# Patient Record
Sex: MALE | Race: WHITE | NOT HISPANIC OR LATINO | Employment: OTHER | URBAN - METROPOLITAN AREA
[De-identification: names, ages, dates, MRNs, and addresses within clinical notes are randomized per-mention and may not be internally consistent; named-entity substitution may affect disease eponyms.]

---

## 2017-01-11 ENCOUNTER — PATIENT MESSAGE (OUTPATIENT)
Dept: CARDIOLOGY | Facility: CLINIC | Age: 72
End: 2017-01-11

## 2017-01-15 RX ORDER — METOPROLOL TARTRATE 50 MG/1
TABLET ORAL
Qty: 180 TABLET | Refills: 3 | Status: SHIPPED | OUTPATIENT
Start: 2017-01-15 | End: 2018-01-02 | Stop reason: SDUPTHER

## 2017-01-16 ENCOUNTER — ANTI-COAG VISIT (OUTPATIENT)
Dept: CARDIOLOGY | Facility: CLINIC | Age: 72
End: 2017-01-16

## 2017-01-16 ENCOUNTER — LAB VISIT (OUTPATIENT)
Dept: LAB | Facility: HOSPITAL | Age: 72
End: 2017-01-16
Attending: INTERNAL MEDICINE
Payer: MEDICARE

## 2017-01-16 DIAGNOSIS — I25.10 CORONARY ARTERY DISEASE INVOLVING NATIVE CORONARY ARTERY OF NATIVE HEART WITHOUT ANGINA PECTORIS: ICD-10-CM

## 2017-01-16 DIAGNOSIS — E78.5 HLD (HYPERLIPIDEMIA): ICD-10-CM

## 2017-01-16 DIAGNOSIS — Z79.01 LONG TERM CURRENT USE OF ANTICOAGULANT THERAPY: ICD-10-CM

## 2017-01-16 DIAGNOSIS — I27.82 OTHER CHRONIC PULMONARY EMBOLISM WITHOUT ACUTE COR PULMONALE: ICD-10-CM

## 2017-01-16 LAB
ALT SERPL W/O P-5'-P-CCNC: 26 U/L
ANION GAP SERPL CALC-SCNC: 5 MMOL/L
AST SERPL-CCNC: 27 U/L
BUN SERPL-MCNC: 21 MG/DL
CALCIUM SERPL-MCNC: 9.3 MG/DL
CHLORIDE SERPL-SCNC: 106 MMOL/L
CHOLEST/HDLC SERPL: 3.5 {RATIO}
CO2 SERPL-SCNC: 27 MMOL/L
CREAT SERPL-MCNC: 0.8 MG/DL
EST. GFR  (AFRICAN AMERICAN): >60 ML/MIN/1.73 M^2
EST. GFR  (NON AFRICAN AMERICAN): >60 ML/MIN/1.73 M^2
GLUCOSE SERPL-MCNC: 113 MG/DL
HCT VFR BLD AUTO: 42.8 %
HDL/CHOLESTEROL RATIO: 28.7 %
HDLC SERPL-MCNC: 129 MG/DL
HDLC SERPL-MCNC: 37 MG/DL
HGB BLD-MCNC: 14.1 G/DL
INR PPP: 2.2
LDLC SERPL CALC-MCNC: 71.4 MG/DL
NONHDLC SERPL-MCNC: 92 MG/DL
POTASSIUM SERPL-SCNC: 4.7 MMOL/L
PROTHROMBIN TIME: 22.5 SEC
SODIUM SERPL-SCNC: 138 MMOL/L
TRIGL SERPL-MCNC: 103 MG/DL

## 2017-01-16 PROCEDURE — 80061 LIPID PANEL: CPT

## 2017-01-16 PROCEDURE — 36415 COLL VENOUS BLD VENIPUNCTURE: CPT

## 2017-01-16 PROCEDURE — 85014 HEMATOCRIT: CPT

## 2017-01-16 PROCEDURE — 85610 PROTHROMBIN TIME: CPT

## 2017-01-16 PROCEDURE — 84450 TRANSFERASE (AST) (SGOT): CPT

## 2017-01-16 PROCEDURE — 85018 HEMOGLOBIN: CPT

## 2017-01-16 PROCEDURE — 84460 ALANINE AMINO (ALT) (SGPT): CPT

## 2017-01-16 PROCEDURE — 80048 BASIC METABOLIC PNL TOTAL CA: CPT

## 2017-01-20 ENCOUNTER — OFFICE VISIT (OUTPATIENT)
Dept: CARDIOLOGY | Facility: CLINIC | Age: 72
End: 2017-01-20
Payer: MEDICARE

## 2017-01-20 VITALS
HEART RATE: 52 BPM | HEIGHT: 76 IN | WEIGHT: 310.44 LBS | SYSTOLIC BLOOD PRESSURE: 112 MMHG | BODY MASS INDEX: 37.8 KG/M2 | DIASTOLIC BLOOD PRESSURE: 56 MMHG

## 2017-01-20 DIAGNOSIS — Z79.01 LONG TERM CURRENT USE OF ANTICOAGULANT THERAPY: ICD-10-CM

## 2017-01-20 DIAGNOSIS — I10 ESSENTIAL HYPERTENSION: ICD-10-CM

## 2017-01-20 DIAGNOSIS — E78.2 MIXED HYPERLIPIDEMIA: ICD-10-CM

## 2017-01-20 DIAGNOSIS — I77.9 LEFT-SIDED CAROTID ARTERY DISEASE: ICD-10-CM

## 2017-01-20 DIAGNOSIS — Z95.1 S/P CABG X 2: ICD-10-CM

## 2017-01-20 DIAGNOSIS — I25.10 CORONARY ARTERY DISEASE INVOLVING NATIVE CORONARY ARTERY OF NATIVE HEART WITHOUT ANGINA PECTORIS: Primary | ICD-10-CM

## 2017-01-20 DIAGNOSIS — I26.99 OTHER PULMONARY EMBOLISM WITHOUT ACUTE COR PULMONALE, UNSPECIFIED CHRONICITY: ICD-10-CM

## 2017-01-20 PROCEDURE — 1157F ADVNC CARE PLAN IN RCRD: CPT | Mod: S$GLB,,, | Performed by: INTERNAL MEDICINE

## 2017-01-20 PROCEDURE — 3078F DIAST BP <80 MM HG: CPT | Mod: S$GLB,,, | Performed by: INTERNAL MEDICINE

## 2017-01-20 PROCEDURE — 1126F AMNT PAIN NOTED NONE PRSNT: CPT | Mod: S$GLB,,, | Performed by: INTERNAL MEDICINE

## 2017-01-20 PROCEDURE — 93000 ELECTROCARDIOGRAM COMPLETE: CPT | Mod: S$GLB,,, | Performed by: INTERNAL MEDICINE

## 2017-01-20 PROCEDURE — 99215 OFFICE O/P EST HI 40 MIN: CPT | Mod: S$GLB,,, | Performed by: INTERNAL MEDICINE

## 2017-01-20 PROCEDURE — 1159F MED LIST DOCD IN RCRD: CPT | Mod: S$GLB,,, | Performed by: INTERNAL MEDICINE

## 2017-01-20 PROCEDURE — 99499 UNLISTED E&M SERVICE: CPT | Mod: S$GLB,,, | Performed by: INTERNAL MEDICINE

## 2017-01-20 PROCEDURE — 3074F SYST BP LT 130 MM HG: CPT | Mod: S$GLB,,, | Performed by: INTERNAL MEDICINE

## 2017-01-20 PROCEDURE — 99999 PR PBB SHADOW E&M-EST. PATIENT-LVL III: CPT | Mod: PBBFAC,,, | Performed by: INTERNAL MEDICINE

## 2017-01-20 PROCEDURE — 1160F RVW MEDS BY RX/DR IN RCRD: CPT | Mod: S$GLB,,, | Performed by: INTERNAL MEDICINE

## 2017-01-20 NOTE — MR AVS SNAPSHOT
[place] [facility]   [facility]  [place] [address]  Phone: [phone]                  Del Anand   2017 8:30 AM   Office Visit    Description:  Male : 1945   Provider:  Blanquita Levin MD   Department:  [place] [facility]           Reason for Visit     Coronary Artery Disease           Diagnoses this Visit        Comments    Coronary artery disease involving native coronary artery of native heart without angina pectoris                To Do List           Future Appointments        Provider Department Dept Phone    2017 2:30 PM Stephanie Vega, PharmD [place] [facility] [phone]      Goals (5 Years of Data)     None      [facility] On Call     [facility] On Call Nurse Care Line -  Assistance  Registered nurses in the [facility] On Call Center provide clinical advisement, health education, appointment booking, and other advisory services.  Call for this free service at [phone].             Medications           Message regarding Medications     Verify the changes and/or additions to your medication regime listed below are the same as discussed with your clinician today.  If any of these changes or additions are incorrect, please notify your healthcare provider.        STOP taking these medications     hydrocortisone 2.5 % cream Apply topically 2 (two) times daily.    methylPREDNISolone (MEDROL, FINA,) 4 mg tablet use as directed           Verify that the below list of medications is an accurate representation of the medications you are currently taking.  If none reported, the list may be blank. If incorrect, please contact your healthcare provider. Carry this list with you in case of emergency.           Current Medications     acetaminophen (TYLENOL) 325 MG tablet Take 325 mg by mouth as needed for Pain.    cyanocobalamin (B-12 DOTS) 500 MCG tablet Take 500 mcg by mouth every morning. Every day    FOLIC ACID/MULTIVITS-MIN/LUT (CENTRUM SILVER  "ORAL) Take 1 tablet by mouth once daily.    metoprolol tartrate (LOPRESSOR) 50 MG tablet TAKE 1 TABLET (50 MG TOTAL) BY MOUTH 2 (TWO) TIMES DAILY.    ramipril (ALTACE) 5 MG capsule TAKE 1 CAPSULE ONE TIME DAILY    rosuvastatin (CRESTOR) 40 MG Tab Take 1 tablet (40 mg total) by mouth once daily.    warfarin (COUMADIN) 10 MG tablet TAKE AS DIRECTED  BY  COUMADIN  CLINIC (CURRENT DOSE IS 10MG DAILY EXCEPT 15MG ON MONDAY,WEDNESDAY AND FRIDAY)     warfarin (COUMADIN) 5 MG tablet Take as directed by Coumadin Clinic. Current dose is 10mg daily except 15mg Monday, Wednesday, Friday (90 day supply)           Clinical Reference Information           Vital Signs - Last Recorded  Most recent update: 1/20/2017  8:42 AM by Sara Patterson LPN    BP Pulse Ht Wt BMI    (!) 112/56 (!) 52 6' 4" (1.93 m) (!) 140.8 kg (310 lb 6.5 oz) 37.78 kg/m2      Blood Pressure          Most Recent Value    BP  (!)  112/56      Allergies as of 1/20/2017     No Known Allergies      Immunizations Administered on Date of Encounter - 1/20/2017     None      Orders Placed During Today's Visit      Normal Orders This Visit    EKG 12-lead       Instructions    Component      Latest Ref Rng & Units 1/16/2017 3/28/2016 10/7/2015 7/31/2015                Cholesterol      120 - 199 mg/dL 129 188 160 177   Triglycerides      30 - 150 mg/dL 103 230 (H) 223 (H) 186 (H)   HDL      40 - 75 mg/dL 37 (L) 42 40 45   LDL Cholesterol      63.0 - 159.0 mg/dL 71.4 100.0 75.4 94.8   HDL/Chol Ratio      20.0 - 50.0 % 28.7 22.3 25.0 25.4     Component      Latest Ref Rng & Units 6/24/2015 3/16/2015 1/14/2015 1/7/2015              8:25 AM   Cholesterol      120 - 199 mg/dL 152 139 118 (L) 122   Triglycerides      30 - 150 mg/dL 171 (H) 102 102 97   HDL      40 - 75 mg/dL 43 44 31 (L) 39 (L)   LDL Cholesterol      63.0 - 159.0 mg/dL 74.8 74.6 66.6 63.6   HDL/Chol Ratio      20.0 - 50.0 % 28.3 31.7 26.3 32.0     Component      Latest Ref Rng & Units 1/7/2015 12/16/2014 6/3/2014 " 12/4/2013           8:25 AM      Cholesterol      120 - 199 mg/dL 122 123 177 169   Triglycerides      30 - 150 mg/dL 97 209 (H) 196 (H) 185 (H)   HDL      40 - 75 mg/dL 39 (L) 26 (L) 44 44   LDL Cholesterol      63.0 - 159.0 mg/dL 63.6 55.2 (L) 93.8 88.0   HDL/Chol Ratio      20.0 - 50.0 % 32.0 21.1 24.9 26.0     Component      Latest Ref Rng & Units 5/24/2013 6/1/2012              Cholesterol      120 - 199 mg/dL 177 174   Triglycerides      30 - 150 mg/dL 167 (H) 162 (H)   HDL      40 - 75 mg/dL 44 46   LDL Cholesterol      63.0 - 159.0 mg/dL 100.0 96.0   HDL/Chol Ratio      20.0 - 50.0 % 24.9 26.4         LDL - bad type - improves with diet and medications: typically statins; most other medications can lower LDL but have not been proven to prevent heart attacks.             May not improve significantly with exercise alone.             Ideally less than 70     HDL - good type - improves with exercise             Ideally greater than 50    TGs (triglycerides) - also bad - can change very quickly and considerably with food - improves with diet and exercise            In some cases a low carbohydrate diet will lower TGs better than a low fat diet.            Ideal range       Sugar, fat and cholesterol in food:     Current dietary guidelines recommend drastically reducing our intake of sugar. There is less emphasis on fat. And cholesterol in our food is no longer a significant concern. However please do not confuse this with the role of cholesterol in our blood and arteries. It is still cholesterol that clogs up our arteries whether it comes from our food or is manufactured by our bodies.       Most foods that are high in cholesterol are also high in saturated fat. But there is way more saturated fat than cholesterol in these foods. On the other hand there are a handful of foods that are high in cholesterol but do not contain much saturated fat: eggs, shrimp, crab legs and crawfish; these are OK to eat.        Saturated fat is the bad fat - you should limit your intake of this. Deep fried foods, meats and other animal fats are high in saturated fat. Cookies, donuts and most dessert and cakes are usually high in both saturated fat and sugar.       Unsaturated fat is the good fat. It contains the same number of calories as saturated fat but does not get deposited in our arteries. The Mediterranean style diet encourages the intake of unsaturated fat - olive oil, avocado and unsalted nuts.      You should eat a few servings of vegetables (and fruit as long as you are not diabetic) everyday. Substitute some plant proteins in place of meat: soy, beans, lentils, quinoa and oatmeal.     Do not use stick butter or stick margarine. Butter that comes in a tub is soft butter. It consists of 1/2 butter and 1/2 canola or another type of vegetable oil. It is fine to use that.       Trans fats should also be avoided. Most foods that are labelled as containing 0 gms of trans fat can still contain several hundred milligrams of trans fat: creamer, margarine, refrigerator dough, deep fried foods, ready made frosting, potato, corn and torilla chips, cakes, cookies, pie crusts and crackers containing shortening made with hydrogenated vegetable oil.

## 2017-01-20 NOTE — PATIENT INSTRUCTIONS
Component      Latest Ref Rng & Units 1/16/2017 3/28/2016 10/7/2015 7/31/2015                Cholesterol      120 - 199 mg/dL 129 188 160 177   Triglycerides      30 - 150 mg/dL 103 230 (H) 223 (H) 186 (H)   HDL      40 - 75 mg/dL 37 (L) 42 40 45   LDL Cholesterol      63.0 - 159.0 mg/dL 71.4 100.0 75.4 94.8   HDL/Chol Ratio      20.0 - 50.0 % 28.7 22.3 25.0 25.4     Component      Latest Ref Rng & Units 6/24/2015 3/16/2015 1/14/2015 1/7/2015              8:25 AM   Cholesterol      120 - 199 mg/dL 152 139 118 (L) 122   Triglycerides      30 - 150 mg/dL 171 (H) 102 102 97   HDL      40 - 75 mg/dL 43 44 31 (L) 39 (L)   LDL Cholesterol      63.0 - 159.0 mg/dL 74.8 74.6 66.6 63.6   HDL/Chol Ratio      20.0 - 50.0 % 28.3 31.7 26.3 32.0     Component      Latest Ref Rng & Units 1/7/2015 12/16/2014 6/3/2014 12/4/2013           8:25 AM      Cholesterol      120 - 199 mg/dL 122 123 177 169   Triglycerides      30 - 150 mg/dL 97 209 (H) 196 (H) 185 (H)   HDL      40 - 75 mg/dL 39 (L) 26 (L) 44 44   LDL Cholesterol      63.0 - 159.0 mg/dL 63.6 55.2 (L) 93.8 88.0   HDL/Chol Ratio      20.0 - 50.0 % 32.0 21.1 24.9 26.0     Component      Latest Ref Rng & Units 5/24/2013 6/1/2012              Cholesterol      120 - 199 mg/dL 177 174   Triglycerides      30 - 150 mg/dL 167 (H) 162 (H)   HDL      40 - 75 mg/dL 44 46   LDL Cholesterol      63.0 - 159.0 mg/dL 100.0 96.0   HDL/Chol Ratio      20.0 - 50.0 % 24.9 26.4         LDL - bad type - improves with diet and medications: typically statins; most other medications can lower LDL but have not been proven to prevent heart attacks.             May not improve significantly with exercise alone.             Ideally less than 70     HDL - good type - improves with exercise             Ideally greater than 50    TGs (triglycerides) - also bad - can change very quickly and considerably with food - improves with diet and exercise            In some cases a low carbohydrate diet will lower  TGs better than a low fat diet.            Ideal range       Sugar, fat and cholesterol in food:     Current dietary guidelines recommend drastically reducing our intake of sugar. There is less emphasis on fat. And cholesterol in our food is no longer a significant concern. However please do not confuse this with the role of cholesterol in our blood and arteries. It is still cholesterol that clogs up our arteries whether it comes from our food or is manufactured by our bodies.       Most foods that are high in cholesterol are also high in saturated fat. But there is way more saturated fat than cholesterol in these foods. On the other hand there are a handful of foods that are high in cholesterol but do not contain much saturated fat: eggs, shrimp, crab legs and crawfish; these are OK to eat.       Saturated fat is the bad fat - you should limit your intake of this. Deep fried foods, meats and other animal fats are high in saturated fat. Cookies, donuts and most dessert and cakes are usually high in both saturated fat and sugar.       Unsaturated fat is the good fat. It contains the same number of calories as saturated fat but does not get deposited in our arteries. The Mediterranean style diet encourages the intake of unsaturated fat - olive oil, avocado and unsalted nuts.      You should eat a few servings of vegetables (and fruit as long as you are not diabetic) everyday. Substitute some plant proteins in place of meat: soy, beans, lentils, quinoa and oatmeal.     Do not use stick butter or stick margarine. Butter that comes in a tub is soft butter. It consists of 1/2 butter and 1/2 canola or another type of vegetable oil. It is fine to use that.       Trans fats should also be avoided. Most foods that are labelled as containing 0 gms of trans fat can still contain several hundred milligrams of trans fat: creamer, margarine, refrigerator dough, deep fried foods, ready made frosting, potato, corn and torilla  chips, cakes, cookies, pie crusts and crackers containing shortening made with hydrogenated vegetable oil.

## 2017-01-20 NOTE — PROGRESS NOTES
Subjective:   Patient ID:  Del Anand is a 71 y.o. male who presents for follow-up of Coronary Artery Disease      Problem List:  CAD   CABG x 2 - SVG-LAD, SVG-ramus   HTN  Hyperlipidemia   Obesity   Prior tobacco use - 1ppd x 20 yrs (quit 1987)   Pulm embolism 1/15   DVT (R) leg while on rivaroxaban     HPI:   Del Anand feels generally well.   He remains on warfarin, because he had a DVT while on rivaroxaban. He does not report excessive bruising, nose bleeds, melena or other bleeding from other sites.  He does not report angina or shortness of breath with exertion. He has not required S/L NTG.    Exercising at [employer].  He has a tooth that may need to be extracted at a later date.  On 40 mg of rosuvastatin LDL is <70. Hepatic transaminases are within normal limits.       Review of Systems   Constitution: Positive for weight gain. Negative for malaise/fatigue and weight loss.   Cardiovascular: Negative for chest pain, dyspnea on exertion, leg swelling and palpitations.   Hematologic/Lymphatic: Does not bruise/bleed easily.   Musculoskeletal: Negative for arthritis and muscle cramps.   Gastrointestinal: Negative for abdominal pain and melena.       Current Outpatient Prescriptions   Medication Sig    acetaminophen (TYLENOL) 325 MG tablet Take 325 mg by mouth as needed for Pain.    cyanocobalamin (B-12 DOTS) 500 MCG tablet Take 500 mcg by mouth every morning. Every day    FOLIC ACID/MULTIVITS-MIN/LUT (CENTRUM SILVER ORAL) Take 1 tablet by mouth once daily.    metoprolol tartrate (LOPRESSOR) 50 MG tablet TAKE 1 TABLET (50 MG TOTAL) BY MOUTH 2 (TWO) TIMES DAILY.    ramipril (ALTACE) 5 MG capsule TAKE 1 CAPSULE ONE TIME DAILY    rosuvastatin (CRESTOR) 40 MG Tab Take 1 tablet (40 mg total) by mouth once daily.    warfarin (COUMADIN) 10 MG tablet TAKE AS DIRECTED  BY  COUMADIN  CLINIC          Social History   Substance Use Topics    Smoking status: Former Smoker     Packs/day: 1.00      "Years: 20.00     Types: Cigarettes     Quit date: 10/16/1987    Smokeless tobacco: Former User    Alcohol use 0.0 oz/week     1 - 2 Cans of beer, 1 - 2 Standard drinks or equivalent per week      Comment: daily         Objective:     Physical Exam   Constitutional: He is oriented to person, place, and time. He appears well-developed and well-nourished.   BP (!) 112/56  Pulse (!) 52  Ht 6' 4" (1.93 m)  Wt (!) 140.8 kg (310 lb 6.5 oz)  BMI 37.78 kg/m2     HENT:   Head: Normocephalic.   Neck: No JVD present.   Cardiovascular: Normal rate, regular rhythm, S1 normal and S2 normal.    No murmur heard.  Pulses:       Radial pulses are 2+ on the right side, and 2+ on the left side.   Pulmonary/Chest: Breath sounds normal. Chest wall is not dull to percussion.   Abdominal: Soft. He exhibits no mass. There is no splenomegaly or hepatomegaly.   Musculoskeletal:        Right lower leg: He exhibits no edema.        Left lower leg: He exhibits no edema.   Neurological: He is alert and oriented to person, place, and time. Gait normal.   Skin: No bruising noted. Nails show no clubbing.   Psychiatric: He has a normal mood and affect. His speech is normal and behavior is normal.           Lab Results   Component Value Date    CHOL 129 01/16/2017    HDL 37 (L) 01/16/2017    LDLCALC 71.4 01/16/2017    TRIG 103 01/16/2017    CHOLHDL 28.7 01/16/2017     Lab Results   Component Value Date     (H) 01/16/2017    CREATININE 0.8 01/16/2017    BUN 21 01/16/2017     01/16/2017    K 4.7 01/16/2017     01/16/2017    CO2 27 01/16/2017     Lab Results   Component Value Date    ALT 26 01/16/2017    AST 27 01/16/2017    ALKPHOS 83 03/28/2016    BILITOT 0.7 03/28/2016       ECG today reviewed by me. It reveals sinus rhythm with no ST or T abnormality.      Assessment:     1. Coronary artery disease involving native coronary artery of native heart without angina pectoris    2. Mixed hyperlipidemia    3. Pulmonary embolism    4. S/P " CABG x 2    5. Essential hypertension    6. Long term current use of anticoagulant therapy    7. Left-sided carotid artery disease          Plan:     Coronary artery disease  Stable. Continue current meds.    Essential hypertension  Check at home. Also verify accuracy of home BP monitor.    Mixed hyperlipidemia  Stable.  Continue same meds.   Cholesterol education.  Nutritional counseling.    Left-sided carotid artery disease  Repeat carotid u/s at next visit.    RTC 10/17.

## 2017-01-24 ENCOUNTER — TELEPHONE (OUTPATIENT)
Dept: CARDIOLOGY | Facility: CLINIC | Age: 72
End: 2017-01-24

## 2017-01-24 VITALS — DIASTOLIC BLOOD PRESSURE: 64 MMHG | SYSTOLIC BLOOD PRESSURE: 119 MMHG

## 2017-01-24 NOTE — PROGRESS NOTES
Patient, Del Anand (MRN #[id number]), presented with a recorded BMI of 37.78 kg/m^2 and a documented comorbidity(s):  - Hypertension  - Hyperlipidemia  to which the severe obesity is a contributing factor. This is consistent with the definition of severe obesity (BMI 35.0-35.9) with comorbidity (ICD-10 E66.01, Z68.35). The patient's severe obesity was monitored, evaluated, addressed and/or treated. This addendum to the medical record is made on 01/24/2017.

## 2017-02-13 ENCOUNTER — ANTI-COAG VISIT (OUTPATIENT)
Dept: CARDIOLOGY | Facility: CLINIC | Age: 72
End: 2017-02-13
Payer: MEDICARE

## 2017-02-13 DIAGNOSIS — Z79.01 LONG TERM CURRENT USE OF ANTICOAGULANT THERAPY: ICD-10-CM

## 2017-02-13 LAB — INR PPP: 2.9 (ref 2–3)

## 2017-02-13 PROCEDURE — 99211 OFF/OP EST MAY X REQ PHY/QHP: CPT | Mod: 25,S$GLB,,

## 2017-02-13 PROCEDURE — 85610 PROTHROMBIN TIME: CPT | Mod: QW,S$GLB,,

## 2017-02-13 NOTE — PROGRESS NOTES
Patient started Lopressor. This change in medication won't affect warfarin. He is stable on this dose. He will continue this dose until follow-up. I advised him to contact us with any changes or problems.

## 2017-03-09 NOTE — PROGRESS NOTES
Patient called complaining about the co-payments for a face to face visit. He is going to look into his options for a DOAC and contact us back.

## 2017-04-03 RX ORDER — RAMIPRIL 5 MG/1
5 CAPSULE ORAL DAILY
Qty: 90 CAPSULE | Refills: 1 | Status: SHIPPED | OUTPATIENT
Start: 2017-04-03 | End: 2017-10-01 | Stop reason: SDUPTHER

## 2017-04-04 DIAGNOSIS — E78.2 MIXED HYPERLIPIDEMIA: ICD-10-CM

## 2017-04-04 DIAGNOSIS — R73.9 HYPERGLYCEMIA: Primary | ICD-10-CM

## 2017-04-04 DIAGNOSIS — I10 UNSPECIFIED ESSENTIAL HYPERTENSION: ICD-10-CM

## 2017-04-17 ENCOUNTER — PATIENT MESSAGE (OUTPATIENT)
Dept: CARDIOLOGY | Facility: CLINIC | Age: 72
End: 2017-04-17

## 2017-04-17 RX ORDER — ROSUVASTATIN CALCIUM 40 MG/1
40 TABLET, COATED ORAL DAILY
Qty: 90 TABLET | Refills: 3 | Status: SHIPPED | OUTPATIENT
Start: 2017-04-17 | End: 2018-04-16 | Stop reason: SDUPTHER

## 2017-04-24 ENCOUNTER — ANTI-COAG VISIT (OUTPATIENT)
Dept: CARDIOLOGY | Facility: CLINIC | Age: 72
End: 2017-04-24
Payer: MEDICARE

## 2017-04-24 DIAGNOSIS — Z79.01 LONG TERM CURRENT USE OF ANTICOAGULANT THERAPY: Primary | ICD-10-CM

## 2017-04-24 LAB — INR PPP: 3.5 (ref 2–3)

## 2017-04-24 PROCEDURE — 85610 PROTHROMBIN TIME: CPT | Mod: QW,S$GLB,,

## 2017-04-24 PROCEDURE — 99211 OFF/OP EST MAY X REQ PHY/QHP: CPT | Mod: 25,S$GLB,,

## 2017-04-24 NOTE — PROGRESS NOTES
Patient will eat greens tonight to help reduce his INR. This change in diet will prevent his INR from continuing to climb. Patient is stable on this dose. He will continue this dose until follow-up. I advised him and his wife to contact us with any changes or problems.

## 2017-06-05 ENCOUNTER — PATIENT MESSAGE (OUTPATIENT)
Dept: CARDIOLOGY | Facility: CLINIC | Age: 72
End: 2017-06-05

## 2017-06-06 ENCOUNTER — LAB VISIT (OUTPATIENT)
Dept: LAB | Facility: HOSPITAL | Age: 72
End: 2017-06-06
Attending: INTERNAL MEDICINE
Payer: MEDICARE

## 2017-06-06 ENCOUNTER — ANTI-COAG VISIT (OUTPATIENT)
Dept: CARDIOLOGY | Facility: CLINIC | Age: 72
End: 2017-06-06

## 2017-06-06 DIAGNOSIS — Z79.01 LONG TERM CURRENT USE OF ANTICOAGULANT THERAPY: ICD-10-CM

## 2017-06-06 DIAGNOSIS — E11.9 TYPE 2 DIABETES MELLITUS WITHOUT COMPLICATION: ICD-10-CM

## 2017-06-06 DIAGNOSIS — I10 UNSPECIFIED ESSENTIAL HYPERTENSION: ICD-10-CM

## 2017-06-06 DIAGNOSIS — R73.9 HYPERGLYCEMIA: ICD-10-CM

## 2017-06-06 DIAGNOSIS — D64.9 ANEMIA: ICD-10-CM

## 2017-06-06 DIAGNOSIS — E78.2 MIXED HYPERLIPIDEMIA: ICD-10-CM

## 2017-06-06 LAB
ALBUMIN SERPL BCP-MCNC: 3.6 G/DL
ALP SERPL-CCNC: 83 U/L
ALT SERPL W/O P-5'-P-CCNC: 35 U/L
ANION GAP SERPL CALC-SCNC: 7 MMOL/L
AST SERPL-CCNC: 32 U/L
BASOPHILS # BLD AUTO: 0.04 K/UL
BASOPHILS NFR BLD: 0.7 %
BILIRUB SERPL-MCNC: 0.7 MG/DL
BUN SERPL-MCNC: 22 MG/DL
CALCIUM SERPL-MCNC: 8.6 MG/DL
CHLORIDE SERPL-SCNC: 104 MMOL/L
CHOLEST/HDLC SERPL: 3.6 {RATIO}
CO2 SERPL-SCNC: 26 MMOL/L
CREAT SERPL-MCNC: 0.8 MG/DL
DIFFERENTIAL METHOD: ABNORMAL
EOSINOPHIL # BLD AUTO: 0.1 K/UL
EOSINOPHIL NFR BLD: 2.1 %
ERYTHROCYTE [DISTWIDTH] IN BLOOD BY AUTOMATED COUNT: 13.4 %
EST. GFR  (AFRICAN AMERICAN): >60 ML/MIN/1.73 M^2
EST. GFR  (NON AFRICAN AMERICAN): >60 ML/MIN/1.73 M^2
ESTIMATED AVG GLUCOSE: 126 MG/DL
GLUCOSE SERPL-MCNC: 115 MG/DL
HBA1C MFR BLD HPLC: 6 %
HCT VFR BLD AUTO: 41.5 %
HDL/CHOLESTEROL RATIO: 27.5 %
HDLC SERPL-MCNC: 142 MG/DL
HDLC SERPL-MCNC: 39 MG/DL
HGB BLD-MCNC: 13.6 G/DL
INR PPP: 2.3
IRON SERPL-MCNC: 84 UG/DL
LDLC SERPL CALC-MCNC: 69.4 MG/DL
LYMPHOCYTES # BLD AUTO: 1.9 K/UL
LYMPHOCYTES NFR BLD: 34.5 %
MCH RBC QN AUTO: 32.3 PG
MCHC RBC AUTO-ENTMCNC: 32.8 %
MCV RBC AUTO: 99 FL
MONOCYTES # BLD AUTO: 0.5 K/UL
MONOCYTES NFR BLD: 9.1 %
NEUTROPHILS # BLD AUTO: 2.9 K/UL
NEUTROPHILS NFR BLD: 53.6 %
NONHDLC SERPL-MCNC: 103 MG/DL
PLATELET # BLD AUTO: 167 K/UL
PMV BLD AUTO: 11.1 FL
POTASSIUM SERPL-SCNC: 4.5 MMOL/L
PROT SERPL-MCNC: 6.8 G/DL
PROTHROMBIN TIME: 22.9 SEC
RBC # BLD AUTO: 4.21 M/UL
SATURATED IRON: 23 %
SODIUM SERPL-SCNC: 137 MMOL/L
TOTAL IRON BINDING CAPACITY: 371 UG/DL
TRANSFERRIN SERPL-MCNC: 251 MG/DL
TRIGL SERPL-MCNC: 168 MG/DL
TSH SERPL DL<=0.005 MIU/L-ACNC: 2.1 UIU/ML
WBC # BLD AUTO: 5.36 K/UL

## 2017-06-06 PROCEDURE — 80053 COMPREHEN METABOLIC PANEL: CPT

## 2017-06-06 PROCEDURE — 36415 COLL VENOUS BLD VENIPUNCTURE: CPT | Mod: PO

## 2017-06-06 PROCEDURE — 80061 LIPID PANEL: CPT

## 2017-06-06 PROCEDURE — 85025 COMPLETE CBC W/AUTO DIFF WBC: CPT

## 2017-06-06 PROCEDURE — 83036 HEMOGLOBIN GLYCOSYLATED A1C: CPT

## 2017-06-06 PROCEDURE — 84443 ASSAY THYROID STIM HORMONE: CPT

## 2017-06-06 PROCEDURE — 83540 ASSAY OF IRON: CPT

## 2017-06-06 PROCEDURE — 85610 PROTHROMBIN TIME: CPT

## 2017-06-06 NOTE — PROGRESS NOTES
Patient called to report that his next lab appointment is due 6/23/17 but had other labs scheduled for today and states his INR was also drawn, appointment from 6/23 was rescheduled to today-6/06/17

## 2017-06-07 ENCOUNTER — TELEPHONE (OUTPATIENT)
Dept: INTERNAL MEDICINE | Facility: CLINIC | Age: 72
End: 2017-06-07

## 2017-06-07 NOTE — TELEPHONE ENCOUNTER
----- Message from Amber Huertas MD sent at 6/7/2017  7:46 AM CDT -----  Please review your lab work and we will discuss at your pending office visit.  Amber Jimenez

## 2017-06-07 NOTE — PROGRESS NOTES
"Patient upset about having to go to [facility] [place]. He reports that he spoke to the "girl" at the [facility] lab and she advised him that they have lots of patients going there for INR testing. I explained our protocol to him again about point of care face to face management and added that despite this he  has been given the opportunity to alternate between the [facility] and [facility]. I also advised that it is out goal to only have him check his INR every 8 weeks once he has safe and stable INRs. In addition, he is not interested in a home meter since this is a "hassle".  He is still upset about the copay of $20 and would like to speak to management. He will call for Stephanie Vega when she is in office tomorrow. "This note will not be shared with the patient."  "

## 2017-06-13 ENCOUNTER — OFFICE VISIT (OUTPATIENT)
Dept: INTERNAL MEDICINE | Facility: CLINIC | Age: 72
End: 2017-06-13
Payer: MEDICARE

## 2017-06-13 VITALS
HEIGHT: 75 IN | SYSTOLIC BLOOD PRESSURE: 110 MMHG | TEMPERATURE: 98 F | BODY MASS INDEX: 38.71 KG/M2 | HEART RATE: 81 BPM | WEIGHT: 311.31 LBS | OXYGEN SATURATION: 97 % | DIASTOLIC BLOOD PRESSURE: 72 MMHG | RESPIRATION RATE: 16 BRPM

## 2017-06-13 DIAGNOSIS — I10 ESSENTIAL HYPERTENSION: ICD-10-CM

## 2017-06-13 DIAGNOSIS — I77.9 LEFT-SIDED CAROTID ARTERY DISEASE: ICD-10-CM

## 2017-06-13 DIAGNOSIS — E78.2 MIXED HYPERLIPIDEMIA: ICD-10-CM

## 2017-06-13 DIAGNOSIS — Z00.00 ANNUAL PHYSICAL EXAM: Primary | ICD-10-CM

## 2017-06-13 DIAGNOSIS — R73.01 IMPAIRED FASTING BLOOD SUGAR: ICD-10-CM

## 2017-06-13 DIAGNOSIS — G47.33 OSA ON CPAP: ICD-10-CM

## 2017-06-13 PROCEDURE — 99999 PR PBB SHADOW E&M-EST. PATIENT-LVL III: CPT | Mod: PBBFAC,,, | Performed by: INTERNAL MEDICINE

## 2017-06-13 PROCEDURE — 99397 PER PM REEVAL EST PAT 65+ YR: CPT | Mod: S$GLB,,, | Performed by: INTERNAL MEDICINE

## 2017-06-13 PROCEDURE — 99499 UNLISTED E&M SERVICE: CPT | Mod: S$GLB,,, | Performed by: INTERNAL MEDICINE

## 2017-06-13 NOTE — PROGRESS NOTES
72-year-old gentleman presents  for Annual PE   Ex- smoker having quit over 20 years ago,  Social alcohol consumer.     SCREENING TESTS:   Cholesterol/lab, reviewed today   US carotids 40-49%  Colonoscopy 08/17/11, with Dr. Martinez. He had 1 polyp;hyperplastic .  PSA 0.21.( last check)   Eye exam and dental work are up-to-date.    VACCINATIONS:  Zostavax, ~ 5 yrs ago  TDAP, 12/2013  Pneumovax, 10/2012  Prevnar, 2015  Flu vaccine., yearly    MedCard: Reviewed.     REVIEW OF SYSTEMS:   ++ weight , interested in diet- reviewed Weight Watchers  No fever, chill, or night sweats  No dysphagia or early satiety  No change in bowel or bladder function.  Not having increased thirst or urination.  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 dementia  Mental Health: Good   AD's: + will, living will, and POA  Nutrition: Good  Gait: No falls  Safety: Intact  Urinary incontinence: n/a, nocturia- rare; ++ urinary hesitency  Remainder of review negative except as previously noted.     PAST MEDICAL HISTORY:   Dyslipidemia.  Hypertension  Elevated LFTs,   Impaired fasting glucose/prediabetes  Metabolic syndrome  Fatty liver  Anemia with workup by hem/onc  unremarkable.   DJD, knees and hips  Renal stones,   Sinus and rhinitis.  Colon  polyps   DVT  Pulmonary embolism    PHYSICAL EXAM:   VSS:   GENERAL: Alert and oriented, in no acute distress. Well-developed,   well-nourished, obese gentleman, conversant, and cooperative. Pleasant as always  EYES: Conjunctivae and lids unremarkable. Sclerae anicteric.   Pupils reactive.   ENT:Canals w/o significant cerumen, TMS- unremarkable   Nasal mucosa, tturbinates, oropharynx injected w/o  exudate  Sinuses nontender.   NECK: Supple. No thyromegaly or lymphadenopathy.   RESPIRATORY: Efforts unlabored.   LUNGS: Clear to auscultation.   HEART: Regular rate and rhythm. No carotid bruits,   1+ pedal pulse. No edema.   ABDOMEN: Bowel sounds present, soft, nontender.   No hepatosplenomegaly appreciated.   MUSCULOSKELETAL: Gait normal.   Hands OA, right >> left  Left knee, decreased ROM, + crepitus, +  Tenderness, no instaibily  No clubbing, cyanosis, or edema.  NEURO: BECK. No tremor noted.  SKIN: Warm and dry      IMPRESSION:     Annual PE.     Family history of cardiovascular disease.     CAD, s/p bypass    Ischemic cardiomyopathy, asx  . Hypertension, stable.     Dyslipidemia, stable     Impaired fasting blood sugar/prediabetes, stable.     Dysmetabolic syndrome/obesity.     Fatty liver    Carotid ds, left 40-59%    DVT, right, on chronic anticoagulation    Pulmonary Embolism, on chronic anticoagulation    Anemia, improved    VERONICA, on CPAP    Obesity, BMI 38.91    PLAN:  Carotid US- pending  Reviewed Weight Watchers Program  Continue diet and weight loss  Continue present meds  Diet, reviewed avoid sugar and simple carbs to help w/ triglycerides  Exercise, continue gym and golfing    Call prn  RTC 6 mos

## 2017-06-27 ENCOUNTER — ANTI-COAG VISIT (OUTPATIENT)
Dept: CARDIOLOGY | Facility: CLINIC | Age: 72
End: 2017-06-27
Payer: MEDICARE

## 2017-06-27 DIAGNOSIS — Z79.01 LONG TERM CURRENT USE OF ANTICOAGULANT THERAPY: Primary | ICD-10-CM

## 2017-06-27 LAB — INR PPP: 2.7 (ref 2–3)

## 2017-06-27 PROCEDURE — 99211 OFF/OP EST MAY X REQ PHY/QHP: CPT | Mod: 25,S$GLB,,

## 2017-06-27 PROCEDURE — 85610 PROTHROMBIN TIME: CPT | Mod: QW,S$GLB,,

## 2017-06-27 NOTE — PROGRESS NOTES
Today is quick follow up for subtherapeutic INR.  INR has returned to therapeutic range.  Patient has minor cuts/bruises from use.  Patient denies changes to diet, medications, or health that would affect INR.  Patient will continue weekly warfarin regimen at this time.  Resume 6 week monitoring.  Patient advised to contact clinic with any changes, questions, or concerns.

## 2017-08-08 ENCOUNTER — LAB VISIT (OUTPATIENT)
Dept: LAB | Facility: HOSPITAL | Age: 72
End: 2017-08-08
Attending: INTERNAL MEDICINE
Payer: MEDICARE

## 2017-08-08 ENCOUNTER — ANTI-COAG VISIT (OUTPATIENT)
Dept: CARDIOLOGY | Facility: CLINIC | Age: 72
End: 2017-08-08

## 2017-08-08 DIAGNOSIS — Z79.01 LONG TERM CURRENT USE OF ANTICOAGULANT THERAPY: ICD-10-CM

## 2017-08-08 LAB
INR PPP: 2.7
PROTHROMBIN TIME: 27.3 SEC

## 2017-08-08 PROCEDURE — 85610 PROTHROMBIN TIME: CPT

## 2017-08-08 PROCEDURE — 36415 COLL VENOUS BLD VENIPUNCTURE: CPT | Mod: PO

## 2017-08-18 ENCOUNTER — TELEPHONE (OUTPATIENT)
Dept: INTERNAL MEDICINE | Facility: CLINIC | Age: 72
End: 2017-08-18

## 2017-08-18 DIAGNOSIS — R35.1 NOCTURIA: ICD-10-CM

## 2017-08-18 DIAGNOSIS — R73.03 PREDIABETES: Primary | ICD-10-CM

## 2017-08-18 NOTE — TELEPHONE ENCOUNTER
----- Message from Ana Feliciano sent at 8/18/2017  2:48 PM CDT -----  Contact: Self [phone]  Doctor appointment and lab have been scheduled.  Please link lab orders to the lab appointment.  Date of doctor appointment:  12/12  Physical or EP:  EP  Date of lab appointment:  12/04  Comments:

## 2017-09-22 ENCOUNTER — ANTI-COAG VISIT (OUTPATIENT)
Dept: CARDIOLOGY | Facility: CLINIC | Age: 72
End: 2017-09-22
Payer: MEDICARE

## 2017-09-22 DIAGNOSIS — Z79.01 LONG TERM CURRENT USE OF ANTICOAGULANT THERAPY: ICD-10-CM

## 2017-09-22 LAB — INR PPP: 2.4 (ref 2–3)

## 2017-09-22 PROCEDURE — 99211 OFF/OP EST MAY X REQ PHY/QHP: CPT | Mod: 25,S$GLB,,

## 2017-09-22 PROCEDURE — 85610 PROTHROMBIN TIME: CPT | Mod: QW,S$GLB,,

## 2017-10-01 RX ORDER — RAMIPRIL 5 MG/1
CAPSULE ORAL
Qty: 90 CAPSULE | Refills: 1 | Status: SHIPPED | OUTPATIENT
Start: 2017-10-01 | End: 2018-04-16 | Stop reason: SDUPTHER

## 2017-10-01 RX ORDER — WARFARIN 10 MG/1
TABLET ORAL
Qty: 111 TABLET | Refills: 3 | Status: ON HOLD | OUTPATIENT
Start: 2017-10-01 | End: 2019-03-13 | Stop reason: SDUPTHER

## 2017-10-17 ENCOUNTER — OFFICE VISIT (OUTPATIENT)
Dept: OPTOMETRY | Facility: CLINIC | Age: 72
End: 2017-10-17
Payer: MEDICARE

## 2017-10-17 DIAGNOSIS — H25.13 NUCLEAR SCLEROSIS, BILATERAL: Primary | ICD-10-CM

## 2017-10-17 DIAGNOSIS — H52.4 ASTIGMATISM WITH PRESBYOPIA, BILATERAL: ICD-10-CM

## 2017-10-17 DIAGNOSIS — H52.203 ASTIGMATISM WITH PRESBYOPIA, BILATERAL: ICD-10-CM

## 2017-10-17 PROCEDURE — 99999 PR PBB SHADOW E&M-EST. PATIENT-LVL II: CPT | Mod: PBBFAC,,, | Performed by: OPTOMETRIST

## 2017-10-17 PROCEDURE — 92014 COMPRE OPH EXAM EST PT 1/>: CPT | Mod: S$GLB,,, | Performed by: OPTOMETRIST

## 2017-10-17 PROCEDURE — 92015 DETERMINE REFRACTIVE STATE: CPT | Mod: S$GLB,,, | Performed by: OPTOMETRIST

## 2017-10-17 NOTE — PROGRESS NOTES
HPI     Blur ou at dist, x mos, no assoc pain or red, no relief over time,   constant    Last edited by Christophe Gentile, OD on 10/17/2017  3:52 PM. (History)              Assessment /Plan     For exam results, see Encounter Report.    Nuclear sclerosis, bilateral    Astigmatism with presbyopia, bilateral      1. Educated pt on presence of cataracts and effects on vision. No surgery at this time. Recheck in one year.  2. Spec Rx given. Different lens options discussed with patient. RTC 1 year full exam.

## 2017-10-20 ENCOUNTER — TELEPHONE (OUTPATIENT)
Dept: OPHTHALMOLOGY | Facility: CLINIC | Age: 72
End: 2017-10-20

## 2017-11-03 ENCOUNTER — LAB VISIT (OUTPATIENT)
Dept: LAB | Facility: HOSPITAL | Age: 72
End: 2017-11-03
Attending: INTERNAL MEDICINE
Payer: MEDICARE

## 2017-11-03 ENCOUNTER — CLINICAL SUPPORT (OUTPATIENT)
Dept: CARDIOLOGY | Facility: CLINIC | Age: 72
End: 2017-11-03
Payer: MEDICARE

## 2017-11-03 ENCOUNTER — ANTI-COAG VISIT (OUTPATIENT)
Dept: CARDIOLOGY | Facility: CLINIC | Age: 72
End: 2017-11-03
Payer: MEDICARE

## 2017-11-03 DIAGNOSIS — E78.2 MIXED HYPERLIPIDEMIA: ICD-10-CM

## 2017-11-03 DIAGNOSIS — Z79.01 LONG TERM CURRENT USE OF ANTICOAGULANT THERAPY: Primary | ICD-10-CM

## 2017-11-03 DIAGNOSIS — I26.99 OTHER PULMONARY EMBOLISM WITHOUT ACUTE COR PULMONALE, UNSPECIFIED CHRONICITY: ICD-10-CM

## 2017-11-03 DIAGNOSIS — I10 ESSENTIAL HYPERTENSION: ICD-10-CM

## 2017-11-03 DIAGNOSIS — I77.9 LEFT-SIDED CAROTID ARTERY DISEASE: ICD-10-CM

## 2017-11-03 DIAGNOSIS — I25.10 CORONARY ARTERY DISEASE INVOLVING NATIVE CORONARY ARTERY OF NATIVE HEART WITHOUT ANGINA PECTORIS: ICD-10-CM

## 2017-11-03 LAB
ALT SERPL W/O P-5'-P-CCNC: 24 U/L
ANION GAP SERPL CALC-SCNC: 6 MMOL/L
AST SERPL-CCNC: 29 U/L
BUN SERPL-MCNC: 21 MG/DL
CALCIUM SERPL-MCNC: 9.2 MG/DL
CHLORIDE SERPL-SCNC: 107 MMOL/L
CHOLEST SERPL-MCNC: 128 MG/DL
CHOLEST/HDLC SERPL: 3.6 {RATIO}
CO2 SERPL-SCNC: 27 MMOL/L
CREAT SERPL-MCNC: 0.8 MG/DL
EST. GFR  (AFRICAN AMERICAN): >60 ML/MIN/1.73 M^2
EST. GFR  (NON AFRICAN AMERICAN): >60 ML/MIN/1.73 M^2
GLUCOSE SERPL-MCNC: 130 MG/DL
HCT VFR BLD AUTO: 41 %
HDLC SERPL-MCNC: 36 MG/DL
HDLC SERPL: 28.1 %
HGB BLD-MCNC: 13.7 G/DL
INR PPP: 3.3 (ref 2–3)
INTERNAL CAROTID STENOSIS: ABNORMAL
LDLC SERPL CALC-MCNC: 61.8 MG/DL
NONHDLC SERPL-MCNC: 92 MG/DL
POTASSIUM SERPL-SCNC: 5.2 MMOL/L
SODIUM SERPL-SCNC: 140 MMOL/L
TRIGL SERPL-MCNC: 151 MG/DL

## 2017-11-03 PROCEDURE — 80061 LIPID PANEL: CPT

## 2017-11-03 PROCEDURE — 85014 HEMATOCRIT: CPT

## 2017-11-03 PROCEDURE — 84460 ALANINE AMINO (ALT) (SGPT): CPT

## 2017-11-03 PROCEDURE — 99211 OFF/OP EST MAY X REQ PHY/QHP: CPT | Mod: 25,S$GLB,,

## 2017-11-03 PROCEDURE — 84450 TRANSFERASE (AST) (SGOT): CPT

## 2017-11-03 PROCEDURE — 36415 COLL VENOUS BLD VENIPUNCTURE: CPT | Mod: PO

## 2017-11-03 PROCEDURE — 93880 EXTRACRANIAL BILAT STUDY: CPT | Mod: S$GLB,,, | Performed by: INTERNAL MEDICINE

## 2017-11-03 PROCEDURE — 80048 BASIC METABOLIC PNL TOTAL CA: CPT

## 2017-11-03 PROCEDURE — 85018 HEMOGLOBIN: CPT

## 2017-11-03 PROCEDURE — 99999 PR PBB SHADOW E&M-EST. PATIENT-LVL I: CPT | Mod: PBBFAC,,,

## 2017-11-03 PROCEDURE — 85610 PROTHROMBIN TIME: CPT | Mod: QW,S$GLB,,

## 2017-11-03 NOTE — PROGRESS NOTES
Quick follow up from low INR 9/22. INR within range today. Patient with bruise on arm from use. He will continue this weekly dose until follow up in 6 weeks. Re educated patient on the importance of a consistent diet while on coumadin. Advised patient to call with any changes or concerns.

## 2017-11-07 ENCOUNTER — OFFICE VISIT (OUTPATIENT)
Dept: CARDIOLOGY | Facility: CLINIC | Age: 72
End: 2017-11-07
Payer: MEDICARE

## 2017-11-07 VITALS
HEIGHT: 75 IN | SYSTOLIC BLOOD PRESSURE: 126 MMHG | HEART RATE: 68 BPM | BODY MASS INDEX: 38.46 KG/M2 | DIASTOLIC BLOOD PRESSURE: 60 MMHG | WEIGHT: 309.31 LBS

## 2017-11-07 DIAGNOSIS — I77.9 LEFT-SIDED CAROTID ARTERY DISEASE: ICD-10-CM

## 2017-11-07 DIAGNOSIS — G47.33 OSA ON CPAP: ICD-10-CM

## 2017-11-07 DIAGNOSIS — E78.2 MIXED HYPERLIPIDEMIA: ICD-10-CM

## 2017-11-07 DIAGNOSIS — I10 ESSENTIAL HYPERTENSION: ICD-10-CM

## 2017-11-07 DIAGNOSIS — I25.10 CORONARY ARTERY DISEASE INVOLVING NATIVE CORONARY ARTERY OF NATIVE HEART WITHOUT ANGINA PECTORIS: Primary | ICD-10-CM

## 2017-11-07 DIAGNOSIS — I27.82 OTHER CHRONIC PULMONARY EMBOLISM WITHOUT ACUTE COR PULMONALE: ICD-10-CM

## 2017-11-07 PROCEDURE — 99999 PR PBB SHADOW E&M-EST. PATIENT-LVL III: CPT | Mod: PBBFAC,,, | Performed by: INTERNAL MEDICINE

## 2017-11-07 PROCEDURE — 99214 OFFICE O/P EST MOD 30 MIN: CPT | Mod: S$GLB,,, | Performed by: INTERNAL MEDICINE

## 2017-11-07 PROCEDURE — 99499 UNLISTED E&M SERVICE: CPT | Mod: S$GLB,,, | Performed by: INTERNAL MEDICINE

## 2017-11-07 PROCEDURE — 93000 ELECTROCARDIOGRAM COMPLETE: CPT | Mod: S$GLB,,, | Performed by: INTERNAL MEDICINE

## 2017-11-07 RX ORDER — NAPROXEN SODIUM 220 MG/1
81 TABLET, FILM COATED ORAL DAILY
COMMUNITY

## 2017-11-07 NOTE — PATIENT INSTRUCTIONS
LDL - bad type - improves with diet and medications: typically statins; most other                   medications that lower LDL but have not been proven to prevent heart attacks.             May not improve significantly with exercise alone.             Ideally less than 70    HDL - good type - improves with exercise             Ideally greater than 50    TGs (triglycerides) - also bad - can change very quickly and considerably with food -           improve with diet and exercise            In some cases a low carbohydrate diet will lower TGs better than a low fat diet.            Ideal range     Sugar, fat and cholesterol in food:     A sensible diet that limits the intake of sugars, saturated (bad) fats and trans fats while increasing the intake of unsaturated (good)  fats and plant proteins is the basis of the current dietary recommendations.      We now recommend drastically reducing the intake of sugar. There is less emphasis on excluding fat.   Cholesterol in our food is no longer a significant concern, because it is generally present in small amounts. However please do not confuse this with the role of cholesterol in our blood and arteries. Make no mistake, it is cholesterol that clogs up our arteries whether it comes from our food or is manufactured by our bodies.       Most foods that are high in cholesterol are also high in saturated fat. But there is way more saturated fat than cholesterol in these foods. On the other hand there are a handful of foods that are high in cholesterol but do not contain much saturated fat: eggs, shrimp, crab legs and crawfish are OK to eat.       Saturated fat is the bad fat - you should limit your intake of this. Deep fried foods, meats and other animal fats are high in saturated fat. Cookies, donuts and most dessert and cakes are usually high in both saturated fat and sugar.       Unsaturated fat is the good fat. It contains the same number of calories as saturated fat  but does not get deposited in our arteries. The Mediterranean style diet encourages the intake of unsaturated fat - olive oil, avocado and unsalted nuts.      You should eat a few servings of vegetables (and fruit as long as you are not diabetic) everyday. Substitute some plant proteins in place of meat: soy, beans, lentils, quinoa and oatmeal.      Do not use stick butter or stick margarine. Butter that comes in a tub is soft butter. It consists of 1/2 butter and 1/2 canola or another type of vegetable oil. It is fine to use that.       Trans fats should definitely be avoided. Most foods that are labelled as containing 0 gms of trans fat can still contain several hundred milligrams of trans fat: creamer, margarine, refrigerator dough, deep fried foods, ready made frosting, potato, corn and torilla chips, cakes, cookies, pie crusts and crackers containing shortening made with hydrogenated vegetable oil.        ==============    Heart rate while exercising try maintaining a heart rate b/w 110 and 127 bpm.

## 2017-11-07 NOTE — PROGRESS NOTES
Subjective:   Patient ID:  Del Anand is a 72 y.o. male who presents for follow-up of Coronary Artery Disease      Problem List:  CAD   CABG x 2 - SVG-LAD, SVG-ramus   HTN  Hyperlipidemia   Obesity   Prior tobacco use - 1ppd x 20 yrs (quit 1987)   Pulm embolism 1/15   DVT (R) leg while on rivaroxaban     HPI:   Del Anand feels generally well. He does not report angina or shortness of breath with exertion. He has not required S/L NTG.    On 40 mg of rosuvastatin his LDL is <70 mg/dl. Hepatic transaminases are within normal limits.   Carotid u/s 40-49% on the left. He does not report symptoms suggestive of a TIA.  BP is within normal limits.  On warfarin for venous thromboembolism prevention.     ROS    Current Outpatient Prescriptions   Medication Sig    acetaminophen (TYLENOL) 325 MG tablet Take 325 mg by mouth as needed for Pain.    aspirin 81 MG Chew Take 81 mg by mouth once daily.    cyanocobalamin (B-12 DOTS) 500 MCG tablet Take 500 mcg by mouth every morning. Every day    FOLIC ACID/MULTIVITS-MIN/LUT (CENTRUM SILVER ORAL) Take 1 tablet by mouth once daily.    metoprolol tartrate (LOPRESSOR) 50 MG tablet TAKE 1 TABLET (50 MG TOTAL) BY MOUTH 2 (TWO) TIMES DAILY.    ramipril (ALTACE) 5 MG capsule TAKE 1 CAPSULE ONE TIME DAILY    rosuvastatin (CRESTOR) 40 MG Tab Take 1 tablet (40 mg total) by mouth once daily.    warfarin (COUMADIN) 10 MG tablet TAKE AS DIRECTED  BY  COUMADIN  CLINIC (CURRENT DOSE IS 10MG DAILY EXCEPT 15MG ON MONDAY,WEDNESDAY AND FRIDAY)  (Patient taking differently: TAKE AS DIRECTED  BY  COUMADIN  CLINIC (CURRENT DOSE IS 10MG DAILY EXCEPT 15MG ON WEDNESDAY))              Social History   Substance Use Topics    Smoking status: Former Smoker     Packs/day: 1.00     Years: 20.00     Types: Cigarettes     Quit date: 10/16/1987    Smokeless tobacco: Former User    Alcohol use 0.0 oz/week     1 - 2 Cans of beer, 1 - 2 Standard drinks or equivalent per week      Comment: daily  "        Objective:     Physical Exam   Constitutional: He is oriented to person, place, and time. He appears well-developed and well-nourished.   /60   Pulse 68   Ht 6' 3" (1.905 m)   Wt (!) 140.3 kg (309 lb 4.9 oz)   BMI 38.66 kg/m²      HENT:   Head: Normocephalic.   Neck: No JVD present.   Cardiovascular: Normal rate, regular rhythm, S1 normal and S2 normal.    No murmur heard.  Pulmonary/Chest: Breath sounds normal. Chest wall is not dull to percussion.   Abdominal: Soft. He exhibits no mass. There is no splenomegaly or hepatomegaly.   Musculoskeletal:        Right lower leg: He exhibits no edema.        Left lower leg: He exhibits no edema.   Neurological: He is alert and oriented to person, place, and time. Gait normal.   Skin: No bruising noted. Nails show no clubbing.   Psychiatric: He has a normal mood and affect. His speech is normal and behavior is normal.           Lab Results   Component Value Date    CHOL 128 11/03/2017    HDL 36 (L) 11/03/2017    LDLCALC 61.8 (L) 11/03/2017    TRIG 151 (H) 11/03/2017    CHOLHDL 28.1 11/03/2017     Lab Results   Component Value Date     (H) 11/03/2017    CREATININE 0.8 11/03/2017    BUN 21 11/03/2017     11/03/2017    K 5.2 (H) 11/03/2017     11/03/2017    CO2 27 11/03/2017     Lab Results   Component Value Date    ALT 24 11/03/2017    AST 29 11/03/2017    ALKPHOS 83 06/06/2017    BILITOT 0.7 06/06/2017       ECG today reviewed by me. It reveals sinus rhythm with no ST or T abnormality.        Assessment and Plan:     1. Coronary artery disease involving native coronary artery of native heart without angina pectoris    2. Mixed hyperlipidemia    3. Essential hypertension    4. Left-sided carotid artery disease    5. Chronic pulmonary embolism without acute cor pulmonale    6. VERONICA on CPAP      Coronary artery disease  Stable.  Continue same meds.   Exercise counseling. Exercise prescription given.    Mixed hyperlipidemia  Stable.  Continue " same meds.   Cholesterol education.  Nutritional counseling.     Essential hypertension  Stable.  Continue same meds.     Left-sided carotid artery disease  Stable.  Continue same meds.     Pulmonary embolism  Discussed alternatives to warfarin.  Continue anticoagulation w warfarin.    RTC 1 year.

## 2017-11-10 ENCOUNTER — TELEPHONE (OUTPATIENT)
Dept: OPTOMETRY | Facility: CLINIC | Age: 72
End: 2017-11-10

## 2017-11-11 NOTE — ASSESSMENT & PLAN NOTE
Discussed alternatives to warfarin. DVT while on rivaroxaban   Continue anticoagulation w warfarin.

## 2017-11-13 ENCOUNTER — TELEPHONE (OUTPATIENT)
Dept: CARDIOLOGY | Facility: CLINIC | Age: 72
End: 2017-11-13

## 2017-11-13 DIAGNOSIS — I10 HYPERTENSION, UNSPECIFIED TYPE: Primary | ICD-10-CM

## 2017-11-13 DIAGNOSIS — I10 ESSENTIAL HYPERTENSION: ICD-10-CM

## 2017-11-13 NOTE — TELEPHONE ENCOUNTER
----- Message from Blanquita Levin MD sent at 11/11/2017  4:39 PM CST -----  K was 5.2 when he came to see me. To be on the safe side repeat BMP (use HTN as the dx) at main campus in the next few days/weeks.

## 2017-11-14 ENCOUNTER — LAB VISIT (OUTPATIENT)
Dept: LAB | Facility: HOSPITAL | Age: 72
End: 2017-11-14
Payer: MEDICARE

## 2017-11-14 DIAGNOSIS — E78.2 MIXED HYPERLIPIDEMIA: Primary | ICD-10-CM

## 2017-11-14 DIAGNOSIS — I10 ESSENTIAL HYPERTENSION: ICD-10-CM

## 2017-11-14 DIAGNOSIS — I10 HYPERTENSION, UNSPECIFIED TYPE: ICD-10-CM

## 2017-11-14 LAB
ANION GAP SERPL CALC-SCNC: 7 MMOL/L
BUN SERPL-MCNC: 15 MG/DL
CALCIUM SERPL-MCNC: 8.8 MG/DL
CHLORIDE SERPL-SCNC: 105 MMOL/L
CO2 SERPL-SCNC: 26 MMOL/L
CREAT SERPL-MCNC: 0.8 MG/DL
EST. GFR  (AFRICAN AMERICAN): >60 ML/MIN/1.73 M^2
EST. GFR  (NON AFRICAN AMERICAN): >60 ML/MIN/1.73 M^2
GLUCOSE SERPL-MCNC: 94 MG/DL
POTASSIUM SERPL-SCNC: 4.7 MMOL/L
SODIUM SERPL-SCNC: 138 MMOL/L

## 2017-11-14 PROCEDURE — 36415 COLL VENOUS BLD VENIPUNCTURE: CPT

## 2017-11-14 PROCEDURE — 80048 BASIC METABOLIC PNL TOTAL CA: CPT

## 2017-11-16 ENCOUNTER — TELEPHONE (OUTPATIENT)
Dept: OPTOMETRY | Facility: CLINIC | Age: 72
End: 2017-11-16

## 2017-12-04 ENCOUNTER — PATIENT MESSAGE (OUTPATIENT)
Dept: INTERNAL MEDICINE | Facility: CLINIC | Age: 72
End: 2017-12-04

## 2017-12-05 ENCOUNTER — LAB VISIT (OUTPATIENT)
Dept: LAB | Facility: HOSPITAL | Age: 72
End: 2017-12-05
Attending: INTERNAL MEDICINE
Payer: MEDICARE

## 2017-12-05 DIAGNOSIS — R35.1 NOCTURIA: ICD-10-CM

## 2017-12-05 DIAGNOSIS — R73.03 PREDIABETES: ICD-10-CM

## 2017-12-05 LAB
ALBUMIN SERPL BCP-MCNC: 3.7 G/DL
ALP SERPL-CCNC: 92 U/L
ALT SERPL W/O P-5'-P-CCNC: 36 U/L
ANION GAP SERPL CALC-SCNC: 10 MMOL/L
AST SERPL-CCNC: 40 U/L
BILIRUB SERPL-MCNC: 0.7 MG/DL
BUN SERPL-MCNC: 16 MG/DL
CALCIUM SERPL-MCNC: 9.4 MG/DL
CHLORIDE SERPL-SCNC: 104 MMOL/L
CO2 SERPL-SCNC: 26 MMOL/L
COMPLEXED PSA SERPL-MCNC: 0.21 NG/ML
CREAT SERPL-MCNC: 0.9 MG/DL
EST. GFR  (AFRICAN AMERICAN): >60 ML/MIN/1.73 M^2
EST. GFR  (NON AFRICAN AMERICAN): >60 ML/MIN/1.73 M^2
ESTIMATED AVG GLUCOSE: 120 MG/DL
GLUCOSE SERPL-MCNC: 123 MG/DL
HBA1C MFR BLD HPLC: 5.8 %
POTASSIUM SERPL-SCNC: 4.7 MMOL/L
PROT SERPL-MCNC: 7.1 G/DL
SODIUM SERPL-SCNC: 140 MMOL/L

## 2017-12-05 PROCEDURE — 36415 COLL VENOUS BLD VENIPUNCTURE: CPT | Mod: PO

## 2017-12-05 PROCEDURE — 84153 ASSAY OF PSA TOTAL: CPT

## 2017-12-05 PROCEDURE — 83036 HEMOGLOBIN GLYCOSYLATED A1C: CPT

## 2017-12-05 PROCEDURE — 80053 COMPREHEN METABOLIC PANEL: CPT

## 2017-12-07 ENCOUNTER — TELEPHONE (OUTPATIENT)
Dept: INTERNAL MEDICINE | Facility: CLINIC | Age: 72
End: 2017-12-07

## 2017-12-07 NOTE — TELEPHONE ENCOUNTER
----- Message from Amber Huertas MD sent at 12/6/2017  8:59 PM CST -----  Please review your lab work and we will discuss at your pending office visit.  Amber Lebron

## 2017-12-12 ENCOUNTER — ANTI-COAG VISIT (OUTPATIENT)
Dept: CARDIOLOGY | Facility: CLINIC | Age: 72
End: 2017-12-12
Payer: MEDICARE

## 2017-12-12 ENCOUNTER — OFFICE VISIT (OUTPATIENT)
Dept: INTERNAL MEDICINE | Facility: CLINIC | Age: 72
End: 2017-12-12
Payer: MEDICARE

## 2017-12-12 VITALS
DIASTOLIC BLOOD PRESSURE: 68 MMHG | BODY MASS INDEX: 38.37 KG/M2 | WEIGHT: 308.63 LBS | RESPIRATION RATE: 16 BRPM | HEART RATE: 70 BPM | HEIGHT: 75 IN | SYSTOLIC BLOOD PRESSURE: 112 MMHG | TEMPERATURE: 99 F

## 2017-12-12 DIAGNOSIS — I70.0 AORTIC ATHEROSCLEROSIS: ICD-10-CM

## 2017-12-12 DIAGNOSIS — E78.2 MIXED HYPERLIPIDEMIA: ICD-10-CM

## 2017-12-12 DIAGNOSIS — I10 ESSENTIAL HYPERTENSION: Primary | ICD-10-CM

## 2017-12-12 DIAGNOSIS — I25.10 CORONARY ARTERY DISEASE INVOLVING NATIVE CORONARY ARTERY OF NATIVE HEART WITHOUT ANGINA PECTORIS: ICD-10-CM

## 2017-12-12 DIAGNOSIS — E66.01 SEVERE OBESITY (BMI 35.0-35.9 WITH COMORBIDITY): ICD-10-CM

## 2017-12-12 DIAGNOSIS — Z79.01 LONG TERM CURRENT USE OF ANTICOAGULANT THERAPY: Primary | ICD-10-CM

## 2017-12-12 DIAGNOSIS — R73.01 IMPAIRED FASTING GLUCOSE: ICD-10-CM

## 2017-12-12 LAB — INR PPP: 2.6 (ref 2–3)

## 2017-12-12 PROCEDURE — 85610 PROTHROMBIN TIME: CPT | Mod: QW,S$GLB,, | Performed by: PHARMACIST

## 2017-12-12 PROCEDURE — 90662 IIV NO PRSV INCREASED AG IM: CPT | Mod: S$GLB,,, | Performed by: INTERNAL MEDICINE

## 2017-12-12 PROCEDURE — 99999 PR PBB SHADOW E&M-EST. PATIENT-LVL III: CPT | Mod: PBBFAC,,, | Performed by: INTERNAL MEDICINE

## 2017-12-12 PROCEDURE — 99214 OFFICE O/P EST MOD 30 MIN: CPT | Mod: S$GLB,,, | Performed by: INTERNAL MEDICINE

## 2017-12-12 PROCEDURE — 99499 UNLISTED E&M SERVICE: CPT | Mod: S$GLB,,, | Performed by: INTERNAL MEDICINE

## 2017-12-12 PROCEDURE — G0008 ADMIN INFLUENZA VIRUS VAC: HCPCS | Mod: S$GLB,,, | Performed by: INTERNAL MEDICINE

## 2017-12-12 NOTE — PROGRESS NOTES
72-year-old gentleman w/ preDM, HTN, HLD, aortic atherosclerosis and obesity  On anticoagulant 2/2 to chronic DVT, s/p PE  presents  for follow up of problems ( wife +)  HTN, tx metoprolol and ramipril   Denied HA or dz  BP today==>112/68    HLD, tx rosuvastatin  Denied angina or PROCTOR  LDL==>61.8    Pre-DM/IFBS, tx diet  Denied increased thirst or urination  A1C==>5.8        MedCard: Reviewed.     REVIEW OF SYSTEMS:     No fever, chill, or night sweats  No dysphagia or early satiety  No change in bowel or bladder function.  Not having increased thirst or urination.  No HA or focal deficits  No increased thirst or urination  No cold or heat intolerance  No unusual bruising or bleeding  Remainder of review negative except as previously noted.     PAST MEDICAL HISTORY:   Dyslipidemia.  Hypertension  Elevated LFTs,   Impaired fasting glucose/prediabetes  Metabolic syndrome  Fatty liver  Anemia with workup by hem/onc  unremarkable.   DJD, knees and hips  Renal stones,   Sinus and rhinitis.  Colon  polyps   DVT  Pulmonary embolism    PHYSICAL EXAM:   VSS:   GENERAL: Alert and oriented, in no acute distress. Well-developed,   well-nourished, obese gentleman, conversant, and cooperative. Pleasant as always  EYES: Conjunctivae and lids unremarkable. Sclerae anicteric.   Pupils reactive.   ENT:Canals w/o significant cerumen, TMS- unremarkable   Nasal mucosa, tturbinates, oropharynx injected w/o exudate  Sinuses nontender.   NECK: Supple. No thyromegaly or lymphadenopathy.   RESPIRATORY: Efforts unlabored.   LUNGS: Clear to auscultation.   HEART: Regular rate and rhythm. No carotid bruits,   1+ pedal pulse. No edema.   MUSCULOSKELETAL: Gait normal.   NEURO: BECK. No tremor noted.  SKIN: Warm and dry      IMPRESSION:   . Hypertension, stable.     Dyslipidemia, stable     CAD, asx    Aortic atherosclerosis, asx    Impaired fasting blood sugar/prediabetes, stable.     Dysmetabolic syndrome/obesity.     Fatty liver       DVT, right,  on chronic anticoagulation    Pulmonary Embolism, on chronic anticoagulation    Anemia, improved    VERONICA, on CPAP    Obesity severe, w/ co-morbidity, BMI 38.91    PLAN:  HD flu vaccine  Continue diet and weight loss  Continue present meds    Call prn  RTC 6 mos EPP

## 2017-12-12 NOTE — PROGRESS NOTES
Patient reports no bleeding or bruising, no new medications and no diet changes.  I reminded the patient to call with any problems, changes or questions before the next visit.

## 2018-01-02 RX ORDER — METOPROLOL TARTRATE 50 MG/1
50 TABLET ORAL 2 TIMES DAILY
Qty: 180 TABLET | Refills: 3 | Status: SHIPPED | OUTPATIENT
Start: 2018-01-02 | End: 2018-12-04 | Stop reason: SDUPTHER

## 2018-01-17 DIAGNOSIS — M79.641 RIGHT HAND PAIN: Primary | ICD-10-CM

## 2018-01-18 ENCOUNTER — TELEPHONE (OUTPATIENT)
Dept: ORTHOPEDICS | Facility: CLINIC | Age: 73
End: 2018-01-18

## 2018-01-23 ENCOUNTER — HOSPITAL ENCOUNTER (OUTPATIENT)
Dept: RADIOLOGY | Facility: OTHER | Age: 73
Discharge: HOME | End: 2018-01-23
Attending: ORTHOPAEDIC SURGERY
Payer: MEDICARE

## 2018-01-23 ENCOUNTER — OFFICE VISIT (OUTPATIENT)
Dept: ORTHOPEDICS | Facility: CLINIC | Age: 73
End: 2018-01-23
Payer: MEDICARE

## 2018-01-23 VITALS
HEART RATE: 56 BPM | WEIGHT: 308.63 LBS | HEIGHT: 75 IN | SYSTOLIC BLOOD PRESSURE: 149 MMHG | BODY MASS INDEX: 38.37 KG/M2 | DIASTOLIC BLOOD PRESSURE: 77 MMHG | RESPIRATION RATE: 18 BRPM

## 2018-01-23 DIAGNOSIS — M79.641 RIGHT HAND PAIN: ICD-10-CM

## 2018-01-23 DIAGNOSIS — M65.331 TRIGGER MIDDLE FINGER OF RIGHT HAND: Primary | ICD-10-CM

## 2018-01-23 PROCEDURE — 73130 X-RAY EXAM OF HAND: CPT | Mod: TC,FY,RT

## 2018-01-23 PROCEDURE — 73130 X-RAY EXAM OF HAND: CPT | Mod: 26,RT,, | Performed by: RADIOLOGY

## 2018-01-23 PROCEDURE — 99999 PR PBB SHADOW E&M-EST. PATIENT-LVL III: CPT | Mod: PBBFAC,,, | Performed by: ORTHOPAEDIC SURGERY

## 2018-01-23 PROCEDURE — 20550 NJX 1 TENDON SHEATH/LIGAMENT: CPT | Mod: F7,S$GLB,, | Performed by: ORTHOPAEDIC SURGERY

## 2018-01-23 PROCEDURE — 99204 OFFICE O/P NEW MOD 45 MIN: CPT | Mod: 25,S$GLB,, | Performed by: ORTHOPAEDIC SURGERY

## 2018-01-23 RX ADMIN — DEXAMETHASONE SODIUM PHOSPHATE 4 MG: 4 INJECTION, SOLUTION INTRA-ARTICULAR; INTRALESIONAL; INTRAMUSCULAR; INTRAVENOUS; SOFT TISSUE at 11:01

## 2018-01-23 NOTE — PROCEDURES
Tendon Sheath  Date/Time: 1/23/2018 11:16 AM  Performed by: KORY VOSS  Authorized by: KORY VOSS     Consent Done?:  Yes (Verbal)  Timeout: prior to procedure the correct patient, procedure, and site was verified    Indications:  Pain  Timeout: prior to procedure the correct patient, procedure, and site was verified    Location:  Long finger  Site:  R long MCP  Needle size:  25 G  Approach:  Volar  Medications:  4 mg dexamethasone 4 mg/mL

## 2018-01-23 NOTE — PROGRESS NOTES
I have personally taken the history and examined the patient. I agree with the Hand Surgery PA's note. The plan will be injection from trigger finger long finger. It is painful. He cannot straighten the long finger all the way and unable to make composite fist.

## 2018-01-23 NOTE — PROGRESS NOTES
Subjective:      Patient ID: Del Anand is a 72 y.o. male.    Chief Complaint: Pain of the Right Hand      HPI  Del Anand is a 72 y.o. male presenting today for right middle finger pain. He notes onset about 1 mo ago. He complains of swelling and pain in the area of the middle A1 pulley. He initially had triggering of the finger, this has stopped but continues to have pain. Pt also notes weaker  strength of this hand but feels it is because he is limited by pain. Has pain when gripping things like his golf club. He denies numbness or tingling.       Review of patient's allergies indicates:  No Known Allergies      Current Outpatient Prescriptions   Medication Sig Dispense Refill    acetaminophen (TYLENOL) 325 MG tablet Take 325 mg by mouth as needed for Pain.      aspirin 81 MG Chew Take 81 mg by mouth once daily.      cyanocobalamin (B-12 DOTS) 500 MCG tablet Take 500 mcg by mouth every morning. Every day      FOLIC ACID/MULTIVITS-MIN/LUT (CENTRUM SILVER ORAL) Take 1 tablet by mouth once daily.      metoprolol tartrate (LOPRESSOR) 50 MG tablet Take 1 tablet (50 mg total) by mouth 2 (two) times daily. 180 tablet 3    ramipril (ALTACE) 5 MG capsule TAKE 1 CAPSULE ONE TIME DAILY 90 capsule 1    rosuvastatin (CRESTOR) 40 MG Tab Take 1 tablet (40 mg total) by mouth once daily. 90 tablet 3    warfarin (COUMADIN) 10 MG tablet TAKE AS DIRECTED  BY  COUMADIN  CLINIC (CURRENT DOSE IS 10MG DAILY EXCEPT 15MG ON MONDAY,WEDNESDAY AND FRIDAY)  (Patient taking differently: TAKE AS DIRECTED  BY  COUMADIN  CLINIC (CURRENT DOSE IS 10MG DAILY EXCEPT 15MG ON WEDNESDAY)) 111 tablet 3    warfarin (COUMADIN) 5 MG tablet Take as directed by [facility]. Current dose is 10mg daily except 15mg Monday, Wednesday, Friday (90 day supply) (Patient taking differently: Take by mouth. Take as directed by [facility]. Current dose is 10mg daily except 15mg on Wednesday) 90 tablet 3     No current  "facility-administered medications for this visit.        Past Medical History:   Diagnosis Date    Benign neoplasm of colon     CHF (congestive heart failure) 1/13/2015    Coronary artery disease     Difficult intubation     DVT (deep venous thrombosis)     HLD (hyperlipidemia)     HTN (hypertension)     Impaired fasting glucose     Kidney stone     Metabolic syndrome     Nonspecific abnormal results of liver function study     Nonspecific findings on examination of blood     Nuclear sclerosis - Both Eyes 10/18/2013    Osteoarthrosis, unspecified whether generalized or localized, lower leg     Respiratory distress     PT STATES IT WAS "CRAP", "VERY UNCOMFORTABLE"       Past Surgical History:   Procedure Laterality Date    CARDIAC SURGERY      CORONARY ARTERY BYPASS GRAFT      2014    CYSTOSCOPY      KIDNEY STONE SURGERY      KNEE ARTHROPLASTY      bilateral    renal stone      SKIN BIOPSY         Review of Systems:  Constitutional: Negative for chills and fever.   Respiratory: Negative for cough and shortness of breath.    Gastrointestinal: Negative for nausea and vomiting.   Skin: Negative for rash.   Neurological: Negative for dizziness and headaches.   Psychiatric/Behavioral: Negative for depression.   MSK as in HPI       OBJECTIVE:     PHYSICAL EXAM:  BP (!) 149/77   Pulse (!) 56   Resp 18   Ht 6' 3" (1.905 m)   Wt (!) 140 kg (308 lb 10.3 oz)   BMI 38.58 kg/m²     GEN:  NAD, well-developed, well-groomed.  NEURO: Awake, alert, and oriented. Normal attention and concentration.    PSYCH: Normal mood and affect. Behavior is normal.  HEENT: No cervical lymphadenopathy noted.  CARDIOVASCULAR: Radial pulses 2+ bilaterally. No LE edema noted.  PULMONARY: Breath sounds normal. No respiratory distress.  SKIN: Intact, no rashes.      MSK:   RUE:  Good active ROM of the wrist and fingers. Pain with flexion of the middle finger. AIN/PIN/Radial/Median/Ulnar Nerves assessed in isolation without " deficit. Radial & Ulnar arteries palpated 2+. Capillary Refill <3s. He has pain to palpation over the middle A1 pulley. No triggering noted. saggittal band is intact.       RADIOGRAPHS:  Xray right hand 1/23/18  Narrative   PA, lateral, oblique views right hand    Comparison: Not available.    Findings: There is no fracture or dislocation. Degenerative changes of the interphalangeal joints. Mild basal joints degenerative change. Soft tissues are unremarkable.     Comments: I have personally reviewed the imaging and I agree with the above radiologist's report.    ASSESSMENT/PLAN:       ICD-10-CM ICD-9-CM   1. Trigger middle finger of right hand M65.331 727.03          No orders of the defined types were placed in this encounter.       Plan:   -discussed treatment options with pt. He would like to proceed with injection of the right middle tendon sheath.   -RTC 6 wks      The patient indicates understanding of these issues and agrees to the plan.    Lydia Small PA-C  Hand Clinic   [facility]  [place], [place]

## 2018-01-24 RX ORDER — DEXAMETHASONE SODIUM PHOSPHATE 4 MG/ML
4 INJECTION, SOLUTION INTRA-ARTICULAR; INTRALESIONAL; INTRAMUSCULAR; INTRAVENOUS; SOFT TISSUE
Status: DISCONTINUED | OUTPATIENT
Start: 2018-01-23 | End: 2018-01-24 | Stop reason: HOSPADM

## 2018-01-25 ENCOUNTER — PES CALL (OUTPATIENT)
Dept: ADMINISTRATIVE | Facility: CLINIC | Age: 73
End: 2018-01-25

## 2018-01-29 ENCOUNTER — PATIENT MESSAGE (OUTPATIENT)
Dept: ADMINISTRATIVE | Facility: OTHER | Age: 73
End: 2018-01-29

## 2018-01-30 ENCOUNTER — ANTI-COAG VISIT (OUTPATIENT)
Dept: CARDIOLOGY | Facility: CLINIC | Age: 73
End: 2018-01-30

## 2018-01-30 ENCOUNTER — LAB VISIT (OUTPATIENT)
Dept: LAB | Facility: HOSPITAL | Age: 73
End: 2018-01-30
Attending: INTERNAL MEDICINE
Payer: MEDICARE

## 2018-01-30 DIAGNOSIS — Z79.01 LONG TERM CURRENT USE OF ANTICOAGULANT THERAPY: ICD-10-CM

## 2018-01-30 LAB
INR PPP: 2.6
PROTHROMBIN TIME: 25.6 SEC

## 2018-01-30 PROCEDURE — 85610 PROTHROMBIN TIME: CPT

## 2018-01-30 PROCEDURE — 36415 COLL VENOUS BLD VENIPUNCTURE: CPT | Mod: PO

## 2018-02-02 ENCOUNTER — PATIENT MESSAGE (OUTPATIENT)
Dept: ADMINISTRATIVE | Facility: OTHER | Age: 73
End: 2018-02-02

## 2018-02-05 ENCOUNTER — PATIENT OUTREACH (OUTPATIENT)
Dept: OTHER | Facility: OTHER | Age: 73
End: 2018-02-05

## 2018-02-05 NOTE — PROGRESS NOTES
Mr. Del Anand is a 72 y.o. male who is newly enrolled in the Digital Medicine Hypertension Clinic.     The following information was reviewed/updated:  Preferred pharmacy   [facility]/pharmacy #[id number] - [place] - [address] [facility]  [address] [place] [address]  Phone: [phone] Fax: [phone]    [facility] - [facility] [address] [facility] [facility]  [address] [facility] [facility] [address]  Phone: [phone] Fax: [phone]    Patient prefers a 90 days supply (no copay for 90 days supply)    Review of patient's allergies indicates:  No Known Allergies    Current Outpatient Prescriptions on File Prior to Visit   Medication Sig Dispense Refill    acetaminophen (TYLENOL) 325 MG tablet Take 325 mg by mouth as needed for Pain.      aspirin 81 MG Chew Take 81 mg by mouth once daily.      cyanocobalamin (B-12 DOTS) 500 MCG tablet Take 500 mcg by mouth every morning. Every day      FOLIC ACID/MULTIVITS-MIN/LUT (CENTRUM SILVER ORAL) Take 1 tablet by mouth once daily.      metoprolol tartrate (LOPRESSOR) 50 MG tablet Take 1 tablet (50 mg total) by mouth 2 (two) times daily. 180 tablet 3    ramipril (ALTACE) 5 MG capsule TAKE 1 CAPSULE ONE TIME DAILY 90 capsule 1    rosuvastatin (CRESTOR) 40 MG Tab Take 1 tablet (40 mg total) by mouth once daily. 90 tablet 3    warfarin (COUMADIN) 10 MG tablet TAKE AS DIRECTED  BY  COUMADIN  CLINIC (CURRENT DOSE IS 10MG DAILY EXCEPT 15MG ON MONDAY,WEDNESDAY AND FRIDAY)  (Patient taking differently: TAKE AS DIRECTED  BY  COUMADIN  CLINIC (CURRENT DOSE IS 10MG DAILY EXCEPT 15MG ON WEDNESDAY)) 111 tablet 3    warfarin (COUMADIN) 5 MG tablet Take as directed by [facility] Clinic. Current dose is 10mg daily except 15mg Monday, Wednesday, Friday (90 day supply) (Patient taking differently: Take by mouth. Take as directed by [facility]madin Clinic. Current dose is 10mg daily except 15mg on Wednesday) 90 tablet 3     No current facility-administered medications on  file prior to visit.        VERONICA screening results for this patient suggest a high likelihood of sleep apnea, which can contribute to hypertension. Patient has been previously diagnosed with sleep apnea and is not interested in referral at this time. Mr. Anand reports consistent CPAP use at least 8 per night. VERONICA is managed effectively with CPAP use.     Reviewed non-pharmacologic therapies and impact on BP:    1. Low-sodium diet- 11 mmHg reduction 2-4 weeks. I have reviewed [facility].A[facility] diet sodium restrictions (<2000mg/day) Does not track sodium intake  2. Exercise- 7 mmHg reduction 4-12 weeks. I have recommended patient engage in exercise as tolerated at least 30 minutes 5x per week to improve cardiovascular health.  Walks dog for 30 minutes daily. Golfs Tuesday, Thursday, Saturday, and one Sunday per month.  3. Alcohol intake- 3 mmHg reduction 4-12 weeks. I have discussed with patient the maximum recommended number of 2 drink(s) per day for men. One drink per day. Saturday 3-4 beers.    Explained that we expect patient to obtain several blood pressures per week at random times of day.   Our goal is to get  BP to consistently below 130/80mmHg and make the process convenient so patient can avoid extra trips to the office. Getting your blood pressure below 130/80mmHg (definition of control) will reduce your risk for heart attack, kidney failure, stroke and death (as well as kidney failure, eye disease, & dementia).     Patient is not meeting the goal already.   When asked what the patient thinks is causing BP to be elevated, he states: stress contributes, otherwise unsure    Instructed patient not to allow anyone else to use phone and BP cuff.   I'm not available for emergencies. Patient will call [facility] on-[facility] ([phone] or [phone]) or [phone] if needed.     Discussed appropriate BP measuring technique:  Before taking your blood pressure  Find a quiet place. You will need to listen for your heartbeat.  Roll up  the sleeve on your left arm or remove any tight-sleeved clothing, if needed. (It's best to take your blood pressure from your left arm if you are right-handed.You can use the other arm if you have been told by your health care provider to do so.)  Rest in a chair next to a table for 5 to 10 minutes. (Your left arm should rest comfortably at heart level.)  Sit up straight with your back against the chair, legs uncrossed and on the ground.  Rest your forearm on the table with the palm of your hand facing up.  You should not talk, read the newspaper, or watch television during this process.      Patient and I agreed that he will continue to monitor blood pressure and sodium intake, and continue to remain adherent to medications.     I will plan to follow-up with the patient in 3 weeks.   Emailed patient link to [facility] webpage and my contact information in case he has any questions.       Last 5 Patient Entered Readings                                      Current 30 Day Average: 133/78     Recent Readings 2/8/2018 2/7/2018 2/6/2018 2/5/2018 2/5/2018    SBP (mmHg) 137 125 127 152 154    DBP (mmHg) 81 69 88 73 84    Pulse 58 64 60 63 64

## 2018-02-05 NOTE — LETTER
Valeri George, PharmD  [address] [place] [address]     Dear Del Anand,    Welcome to the [facility]!         My name is Valeri George PharmD and I am your dedicated Digital Medicine clinician.  As an expert in medication management, I will help ensure that the medications you are taking continue to provide you with the intended benefits.      I am Letty oLpez and I will be your health  for the duration of the program.  My  job is to help you identify lifestyle changes to improve your blood pressure control.  We will talk about nutrition, exercise, and other ways that you may be able to adjust your current habits to better your health. Together, we will work to improve your overall health and encourage you to meet your goals for a healthier lifestyle.    What we expect from YOU:    You will need to take blood pressure readings multiple times a week and no less than one reading per week.   It is important that you take your measurements at different times during the day, when possible.     What you should expect from your Digital Medicine Care Team:   We will provide you with education about high blood pressure, including lifestyle changes that could help you to control your blood pressure.   We will review your weekly readings and provide you with monthly blood pressure progress reports after you have been in the program for more than 30 days.   We will send monthly progress reports on your blood pressure control to your physician so they can follow along with your progress as well.    You will be able to reach me by phone at [phone] or through your [facility] account by clicking my name under Care Team on the right side of the home screen.    I look forward to working with you to achieve your blood pressure goals!    Sincerely,    Valeri George PharmD  Your personal clinician    Please visit  [email or web address] to learn more about high blood pressure and what you can do lower your blood pressure.                                                                                           Del Anand  [address] [place] [address]

## 2018-02-14 ENCOUNTER — PATIENT MESSAGE (OUTPATIENT)
Dept: OTHER | Facility: OTHER | Age: 73
End: 2018-02-14

## 2018-02-14 ENCOUNTER — PATIENT OUTREACH (OUTPATIENT)
Dept: OTHER | Facility: OTHER | Age: 73
End: 2018-02-14

## 2018-02-14 NOTE — LETTER
Valeri George, PharmD  [address] [place] [address]     Dear Del Anand,    Welcome to the [facility]pert[facility] Digital [facility]!         My name is Valeri George PharmD and I am your dedicated Digital Medicine clinician.  As an expert in medication management, I will help ensure that the medications you are taking continue to provide you with the intended benefits.      I am Letty Lopez and I will be your health  for the duration of the program.  My  job is to help you identify lifestyle changes to improve your blood pressure control.  We will talk about nutrition, exercise, and other ways that you may be able to adjust your current habits to better your health. Together, we will work to improve your overall health and encourage you to meet your goals for a healthier lifestyle.    What we expect from YOU:    You will need to take blood pressure readings multiple times a week and no less than one reading per week.   It is important that you take your measurements at different times during the day, when possible.     What you should expect from your Digital Medicine Care Team:   We will provide you with education about high blood pressure, including lifestyle changes that could help you to control your blood pressure.   We will review your weekly readings and provide you with monthly blood pressure progress reports after you have been in the program for more than 30 days.   We will send monthly progress reports on your blood pressure control to your physician so they can follow along with your progress as well.    You will be able to reach me by phone at [phone] or through your [facility] account by clicking my name under Care Team on the right side of the home screen.    I look forward to working with you to achieve your blood pressure goals!    Sincerely,    Valeri George PharmD  Your personal clinician    Please visit  [email or web address] to learn more about high blood pressure and what you can do lower your blood pressure.                                                                                           Del Anand  [address] [address]

## 2018-02-14 NOTE — PROGRESS NOTES
Last 5 Patient Entered Readings                                      Current 30 Day Average: 131/74     Recent Readings 2/14/2018 2/13/2018 2/12/2018 2/11/2018 2/10/2018    SBP (mmHg) 123 135 138 114 119    DBP (mmHg) 65 64 73 59 70    Pulse 62 57 62 65 79          Hypertension Digital Medicine Program (HDMP): Health  Introduction    Introduced Mr. Del Anand to HDMP. Discussed health  role and recommended lifestyle modifications.       Diet (i.e. Low sodium, food labels): Pt reports using sea salt instead of table to table salt. Reports his wife does most of the cooking at home and she has been trying to use less salt.   Discussed sea salt and how its still contains a lot of sodium it it and should try seasonings such as Mrs. Silva to help with lowering sodium intake. Discussed how consuming no more than 2000 mgs of sodium a day.  Exercise: Pt reports golfing three times a week and walking his dog daily for at least 30 minutes.  Alcohol/Tobacco:Pt reports not smoking and having one drink a day.  Medication Adherence: has been compliant with the medicaiton regimen.  Other goals: Pt reports wanting to get his BP at goal.    Reviewed AHA recommendations:  Limit sodium intake to <2000mg/day  Perform 150 minutes of physical activity per week    Patient is aware of the importance of taking daily BP readings at various times of the day  Patient aware that the health  can be used as a resource for lifestyle modifications to help reduce or maintain a healthy BP  Patient is aware of the importance of medication adherence.  Patient is aware that HDMP team is not available for emergencies. Patient should call 911 or [facility] if one arises.

## 2018-02-18 ENCOUNTER — PATIENT MESSAGE (OUTPATIENT)
Dept: INTERNAL MEDICINE | Facility: CLINIC | Age: 73
End: 2018-02-18

## 2018-02-18 DIAGNOSIS — R31.9 HEMATURIA, UNSPECIFIED TYPE: Primary | ICD-10-CM

## 2018-02-18 DIAGNOSIS — Z87.442 HISTORY OF RENAL STONE: ICD-10-CM

## 2018-02-20 ENCOUNTER — OFFICE VISIT (OUTPATIENT)
Dept: UROLOGY | Facility: CLINIC | Age: 73
End: 2018-02-20
Payer: MEDICARE

## 2018-02-20 ENCOUNTER — ANTI-COAG VISIT (OUTPATIENT)
Dept: CARDIOLOGY | Facility: CLINIC | Age: 73
End: 2018-02-20
Payer: MEDICARE

## 2018-02-20 VITALS
HEIGHT: 75 IN | WEIGHT: 313.25 LBS | HEART RATE: 71 BPM | SYSTOLIC BLOOD PRESSURE: 135 MMHG | DIASTOLIC BLOOD PRESSURE: 65 MMHG | BODY MASS INDEX: 38.95 KG/M2

## 2018-02-20 DIAGNOSIS — R31.0 HEMATURIA, GROSS: ICD-10-CM

## 2018-02-20 DIAGNOSIS — Z79.01 LONG TERM CURRENT USE OF ANTICOAGULANT THERAPY: Primary | ICD-10-CM

## 2018-02-20 DIAGNOSIS — R31.9 HEMATURIA, UNSPECIFIED TYPE: ICD-10-CM

## 2018-02-20 LAB — INR PPP: 3 (ref 2–3)

## 2018-02-20 PROCEDURE — 3008F BODY MASS INDEX DOCD: CPT | Mod: S$GLB,,, | Performed by: UROLOGY

## 2018-02-20 PROCEDURE — 87086 URINE CULTURE/COLONY COUNT: CPT

## 2018-02-20 PROCEDURE — 1159F MED LIST DOCD IN RCRD: CPT | Mod: S$GLB,,, | Performed by: UROLOGY

## 2018-02-20 PROCEDURE — 81002 URINALYSIS NONAUTO W/O SCOPE: CPT | Mod: S$GLB,,, | Performed by: UROLOGY

## 2018-02-20 PROCEDURE — 99211 OFF/OP EST MAY X REQ PHY/QHP: CPT | Mod: 25,S$GLB,,

## 2018-02-20 PROCEDURE — 99999 PR PBB SHADOW E&M-EST. PATIENT-LVL III: CPT | Mod: PBBFAC,,, | Performed by: UROLOGY

## 2018-02-20 PROCEDURE — 88112 CYTOPATH CELL ENHANCE TECH: CPT | Performed by: PATHOLOGY

## 2018-02-20 PROCEDURE — 99204 OFFICE O/P NEW MOD 45 MIN: CPT | Mod: 25,S$GLB,, | Performed by: UROLOGY

## 2018-02-20 PROCEDURE — 1126F AMNT PAIN NOTED NONE PRSNT: CPT | Mod: S$GLB,,, | Performed by: UROLOGY

## 2018-02-20 PROCEDURE — 85610 PROTHROMBIN TIME: CPT | Mod: QW,S$GLB,,

## 2018-02-20 RX ORDER — CIPROFLOXACIN 250 MG/1
500 TABLET, FILM COATED ORAL ONCE
Status: CANCELLED | OUTPATIENT
Start: 2018-02-20 | End: 2018-02-20

## 2018-02-20 RX ORDER — LIDOCAINE HYDROCHLORIDE 20 MG/ML
JELLY TOPICAL ONCE
Status: CANCELLED | OUTPATIENT
Start: 2018-02-20 | End: 2018-02-20

## 2018-02-20 NOTE — LETTER
February 20, 2018      Patric Germain Jr., MD  [address]  [place] [address]           [facility] - [facility] 4th [facility]  [address] [facility] [address]  [facility] [address]  Phone: [phone]          Patient: Del Anand   MR Number: [id number]   YOB: 1945   Date of Visit: 2/20/2018       Dear Dr. Patric Germain Jr.:    Thank you for referring Del Anand to me for evaluation. Attached you will find relevant portions of my assessment and plan of care.    If you have questions, please do not hesitate to call me. I look forward to following Del Anand along with you.    Sincerely,    Arnold Ray MD    Enclosure  CC:  No Recipients    If you would like to receive this communication electronically, please contact [email or web address] or [phone] to request more information on [employer] Link access.    For providers and/or their staff who would like to refer a patient to [facility], please contact us through our one-stop-shop provider referral line, [facility], at [phone].    If you feel you have received this communication in error or would no longer like to receive these types of communications, please e-mail [email or web address]

## 2018-02-20 NOTE — PROGRESS NOTES
INR within normal range today. Patient is having hematuria and is going to see urology after. Denies any bruising or other changes. He will eating a serving of spinach tonight to help with the bleeding. Patient appears stable on this dose but will monitor closely because of bleeding. He will continue this dose until follow-up next week. I advised him and his wife to contact us with any changes or problems.

## 2018-02-20 NOTE — PROGRESS NOTES
Patient called to reschedule 3/20 coumadin clinic appointment to today-2/20, states he has a urology appointment due to blood in urine and also had blood when brushing his teeth today, wants INR tested today, appointment booked

## 2018-02-20 NOTE — PROGRESS NOTES
"CC: gross hematuria    Del Anand is a 72 y.o. man who is here for the evaluation of Hematuria    A new pt referred by his PCP, Dr. Cruz.  2 wk hx of gross hematuria.  C/o dark urine as well as bright red blood in his urine for the past 2 wks.  When he drank plenty of water, his urine got cleared.  He is on ASA and Coumadin for hx of PE, DVT.  No voiding problems reported.    Denies flank pain, dysuira.    Non-smoker.    Past Medical History:   Diagnosis Date    Benign neoplasm of colon     CHF (congestive heart failure) 1/13/2015    Coronary artery disease     Difficult intubation     DVT (deep venous thrombosis)     HLD (hyperlipidemia)     HTN (hypertension)     Impaired fasting glucose     Kidney stone     Metabolic syndrome     Nonspecific abnormal results of liver function study     Nonspecific findings on examination of blood     Nuclear sclerosis - Both Eyes 10/18/2013    Osteoarthrosis, unspecified whether generalized or localized, lower leg     Respiratory distress     PT STATES IT WAS "CRAP", "VERY UNCOMFORTABLE"     Past Surgical History:   Procedure Laterality Date    CARDIAC SURGERY      CORONARY ARTERY BYPASS GRAFT      2014    CYSTOSCOPY      KIDNEY STONE SURGERY      KNEE ARTHROPLASTY      bilateral    renal stone      SKIN BIOPSY       Social History   Substance Use Topics    Smoking status: Former Smoker     Packs/day: 1.00     Years: 20.00     Types: Cigarettes     Quit date: 10/16/1987    Smokeless tobacco: Former User    Alcohol use 0.0 oz/week     1 - 2 Cans of beer, 1 - 2 Standard drinks or equivalent per week      Comment: daily     Family History   Problem Relation Age of Onset    Heart attack Father      d. 85    Urolithiasis Father     Heart disease Father     Heart failure Father     Stroke Mother     Dementia Mother     Heart attack Maternal Grandfather      d. 65    Hypertension Paternal Grandmother     Heart attack Paternal Grandfather     " Heart failure Paternal Grandfather     Heart disease Paternal Grandfather     Other Sister      bladder lift, s/p 4     No Known Problems Maternal Aunt     No Known Problems Maternal Uncle     No Known Problems Paternal Aunt     No Known Problems Paternal Uncle     Blindness Neg Hx     Cancer Neg Hx     Cataracts Neg Hx     Diabetes Neg Hx     Glaucoma Neg Hx     Macular degeneration Neg Hx     Retinal detachment Neg Hx     Strabismus Neg Hx     Thyroid disease Neg Hx     Amblyopia Neg Hx      Allergy:  Review of patient's allergies indicates:  No Known Allergies  Outpatient Encounter Prescriptions as of 2018   Medication Sig Dispense Refill    acetaminophen (TYLENOL) 325 MG tablet Take 325 mg by mouth as needed for Pain.      aspirin 81 MG Chew Take 81 mg by mouth once daily.      cyanocobalamin (B-12 DOTS) 500 MCG tablet Take 500 mcg by mouth every morning. Every day      FOLIC ACID/MULTIVITS-MIN/LUT (CENTRUM SILVER ORAL) Take 1 tablet by mouth once daily.      metoprolol tartrate (LOPRESSOR) 50 MG tablet Take 1 tablet (50 mg total) by mouth 2 (two) times daily. 180 tablet 3    ramipril (ALTACE) 5 MG capsule TAKE 1 CAPSULE ONE TIME DAILY 90 capsule 1    rosuvastatin (CRESTOR) 40 MG Tab Take 1 tablet (40 mg total) by mouth once daily. 90 tablet 3    warfarin (COUMADIN) 10 MG tablet TAKE AS DIRECTED  BY  COUMADIN  CLINIC (CURRENT DOSE IS 10MG DAILY EXCEPT 15MG ON MONDAY,WEDNESDAY AND FRIDAY)  (Patient taking differently: TAKE AS DIRECTED  BY  COUMADIN  CLINIC (CURRENT DOSE IS 10MG DAILY EXCEPT 15MG ON WEDNESDAY)) 111 tablet 3    warfarin (COUMADIN) 5 MG tablet Take as directed by Coumadin Clinic. Current dose is 10mg daily except 15mg Monday, Wednesday, Friday (90 day supply) (Patient taking differently: Take by mouth. Take as directed by Coumadin Clinic. Current dose is 10mg daily except 15mg on Wednesday) 90 tablet 3     No facility-administered encounter medications on file as of  2/20/2018.      Review of Systems   General ROS: GENERAL:  No weight gain or loss  SKIN:  No rashes or lacerations  HEAD:  No headaches  EYES:  No exophthalmos, jaundice or blindness  EARS:  No dizziness, tinnitus or hearing loss  NOSE:  No changes in smell  MOUTH & THROAT:  No dyskinetic movements or obvious goiter  CHEST:  No shortness of breath, hyperventilation or cough  CARDIOVASCULAR:  No tachycardia or chest pain  ABDOMEN:  No nausea, vomiting, pain, constipation or diarrhea  URINARY:  No frequency, dysuria or sexual dysfunction  ENDOCRINE:  No polydipsia, polyuria  MUSCULOSKELETAL:  No pain or stiffness of the joints  NEUROLOGIC:  No weakness, sensory changes, seizures, confusion, memory loss, tremor or other abnormal movements  Physical Exam     Vitals:    02/20/18 1343   BP: 135/65   Pulse: 71     Physical Exam   Constitutional: He is oriented to person, place, and time. He appears well-developed and well-nourished. No distress.   HENT:   Head: Normocephalic and atraumatic.   Right Ear: External ear normal.   Left Ear: External ear normal.   Nose: Nose normal.   Mouth/Throat: Oropharynx is clear and moist.   Eyes: Conjunctivae are normal. Pupils are equal, round, and reactive to light.   Neck: Normal range of motion. Neck supple. No JVD present. No tracheal deviation present. No thyromegaly present.   Cardiovascular: Normal rate, regular rhythm, normal heart sounds and intact distal pulses.  Exam reveals no gallop and no friction rub.    No murmur heard.  Pulmonary/Chest: Effort normal and breath sounds normal. No respiratory distress. He has no wheezes. He exhibits no tenderness.   Abdominal: Soft. Bowel sounds are normal. He exhibits no distension and no mass. There is no tenderness. There is no rebound and no guarding.   Genitourinary: Rectum normal and penis normal. No penile tenderness.   Genitourinary Comments: Prostate 30 grams with negative nodule or negative tenderness     Musculoskeletal: Normal  range of motion. He exhibits no edema, tenderness or deformity.   Lymphadenopathy:     He has no cervical adenopathy.   Neurological: He is alert and oriented to person, place, and time.   Skin: Skin is warm and dry. He is not diaphoretic.     Psychiatric: He has a normal mood and affect. His behavior is normal. Thought content normal.     Genitalia:  Scrotum: no rash or lesion  Normal symmetric epididymis without masses  Normal vas palpated  Normal size, symmetric testicles with no masses   Normal urethral meatus with no discharge  Normal circumcised penis with no lesion   Rectal:  Normal perineum and anus upon inspection.  Normal tone, no masses or tenderness;     LABS:  Lab Results   Component Value Date    PSA 0.16 12/04/2013    PSA 0.17 10/15/2012    PSA 0.14 11/15/2011    PSA 0.14 11/22/2010    PSA 0.1 07/07/2008    PSA 0.2 06/29/2007    PSA 0.2 06/07/2005    PSA 0.2 06/02/2004    PSADIAG 0.21 12/05/2017    PSADIAG 0.21 03/28/2016    PSADIAG 0.38 12/16/2014    PSATOTAL 0.25 04/10/2015    PSAFREE 0.09 04/10/2015    PSAFREEPCT 36.00 04/10/2015     Results for orders placed or performed in visit on 12/05/17   Prostate Specific Antigen, Diagnostic   Result Value Ref Range    PSA DIAGNOSTIC 0.21 0.00 - 4.00 ng/mL   Results for orders placed or performed in visit on 03/28/16   Prostate Specific Antigen, Diagnostic   Result Value Ref Range    PSA DIAGNOSTIC 0.21 0.00 - 4.00 ng/mL   Results for orders placed or performed in visit on 12/16/14   PROSTATE SPECIFIC ANTIGEN, DIAGNOSTIC   Result Value Ref Range    PSA DIAGNOSTIC 0.38 0.00 - 4.00 ng/mL     Lab Results   Component Value Date    CREATININE 0.9 12/05/2017    CREATININE 0.8 11/14/2017    CREATININE 0.8 11/03/2017     Results for orders placed or performed in visit on 11/15/11   Testosterone   Result Value Ref Range    Testosterone, Total 504 195 - 1138 ng/dl     Urine Culture, Routine   Date Value Ref Range Status   11/07/2012 NO SIGNIFICANT GROWTH  Final      Assessment and Plan:  Del was seen today for hematuria.    Diagnoses and all orders for this visit:    Hematuria, gross  -     Cytology, urine  -     POCT urine dipstick without microscope  -     Urine culture  -     Cystoscopy; Future  -     CBC Without Differential; Future  -     Comprehensive metabolic panel; Future    Hematuria, unspecified type  -     CT Urogram Abd Pelvis W WO; Future    Other orders  -     lidocaine HCl 2% urojet; Place into the urethra once.  -     ciprofloxacin HCl tablet 500 mg; Take 2 tablets (500 mg total) by mouth once.      Follow-up:  Follow-up for cysto.

## 2018-02-21 LAB — BACTERIA UR CULT: NORMAL

## 2018-02-27 ENCOUNTER — HOSPITAL ENCOUNTER (OUTPATIENT)
Dept: RADIOLOGY | Facility: HOSPITAL | Age: 73
Discharge: HOME | End: 2018-02-27
Attending: UROLOGY
Payer: MEDICARE

## 2018-02-27 ENCOUNTER — ANTI-COAG VISIT (OUTPATIENT)
Dept: CARDIOLOGY | Facility: CLINIC | Age: 73
End: 2018-02-27
Payer: MEDICARE

## 2018-02-27 DIAGNOSIS — R31.9 HEMATURIA, UNSPECIFIED TYPE: ICD-10-CM

## 2018-02-27 DIAGNOSIS — Z79.01 LONG TERM CURRENT USE OF ANTICOAGULANT THERAPY: ICD-10-CM

## 2018-02-27 LAB — INR PPP: 2.5

## 2018-02-27 PROCEDURE — G0248 DEMONSTRATE USE HOME INR MON: HCPCS | Mod: S$GLB,,, | Performed by: INTERNAL MEDICINE

## 2018-02-27 PROCEDURE — 99211 OFF/OP EST MAY X REQ PHY/QHP: CPT | Mod: S$GLB,,,

## 2018-02-27 PROCEDURE — 25500020 PHARM REV CODE 255: Performed by: UROLOGY

## 2018-02-27 PROCEDURE — 74178 CT ABD&PLV WO CNTR FLWD CNTR: CPT | Mod: TC

## 2018-02-27 PROCEDURE — 74178 CT ABD&PLV WO CNTR FLWD CNTR: CPT | Mod: 26,,, | Performed by: RADIOLOGY

## 2018-02-27 RX ADMIN — IOHEXOL 150 ML: 350 INJECTION, SOLUTION INTRAVENOUS at 05:02

## 2018-02-27 NOTE — PROGRESS NOTES
INR good. Patient confirmed eating spinach last week as planned. Continue current dose plan and start weekly INR.

## 2018-02-27 NOTE — PROGRESS NOTES
Meter education complete. Patient demonstrated understanding of self-test process by performing initial pt/inr and reporting results to CPS. Reference materials reviewed. Terms of compliance discussed. Pt agreed to terms by signing Patient Contract and Release Form. Mr and Mrs Anand were given the opportunity to ask questions. Training checklist signed and faxed to [facility]. Chart billed for consult.

## 2018-03-02 ENCOUNTER — PATIENT OUTREACH (OUTPATIENT)
Dept: OTHER | Facility: OTHER | Age: 73
End: 2018-03-02

## 2018-03-02 NOTE — PROGRESS NOTES
Last 5 Patient Entered Readings                                      Current 30 Day Average: 129/71     Recent Readings 4/18/2018 4/17/2018 4/16/2018 4/13/2018 4/11/2018    SBP (mmHg) 110 149 134 120 117    DBP (mmHg) 61 76 67 66 63    Pulse 67 74 62 57 66        Patient's BP average is controlled based on 2017 ACC/AHA HTN guidelines of goal BP <130/80.      Patient denies s/s of hypotension (lightheadedness, dizziness, nausea, fatigue) associated with low readings. Instructed patient to inform me if this occurs, patient confirms understanding.      Patient denies s/s of hypertension (SOB, CP, severe headaches, changes in vision) associated with high readings. Instructed patient to go to the ED if BP > 180/110 and accompanied by hypertensive s/s, patient confirms understanding.     Patient's health , Rajni Chavez, will be following up every 3-4 weeks. I will continue to monitor regularly and will follow up in 2-3 months, sooner if BP begins to trend upward or downward.    Patient has my contact information and knows to call with any concerns or clinical changes.     Current HTN regimen:  Hypertension Medications             metoprolol tartrate (LOPRESSOR) 50 MG tablet Take 1 tablet (50 mg total) by mouth 2 (two) times daily.    ramipril (ALTACE) 5 MG capsule Take 1 capsule (5 mg total) by mouth once daily.        Patient reports changing to salt substitue.

## 2018-03-14 ENCOUNTER — ANTI-COAG VISIT (OUTPATIENT)
Dept: CARDIOLOGY | Facility: CLINIC | Age: 73
End: 2018-03-14

## 2018-03-14 ENCOUNTER — PATIENT OUTREACH (OUTPATIENT)
Dept: OTHER | Facility: OTHER | Age: 73
End: 2018-03-14

## 2018-03-14 DIAGNOSIS — Z79.01 LONG TERM CURRENT USE OF ANTICOAGULANT THERAPY: ICD-10-CM

## 2018-03-14 LAB — INR PPP: 1.7

## 2018-03-14 NOTE — PROGRESS NOTES
.  Last 5 Patient Entered Readings                                      Current 30 Day Average: 133/73     Recent Readings 3/28/2018 3/27/2018 3/27/2018 3/26/2018 3/23/2018    SBP (mmHg) 120 136 147 132 154    DBP (mmHg) 66 82 106 73 80    Pulse 60 67 67 64 52          Hypertension Digital Medicine Program (HDMP): Health  Follow Up    Lifestyle Modifications:  Discussed with pt that she will be getting a new health  in the digital medicine program.     1.Low sodium diet: yes, Pt reports his wife using Mrs. Silva seasonings while cooking instead of salt. Reports when they are dining out he doesn't add salt to his food.    2.Physical activity: yes , Pt reports he is still golfing when the weather isn't bad.     3.Hypotension/Hypertension symptoms: no   Frequency/Alleviating factors/Precipitating factors, etc.     4.Patient has been compliant with the medication regimen.     Follow up with Mr. Del Anand completed. No further questions or concerns. I will follow up in a few weeks to assess progress.

## 2018-03-14 NOTE — PROGRESS NOTES
INR lab draw was due 3/13, Patient tested 3/14, Tika with [facility] called in a verbal result dated today 3/14 as: INR -1.7, hard copy to be faxed

## 2018-03-27 ENCOUNTER — ANTI-COAG VISIT (OUTPATIENT)
Dept: CARDIOLOGY | Facility: CLINIC | Age: 73
End: 2018-03-27

## 2018-03-27 DIAGNOSIS — Z79.01 LONG TERM CURRENT USE OF ANTICOAGULANT THERAPY: ICD-10-CM

## 2018-03-27 LAB — INR PPP: 3.8

## 2018-03-27 NOTE — PROGRESS NOTES
Questioned and confirmed dose .   Reports having beer intake over weekend and a couple for lunch.  Pt stated he usually has alcohol intake every other day .

## 2018-04-04 ENCOUNTER — ANTI-COAG VISIT (OUTPATIENT)
Dept: CARDIOLOGY | Facility: CLINIC | Age: 73
End: 2018-04-04
Payer: MEDICARE

## 2018-04-04 DIAGNOSIS — Z79.01 LONG TERM CURRENT USE OF ANTICOAGULANT THERAPY: ICD-10-CM

## 2018-04-04 LAB — INR PPP: 2.4

## 2018-04-04 PROCEDURE — G0250 MD INR TEST REVIE INTER MGMT: HCPCS | Mod: S$GLB,,, | Performed by: PHARMACIST

## 2018-04-11 ENCOUNTER — ANTI-COAG VISIT (OUTPATIENT)
Dept: CARDIOLOGY | Facility: CLINIC | Age: 73
End: 2018-04-11

## 2018-04-11 DIAGNOSIS — Z79.01 LONG TERM CURRENT USE OF ANTICOAGULANT THERAPY: ICD-10-CM

## 2018-04-11 LAB — INR PPP: 2.7

## 2018-04-16 RX ORDER — RAMIPRIL 5 MG/1
5 CAPSULE ORAL DAILY
Qty: 90 CAPSULE | Refills: 1 | Status: SHIPPED | OUTPATIENT
Start: 2018-04-16 | End: 2018-09-30 | Stop reason: SDUPTHER

## 2018-04-16 RX ORDER — ROSUVASTATIN CALCIUM 40 MG/1
40 TABLET, COATED ORAL DAILY
Qty: 90 TABLET | Refills: 3 | Status: SHIPPED | OUTPATIENT
Start: 2018-04-16 | End: 2019-04-07 | Stop reason: SDUPTHER

## 2018-04-18 ENCOUNTER — ANTI-COAG VISIT (OUTPATIENT)
Dept: CARDIOLOGY | Facility: CLINIC | Age: 73
End: 2018-04-18

## 2018-04-18 DIAGNOSIS — Z79.01 LONG TERM CURRENT USE OF ANTICOAGULANT THERAPY: ICD-10-CM

## 2018-04-18 LAB — INR PPP: 2.7

## 2018-04-25 ENCOUNTER — TELEPHONE (OUTPATIENT)
Dept: UROLOGY | Facility: CLINIC | Age: 73
End: 2018-04-25

## 2018-04-25 ENCOUNTER — ANTI-COAG VISIT (OUTPATIENT)
Dept: CARDIOLOGY | Facility: CLINIC | Age: 73
End: 2018-04-25

## 2018-04-25 ENCOUNTER — HOSPITAL ENCOUNTER (OUTPATIENT)
Dept: RADIOLOGY | Facility: HOSPITAL | Age: 73
Discharge: HOME | End: 2018-04-25
Attending: UROLOGY
Payer: MEDICARE

## 2018-04-25 ENCOUNTER — PATIENT MESSAGE (OUTPATIENT)
Dept: UROLOGY | Facility: CLINIC | Age: 73
End: 2018-04-25

## 2018-04-25 DIAGNOSIS — N20.0 KIDNEY STONE ON LEFT SIDE: Primary | ICD-10-CM

## 2018-04-25 DIAGNOSIS — N20.0 KIDNEY STONE ON LEFT SIDE: ICD-10-CM

## 2018-04-25 DIAGNOSIS — Z79.01 LONG TERM CURRENT USE OF ANTICOAGULANT THERAPY: ICD-10-CM

## 2018-04-25 LAB — INR PPP: 3.6

## 2018-04-25 PROCEDURE — 74018 RADEX ABDOMEN 1 VIEW: CPT | Mod: 26,,, | Performed by: RADIOLOGY

## 2018-04-25 PROCEDURE — 74018 RADEX ABDOMEN 1 VIEW: CPT | Mod: TC

## 2018-04-25 NOTE — TELEPHONE ENCOUNTER
CT urogram 2/27/18  1.  Left nonobstructing nephrolithiasis.    2.  Stable, subcentimeter left lower lobe nodule , probably benign.    3.  Mild ectasia of the infrarenal abdominal aorta. Atherosclerosis.    Diagnoses and all orders for this visit:    Kidney stone on left side  -     X-Ray Abdomen AP 1 View; Future    He appears to have passed a stone.  Please have him follow up with a KUB with an VIJI.  He will bring the stone he passed for stone analysis.

## 2018-04-26 ENCOUNTER — OFFICE VISIT (OUTPATIENT)
Dept: UROLOGY | Facility: CLINIC | Age: 73
End: 2018-04-26
Payer: MEDICARE

## 2018-04-26 VITALS
BODY MASS INDEX: 38.65 KG/M2 | HEIGHT: 75 IN | WEIGHT: 310.88 LBS | DIASTOLIC BLOOD PRESSURE: 76 MMHG | SYSTOLIC BLOOD PRESSURE: 135 MMHG | HEART RATE: 58 BPM

## 2018-04-26 DIAGNOSIS — N20.0 KIDNEY STONE ON LEFT SIDE: Primary | ICD-10-CM

## 2018-04-26 PROCEDURE — 82365 CALCULUS SPECTROSCOPY: CPT

## 2018-04-26 PROCEDURE — 99214 OFFICE O/P EST MOD 30 MIN: CPT | Mod: S$GLB,,, | Performed by: NURSE PRACTITIONER

## 2018-04-26 PROCEDURE — 99999 PR PBB SHADOW E&M-EST. PATIENT-LVL III: CPT | Mod: PBBFAC,,, | Performed by: NURSE PRACTITIONER

## 2018-04-26 PROCEDURE — 3078F DIAST BP <80 MM HG: CPT | Mod: CPTII,S$GLB,, | Performed by: NURSE PRACTITIONER

## 2018-04-26 PROCEDURE — 3075F SYST BP GE 130 - 139MM HG: CPT | Mod: CPTII,S$GLB,, | Performed by: NURSE PRACTITIONER

## 2018-04-26 NOTE — LETTER
April 27, 2018      Arnold Ray MD  [address]  [place] [address]           [facility] - Urology 4th Floor  [address]  [facility] [address]  Phone: [phone]          Patient: Del Anand   MR Number: [id number]   YOB: 1945   Date of Visit: 4/26/2018       Dear Dr. Arnold Ray:    Thank you for referring Del Anand to me for evaluation. Attached you will find relevant portions of my assessment and plan of care.    If you have questions, please do not hesitate to call me. I look forward to following Del Anand along with you.    Sincerely,    Nadia Ramírez, NP    Enclosure  CC:  No Recipients    If you would like to receive this communication electronically, please contact [email or web address] or [phone] to request more information on [employer] Link access.    For providers and/or their staff who would like to refer a patient to [facility], please contact us through our one-stop-shop provider referral line, [facility], at [phone].    If you feel you have received this communication in error or would no longer like to receive these types of communications, please e-mail [email or web address]

## 2018-04-27 NOTE — PATIENT INSTRUCTIONS
The patient was encouraged to drink 2-3 liters of water a day, limit iced tea and don as well as foods high in oxalate.  They were cautioned to try to limit salt and red meat intake.  We also discussed adding citrate to the diet with the addition of manas or lemon juice to their water or alternatively with crystal light.     Get prompt medical attention if any of the following occur:  · Severe pain that returns and not relieved by pain medicines  · Repeated vomiting or unable to keep down fluids  · Weakness, dizziness or fainting  · Fever of 101.4ºF (38ºC) or higher, or as directed by your healthcare provider  · Blood clots in urine  · Foul smelling or cloudy urine  · Unable to pass urine for 8 hours or increasing bladder pressure

## 2018-04-27 NOTE — PROGRESS NOTES
Subjective:       Patient ID: Del Anand is a 73 y.o. male.    Chief Complaint: Kidney stone.      HPI: Del Anand is a 73 y.o. White male who presents today for evaluation and management of kidney stone. He is an established patient with [facility] but a new patient to me. His last clinic visit was with Dr. Ray on 2/20/18 for gross hematuria work up.    Hx of kidney stones. He reports his last kidney stone was in 11/2012.    2/27/18 CT Urogram:  Kidneys/Ureters: Normal in size and location demonstrating appropriate concentration and excretion of contrast on delayed phase imaging.Left kidney contains multiple subcentimeter hypodensities too small to characterize but likely representing simple renal cyst.  Additionally there is a 0.6 cm nonobstructing nephrolith in the left lower pole.No hydroureteronephrosis.    4/25/18 KUB:  Nonspecific bowel gas pattern with no evidence of bowel obstruction.  No free air.  There is degenerative change of the spine.  Soft tissues and osseous structures are otherwise unremarkable.    Today patient presents to clinic for left renal stone. He brought the stone to clinic.   He reports after having the CT urogram completed, he felt some left flank discomfort radiating to lower left abdominal area, which he assumed was a stone passing. A few days ago, he felt 10/10 sharp intermittent pain in his penis. He reports he was having to strain to urinate and having bladder pressure. He reports he did not feel as though he was emptying his bladder. After 2 days, he then was able to pass stone and all his urinary symptoms subsided. He denies any hematuria since being seen in clinic with Dr. Ray.  Today he reports pain has resolved and is 0/10. He is urinating without difficulty. He denies straining, intermittent stream, or decreased FOS since passing stone. He denies flank pain, frequency, urgency, bladder pain or pressure, dysuria or hematuria. He denies f/c/n/v.      Review of  patient's allergies indicates:  No Known Allergies    Current Outpatient Prescriptions   Medication Sig Dispense Refill    acetaminophen (TYLENOL) 325 MG tablet Take 325 mg by mouth as needed for Pain.      aspirin 81 MG Chew Take 81 mg by mouth once daily.      cyanocobalamin (B-12 DOTS) 500 MCG tablet Take 500 mcg by mouth every morning. Every day      FOLIC ACID/MULTIVITS-MIN/LUT (CENTRUM SILVER ORAL) Take 1 tablet by mouth once daily.      metoprolol tartrate (LOPRESSOR) 50 MG tablet Take 1 tablet (50 mg total) by mouth 2 (two) times daily. 180 tablet 3    ramipril (ALTACE) 5 MG capsule Take 1 capsule (5 mg total) by mouth once daily. 90 capsule 1    rosuvastatin (CRESTOR) 40 MG Tab Take 1 tablet (40 mg total) by mouth once daily. 90 tablet 3    warfarin (COUMADIN) 10 MG tablet TAKE AS DIRECTED  BY  COUMADIN  CLINIC (CURRENT DOSE IS 10MG DAILY EXCEPT 15MG ON MONDAY,WEDNESDAY AND FRIDAY)  (Patient taking differently: TAKE AS DIRECTED  BY  COUMADIN  CLINIC (CURRENT DOSE IS 10MG DAILY EXCEPT 15MG ON WEDNESDAY)) 111 tablet 3    warfarin (COUMADIN) 5 MG tablet Take as directed by Coumadin Clinic. Current dose is 10mg daily except 15mg Monday, Wednesday, Friday (90 day supply) (Patient taking differently: Take by mouth. Take as directed by Coumadin Clinic. Current dose is 10mg daily except 15mg on Wednesday) 90 tablet 3     No current facility-administered medications for this visit.        Past Medical History:   Diagnosis Date    Benign neoplasm of colon     CHF (congestive heart failure) 1/13/2015    Coronary artery disease     Difficult intubation     DVT (deep venous thrombosis)     HLD (hyperlipidemia)     HTN (hypertension)     Impaired fasting glucose     Kidney stone     Metabolic syndrome     Nonspecific abnormal results of liver function study     Nonspecific findings on examination of blood     Nuclear sclerosis - Both Eyes 10/18/2013    Osteoarthrosis, unspecified whether  "generalized or localized, lower leg     Respiratory distress     PT STATES IT WAS "CRAP", "VERY UNCOMFORTABLE"       Past Surgical History:   Procedure Laterality Date    CARDIAC SURGERY      CORONARY ARTERY BYPASS GRAFT      2014    CYSTOSCOPY      KIDNEY STONE SURGERY      KNEE ARTHROPLASTY      bilateral    renal stone      SKIN BIOPSY         Family History   Problem Relation Age of Onset    Heart attack Father      d. 85    Urolithiasis Father     Heart disease Father     Heart failure Father     Stroke Mother     Dementia Mother     Heart attack Maternal Grandfather      d. 65    Hypertension Paternal Grandmother     Heart attack Paternal Grandfather     Heart failure Paternal Grandfather     Heart disease Paternal Grandfather     Other Sister      bladder lift, s/p 4     No Known Problems Maternal Aunt     No Known Problems Maternal Uncle     No Known Problems Paternal Aunt     No Known Problems Paternal Uncle     Blindness Neg Hx     Cancer Neg Hx     Cataracts Neg Hx     Diabetes Neg Hx     Glaucoma Neg Hx     Macular degeneration Neg Hx     Retinal detachment Neg Hx     Strabismus Neg Hx     Thyroid disease Neg Hx     Amblyopia Neg Hx        Review of Systems   Constitutional: Negative for activity change, appetite change, chills, diaphoresis, fatigue, fever and unexpected weight change.   HENT: Negative for congestion and trouble swallowing.    Eyes: Negative for visual disturbance.   Respiratory: Negative for chest tightness and shortness of breath.    Cardiovascular: Negative for chest pain and palpitations.   Gastrointestinal: Negative for abdominal pain, constipation, diarrhea, nausea and vomiting.   Genitourinary: Negative for decreased urine volume, difficulty urinating (resolved), discharge, dysuria, flank pain, frequency, hematuria, penile pain (resolved), penile swelling, scrotal swelling, testicular pain and urgency.   Musculoskeletal: Negative for back pain " and gait problem.   Skin: Negative for pallor and rash.   Allergic/Immunologic: Negative for immunocompromised state.   Neurological: Negative for dizziness, seizures, syncope, weakness, light-headedness and headaches.   Hematological: Negative for adenopathy.   Psychiatric/Behavioral: Negative for confusion. The patient is not nervous/anxious.          All other systems were reviewed and were negative.    Objective:     Vitals:    04/26/18 1441   BP: 135/76   Pulse: (!) 58        Physical Exam   Nursing note and vitals reviewed.  Constitutional: He is oriented to person, place, and time. He appears well-developed and well-nourished.   HENT:   Head: Normocephalic and atraumatic.   Eyes: Conjunctivae and EOM are normal.   Neck: Normal range of motion.   Cardiovascular: Regular rhythm.  Bradycardia present.    Pulmonary/Chest: Effort normal. No respiratory distress.   Abdominal: Soft. He exhibits no distension. There is no CVA tenderness.   Musculoskeletal: Normal range of motion.   Neurological: He is alert and oriented to person, place, and time.   Skin: Skin is warm and dry.     Psychiatric: He has a normal mood and affect. His behavior is normal. Judgment and thought content normal.         Lab Results   Component Value Date    CREATININE 0.9 02/20/2018     Lab Results   Component Value Date    EGFRNONAA >60.0 02/20/2018     Lab Results   Component Value Date    ESTGFRAFRICA >60.0 02/20/2018         Assessment:       1. Kidney stone on left side        Plan:     Diagnoses and all orders for this visit:    Kidney stone on left side  -     Urinary Stone Analysis    -Discussed plan of care with patient  -Stone brought to clinic by patient sent to lab for stone analysis; will notify patient of results  -General risk factors for kidney stones and the conservative measures to prevent kidney stones in the future were discussed with the patient in detail.  The patient was encouraged to drink 2-3 liters of water a day,  limit iced tea and don as well as foods high in oxalate.  They were cautioned to try to limit salt and red meat intake.  We also discussed adding citrate to the diet with the addition of manas or lemon juice to their water or alternatively with crystal light.   -Reviewed KUB with patient  -Follow up as needed  -Get prompt medical attention if any of the following occur:  · Severe pain that returns and not relieved by pain medicines  · Repeated vomiting or unable to keep down fluids  · Weakness, dizziness or fainting  · Fever of 101.4ºF (38ºC) or higher, or as directed by your healthcare provider  · Blood clots in urine  · Foul smelling or cloudy urine  · Unable to pass urine for 8 hours or increasing bladder pressure        I spent 25 minutes with the patient of which more than half was spent in coordinating the patient's care as well as in direct consultation with the patient in regards to our treatment and plan.

## 2018-04-30 ENCOUNTER — PATIENT OUTREACH (OUTPATIENT)
Dept: OTHER | Facility: OTHER | Age: 73
End: 2018-04-30

## 2018-05-02 ENCOUNTER — ANTI-COAG VISIT (OUTPATIENT)
Dept: CARDIOLOGY | Facility: CLINIC | Age: 73
End: 2018-05-02

## 2018-05-02 DIAGNOSIS — Z79.01 LONG TERM CURRENT USE OF ANTICOAGULANT THERAPY: ICD-10-CM

## 2018-05-02 LAB
ANNOTATION COMMENT IMP: NORMAL
COMPN STONE: NORMAL
INR PPP: 2.8
SPECIMEN SOURCE: NORMAL
STONE ANALYSIS IR-IMP: NORMAL

## 2018-05-03 ENCOUNTER — TELEPHONE (OUTPATIENT)
Dept: UROLOGY | Facility: CLINIC | Age: 73
End: 2018-05-03

## 2018-05-03 NOTE — TELEPHONE ENCOUNTER
Spoke with Mr. Anand regarding urinary stone analysis.  (100% Calcium oxalate monohydrate)    General risk factors for kidney stones and the conservative measures to prevent kidney stones in the future were discussed with the patient in detail.  The patient was encouraged to drink 2-3 liters of water a day, limit iced tea and don as well as foods high in oxalate.  They were cautioned to try to limit salt and red meat intake.  We also discussed adding citrate to the diet with the addition of manas or lemon juice to their water or alternatively with crystal light.     Pt verbalized understanding.

## 2018-05-09 ENCOUNTER — ANTI-COAG VISIT (OUTPATIENT)
Dept: CARDIOLOGY | Facility: CLINIC | Age: 73
End: 2018-05-09
Payer: MEDICARE

## 2018-05-09 DIAGNOSIS — Z79.01 LONG TERM CURRENT USE OF ANTICOAGULANT THERAPY: ICD-10-CM

## 2018-05-09 LAB — INR PPP: 2.8

## 2018-05-09 PROCEDURE — G0250 MD INR TEST REVIE INTER MGMT: HCPCS | Mod: S$GLB,,, | Performed by: PHARMACIST

## 2018-05-16 ENCOUNTER — ANTI-COAG VISIT (OUTPATIENT)
Dept: CARDIOLOGY | Facility: CLINIC | Age: 73
End: 2018-05-16

## 2018-05-16 DIAGNOSIS — Z79.01 LONG TERM CURRENT USE OF ANTICOAGULANT THERAPY: ICD-10-CM

## 2018-05-16 LAB — INR PPP: 2.2

## 2018-05-16 NOTE — PROGRESS NOTES
Questioned pt, confirmed taking correct dose  Reports no spinach intake this week  No other changes (missed dose, new meds, bleeding, etc)

## 2018-05-16 NOTE — PROGRESS NOTES
Patient called back and I followed-up with him.  Asked patient what he thinks is causing his BP to spike on 5/14. Patient states he does not know what caused his BP to spike that day and he can not recall what he ate that day. Patient states that even on that day he was puzzled and could not recall anything he ate caused his BP to spike.  Patient reports he will be out of town playing golf for 5 days.   Last 5 Patient Entered Readings                                      Current 30 Day Average: 136/72     Recent Readings 5/14/2018 5/14/2018 5/11/2018 5/8/2018 4/30/2018    SBP (mmHg) 169 138 133 125 126    DBP (mmHg) 85 93 70 67 75    Pulse 56 55 57 68 59          Digital Medicine: Health  Follow Up    Lifestyle Modifications:    1.Dietary Modifications (Sodium intake <2,000mg/day, food labels, dining out):  Patient reports he still watching his sodium intake.     2.Physical Activity: Patient reports he still playing golf when the weather is good.     3.Medication Therapy: Patient has been compliant with the medication regimen.    4.Patient has the following medication side effects/concerns: Patient reports he has no s/s of hypotension ( dizziness, LH, nausea, or fatigue ) with low readings. Patient reports he has no s/s of hypertension ( CP, SOB, severe headaches,or change in vision ) with high readings.   (Frequency/Alleviating factors/Precipitating factors, etc.)     Follow up with Mr. Del Anand completed. No further questions or concerns.     Patient's current 30 day average BP is not at goal.     Plan: Will continue follow up to achieve health goals.

## 2018-05-23 ENCOUNTER — ANTI-COAG VISIT (OUTPATIENT)
Dept: CARDIOLOGY | Facility: CLINIC | Age: 73
End: 2018-05-23

## 2018-05-23 DIAGNOSIS — Z79.01 LONG TERM CURRENT USE OF ANTICOAGULANT THERAPY: ICD-10-CM

## 2018-05-23 LAB — INR PPP: 2.7

## 2018-05-30 ENCOUNTER — ANTI-COAG VISIT (OUTPATIENT)
Dept: CARDIOLOGY | Facility: CLINIC | Age: 73
End: 2018-05-30

## 2018-05-30 DIAGNOSIS — Z79.01 LONG TERM CURRENT USE OF ANTICOAGULANT THERAPY: ICD-10-CM

## 2018-05-30 LAB — INR PPP: 2.6

## 2018-05-31 ENCOUNTER — PATIENT MESSAGE (OUTPATIENT)
Dept: CARDIOLOGY | Facility: CLINIC | Age: 73
End: 2018-05-31

## 2018-06-06 ENCOUNTER — ANTI-COAG VISIT (OUTPATIENT)
Dept: CARDIOLOGY | Facility: CLINIC | Age: 73
End: 2018-06-06

## 2018-06-06 ENCOUNTER — PATIENT OUTREACH (OUTPATIENT)
Dept: OTHER | Facility: OTHER | Age: 73
End: 2018-06-06

## 2018-06-06 DIAGNOSIS — Z79.01 LONG TERM CURRENT USE OF ANTICOAGULANT THERAPY: ICD-10-CM

## 2018-06-06 LAB — INR PPP: 2.8

## 2018-06-06 NOTE — PROGRESS NOTES
Last 5 Patient Entered Readings                                      Current 30 Day Average: 139/72     Recent Readings 6/6/2018 5/30/2018 5/23/2018 5/14/2018 5/14/2018    SBP (mmHg) 125 132 151 169 138    DBP (mmHg) 61 73 76 85 93    Pulse 59 57 56 56 55        Digital Medicine: Health  Follow Up    Lifestyle Modifications:    1.Dietary Modifications (Sodium intake <2,000mg/day, food labels, dining out): Patient reports he ha been watching his sodium intake by cutting out the salt and using salt substitutes.     2.Physical Activity: Patient reports he still plays golf for exercise.     3.Medication Therapy: Patient has been compliant with the medication regimen.    4.Patient has the following medication side effects/concerns: Patient reports he has no s/s of hypotension (dizziness, LH, nausea,or fatigue) with low readings. Patient reports he has no s/s of hypertension (CP,SOB,severe headaches,or change in vision) with high readings.   (Frequency/Alleviating factors/Precipitating factors, etc.)     Follow up with Mr. Del Anand completed. No further questions or concerns.     Patient's current 30 day average BP is not at goal.    Plan: Will continue follow up to achieve health goals.

## 2018-06-06 NOTE — PROGRESS NOTES
"Patient scheduled for dental work 7/6 at 10AM. Per pt he requested a  teeth cleaning and strongly feel that he needs the second tooth from the back on the upper left side extracted    Per message from Dr Levin, "hold Coumadin for 2 days prior to your visit w the dentist and resume in the evening after the procedure"  "

## 2018-06-13 ENCOUNTER — ANTI-COAG VISIT (OUTPATIENT)
Dept: CARDIOLOGY | Facility: CLINIC | Age: 73
End: 2018-06-13
Payer: MEDICARE

## 2018-06-13 DIAGNOSIS — Z79.01 LONG TERM CURRENT USE OF ANTICOAGULANT THERAPY: ICD-10-CM

## 2018-06-13 LAB — INR PPP: 3.1

## 2018-06-13 PROCEDURE — G0250 MD INR TEST REVIE INTER MGMT: HCPCS | Mod: S$GLB,,, | Performed by: INTERNAL MEDICINE

## 2018-06-20 ENCOUNTER — ANTI-COAG VISIT (OUTPATIENT)
Dept: CARDIOLOGY | Facility: CLINIC | Age: 73
End: 2018-06-20

## 2018-06-20 DIAGNOSIS — Z79.01 LONG TERM CURRENT USE OF ANTICOAGULANT THERAPY: ICD-10-CM

## 2018-06-20 LAB — INR PPP: 3.5

## 2018-06-27 ENCOUNTER — PATIENT OUTREACH (OUTPATIENT)
Dept: OTHER | Facility: OTHER | Age: 73
End: 2018-06-27

## 2018-06-27 ENCOUNTER — ANTI-COAG VISIT (OUTPATIENT)
Dept: CARDIOLOGY | Facility: CLINIC | Age: 73
End: 2018-06-27

## 2018-06-27 DIAGNOSIS — Z79.01 LONG TERM CURRENT USE OF ANTICOAGULANT THERAPY: ICD-10-CM

## 2018-06-27 LAB — INR PPP: 3.1

## 2018-06-27 NOTE — PROGRESS NOTES
Last 5 Patient Entered Readings                                      Current 30 Day Average: 130/70     Recent Readings 6/27/2018 6/20/2018 6/20/2018 6/13/2018 6/6/2018    SBP (mmHg) 135 136 145 122 125    DBP (mmHg) 69 78 88 69 61    Pulse 58 58 58 60 59        Left voicemail and sent [facility] message to follow up with Mr. Del Anand.    Per 30 day average, blood pressure is well controlled 130/70 mmHg. Encouraged adherence to low sodium diet and physical activity guidelines. Advised patient to call or message with questions or concerns. Follow up in 8 weeks.       Current HTN regimen:  Hypertension Medications             metoprolol tartrate (LOPRESSOR) 50 MG tablet Take 1 tablet (50 mg total) by mouth 2 (two) times daily.    ramipril (ALTACE) 5 MG capsule Take 1 capsule (5 mg total) by mouth once daily.

## 2018-07-04 LAB — INR PPP: 3.9

## 2018-07-05 ENCOUNTER — ANTI-COAG VISIT (OUTPATIENT)
Dept: CARDIOLOGY | Facility: CLINIC | Age: 73
End: 2018-07-05

## 2018-07-05 DIAGNOSIS — Z79.01 LONG TERM CURRENT USE OF ANTICOAGULANT THERAPY: ICD-10-CM

## 2018-07-07 ENCOUNTER — PATIENT MESSAGE (OUTPATIENT)
Dept: CARDIOLOGY | Facility: CLINIC | Age: 73
End: 2018-07-07

## 2018-07-07 LAB — INR PPP: 1.3

## 2018-07-09 NOTE — PROGRESS NOTES
"We received the following message from patient sent Saturday 7/7/18:  "My last dose of warfarin was on Tuesday, it was 10 mg. I ate some spinach on Thursday for dinner. I had a tooth extracted on Friday, July 6. My INR today, Saturday was 1.3. I plan on taking 15 mg of warfarin today and 10 on Sunday. I expect to hear from you on Monday for dosing instructions unless you contact me before then. "  "

## 2018-07-11 ENCOUNTER — ANTI-COAG VISIT (OUTPATIENT)
Dept: CARDIOLOGY | Facility: CLINIC | Age: 73
End: 2018-07-11

## 2018-07-11 ENCOUNTER — PATIENT MESSAGE (OUTPATIENT)
Dept: CARDIOLOGY | Facility: CLINIC | Age: 73
End: 2018-07-11

## 2018-07-11 DIAGNOSIS — Z79.01 LONG TERM CURRENT USE OF ANTICOAGULANT THERAPY: ICD-10-CM

## 2018-07-11 LAB — INR PPP: 1.7

## 2018-07-11 NOTE — PROGRESS NOTES
"We received the following message from the patient:    "My INR today was 1.7 after taking the quantities previously communicated. This is up toward my target range only .4. I would like to pursue a more aggressive schedule to get me back within range sooner. Taking 15 today and 10 each day after until a retest will not get me back in range soon enough for me. I plan to take 15 mg today and tomorrow which is  more like what I needed in the past.  If you have a problem with this please let me know.   If it would be safe to take additional doses I would also be interested in that.   Send me a note if needed "          "

## 2018-07-12 NOTE — PROGRESS NOTES
Received the following message from patient:  I have downloaded my inr test results from [employer] provided reporting software     Entry method Test location Date and time of INR test result INR test result Unscheduled test Out of target range   [facility] 7/11/2018 9:18 1.7 No Yes   [facility] 7/7/2018 15:22 1.3 Yes Yes   [facility] 7/4/2018 6:45 3.9 No Yes   [facility] 6/27/2018 7:39 3.1 No No   [facility] 6/20/2018 7:12 3.5 No No   [facility] 6/13/2018 7:37 3.1 No No   [facility] 6/6/2018 7:24 2.8 No No

## 2018-07-18 ENCOUNTER — ANTI-COAG VISIT (OUTPATIENT)
Dept: CARDIOLOGY | Facility: CLINIC | Age: 73
End: 2018-07-18
Payer: MEDICARE

## 2018-07-18 DIAGNOSIS — Z79.01 LONG TERM CURRENT USE OF ANTICOAGULANT THERAPY: ICD-10-CM

## 2018-07-18 LAB — INR PPP: 2.8

## 2018-07-18 PROCEDURE — G0250 MD INR TEST REVIE INTER MGMT: HCPCS | Mod: S$GLB,,, | Performed by: INTERNAL MEDICINE

## 2018-07-23 ENCOUNTER — PATIENT MESSAGE (OUTPATIENT)
Dept: ADMINISTRATIVE | Facility: OTHER | Age: 73
End: 2018-07-23

## 2018-07-25 ENCOUNTER — PATIENT MESSAGE (OUTPATIENT)
Dept: CARDIOLOGY | Facility: CLINIC | Age: 73
End: 2018-07-25

## 2018-07-25 ENCOUNTER — ANTI-COAG VISIT (OUTPATIENT)
Dept: CARDIOLOGY | Facility: CLINIC | Age: 73
End: 2018-07-25

## 2018-07-25 DIAGNOSIS — Z79.01 LONG TERM CURRENT USE OF ANTICOAGULANT THERAPY: ICD-10-CM

## 2018-07-25 LAB — INR PPP: 2.6

## 2018-08-01 ENCOUNTER — ANTI-COAG VISIT (OUTPATIENT)
Dept: CARDIOLOGY | Facility: CLINIC | Age: 73
End: 2018-08-01

## 2018-08-01 DIAGNOSIS — Z79.01 LONG TERM CURRENT USE OF ANTICOAGULANT THERAPY: ICD-10-CM

## 2018-08-01 LAB — INR PPP: 2.9

## 2018-08-03 ENCOUNTER — PATIENT OUTREACH (OUTPATIENT)
Dept: OTHER | Facility: OTHER | Age: 73
End: 2018-08-03

## 2018-08-03 NOTE — PROGRESS NOTES
Last 5 Patient Entered Readings                                      Current 30 Day Average: 127/70     Recent Readings 8/1/2018 7/25/2018 7/20/2018 7/18/2018 7/11/2018    SBP (mmHg) 126 115 138 126 131    DBP (mmHg) 71 62 73 76 70    Pulse 61 60 53 61 56        Digital Medicine: Health  Follow Up    Lifestyle Modifications:    1.Dietary Modifications (Sodium intake <2,000mg/day, food labels, dining out): Patient reports he still watching his sodium intake by watching how much salt he consumes daily.    2.Physical Activity: Patient reports he still playing golf regularly.     3.Medication Therapy: Patient has been compliant with the medication regimen.    4.Patient has the following medication side effects/concerns: Patient denies any s/s of hypotension (dizziness, LH,nausea,or change in vision) with low readings. Patient denies any s/s (CP,SOB,severe headaches,or change in vision ) with high readings.   (Frequency/Alleviating factors/Precipitating factors, etc.)     Follow up with Mr. Del Anand completed. No further questions or concerns.    Patient's current 30 day average BP is at goal.     Plan:  Will continue follow up to achieve health goals.

## 2018-08-08 ENCOUNTER — ANTI-COAG VISIT (OUTPATIENT)
Dept: CARDIOLOGY | Facility: CLINIC | Age: 73
End: 2018-08-08

## 2018-08-08 DIAGNOSIS — Z79.01 LONG TERM CURRENT USE OF ANTICOAGULANT THERAPY: ICD-10-CM

## 2018-08-08 LAB — INR PPP: 3

## 2018-08-14 ENCOUNTER — ANTI-COAG VISIT (OUTPATIENT)
Dept: CARDIOLOGY | Facility: CLINIC | Age: 73
End: 2018-08-14

## 2018-08-14 DIAGNOSIS — Z79.01 LONG TERM CURRENT USE OF ANTICOAGULANT THERAPY: ICD-10-CM

## 2018-08-14 LAB — INR PPP: 2.4

## 2018-08-17 ENCOUNTER — ANTI-COAG VISIT (OUTPATIENT)
Dept: CARDIOLOGY | Facility: CLINIC | Age: 73
End: 2018-08-17

## 2018-08-17 DIAGNOSIS — Z79.01 LONG TERM CURRENT USE OF ANTICOAGULANT THERAPY: ICD-10-CM

## 2018-08-17 LAB — INR PPP: 2.2

## 2018-08-22 ENCOUNTER — ANTI-COAG VISIT (OUTPATIENT)
Dept: CARDIOLOGY | Facility: CLINIC | Age: 73
End: 2018-08-22
Payer: MEDICARE

## 2018-08-22 DIAGNOSIS — Z79.01 LONG TERM CURRENT USE OF ANTICOAGULANT THERAPY: ICD-10-CM

## 2018-08-22 LAB — INR PPP: 2.6

## 2018-08-22 PROCEDURE — G0250 MD INR TEST REVIE INTER MGMT: HCPCS | Mod: S$GLB,,, | Performed by: INTERNAL MEDICINE

## 2018-08-23 ENCOUNTER — PATIENT OUTREACH (OUTPATIENT)
Dept: OTHER | Facility: OTHER | Age: 73
End: 2018-08-23

## 2018-08-23 NOTE — PROGRESS NOTES
Last 5 Patient Entered Readings                                      Current 30 Day Average: 129/68     Recent Readings 8/22/2018 8/17/2018 8/14/2018 8/8/2018 8/1/2018    SBP (mmHg) 136 145 136 118 126    DBP (mmHg) 71 66 74 62 71    Pulse 62 55 67 64 61          Left voicemail to follow up and address upward trend of BP

## 2018-08-24 ENCOUNTER — PATIENT OUTREACH (OUTPATIENT)
Dept: OTHER | Facility: OTHER | Age: 73
End: 2018-08-24

## 2018-08-24 NOTE — PROGRESS NOTES
Called patient for a follow-up. Asked patient what he think has been casueing his BP to spike up lately. Patient states he was stress about finding  basal cancer on his leg which he had removed 10 days ago.   Last 5 Patient Entered Readings                                      Current 30 Day Average: 129/68     Recent Readings 8/22/2018 8/17/2018 8/14/2018 8/8/2018 8/1/2018    SBP (mmHg) 136 145 136 118 126    DBP (mmHg) 71 66 74 62 71    Pulse 62 55 67 64 61         Digital Medicine: Health  Follow Up    Lifestyle Modifications:    1.Dietary Modifications (Sodium intake <2,000mg/day, food labels, dining out): Patient reports he still watching his sodium intake closely.     2.Physical Activity: Patient reports he has not been golfing much but walks his dog every morning for about 1 mile.     3.Medication Therapy: Patient has been compliant with the medication regimen.    4.Patient has the following medication side effects/concerns: Patient denies any s/s of hypotension (dizziness, LH,nausea, or fatigue) with low readings. Patient denies any s/s of hypertension (CP,SOB,severe headaches,or change in vision) with high readings.   Follow up with Mr. Del Anand completed. No further questions or concerns.     Patient's current 30 day average BP is at goal.     Plan: Will continue follow up to achieve health goals.

## 2018-08-29 ENCOUNTER — ANTI-COAG VISIT (OUTPATIENT)
Dept: CARDIOLOGY | Facility: CLINIC | Age: 73
End: 2018-08-29

## 2018-08-29 DIAGNOSIS — Z79.01 LONG TERM CURRENT USE OF ANTICOAGULANT THERAPY: ICD-10-CM

## 2018-08-29 LAB — INR PPP: 3.1

## 2018-08-29 NOTE — PROGRESS NOTES
Last 5 Patient Entered Readings                                      Current 30 Day Average: 132/70     Recent Readings 8/29/2018 8/29/2018 8/22/2018 8/17/2018 8/14/2018    SBP (mmHg) 132 150 136 145 136    DBP (mmHg) 75 83 71 66 74    Pulse 56 56 62 55 67          Left voicemail to follow up. Health  spoke with patient last week; he reported recent increased stress.

## 2018-09-05 ENCOUNTER — ANTI-COAG VISIT (OUTPATIENT)
Dept: CARDIOLOGY | Facility: CLINIC | Age: 73
End: 2018-09-05

## 2018-09-05 DIAGNOSIS — Z79.01 LONG TERM CURRENT USE OF ANTICOAGULANT THERAPY: ICD-10-CM

## 2018-09-05 LAB — INR PPP: 2.8

## 2018-09-07 ENCOUNTER — PES CALL (OUTPATIENT)
Dept: ADMINISTRATIVE | Facility: CLINIC | Age: 73
End: 2018-09-07

## 2018-09-12 ENCOUNTER — ANTI-COAG VISIT (OUTPATIENT)
Dept: CARDIOLOGY | Facility: CLINIC | Age: 73
End: 2018-09-12

## 2018-09-12 DIAGNOSIS — Z79.01 LONG TERM CURRENT USE OF ANTICOAGULANT THERAPY: ICD-10-CM

## 2018-09-12 LAB — INR PPP: 2.9

## 2018-09-12 NOTE — PROGRESS NOTES
Last 5 Patient Entered Readings                                      Current 30 Day Average: 130/70     Recent Readings 9/12/2018 9/5/2018 8/29/2018 8/29/2018 8/22/2018    SBP (mmHg) 112 119 132 150 136    DBP (mmHg) 66 68 75 83 71    Pulse 64 62 56 56 62        BP improving and average at goal. No medication recommendations at this time.

## 2018-09-14 ENCOUNTER — PATIENT OUTREACH (OUTPATIENT)
Dept: OTHER | Facility: OTHER | Age: 73
End: 2018-09-14

## 2018-09-14 NOTE — PROGRESS NOTES
Last 5 Patient Entered Readings                                      Current 30 Day Average: 129/69     Recent Readings 9/12/2018 9/5/2018 8/29/2018 8/29/2018 8/22/2018    SBP (mmHg) 112 119 132 150 136    DBP (mmHg) 66 68 75 83 71    Pulse 64 62 56 56 62        Digital Medicine: Health  Follow Up    Lifestyle Modifications:    1.Dietary Modifications (Sodium intake <2,000mg/day, food labels, dining out): Patient reports nothing has changed with diet and he still watching his sodium intake closely.     2.Physical Activity: Patient reports he has not been playing golf much d/t the weather but still walking his dogs in the morning.     3.Medication Therapy: Patient has been compliant with the medication regimen.    4.Patient has the following medication side effects/concerns: Patient denies any s/s of hypotension (dizziness, LH,nausea,or fatigue) with low readings. Patient denies any s/s of hypertension (CP,SOB,severe headaches,or change in vision ) with high readings.   (Frequency/Alleviating factors/Precipitating factors, etc.)     Follow up with Mr. Del Anand completed. No further questions or concerns.     Patient's current 30 day average BP is at goal.     Plan: Will continue follow up to achieve health goals.

## 2018-09-19 ENCOUNTER — PATIENT MESSAGE (OUTPATIENT)
Dept: CARDIOLOGY | Facility: CLINIC | Age: 73
End: 2018-09-19

## 2018-09-19 ENCOUNTER — ANTI-COAG VISIT (OUTPATIENT)
Dept: CARDIOLOGY | Facility: CLINIC | Age: 73
End: 2018-09-19

## 2018-09-19 DIAGNOSIS — Z79.01 LONG TERM CURRENT USE OF ANTICOAGULANT THERAPY: ICD-10-CM

## 2018-09-19 LAB — INR PPP: 2.5

## 2018-09-19 NOTE — PROGRESS NOTES
2nd course of doxy starting, will not adjust coumadin as we typically see an increase in results, which did not occur with his last course of ABx.  Will inform patient that if anything, we should see a rise in INR.

## 2018-09-19 NOTE — PROGRESS NOTES
Pt reports taking an antibiotic (no name) and is concerned about it affecting his INR.  Will review this with him and ask him to remain on his current dose as his INR is on the lower end but it is therapeutic.

## 2018-09-19 NOTE — PROGRESS NOTES
Patient called back to report that on 9/11 he started Doxycycline -not sure of how many mg.-last dose was this am but states today he's getting a second course of the Doxycycline

## 2018-09-25 ENCOUNTER — OFFICE VISIT (OUTPATIENT)
Dept: WOUND CARE | Facility: CLINIC | Age: 73
End: 2018-09-25
Payer: MEDICARE

## 2018-09-25 VITALS
WEIGHT: 313.06 LBS | DIASTOLIC BLOOD PRESSURE: 69 MMHG | SYSTOLIC BLOOD PRESSURE: 136 MMHG | HEART RATE: 71 BPM | HEIGHT: 75 IN | TEMPERATURE: 97 F | BODY MASS INDEX: 38.92 KG/M2

## 2018-09-25 DIAGNOSIS — T81.89XA DELAYED SURGICAL WOUND HEALING, INITIAL ENCOUNTER: Primary | ICD-10-CM

## 2018-09-25 DIAGNOSIS — Z85.89 HISTORY OF SQUAMOUS CELL CARCINOMA: ICD-10-CM

## 2018-09-25 PROCEDURE — 99203 OFFICE O/P NEW LOW 30 MIN: CPT | Mod: 25,S$PBB,, | Performed by: NURSE PRACTITIONER

## 2018-09-25 PROCEDURE — 3075F SYST BP GE 130 - 139MM HG: CPT | Mod: CPTII,,, | Performed by: NURSE PRACTITIONER

## 2018-09-25 PROCEDURE — 99214 OFFICE O/P EST MOD 30 MIN: CPT | Mod: PBBFAC,25 | Performed by: NURSE PRACTITIONER

## 2018-09-25 PROCEDURE — 1101F PT FALLS ASSESS-DOCD LE1/YR: CPT | Mod: CPTII,,, | Performed by: NURSE PRACTITIONER

## 2018-09-25 PROCEDURE — 99499 UNLISTED E&M SERVICE: CPT | Mod: HCNC,S$GLB,, | Performed by: NURSE PRACTITIONER

## 2018-09-25 PROCEDURE — 3078F DIAST BP <80 MM HG: CPT | Mod: CPTII,,, | Performed by: NURSE PRACTITIONER

## 2018-09-25 PROCEDURE — 99999 PR PBB SHADOW E&M-EST. PATIENT-LVL IV: CPT | Mod: PBBFAC,,, | Performed by: NURSE PRACTITIONER

## 2018-09-25 PROCEDURE — 11042 DBRDMT SUBQ TIS 1ST 20SQCM/<: CPT | Mod: S$PBB,,, | Performed by: NURSE PRACTITIONER

## 2018-09-25 PROCEDURE — 11042 DBRDMT SUBQ TIS 1ST 20SQCM/<: CPT | Mod: PBBFAC | Performed by: NURSE PRACTITIONER

## 2018-09-25 RX ORDER — DOXYCYCLINE 100 MG/1
TABLET ORAL
Refills: 0 | COMMUNITY
Start: 2018-09-18 | End: 2018-10-16

## 2018-09-25 NOTE — PATIENT INSTRUCTIONS
Clean with wound cleanser or normal saline  Apply Santyl to wound bed  Cover with small moist gauze  Apply border absorbant cover dressing  Perform wound care daily  Return to clinic in one week  Monitor for infection    Recognizing and Treating Wound Infection  Wounds can become infected with harmful bacteria. This prevents healing and increases the risk of scars. In some cases, the infection may spread to other parts of the body. And infection with the bacteria that cause tetanus can be fatal. Know what to watch for and get prompt treatment for infection.    Risk Factors  A wound is more likely to become infected if it:  · Results from a puncture, such as from a nail or piece of glass.  · Results from a human or animal bite.  · Isn't cleaned or treated within 8 hours.  · Occurs in your hand, foot, leg, armpit, or groin.  · Contains dirt or saliva.  · Heals very slowly.  · Occurs in a person with diabetes, alcoholism, or a compromised immune system.    Symptoms of Infection  Call your healthcare provider at the first sign of infection, such as:  · Yellow, yellow-green, or foul-smelling drainage from a wound  · Increased pain, swelling, or redness in or near a wound  · A change in the color or size of a wound  · Red streaks in the skin around the wound  · Fever  Preventing Wound Infection   Follow these steps to help keep wounds from developing infection:  · Wash the wound right away with soap and water.  · Apply a small amount of antibiotic ointment. You can buy this at the drugstore without a prescription.  · Cover wounds with a bandage or gauze dressing.  · Keep the wound clean and dry for the first 24 hours.  · Change the dressing daily using sterile gloves.    Treatment  Treatment is likely to depend on the type and extent of your infection. Your healthcare provider may prescribe oral antibiotics to help fight bacteria. He or she may also flush the wound with an antibiotic solution or apply an antibiotic  ointment. Sometimes an abscess (a pocket of pus) may form. In that case, the abscess will be opened and the fluid drained. You may need hospital care if the infection is very severe.  © 9936-2695 [employer], [address] [place] [address], [place] [address]. All rights reserved. This information is not intended as a substitute for professional medical care. Always follow your healthcare professional's instructions.

## 2018-09-25 NOTE — PROGRESS NOTES
Subjective:       Patient ID: Del Anand is a 73 y.o. male.    Chief Complaint: Wound Check    Mr. 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 He underwent MOH's procedure on 8/15/5018. He reports his incision became necrotic and eventually opened. A wound culture was done post op week 3 that was + for K.pneumoniae. He was prescribed doxycycline for two weeks, his last dose is tomorrow. PMHx includes CAD, CABG 2014, HTN, DVT, PE, and kidney stones. He is on coumadin therapy for hx of PE. He presents today for wound evaluation and recommendations.       Review of Systems   Constitutional: Negative.  Negative for chills, diaphoresis, fatigue and fever.   HENT: Negative.  Negative for ear pain, nosebleeds, sinus pain, sore throat and trouble swallowing.    Eyes: Positive for visual disturbance. Negative for pain and redness.   Respiratory: Negative.  Negative for cough, shortness of breath and wheezing.         Hx of PE  DVT   Cardiovascular: Negative.  Negative for chest pain, palpitations and leg swelling.        CAD  Hx of COPD   Gastrointestinal: Negative.  Negative for abdominal distention, abdominal pain, blood in stool, nausea and vomiting.   Endocrine: Negative.  Negative for polydipsia, polyphagia and polyuria.   Genitourinary: Negative.  Negative for flank pain and hematuria.   Musculoskeletal: Positive for arthralgias (Knees, hands). Negative for back pain, joint swelling and myalgias.   Skin: Positive for wound (Left leg).   Allergic/Immunologic: Negative.    Neurological: Negative.  Negative for seizures, facial asymmetry, weakness and headaches.   Hematological: Negative for adenopathy. Bruises/bleeds easily.        Coumadin therapy   Psychiatric/Behavioral: Negative.  Negative for agitation, dysphoric mood and sleep disturbance. The patient is not nervous/anxious.        Objective:      Physical Exam   Constitutional: He is oriented to person,  place, and time. Vital signs are normal. He appears well-developed. No distress.   HENT:   Head: Normocephalic.   Mouth/Throat: Oropharynx is clear and moist.   Eyes: Conjunctivae, EOM and lids are normal. Pupils are equal, round, and reactive to light.   Neck: Trachea normal and normal range of motion. Carotid bruit is not present. No thyromegaly present.   Cardiovascular: Normal rate, regular rhythm, normal heart sounds and intact distal pulses.   Pulses:       Dorsalis pedis pulses are 2+ on the right side, and 2+ on the left side.        Posterior tibial pulses are 2+ on the right side, and 2+ on the left side.   Pulmonary/Chest: Effort normal and breath sounds normal. No respiratory distress. He has no wheezes. He has no rales.   Abdominal: Soft. Bowel sounds are normal. He exhibits no distension. There is no tenderness.   Musculoskeletal: Normal range of motion.        Legs:  Lymphadenopathy:     He has no cervical adenopathy.   Neurological: He is alert and oriented to person, place, and time.   Skin: Skin is warm and dry. Capillary refill takes less than 2 seconds. He is not diaphoretic. No erythema.   Psychiatric: He has a normal mood and affect. Thought content normal.   Vitals reviewed.              Procedure: Del was seen in the clinic room and placed in the supine position on the treatment table. Attention was directed to the left lower leg .  The wound was covered with 30-40% necrotic tissue. + wound culture however he on his second week of antibiotic therapy. The wound was non-tender. The wound was prepped with betadine. Utilizing a 5cm curette, I debrided the subcutaneous tissue to excise viable and nonviable tissue inside the wound margins. Multiple sutures removed from the necrotic tissue. The patient tolerated well. Topical anesthetic was used for pain control. The wound was flushed with wound cleasner. There was no bleeding during the procedure. The wound was then dressed with medihoney (Santyl  ordered) and a foam cover dressing. The patient tolerated the procedure well.    Assessment:       1. Delayed surgical wound healing, initial encounter    2. History of squamous cell carcinoma        Plan:       Clean with wound cleanser or normal saline  Apply Santyl to wound bed  Cover with small moist gauze  Apply border absorbant cover dressing  Perform wound care daily  Return to clinic in one week  Monitor for infection  Plan enzymatic debridement along with conservative sharps debridement as indicated due to coumadin therapy

## 2018-09-26 ENCOUNTER — ANTI-COAG VISIT (OUTPATIENT)
Dept: CARDIOLOGY | Facility: CLINIC | Age: 73
End: 2018-09-26
Payer: MEDICARE

## 2018-09-26 DIAGNOSIS — Z79.01 LONG TERM CURRENT USE OF ANTICOAGULANT THERAPY: ICD-10-CM

## 2018-09-26 LAB — INR PPP: 3.2

## 2018-09-26 PROCEDURE — G0250 MD INR TEST REVIE INTER MGMT: HCPCS | Mod: ,,, | Performed by: INTERNAL MEDICINE

## 2018-09-30 RX ORDER — RAMIPRIL 5 MG/1
CAPSULE ORAL
Qty: 90 CAPSULE | Refills: 1 | Status: SHIPPED | OUTPATIENT
Start: 2018-09-30 | End: 2019-01-03

## 2018-10-02 ENCOUNTER — OFFICE VISIT (OUTPATIENT)
Dept: WOUND CARE | Facility: CLINIC | Age: 73
End: 2018-10-02
Payer: MEDICARE

## 2018-10-02 VITALS
BODY MASS INDEX: 38.32 KG/M2 | TEMPERATURE: 98 F | DIASTOLIC BLOOD PRESSURE: 68 MMHG | HEIGHT: 75 IN | WEIGHT: 308.19 LBS | SYSTOLIC BLOOD PRESSURE: 128 MMHG | HEART RATE: 83 BPM

## 2018-10-02 DIAGNOSIS — T81.89XA DELAYED SURGICAL WOUND HEALING, INITIAL ENCOUNTER: Primary | ICD-10-CM

## 2018-10-02 DIAGNOSIS — Z85.89 HISTORY OF SQUAMOUS CELL CARCINOMA: ICD-10-CM

## 2018-10-02 PROCEDURE — 99212 OFFICE O/P EST SF 10 MIN: CPT | Mod: 25,S$PBB,, | Performed by: NURSE PRACTITIONER

## 2018-10-02 PROCEDURE — 99999 PR PBB SHADOW E&M-EST. PATIENT-LVL III: CPT | Mod: PBBFAC,,, | Performed by: NURSE PRACTITIONER

## 2018-10-02 PROCEDURE — 3078F DIAST BP <80 MM HG: CPT | Mod: CPTII,,, | Performed by: NURSE PRACTITIONER

## 2018-10-02 PROCEDURE — 1101F PT FALLS ASSESS-DOCD LE1/YR: CPT | Mod: CPTII,,, | Performed by: NURSE PRACTITIONER

## 2018-10-02 PROCEDURE — 3074F SYST BP LT 130 MM HG: CPT | Mod: CPTII,,, | Performed by: NURSE PRACTITIONER

## 2018-10-02 PROCEDURE — 99213 OFFICE O/P EST LOW 20 MIN: CPT | Mod: PBBFAC | Performed by: NURSE PRACTITIONER

## 2018-10-02 NOTE — PATIENT INSTRUCTIONS
Clean with wound cleanser or normal saline  Apply Santyl to wound bed  Cover with small piece of silver alginate  Apply border absorbant cover dressing  Perform wound care daily  Return to clinic in one week  Monitor for infection  Plan enzymatic debridement along with conservative sharps debridement as indicated due to coumadin therapy

## 2018-10-02 NOTE — PROGRESS NOTES
Subjective:       Patient ID: Del Anand is a 73 y.o. male.    Chief Complaint: Wound Check    Mr. 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 He underwent MOH's procedure on 8/15/5018. He reports his incision became necrotic and eventually opened. A wound culture was done post op week 3 that was + for K.pneumoniae. He was prescribed doxycycline for two weeks.  PMHx includes CAD, CABG 2014, HTN, DVT, PE, and kidney stones. He is on coumadin therapy for hx of PE. Last clinic visit he was debrided and enzymatic debridement was initiated. He is here today for follow up. The wound has slightly macerated edges along with 30% necrotic tissue to wound bed.       Review of Systems   Constitutional: Negative.  Negative for chills, diaphoresis, fatigue and fever.   HENT: Negative.  Negative for ear pain, nosebleeds, sinus pain, sore throat and trouble swallowing.    Eyes: Positive for visual disturbance. Negative for pain and redness.   Respiratory: Negative.  Negative for cough, shortness of breath and wheezing.         Hx of PE  DVT   Cardiovascular: Negative.  Negative for chest pain, palpitations and leg swelling.        CAD  Hx of COPD   Gastrointestinal: Negative.  Negative for abdominal distention, abdominal pain, blood in stool, nausea and vomiting.   Endocrine: Negative.  Negative for polydipsia, polyphagia and polyuria.   Genitourinary: Negative.  Negative for flank pain and hematuria.   Musculoskeletal: Positive for arthralgias (Knees, hands). Negative for back pain, joint swelling and myalgias.   Skin: Positive for wound (Left leg).   Allergic/Immunologic: Negative.    Neurological: Negative.  Negative for seizures, facial asymmetry, weakness and headaches.   Hematological: Negative for adenopathy. Bruises/bleeds easily.        Coumadin therapy   Psychiatric/Behavioral: Negative.  Negative for agitation, dysphoric mood and sleep disturbance. The patient  is not nervous/anxious.        Objective:      Physical Exam   Constitutional: He is oriented to person, place, and time. Vital signs are normal. He appears well-developed. No distress.   HENT:   Head: Normocephalic.   Mouth/Throat: Oropharynx is clear and moist.   Eyes: Conjunctivae, EOM and lids are normal. Pupils are equal, round, and reactive to light.   Neck: Trachea normal and normal range of motion. Carotid bruit is not present. No thyromegaly present.   Cardiovascular: Normal rate, regular rhythm, normal heart sounds and intact distal pulses.   Pulses:       Dorsalis pedis pulses are 2+ on the right side, and 2+ on the left side.        Posterior tibial pulses are 2+ on the right side, and 2+ on the left side.   Pulmonary/Chest: Effort normal and breath sounds normal. No respiratory distress. He has no wheezes. He has no rales.   Abdominal: Soft. Bowel sounds are normal. He exhibits no distension. There is no tenderness.   Musculoskeletal: Normal range of motion.        Legs:  Lymphadenopathy:     He has no cervical adenopathy.   Neurological: He is alert and oriented to person, place, and time.   Skin: Skin is warm and dry. Capillary refill takes less than 2 seconds. He is not diaphoretic. No erythema.   Psychiatric: He has a normal mood and affect. Thought content normal.   Vitals reviewed.            Procedure: Del was seen in the clinic room and placed in the supine position on the treatment table. Attention was directed to the left lower leg .  The wound was covered with 30% necrotic tissue.  The wound was non-tender. The wound was prepped with betadine. Utilizing a 3mm curette, I debrided the subcutaneous tissue to excise viable and nonviable tissue inside the wound margins. Multiple sutures removed from the necrotic tissue. The patient tolerated well.  The wound was flushed with wound cleasner. There was no bleeding during the procedure. The wound was then dressed with medihoney (Santyl not brought  to clinic) followed by silver alginate,  and a foam cover dressing. The patient tolerated the procedure well.    Assessment:       1. Delayed surgical wound healing, initial encounter    2. History of squamous cell carcinoma        Plan:       Clean with wound cleanser or normal saline  Apply Santyl to wound bed  Cover with small piece of silver alginate  Apply border absorbant cover dressing  Perform wound care daily  Return to clinic in one week  Monitor for infection  Plan enzymatic debridement along with conservative sharps debridement as indicated due to coumadin therapy

## 2018-10-03 ENCOUNTER — ANTI-COAG VISIT (OUTPATIENT)
Dept: CARDIOLOGY | Facility: CLINIC | Age: 73
End: 2018-10-03

## 2018-10-03 DIAGNOSIS — Z79.01 LONG TERM CURRENT USE OF ANTICOAGULANT THERAPY: ICD-10-CM

## 2018-10-03 LAB — INR PPP: 3.2

## 2018-10-09 ENCOUNTER — OFFICE VISIT (OUTPATIENT)
Dept: WOUND CARE | Facility: CLINIC | Age: 73
End: 2018-10-09
Payer: MEDICARE

## 2018-10-09 VITALS
HEIGHT: 75 IN | HEART RATE: 58 BPM | BODY MASS INDEX: 38.57 KG/M2 | SYSTOLIC BLOOD PRESSURE: 155 MMHG | DIASTOLIC BLOOD PRESSURE: 77 MMHG | WEIGHT: 310.19 LBS

## 2018-10-09 DIAGNOSIS — T81.89XD DELAYED SURGICAL WOUND HEALING, SUBSEQUENT ENCOUNTER: Primary | ICD-10-CM

## 2018-10-09 PROCEDURE — 99999 PR PBB SHADOW E&M-EST. PATIENT-LVL IV: CPT | Mod: PBBFAC,,, | Performed by: NURSE PRACTITIONER

## 2018-10-09 PROCEDURE — 99214 OFFICE O/P EST MOD 30 MIN: CPT | Mod: PBBFAC,25 | Performed by: NURSE PRACTITIONER

## 2018-10-09 PROCEDURE — 87070 CULTURE OTHR SPECIMN AEROBIC: CPT

## 2018-10-09 PROCEDURE — 3078F DIAST BP <80 MM HG: CPT | Mod: CPTII,,, | Performed by: NURSE PRACTITIONER

## 2018-10-09 PROCEDURE — 11042 DBRDMT SUBQ TIS 1ST 20SQCM/<: CPT | Mod: PBBFAC | Performed by: NURSE PRACTITIONER

## 2018-10-09 PROCEDURE — 3077F SYST BP >= 140 MM HG: CPT | Mod: CPTII,,, | Performed by: NURSE PRACTITIONER

## 2018-10-09 PROCEDURE — 99499 UNLISTED E&M SERVICE: CPT | Mod: HCNC,S$GLB,, | Performed by: NURSE PRACTITIONER

## 2018-10-09 PROCEDURE — 99212 OFFICE O/P EST SF 10 MIN: CPT | Mod: 25,S$PBB,, | Performed by: NURSE PRACTITIONER

## 2018-10-09 PROCEDURE — 11042 DBRDMT SUBQ TIS 1ST 20SQCM/<: CPT | Mod: S$PBB,,, | Performed by: NURSE PRACTITIONER

## 2018-10-09 PROCEDURE — 1101F PT FALLS ASSESS-DOCD LE1/YR: CPT | Mod: CPTII,,, | Performed by: NURSE PRACTITIONER

## 2018-10-09 NOTE — PROGRESS NOTES
Subjective:       Patient ID: Del Anand is a 73 y.o. male.    Chief Complaint: Wound Check    Mr. 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 He underwent MOH's procedure on 8/15/5018. He reports his incision became necrotic and eventually opened. A wound culture was done post op week 3 that was + for K.pneumoniae. He was prescribed doxycycline for two weeks.  PMHx includes CAD, CABG 2014, HTN, DVT, PE, and kidney stones. He is on coumadin therapy for hx of PE. Last clinic visit he was debrided and enzymatic debridement was initiated. He is here today for follow up. The patient reports he is having to perform wound care twice a day due to drainage. The  Dressing removed was saturated after 14 hours. He has redness to the periwound and the skin is still macerated. No odor noted. Mild to moderate generalized swelling to area.       Review of Systems   Constitutional: Negative.  Negative for chills, diaphoresis, fatigue and fever.   HENT: Negative.  Negative for ear pain, nosebleeds, sinus pain, sore throat and trouble swallowing.    Eyes: Positive for visual disturbance. Negative for pain and redness.   Respiratory: Negative.  Negative for cough, shortness of breath and wheezing.         Hx of PE  DVT   Cardiovascular: Negative.  Negative for chest pain, palpitations and leg swelling.        CAD  Hx of COPD   Gastrointestinal: Negative.  Negative for abdominal distention, abdominal pain, blood in stool, nausea and vomiting.   Endocrine: Negative.  Negative for polydipsia, polyphagia and polyuria.   Genitourinary: Negative.  Negative for flank pain and hematuria.   Musculoskeletal: Positive for arthralgias (Knees, hands). Negative for back pain, joint swelling and myalgias.   Skin: Positive for wound (Left leg).   Allergic/Immunologic: Negative.    Neurological: Negative.  Negative for seizures, facial asymmetry, weakness and headaches.   Hematological:  Negative for adenopathy. Bruises/bleeds easily.        Coumadin therapy   Psychiatric/Behavioral: Negative.  Negative for agitation, dysphoric mood and sleep disturbance. The patient is not nervous/anxious.        Objective:      Physical Exam   Constitutional: He is oriented to person, place, and time. Vital signs are normal. He appears well-developed. No distress.   HENT:   Head: Normocephalic.   Mouth/Throat: Oropharynx is clear and moist.   Eyes: Conjunctivae, EOM and lids are normal. Pupils are equal, round, and reactive to light.   Neck: Trachea normal and normal range of motion. Carotid bruit is not present. No thyromegaly present.   Cardiovascular: Normal rate, regular rhythm, normal heart sounds and intact distal pulses.   Pulses:       Dorsalis pedis pulses are 2+ on the right side, and 2+ on the left side.        Posterior tibial pulses are 2+ on the right side, and 2+ on the left side.   Pulmonary/Chest: Effort normal and breath sounds normal. No respiratory distress. He has no wheezes. He has no rales.   Abdominal: Soft. Bowel sounds are normal. He exhibits no distension. There is no tenderness.   Musculoskeletal: Normal range of motion.        Legs:  Lymphadenopathy:     He has no cervical adenopathy.   Neurological: He is alert and oriented to person, place, and time.   Skin: Skin is warm and dry. Capillary refill takes less than 2 seconds. He is not diaphoretic. No erythema.   Psychiatric: He has a normal mood and affect. Thought content normal.   Vitals reviewed.          Procedure: Del was seen in the clinic room and placed in the supine position on the treatment table. Attention was directed to the left lower leg .  The wound was covered with 20% necrotic tissue.  The wound was non-tender. The wound was prepped with betadine. Utilizing a 3mm curette, I debrided the subcutaneous tissue to excise viable and nonviable tissue inside the wound margins. Following debrided a tissue culture was obtained.  The patient tolerated well.  The wound was flushed with wound cleasner. There was no bleeding during the procedure. The wound was then dressed with Santyl to necrotic tissue only, followed by silver alginate, barrier cream to periwound,  and a foam cover dressing. The patient tolerated the procedure well.    Assessment:       1. Delayed surgical wound healing, subsequent encounter        Plan:       Clean with wound cleanser or normal saline  Apply Santyl to wound bed  Cover with small piece of silver alginate  Barrier cream to the periphery of wound  Apply border absorbant cover dressing  Perform wound care twice a day  Return to clinic in one week  Monitor for infection  Plan enzymatic debridement along with conservative sharps debridement as indicated due to coumadin therapy  Wound culture performed

## 2018-10-09 NOTE — PATIENT INSTRUCTIONS
Clean with wound cleanser or normal saline  Apply Santyl to wound bed  Cover with small piece of silver alginate  Barrier cream to the periphery of wound  Apply border absorbant cover dressing  Perform wound care twice a day  Return to clinic in one week

## 2018-10-10 ENCOUNTER — ANTI-COAG VISIT (OUTPATIENT)
Dept: CARDIOLOGY | Facility: CLINIC | Age: 73
End: 2018-10-10

## 2018-10-10 ENCOUNTER — PATIENT MESSAGE (OUTPATIENT)
Dept: CARDIOLOGY | Facility: CLINIC | Age: 73
End: 2018-10-10

## 2018-10-10 DIAGNOSIS — Z79.01 LONG TERM CURRENT USE OF ANTICOAGULANT THERAPY: ICD-10-CM

## 2018-10-10 LAB — INR PPP: 3.6

## 2018-10-12 LAB — BACTERIA SPEC AEROBE CULT: NO GROWTH

## 2018-10-16 ENCOUNTER — OFFICE VISIT (OUTPATIENT)
Dept: WOUND CARE | Facility: CLINIC | Age: 73
End: 2018-10-16
Payer: MEDICARE

## 2018-10-16 ENCOUNTER — OFFICE VISIT (OUTPATIENT)
Dept: OPTOMETRY | Facility: CLINIC | Age: 73
End: 2018-10-16
Payer: MEDICARE

## 2018-10-16 DIAGNOSIS — H52.203 ASTIGMATISM WITH PRESBYOPIA, BILATERAL: ICD-10-CM

## 2018-10-16 DIAGNOSIS — H25.13 NUCLEAR SCLEROSIS, BILATERAL: Primary | ICD-10-CM

## 2018-10-16 DIAGNOSIS — T81.89XD DELAYED SURGICAL WOUND HEALING, SUBSEQUENT ENCOUNTER: Primary | ICD-10-CM

## 2018-10-16 DIAGNOSIS — H52.4 ASTIGMATISM WITH PRESBYOPIA, BILATERAL: ICD-10-CM

## 2018-10-16 PROCEDURE — 99999 PR PBB SHADOW E&M-EST. PATIENT-LVL III: CPT | Mod: PBBFAC,,, | Performed by: NURSE PRACTITIONER

## 2018-10-16 PROCEDURE — 99213 OFFICE O/P EST LOW 20 MIN: CPT | Mod: PBBFAC,25 | Performed by: NURSE PRACTITIONER

## 2018-10-16 PROCEDURE — 99999 PR PBB SHADOW E&M-EST. PATIENT-LVL II: CPT | Mod: PBBFAC,,, | Performed by: OPTOMETRIST

## 2018-10-16 PROCEDURE — 92014 COMPRE OPH EXAM EST PT 1/>: CPT | Mod: S$PBB,,, | Performed by: OPTOMETRIST

## 2018-10-16 PROCEDURE — 17250 CHEM CAUT OF GRANLTJ TISSUE: CPT | Mod: S$PBB,,, | Performed by: NURSE PRACTITIONER

## 2018-10-16 PROCEDURE — 92015 DETERMINE REFRACTIVE STATE: CPT | Mod: ,,, | Performed by: OPTOMETRIST

## 2018-10-16 PROCEDURE — 1101F PT FALLS ASSESS-DOCD LE1/YR: CPT | Mod: CPTII,,, | Performed by: NURSE PRACTITIONER

## 2018-10-16 PROCEDURE — 99212 OFFICE O/P EST SF 10 MIN: CPT | Mod: PBBFAC,27,PO | Performed by: OPTOMETRIST

## 2018-10-16 PROCEDURE — 17250 CHEM CAUT OF GRANLTJ TISSUE: CPT | Mod: PBBFAC | Performed by: NURSE PRACTITIONER

## 2018-10-16 RX ORDER — MUPIROCIN 20 MG/G
OINTMENT TOPICAL 2 TIMES DAILY
Refills: 0 | COMMUNITY
Start: 2018-09-19 | End: 2019-01-03 | Stop reason: ALTCHOICE

## 2018-10-16 RX ORDER — HYDROCODONE BITARTRATE AND ACETAMINOPHEN 5; 325 MG/1; MG/1
1 TABLET ORAL
Refills: 0 | COMMUNITY
Start: 2018-08-20 | End: 2019-01-03 | Stop reason: ALTCHOICE

## 2018-10-16 NOTE — PATIENT INSTRUCTIONS
Clean with wound cleanser or normal saline  DC santyl  Cover with small piece of silver alginate  Barrier cream to the periphery of wound  Apply border absorbant cover dressing  Perform wound care twice a day  Return to clinic in one week  Monitor for infection

## 2018-10-16 NOTE — PROGRESS NOTES
KHAI CONTRERAS 10/2017 Glasses about 11 yrs. Old.. Patient sees better at distance   without glasses, near seems fine with glasses.  Trouble seeing golf ball,   and TV. Not using any drops.   OD sometimes hernandez.    Last edited by Unique Quan on 10/16/2018  2:25 PM. (History)            Assessment /Plan     For exam results, see Encounter Report.    Nuclear sclerosis, bilateral    Astigmatism with presbyopia, bilateral      1. Educated pt on presence of cataracts and effects on vision. No surgery at this time. Recheck in one year.  2. Spec Rx given. Different lens options discussed with patient. RTC 1 year full exam.

## 2018-10-16 NOTE — PROGRESS NOTES
Subjective:       Patient ID: Del Anand is a 73 y.o. male.    Chief Complaint: Wound Check    Mr. 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 He underwent MOH's procedure on 8/15/5018. He reports his incision became necrotic and eventually opened. A wound culture was done post op week 3 that was + for K.pneumoniae. He was prescribed doxycycline for two weeks.  PMHx includes CAD, CABG 2014, HTN, DVT, PE, and kidney stones. He is on coumadin therapy for hx of PE. Sharps debridement along with enzymatic debridement was initiated. Last clinic visit dressing change frequency was increased to twice a day due to large amounts of drainage and periwound skin maceration. He is here today for follow up.       Review of Systems   Constitutional: Negative.  Negative for chills, diaphoresis, fatigue and fever.   HENT: Negative.  Negative for ear pain, nosebleeds, sinus pain, sore throat and trouble swallowing.    Eyes: Positive for visual disturbance. Negative for pain and redness.   Respiratory: Negative.  Negative for cough, shortness of breath and wheezing.         Hx of PE  DVT   Cardiovascular: Negative.  Negative for chest pain, palpitations and leg swelling.        CAD  Hx of COPD   Gastrointestinal: Negative.  Negative for abdominal distention, abdominal pain, blood in stool, nausea and vomiting.   Endocrine: Negative.  Negative for polydipsia, polyphagia and polyuria.   Genitourinary: Negative.  Negative for flank pain and hematuria.   Musculoskeletal: Positive for arthralgias (Knees, hands). Negative for back pain, joint swelling and myalgias.   Skin: Positive for wound (Left leg).   Allergic/Immunologic: Negative.    Neurological: Negative.  Negative for seizures, facial asymmetry, weakness and headaches.   Hematological: Negative for adenopathy. Bruises/bleeds easily.        Coumadin therapy   Psychiatric/Behavioral: Negative.  Negative for agitation,  dysphoric mood and sleep disturbance. The patient is not nervous/anxious.        Objective:      Physical Exam   Constitutional: He is oriented to person, place, and time. Vital signs are normal. He appears well-developed. No distress.   HENT:   Head: Normocephalic.   Mouth/Throat: Oropharynx is clear and moist.   Eyes: Conjunctivae, EOM and lids are normal. Pupils are equal, round, and reactive to light.   Neck: Trachea normal and normal range of motion. Carotid bruit is not present. No thyromegaly present.   Cardiovascular: Normal rate, regular rhythm, normal heart sounds and intact distal pulses.   Pulses:       Dorsalis pedis pulses are 2+ on the right side, and 2+ on the left side.        Posterior tibial pulses are 2+ on the right side, and 2+ on the left side.   Pulmonary/Chest: Effort normal and breath sounds normal. No respiratory distress. He has no wheezes. He has no rales.   Abdominal: Soft. Bowel sounds are normal. He exhibits no distension. There is no tenderness.   Musculoskeletal: Normal range of motion.        Legs:  Lymphadenopathy:     He has no cervical adenopathy.   Neurological: He is alert and oriented to person, place, and time.   Skin: Skin is warm and dry. Capillary refill takes less than 2 seconds. He is not diaphoretic. No erythema.   Psychiatric: He has a normal mood and affect. Thought content normal.   Vitals reviewed.            Using one silver nitrate stick, hypergranualtion tissue to wound bed was cauterized. Patient tolerated well.   Assessment:       1. Delayed surgical wound healing, subsequent encounter        Plan:       Clean with wound cleanser or normal saline  DC santyl  Cover with small piece of silver alginate  Barrier cream to the periphery of wound  Apply border absorbant cover dressing  Perform wound care twice a day  Return to clinic in one week

## 2018-10-17 ENCOUNTER — ANTI-COAG VISIT (OUTPATIENT)
Dept: CARDIOLOGY | Facility: CLINIC | Age: 73
End: 2018-10-17

## 2018-10-17 DIAGNOSIS — Z79.01 LONG TERM CURRENT USE OF ANTICOAGULANT THERAPY: ICD-10-CM

## 2018-10-17 LAB — INR PPP: 3.4

## 2018-10-22 ENCOUNTER — OFFICE VISIT (OUTPATIENT)
Dept: WOUND CARE | Facility: CLINIC | Age: 73
End: 2018-10-22
Payer: MEDICARE

## 2018-10-22 VITALS
SYSTOLIC BLOOD PRESSURE: 123 MMHG | BODY MASS INDEX: 38.65 KG/M2 | HEART RATE: 53 BPM | TEMPERATURE: 98 F | WEIGHT: 310.88 LBS | HEIGHT: 75 IN | DIASTOLIC BLOOD PRESSURE: 62 MMHG

## 2018-10-22 DIAGNOSIS — T81.89XD DELAYED SURGICAL WOUND HEALING, SUBSEQUENT ENCOUNTER: Primary | ICD-10-CM

## 2018-10-22 PROCEDURE — 99999 PR PBB SHADOW E&M-EST. PATIENT-LVL IV: CPT | Mod: PBBFAC,,, | Performed by: NURSE PRACTITIONER

## 2018-10-22 PROCEDURE — 99214 OFFICE O/P EST MOD 30 MIN: CPT | Mod: PBBFAC,25 | Performed by: NURSE PRACTITIONER

## 2018-10-22 PROCEDURE — 17250 CHEM CAUT OF GRANLTJ TISSUE: CPT | Mod: PBBFAC,HCNC | Performed by: NURSE PRACTITIONER

## 2018-10-22 PROCEDURE — 3078F DIAST BP <80 MM HG: CPT | Mod: CPTII,HCNC,, | Performed by: NURSE PRACTITIONER

## 2018-10-22 PROCEDURE — 99212 OFFICE O/P EST SF 10 MIN: CPT | Mod: 25,S$PBB,HCNC, | Performed by: NURSE PRACTITIONER

## 2018-10-22 PROCEDURE — 3074F SYST BP LT 130 MM HG: CPT | Mod: CPTII,HCNC,, | Performed by: NURSE PRACTITIONER

## 2018-10-22 PROCEDURE — 1101F PT FALLS ASSESS-DOCD LE1/YR: CPT | Mod: CPTII,HCNC,, | Performed by: NURSE PRACTITIONER

## 2018-10-22 PROCEDURE — 17250 CHEM CAUT OF GRANLTJ TISSUE: CPT | Mod: S$PBB,HCNC,, | Performed by: NURSE PRACTITIONER

## 2018-10-22 NOTE — PROGRESS NOTES
Last 5 Patient Entered Readings                                      Current 30 Day Average: 136/71     Recent Readings 10/17/2018 10/10/2018 10/3/2018 9/26/2018 9/19/2018    SBP (mmHg) 134 135 146 128 111    DBP (mmHg) 70 68 78 69 63    Pulse 59 63 60 80 65          Left voicemail. Discuss recent elevated BP readings. Consider increasing ramipril.

## 2018-10-22 NOTE — PATIENT INSTRUCTIONS
Clean with wound cleanser or normal saline  Cover with small piece of silver alginate  Barrier cream to the periphery of wound  Apply border absorbant cover dressing  Perform wound care twice a day  Return to clinic in one-two weeks (vacation)

## 2018-10-23 ENCOUNTER — ANTI-COAG VISIT (OUTPATIENT)
Dept: CARDIOLOGY | Facility: CLINIC | Age: 73
End: 2018-10-23

## 2018-10-23 DIAGNOSIS — Z79.01 LONG TERM CURRENT USE OF ANTICOAGULANT THERAPY: ICD-10-CM

## 2018-10-23 LAB — INR PPP: 3

## 2018-10-24 ENCOUNTER — PATIENT OUTREACH (OUTPATIENT)
Dept: OTHER | Facility: OTHER | Age: 73
End: 2018-10-24

## 2018-10-24 NOTE — PROGRESS NOTES
Called patient for a follow-up. Asked patient what he think caused his BP to be elevated on 10/23. Patient states he was in a rush when taking his BP reading. Advised patient to  Wait at least 5-10 minutes to relax and then take a BP reading.   Last 5 Patient Entered Readings                                      Current 30 Day Average: 137/72     Recent Readings 10/23/2018 10/23/2018 10/17/2018 10/10/2018 10/3/2018    SBP (mmHg) 140 162 134 135 146    DBP (mmHg) 77 82 70 68 78    Pulse 64 69 59 63 60        [facility]: Health  Follow Up    Lifestyle Modifications:    1.Dietary Modifications (Sodium intake <2,000mg/day, food labels, dining out): Patient reports he still watching hi sodium intake.     2.Physical Activity: Patient reports he still playing golf.    3.Medication Therapy: Patient has been compliant with the medication regimen.    4.Patient has the following medication side effects/concerns: Patient denies any s/s of hypotension (dizziness, LH, nausea, or fatigue) with low readings. Patient denies any s/s of hypertension (CP,SOB,severe headaches, or change in vision) with high readings.   (Frequency/Alleviating factors/Precipitating factors, etc.)     Follow up with Mr. Del Anand completed. No further questions or concerns.     Patient's current 30 day average BP is elevated.     Plan: Will continue to follow up to achieve health goals.

## 2018-10-29 ENCOUNTER — PATIENT MESSAGE (OUTPATIENT)
Dept: CARDIOLOGY | Facility: CLINIC | Age: 73
End: 2018-10-29

## 2018-10-29 NOTE — TELEPHONE ENCOUNTER
Arina with Coumadin clinic notified of pt's request.Arina to contact pt regarding Coumadin request.

## 2018-10-31 LAB — INR PPP: 3

## 2018-11-01 ENCOUNTER — ANTI-COAG VISIT (OUTPATIENT)
Dept: CARDIOLOGY | Facility: CLINIC | Age: 73
End: 2018-11-01
Payer: MEDICARE

## 2018-11-01 ENCOUNTER — OFFICE VISIT (OUTPATIENT)
Dept: WOUND CARE | Facility: CLINIC | Age: 73
End: 2018-11-01
Payer: MEDICARE

## 2018-11-01 VITALS
HEIGHT: 75 IN | DIASTOLIC BLOOD PRESSURE: 79 MMHG | WEIGHT: 313.5 LBS | HEART RATE: 62 BPM | TEMPERATURE: 98 F | SYSTOLIC BLOOD PRESSURE: 138 MMHG | BODY MASS INDEX: 38.98 KG/M2

## 2018-11-01 DIAGNOSIS — Z79.01 LONG TERM CURRENT USE OF ANTICOAGULANT THERAPY: ICD-10-CM

## 2018-11-01 DIAGNOSIS — T81.89XD DELAYED SURGICAL WOUND HEALING, SUBSEQUENT ENCOUNTER: Primary | ICD-10-CM

## 2018-11-01 DIAGNOSIS — Z85.89 HISTORY OF SQUAMOUS CELL CARCINOMA: ICD-10-CM

## 2018-11-01 PROCEDURE — 3075F SYST BP GE 130 - 139MM HG: CPT | Mod: CPTII,HCNC,S$GLB, | Performed by: NURSE PRACTITIONER

## 2018-11-01 PROCEDURE — 3078F DIAST BP <80 MM HG: CPT | Mod: CPTII,HCNC,S$GLB, | Performed by: NURSE PRACTITIONER

## 2018-11-01 PROCEDURE — 29580 STRAPPING UNNA BOOT: CPT | Mod: HCNC,LT,S$GLB, | Performed by: NURSE PRACTITIONER

## 2018-11-01 PROCEDURE — 1101F PT FALLS ASSESS-DOCD LE1/YR: CPT | Mod: CPTII,HCNC,S$GLB, | Performed by: NURSE PRACTITIONER

## 2018-11-01 PROCEDURE — G0250 MD INR TEST REVIE INTER MGMT: HCPCS | Mod: S$GLB,,, | Performed by: PHARMACIST

## 2018-11-01 PROCEDURE — 99999 PR PBB SHADOW E&M-EST. PATIENT-LVL IV: CPT | Mod: PBBFAC,HCNC,, | Performed by: NURSE PRACTITIONER

## 2018-11-01 PROCEDURE — 99214 OFFICE O/P EST MOD 30 MIN: CPT | Mod: PBBFAC,HCNC | Performed by: NURSE PRACTITIONER

## 2018-11-01 NOTE — PROGRESS NOTES
Subjective:       Patient ID: Del Anand is a 73 y.o. male.    Chief Complaint: Wound Check    Mr. 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 He underwent MOH's procedure on 8/15/5018. He reports his incision became necrotic and eventually opened. A wound culture was done post op week 3 that was + for K.pneumoniae. He was prescribed doxycycline for two weeks.  PMHx includes CAD, CABG 2014, HTN, DVT, PE, and kidney stones. He is on coumadin therapy for hx of PE. Sharps debridement along with enzymatic debridement was initiated. Last clinic visit dressing change frequency was increased to twice a day due to large amounts of drainage and periwound skin maceration. The wound was also cauterized with silver nitrate for hypergranulation tissue. He is here today for follow up following a week on vacation. The leg has generalized edema especially to ankle area and the tissue is smooth and non granular. Continues with moderate amounts of drainage.  Slow delayed progress. Will place in compression therapy.       Review of Systems   Constitutional: Negative.  Negative for chills, diaphoresis, fatigue and fever.   HENT: Negative.  Negative for ear pain, nosebleeds, sinus pain, sore throat and trouble swallowing.    Eyes: Positive for visual disturbance. Negative for pain and redness.   Respiratory: Negative.  Negative for cough, shortness of breath and wheezing.         Hx of PE  DVT   Cardiovascular: Negative.  Negative for chest pain, palpitations and leg swelling.        CAD  Hx of COPD   Gastrointestinal: Negative.  Negative for abdominal distention, abdominal pain, blood in stool, nausea and vomiting.   Endocrine: Negative.  Negative for polydipsia, polyphagia and polyuria.   Genitourinary: Negative.  Negative for flank pain and hematuria.   Musculoskeletal: Positive for arthralgias (Knees, hands). Negative for back pain, joint swelling and myalgias.   Skin:  Positive for wound (Left leg).   Allergic/Immunologic: Negative.    Neurological: Negative.  Negative for seizures, facial asymmetry, weakness and headaches.   Hematological: Negative for adenopathy. Bruises/bleeds easily.        Coumadin therapy   Psychiatric/Behavioral: Negative.  Negative for agitation, dysphoric mood and sleep disturbance. The patient is not nervous/anxious.        Objective:      Physical Exam   Constitutional: He is oriented to person, place, and time. Vital signs are normal. He appears well-developed. No distress.   HENT:   Head: Normocephalic.   Mouth/Throat: Oropharynx is clear and moist.   Eyes: Conjunctivae, EOM and lids are normal. Pupils are equal, round, and reactive to light.   Neck: Trachea normal and normal range of motion. Carotid bruit is not present. No thyromegaly present.   Cardiovascular: Normal rate, regular rhythm, normal heart sounds and intact distal pulses.   Pulses:       Dorsalis pedis pulses are 2+ on the right side, and 2+ on the left side.        Posterior tibial pulses are 2+ on the right side, and 2+ on the left side.   Pulmonary/Chest: Effort normal and breath sounds normal. No respiratory distress. He has no wheezes. He has no rales.   Abdominal: Soft. Bowel sounds are normal. He exhibits no distension. There is no tenderness.   Musculoskeletal: Normal range of motion.        Left lower leg: He exhibits edema.        Legs:  Lymphadenopathy:     He has no cervical adenopathy.   Neurological: He is alert and oriented to person, place, and time.   Skin: Skin is warm and dry. Capillary refill takes less than 2 seconds. He is not diaphoretic. No erythema.   Psychiatric: He has a normal mood and affect. Thought content normal.   Vitals reviewed.          Del was seen in the clinic room and placed in the supine position on the treatment table. A silver alginate dressing was applied to the wound with barrier cream to the periwound.  The patient's foot was positioned at  a 90 degree angle.  The coflex was applied per package instructions.  A two layered application was performed using a spiral technique beginning with the foam layer followed by the cohesive bandage avoiding creases or folds.  The wrap was started behind the first metatarsal and ended below the tibial tubercle of the knee.  There was overlap of each turn half the width of the previous turn.  The compression wrap will be changed every 7 days.    Assessment:       1. Delayed surgical wound healing, subsequent encounter    2. History of squamous cell carcinoma        Plan:       Clean with wound cleanser or normal saline  Silver alginate to wound bed  Barrier cream to the periphery of wound  Apply border absorbant cover dressing  Two layer coflex compression therapy  Return to clinic in one  Avoid getting leg wet, use cast cover  [facility] split thickness skin graft

## 2018-11-01 NOTE — PROGRESS NOTES
Last 5 Patient Entered Readings                                      Current 30 Day Average: 142/77     Recent Readings 11/1/2018 11/1/2018 11/1/2018 10/23/2018 10/23/2018    SBP (mmHg) 155 157 140 140 162    DBP (mmHg) 83 84 77 77 82    Pulse 48 55 64 64 69          Left voicemail and sent [employer] message. Discuss increasing ramipril.

## 2018-11-01 NOTE — PATIENT INSTRUCTIONS
Elevate legs as much as possible. Do not get the dressings wet and use cast covers for showering.  Should the dressing become wet, remove it, place a wet-to-dry dressing over the wound, cover with gauze and roll gauze and use ace wraps for compression and to secure bandages.  Notify clinic as soon as possible to have a new dressing applied.    Return to clinic in one week  May use cast cover for showering, can be purchased at [employer]    Chronic Venous Insufficiency: Treating Ulcers  If leg swelling due to chronic venous insufficiency isnt controlled, an ulcer (open wound) can form. Although ulcers vary in size and shape, they usually appear on the inside of the ankle.      Blood pools around the ankles. The area may look puffy or swollen, and the skin may dimple when pressed.            Fluid leaks from the veins into surrounding tissue. The ankle may bulge, and the skin may glisten.            An ulcer forms if the skin is broken by a bump or a scratch. The ulcer appears watery and may seep fluid.        Your doctor or nurse may apply a special dressing to help the ulcer heal and protect it from infection.    Treating an Ulcer  · Visit your doctor. Ulcers need frequent medical care. Special dressings may be applied. You may be given antibiotics to fight infection.  · Elevate your legs often to reduce swelling. The ulcer needs oxygen-rich blood to heal. This blood cant reach the ulcer until swelling is reduced.  When to Call Your Doctor   · You have an increase in pain.  · You develop a fever of 101.0°F or higher.  · The ulcer oozes discolored fluid or smells bad.  · Swelling increases suddenly or the dressing feels tight.    © 5258-4017 [employer] [facility], [address] [place] [address], [place] [address]. All rights reserved. This information is not intended as a substitute for professional medical care. Always follow your healthcare professional's instructions.

## 2018-11-02 DIAGNOSIS — T81.89XD DELAYED SURGICAL WOUND HEALING, SUBSEQUENT ENCOUNTER: Primary | ICD-10-CM

## 2018-11-07 ENCOUNTER — PATIENT MESSAGE (OUTPATIENT)
Dept: CARDIOLOGY | Facility: CLINIC | Age: 73
End: 2018-11-07

## 2018-11-07 ENCOUNTER — ANTI-COAG VISIT (OUTPATIENT)
Dept: CARDIOLOGY | Facility: CLINIC | Age: 73
End: 2018-11-07

## 2018-11-07 DIAGNOSIS — Z79.01 LONG TERM CURRENT USE OF ANTICOAGULANT THERAPY: ICD-10-CM

## 2018-11-07 LAB — INR PPP: 3.1

## 2018-11-07 NOTE — PROGRESS NOTES
"Received the following message from patient this afternoon:     "I am having implant work tomorrow. In July, the dentist pulled the molar. My next appt is tomorrow when he will drill and place the anchor into my jaw and 90 days from now an implant will be screwed into the anchor.    Is there any reason to not take my coumadin today? "        I advised patient that if drilling/cutting will take place and if this will be invasive then he would have had to have his coumadin held/adjusted in advance of this procedure at least to target the lower end of his range and possibly hold the coumadin if the dentist says this will be invasive. He reports he will call his dentist and get more info and let us know outcome.   "

## 2018-11-07 NOTE — PROGRESS NOTES
Last 5 Patient Entered Readings                                      Current 30 Day Average: 138/76     Recent Readings 11/7/2018 11/1/2018 11/1/2018 11/1/2018 10/23/2018    SBP (mmHg) 124 155 157 140 140    DBP (mmHg) 70 83 84 77 77    Pulse 60 48 55 64 64          Patient's BP average is above goal of <130/80.     Patient denies s/s of hypotension (lightheadedness, dizziness, nausea, fatigue) associated with low readings. Instructed patient to inform me if this occurs, patient confirms understanding.      Patient denies s/s of hypertension (SOB, CP, severe headaches, changes in vision) associated with high readings. Instructed patient to go to the ED if BP > 180/110 and accompanied by hypertensive s/s, patient confirms understanding.    Variable BP readings may be due to low battery. Patient reports he just charged his cuff for the first time about 1 week ago. Encouraged him to fully charge cuff regularly. He admits to missed morning dose of metoprolol yesterday and states he infrequently misses doses.    Will continue to monitor regularly. Will follow up in 2-3 weeks, sooner if BP begins to trend upward or downward.    Patient has my contact information and knows to call with any concerns or clinical changes.     Current HTN regimen:  Hypertension Medications             metoprolol tartrate (LOPRESSOR) 50 MG tablet Take 1 tablet (50 mg total) by mouth 2 (two) times daily.    ramipril (ALTACE) 5 MG capsule TAKE 1 CAPSULE EVERY DAY

## 2018-11-08 ENCOUNTER — ANTI-COAG VISIT (OUTPATIENT)
Dept: CARDIOLOGY | Facility: CLINIC | Age: 73
End: 2018-11-08
Payer: MEDICARE

## 2018-11-08 ENCOUNTER — OFFICE VISIT (OUTPATIENT)
Dept: WOUND CARE | Facility: CLINIC | Age: 73
End: 2018-11-08
Payer: MEDICARE

## 2018-11-08 VITALS
WEIGHT: 311.31 LBS | TEMPERATURE: 98 F | DIASTOLIC BLOOD PRESSURE: 71 MMHG | HEIGHT: 75 IN | SYSTOLIC BLOOD PRESSURE: 147 MMHG | BODY MASS INDEX: 38.71 KG/M2

## 2018-11-08 DIAGNOSIS — T81.89XD DELAYED SURGICAL WOUND HEALING, SUBSEQUENT ENCOUNTER: Primary | ICD-10-CM

## 2018-11-08 DIAGNOSIS — Z79.01 LONG TERM CURRENT USE OF ANTICOAGULANT THERAPY: Primary | ICD-10-CM

## 2018-11-08 DIAGNOSIS — Z85.89 HISTORY OF SQUAMOUS CELL CARCINOMA: ICD-10-CM

## 2018-11-08 LAB — INR PPP: 2.8 (ref 2–3)

## 2018-11-08 PROCEDURE — 17250 CHEM CAUT OF GRANLTJ TISSUE: CPT | Mod: HCNC,S$GLB,, | Performed by: NURSE PRACTITIONER

## 2018-11-08 PROCEDURE — 29580 STRAPPING UNNA BOOT: CPT | Mod: HCNC,51,LT,S$GLB | Performed by: NURSE PRACTITIONER

## 2018-11-08 PROCEDURE — 99499 UNLISTED E&M SERVICE: CPT | Mod: HCNC,S$GLB,, | Performed by: NURSE PRACTITIONER

## 2018-11-08 PROCEDURE — 85610 PROTHROMBIN TIME: CPT | Mod: QW,HCNC,S$GLB, | Performed by: INTERNAL MEDICINE

## 2018-11-08 PROCEDURE — 99999 PR PBB SHADOW E&M-EST. PATIENT-LVL III: CPT | Mod: PBBFAC,HCNC,, | Performed by: NURSE PRACTITIONER

## 2018-11-08 PROCEDURE — 99211 OFF/OP EST MAY X REQ PHY/QHP: CPT | Mod: 25,HCNC,S$GLB, | Performed by: INTERNAL MEDICINE

## 2018-11-08 NOTE — PATIENT INSTRUCTIONS
Elevate legs as much as possible. Do not get the dressings wet and use cast covers for showering.  Should the dressing become wet, remove it, place a wet-to-dry dressing over the wound, cover with gauze and roll gauze and use ace wraps for compression and to secure bandages.  Notify clinic as soon as possible to have a new dressing applied.    Return to clinic in one week for dressing change    Chronic Venous Insufficiency: Treating Ulcers  If leg swelling due to chronic venous insufficiency isnt controlled, an ulcer (open wound) can form. Although ulcers vary in size and shape, they usually appear on the inside of the ankle.      Blood pools around the ankles. The area may look puffy or swollen, and the skin may dimple when pressed.            Fluid leaks from the veins into surrounding tissue. The ankle may bulge, and the skin may glisten.            An ulcer forms if the skin is broken by a bump or a scratch. The ulcer appears watery and may seep fluid.        Your doctor or nurse may apply a special dressing to help the ulcer heal and protect it from infection.    Treating an Ulcer  · Visit your doctor. Ulcers need frequent medical care. Special dressings may be applied. You may be given antibiotics to fight infection.  · Elevate your legs often to reduce swelling. The ulcer needs oxygen-rich blood to heal. This blood cant reach the ulcer until swelling is reduced.  When to Call Your Doctor   · You have an increase in pain.  · You develop a fever of 101.0°F or higher.  · The ulcer oozes discolored fluid or smells bad.  · Swelling increases suddenly or the dressing feels tight.    © 6865-7083 [employer], [address] [place] [address], [place] [address]. All rights reserved. This information is not intended as a substitute for professional medical care. Always follow your healthcare professional's instructions.

## 2018-11-08 NOTE — PROGRESS NOTES
INR within normal range today. Patient with bruises on arms from use. Denies any bleeding or changes. Patient is stable on this dose. He will continue this dose until follow-up in 3 weeks unless his new test strips come in before then and he can use his home meter again. I advised him to contact us with any changes or problems.

## 2018-11-08 NOTE — PROGRESS NOTES
Subjective:       Patient ID: Del Anand is a 73 y.o. male.    Chief Complaint: Wound Check    Mr. 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 He underwent MOH's procedure on 8/15/5018. He reports his incision became necrotic and eventually opened. A wound culture was done post op week 3 that was + for K.pneumoniae. He was prescribed doxycycline for two weeks.  PMHx includes CAD, CABG 2014, HTN, DVT, PE, and kidney stones. He is on coumadin therapy for hx of PE. Sharps debridement along with enzymatic debridement was initiated. Last clinic visit dressing change frequency was increased to twice a day due to large amounts of drainage and periwound skin maceration. The wound was also cauterized with silver nitrate for hypergranulation tissue. He is here today for follow up following a week on vacation. The leg has generalized edema especially to ankle area and the tissue is smooth and non granular. Continues with moderate amounts of drainage.  Slow delayed progress. Patient placed in compression therapy last week with good results.        Review of Systems   Constitutional: Negative.  Negative for chills, diaphoresis, fatigue and fever.   HENT: Negative.  Negative for ear pain, nosebleeds, sinus pain, sore throat and trouble swallowing.    Eyes: Positive for visual disturbance. Negative for pain and redness.   Respiratory: Negative.  Negative for cough, shortness of breath and wheezing.         Hx of PE  DVT   Cardiovascular: Negative.  Negative for chest pain, palpitations and leg swelling.        CAD  Hx of COPD   Gastrointestinal: Negative.  Negative for abdominal distention, abdominal pain, blood in stool, nausea and vomiting.   Endocrine: Negative.  Negative for polydipsia, polyphagia and polyuria.   Genitourinary: Negative.  Negative for flank pain and hematuria.   Musculoskeletal: Positive for arthralgias (Knees, hands). Negative for back pain, joint  swelling and myalgias.   Skin: Positive for wound (Left leg).   Allergic/Immunologic: Negative.    Neurological: Negative.  Negative for seizures, facial asymmetry, weakness and headaches.   Hematological: Negative for adenopathy. Bruises/bleeds easily.        Coumadin therapy   Psychiatric/Behavioral: Negative.  Negative for agitation, dysphoric mood and sleep disturbance. The patient is not nervous/anxious.        Objective:      Physical Exam   Constitutional: He is oriented to person, place, and time. Vital signs are normal. He appears well-developed. No distress.   HENT:   Head: Normocephalic.   Mouth/Throat: Oropharynx is clear and moist.   Eyes: Conjunctivae, EOM and lids are normal. Pupils are equal, round, and reactive to light.   Neck: Trachea normal and normal range of motion. Carotid bruit is not present. No thyromegaly present.   Cardiovascular: Normal rate, regular rhythm, normal heart sounds and intact distal pulses.   Pulses:       Dorsalis pedis pulses are 2+ on the right side, and 2+ on the left side.        Posterior tibial pulses are 2+ on the right side, and 2+ on the left side.   Pulmonary/Chest: Effort normal and breath sounds normal. No respiratory distress. He has no wheezes. He has no rales.   Abdominal: Soft. Bowel sounds are normal. He exhibits no distension. There is no tenderness.   Musculoskeletal: Normal range of motion.        Left lower leg: He exhibits edema.        Legs:  Lymphadenopathy:     He has no cervical adenopathy.   Neurological: He is alert and oriented to person, place, and time.   Skin: Skin is warm and dry. Capillary refill takes less than 2 seconds. He is not diaphoretic. No erythema.   Psychiatric: He has a normal mood and affect. Thought content normal.   Vitals reviewed.              Del was seen in the clinic room and placed in the supine position on the treatment table. The wound was cauterized with two silver nitrate sticks. A silver alginate dressing was  applied to the wound with barrier cream to the periwound.  The patient's foot was positioned at a 90 degree angle.  The coflex was applied per package instructions.  A two layered application was performed using a spiral technique beginning with the foam layer followed by the cohesive bandage avoiding creases or folds.  The wrap was started behind the first metatarsal and ended below the tibial tubercle of the knee.  There was overlap of each turn half the width of the previous turn.  The compression wrap will be changed every 7 days.    Assessment:       1. Delayed surgical wound healing, subsequent encounter    2. History of squamous cell carcinoma        Plan:       Clean with wound cleanser or normal saline  Silver alginate to wound bed  Barrier cream to the periphery of wound  Apply border absorbant cover dressing  Two layer coflex compression therapy  Return to clinic in one  Avoid getting leg wet, use cast cover

## 2018-11-15 ENCOUNTER — OFFICE VISIT (OUTPATIENT)
Dept: WOUND CARE | Facility: CLINIC | Age: 73
End: 2018-11-15
Payer: MEDICARE

## 2018-11-15 ENCOUNTER — IMMUNIZATION (OUTPATIENT)
Dept: PHARMACY | Facility: CLINIC | Age: 73
End: 2018-11-15
Payer: MEDICARE

## 2018-11-15 VITALS
TEMPERATURE: 98 F | HEART RATE: 68 BPM | DIASTOLIC BLOOD PRESSURE: 78 MMHG | BODY MASS INDEX: 38.78 KG/M2 | SYSTOLIC BLOOD PRESSURE: 142 MMHG | HEIGHT: 75 IN | WEIGHT: 311.94 LBS

## 2018-11-15 DIAGNOSIS — T81.89XD DELAYED SURGICAL WOUND HEALING, SUBSEQUENT ENCOUNTER: Primary | ICD-10-CM

## 2018-11-15 DIAGNOSIS — Z85.89 HISTORY OF SQUAMOUS CELL CARCINOMA: ICD-10-CM

## 2018-11-15 PROCEDURE — 99499 UNLISTED E&M SERVICE: CPT | Mod: HCNC,S$GLB,, | Performed by: NURSE PRACTITIONER

## 2018-11-15 PROCEDURE — 99999 PR PBB SHADOW E&M-EST. PATIENT-LVL III: CPT | Mod: PBBFAC,HCNC,, | Performed by: NURSE PRACTITIONER

## 2018-11-15 PROCEDURE — 29580 STRAPPING UNNA BOOT: CPT | Mod: HCNC,LT,S$GLB, | Performed by: NURSE PRACTITIONER

## 2018-11-15 NOTE — PROGRESS NOTES
Subjective:       Patient ID: Del Anand is a 73 y.o. male.    Chief Complaint: Wound Check    Mr. 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 He underwent MOH's procedure on 8/15/5018. He reports his incision became necrotic and eventually opened. A wound culture was done post op week 3 that was + for K.pneumoniae. He was prescribed doxycycline for two weeks.  PMHx includes CAD, CABG 2014, HTN, DVT, PE, and kidney stones. He is on coumadin therapy for hx of PE. Sharps debridement along with enzymatic debridement was initiated. The wound is also cauterized with silver nitrate for hypergranulation tissue as indicated.  The left leg developed generalized edema especially to ankle area and the tissue is smooth and non granular following a vacation. Moderate amounts of drainage. Patient placed in compression therapy last week with good results.  Good progress over the last week as evidenced by wound contraction. Will continue compression and silver nitrate cauterization as indicated.       Review of Systems   Constitutional: Negative.  Negative for chills, diaphoresis, fatigue and fever.   HENT: Negative.  Negative for ear pain, nosebleeds, sinus pain, sore throat and trouble swallowing.    Eyes: Positive for visual disturbance. Negative for pain and redness.   Respiratory: Negative.  Negative for cough, shortness of breath and wheezing.         Hx of PE  DVT   Cardiovascular: Negative.  Negative for chest pain, palpitations and leg swelling.        CAD  Hx of COPD   Gastrointestinal: Negative.  Negative for abdominal distention, abdominal pain, blood in stool, nausea and vomiting.   Endocrine: Negative.  Negative for polydipsia, polyphagia and polyuria.   Genitourinary: Negative.  Negative for flank pain and hematuria.   Musculoskeletal: Positive for arthralgias (Knees, hands). Negative for back pain, joint swelling and myalgias.   Skin: Positive for wound  (Left leg).   Allergic/Immunologic: Negative.    Neurological: Negative.  Negative for seizures, facial asymmetry, weakness and headaches.   Hematological: Negative for adenopathy. Bruises/bleeds easily.        Coumadin therapy   Psychiatric/Behavioral: Negative.  Negative for agitation, dysphoric mood and sleep disturbance. The patient is not nervous/anxious.        Objective:      Physical Exam   Constitutional: He is oriented to person, place, and time. Vital signs are normal. He appears well-developed. No distress.   HENT:   Head: Normocephalic.   Mouth/Throat: Oropharynx is clear and moist.   Eyes: Conjunctivae, EOM and lids are normal. Pupils are equal, round, and reactive to light.   Neck: Trachea normal and normal range of motion. Carotid bruit is not present. No thyromegaly present.   Cardiovascular: Normal rate, regular rhythm, normal heart sounds and intact distal pulses.   Pulses:       Dorsalis pedis pulses are 2+ on the right side, and 2+ on the left side.        Posterior tibial pulses are 2+ on the right side, and 2+ on the left side.   Pulmonary/Chest: Effort normal and breath sounds normal. No respiratory distress. He has no wheezes. He has no rales.   Abdominal: Soft. Bowel sounds are normal. He exhibits no distension. There is no tenderness.   Musculoskeletal: Normal range of motion.        Left lower leg: He exhibits edema.        Legs:  Lymphadenopathy:     He has no cervical adenopathy.   Neurological: He is alert and oriented to person, place, and time.   Skin: Skin is warm and dry. Capillary refill takes less than 2 seconds. He is not diaphoretic. No erythema.   Psychiatric: He has a normal mood and affect. Thought content normal.   Vitals reviewed.            Del was seen in the clinic room and placed in the supine position on the treatment table. The wound was cauterized with two silver nitrate sticks. Iodolfex was applied to the wound with barrier cream to the periwound.  The patient's  foot was positioned at a 90 degree angle.  The coflex was applied per package instructions.  A two layered application was performed using a spiral technique beginning with the foam layer followed by the cohesive bandage avoiding creases or folds.  The wrap was started behind the first metatarsal and ended below the tibial tubercle of the knee.  There was overlap of each turn half the width of the previous turn.  The compression wrap will be changed every 7 days.    Assessment:       1. Delayed surgical wound healing, subsequent encounter    2. History of squamous cell carcinoma        Plan:       Clean with wound cleanser or normal saline  Iodoflex to wound bed  Barrier cream to the periphery of wound  Apply border absorbant cover dressing  Two layer coflex compression therapy  Return to clinic in one  Avoid getting leg wet, use cast cover

## 2018-11-15 NOTE — PATIENT INSTRUCTIONS
Elevate legs as much as possible. Do not get the dressings wet and use cast covers for showering.  Should the dressing become wet, remove it, place a wet-to-dry dressing over the wound, cover with gauze and roll gauze and use ace wraps for compression and to secure bandages.  Notify clinic as soon as possible to have a new dressing applied.    Return to clinic in one week

## 2018-11-21 ENCOUNTER — ANTI-COAG VISIT (OUTPATIENT)
Dept: CARDIOLOGY | Facility: CLINIC | Age: 73
End: 2018-11-21

## 2018-11-21 DIAGNOSIS — Z79.01 LONG TERM CURRENT USE OF ANTICOAGULANT THERAPY: ICD-10-CM

## 2018-11-21 LAB
INR PPP: 2.4
INR PPP: 2.6

## 2018-11-21 NOTE — PROGRESS NOTES
Last 5 Patient Entered Readings                                      Current 30 Day Average: 130/77     Recent Readings 11/21/2018 11/14/2018 11/7/2018 11/1/2018 11/1/2018    SBP (mmHg) 112 119 124 155 157    DBP (mmHg) 71 75 70 83 84    Pulse 62 62 60 48 55          BP readings improved and at goal. No medication recommendations at this time.

## 2018-11-23 ENCOUNTER — OFFICE VISIT (OUTPATIENT)
Dept: WOUND CARE | Facility: CLINIC | Age: 73
End: 2018-11-23
Payer: MEDICARE

## 2018-11-23 VITALS
TEMPERATURE: 98 F | WEIGHT: 310.88 LBS | SYSTOLIC BLOOD PRESSURE: 124 MMHG | HEIGHT: 75 IN | HEART RATE: 65 BPM | BODY MASS INDEX: 38.65 KG/M2 | DIASTOLIC BLOOD PRESSURE: 62 MMHG

## 2018-11-23 DIAGNOSIS — T81.89XD DELAYED SURGICAL WOUND HEALING, SUBSEQUENT ENCOUNTER: Primary | ICD-10-CM

## 2018-11-23 DIAGNOSIS — Z85.89 HISTORY OF SQUAMOUS CELL CARCINOMA: ICD-10-CM

## 2018-11-23 PROCEDURE — 29580 STRAPPING UNNA BOOT: CPT | Mod: HCNC,LT,S$GLB, | Performed by: NURSE PRACTITIONER

## 2018-11-23 PROCEDURE — 99499 UNLISTED E&M SERVICE: CPT | Mod: HCNC,S$GLB,, | Performed by: NURSE PRACTITIONER

## 2018-11-23 PROCEDURE — 99999 PR PBB SHADOW E&M-EST. PATIENT-LVL III: CPT | Mod: PBBFAC,HCNC,, | Performed by: NURSE PRACTITIONER

## 2018-11-23 NOTE — PROGRESS NOTES
Subjective:       Patient ID: Del Anand is a 73 y.o. male.    Chief Complaint: Wound Check    Mr. 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 He underwent MOH's procedure on 8/15/5018. He reports his incision became necrotic and eventually opened. A wound culture was done post op week 3 that was + for K.pneumoniae. He was prescribed doxycycline for two weeks.  PMHx includes CAD, CABG 2014, HTN, DVT, PE, and kidney stones. He is on coumadin therapy for hx of PE. Sharps debridement along with enzymatic debridement was initiated. The wound is also cauterized with silver nitrate for hypergranulation tissue as indicated.  The left leg developed generalized edema especially to ankle area and the tissue is smooth and non granular following a vacation. Moderate amounts of drainage. Patient placed in compression therapy last week with good results.  Good progress over the last week as evidenced by wound contraction. Will continue compression and silver nitrate cauterization as indicated.       Review of Systems   Constitutional: Negative.  Negative for chills, diaphoresis, fatigue and fever.   HENT: Negative.  Negative for ear pain, nosebleeds, sinus pain, sore throat and trouble swallowing.    Eyes: Positive for visual disturbance. Negative for pain and redness.   Respiratory: Negative.  Negative for cough, shortness of breath and wheezing.         Hx of PE  DVT   Cardiovascular: Negative.  Negative for chest pain, palpitations and leg swelling.        CAD  Hx of COPD   Gastrointestinal: Negative.  Negative for abdominal distention, abdominal pain, blood in stool, nausea and vomiting.   Endocrine: Negative.  Negative for polydipsia, polyphagia and polyuria.   Genitourinary: Negative.  Negative for flank pain and hematuria.   Musculoskeletal: Positive for arthralgias (Knees, hands). Negative for back pain, joint swelling and myalgias.   Skin: Positive for wound  (Left leg).   Allergic/Immunologic: Negative.    Neurological: Negative.  Negative for seizures, facial asymmetry, weakness and headaches.   Hematological: Negative for adenopathy. Bruises/bleeds easily.        Coumadin therapy   Psychiatric/Behavioral: Negative.  Negative for agitation, dysphoric mood and sleep disturbance. The patient is not nervous/anxious.        Objective:      Physical Exam   Constitutional: He is oriented to person, place, and time. Vital signs are normal. He appears well-developed. No distress.   HENT:   Head: Normocephalic.   Mouth/Throat: Oropharynx is clear and moist.   Eyes: Conjunctivae, EOM and lids are normal. Pupils are equal, round, and reactive to light.   Neck: Trachea normal and normal range of motion. Carotid bruit is not present. No thyromegaly present.   Cardiovascular: Normal rate, regular rhythm, normal heart sounds and intact distal pulses.   Pulses:       Dorsalis pedis pulses are 2+ on the right side, and 2+ on the left side.        Posterior tibial pulses are 2+ on the right side, and 2+ on the left side.   Pulmonary/Chest: Effort normal and breath sounds normal. No respiratory distress. He has no wheezes. He has no rales.   Abdominal: Soft. Bowel sounds are normal. He exhibits no distension. There is no tenderness.   Musculoskeletal: Normal range of motion.        Left lower leg: He exhibits edema.        Legs:  Lymphadenopathy:     He has no cervical adenopathy.   Neurological: He is alert and oriented to person, place, and time.   Skin: Skin is warm and dry. Capillary refill takes less than 2 seconds. He is not diaphoretic. No erythema.   Psychiatric: He has a normal mood and affect. Thought content normal.   Vitals reviewed.                Del was seen in the clinic room and placed in the supine position on the treatment table. The wound was cauterized with one silver nitrate sticks. Iodolfex was applied to the wound with barrier cream to the periwound.  The  patient's foot was positioned at a 90 degree angle.  The coflex was applied per package instructions.  A two layered application was performed using a spiral technique beginning with the foam layer followed by the cohesive bandage avoiding creases or folds.  The wrap was started behind the first metatarsal and ended below the tibial tubercle of the knee.  There was overlap of each turn half the width of the previous turn.  The compression wrap will be changed every 7 days.    Assessment:       1. Delayed surgical wound healing, subsequent encounter    2. History of squamous cell carcinoma        Plan:       Clean with wound cleanser or normal saline  Iodoflex to wound bed  Apply border absorbant cover dressing  Two layer coflex compression therapy  Return to clinic in one  Avoid getting leg wet, use cast cover

## 2018-11-28 ENCOUNTER — ANTI-COAG VISIT (OUTPATIENT)
Dept: CARDIOLOGY | Facility: CLINIC | Age: 73
End: 2018-11-28

## 2018-11-28 DIAGNOSIS — Z79.01 LONG TERM CURRENT USE OF ANTICOAGULANT THERAPY: ICD-10-CM

## 2018-11-28 LAB — INR PPP: 2.5

## 2018-11-28 NOTE — PROGRESS NOTES
INR drawn today using home meter and unaffected test strips (lot #[id number]) is within range. Continue current dose and home monitoring plan.

## 2018-11-29 ENCOUNTER — OFFICE VISIT (OUTPATIENT)
Dept: WOUND CARE | Facility: CLINIC | Age: 73
End: 2018-11-29
Payer: MEDICARE

## 2018-11-29 VITALS
BODY MASS INDEX: 39.09 KG/M2 | DIASTOLIC BLOOD PRESSURE: 63 MMHG | HEIGHT: 75 IN | TEMPERATURE: 98 F | WEIGHT: 314.38 LBS | HEART RATE: 67 BPM | SYSTOLIC BLOOD PRESSURE: 137 MMHG

## 2018-11-29 DIAGNOSIS — Z85.89 HISTORY OF SQUAMOUS CELL CARCINOMA: ICD-10-CM

## 2018-11-29 DIAGNOSIS — L08.9 PUSTULES DETERMINED BY EXAMINATION: ICD-10-CM

## 2018-11-29 DIAGNOSIS — T81.89XD DELAYED SURGICAL WOUND HEALING, SUBSEQUENT ENCOUNTER: Primary | ICD-10-CM

## 2018-11-29 PROCEDURE — 99999 PR PBB SHADOW E&M-EST. PATIENT-LVL III: CPT | Mod: PBBFAC,HCNC,, | Performed by: NURSE PRACTITIONER

## 2018-11-29 PROCEDURE — 99499 UNLISTED E&M SERVICE: CPT | Mod: HCNC,S$GLB,, | Performed by: NURSE PRACTITIONER

## 2018-11-29 PROCEDURE — 29580 STRAPPING UNNA BOOT: CPT | Mod: 51,HCNC,LT,S$GLB | Performed by: NURSE PRACTITIONER

## 2018-11-29 PROCEDURE — 17250 CHEM CAUT OF GRANLTJ TISSUE: CPT | Mod: HCNC,S$GLB,, | Performed by: NURSE PRACTITIONER

## 2018-11-29 RX ORDER — SULFAMETHOXAZOLE AND TRIMETHOPRIM 800; 160 MG/1; MG/1
1 TABLET ORAL 2 TIMES DAILY
Qty: 20 TABLET | Refills: 0 | Status: SHIPPED | OUTPATIENT
Start: 2018-11-29 | End: 2018-12-09

## 2018-11-29 NOTE — PROGRESS NOTES
Subjective:       Patient ID: Del Anand is a 73 y.o. male.    Chief Complaint: Wound Check    Mr. 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 He underwent MOH's procedure on 8/15/5018. He reports his incision became necrotic and eventually opened. A wound culture was done post op week 3 that was + for K.pneumoniae. He was prescribed doxycycline for two weeks.  PMHx includes CAD, CABG 2014, HTN, DVT, PE, and kidney stones. He is on coumadin therapy for hx of PE. Sharps debridement along with enzymatic debridement was initiated. The wound is also cauterized with silver nitrate for hypergranulation tissue as indicated.  The left leg developed generalized edema especially to ankle area and the tissue is smooth and non granular following a vacation. Moderate amounts of drainage. Patient placed in compression therapy last week with good results.  Good progress over the last week as evidenced by wound contraction. Will continue compression and silver nitrate cauterization as indicated. Plan to discharge next week. Wound near complete closure.      Review of Systems   Constitutional: Negative.  Negative for chills, diaphoresis, fatigue and fever.   HENT: Negative.  Negative for ear pain, nosebleeds, sinus pain, sore throat and trouble swallowing.    Eyes: Positive for visual disturbance. Negative for pain and redness.   Respiratory: Negative.  Negative for cough, shortness of breath and wheezing.         Hx of PE  DVT   Cardiovascular: Negative.  Negative for chest pain, palpitations and leg swelling.        CAD  Hx of COPD   Gastrointestinal: Negative.  Negative for abdominal distention, abdominal pain, blood in stool, nausea and vomiting.   Endocrine: Negative.  Negative for polydipsia, polyphagia and polyuria.   Genitourinary: Negative.  Negative for flank pain and hematuria.   Musculoskeletal: Positive for arthralgias (Knees, hands). Negative for back pain,  joint swelling and myalgias.   Skin: Positive for wound (Left leg).   Allergic/Immunologic: Negative.    Neurological: Negative.  Negative for seizures, facial asymmetry, weakness and headaches.   Hematological: Negative for adenopathy. Bruises/bleeds easily.        Coumadin therapy   Psychiatric/Behavioral: Negative.  Negative for agitation, dysphoric mood and sleep disturbance. The patient is not nervous/anxious.        Objective:      Physical Exam   Constitutional: He is oriented to person, place, and time. Vital signs are normal. He appears well-developed. No distress.   HENT:   Head: Normocephalic.   Mouth/Throat: Oropharynx is clear and moist.   Eyes: Conjunctivae, EOM and lids are normal. Pupils are equal, round, and reactive to light.   Neck: Trachea normal and normal range of motion. Carotid bruit is not present. No thyromegaly present.   Cardiovascular: Normal rate, regular rhythm, normal heart sounds and intact distal pulses.   Pulses:       Dorsalis pedis pulses are 2+ on the right side, and 2+ on the left side.        Posterior tibial pulses are 2+ on the right side, and 2+ on the left side.   Pulmonary/Chest: Effort normal and breath sounds normal. No respiratory distress. He has no wheezes. He has no rales.   Abdominal: Soft. Bowel sounds are normal. He exhibits no distension. There is no tenderness.   Musculoskeletal: Normal range of motion.        Left lower leg: He exhibits edema.        Legs:  Lymphadenopathy:     He has no cervical adenopathy.   Neurological: He is alert and oriented to person, place, and time.   Skin: Skin is warm and dry. Capillary refill takes less than 2 seconds. He is not diaphoretic. No erythema.   Psychiatric: He has a normal mood and affect. Thought content normal.   Vitals reviewed.          Del was seen in the clinic room and placed in the supine position on the treatment table. The wound was cauterized with one silver nitrate sticks. Iodolfex was applied to the wound  with barrier cream to the periwound.  The patient's foot was positioned at a 90 degree angle.  The coflex was applied per package instructions.  A two layered application was performed using a spiral technique beginning with the foam layer followed by the cohesive bandage avoiding creases or folds.  The wrap was started behind the first metatarsal and ended below the tibial tubercle of the knee.  There was overlap of each turn half the width of the previous turn.  The compression wrap will be changed every 7 days.    Assessment:       1. Delayed surgical wound healing, subsequent encounter    2. History of squamous cell carcinoma        Plan:       Clean with wound cleanser or normal saline  Iodoflex to wound bed  Apply border absorbant cover dressing  Two layer coflex compression therapy  Return to clinic in one  Avoid getting leg wet, use cast cover  Preparing to discharge next week  Bactrim for pustules to left  Upper leg x 10 days

## 2018-11-29 NOTE — PATIENT INSTRUCTIONS
Elevate legs as much as possible. Do not get the dressings wet and use cast covers for showering.  Should the dressing become wet, remove it, place a wet-to-dry dressing over the wound, cover with gauze and roll gauze and use ace wraps for compression and to secure bandages.  Notify clinic as soon as possible to have a new dressing applied.    Return to clinic in one week  Preparing for discharge  Bactrim oral x 10 days twice a day for pustules to left upper leg

## 2018-12-05 ENCOUNTER — ANTI-COAG VISIT (OUTPATIENT)
Dept: CARDIOLOGY | Facility: CLINIC | Age: 73
End: 2018-12-05
Payer: MEDICARE

## 2018-12-05 DIAGNOSIS — Z79.01 LONG TERM CURRENT USE OF ANTICOAGULANT THERAPY: ICD-10-CM

## 2018-12-05 LAB — INR PPP: 2.9

## 2018-12-05 PROCEDURE — G0250 MD INR TEST REVIE INTER MGMT: HCPCS | Mod: S$GLB,,, | Performed by: PHARMACIST

## 2018-12-05 NOTE — PROGRESS NOTES
Patient Questioned and confirmed taking correct dose of coumadin; He reports missed dose of coumadin on SATURDAY  Reports stating new meds (bactrim) on FRI 11/30; States taking new meds for 10 days  NO other changes

## 2018-12-06 RX ORDER — METOPROLOL TARTRATE 50 MG/1
50 TABLET ORAL 2 TIMES DAILY
Qty: 180 TABLET | Refills: 3 | Status: SHIPPED | OUTPATIENT
Start: 2018-12-06 | End: 2020-01-06 | Stop reason: SDUPTHER

## 2018-12-06 RX ORDER — WARFARIN 10 MG/1
TABLET ORAL
Qty: 111 TABLET | Refills: 3 | OUTPATIENT
Start: 2018-12-06

## 2018-12-07 ENCOUNTER — OFFICE VISIT (OUTPATIENT)
Dept: WOUND CARE | Facility: CLINIC | Age: 73
End: 2018-12-07
Payer: MEDICARE

## 2018-12-07 VITALS
WEIGHT: 310.63 LBS | HEART RATE: 56 BPM | RESPIRATION RATE: 18 BRPM | BODY MASS INDEX: 38.62 KG/M2 | HEIGHT: 75 IN | TEMPERATURE: 98 F | SYSTOLIC BLOOD PRESSURE: 128 MMHG | DIASTOLIC BLOOD PRESSURE: 62 MMHG

## 2018-12-07 DIAGNOSIS — I83.892 VARICOSE VEINS OF LEFT LOWER EXTREMITY WITH COMPLICATIONS: Primary | ICD-10-CM

## 2018-12-07 PROBLEM — T81.89XA DELAYED SURGICAL WOUND HEALING: Status: RESOLVED | Noted: 2018-09-25 | Resolved: 2018-12-07

## 2018-12-07 PROCEDURE — 99212 OFFICE O/P EST SF 10 MIN: CPT | Mod: HCNC,S$GLB,, | Performed by: NURSE PRACTITIONER

## 2018-12-07 PROCEDURE — 3074F SYST BP LT 130 MM HG: CPT | Mod: CPTII,HCNC,S$GLB, | Performed by: NURSE PRACTITIONER

## 2018-12-07 PROCEDURE — 99999 PR PBB SHADOW E&M-EST. PATIENT-LVL V: CPT | Mod: PBBFAC,HCNC,, | Performed by: NURSE PRACTITIONER

## 2018-12-07 PROCEDURE — 3078F DIAST BP <80 MM HG: CPT | Mod: CPTII,HCNC,S$GLB, | Performed by: NURSE PRACTITIONER

## 2018-12-07 PROCEDURE — 1101F PT FALLS ASSESS-DOCD LE1/YR: CPT | Mod: CPTII,HCNC,S$GLB, | Performed by: NURSE PRACTITIONER

## 2018-12-10 ENCOUNTER — ANTI-COAG VISIT (OUTPATIENT)
Dept: CARDIOLOGY | Facility: CLINIC | Age: 73
End: 2018-12-10

## 2018-12-10 DIAGNOSIS — Z79.01 LONG TERM CURRENT USE OF ANTICOAGULANT THERAPY: ICD-10-CM

## 2018-12-10 LAB — INR PPP: 2.6

## 2018-12-12 LAB — INR PPP: 2.6

## 2018-12-13 ENCOUNTER — ANTI-COAG VISIT (OUTPATIENT)
Dept: CARDIOLOGY | Facility: CLINIC | Age: 73
End: 2018-12-13

## 2018-12-13 DIAGNOSIS — Z79.01 LONG TERM CURRENT USE OF ANTICOAGULANT THERAPY: ICD-10-CM

## 2018-12-19 ENCOUNTER — ANTI-COAG VISIT (OUTPATIENT)
Dept: CARDIOLOGY | Facility: CLINIC | Age: 73
End: 2018-12-19

## 2018-12-19 DIAGNOSIS — Z79.01 LONG TERM CURRENT USE OF ANTICOAGULANT THERAPY: ICD-10-CM

## 2018-12-19 LAB — INR PPP: 2

## 2018-12-27 ENCOUNTER — ANTI-COAG VISIT (OUTPATIENT)
Dept: CARDIOLOGY | Facility: CLINIC | Age: 73
End: 2018-12-27

## 2018-12-27 DIAGNOSIS — Z79.01 LONG TERM CURRENT USE OF ANTICOAGULANT THERAPY: ICD-10-CM

## 2018-12-27 LAB — INR PPP: 2.6

## 2018-12-28 ENCOUNTER — LAB VISIT (OUTPATIENT)
Dept: LAB | Facility: HOSPITAL | Age: 73
End: 2018-12-28
Attending: INTERNAL MEDICINE
Payer: MEDICARE

## 2018-12-28 DIAGNOSIS — E78.2 MIXED HYPERLIPIDEMIA: ICD-10-CM

## 2018-12-28 DIAGNOSIS — I10 ESSENTIAL HYPERTENSION: ICD-10-CM

## 2018-12-28 LAB
ALT SERPL W/O P-5'-P-CCNC: 26 U/L
ANION GAP SERPL CALC-SCNC: 6 MMOL/L
AST SERPL-CCNC: 30 U/L
BUN SERPL-MCNC: 18 MG/DL
CALCIUM SERPL-MCNC: 9.1 MG/DL
CHLORIDE SERPL-SCNC: 108 MMOL/L
CHOLEST SERPL-MCNC: 149 MG/DL
CHOLEST/HDLC SERPL: 3.4 {RATIO}
CO2 SERPL-SCNC: 27 MMOL/L
CREAT SERPL-MCNC: 0.8 MG/DL
EST. GFR  (AFRICAN AMERICAN): >60 ML/MIN/1.73 M^2
EST. GFR  (NON AFRICAN AMERICAN): >60 ML/MIN/1.73 M^2
GLUCOSE SERPL-MCNC: 109 MG/DL
HDLC SERPL-MCNC: 44 MG/DL
HDLC SERPL: 29.5 %
LDLC SERPL CALC-MCNC: 77.2 MG/DL
NONHDLC SERPL-MCNC: 105 MG/DL
POTASSIUM SERPL-SCNC: 5.3 MMOL/L
SODIUM SERPL-SCNC: 141 MMOL/L
TRIGL SERPL-MCNC: 139 MG/DL

## 2018-12-28 PROCEDURE — 84450 TRANSFERASE (AST) (SGOT): CPT | Mod: HCNC

## 2018-12-28 PROCEDURE — 36415 COLL VENOUS BLD VENIPUNCTURE: CPT | Mod: HCNC,PO

## 2018-12-28 PROCEDURE — 80061 LIPID PANEL: CPT | Mod: HCNC

## 2018-12-28 PROCEDURE — 80048 BASIC METABOLIC PNL TOTAL CA: CPT | Mod: HCNC

## 2018-12-28 PROCEDURE — 84460 ALANINE AMINO (ALT) (SGPT): CPT | Mod: HCNC

## 2019-01-02 ENCOUNTER — ANTI-COAG VISIT (OUTPATIENT)
Dept: CARDIOLOGY | Facility: CLINIC | Age: 74
End: 2019-01-02

## 2019-01-02 DIAGNOSIS — Z79.01 LONG TERM CURRENT USE OF ANTICOAGULANT THERAPY: ICD-10-CM

## 2019-01-02 LAB — INR PPP: 2.5

## 2019-01-02 NOTE — PROGRESS NOTES
Last 5 Patient Entered Readings                                      Current 30 Day Average: 133/72     Recent Readings 1/2/2019 12/27/2018 12/19/2018 12/12/2018 12/10/2018    SBP (mmHg) 142 139 140 111 132    DBP (mmHg) 74 89 72 61 50    Pulse 56 79 55 66 61          Elevated K+ noted on recent labs. Patient seeing Dr. Levin 1/3. Given elevated K+ and recent study linking increased risk of lung cancer with use of an ACE Inhibitor, would consider changing ramipril to chlorthalidone 12.5 mg to 25 mg once daily. First recent elevation of K+. Alternatively, could repeat K+ level and if WNL, change ramipril to ARB (ie. olmesartan 10 mg daily).

## 2019-01-03 ENCOUNTER — PATIENT MESSAGE (OUTPATIENT)
Dept: CARDIOLOGY | Facility: CLINIC | Age: 74
End: 2019-01-03

## 2019-01-03 ENCOUNTER — OFFICE VISIT (OUTPATIENT)
Dept: CARDIOLOGY | Facility: CLINIC | Age: 74
End: 2019-01-03
Payer: MEDICARE

## 2019-01-03 VITALS
BODY MASS INDEX: 39.17 KG/M2 | HEIGHT: 75 IN | HEART RATE: 60 BPM | DIASTOLIC BLOOD PRESSURE: 70 MMHG | SYSTOLIC BLOOD PRESSURE: 130 MMHG | WEIGHT: 315 LBS

## 2019-01-03 DIAGNOSIS — E78.2 MIXED HYPERLIPIDEMIA: ICD-10-CM

## 2019-01-03 DIAGNOSIS — I25.10 CORONARY ARTERY DISEASE INVOLVING NATIVE CORONARY ARTERY OF NATIVE HEART WITHOUT ANGINA PECTORIS: Primary | ICD-10-CM

## 2019-01-03 DIAGNOSIS — I27.82 OTHER CHRONIC PULMONARY EMBOLISM WITHOUT ACUTE COR PULMONALE: ICD-10-CM

## 2019-01-03 DIAGNOSIS — I10 ESSENTIAL HYPERTENSION: ICD-10-CM

## 2019-01-03 PROCEDURE — 3075F SYST BP GE 130 - 139MM HG: CPT | Mod: CPTII,S$GLB,, | Performed by: INTERNAL MEDICINE

## 2019-01-03 PROCEDURE — 1101F PT FALLS ASSESS-DOCD LE1/YR: CPT | Mod: CPTII,S$GLB,, | Performed by: INTERNAL MEDICINE

## 2019-01-03 PROCEDURE — 99499 UNLISTED E&M SERVICE: CPT | Mod: HCNC,S$GLB,, | Performed by: INTERNAL MEDICINE

## 2019-01-03 PROCEDURE — 1101F PR PT FALLS ASSESS DOC 0-1 FALLS W/OUT INJ PAST YR: ICD-10-PCS | Mod: CPTII,S$GLB,, | Performed by: INTERNAL MEDICINE

## 2019-01-03 PROCEDURE — 93010 EKG 12-LEAD: ICD-10-PCS | Mod: S$GLB,,, | Performed by: INTERNAL MEDICINE

## 2019-01-03 PROCEDURE — 3075F PR MOST RECENT SYSTOLIC BLOOD PRESS GE 130-139MM HG: ICD-10-PCS | Mod: CPTII,S$GLB,, | Performed by: INTERNAL MEDICINE

## 2019-01-03 PROCEDURE — 3078F DIAST BP <80 MM HG: CPT | Mod: CPTII,S$GLB,, | Performed by: INTERNAL MEDICINE

## 2019-01-03 PROCEDURE — 99999 PR PBB SHADOW E&M-EST. PATIENT-LVL III: CPT | Mod: PBBFAC,,, | Performed by: INTERNAL MEDICINE

## 2019-01-03 PROCEDURE — 93010 ELECTROCARDIOGRAM REPORT: CPT | Mod: S$GLB,,, | Performed by: INTERNAL MEDICINE

## 2019-01-03 PROCEDURE — 99999 PR PBB SHADOW E&M-EST. PATIENT-LVL III: ICD-10-PCS | Mod: PBBFAC,,, | Performed by: INTERNAL MEDICINE

## 2019-01-03 PROCEDURE — 3078F PR MOST RECENT DIASTOLIC BLOOD PRESSURE < 80 MM HG: ICD-10-PCS | Mod: CPTII,S$GLB,, | Performed by: INTERNAL MEDICINE

## 2019-01-03 PROCEDURE — 99214 OFFICE O/P EST MOD 30 MIN: CPT | Mod: S$GLB,,, | Performed by: INTERNAL MEDICINE

## 2019-01-03 PROCEDURE — 99214 PR OFFICE/OUTPT VISIT, EST, LEVL IV, 30-39 MIN: ICD-10-PCS | Mod: S$GLB,,, | Performed by: INTERNAL MEDICINE

## 2019-01-03 PROCEDURE — 99499 RISK ADDL DX/OHS AUDIT: ICD-10-PCS | Mod: HCNC,S$GLB,, | Performed by: INTERNAL MEDICINE

## 2019-01-03 PROCEDURE — 93005 ELECTROCARDIOGRAM TRACING: CPT | Mod: S$GLB,,, | Performed by: INTERNAL MEDICINE

## 2019-01-03 PROCEDURE — 93005 EKG 12-LEAD: ICD-10-PCS | Mod: S$GLB,,, | Performed by: INTERNAL MEDICINE

## 2019-01-03 RX ORDER — IRBESARTAN 150 MG/1
150 TABLET ORAL NIGHTLY
Qty: 90 TABLET | Refills: 3 | Status: SHIPPED | OUTPATIENT
Start: 2019-01-03 | End: 2019-12-16 | Stop reason: SDUPTHER

## 2019-01-03 RX ORDER — EZETIMIBE 10 MG/1
10 TABLET ORAL DAILY
Qty: 90 TABLET | Refills: 3 | Status: SHIPPED | OUTPATIENT
Start: 2019-01-03 | End: 2019-12-16 | Stop reason: SDUPTHER

## 2019-01-03 NOTE — PROGRESS NOTES
Subjective:   Patient ID:  Del Anand is a 73 y.o. male who presents for follow-up of Coronary Artery Disease      Problem List:  CAD   CABG x 2 - SVG-LAD, SVG-ramus   HTN  Hyperlipidemia   Obesity   Prior tobacco use - 1ppd x 20 yrs (quit 1987)   Pulm embolism 1/15   DVT (R) leg while on rivaroxaban     HPI:   Del Anand has been taking warfarin for prevention of venous thromboembolism and monitors his INR at home. He does not report excessive bruising, nose bleeds, melena or bleeding from other sites.   On 40 mg of rosuvastatin LDL is in the 70s. Hepatic transaminases are within normal limits.   On a low dose ACE inhibitor for hypertension.      Review of Systems   Constitution: Negative for malaise/fatigue, weight gain and weight loss.   Cardiovascular: Negative for chest pain, dyspnea on exertion, leg swelling and palpitations.   Respiratory: Negative for cough and hemoptysis.    Hematologic/Lymphatic: Does not bruise/bleed easily.   Musculoskeletal: Negative for arthritis and muscle cramps.   Gastrointestinal: Negative for abdominal pain, heartburn and melena.   Genitourinary: Negative for hematuria and nocturia.   Neurological: Negative for headaches, light-headedness and seizures.   Psychiatric/Behavioral: Negative for depression.       Current Outpatient Medications   Medication Sig    aspirin 81 MG Chew Take 81 mg by mouth once daily.    cyanocobalamin (B-12 DOTS) 500 MCG tablet Take 500 mcg by mouth every morning. Every day    FOLIC ACID/MULTIVITS-MIN/LUT (CENTRUM SILVER ORAL) Take 1 tablet by mouth once daily.    metoprolol tartrate (LOPRESSOR) 50 MG tablet TAKE 1 TABLET (50 MG TOTAL) BY MOUTH 2 (TWO) TIMES DAILY.    ramipril (ALTACE) 5 MG capsule TAKE 1 CAPSULE EVERY DAY    rosuvastatin (CRESTOR) 40 MG Tab Take 1 tablet (40 mg total) by mouth once daily.    warfarin (COUMADIN) 10 MG tablet TAKE AS DIRECTED  BY  COUMADIN  CLINIC (CURRENT DOSE IS 10MG DAILY EXCEPT 15MG ON  "MONDAY,WEDNESDAY AND FRIDAY)  (Patient taking differently: TAKE AS DIRECTED  BY  COUMADIN  CLINIC (CURRENT DOSE IS 10MG DAILY EXCEPT 15MG ON WEDNESDAY))         Social History     Tobacco Use    Smoking status: Former Smoker     Packs/day: 1.00     Years: 20.00     Pack years: 20.00     Types: Cigarettes     Last attempt to quit: 10/16/1987     Years since quittin.2    Smokeless tobacco: Former User   Substance Use Topics    Alcohol use: Yes     Alcohol/week: 0.0 oz     Types: 1 - 2 Cans of beer, 1 - 2 Standard drinks or equivalent per week     Comment: daily    Drug use: No         Objective:     Physical Exam   Constitutional: He is oriented to person, place, and time. He appears well-developed and well-nourished.   /70   Pulse 60   Ht 6' 3" (1.905 m)   Wt (!) 143.5 kg (316 lb 5.8 oz)   BMI 39.54 kg/m²      HENT:   Head: Normocephalic and atraumatic.   Neck: No JVD present. Carotid bruit is not present.   Cardiovascular: Normal rate, regular rhythm, S1 normal and S2 normal. Exam reveals no gallop.   No murmur heard.  Pulses:       Radial pulses are 2+ on the right side, and 2+ on the left side.        Posterior tibial pulses are 1+ on the right side, and 2+ on the left side.   Pulmonary/Chest: Effort normal. He has no wheezes. He has no rales. Chest wall is not dull to percussion.   Abdominal: Soft. There is no splenomegaly or hepatomegaly. There is no tenderness.   Musculoskeletal:        Right lower leg: He exhibits no edema.        Left lower leg: He exhibits no edema.   Lesion on left lower extremity.   Neurological: He is alert and oriented to person, place, and time. Gait normal.   Skin: Skin is warm and dry. No bruising noted. No cyanosis. Nails show no clubbing.   Psychiatric: He has a normal mood and affect. His speech is normal and behavior is normal. Judgment and thought content normal. Cognition and memory are normal.               Lab Results   Component Value Date    CHOL 149 " 12/28/2018    HDL 44 12/28/2018    LDLCALC 77.2 12/28/2018    TRIG 139 12/28/2018    CHOLHDL 29.5 12/28/2018     Lab Results   Component Value Date     12/28/2018    CREATININE 0.8 12/28/2018    BUN 18 12/28/2018     12/28/2018    K 5.3 (H) 12/28/2018     12/28/2018    CO2 27 12/28/2018     Lab Results   Component Value Date    ALT 26 12/28/2018    AST 30 12/28/2018    ALKPHOS 88 02/20/2018    BILITOT 0.8 02/20/2018       ECG today reviewed by me. It reveals sinus bradycardia with no ST or T abnormality. LAFB.      Assessment and Plan:     1. Coronary artery disease involving native coronary artery of native heart without angina pectoris  Stable.  Continue same meds.  - IN OFFICE EKG 12-LEAD (to Muse); Future  - EKG 12-lead; Future    2. Chronic pulmonary embolism without acute cor pulmonale  Continue warfarin and home INR monitoring.  Patient would like to switch to Q 2 weeks monitoring at home.  Will discuss with Coumadin Clinic.    3. Essential hypertension  Switch Ace inhibitor to angiotensin receptor blocker in view of association w lung cancer.  D/C ramipril.   - irbesartan (AVAPRO) 150 MG tablet; Take 1 tablet (150 mg total) by mouth every evening.  Dispense: 90 tablet; Refill: 3  - Basic metabolic panel; Future  - Basic metabolic panel; Future    4. Mixed hyperlipidemia  LDL not at goal.   Continue rosuvastatin.  Add   - ezetimibe (ZETIA) 10 mg tablet; Take 1 tablet (10 mg total) by mouth once daily.  Dispense: 90 tablet; Refill: 3  - AST (SGOT); Future  - ALT (SGPT); Future  - Lipid panel; Future    Greater than 50% of 25 minutes spent counseling.      Follow-up in about 1 year (around 1/3/2020).

## 2019-01-03 NOTE — Clinical Note
Hannae,This is the pt we spoke about on Fri. He would like to switch home INR testing from once a week to q 2 weeks.Homeyar ISABELLA Levin, [facility], FASEAssociate Director Preventive CardiologyTel: [phone]

## 2019-01-03 NOTE — PATIENT INSTRUCTIONS
Your potassium level was mildly elevated.  This may be a spurious reading but it has been mildly elevated in the past. So I recommend that you limit your intake of potassium containing foods    OJ  Bananas  Tomatoes  Fruit juices  Vegetable juices:  low sodium V8*  Salt substitutes      ============    Switch Altace AKA ramipril to Avapro AKA irbesartan.  Take at night.    ===========    Add Zetia 10 mg once a day to the rosuvastatin that you are currently taking. LDL should be <70

## 2019-01-04 ENCOUNTER — TELEPHONE (OUTPATIENT)
Dept: INTERNAL MEDICINE | Facility: CLINIC | Age: 74
End: 2019-01-04

## 2019-01-04 DIAGNOSIS — Z12.11 COLON CANCER SCREENING: Primary | ICD-10-CM

## 2019-01-07 NOTE — PROGRESS NOTES
Received note from Dr. Levin that patient asked him if he could change to testing q2 weeks. In reviewing chart, he has not been in for a meter follow up appt and recently had some erratic INRs and dose changes. He needs to be scheduled for meter f/u visit and continue weekly INRs for now. Once he has his meter f/u appt and dose is stable, we can then decide if changing to [id number] is ok.

## 2019-01-09 ENCOUNTER — ANTI-COAG VISIT (OUTPATIENT)
Dept: CARDIOLOGY | Facility: CLINIC | Age: 74
End: 2019-01-09
Payer: MEDICARE

## 2019-01-09 DIAGNOSIS — Z79.01 LONG TERM CURRENT USE OF ANTICOAGULANT THERAPY: ICD-10-CM

## 2019-01-09 LAB — INR PPP: 2.4

## 2019-01-09 PROCEDURE — G0250 PR MD REVIEW INTERPRET OF TEST: ICD-10-PCS | Mod: S$GLB,,, | Performed by: INTERNAL MEDICINE

## 2019-01-09 PROCEDURE — G0250 MD INR TEST REVIE INTER MGMT: HCPCS | Mod: S$GLB,,, | Performed by: INTERNAL MEDICINE

## 2019-01-16 ENCOUNTER — ANTI-COAG VISIT (OUTPATIENT)
Dept: CARDIOLOGY | Facility: CLINIC | Age: 74
End: 2019-01-16
Payer: MEDICARE

## 2019-01-16 ENCOUNTER — PATIENT MESSAGE (OUTPATIENT)
Dept: CARDIOLOGY | Facility: CLINIC | Age: 74
End: 2019-01-16

## 2019-01-16 DIAGNOSIS — Z79.01 LONG TERM CURRENT USE OF ANTICOAGULANT THERAPY: Primary | ICD-10-CM

## 2019-01-16 LAB — INR PPP: 2.6 (ref 2–3)

## 2019-01-16 PROCEDURE — 85610 PROTHROMBIN TIME: CPT | Mod: QW,HCNC,S$GLB, | Performed by: INTERNAL MEDICINE

## 2019-01-16 PROCEDURE — 99211 OFF/OP EST MAY X REQ PHY/QHP: CPT | Mod: 25,HCNC,S$GLB, | Performed by: INTERNAL MEDICINE

## 2019-01-16 PROCEDURE — 99211 PR OFFICE/OUTPT VISIT, EST, LEVL I: ICD-10-PCS | Mod: 25,HCNC,S$GLB, | Performed by: INTERNAL MEDICINE

## 2019-01-16 PROCEDURE — 85610 POCT INR: ICD-10-PCS | Mod: QW,HCNC,S$GLB, | Performed by: INTERNAL MEDICINE

## 2019-01-17 ENCOUNTER — PATIENT OUTREACH (OUTPATIENT)
Dept: OTHER | Facility: OTHER | Age: 74
End: 2019-01-17

## 2019-01-17 ENCOUNTER — PATIENT MESSAGE (OUTPATIENT)
Dept: CARDIOLOGY | Facility: CLINIC | Age: 74
End: 2019-01-17

## 2019-01-17 NOTE — PROGRESS NOTES
Last 5 Patient Entered Readings                                      Current 30 Day Average: 135/73     Recent Readings 1/9/2019 1/2/2019 12/27/2018 12/19/2018 12/12/2018    SBP (mmHg) 118 142 139 140 111    DBP (mmHg) 56 74 89 72 61    Pulse 61 56 79 55 66          Left voicemail.  Follow up on change from ramipril to irbesartan made by Dr. Levin ~ 1/3.

## 2019-01-21 ENCOUNTER — TELEPHONE (OUTPATIENT)
Dept: ENDOSCOPY | Facility: HOSPITAL | Age: 74
End: 2019-01-21

## 2019-01-21 DIAGNOSIS — Z12.11 SPECIAL SCREENING FOR MALIGNANT NEOPLASMS, COLON: Primary | ICD-10-CM

## 2019-01-21 RX ORDER — SODIUM, POTASSIUM,MAG SULFATES 17.5-3.13G
SOLUTION, RECONSTITUTED, ORAL ORAL
Qty: 1 KIT | Refills: 0 | Status: ON HOLD | OUTPATIENT
Start: 2019-01-21 | End: 2019-03-13 | Stop reason: HOSPADM

## 2019-01-21 NOTE — TELEPHONE ENCOUNTER
Laura Kong, PharmD   to Me  Blanquita Levin MD           1/21/19 2:55 PM   Patient has a history of multiple VTE. He may be cleared to hold warfarin x5 days but will need enoxaparin bridge. We will be in touch with patient regarding detailed instructions.     Copying cardiologist on plan. Please let us know if the date changes. Thank you!              1/21/19 10:04 AM   You routed this conversation to [facility] Coumad Provider    Me          1/21/19 10:03 AM   Note      Pt is scheduled to have a colonoscopy on 3/13/19.  Pt is on Coumadin.  Can pt hold x 3 or 5 days?  Please advise and let pt know.  Thanks

## 2019-01-21 NOTE — TELEPHONE ENCOUNTER
Pt is scheduled to have a colonoscopy on 3/13/19.  Pt is on Coumadin.  Can pt hold x 3 or 5 days?  Please advise and let pt know.  Thanks

## 2019-01-22 NOTE — PROGRESS NOTES
Patient, Del Anand (MRN #[id number]), presented with a recorded BMI of 39.54 kg/m^2 and a documented comorbidity(s):  - Hypertension  - Hyperlipidemia  to which the severe obesity is a contributing factor. This is consistent with the definition of severe obesity (BMI 35.0-39.9) with comorbidity (ICD-10 E66.01, Z68.35). The patient's severe obesity was monitored, evaluated, addressed and/or treated. This addendum to the medical record is made on 01/21/2019.

## 2019-01-30 ENCOUNTER — PATIENT MESSAGE (OUTPATIENT)
Dept: ADMINISTRATIVE | Facility: OTHER | Age: 74
End: 2019-01-30

## 2019-01-30 ENCOUNTER — ANTI-COAG VISIT (OUTPATIENT)
Dept: CARDIOLOGY | Facility: CLINIC | Age: 74
End: 2019-01-30

## 2019-01-30 DIAGNOSIS — Z79.01 LONG TERM CURRENT USE OF ANTICOAGULANT THERAPY: ICD-10-CM

## 2019-01-30 LAB — INR PPP: 2.8

## 2019-01-30 NOTE — PROGRESS NOTES
Last 5 Patient Entered Readings                                      Current 30 Day Average: 119/61     Recent Readings 1/30/2019 1/23/2019 1/18/2019 1/9/2019 1/2/2019    SBP (mmHg) 110 112 111 118 142    DBP (mmHg) 51 58 65 56 74    Pulse 62 60 61 61 56        Left voicemail.  Follow up on change from ramipril to irbesartan made by Dr. Levin ~ 1/3. BP at goal.

## 2019-02-06 ENCOUNTER — LAB VISIT (OUTPATIENT)
Dept: LAB | Facility: HOSPITAL | Age: 74
End: 2019-02-06
Attending: INTERNAL MEDICINE
Payer: MEDICARE

## 2019-02-06 DIAGNOSIS — I10 ESSENTIAL HYPERTENSION: ICD-10-CM

## 2019-02-06 LAB
ANION GAP SERPL CALC-SCNC: 5 MMOL/L
BUN SERPL-MCNC: 20 MG/DL
CALCIUM SERPL-MCNC: 9.3 MG/DL
CHLORIDE SERPL-SCNC: 105 MMOL/L
CO2 SERPL-SCNC: 28 MMOL/L
CREAT SERPL-MCNC: 0.8 MG/DL
EST. GFR  (AFRICAN AMERICAN): >60 ML/MIN/1.73 M^2
EST. GFR  (NON AFRICAN AMERICAN): >60 ML/MIN/1.73 M^2
GLUCOSE SERPL-MCNC: 133 MG/DL
POTASSIUM SERPL-SCNC: 4.6 MMOL/L
SODIUM SERPL-SCNC: 138 MMOL/L

## 2019-02-06 PROCEDURE — 36415 COLL VENOUS BLD VENIPUNCTURE: CPT | Mod: HCNC,PO

## 2019-02-06 PROCEDURE — 80048 BASIC METABOLIC PNL TOTAL CA: CPT | Mod: HCNC

## 2019-02-07 ENCOUNTER — PATIENT MESSAGE (OUTPATIENT)
Dept: CARDIOLOGY | Facility: CLINIC | Age: 74
End: 2019-02-07

## 2019-02-13 ENCOUNTER — PATIENT MESSAGE (OUTPATIENT)
Dept: CARDIOLOGY | Facility: CLINIC | Age: 74
End: 2019-02-13

## 2019-02-13 ENCOUNTER — ANTI-COAG VISIT (OUTPATIENT)
Dept: CARDIOLOGY | Facility: CLINIC | Age: 74
End: 2019-02-13

## 2019-02-13 DIAGNOSIS — R53.83 FATIGUE, UNSPECIFIED TYPE: Primary | ICD-10-CM

## 2019-02-13 DIAGNOSIS — Z79.01 LONG TERM CURRENT USE OF ANTICOAGULANT THERAPY: ICD-10-CM

## 2019-02-13 LAB — INR PPP: 2.4

## 2019-02-13 NOTE — PROGRESS NOTES
Pl tell him that the Zetia is not causing fatigue.  But he should do some additional blood tests and tell us his heart rate and BP

## 2019-02-15 ENCOUNTER — LAB VISIT (OUTPATIENT)
Dept: LAB | Facility: HOSPITAL | Age: 74
End: 2019-02-15
Attending: INTERNAL MEDICINE
Payer: MEDICARE

## 2019-02-15 DIAGNOSIS — R53.83 FATIGUE, UNSPECIFIED TYPE: ICD-10-CM

## 2019-02-15 LAB
ERYTHROCYTE [DISTWIDTH] IN BLOOD BY AUTOMATED COUNT: 12.9 %
HCT VFR BLD AUTO: 41.4 %
HGB BLD-MCNC: 13.6 G/DL
MCH RBC QN AUTO: 31.8 PG
MCHC RBC AUTO-ENTMCNC: 32.9 G/DL
MCV RBC AUTO: 97 FL
PLATELET # BLD AUTO: 170 K/UL
PMV BLD AUTO: 11.2 FL
RBC # BLD AUTO: 4.28 M/UL
TSH SERPL DL<=0.005 MIU/L-ACNC: 1.47 UIU/ML
WBC # BLD AUTO: 6.25 K/UL

## 2019-02-15 PROCEDURE — 36415 COLL VENOUS BLD VENIPUNCTURE: CPT | Mod: HCNC,PO

## 2019-02-15 PROCEDURE — 85027 COMPLETE CBC AUTOMATED: CPT | Mod: HCNC

## 2019-02-15 PROCEDURE — 84443 ASSAY THYROID STIM HORMONE: CPT | Mod: HCNC

## 2019-02-27 ENCOUNTER — ANTI-COAG VISIT (OUTPATIENT)
Dept: CARDIOLOGY | Facility: CLINIC | Age: 74
End: 2019-02-27
Payer: MEDICARE

## 2019-02-27 DIAGNOSIS — Z79.01 LONG TERM CURRENT USE OF ANTICOAGULANT THERAPY: ICD-10-CM

## 2019-02-27 LAB — INR PPP: 2.9

## 2019-02-27 PROCEDURE — G0250 MD INR TEST REVIE INTER MGMT: HCPCS | Mod: S$GLB,,, | Performed by: PHARMACIST

## 2019-02-27 PROCEDURE — G0250 PR MD REVIEW INTERPRET OF TEST: ICD-10-PCS | Mod: S$GLB,,, | Performed by: PHARMACIST

## 2019-02-27 NOTE — PROGRESS NOTES
Last 5 Patient Entered Readings                                      Current 30 Day Average: 123/68     Recent Readings 2/27/2019 2/21/2019 2/21/2019 2/13/2019 2/7/2019    SBP (mmHg) 115 141 152 124 124    DBP (mmHg) 66 77 86 67 62    Pulse 75 66 59 54 57          Left voicemail.  Follow up on change from ramipril to irbesartan made by Dr. Levin ~ 1/3. BP at goal.

## 2019-03-06 ENCOUNTER — ANTI-COAG VISIT (OUTPATIENT)
Dept: CARDIOLOGY | Facility: CLINIC | Age: 74
End: 2019-03-06

## 2019-03-06 DIAGNOSIS — Z79.01 LONG TERM CURRENT USE OF ANTICOAGULANT THERAPY: Primary | ICD-10-CM

## 2019-03-06 LAB — INR PPP: 3.2

## 2019-03-06 RX ORDER — ENOXAPARIN SODIUM 150 MG/ML
150 INJECTION SUBCUTANEOUS EVERY 12 HOURS
Qty: 14 SYRINGE | Refills: 1 | Status: SHIPPED | OUTPATIENT
Start: 2019-03-06 | End: 2019-07-17

## 2019-03-06 RX ORDER — ENOXAPARIN SODIUM 150 MG/ML
150 INJECTION SUBCUTANEOUS EVERY 12 HOURS
Qty: 14 SYRINGE | Refills: 1 | Status: SHIPPED | OUTPATIENT
Start: 2019-03-06 | End: 2019-03-06 | Stop reason: SDUPTHER

## 2019-03-06 NOTE — PROGRESS NOTES
"Patient needs bridging instructions for upcoming c-scope    Height    6'3"           Weight   144 kg          SCr     0.8 mg/dL          CrCl    >30 mL/min             Pre-Procedure Instructions:    Take last dose of warfarin on :           3/7               Start enoxaparin injections the evening of:         3/9                  Enoxaparin dose is:        150mg              Every 12 hours (Twice Daily)  Last dose of enoxaparin will be the morning of:           3/12                Post-Procedure Instructions:    Restart warfarin on        3/13                  At a dose of      Per calendar                   Unless directed otherwise by your physician    Restart lovenox injections on the morning of         3/14                Unless directed otherwise by your physician    The pt has been instructed to call the Coumadin Clinic if given different recommendations post procedure.            "

## 2019-03-07 ENCOUNTER — HOSPITAL ENCOUNTER (EMERGENCY)
Facility: HOSPITAL | Age: 74
Discharge: HOME | End: 2019-03-07
Attending: EMERGENCY MEDICINE
Payer: MEDICARE

## 2019-03-07 ENCOUNTER — PATIENT MESSAGE (OUTPATIENT)
Dept: INTERNAL MEDICINE | Facility: CLINIC | Age: 74
End: 2019-03-07

## 2019-03-07 VITALS
RESPIRATION RATE: 17 BRPM | DIASTOLIC BLOOD PRESSURE: 57 MMHG | SYSTOLIC BLOOD PRESSURE: 129 MMHG | HEART RATE: 65 BPM | OXYGEN SATURATION: 97 % | TEMPERATURE: 98 F

## 2019-03-07 DIAGNOSIS — M79.89 LEG SWELLING: ICD-10-CM

## 2019-03-07 DIAGNOSIS — S70.12XA CONTUSION OF LEFT THIGH, INITIAL ENCOUNTER: Primary | ICD-10-CM

## 2019-03-07 PROCEDURE — 99284 PR EMERGENCY DEPT VISIT,LEVEL IV: ICD-10-PCS | Mod: HCNC,,, | Performed by: EMERGENCY MEDICINE

## 2019-03-07 PROCEDURE — 99284 EMERGENCY DEPT VISIT MOD MDM: CPT | Mod: HCNC,,, | Performed by: EMERGENCY MEDICINE

## 2019-03-07 PROCEDURE — 99284 EMERGENCY DEPT VISIT MOD MDM: CPT | Mod: 25,HCNC

## 2019-03-07 NOTE — ED TRIAGE NOTES
"Patient states left leg hamstring "snapped" after slipping near his pool Sunday. Able to walk with out pain Mon, Tues, Wednesday. Pain began when sitting. Oxycodone 10mg PTA with no relief.  On Coumadin. Concerned about edema from knee down.   "

## 2019-03-07 NOTE — ED PROVIDER NOTES
"Encounter Date: 3/7/2019    SCRIBE #1 NOTE: I, Anna Baca, am scribing for, and in the presence of,  Dr. Gillis. I have scribed the following portions of the note - Other sections scribed: HPI, ROS, PE.       History     Chief Complaint   Patient presents with    Leg Pain     LLE pain since saturday that is worsening. pt reports difficulty walking.      Time patient was seen by the provider: 2:13 PM      The patient is a 73 y.o. male with co-morbidities including HLD, HTN, osteoarthritis, CAD on coumadin who presents to the ED with a complaint of leg pain. Patient reports while cleaning his pool 4 days ago, he slipped, "fell into a split," and heard his left hamstring, "snap like a guitar." He states he did not have any pain until today when he sat down in a chair, He states he now has mild pain with ambulating, worse with sitting and bending down and also swelling to left lower leg. He took 3 tylenols and oxycodone with no relief. Patient also reports large bruise to left posterior superior thigh, which he noticed 3 days ago. Denies fever, chills or pelvic pain.       The history is provided by the patient and medical records.     Review of patient's allergies indicates:  No Known Allergies  Past Medical History:   Diagnosis Date    Benign neoplasm of colon     Cataract     CHF (congestive heart failure) 1/13/2015    Coronary artery disease     Difficult intubation     DVT (deep venous thrombosis)     HLD (hyperlipidemia)     HTN (hypertension)     Impaired fasting glucose     Kidney stone     Metabolic syndrome     Nonspecific abnormal results of liver function study     Nonspecific findings on examination of blood     Nuclear sclerosis - Both Eyes 10/18/2013    Osteoarthrosis, unspecified whether generalized or localized, lower leg     Respiratory distress     PT STATES IT WAS "CRAP", "VERY UNCOMFORTABLE"     Past Surgical History:   Procedure Laterality Date    AORTOCORONARY " BYPASS-CABG/CABGX2 N/A 2014    Performed by [facility] Schilling [facility] at [facility] OR 2ND FLR    CARDIAC SURGERY      CORONARY ARTERY BYPASS GRAFT      2014    CYSTOSCOPY      HEART CATH WITH GRAFTS-LEFT N/A 2015    Performed by Sundar Adler MD at [facility] CATH LAB    HEART CATH-LEFT Left 11/10/2014    Performed by Baldev Prescott MD at [facility] CATH LAB    KIDNEY STONE SURGERY      KNEE ARTHROPLASTY      bilateral    renal stone      SKIN BIOPSY      VENOGRAM of RLE with poss thrombolysis Right 4/10/2015    Performed by Devin Lane MD at [facility] CATH LAB     Family History   Problem Relation Age of Onset    Heart attack Father         d. 85    Urolithiasis Father     Heart disease Father     Heart failure Father     Stroke Mother     Dementia Mother     Heart attack Maternal Grandfather         d. 65    Hypertension Paternal Grandmother     Heart attack Paternal Grandfather     Heart failure Paternal Grandfather     Heart disease Paternal Grandfather     Other Sister         bladder lift, s/p 4     No Known Problems Maternal Aunt     No Known Problems Maternal Uncle     No Known Problems Paternal Aunt     No Known Problems Paternal Uncle     Blindness Neg Hx     Cancer Neg Hx     Cataracts Neg Hx     Diabetes Neg Hx     Glaucoma Neg Hx     Macular degeneration Neg Hx     Retinal detachment Neg Hx     Strabismus Neg Hx     Thyroid disease Neg Hx     Amblyopia Neg Hx      Social History     Tobacco Use    Smoking status: Former Smoker     Packs/day: 1.00     Years: 20.00     Pack years: 20.00     Types: Cigarettes     Last attempt to quit: 10/16/1987     Years since quittin.4    Smokeless tobacco: Former User   Substance Use Topics    Alcohol use: Yes     Alcohol/week: 0.0 oz     Types: 1 - 2 Cans of beer, 1 - 2 Standard drinks or equivalent per week     Comment: daily, none today    Drug use: No     Review of Systems   Constitutional: Negative for fever.   HENT:  Negative for sore throat.    Respiratory: Negative for shortness of breath.    Cardiovascular: Negative for chest pain.   Gastrointestinal: Negative for nausea.   Genitourinary: Negative for dysuria.   Musculoskeletal: Negative for back pain.        Positive for pain and swelling to LLE   Skin: Positive for wound (bruise to left posterior superior thigh). Negative for rash.   Neurological: Negative for weakness.   Hematological: Does not bruise/bleed easily.       Physical Exam     Initial Vitals [03/07/19 1156]   BP Pulse Resp Temp SpO2   (!) 129/57 65 17 98 °F (36.7 °C) 97 %      MAP       --         Physical Exam    Nursing note and vitals reviewed.  Constitutional: He appears well-developed and well-nourished. He is not diaphoretic. No distress.   Comfortable, well appearing   HENT:   Head: Normocephalic and atraumatic.   Eyes: Conjunctivae and EOM are normal.   Neck: Normal range of motion. Neck supple.   Cardiovascular: Normal rate and regular rhythm.   No murmur heard.   DP pulse 2+   Pulmonary/Chest: Breath sounds normal. No respiratory distress.   Abdominal: Soft. He exhibits no distension. There is no tenderness. There is no guarding.   Musculoskeletal: Normal range of motion.   Left leg with full ROM at hip and knee. Large ecchymotic lesion at the posterior superior aspect of left thigh, this area is soft and easily compressible. There is no surrounding erythema, induration or warmth. 5/5 flexion and extension at the knee. Moderate swelling of the left lower leg and calf compared to right.    Neurological: He is alert and oriented to person, place, and time.   Skin: Skin is warm and dry. No rash noted.             ED Course   Procedures  Labs Reviewed - No data to display       Imaging Results          US Lower Extremity Veins Left (Final result)  Result time 03/07/19 16:13:13    Final result by Alli Car MD (03/07/19 16:13:13)                 Impression:      No evidence of deep venous thrombosis  in the left lower extremity.    Electronically signed by resident: Ankit Estrada  Date:    03/07/2019  Time:    16:05    Electronically signed by: Alli Car MD  Date:    03/07/2019  Time:    16:13             Narrative:    EXAMINATION:  US LOWER EXTREMITY VEINS LEFT    CLINICAL HISTORY:  Other specified soft tissue disorders    TECHNIQUE:  Duplex and color flow Doppler evaluation and graded compression of the left lower extremity veins was performed.    COMPARISON:  Ultrasound 04/09/2015, 01/16/2015    FINDINGS:  Left thigh veins: The common femoral, femoral, popliteal, upper greater saphenous, and deep femoral veins are patent and free of thrombus. The veins are normally compressible and have normal phasic flow and augmentation response.    Left calf veins: The visualized calf veins are patent.    Contralateral CFV: The contralateral (right) common femoral vein is patent and free of thrombus.    Miscellaneous: Compressible varicosities within the left popliteal fossa and left calf.                                 Medical Decision Making:   History:   Old Medical Records: I decided to obtain old medical records.  Initial Assessment:   74 yo m, h/o CHF, DVT on coumadin (last INR checked yesterday, was 3.2), CAD, HTN, here with left posterior thigh pain s/p mechanical fall 4 days ago    VSS and well-appearing  Large ecchymotic area to posterior thigh though no signs of infection/compartment syndrome on exam  Extension/flexion at leg 5/5  Mild swelling to lower leg  Pulses 2+ DP  Differential Diagnosis:   Hematoma - likely worsened by anticoagulation  Likely partial hamstring tear  No signs infection/compartment syndrome  Could also have concurrent DVT though suspect LE edema 2/2 dependent edema from ecchymoses/hematoma  Clinical Tests:   Radiological Study: Ordered and Reviewed  ED Management:  Ultrasound to r/o DVT though suspicion low  INR 3.2 yesterday so do not need to recheck today      4:33 PM  yanira  negative for DVT  Will d/c home, has f/u with internal medicine tomorrow  Discussed with pt that may need outpatient MRI             Scribe Attestation:   Scribe #1: I performed the above scribed service and the documentation accurately describes the services I performed. I attest to the accuracy of the note.    I, Dr. Jody Gillis, personally performed the services described in this documentation. All medical record entries made by the scribe were at my direction and in my presence.  I have reviewed the chart and agree that the record reflects my personal performance and is accurate and complete. Jody Gillis MD.  3:16 PM 03/11/2019           Clinical Impression:       ICD-10-CM ICD-9-CM   1. Contusion of left thigh, initial encounter S70.12XA 924.00   2. Leg swelling M79.89 729.81         Disposition:   Disposition: Discharged  Condition: Stable                        Jody Gillis MD  03/11/19 1257

## 2019-03-07 NOTE — ED NOTES
Patient identifiers verified and correct for Mr Anand  C/C: Left leg pain SEE NN  APPEARANCE: awake and alert in NAD.  SKIN: warm, dry and intact. Bruise to left buttock.  MUSCULOSKELETAL: Patient moving all extremities spontaneously, no obvious swelling or deformities noted. Ambulates independently.  RESPIRATORY: Denies shortness of breath.Respirations unlabored.   CARDIAC: Denies CP, 2+ distal pulses; no peripheral edema  ABDOMEN: S/ND/NT, Denies nausea  : voids spontaneously, denies difficulty  Neurologic: AAO x 4; follows commands equal strength in all extremities; denies numbness/tingling. Denies dizziness Denies weakness, waylon lower ext pale colored, positive sensation, mvmt and pedal pulses.

## 2019-03-08 ENCOUNTER — OFFICE VISIT (OUTPATIENT)
Dept: INTERNAL MEDICINE | Facility: CLINIC | Age: 74
End: 2019-03-08
Payer: MEDICARE

## 2019-03-08 VITALS
HEART RATE: 62 BPM | TEMPERATURE: 99 F | DIASTOLIC BLOOD PRESSURE: 64 MMHG | BODY MASS INDEX: 39.6 KG/M2 | RESPIRATION RATE: 16 BRPM | WEIGHT: 315 LBS | OXYGEN SATURATION: 96 % | SYSTOLIC BLOOD PRESSURE: 106 MMHG

## 2019-03-08 DIAGNOSIS — R22.42 LOCALIZED SWELLING, MASS AND LUMP, LEFT LOWER LIMB: ICD-10-CM

## 2019-03-08 DIAGNOSIS — S70.12XD HEMATOMA OF LEFT THIGH, SUBSEQUENT ENCOUNTER: ICD-10-CM

## 2019-03-08 DIAGNOSIS — I82.591 CHRONIC DEEP VEIN THROMBOSIS (DVT) OF OTHER VEIN OF RIGHT LOWER EXTREMITY: ICD-10-CM

## 2019-03-08 DIAGNOSIS — S76.219A: Primary | ICD-10-CM

## 2019-03-08 PROCEDURE — 99213 PR OFFICE/OUTPT VISIT, EST, LEVL III, 20-29 MIN: ICD-10-PCS | Mod: HCNC,S$GLB,, | Performed by: INTERNAL MEDICINE

## 2019-03-08 PROCEDURE — 3074F PR MOST RECENT SYSTOLIC BLOOD PRESSURE < 130 MM HG: ICD-10-PCS | Mod: HCNC,CPTII,S$GLB, | Performed by: INTERNAL MEDICINE

## 2019-03-08 PROCEDURE — 99999 PR PBB SHADOW E&M-EST. PATIENT-LVL III: ICD-10-PCS | Mod: PBBFAC,HCNC,, | Performed by: INTERNAL MEDICINE

## 2019-03-08 PROCEDURE — 99999 PR PBB SHADOW E&M-EST. PATIENT-LVL III: CPT | Mod: PBBFAC,HCNC,, | Performed by: INTERNAL MEDICINE

## 2019-03-08 PROCEDURE — 3074F SYST BP LT 130 MM HG: CPT | Mod: HCNC,CPTII,S$GLB, | Performed by: INTERNAL MEDICINE

## 2019-03-08 PROCEDURE — 1101F PR PT FALLS ASSESS DOC 0-1 FALLS W/OUT INJ PAST YR: ICD-10-PCS | Mod: HCNC,CPTII,S$GLB, | Performed by: INTERNAL MEDICINE

## 2019-03-08 PROCEDURE — 99213 OFFICE O/P EST LOW 20 MIN: CPT | Mod: HCNC,S$GLB,, | Performed by: INTERNAL MEDICINE

## 2019-03-08 PROCEDURE — 99499 UNLISTED E&M SERVICE: CPT | Mod: HCNC,S$GLB,, | Performed by: INTERNAL MEDICINE

## 2019-03-08 PROCEDURE — 1101F PT FALLS ASSESS-DOCD LE1/YR: CPT | Mod: HCNC,CPTII,S$GLB, | Performed by: INTERNAL MEDICINE

## 2019-03-08 PROCEDURE — 99499 RISK ADDL DX/OHS AUDIT: ICD-10-PCS | Mod: HCNC,S$GLB,, | Performed by: INTERNAL MEDICINE

## 2019-03-08 PROCEDURE — 3078F PR MOST RECENT DIASTOLIC BLOOD PRESSURE < 80 MM HG: ICD-10-PCS | Mod: HCNC,CPTII,S$GLB, | Performed by: INTERNAL MEDICINE

## 2019-03-08 PROCEDURE — 3078F DIAST BP <80 MM HG: CPT | Mod: HCNC,CPTII,S$GLB, | Performed by: INTERNAL MEDICINE

## 2019-03-08 RX ORDER — HYDROCODONE BITARTRATE AND ACETAMINOPHEN 10; 325 MG/1; MG/1
1 TABLET ORAL EVERY 6 HOURS PRN
Qty: 12 TABLET | Refills: 0 | Status: SHIPPED | OUTPATIENT
Start: 2019-03-08 | End: 2019-07-17

## 2019-03-08 NOTE — PROGRESS NOTES
Subjective:       Patient ID: Del Anand is a 73 y.o. male who presents for Leg Pain      Leg Pain    The incident occurred 2 days ago. The injury mechanism was a fall. The pain is present in the left thigh. The quality of the pain is described as aching. The pain is moderate. Pertinent negatives include no loss of motion, loss of sensation, numbness or tingling. The symptoms are aggravated by movement. He has tried rest and ice for the symptoms. The treatment provided no relief.      Patient with DVT on Coumadin who visited ER on 3/7/19 for left upper leg pain, US in the ER showed no clot. Pain is severe with motion and reaches inner thigh. Patient feels that movement in left leg is weaker. Has taken oxycodone for the pain.    Body mass index is 39.6 kg/m².      Review of Systems   Constitutional: Negative for chills and fever.   HENT: Negative for congestion, rhinorrhea and sinus pressure.    Respiratory: Negative for cough and shortness of breath.    Cardiovascular: Negative for chest pain and palpitations.   Gastrointestinal: Negative for abdominal pain, constipation, diarrhea, nausea and vomiting.   Genitourinary: Negative for dysuria and hematuria.   Musculoskeletal: Positive for arthralgias and gait problem. Negative for myalgias.   Skin: Positive for color change (left inner thigh bruising). Negative for rash.   Neurological: Negative for dizziness, tingling, weakness, numbness and headaches.   Hematological: Bruises/bleeds easily.       Objective:      Physical Exam   Constitutional: He is oriented to person, place, and time. He appears well-developed and well-nourished.   HENT:   Head: Normocephalic and atraumatic.   Right Ear: Hearing and external ear normal.   Left Ear: Hearing and external ear normal.   Nose: Nose normal.   Mouth/Throat: Uvula is midline and oropharynx is clear and moist.   Eyes: Pupils are equal, round, and reactive to light. Conjunctivae, EOM and lids are normal.   Neck: Full  passive range of motion without pain.   Cardiovascular: Normal rate and regular rhythm.   No murmur heard.  Pulmonary/Chest: Effort normal and breath sounds normal. He has no wheezes.   Abdominal: Soft. Bowel sounds are normal. He exhibits no distension. There is no tenderness.   Musculoskeletal: Normal range of motion. He exhibits edema (trace pitting LLE edema).        Left upper leg: He exhibits tenderness and swelling. He exhibits no bony tenderness and no laceration.   Neurological: He is alert and oriented to person, place, and time.   Skin: Skin is warm and dry. Ecchymosis (left leg) noted. No lesion and no rash noted.   Psychiatric: He has a normal mood and affect.       Assessment/Plan:       1. Strain of tendon of adductor muscle of thigh  - HYDROcodone-acetaminophen (NORCO)  mg per tablet; Take 1 tablet by mouth every 6 (six) hours as needed for Pain.  Dispense: 12 tablet; Refill: 0    2. Hematoma of left thigh, subsequent encounter  - may use ice as needed, elevate leg, rest    3. Localized swelling, mass and lump, left lower limb   - HYDROcodone-acetaminophen (NORCO)  mg per tablet; Take 1 tablet by mouth every 6 (six) hours as needed for Pain.  Dispense: 12 tablet; Refill: 0  - consider tissue US if worsens    4. Chronic deep vein thrombosis (DVT) of other vein of right lower extremity  - on chronic anticoagulation    Call if no improvement in symptoms    Aga Mejias MD

## 2019-03-11 ENCOUNTER — PATIENT MESSAGE (OUTPATIENT)
Dept: ENDOSCOPY | Facility: HOSPITAL | Age: 74
End: 2019-03-11

## 2019-03-11 ENCOUNTER — TELEPHONE (OUTPATIENT)
Dept: ENDOSCOPY | Facility: HOSPITAL | Age: 74
End: 2019-03-11

## 2019-03-13 ENCOUNTER — HOSPITAL ENCOUNTER (OUTPATIENT)
Facility: HOSPITAL | Age: 74
Discharge: HOME | End: 2019-03-13
Attending: INTERNAL MEDICINE | Admitting: INTERNAL MEDICINE
Payer: MEDICARE

## 2019-03-13 ENCOUNTER — ANESTHESIA (OUTPATIENT)
Dept: ENDOSCOPY | Facility: HOSPITAL | Age: 74
End: 2019-03-13
Payer: MEDICARE

## 2019-03-13 ENCOUNTER — ANESTHESIA EVENT (OUTPATIENT)
Dept: ENDOSCOPY | Facility: HOSPITAL | Age: 74
End: 2019-03-13
Payer: MEDICARE

## 2019-03-13 VITALS
OXYGEN SATURATION: 100 % | BODY MASS INDEX: 37.3 KG/M2 | WEIGHT: 300 LBS | TEMPERATURE: 98 F | SYSTOLIC BLOOD PRESSURE: 122 MMHG | RESPIRATION RATE: 18 BRPM | HEIGHT: 75 IN | HEART RATE: 58 BPM | DIASTOLIC BLOOD PRESSURE: 67 MMHG

## 2019-03-13 DIAGNOSIS — Z12.11 COLON CANCER SCREENING: Primary | ICD-10-CM

## 2019-03-13 PROCEDURE — 25000003 PHARM REV CODE 250: Mod: HCNC | Performed by: INTERNAL MEDICINE

## 2019-03-13 PROCEDURE — D9220A PRA ANESTHESIA: ICD-10-PCS | Mod: PT,HCNC,ANES, | Performed by: ANESTHESIOLOGY

## 2019-03-13 PROCEDURE — 63600175 PHARM REV CODE 636 W HCPCS: Mod: HCNC | Performed by: NURSE ANESTHETIST, CERTIFIED REGISTERED

## 2019-03-13 PROCEDURE — 45385 COLONOSCOPY W/LESION REMOVAL: CPT | Mod: HCNC | Performed by: INTERNAL MEDICINE

## 2019-03-13 PROCEDURE — 27201012 HC FORCEPS, HOT/COLD, DISP: Mod: HCNC | Performed by: INTERNAL MEDICINE

## 2019-03-13 PROCEDURE — 45380 COLONOSCOPY AND BIOPSY: CPT | Mod: 59,HCNC,, | Performed by: INTERNAL MEDICINE

## 2019-03-13 PROCEDURE — 88305 TISSUE SPECIMEN TO PATHOLOGY - SURGERY: ICD-10-PCS | Mod: 26,HCNC,, | Performed by: PATHOLOGY

## 2019-03-13 PROCEDURE — 45380 COLONOSCOPY AND BIOPSY: CPT | Mod: HCNC | Performed by: INTERNAL MEDICINE

## 2019-03-13 PROCEDURE — 88305 TISSUE EXAM BY PATHOLOGIST: CPT | Mod: HCNC | Performed by: PATHOLOGY

## 2019-03-13 PROCEDURE — D9220A PRA ANESTHESIA: Mod: PT,HCNC,ANES, | Performed by: ANESTHESIOLOGY

## 2019-03-13 PROCEDURE — 45385 PR COLONOSCOPY,REMV LESN,SNARE: ICD-10-PCS | Mod: PT,HCNC,GC, | Performed by: INTERNAL MEDICINE

## 2019-03-13 PROCEDURE — 88305 TISSUE EXAM BY PATHOLOGIST: CPT | Mod: 26,HCNC,, | Performed by: PATHOLOGY

## 2019-03-13 PROCEDURE — 37000008 HC ANESTHESIA 1ST 15 MINUTES: Mod: HCNC | Performed by: INTERNAL MEDICINE

## 2019-03-13 PROCEDURE — 45385 COLONOSCOPY W/LESION REMOVAL: CPT | Mod: PT,HCNC,GC, | Performed by: INTERNAL MEDICINE

## 2019-03-13 PROCEDURE — 37000009 HC ANESTHESIA EA ADD 15 MINS: Mod: HCNC | Performed by: INTERNAL MEDICINE

## 2019-03-13 PROCEDURE — 45380 PR COLONOSCOPY,BIOPSY: ICD-10-PCS | Mod: 59,HCNC,, | Performed by: INTERNAL MEDICINE

## 2019-03-13 PROCEDURE — 27201089 HC SNARE, DISP (ANY): Mod: HCNC | Performed by: INTERNAL MEDICINE

## 2019-03-13 RX ORDER — SODIUM CHLORIDE 0.9 % (FLUSH) 0.9 %
3 SYRINGE (ML) INJECTION
Status: DISCONTINUED | OUTPATIENT
Start: 2019-03-13 | End: 2019-03-13 | Stop reason: HOSPADM

## 2019-03-13 RX ORDER — PHENYLEPHRINE HYDROCHLORIDE 10 MG/ML
INJECTION INTRAVENOUS
Status: DISCONTINUED | OUTPATIENT
Start: 2019-03-13 | End: 2019-03-13

## 2019-03-13 RX ORDER — PROPOFOL 10 MG/ML
VIAL (ML) INTRAVENOUS
Status: DISCONTINUED | OUTPATIENT
Start: 2019-03-13 | End: 2019-03-13

## 2019-03-13 RX ORDER — SODIUM CHLORIDE 9 MG/ML
INJECTION, SOLUTION INTRAVENOUS CONTINUOUS
Status: DISCONTINUED | OUTPATIENT
Start: 2019-03-13 | End: 2019-03-13 | Stop reason: HOSPADM

## 2019-03-13 RX ORDER — WARFARIN 10 MG/1
10-15 TABLET ORAL DAILY
Qty: 180 TABLET | Refills: 3 | Status: SHIPPED | OUTPATIENT
Start: 2019-03-13 | End: 2020-12-20 | Stop reason: SDUPTHER

## 2019-03-13 RX ORDER — PROPOFOL 10 MG/ML
VIAL (ML) INTRAVENOUS CONTINUOUS PRN
Status: DISCONTINUED | OUTPATIENT
Start: 2019-03-13 | End: 2019-03-13

## 2019-03-13 RX ORDER — LIDOCAINE HCL/PF 100 MG/5ML
SYRINGE (ML) INTRAVENOUS
Status: DISCONTINUED | OUTPATIENT
Start: 2019-03-13 | End: 2019-03-13

## 2019-03-13 RX ADMIN — PROPOFOL 50 MG: 10 INJECTION, EMULSION INTRAVENOUS at 08:03

## 2019-03-13 RX ADMIN — PROPOFOL 20 MG: 10 INJECTION, EMULSION INTRAVENOUS at 08:03

## 2019-03-13 RX ADMIN — PROPOFOL 30 MG: 10 INJECTION, EMULSION INTRAVENOUS at 08:03

## 2019-03-13 RX ADMIN — PHENYLEPHRINE HYDROCHLORIDE 150 MCG: 10 INJECTION INTRAVENOUS at 09:03

## 2019-03-13 RX ADMIN — SODIUM CHLORIDE: 0.9 INJECTION, SOLUTION INTRAVENOUS at 08:03

## 2019-03-13 RX ADMIN — PROPOFOL 100 MCG/KG/MIN: 10 INJECTION, EMULSION INTRAVENOUS at 08:03

## 2019-03-13 RX ADMIN — LIDOCAINE HYDROCHLORIDE 100 MG: 20 INJECTION, SOLUTION INTRAVENOUS at 08:03

## 2019-03-13 NOTE — [employer] PATIENT INSTRUCTIONS
Discharge Summary/Instructions after an Endoscopic Procedure  Patient Name: Del Anand  Patient MRN: [id number]  Patient YOB: 1945  Wednesday, March 13, 2019  Nikunj Larson MD  RESTRICTIONS:  During your procedure today, you received medications for sedation.  These   medications may affect your judgment, balance and coordination.  Therefore,   for 24 hours, you have the following restrictions:   - DO NOT drive a car, operate machinery, make legal/financial decisions,   sign important papers or drink alcohol.    ACTIVITY:  Today: no heavy lifting, straining or running due to procedural   sedation/anesthesia.  The following day: return to full activity including work.  DIET:  Eat and drink normally unless instructed otherwise.     TREATMENT FOR COMMON SIDE EFFECTS:  - Mild abdominal pain, nausea, belching, bloating or excessive gas:  rest,   eat lightly and use a heating pad.  - Sore Throat: treat with throat lozenges and/or gargle with warm salt   water.  - Because air was used during the procedure, expelling large amounts of air   from your rectum or belching is normal.  - If a bowel prep was taken, you may not have a bowel movement for 1-3 days.    This is normal.  SYMPTOMS TO WATCH FOR AND REPORT TO YOUR PHYSICIAN:  1. Abdominal pain or bloating, other than gas cramps.  2. Chest pain.  3. Back pain.  4. Signs of infection such as: chills or fever occurring within 24 hours   after the procedure.  5. Rectal bleeding, which would show as bright red, maroon, or black stools.   (A tablespoon of blood from the rectum is not serious, especially if   hemorrhoids are present.)  6. Vomiting.  7. Weakness or dizziness.  GO DIRECTLY TO THE NEAREST EMERGENCY ROOM IF YOU HAVE ANY OF THE FOLLOWING:      Difficulty breathing              Chills and/or fever over 101 F   Persistent vomiting and/or vomiting blood   Severe abdominal pain   Severe chest pain   Black, tarry stools   Bleeding- more than one  tablespoon   Any other symptom or condition that you feel may need urgent attention  Your doctor recommends these additional instructions:  If any biopsies were taken, your doctors clinic will contact you in 1 to 2   weeks with any results.  - Discharge patient to home.   - Patient has a contact number available for emergencies.  The signs and   symptoms of potential delayed complications were discussed with the   patient.  Return to normal activities tomorrow.  Written discharge   instructions were provided to the patient.   - Resume previous diet.   - Continue present medications.   - Await pathology results.   - Telephone GI clinic for pathology results in 1 week.   - Repeat colonoscopy in 3 years for surveillance of multiple polyps.   - Resume Coumadin (warfarin) tomorrow and Lovenox (enoxaparin) today at   prior doses.  For questions, problems or results please call your physician - Nikunj Larson MD at Work:  [phone].  [facility], EMERGENCY ROOM PHONE NUMBER: [phone]  IF A COMPLICATION OR EMERGENCY SITUATION ARISES AND YOU ARE UNABLE TO REACH   YOUR PHYSICIAN - GO DIRECTLY TO THE EMERGENCY ROOM.  Nikunj Larson MD  3/13/2019 9:31:15 AM  This report has been verified and signed electronically.  [employer]

## 2019-03-13 NOTE — ANESTHESIA PREPROCEDURE EVALUATION
03/13/2019  Del Anand is a 73 y.o., male.    Anesthesia Evaluation    I have reviewed the Patient Summary Reports.    I have reviewed the Nursing Notes.   I have reviewed the Medications.     Review of Systems  Anesthesia Hx:  Hx of Anesthetic complications Difficult intubation; PONV History of prior surgery of interest to airway management or planning: heart surgery. Previous anesthesia: General Denies Family Hx of Anesthesia complications.  Personal Hx of Anesthesia complications, Post-Operative Nausea/Vomiting  Difficult Intubation   Social:  Former Smoker    Hematology/Oncology:  Hematology Normal   Oncology Normal     EENT/Dental:EENT/Dental Normal   Cardiovascular:   Exercise tolerance: good Hypertension CAD  CABG/stent  CHF ECG has been reviewed. Hx PE, DVT   Pulmonary:   Sleep Apnea, CPAP    Renal/:   renal calculi    Hepatic/GI:   Liver Disease, Fatty liver   Musculoskeletal:   Arthritis     Neurological:  Neurology Normal    Endocrine:  Endocrine Normal    Dermatological:  Skin Normal    Psych:  Psychiatric Normal           Physical Exam  General:  Well nourished, Morbid Obesity    Airway/Jaw/Neck:  Airway Findings: Mouth Opening: Normal Tongue: Normal  General Airway Assessment: Adult, Average, Possible difficult intubation  Mallampati: III  Improves to II with phonation.  TM Distance: Normal, at least 6 cm        Eyes/Ears/Nose:  EYES/EARS/NOSE FINDINGS: Normal   Dental:  Dental Findings: In tact, Molar caps   Chest/Lungs:  Chest/Lungs Findings: Clear to auscultation, Normal Respiratory Rate     Heart/Vascular:  Heart Findings: Rate: Normal  Rhythm: Regular Rhythm  Sounds: Normal  Heart murmur: negative Vascular Findings: Normal    Abdomen:  Abdomen Findings: Normal    Musculoskeletal:  Musculoskeletal Findings: Normal   Skin:  Skin Findings: Normal    Mental Status:  Mental Status  Findings:  Cooperative, Alert and Oriented         Anesthesia Plan  Type of Anesthesia, risks & benefits discussed:  Anesthesia Type:  general  Patient's Preference:   Intra-op Monitoring Plan: standard ASA monitors  Intra-op Monitoring Plan Comments:   Post Op Pain Control Plan:   Post Op Pain Control Plan Comments:   Induction:   IV  Beta Blocker:  Patient is on a Beta-Blocker and has received one dose within the past 24 hours (No further documentation required).       Informed Consent: Patient understands risks and agrees with Anesthesia plan.  Questions answered. Anesthesia consent signed with patient.  ASA Score: 3     Day of Surgery Review of History & Physical:            Ready For Surgery From Anesthesia Perspective.

## 2019-03-13 NOTE — PLAN OF CARE
Problem: Adult Inpatient Plan of Care  Goal: Plan of Care Review  Outcome: Outcome(s) achieved Date Met: 03/13/19    Discharge instructions  given to patient and spouse. Verbalized understanding. Patient stable, tolerating fluids. No complaints at this time.   Patient adequate for discharge once Dr. Larson comes and speaks to patient and spouse regarding results of procedure.

## 2019-03-13 NOTE — H&P
Short Stay Endoscopy History and Physical    PCP - Soo Huertas MD  Referring Physician - Amber Huertas MD  [address] [facility]  [place] [id number]    Procedure - Colonoscopy  ASA - per anesthesia  Mallampati - per anesthesia  History of Anesthesia problems - no  Family history Anesthesia problems -  no   Plan of anesthesia - General    HPI  73 y.o. male here for screening colonoscopy. Afib on coumadin, has been on hold since 3/7    Reason for procedure: Screening colonosocpy    ROS:  Constitutional: No fevers, chills, No weight loss  CV: No chest pain  Pulm: No cough, No shortness of breath  GI: see HPI    Medical History:  has a past medical history of Benign neoplasm of colon, Cataract, CHF (congestive heart failure) (1/13/2015), Coronary artery disease, Difficult intubation, DVT (deep venous thrombosis), HLD (hyperlipidemia), HTN (hypertension), Impaired fasting glucose, Kidney stone, Metabolic syndrome, Nonspecific abnormal results of liver function study, Nonspecific findings on examination of blood, Nuclear sclerosis - Both Eyes (10/18/2013), Osteoarthrosis, unspecified whether generalized or localized, lower leg, and Respiratory distress.    Surgical History:  has a past surgical history that includes Knee Arthroplasty; renal stone; Cystoscopy; Kidney stone surgery; Cardiac surgery; Skin biopsy; and Coronary artery bypass graft.    Family History: family history includes Dementia in his mother; Heart attack in his father, maternal grandfather, and paternal grandfather; Heart disease in his father and paternal grandfather; Heart failure in his father and paternal grandfather; Hypertension in his paternal grandmother; No Known Problems in his maternal aunt, maternal uncle, paternal aunt, and paternal uncle; Other in his sister; Stroke in his mother; Urolithiasis in his father., see HPI    Social History:  reports that he quit smoking about 31 years ago. His smoking use  included cigarettes. He has a 20.00 pack-year smoking history. He has quit using smokeless tobacco. He reports that he drinks alcohol. He reports that he does not use drugs.    Review of patient's allergies indicates:  No Known Allergies    Medications:   Medications Prior to Admission   Medication Sig Dispense Refill Last Dose    aspirin 81 MG Chew Take 81 mg by mouth once daily.   3/13/2019 at Unknown time    cyanocobalamin (B-12 DOTS) 500 MCG tablet Take 500 mcg by mouth every morning. Every day   3/13/2019 at Unknown time    enoxaparin (LOVENOX) 150 mg/mL Syrg Inject 1 mL (150 mg total) into the skin every 12 (twelve) hours. 14 Syringe 1 3/12/2019 at Unknown time    ezetimibe (ZETIA) 10 mg tablet Take 1 tablet (10 mg total) by mouth once daily. 90 tablet 3 3/13/2019 at Unknown time    FOLIC ACID/MULTIVITS-MIN/LUT (CENTRUM SILVER ORAL) Take 1 tablet by mouth once daily.   3/13/2019 at Unknown time    HYDROcodone-acetaminophen (NORCO)  mg per tablet Take 1 tablet by mouth every 6 (six) hours as needed for Pain. 12 tablet 0 Past Week at Unknown time    irbesartan (AVAPRO) 150 MG tablet Take 1 tablet (150 mg total) by mouth every evening. 90 tablet 3 3/12/2019 at Unknown time    metoprolol tartrate (LOPRESSOR) 50 MG tablet TAKE 1 TABLET (50 MG TOTAL) BY MOUTH 2 (TWO) TIMES DAILY. 180 tablet 3 3/13/2019 at Unknown time    rosuvastatin (CRESTOR) 40 MG Tab Take 1 tablet (40 mg total) by mouth once daily. 90 tablet 3 3/13/2019 at Unknown time    sodium,potassium,mag sulfates (SUPREP BOWEL PREP KIT) 17.5-3.13-1.6 gram SolR As directed.  Dispense 1 kit. 1 kit 0 Taking    warfarin (COUMADIN) 10 MG tablet TAKE AS DIRECTED  BY  COUMADIN  CLINIC (CURRENT DOSE IS 10MG DAILY EXCEPT 15MG ON MONDAY,WEDNESDAY AND FRIDAY)  (Patient taking differently: TAKE AS DIRECTED  BY  COUMADIN  CLINIC (CURRENT DOSE IS 10MG DAILY EXCEPT 15MG ON WEDNESDAY)) 111 tablet 3 3/7/2019       Physical Exam:    Vital Signs: There were no  vitals filed for this visit.    General Appearance: Well appearing in no acute distress  Abdomen: Soft, non tender, non distended with normal bowel sounds, no masses    Labs:  Lab Results   Component Value Date    WBC 6.25 02/15/2019    HGB 13.6 (L) 02/15/2019    HCT 41.4 02/15/2019     02/15/2019    CHOL 149 12/28/2018    TRIG 139 12/28/2018    HDL 44 12/28/2018    ALT 26 12/28/2018    AST 30 12/28/2018     02/06/2019    K 4.6 02/06/2019     02/06/2019    CREATININE 0.8 02/06/2019    BUN 20 02/06/2019    CO2 28 02/06/2019    TSH 1.471 02/15/2019    PSA 0.16 12/04/2013    INR 3.2 03/06/2019    GLUF 113 (H) 06/24/2015    HGBA1C 5.8 (H) 12/05/2017       I have explained the risks and benefits of this endoscopic procedure to the patient including but not limited to bleeding, inflammation, infection, perforation, and death.      Hayley Thornton MD

## 2019-03-13 NOTE — PROGRESS NOTES
Dr. Larson's resident at bedside explaining results of procedure to patient and spouse. All questions answered. Copy of report provided.   Patient ok for d/c at this time.

## 2019-03-13 NOTE — DISCHARGE INSTRUCTIONS
Colonoscopy     A camera attached to a flexible tube with a viewing lens is used to take video pictures.     Colonoscopy is a test to view the inside of your lower digestive tract (colon and rectum). Sometimes it can show the last part of the small intestine (ileum). During the test, small pieces of tissue may be removed for testing. This is called a biopsy. Small growths, such as polyps, may also be removed.   Why is colonoscopy done?  The test is done to help look for colon cancer. And it can help find the source of abdominal pain, bleeding, and changes in bowel habits. It may be needed once a year, depending on factors such as your:  · Age  · Health history  · Family health history  · Symptoms  · Results from any prior colonoscopy  Risks and possible complications  These include:  · Bleeding               · A puncture or tear in the colon   · Risks of anesthesia  · A cancer lesion not being seen  Getting ready   To prepare for the test:  · Talk with your healthcare provider about the risks of the test (see below). Also ask your healthcare provider about alternatives to the test.  · Tell your healthcare provider about any medicines you take. Also tell him or her about any health conditions you may have.  · Make sure your rectum and colon are empty for the test. Follow the diet and bowel prep instructions exactly. If you dont, the test may need to be rescheduled.  · Plan for a friend or family member to drive you home after the test.     Colonoscopy provides an inside view of the entire colon.     You may discuss the results with your doctor right away or at a future visit.  During the test   The test is usually done in the hospital on an outpatient basis. This means you go home the same day. The procedure takes about 30 minutes. During that time:  · You are given relaxing (sedating) medicine through an IV line. You may be drowsy, or fully asleep.  · The healthcare provider will first give you a physical exam to  check for anal and rectal problems.  · Then the anus is lubricated and the scope inserted.  · If you are awake, you may have a feeling similar to needing to have a bowel movement. You may also feel pressure as air is pumped into the colon. Its OK to pass gas during the procedure.  · Biopsy, polyp removal, or other treatments may be done during the test.  After the test   You may have gas right after the test. It can help to try to pass it to help prevent later bloating. Your healthcare provider may discuss the results with you right away. Or you may need to schedule a follow-up visit to talk about the results. After the test, you can go back to your normal eating and other activities. You may be tired from the sedation and need to rest for a few hours.  Date Last Reviewed: 11/1/2016 © 2000-2017 [employer]. [address] [place] [address], [place] [address]. All rights reserved. This information is not intended as a substitute for professional medical care. Always follow your healthcare professional's instructions.

## 2019-03-14 ENCOUNTER — TELEPHONE (OUTPATIENT)
Dept: CARDIOLOGY | Facility: CLINIC | Age: 74
End: 2019-03-14

## 2019-03-14 NOTE — ANESTHESIA POSTPROCEDURE EVALUATION
"Anesthesia Post Evaluation    Patient: Del Anand    Procedure(s) Performed: Procedure(s) (LRB):  COLONOSCOPY (N/A)    Final Anesthesia Type: general  Patient location during evaluation: PACU  Patient participation: Yes- Able to Participate  Level of consciousness: awake and alert and oriented  Post-procedure vital signs: reviewed and stable  Pain management: adequate  Airway patency: patent  PONV status at discharge: No PONV  Anesthetic complications: no      Cardiovascular status: hemodynamically stable  Respiratory status: unassisted, spontaneous ventilation and room air  Hydration status: euvolemic  Follow-up not needed.        Visit Vitals  /67   Pulse (!) 58   Temp 36.7 °C (98.1 °F) (Temporal)   Resp 18   Ht 6' 3" (1.905 m)   Wt 136.1 kg (300 lb)   SpO2 100%   BMI 37.50 kg/m²       Pain/Brigitte Score: Brigitte Score: 10 (3/13/2019 10:03 AM)        "

## 2019-03-14 NOTE — TELEPHONE ENCOUNTER
----- Message from Blanquita Levin MD sent at 3/13/2019  9:20 PM CDT -----  Blood work last month was fine. Is he taking Zetia and is he still feeling fatigued?

## 2019-03-15 ENCOUNTER — TELEPHONE (OUTPATIENT)
Dept: INTERNAL MEDICINE | Facility: CLINIC | Age: 74
End: 2019-03-15

## 2019-03-15 ENCOUNTER — PATIENT OUTREACH (OUTPATIENT)
Dept: OTHER | Facility: OTHER | Age: 74
End: 2019-03-15

## 2019-03-15 ENCOUNTER — HOSPITAL ENCOUNTER (OUTPATIENT)
Dept: RADIOLOGY | Facility: HOSPITAL | Age: 74
Discharge: HOME | End: 2019-03-15
Attending: INTERNAL MEDICINE
Payer: MEDICARE

## 2019-03-15 ENCOUNTER — PATIENT MESSAGE (OUTPATIENT)
Dept: INTERNAL MEDICINE | Facility: CLINIC | Age: 74
End: 2019-03-15

## 2019-03-15 DIAGNOSIS — S76.219A: ICD-10-CM

## 2019-03-15 DIAGNOSIS — S70.12XD HEMATOMA OF LEFT THIGH, SUBSEQUENT ENCOUNTER: ICD-10-CM

## 2019-03-15 DIAGNOSIS — R22.42 LOCALIZED SWELLING, MASS AND LUMP, LEFT LOWER LIMB: ICD-10-CM

## 2019-03-15 PROCEDURE — 93971 EXTREMITY STUDY: CPT | Mod: TC,HCNC,RT

## 2019-03-15 PROCEDURE — 93971 EXTREMITY STUDY: CPT | Mod: 26,HCNC,RT, | Performed by: RADIOLOGY

## 2019-03-15 PROCEDURE — 93971 US LOWER EXTREMITY VEINS RIGHT: ICD-10-PCS | Mod: 26,HCNC,RT, | Performed by: RADIOLOGY

## 2019-03-15 NOTE — TELEPHONE ENCOUNTER
Spoke with Kwaku from U/S at [facility].   Pt was there & pt stated that the left leg was ok & the u/s was done already on that one.   His right left was the one that was swollen today.Pt wanted right left checked for DVT's.  Mejias was gone for the day.   Spoke with Dr. Choudhary & told kwaku ok the look at right leg.

## 2019-03-15 NOTE — PROGRESS NOTES
Last 5 Patient Entered Readings                                      Current 30 Day Average: 126/71     Recent Readings 3/6/2019 2/27/2019 2/21/2019 2/21/2019 2/13/2019    SBP (mmHg) 123 115 141 152 124    DBP (mmHg) 61 66 77 86 67    Pulse 64 75 66 59 54          Digital Medicine: Health  Follow Up    Lifestyle Modifications:    1.Dietary Modifications (Sodium intake <2,000mg/day, food labels, dining out): Patient still watching his sodium intake.    2.Physical Activity: Patient reports he has not been playing golf d/t pulling a muscle in his leg.    3.Medication Therapy: Patient has been compliant with the medication regimen.    4.Patient has the following medication side effects/concerns: Patient denies any s/s of hypotension (dizziness, LH, nausea, or fatigue) with low readings. Patient reports (CP,SOB,severe headaches, or change in vision) with high readings.   (Frequency/Alleviating factors/Precipitating factors, etc.)     Follow up with Mr. Del Anand completed. No further questions or concerns.     Plan: Will continue to follow up to achieve health goals.

## 2019-03-15 NOTE — TELEPHONE ENCOUNTER
Please assist with scheduling ultrasound; order is in chart; pt requesting this to be done today if possible. Thank you

## 2019-03-16 ENCOUNTER — PATIENT MESSAGE (OUTPATIENT)
Dept: INTERNAL MEDICINE | Facility: CLINIC | Age: 74
End: 2019-03-16

## 2019-03-18 ENCOUNTER — ANTI-COAG VISIT (OUTPATIENT)
Dept: CARDIOLOGY | Facility: CLINIC | Age: 74
End: 2019-03-18

## 2019-03-18 ENCOUNTER — PATIENT MESSAGE (OUTPATIENT)
Dept: INTERNAL MEDICINE | Facility: CLINIC | Age: 74
End: 2019-03-18

## 2019-03-18 DIAGNOSIS — Z79.01 LONG TERM CURRENT USE OF ANTICOAGULANT THERAPY: ICD-10-CM

## 2019-03-18 LAB — INR PPP: 2.1

## 2019-03-18 NOTE — PROGRESS NOTES
Pt reports recent fall and ED visit on 3/7.  Has large bruise on leg/posterior thigh, followed up in ED for this on 3/7.  US negative in ED, leg swelling thought to be related to bruise/fall.  He reports that the bruise remains and now some bruising on his arm secondary to procedure.  He has been recently boosted with his coumadin dose after holding and bridging with lovenox for endoscopy last week.  We will boost him today as well as request he continue Lovenox thru that time.  Will repeat INR on Wednesday.

## 2019-03-18 NOTE — PROGRESS NOTES
Patient was advised of coumadin instructions: 3/18 -15mg, 3/19 -10mg, re test the INR 3/20 and if no call is received on 3/20 regarding the result then take 15mg -3/20, patient states understanding

## 2019-03-20 ENCOUNTER — ANTI-COAG VISIT (OUTPATIENT)
Dept: CARDIOLOGY | Facility: CLINIC | Age: 74
End: 2019-03-20

## 2019-03-20 DIAGNOSIS — Z79.01 LONG TERM CURRENT USE OF ANTICOAGULANT THERAPY: ICD-10-CM

## 2019-03-20 LAB — INR PPP: 2.4

## 2019-03-25 ENCOUNTER — PATIENT MESSAGE (OUTPATIENT)
Dept: INTERNAL MEDICINE | Facility: CLINIC | Age: 74
End: 2019-03-25

## 2019-03-25 DIAGNOSIS — D12.2 ADENOMATOUS POLYP OF ASCENDING COLON: ICD-10-CM

## 2019-03-27 ENCOUNTER — PATIENT MESSAGE (OUTPATIENT)
Dept: SLEEP MEDICINE | Facility: CLINIC | Age: 74
End: 2019-03-27

## 2019-04-03 ENCOUNTER — ANTI-COAG VISIT (OUTPATIENT)
Dept: CARDIOLOGY | Facility: CLINIC | Age: 74
End: 2019-04-03
Payer: MEDICARE

## 2019-04-03 DIAGNOSIS — Z79.01 LONG TERM CURRENT USE OF ANTICOAGULANT THERAPY: ICD-10-CM

## 2019-04-03 LAB — INR PPP: 2

## 2019-04-03 PROCEDURE — 93793 ANTICOAG MGMT PT WARFARIN: CPT | Mod: S$GLB,,, | Performed by: PHARMACIST

## 2019-04-03 PROCEDURE — 93793 PR ANTICOAGULANT MGMT FOR PT TAKING WARFARIN: ICD-10-PCS | Mod: S$GLB,,, | Performed by: PHARMACIST

## 2019-04-03 PROCEDURE — G0250 MD INR TEST REVIE INTER MGMT: HCPCS | Mod: S$GLB,,, | Performed by: INTERNAL MEDICINE

## 2019-04-03 PROCEDURE — G0250 PR MD REVIEW INTERPRET OF TEST: ICD-10-PCS | Mod: S$GLB,,, | Performed by: INTERNAL MEDICINE

## 2019-04-03 NOTE — PROGRESS NOTES
Questioned pt, confirmed taking correct dose of coumadin; also confirmed taking 15mg on 3/21  No green veggie intake but reports eating 'Cocktails nuts' recently  NO other changes

## 2019-04-03 NOTE — PROGRESS NOTES
Patient advised to take coumadin 15MG MWF; 10mg all others. Also to re test INR on 4/17. Patient verbalized understanding.

## 2019-04-08 RX ORDER — ROSUVASTATIN CALCIUM 40 MG/1
TABLET, COATED ORAL
Qty: 90 TABLET | Refills: 3 | Status: SHIPPED | OUTPATIENT
Start: 2019-04-08 | End: 2019-10-07 | Stop reason: SDUPTHER

## 2019-04-17 ENCOUNTER — OFFICE VISIT (OUTPATIENT)
Dept: SLEEP MEDICINE | Facility: CLINIC | Age: 74
End: 2019-04-17
Payer: MEDICARE

## 2019-04-17 ENCOUNTER — PATIENT MESSAGE (OUTPATIENT)
Dept: CARDIOLOGY | Facility: CLINIC | Age: 74
End: 2019-04-17

## 2019-04-17 ENCOUNTER — ANTI-COAG VISIT (OUTPATIENT)
Dept: CARDIOLOGY | Facility: CLINIC | Age: 74
End: 2019-04-17
Payer: MEDICARE

## 2019-04-17 VITALS
WEIGHT: 313.5 LBS | SYSTOLIC BLOOD PRESSURE: 128 MMHG | HEART RATE: 57 BPM | BODY MASS INDEX: 38.98 KG/M2 | DIASTOLIC BLOOD PRESSURE: 63 MMHG | HEIGHT: 75 IN

## 2019-04-17 DIAGNOSIS — Z79.01 LONG TERM CURRENT USE OF ANTICOAGULANT THERAPY: Primary | ICD-10-CM

## 2019-04-17 DIAGNOSIS — G47.33 OSA (OBSTRUCTIVE SLEEP APNEA): Primary | ICD-10-CM

## 2019-04-17 LAB — INR PPP: 2.8

## 2019-04-17 PROCEDURE — 93793 PR ANTICOAGULANT MGMT FOR PT TAKING WARFARIN: ICD-10-PCS | Mod: S$GLB,,, | Performed by: PHARMACIST

## 2019-04-17 PROCEDURE — 3074F PR MOST RECENT SYSTOLIC BLOOD PRESSURE < 130 MM HG: ICD-10-PCS | Mod: HCNC,CPTII,S$GLB, | Performed by: PSYCHIATRY & NEUROLOGY

## 2019-04-17 PROCEDURE — 3074F SYST BP LT 130 MM HG: CPT | Mod: HCNC,CPTII,S$GLB, | Performed by: PSYCHIATRY & NEUROLOGY

## 2019-04-17 PROCEDURE — 1101F PT FALLS ASSESS-DOCD LE1/YR: CPT | Mod: HCNC,CPTII,S$GLB, | Performed by: PSYCHIATRY & NEUROLOGY

## 2019-04-17 PROCEDURE — 3078F PR MOST RECENT DIASTOLIC BLOOD PRESSURE < 80 MM HG: ICD-10-PCS | Mod: HCNC,CPTII,S$GLB, | Performed by: PSYCHIATRY & NEUROLOGY

## 2019-04-17 PROCEDURE — 99204 PR OFFICE/OUTPT VISIT, NEW, LEVL IV, 45-59 MIN: ICD-10-PCS | Mod: HCNC,S$GLB,, | Performed by: PSYCHIATRY & NEUROLOGY

## 2019-04-17 PROCEDURE — 99999 PR PBB SHADOW E&M-EST. PATIENT-LVL III: CPT | Mod: PBBFAC,HCNC,, | Performed by: PSYCHIATRY & NEUROLOGY

## 2019-04-17 PROCEDURE — 99499 UNLISTED E&M SERVICE: CPT | Mod: HCNC,S$GLB,, | Performed by: PSYCHIATRY & NEUROLOGY

## 2019-04-17 PROCEDURE — 99999 PR PBB SHADOW E&M-EST. PATIENT-LVL III: ICD-10-PCS | Mod: PBBFAC,HCNC,, | Performed by: PSYCHIATRY & NEUROLOGY

## 2019-04-17 PROCEDURE — 1101F PR PT FALLS ASSESS DOC 0-1 FALLS W/OUT INJ PAST YR: ICD-10-PCS | Mod: HCNC,CPTII,S$GLB, | Performed by: PSYCHIATRY & NEUROLOGY

## 2019-04-17 PROCEDURE — 3078F DIAST BP <80 MM HG: CPT | Mod: HCNC,CPTII,S$GLB, | Performed by: PSYCHIATRY & NEUROLOGY

## 2019-04-17 PROCEDURE — 99499 RISK ADDL DX/OHS AUDIT: ICD-10-PCS | Mod: HCNC,S$GLB,, | Performed by: PSYCHIATRY & NEUROLOGY

## 2019-04-17 PROCEDURE — 93793 ANTICOAG MGMT PT WARFARIN: CPT | Mod: S$GLB,,, | Performed by: PHARMACIST

## 2019-04-17 PROCEDURE — 99204 OFFICE O/P NEW MOD 45 MIN: CPT | Mod: HCNC,S$GLB,, | Performed by: PSYCHIATRY & NEUROLOGY

## 2019-04-17 NOTE — PATIENT INSTRUCTIONS
"Consider "CPAP pillow" with cut outs  Consider CPAP liners    DME [facility] [phone] (ext [phone])- [facility]  ---------------------------------------    Access phone number: [phone]  Access address: [address] [place] [address]      "

## 2019-04-17 NOTE — PROGRESS NOTES
Del Anand  was seen at the request of  Self, Aaareferral for sleep evaluation.    03/16/2015 INITIAL HISTORY OF PRESENT ILLNESS:  Del Anand is a 74 y.o. male is here to be evaluated for a sleep disorder.       CHIEF COMPLAINT:      H/o CABG in 12/15. SOB in 1/15. Diagnosed with PE. KLovenox-> Xarelto was started. SaO2 at night with a concentrator.    The patient's complaints include fatigue, snoring,  gasping for air in sleep and interrupted sleep since  Several years back.     Denies  dry mouth and sore throat  Denies nasal congestion   Reports  morning headaches  Denies  interrupted sleep  Denies frequent leg movements  Denies symptoms concerning for parasomnia    The ESS (Clovis Sleepiness Score) taken on initial visit is 5 /24    The patient never had tonsillectomy, adenoidectomy or UPPP     05/06/2015:  He comes to discuss PSG - tolerated CPAP well. Had DVT on Xarelto- now back on Coumadin.    06/24/2015 :   Mr. Anand reports leak to the eyes from his View mask. Improved sleep continuity on CPAP. Still taking Coumadin.   Reports mask leak. No longer snoring, but has witnessed apneas some days.    CPAP pressure: 12 cm H2O  Mask comfort / fit:  View    Pressure tolerance: OK   Humidification: not using humidifier - does not  Feel like maintaining it   CPAP Interrogation:    Ave daily usage:8 hours /7days  Ave daily usage:8 hours /30days  Days >4 hours usage: 7 /7 days  Days >4 hours usage: 30/30 days   Machine condition: good     90-%tile pressure: n/a cm H2O  Large leak 0%  AHI 2.0 (5 OA noted on 6/3 on a download)    10/19/2015:  He is tolerating CPAP well. He had some throat infections (was not aware of the need to wash the hose). Still on Coumadin - will get coagulation test in December, and may come off Coumadin, although he is pretty used to it by now. Not wearing compression stockings anymore. He comes to discuss new titration.  No longer has break through snoring or pauses in his breathing  since pressure was increased to 15 cm. Has to fix his mask several times per night. ESS 8/24.     CPAP pressure: 15 cm H2O  Mask comfort / fit:  FFM    Pressure tolerance: OK   Humidification: not using    CPAP Interrogation:    Ave daily usage:7:30-8 hours /7days  Ave daily usage:24 hours /30days  Days >4 hours usage: 7 /7 days  Days >4 hours usage: 24/30 days   Machine condition: good     90-%tile pressure: na cm H2O  Large leak 0-1%  AHI 0.2    INTERVAL HISTORY:    04/17/2019  The patient has not presented any new complaints since the previous visit.   The patient reports improved sleep continuity and daytime sleepiness on PAP. ESS today is 7/24.  Denies break through snoring. No dry mouth.   Needs supplies.  Using FFM.    Not using humidifier.  No dry mouth.  Being seen by his dentist regularly.     Gained 15 lbs since 2015    EPWORTH SLEEPINESS SCALE 4/16/2019   Sitting and reading 1   Watching TV 1   Sitting, inactive in a public place (e.g. a theatre or a meeting) 0   As a passenger in a car for an hour without a break 1   Lying down to rest in the afternoon when circumstances permit 3   Sitting and talking to someone 0   Sitting quietly after a lunch without alcohol 1   In a car, while stopped for a few minutes in traffic 0   Total score 7       PHQ9 4/4/2016   Total Score 0     No flowsheet data found.          SLEEP ROUTINE 04/17/2019 :    Bed partner: wife  Time to bed: 8-9  Sleep onset latency: 1 min or less  Disruptions or awakenings: 2-3  Time to fall back into sleep: 6-7  Wakeup time: 6-7 AM   Perceived sleep quality: 6=-7 AM  Perceived total sleep time:  6-7  hours.  Daytime naps: raredly  Weekend sleep routine: till 7 AM  Exercise routine: yes     PREVIOUS SLEEP STUDIES:       SPLIT NIGHT STUDY on 3/23/15: Significant VERONICA (Obstructive Sleep Apnea) with AHI of 33.1 hour and SaO2 hyun of 61% - Chema 2 below 90% 12% of TST (weight 284 lbs). Effective control of respiratory events was reached at 12 cm  H2O CPAP.  CPAP titration on 7/30/15: Effective control of respiratory events was achieved at 15 cm of H2O. Weight 294 lbs.      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/03/2014    Obesity, Class II, BMI 35-39.9, with comorbidity 12/03/2014    Pulmonary embolism 01/17/2015    Long term current use of anticoagulant therapy 01/19/2015    Osteoarthrosis, unspecified whether generalized or localized, lower leg 01/26/2015    DVT (deep venous thrombosis) 04/09/2015    Mixed hyperlipidemia 10/15/2015    Essential hypertension 10/15/2015    VERONICA on CPAP 04/04/2016    Left-sided carotid artery disease 04/15/2016    Hematuria, gross 02/20/2018    History of squamous cell carcinoma 09/25/2018    Colon cancer screening 03/13/2019    Adenomatous polyp of ascending colon 03/25/2019     Resolved Ambulatory Problems     Diagnosis Date Noted    Impaired fasting glucose 10/20/2012    Morbid obesity 12/12/2013    Angina pectoris 10/13/2014    Morbid obesity 12/08/2014    Shortness of breath 01/12/2015    Delayed surgical wound healing 09/25/2018    Varicose veins of left lower extremity with complications 12/07/2018     Past Medical History:   Diagnosis Date    Benign neoplasm of colon     Cataract     CHF (congestive heart failure) 1/13/2015    Coronary artery disease     Difficult intubation     DVT (deep venous thrombosis)     HLD (hyperlipidemia)     HTN (hypertension)     Impaired fasting glucose     Kidney stone     Metabolic syndrome     Nonspecific abnormal results of liver function study     Nonspecific findings on examination of blood     Nuclear sclerosis -  Both Eyes 10/18/2013    Osteoarthrosis, unspecified whether generalized or localized, lower leg     Respiratory distress                 PAST SURGICAL HISTORY:    Past Surgical History:   Procedure Laterality Date    AORTOCORONARY BYPASS-CABG/CABGX2 N/A 2014    [facility] at [facility] OR 2ND FLR    CARDIAC SURGERY      COLONOSCOPY N/A 3/13/2019    Performed by Nikunj Larson MD at [facility] ENDO (2ND FLR)    CORONARY ARTERY BYPASS GRAFT      2014    CYSTOSCOPY      HEART CATH WITH GRAFTS-LEFT N/A 2015    Performed [facility] Sundar Adler [facility] at [facility] CATH LAB    HEART CATH-LEFT Left 11/10/2014    Performed by Baldev Prescott [facility] at [facility] CATH LAB    KIDNEY STONE SURGERY      KNEE ARTHROPLASTY      bilateral    renal stone      SKIN BIOPSY      VENOGRAM of RLE with poss thrombolysis Right 4/10/2015    Performed by Devin Lane MD at [facility] CATH LAB         FAMILY HISTORY:                Family History   Problem Relation Age of Onset    Heart attack Father         d. 85    Urolithiasis Father     Heart disease Father     Heart failure Father     Stroke Mother     Dementia Mother     Heart attack Maternal Grandfather         d. 65    Hypertension Paternal Grandmother     Heart attack Paternal Grandfather     Heart failure Paternal Grandfather     Heart disease Paternal Grandfather     Other Sister         bladder lift, s/p 4     No Known Problems Maternal Aunt     No Known Problems Maternal Uncle     No Known Problems Paternal Aunt     No Known Problems Paternal Uncle     Blindness Neg Hx     Cancer Neg Hx     Cataracts Neg Hx     Diabetes Neg Hx     Glaucoma Neg Hx     Macular degeneration Neg Hx     Retinal detachment Neg Hx     Strabismus Neg Hx     Thyroid disease Neg Hx     Amblyopia Neg Hx        SOCIAL HISTORY:          Tobacco:   Social History     Tobacco Use   Smoking Status Former Smoker    Packs/day: 1.00    Years: 20.00    Pack years:  20.00    Types: Cigarettes    Last attempt to quit: 10/16/1987    Years since quittin.5   Smokeless Tobacco Former User       alcohol use:    Social History     Substance and Sexual Activity   Alcohol Use Yes    Alcohol/week: 0.0 oz    Types: 1 - 2 Cans of beer, 1 - 2 Standard drinks or equivalent per week    Comment: daily, none today                 Occupation: retired    ALLERGIES:  Review of patient's allergies indicates:  No Known Allergies    CURRENT MEDICATIONS:    Current Outpatient Medications   Medication Sig Dispense Refill    aspirin 81 MG Chew Take 81 mg by mouth once daily.      cyanocobalamin (B-12 DOTS) 500 MCG tablet Take 500 mcg by mouth every morning. Every day      enoxaparin (LOVENOX) 150 mg/mL Syrg Inject 1 mL (150 mg total) into the skin every 12 (twelve) hours. 14 Syringe 1    FOLIC ACID/MULTIVITS-MIN/LUT (CENTRUM SILVER ORAL) Take 1 tablet by mouth once daily.      HYDROcodone-acetaminophen (NORCO)  mg per tablet Take 1 tablet by mouth every 6 (six) hours as needed for Pain. 12 tablet 0    irbesartan (AVAPRO) 150 MG tablet Take 1 tablet (150 mg total) by mouth every evening. 90 tablet 3    metoprolol tartrate (LOPRESSOR) 50 MG tablet TAKE 1 TABLET (50 MG TOTAL) BY MOUTH 2 (TWO) TIMES DAILY. 180 tablet 3    rosuvastatin (CRESTOR) 40 MG Tab TAKE 1 TABLET ONE TIME DAILY 90 tablet 3    warfarin (COUMADIN) 10 MG tablet Take 1-1.5 tablets (10-15 mg total) by mouth Daily. 180 tablet 3    ezetimibe (ZETIA) 10 mg tablet Take 1 tablet (10 mg total) by mouth once daily. 90 tablet 3     No current facility-administered medications for this visit.                       REVIEW OF SYSTEMS:   Sleep related symptoms as per HPI    denies weight gain  Denies dyspnea  Denies palpitations  Denies acid reflux   Denies polyuria  Reports occasional  mood diturbance  Denies  anemia  Denies  muscle pain  Denies  Gait imbalance    Otherwise, a balance of 10 systems reviewed is  "negative.    PHYSICAL EXAM:  /63 (BP Location: Left arm, Patient Position: Sitting, BP Method: Large (Automatic))   Pulse (!) 57   Ht 6' 3" (1.905 m)   Wt (!) 142.2 kg (313 lb 8 oz)   BMI 39.18 kg/m²   GENERAL: Overweight body habitus, well groomed.  HEENT:   HEENT:  Conjunctivae are non-erythematous; Pupils equal, round, and reactive to light; Nose is symmetrical; Nasal mucosa is pink and moist; Septum is midline; Inferior turbinates are normal; Nasal airflow is normal; Posterior pharynx is pink; Modified Mallampati:IV; Posterior palate is low; Tonsils not visualized; Uvula is normal and pink;Tongue is broad; Dentition is fair; No TMJ tenderness; Jaw opening and protrusion without click and without discomfort.  NECK: Supple. Neck circumference is 18.2 inches. No thyromegaly. No palpable nodes.     SKIN: On face and neck: No abrasions, no rashes, no lesions.  No subcutaneous nodules are palpable.  RESPIRATORY: Chest is clear to auscultation.  Normal chest expansion and non-labored breathing at rest.  CARDIOVASCULAR: Normal S1, S2.  No murmurs, gallops or rubs. No carotid bruits bilaterally.  No edema. No clubbing. No cyanosis.    NEURO: Oriented to time, place and person. Normal attention span and concentration. Gait normal.    PSYCH: Affect is full. Mood is normal  MUSCULOSKELETAL: Moves 4 extremities. Gait normal.         Using [facility]: Yes      ASSESSMENT:    1. Severe VERONICA with significant hypoxemia. The patient symptomatically has  snoring, excessive daytime fatigue, gasping for air in sleep and interrupted sleep  with exam findings of "a crowded oral airway and elevated body mass index. The patient has medical co-morbidities of CAD, h/o PE and CHF,hyperlipidemia and hypertension,  which can be worsened by VERONICA. This warrants treatment. Improved AHI and OA index.         PLAN:    Continue CPAP 15 cm.   Will update his supplies    Consider "CPAP pillow" with cut outs  Consider CPAP liners    DME " [facility] [phone] (ext [phone])- [facility]  ---------------------------------------      Following recommendations were given in the AVS:       Education: During our discussion today, we talked about the etiology of obstructive sleep apnea as well as the potential ramifications of untreated sleep apnea, which could include daytime sleepiness, hypertension, heart disease and/or stroke.  We discussed potential treatment options, which could include weight loss, body positioning, continuous positive airway pressure (CPAP), or referral for surgical consideration. The patient preferred CPAP option.    She should avoid ETOH and sedatives at night, as it tends to aggravate VERONICA. Regular replacement of CPAP mask, tubing and filter was recommended.    Precautions: The patient was advised to abstain from driving should he feel sleepy or drowsy.    Follow up: MD/NP after 1 month of PAP use Madelyn.    Thank you for allowing me the opportunity to participate in the care of your patient.

## 2019-04-18 RX ORDER — WARFARIN SODIUM 5 MG/1
5 TABLET ORAL DAILY
Qty: 36 TABLET | Refills: 3 | Status: SHIPPED | OUTPATIENT
Start: 2019-04-18 | End: 2022-01-03 | Stop reason: DRUGHIGH

## 2019-04-18 RX ORDER — WARFARIN SODIUM 5 MG/1
5 TABLET ORAL DAILY
COMMUNITY
End: 2019-04-18 | Stop reason: SDUPTHER

## 2019-04-18 NOTE — PROGRESS NOTES
Patient sent message as follows - I am being scheduled to take 15 mg of coumidin and I only have 10 mg tablets. I wouild like to get a supply of 5 mg tablets so I don't have the cut the 10's in half. Would you send a Rx to [facility] for a 90 day supply? That would be 36 pills., chart routed to PharmD for escript to be sent

## 2019-05-01 ENCOUNTER — ANTI-COAG VISIT (OUTPATIENT)
Dept: CARDIOLOGY | Facility: CLINIC | Age: 74
End: 2019-05-01
Payer: MEDICARE

## 2019-05-01 ENCOUNTER — PATIENT MESSAGE (OUTPATIENT)
Dept: CARDIOLOGY | Facility: CLINIC | Age: 74
End: 2019-05-01

## 2019-05-01 DIAGNOSIS — Z79.01 LONG TERM CURRENT USE OF ANTICOAGULANT THERAPY: ICD-10-CM

## 2019-05-01 LAB — INR PPP: 3.7

## 2019-05-01 PROCEDURE — 93793 ANTICOAG MGMT PT WARFARIN: CPT | Mod: S$GLB,,,

## 2019-05-01 PROCEDURE — 93793 PR ANTICOAGULANT MGMT FOR PT TAKING WARFARIN: ICD-10-PCS | Mod: S$GLB,,,

## 2019-05-01 NOTE — PROGRESS NOTES
Pt refusing to test on 5/8; states he wanted to confirmed if decreased dose will put him back therapeutic goal range. States he will be out of town next week as well, does not want to bring meter; PT request 5/15 f/u.

## 2019-05-01 NOTE — PROGRESS NOTES
Sara Patterson, Del Lundy  Looking back at my Warfarin dosing and my INR results, I wonder if my dosing for the next two weeks   is on target for the desired results. The dosing to me appears a bit on the high side.   INR     dosing prescribed for the next two weeks   2.0     Dose 170   2.8     Dose 170   3.7     Dose 167.5   Would you mind confirming this, I would have sent the request to Justo Golden but her name does   not appear in my options list for sending messages.

## 2019-05-15 ENCOUNTER — ANTI-COAG VISIT (OUTPATIENT)
Dept: CARDIOLOGY | Facility: CLINIC | Age: 74
End: 2019-05-15
Payer: MEDICARE

## 2019-05-15 ENCOUNTER — PATIENT MESSAGE (OUTPATIENT)
Dept: CARDIOLOGY | Facility: CLINIC | Age: 74
End: 2019-05-15

## 2019-05-15 DIAGNOSIS — Z79.01 LONG TERM CURRENT USE OF ANTICOAGULANT THERAPY: ICD-10-CM

## 2019-05-15 LAB — INR PPP: 4.2

## 2019-05-15 PROCEDURE — 93793 ANTICOAG MGMT PT WARFARIN: CPT | Mod: S$GLB,,, | Performed by: PHARMACIST

## 2019-05-15 PROCEDURE — 93793 PR ANTICOAGULANT MGMT FOR PT TAKING WARFARIN: ICD-10-PCS | Mod: S$GLB,,, | Performed by: PHARMACIST

## 2019-05-15 NOTE — PROGRESS NOTES
"Confirmed taking correct dose of coumadin; also patient confirmed taking correct coumadin dose 5/1.       Also, patient even reports that he consumed alcohol on 5/14  NO other changes reported. In speaking with patient, I recommended we lower his dose to 10mg daily except 15mg twice per week (Since in the recent past INR was subtherapeutic on one day of 15mg and now supra therapeutic on 3 days of 15mg.) I reminded his that his therapaeutic range is 2.5-3.5 due to his history of recurrent clots. Patient was not pleased with this dose and reports he wants to only take one day of 15mg since this dose has worked for him in the past.  We will try this dose and repeat INR as scheduled. He is pleased with this plan.  Patient was re-educated on situations that would require placing a call to the coumadin clinic, including bleeding or unusual bruising issues, changes in health, diet or medications, upcoming procedures that require warfarin interruption, and missed coumadin dose(s). Patient expressed understanding that avoidance of consistency with these parameters could cause fluctuations in INR, leading to more frequent visits and increase risk of adverse events. "This note will not be shared with the patient."        "

## 2019-05-24 ENCOUNTER — PATIENT OUTREACH (OUTPATIENT)
Dept: OTHER | Facility: OTHER | Age: 74
End: 2019-05-24

## 2019-05-24 NOTE — PROGRESS NOTES
Last 5 Patient Entered Readings                                      Current 30 Day Average: 130/68     Recent Readings 5/15/2019 5/1/2019 4/17/2019 4/3/2019 3/17/2019    SBP (mmHg) 123 137 134 127 132    DBP (mmHg) 67 69 71 69 75    Pulse 61 60 58 79 89          Digital Medicine: Health  Follow Up    Left voicemail to follow up with Mr. Del Chavez Kika.  Current BP average 130/68 mmHg is at goal, 130/80.  Patient BP is trending downward.   Will send [employer] message since I was unable to LVM on cell or home phone.   WCB in 1 mo.

## 2019-05-29 ENCOUNTER — ANTI-COAG VISIT (OUTPATIENT)
Dept: CARDIOLOGY | Facility: CLINIC | Age: 74
End: 2019-05-29
Payer: MEDICARE

## 2019-05-29 DIAGNOSIS — Z79.01 LONG TERM CURRENT USE OF ANTICOAGULANT THERAPY: ICD-10-CM

## 2019-05-29 LAB — INR PPP: 3.1

## 2019-05-29 PROCEDURE — 93793 PR ANTICOAGULANT MGMT FOR PT TAKING WARFARIN: ICD-10-PCS | Mod: S$GLB,,, | Performed by: PHARMACIST

## 2019-05-29 PROCEDURE — 93793 ANTICOAG MGMT PT WARFARIN: CPT | Mod: S$GLB,,, | Performed by: PHARMACIST

## 2019-06-06 NOTE — PROGRESS NOTES
Last 5 Patient Entered Readings                                      Current 30 Day Average: 120/71     Recent Readings 5/29/2019 5/15/2019 5/1/2019 4/17/2019 4/3/2019    SBP (mmHg) 117 123 137 134 127    DBP (mmHg) 75 67 69 71 69    Pulse 58 61 60 58 79          Left voicemail to follow up with  Del Chavez Kika.    Per 30 day average, blood pressure is well controlled 120/71 mmHg. Encouraged patient to return health 's call. Advised patient to call or message with questions or concerns. Follow up in 3-4 months.    Hypertension Medications             irbesartan (AVAPRO) 150 MG tablet Take 1 tablet (150 mg total) by mouth every evening.    metoprolol tartrate (LOPRESSOR) 50 MG tablet TAKE 1 TABLET (50 MG TOTAL) BY MOUTH 2 (TWO) TIMES DAILY.

## 2019-06-12 ENCOUNTER — ANTI-COAG VISIT (OUTPATIENT)
Dept: CARDIOLOGY | Facility: CLINIC | Age: 74
End: 2019-06-12
Payer: MEDICARE

## 2019-06-12 DIAGNOSIS — Z79.01 LONG TERM CURRENT USE OF ANTICOAGULANT THERAPY: ICD-10-CM

## 2019-06-12 LAB — INR PPP: 2.5

## 2019-06-12 PROCEDURE — 93793 ANTICOAG MGMT PT WARFARIN: CPT | Mod: S$GLB,,, | Performed by: PHARMACIST

## 2019-06-12 PROCEDURE — 93793 PR ANTICOAGULANT MGMT FOR PT TAKING WARFARIN: ICD-10-PCS | Mod: S$GLB,,, | Performed by: PHARMACIST

## 2019-06-12 NOTE — PROGRESS NOTES
INR on downward trend since last coumadin dose decrease. We will make a slight dose increase and follow up as scheduled in 2 weeks.

## 2019-06-21 ENCOUNTER — TELEPHONE (OUTPATIENT)
Dept: SLEEP MEDICINE | Facility: CLINIC | Age: 74
End: 2019-06-21

## 2019-06-21 NOTE — TELEPHONE ENCOUNTER
----- Message from Lucía Atwood, RRT sent at 6/21/2019 11:31 AM CDT -----  Hey! Mr. Anand came into [facility] today wanting to try a new nasal mask. He did not have his machine with him today to be properly fitted and his RXs on file are all for FFM. He is scheduled at [facility] on the 26th with April. Do you mind uploading a nasal RX please!

## 2019-06-21 NOTE — PROGRESS NOTES
Last 5 Patient Entered Readings                                      Current 30 Day Average: 120/73     Recent Readings 6/12/2019 5/29/2019 5/15/2019 5/1/2019 4/17/2019    SBP (mmHg) 123 117 123 137 134    DBP (mmHg) 70 75 67 69 71    Pulse 72 58 61 60 58        Digital Medicine: Health  Follow Up    Lifestyle Modifications:  Dietary Modifications (Sodium intake <2,000mg/day, food labels, dining out): deferred    Physical Activity:   Patient gets up at 6 am every morning to walk his dog and then plays golf 3x a week and then once a month on Sunday.    Medication Therapy:   Patient has been compliant with the medication regimen.    Patient has the following medication side effects/concerns: none  (Frequency/Alleviating factors/Precipitating factors, etc.)     Follow up with Mr. Del Anand completed. No further questions or concerns. Will continue to follow up to achieve health goals.    Notes:  Patient's BP is well within goal and numbers are looking good. Patient did state that he was getting a text reminder about taking BP readings because he had been taking them every other week.   Reminded patient that per protocol we need at least one reading a week to which he happily obliged. Will f/u on golf, dog walking, and get some more details on diet at next outreach.

## 2019-06-24 ENCOUNTER — TELEPHONE (OUTPATIENT)
Dept: SLEEP MEDICINE | Facility: CLINIC | Age: 74
End: 2019-06-24

## 2019-06-24 DIAGNOSIS — G47.33 OSA (OBSTRUCTIVE SLEEP APNEA): Primary | ICD-10-CM

## 2019-06-24 NOTE — TELEPHONE ENCOUNTER
Message   Received: 3 days ago   Message Contents   Lucía Atwood, RRT  P [facility] Staff   Cc: Jodee Ingram MD             Hey! Mr. Anand came into S today wanting to try a new nasal mask. He did not have his machine with him today to be properly fitted and his RXs on file are all for FFM. He is scheduled at [facility] on the 26th with April. Do you mind uploading a nasal RX please!

## 2019-06-24 NOTE — TELEPHONE ENCOUNTER
_____________________________      I will re-write his prescription for a nasal mask  _________________      ----- Message from Lucía Atwood RRT sent at 6/21/2019 11:31 AM CDT -----  Hey! Mr. Anand came into [facility] today wanting to try a new nasal mask. He did not have his machine with him today to be properly fitted and his RXs on file are all for FFM. He is scheduled at [facility] on the 26th with April. Do you mind uploading a nasal RX please!            Documentation

## 2019-06-26 ENCOUNTER — ANTI-COAG VISIT (OUTPATIENT)
Dept: CARDIOLOGY | Facility: CLINIC | Age: 74
End: 2019-06-26
Payer: MEDICARE

## 2019-06-26 DIAGNOSIS — Z79.01 LONG TERM CURRENT USE OF ANTICOAGULANT THERAPY: ICD-10-CM

## 2019-06-26 LAB — INR PPP: 4.1

## 2019-06-26 PROCEDURE — 93793 PR ANTICOAGULANT MGMT FOR PT TAKING WARFARIN: ICD-10-PCS | Mod: S$GLB,,, | Performed by: PHARMACIST

## 2019-06-26 PROCEDURE — 93793 ANTICOAG MGMT PT WARFARIN: CPT | Mod: S$GLB,,, | Performed by: PHARMACIST

## 2019-06-26 NOTE — PROGRESS NOTES
Confirmed taking correct dose of coumadin  Reports drinking a beer daily, not new  NO other changes

## 2019-07-10 ENCOUNTER — PATIENT MESSAGE (OUTPATIENT)
Dept: SLEEP MEDICINE | Facility: CLINIC | Age: 74
End: 2019-07-10

## 2019-07-10 ENCOUNTER — ANTI-COAG VISIT (OUTPATIENT)
Dept: CARDIOLOGY | Facility: CLINIC | Age: 74
End: 2019-07-10
Payer: MEDICARE

## 2019-07-10 ENCOUNTER — PATIENT MESSAGE (OUTPATIENT)
Dept: CARDIOLOGY | Facility: CLINIC | Age: 74
End: 2019-07-10

## 2019-07-10 ENCOUNTER — TELEPHONE (OUTPATIENT)
Dept: SLEEP MEDICINE | Facility: CLINIC | Age: 74
End: 2019-07-10

## 2019-07-10 DIAGNOSIS — G47.33 OSA (OBSTRUCTIVE SLEEP APNEA): Primary | ICD-10-CM

## 2019-07-10 DIAGNOSIS — Z79.01 LONG TERM CURRENT USE OF ANTICOAGULANT THERAPY: ICD-10-CM

## 2019-07-10 LAB — INR PPP: 3.5

## 2019-07-10 PROCEDURE — 93793 PR ANTICOAGULANT MGMT FOR PT TAKING WARFARIN: ICD-10-PCS | Mod: S$GLB,,, | Performed by: PHARMACIST

## 2019-07-10 PROCEDURE — 93793 ANTICOAG MGMT PT WARFARIN: CPT | Mod: S$GLB,,, | Performed by: PHARMACIST

## 2019-07-10 NOTE — TELEPHONE ENCOUNTER
Cpap Supplies   Received: Today   Message Contents   Satnam Ingram MD   Phone Number: [phone]             Patient is requesting to switch back to a FFM so please update Rx. Thanks!

## 2019-07-17 ENCOUNTER — OFFICE VISIT (OUTPATIENT)
Dept: OPTOMETRY | Facility: CLINIC | Age: 74
End: 2019-07-17
Payer: MEDICARE

## 2019-07-17 DIAGNOSIS — H25.13 NUCLEAR SCLEROSIS, BILATERAL: Primary | ICD-10-CM

## 2019-07-17 DIAGNOSIS — H52.203 ASTIGMATISM WITH PRESBYOPIA, BILATERAL: ICD-10-CM

## 2019-07-17 DIAGNOSIS — H52.4 ASTIGMATISM WITH PRESBYOPIA, BILATERAL: ICD-10-CM

## 2019-07-17 PROCEDURE — 92015 DETERMINE REFRACTIVE STATE: CPT | Mod: HCNC,S$GLB,, | Performed by: OPTOMETRIST

## 2019-07-17 PROCEDURE — 92012 INTRM OPH EXAM EST PATIENT: CPT | Mod: HCNC,S$GLB,, | Performed by: OPTOMETRIST

## 2019-07-17 PROCEDURE — 92012 PR EYE EXAM, EST PATIENT,INTERMED: ICD-10-PCS | Mod: HCNC,S$GLB,, | Performed by: OPTOMETRIST

## 2019-07-17 PROCEDURE — 92015 PR REFRACTION: ICD-10-PCS | Mod: HCNC,S$GLB,, | Performed by: OPTOMETRIST

## 2019-07-17 PROCEDURE — 99999 PR PBB SHADOW E&M-EST. PATIENT-LVL II: ICD-10-PCS | Mod: PBBFAC,HCNC,, | Performed by: OPTOMETRIST

## 2019-07-17 PROCEDURE — 99999 PR PBB SHADOW E&M-EST. PATIENT-LVL II: CPT | Mod: PBBFAC,HCNC,, | Performed by: OPTOMETRIST

## 2019-07-18 ENCOUNTER — TELEPHONE (OUTPATIENT)
Dept: WOUND CARE | Facility: CLINIC | Age: 74
End: 2019-07-18

## 2019-07-18 NOTE — TELEPHONE ENCOUNTER
----- Message from Daniel Michel sent at 7/18/2019  3:52 PM CDT -----  Contact: Pt  Needs Advice    Reason for call:The Pt would like to be seen for wound care on his ankle above his left foot. He was given an appt for 7:20 am on 7/26/19.  Please contact the Pt if you have a sooner appt since he would like to get in to have it checked out please.        Communication Preference:[phone]    Additional Information:The Pt has seen Becka Chavez for this wound before and would like an appt for the first available for this.

## 2019-07-19 ENCOUNTER — OFFICE VISIT (OUTPATIENT)
Dept: WOUND CARE | Facility: CLINIC | Age: 74
End: 2019-07-19
Payer: MEDICARE

## 2019-07-19 VITALS
BODY MASS INDEX: 38.89 KG/M2 | HEIGHT: 75 IN | WEIGHT: 312.81 LBS | SYSTOLIC BLOOD PRESSURE: 127 MMHG | TEMPERATURE: 99 F | HEART RATE: 69 BPM | DIASTOLIC BLOOD PRESSURE: 78 MMHG

## 2019-07-19 DIAGNOSIS — L97.921 ULCER OF LEFT LOWER EXTREMITY, LIMITED TO BREAKDOWN OF SKIN: ICD-10-CM

## 2019-07-19 PROBLEM — L97.901 ULCER OF LOWER EXTREMITY, LIMITED TO BREAKDOWN OF SKIN: Status: ACTIVE | Noted: 2019-07-19

## 2019-07-19 PROCEDURE — 29580 PR STRAPPING UNNA BOOT: ICD-10-PCS | Mod: HCNC,LT,S$GLB, | Performed by: NURSE PRACTITIONER

## 2019-07-19 PROCEDURE — 1101F PT FALLS ASSESS-DOCD LE1/YR: CPT | Mod: HCNC,CPTII,S$GLB, | Performed by: NURSE PRACTITIONER

## 2019-07-19 PROCEDURE — 3078F DIAST BP <80 MM HG: CPT | Mod: HCNC,CPTII,S$GLB, | Performed by: NURSE PRACTITIONER

## 2019-07-19 PROCEDURE — 3074F SYST BP LT 130 MM HG: CPT | Mod: HCNC,CPTII,S$GLB, | Performed by: NURSE PRACTITIONER

## 2019-07-19 PROCEDURE — 99999 PR PBB SHADOW E&M-EST. PATIENT-LVL IV: ICD-10-PCS | Mod: PBBFAC,HCNC,, | Performed by: NURSE PRACTITIONER

## 2019-07-19 PROCEDURE — 3074F PR MOST RECENT SYSTOLIC BLOOD PRESSURE < 130 MM HG: ICD-10-PCS | Mod: HCNC,CPTII,S$GLB, | Performed by: NURSE PRACTITIONER

## 2019-07-19 PROCEDURE — 3078F PR MOST RECENT DIASTOLIC BLOOD PRESSURE < 80 MM HG: ICD-10-PCS | Mod: HCNC,CPTII,S$GLB, | Performed by: NURSE PRACTITIONER

## 2019-07-19 PROCEDURE — 99212 PR OFFICE/OUTPT VISIT, EST, LEVL II, 10-19 MIN: ICD-10-PCS | Mod: 25,HCNC,S$GLB, | Performed by: NURSE PRACTITIONER

## 2019-07-19 PROCEDURE — 99212 OFFICE O/P EST SF 10 MIN: CPT | Mod: 25,HCNC,S$GLB, | Performed by: NURSE PRACTITIONER

## 2019-07-19 PROCEDURE — 29580 STRAPPING UNNA BOOT: CPT | Mod: HCNC,LT,S$GLB, | Performed by: NURSE PRACTITIONER

## 2019-07-19 PROCEDURE — 1101F PR PT FALLS ASSESS DOC 0-1 FALLS W/OUT INJ PAST YR: ICD-10-PCS | Mod: HCNC,CPTII,S$GLB, | Performed by: NURSE PRACTITIONER

## 2019-07-19 PROCEDURE — 99999 PR PBB SHADOW E&M-EST. PATIENT-LVL IV: CPT | Mod: PBBFAC,HCNC,, | Performed by: NURSE PRACTITIONER

## 2019-07-19 NOTE — PROGRESS NOTES
Subjective:       Patient ID: Del Anand is a 74 y.o. male.    Chief Complaint: Wound Check    This patient is well known to my service.  He is seen today for evaluation and management of a wound to the left shin.  This occurred as a result of trauma when he hit it on a ladder on 7/12/19. 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  He has been using an antibiotic ointment on the wound but no form of compression.  PMHx includes CAD, CABG 2014, HTN, DVT, PE, and kidney stones. He is on coumadin therapy for hx of PE. He has responded well to compression therapy in the past.  He is afebrile. He denies increased redness, swelling or purulent drainage.  He does not complain of pain.      Review of Systems   Constitutional: Negative.  Negative for chills, diaphoresis, fatigue and fever.   HENT: Negative.  Negative for ear pain, nosebleeds, sinus pain, sore throat and trouble swallowing.    Eyes: Positive for visual disturbance. Negative for pain and redness.   Respiratory: Negative.  Negative for cough, shortness of breath and wheezing.         Hx of PE  DVT   Cardiovascular: Negative.  Negative for chest pain, palpitations and leg swelling.        CAD  Hx of COPD   Gastrointestinal: Negative.  Negative for abdominal distention, abdominal pain, blood in stool, nausea and vomiting.   Endocrine: Negative.  Negative for polydipsia, polyphagia and polyuria.   Genitourinary: Negative.  Negative for flank pain and hematuria.   Musculoskeletal: Positive for arthralgias (Knees, hands). Negative for back pain, joint swelling and myalgias.   Skin: Negative for wound.   Allergic/Immunologic: Negative.    Neurological: Negative.  Negative for seizures, facial asymmetry, weakness and headaches.   Hematological: Negative for adenopathy. Bruises/bleeds easily.        Coumadin therapy   Psychiatric/Behavioral: Negative.  Negative for agitation, dysphoric mood and sleep disturbance. The  patient is not nervous/anxious.        Objective:      Physical Exam   Constitutional: He is oriented to person, place, and time. Vital signs are normal. He appears well-developed. No distress.   HENT:   Head: Normocephalic.   Neck: Trachea normal. Carotid bruit is not present.   Cardiovascular: Intact distal pulses.   Pulses:       Dorsalis pedis pulses are 2+ on the right side, and 2+ on the left side.        Posterior tibial pulses are 2+ on the right side, and 2+ on the left side.   Musculoskeletal: Normal range of motion.        Left lower leg: He exhibits edema.        Legs:  Neurological: He is alert and oriented to person, place, and time.   Skin: Skin is warm and dry. Capillary refill takes less than 2 seconds. He is not diaphoretic. No erythema.   Psychiatric: He has a normal mood and affect. Thought content normal.   Vitals reviewed.      Del was seen in the clinic room and placed in the supine position on the treatment table.  The right leg was cleansed with Easi-clense sponges and dried thoroughly.  Eucerin cream was applied to the lower legs. A hydrofiber dressing was applied to the wound.  The patient's foot was positioned at a 90 degree angle.  The coflex was applied per package instructions.  A two layered application was performed using a spiral technique beginning with the foam layer followed by the cohesive bandage avoiding creases or folds.  The wrap was started behind the first metatarsal and ended below the tibial tubercle of the knee.  There was overlap of each turn half the width of the previous turn.  The compression wrap will be changed every 7 days.      Assessment:       1. Ulcer of left lower extremity, limited to breakdown of skin        Plan:       Coflex compression wrap left lower leg as detailed above.  Patient was warned not to get the dressings wet and to use cast covers for showering.  Should the dressing become wet, he is to remove it, place a wet-to-dry dressing over the  wound, cover with gauze and roll gauze and use ace wraps for compression and to secure bandages.  He should then notify this office as soon as possible to have a new dressing applied.  Return to clinic in one week.

## 2019-07-24 ENCOUNTER — ANTI-COAG VISIT (OUTPATIENT)
Dept: CARDIOLOGY | Facility: CLINIC | Age: 74
End: 2019-07-24
Payer: MEDICARE

## 2019-07-24 DIAGNOSIS — Z79.01 LONG TERM CURRENT USE OF ANTICOAGULANT THERAPY: ICD-10-CM

## 2019-07-24 LAB — INR PPP: 3.8

## 2019-07-24 PROCEDURE — 93793 ANTICOAG MGMT PT WARFARIN: CPT | Mod: S$GLB,,, | Performed by: PHARMACIST

## 2019-07-24 PROCEDURE — 93793 PR ANTICOAGULANT MGMT FOR PT TAKING WARFARIN: ICD-10-PCS | Mod: S$GLB,,, | Performed by: PHARMACIST

## 2019-07-26 ENCOUNTER — OFFICE VISIT (OUTPATIENT)
Dept: WOUND CARE | Facility: CLINIC | Age: 74
End: 2019-07-26
Payer: MEDICARE

## 2019-07-26 VITALS
WEIGHT: 315 LBS | HEART RATE: 70 BPM | BODY MASS INDEX: 39.17 KG/M2 | HEIGHT: 75 IN | SYSTOLIC BLOOD PRESSURE: 127 MMHG | TEMPERATURE: 98 F | DIASTOLIC BLOOD PRESSURE: 77 MMHG

## 2019-07-26 DIAGNOSIS — L97.921 ULCER OF LEFT LOWER EXTREMITY, LIMITED TO BREAKDOWN OF SKIN: Primary | ICD-10-CM

## 2019-07-26 PROCEDURE — 99211 PR OFFICE/OUTPT VISIT, EST, LEVL I: ICD-10-PCS | Mod: HCNC,S$GLB,, | Performed by: NURSE PRACTITIONER

## 2019-07-26 PROCEDURE — 3078F PR MOST RECENT DIASTOLIC BLOOD PRESSURE < 80 MM HG: ICD-10-PCS | Mod: HCNC,CPTII,S$GLB, | Performed by: NURSE PRACTITIONER

## 2019-07-26 PROCEDURE — 3078F DIAST BP <80 MM HG: CPT | Mod: HCNC,CPTII,S$GLB, | Performed by: NURSE PRACTITIONER

## 2019-07-26 PROCEDURE — 3074F PR MOST RECENT SYSTOLIC BLOOD PRESSURE < 130 MM HG: ICD-10-PCS | Mod: HCNC,CPTII,S$GLB, | Performed by: NURSE PRACTITIONER

## 2019-07-26 PROCEDURE — 99999 PR PBB SHADOW E&M-EST. PATIENT-LVL IV: ICD-10-PCS | Mod: PBBFAC,HCNC,, | Performed by: NURSE PRACTITIONER

## 2019-07-26 PROCEDURE — 3074F SYST BP LT 130 MM HG: CPT | Mod: HCNC,CPTII,S$GLB, | Performed by: NURSE PRACTITIONER

## 2019-07-26 PROCEDURE — 99211 OFF/OP EST MAY X REQ PHY/QHP: CPT | Mod: HCNC,S$GLB,, | Performed by: NURSE PRACTITIONER

## 2019-07-26 PROCEDURE — 99999 PR PBB SHADOW E&M-EST. PATIENT-LVL IV: CPT | Mod: PBBFAC,HCNC,, | Performed by: NURSE PRACTITIONER

## 2019-07-26 NOTE — PROGRESS NOTES
Subjective:       Patient ID: Del Anand is a 74 y.o. male.    Chief Complaint: Wound Check    This patient is well known to my service.  He is seen today for evaluation and management of a wound to the left shin.  This occurred as a result of trauma when he hit it on a ladder on 7/12/19. 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  A coflex compression wrap was placed on the leg on the last visit and the wound is now healed.  PMHx includes CAD, CABG 2014, HTN, DVT, PE, and kidney stones. He is on coumadin therapy for hx of PE. He is afebrile. He denies increased redness, swelling or purulent drainage.  He does not complain of pain.      Wound Check       Review of Systems   Constitutional: Negative.  Negative for chills, diaphoresis, fatigue and fever.   HENT: Negative.  Negative for ear pain, nosebleeds, sinus pain, sore throat and trouble swallowing.    Eyes: Positive for visual disturbance. Negative for pain and redness.   Respiratory: Negative.  Negative for cough, shortness of breath and wheezing.         Hx of PE  DVT   Cardiovascular: Negative.  Negative for chest pain, palpitations and leg swelling.        CAD  Hx of COPD   Gastrointestinal: Negative.  Negative for abdominal distention, abdominal pain, blood in stool, nausea and vomiting.   Endocrine: Negative.  Negative for polydipsia, polyphagia and polyuria.   Genitourinary: Negative.  Negative for flank pain and hematuria.   Musculoskeletal: Positive for arthralgias (Knees, hands). Negative for back pain, joint swelling and myalgias.   Skin: Negative for wound.   Allergic/Immunologic: Negative.    Neurological: Negative.  Negative for seizures, facial asymmetry, weakness and headaches.   Hematological: Negative for adenopathy. Bruises/bleeds easily.        Coumadin therapy   Psychiatric/Behavioral: Negative.  Negative for agitation, dysphoric mood and sleep disturbance. The patient is not nervous/anxious.         Objective:      Physical Exam   Constitutional: He is oriented to person, place, and time. Vital signs are normal. He appears well-developed. No distress.   HENT:   Head: Normocephalic.   Neck: Trachea normal. Carotid bruit is not present.   Cardiovascular: Intact distal pulses.   Pulses:       Dorsalis pedis pulses are 2+ on the right side, and 2+ on the left side.        Posterior tibial pulses are 2+ on the right side, and 2+ on the left side.   Musculoskeletal: Normal range of motion.        Left lower leg: He exhibits edema.        Legs:  Neurological: He is alert and oriented to person, place, and time.   Skin: Skin is warm and dry. Capillary refill takes less than 2 seconds. He is not diaphoretic. No erythema.   Psychiatric: He has a normal mood and affect. Thought content normal.   Vitals reviewed.      Assessment:       1. Ulcer of left lower extremity, limited to breakdown of skin        Plan:       OTC compression hose bilateral lower legs  Return to this clinic as needed.

## 2019-07-26 NOTE — PATIENT INSTRUCTIONS
Change dressing every 3 days.  Apply compression hose first thing in the morning and remove at bedtime.

## 2019-07-29 ENCOUNTER — PATIENT MESSAGE (OUTPATIENT)
Dept: ADMINISTRATIVE | Facility: OTHER | Age: 74
End: 2019-07-29

## 2019-08-01 ENCOUNTER — PATIENT OUTREACH (OUTPATIENT)
Dept: OTHER | Facility: OTHER | Age: 74
End: 2019-08-01

## 2019-08-01 NOTE — PROGRESS NOTES
Last 5 Patient Entered Readings                                      Current 30 Day Average: 127/75     Recent Readings 8/1/2019 7/24/2019 7/20/2019 7/10/2019 7/3/2019    SBP (mmHg) 108 120 159 115 132    DBP (mmHg) 61 80 98 67 70    Pulse 63 83 76 80 70        Digital Medicine: Health  Follow Up    Lifestyle Modifications:  Dietary Modifications (Sodium intake <2,000mg/day, food labels, dining out):   Patient states that he's trying to lose weight. We talked about his diet and he eats three meals a day.   I suggested possibly taking one of those meals and breaking them down into two meals to help burn some more calories.   Patient understood and said he would give it a try    Physical Activity:   Patient still walks his dog daily and plays golf 1-2x per week and then also for 1 Sunday out of the month.     Medication Therapy:   Patient has been compliant with the medication regimen.    Patient has the following medication side effects/concerns: none  (Frequency/Alleviating factors/Precipitating factors, etc.)     Follow up with Mr. Del Anand completed. No further questions or concerns. Will continue to follow up to achieve health goals.    Notes:  Patient is doing well and BP is within goal. Will f/u in 6 weeks.

## 2019-08-07 ENCOUNTER — ANTI-COAG VISIT (OUTPATIENT)
Dept: CARDIOLOGY | Facility: CLINIC | Age: 74
End: 2019-08-07
Payer: MEDICARE

## 2019-08-07 DIAGNOSIS — Z79.01 LONG TERM CURRENT USE OF ANTICOAGULANT THERAPY: ICD-10-CM

## 2019-08-07 LAB — INR PPP: 3.9

## 2019-08-07 PROCEDURE — 93793 ANTICOAG MGMT PT WARFARIN: CPT | Mod: S$GLB,,, | Performed by: PHARMACIST

## 2019-08-07 PROCEDURE — 93793 PR ANTICOAGULANT MGMT FOR PT TAKING WARFARIN: ICD-10-PCS | Mod: S$GLB,,, | Performed by: PHARMACIST

## 2019-08-21 ENCOUNTER — HOSPITAL ENCOUNTER (OUTPATIENT)
Dept: RADIOLOGY | Facility: HOSPITAL | Age: 74
Discharge: HOME | End: 2019-08-21
Attending: INTERNAL MEDICINE
Payer: MEDICARE

## 2019-08-21 ENCOUNTER — LAB VISIT (OUTPATIENT)
Dept: LAB | Facility: HOSPITAL | Age: 74
End: 2019-08-21
Attending: INTERNAL MEDICINE
Payer: MEDICARE

## 2019-08-21 ENCOUNTER — ANTI-COAG VISIT (OUTPATIENT)
Dept: CARDIOLOGY | Facility: CLINIC | Age: 74
End: 2019-08-21
Payer: MEDICARE

## 2019-08-21 ENCOUNTER — OFFICE VISIT (OUTPATIENT)
Dept: INTERNAL MEDICINE | Facility: CLINIC | Age: 74
End: 2019-08-21
Payer: MEDICARE

## 2019-08-21 VITALS
HEART RATE: 88 BPM | SYSTOLIC BLOOD PRESSURE: 108 MMHG | DIASTOLIC BLOOD PRESSURE: 69 MMHG | WEIGHT: 309.06 LBS | BODY MASS INDEX: 38.43 KG/M2 | HEIGHT: 75 IN | RESPIRATION RATE: 16 BRPM | TEMPERATURE: 98 F

## 2019-08-21 DIAGNOSIS — R10.31 RLQ ABDOMINAL PAIN: ICD-10-CM

## 2019-08-21 DIAGNOSIS — Z79.01 LONG TERM CURRENT USE OF ANTICOAGULANT THERAPY: ICD-10-CM

## 2019-08-21 LAB
ANION GAP SERPL CALC-SCNC: 7 MMOL/L (ref 8–16)
BASOPHILS # BLD AUTO: 0.04 K/UL (ref 0–0.2)
BASOPHILS NFR BLD: 0.5 % (ref 0–1.9)
BUN SERPL-MCNC: 15 MG/DL (ref 8–23)
CALCIUM SERPL-MCNC: 9.6 MG/DL (ref 8.7–10.5)
CHLORIDE SERPL-SCNC: 105 MMOL/L (ref 95–110)
CO2 SERPL-SCNC: 28 MMOL/L (ref 23–29)
CREAT SERPL-MCNC: 0.8 MG/DL (ref 0.5–1.4)
DIFFERENTIAL METHOD: ABNORMAL
EOSINOPHIL # BLD AUTO: 0.1 K/UL (ref 0–0.5)
EOSINOPHIL NFR BLD: 0.7 % (ref 0–8)
ERYTHROCYTE [DISTWIDTH] IN BLOOD BY AUTOMATED COUNT: 13.8 % (ref 11.5–14.5)
EST. GFR  (AFRICAN AMERICAN): >60 ML/MIN/1.73 M^2
EST. GFR  (NON AFRICAN AMERICAN): >60 ML/MIN/1.73 M^2
GLUCOSE SERPL-MCNC: 127 MG/DL (ref 70–110)
HCT VFR BLD AUTO: 42.7 % (ref 40–54)
HGB BLD-MCNC: 13.5 G/DL (ref 14–18)
IMM GRANULOCYTES # BLD AUTO: 0.03 K/UL (ref 0–0.04)
IMM GRANULOCYTES NFR BLD AUTO: 0.3 % (ref 0–0.5)
INR PPP: 3
LYMPHOCYTES # BLD AUTO: 1.7 K/UL (ref 1–4.8)
LYMPHOCYTES NFR BLD: 19.1 % (ref 18–48)
MCH RBC QN AUTO: 32.1 PG (ref 27–31)
MCHC RBC AUTO-ENTMCNC: 31.6 G/DL (ref 32–36)
MCV RBC AUTO: 101 FL (ref 82–98)
MONOCYTES # BLD AUTO: 1 K/UL (ref 0.3–1)
MONOCYTES NFR BLD: 11 % (ref 4–15)
NEUTROPHILS # BLD AUTO: 6.1 K/UL (ref 1.8–7.7)
NEUTROPHILS NFR BLD: 68.4 % (ref 38–73)
NRBC BLD-RTO: 0 /100 WBC
PLATELET # BLD AUTO: 177 K/UL (ref 150–350)
PMV BLD AUTO: 11.2 FL (ref 9.2–12.9)
POTASSIUM SERPL-SCNC: 5.2 MMOL/L (ref 3.5–5.1)
PROCALCITONIN SERPL IA-MCNC: 0.02 NG/ML
RBC # BLD AUTO: 4.21 M/UL (ref 4.6–6.2)
SODIUM SERPL-SCNC: 140 MMOL/L (ref 136–145)
WBC # BLD AUTO: 8.84 K/UL (ref 3.9–12.7)

## 2019-08-21 PROCEDURE — 1101F PR PT FALLS ASSESS DOC 0-1 FALLS W/OUT INJ PAST YR: ICD-10-PCS | Mod: HCNC,CPTII,S$GLB, | Performed by: INTERNAL MEDICINE

## 2019-08-21 PROCEDURE — 99999 PR PBB SHADOW E&M-EST. PATIENT-LVL III: ICD-10-PCS | Mod: PBBFAC,HCNC,, | Performed by: INTERNAL MEDICINE

## 2019-08-21 PROCEDURE — 3078F DIAST BP <80 MM HG: CPT | Mod: HCNC,CPTII,S$GLB, | Performed by: INTERNAL MEDICINE

## 2019-08-21 PROCEDURE — 74177 CT ABDOMEN PELVIS WITH CONTRAST: ICD-10-PCS | Mod: 26,HCNC,, | Performed by: RADIOLOGY

## 2019-08-21 PROCEDURE — 84145 PROCALCITONIN (PCT): CPT | Mod: HCNC

## 2019-08-21 PROCEDURE — 25500020 PHARM REV CODE 255: Mod: HCNC | Performed by: INTERNAL MEDICINE

## 2019-08-21 PROCEDURE — 99214 OFFICE O/P EST MOD 30 MIN: CPT | Mod: HCNC,S$GLB,, | Performed by: INTERNAL MEDICINE

## 2019-08-21 PROCEDURE — 99999 PR PBB SHADOW E&M-EST. PATIENT-LVL III: CPT | Mod: PBBFAC,HCNC,, | Performed by: INTERNAL MEDICINE

## 2019-08-21 PROCEDURE — 99214 PR OFFICE/OUTPT VISIT, EST, LEVL IV, 30-39 MIN: ICD-10-PCS | Mod: HCNC,S$GLB,, | Performed by: INTERNAL MEDICINE

## 2019-08-21 PROCEDURE — 1101F PT FALLS ASSESS-DOCD LE1/YR: CPT | Mod: HCNC,CPTII,S$GLB, | Performed by: INTERNAL MEDICINE

## 2019-08-21 PROCEDURE — 85025 COMPLETE CBC W/AUTO DIFF WBC: CPT | Mod: HCNC

## 2019-08-21 PROCEDURE — 3078F PR MOST RECENT DIASTOLIC BLOOD PRESSURE < 80 MM HG: ICD-10-PCS | Mod: HCNC,CPTII,S$GLB, | Performed by: INTERNAL MEDICINE

## 2019-08-21 PROCEDURE — 74177 CT ABD & PELVIS W/CONTRAST: CPT | Mod: 26,HCNC,, | Performed by: RADIOLOGY

## 2019-08-21 PROCEDURE — 93793 ANTICOAG MGMT PT WARFARIN: CPT | Mod: S$GLB,,, | Performed by: PHARMACIST

## 2019-08-21 PROCEDURE — 93793 PR ANTICOAGULANT MGMT FOR PT TAKING WARFARIN: ICD-10-PCS | Mod: S$GLB,,, | Performed by: PHARMACIST

## 2019-08-21 PROCEDURE — 80048 BASIC METABOLIC PNL TOTAL CA: CPT | Mod: HCNC

## 2019-08-21 PROCEDURE — 36415 COLL VENOUS BLD VENIPUNCTURE: CPT | Mod: HCNC,PO

## 2019-08-21 PROCEDURE — 74177 CT ABD & PELVIS W/CONTRAST: CPT | Mod: TC,HCNC

## 2019-08-21 PROCEDURE — 3074F PR MOST RECENT SYSTOLIC BLOOD PRESSURE < 130 MM HG: ICD-10-PCS | Mod: HCNC,CPTII,S$GLB, | Performed by: INTERNAL MEDICINE

## 2019-08-21 PROCEDURE — 3074F SYST BP LT 130 MM HG: CPT | Mod: HCNC,CPTII,S$GLB, | Performed by: INTERNAL MEDICINE

## 2019-08-21 RX ORDER — HYDROCODONE BITARTRATE AND ACETAMINOPHEN 7.5; 325 MG/1; MG/1
1 TABLET ORAL EVERY 6 HOURS PRN
Qty: 30 TABLET | Refills: 0 | Status: SHIPPED | OUTPATIENT
Start: 2019-08-21 | End: 2020-01-07

## 2019-08-21 RX ADMIN — IOHEXOL 100 ML: 350 INJECTION, SOLUTION INTRAVENOUS at 02:08

## 2019-08-21 RX ADMIN — IOHEXOL 30 ML: 350 INJECTION, SOLUTION INTRAVENOUS at 12:08

## 2019-08-21 NOTE — PROGRESS NOTES
Subjective:       Patient ID: Del Anand is a 74 y.o. male.    Chief Complaint: rt side pain    HPI   Pt here for evaluation of 3 days of worsening RLQ Abdominal pain described as sharp/stabbing in nature without radiation. No fevers/chills, N/V/D/C. Movements makes it worse.  No relation to eating.   Review of Systems   Constitutional: Negative for activity change, appetite change, chills, diaphoresis, fatigue, fever and unexpected weight change.   HENT: Negative for postnasal drip, rhinorrhea, sinus pressure, sneezing, sore throat, trouble swallowing and voice change.    Respiratory: Negative for cough, shortness of breath and wheezing.    Cardiovascular: Negative for chest pain, palpitations and leg swelling.   Gastrointestinal: Positive for abdominal pain. Negative for abdominal distention, anal bleeding, blood in stool, constipation, diarrhea, nausea, rectal pain and vomiting.   Genitourinary: Negative for dysuria.   Musculoskeletal: Negative for arthralgias and myalgias.   Skin: Negative for rash and wound.   Allergic/Immunologic: Negative for environmental allergies and food allergies.   Hematological: Negative for adenopathy. Does not bruise/bleed easily.       Objective:      Physical Exam   Constitutional: He is oriented to person, place, and time. He appears well-developed and well-nourished. No distress.   HENT:   Head: Normocephalic and atraumatic.   Eyes: Pupils are equal, round, and reactive to light. Conjunctivae and EOM are normal. Right eye exhibits no discharge. Left eye exhibits no discharge. No scleral icterus.   Neck: Normal range of motion. Neck supple. No JVD present.   Cardiovascular: Normal rate, regular rhythm and normal heart sounds.   No murmur heard.  Pulmonary/Chest: Effort normal and breath sounds normal. No respiratory distress. He has no wheezes. He has no rales.   Abdominal: Soft. Normal appearance and bowel sounds are normal. There is tenderness in the right lower quadrant.  There is tenderness at McBurney's point. There is no rigidity, no rebound, no guarding, no CVA tenderness and negative Florez's sign.   Musculoskeletal: He exhibits no edema.   Lymphadenopathy:     He has no cervical adenopathy.   Neurological: He is alert and oriented to person, place, and time.   Skin: Skin is warm and dry. No rash noted. He is not diaphoretic. No pallor.       Assessment:       1. RLQ abdominal pain        Plan:    1. STAT CT(Abd/pelvis) with contrast        CBC/BMP/PCT       Rx Norco PRN pain, no NSAIDs as he is on coumadin       Pt advised to go straight to the ER if he develops fevers/chills, worsening abdominal pain, bleeding, weakness, etc.

## 2019-08-22 ENCOUNTER — PATIENT MESSAGE (OUTPATIENT)
Dept: INTERNAL MEDICINE | Facility: CLINIC | Age: 74
End: 2019-08-22

## 2019-08-22 DIAGNOSIS — R10.31 RIGHT LOWER QUADRANT ABDOMINAL PAIN: ICD-10-CM

## 2019-08-22 DIAGNOSIS — K63.89 EPIPLOIC APPENDAGITIS: Primary | ICD-10-CM

## 2019-08-28 ENCOUNTER — ANTI-COAG VISIT (OUTPATIENT)
Dept: CARDIOLOGY | Facility: CLINIC | Age: 74
End: 2019-08-28
Payer: MEDICARE

## 2019-08-28 DIAGNOSIS — Z79.01 LONG TERM CURRENT USE OF ANTICOAGULANT THERAPY: ICD-10-CM

## 2019-08-28 LAB — INR PPP: 2.3

## 2019-08-28 PROCEDURE — 93793 PR ANTICOAGULANT MGMT FOR PT TAKING WARFARIN: ICD-10-PCS | Mod: S$GLB,,, | Performed by: PHARMACIST

## 2019-08-28 PROCEDURE — 93793 ANTICOAG MGMT PT WARFARIN: CPT | Mod: S$GLB,,, | Performed by: PHARMACIST

## 2019-08-28 NOTE — PROGRESS NOTES
Patient denies any changes in diet, medications, or health that would effect warfarin therapy.  INR not at goal. Medications, chart, and patient findings reviewed. See calendar for adjustments to dose and follow up plan.

## 2019-09-11 ENCOUNTER — ANTI-COAG VISIT (OUTPATIENT)
Dept: CARDIOLOGY | Facility: CLINIC | Age: 74
End: 2019-09-11
Payer: MEDICARE

## 2019-09-11 DIAGNOSIS — Z79.01 LONG TERM CURRENT USE OF ANTICOAGULANT THERAPY: ICD-10-CM

## 2019-09-11 LAB — INR PPP: 3.1

## 2019-09-11 PROCEDURE — 93793 PR ANTICOAGULANT MGMT FOR PT TAKING WARFARIN: ICD-10-PCS | Mod: S$GLB,,, | Performed by: PHARMACIST

## 2019-09-11 PROCEDURE — 93793 ANTICOAG MGMT PT WARFARIN: CPT | Mod: S$GLB,,, | Performed by: PHARMACIST

## 2019-09-12 ENCOUNTER — PATIENT OUTREACH (OUTPATIENT)
Dept: OTHER | Facility: OTHER | Age: 74
End: 2019-09-12

## 2019-09-25 ENCOUNTER — PATIENT OUTREACH (OUTPATIENT)
Dept: OTHER | Facility: OTHER | Age: 74
End: 2019-09-25

## 2019-09-25 ENCOUNTER — ANTI-COAG VISIT (OUTPATIENT)
Dept: CARDIOLOGY | Facility: CLINIC | Age: 74
End: 2019-09-25
Payer: MEDICARE

## 2019-09-25 DIAGNOSIS — Z79.01 LONG TERM CURRENT USE OF ANTICOAGULANT THERAPY: ICD-10-CM

## 2019-09-25 LAB — INR PPP: 2.4

## 2019-09-25 PROCEDURE — 93793 PR ANTICOAGULANT MGMT FOR PT TAKING WARFARIN: ICD-10-PCS | Mod: S$GLB,,, | Performed by: PHARMACIST

## 2019-09-25 PROCEDURE — 93793 ANTICOAG MGMT PT WARFARIN: CPT | Mod: S$GLB,,, | Performed by: PHARMACIST

## 2019-09-25 NOTE — PROGRESS NOTES
Digital Medicine: Clinician Follow-Up    Patient reports charging BP cuff as advised by health     The history is provided by the patient.     Follow Up  Follow-up reason(s): reading review              Sleep Apnea  Patient previously diagnosed with VERONICA     He reports he is currently using CPAP     Medication Affordability  Patient is currently not having problems affording medications    Medication Adherence:   He misses doses: never    He does not wonder if medications are working.  He knows purpose of medications.        INTERVENTION(S)  reviewed appropriate dose schedule and encouragement/support    PLAN  patient verbalizes understanding    BP trending down since charging cuff  Reminded him to charge cuff at least monthly  Continue current regimen and monitoring        There are no preventive care reminders to display for this patient.    Last 5 Patient Entered Readings                                      Current 30 Day Average: 133/73     Recent Readings 9/25/2019 9/20/2019 9/11/2019 8/28/2019 8/21/2019    SBP (mmHg) 132 139 140 119 140    DBP (mmHg) 72 78 71 72 79    Pulse 55 66 75 79 78             Hypertension Medications             irbesartan (AVAPRO) 150 MG tablet Take 1 tablet (150 mg total) by mouth every evening.    metoprolol tartrate (LOPRESSOR) 50 MG tablet TAKE 1 TABLET (50 MG TOTAL) BY MOUTH 2 (TWO) TIMES DAILY.

## 2019-09-30 DIAGNOSIS — R35.1 NOCTURIA: ICD-10-CM

## 2019-09-30 DIAGNOSIS — I10 ESSENTIAL HYPERTENSION: Primary | ICD-10-CM

## 2019-09-30 DIAGNOSIS — E78.2 MIXED HYPERLIPIDEMIA: ICD-10-CM

## 2019-09-30 DIAGNOSIS — R73.03 PREDIABETES: ICD-10-CM

## 2019-10-07 RX ORDER — ROSUVASTATIN CALCIUM 40 MG/1
TABLET, COATED ORAL
Qty: 90 TABLET | Refills: 3 | Status: SHIPPED | OUTPATIENT
Start: 2019-10-07 | End: 2020-10-06

## 2019-10-09 ENCOUNTER — ANTI-COAG VISIT (OUTPATIENT)
Dept: CARDIOLOGY | Facility: CLINIC | Age: 74
End: 2019-10-09
Payer: MEDICARE

## 2019-10-09 DIAGNOSIS — Z79.01 LONG TERM CURRENT USE OF ANTICOAGULANT THERAPY: ICD-10-CM

## 2019-10-09 LAB — INR PPP: 3

## 2019-10-09 PROCEDURE — 93793 PR ANTICOAGULANT MGMT FOR PT TAKING WARFARIN: ICD-10-PCS | Mod: S$GLB,,, | Performed by: PHARMACIST

## 2019-10-09 PROCEDURE — 93793 ANTICOAG MGMT PT WARFARIN: CPT | Mod: S$GLB,,, | Performed by: PHARMACIST

## 2019-10-09 NOTE — PROGRESS NOTES
INR at goal. Medications and chart reviewed. No changes noted to necessitate adjustment of warfarin or follow-up plan. See calendar.    Of note, patient reports taking 12.5mg  9/25 & 10/2

## 2019-10-11 ENCOUNTER — PATIENT OUTREACH (OUTPATIENT)
Dept: ADMINISTRATIVE | Facility: OTHER | Age: 74
End: 2019-10-11

## 2019-10-11 ENCOUNTER — PATIENT OUTREACH (OUTPATIENT)
Dept: OTHER | Facility: OTHER | Age: 74
End: 2019-10-11

## 2019-10-11 NOTE — PROGRESS NOTES
Digital Medicine: Health  Follow-Up    Patient is doing well with activity and diet. BP numbers are trending down and patient's average is 2 points lower than it was at last encounter (9/12/19).  Will f/u in 4 weeks.    The history is provided by the patient.     Follow Up  Follow-up reason(s): reading review and routine education      Routine Education Topics: eating patterns            Diet:   He has the following dietary restrictions: none    Barriers to a Healthy Diet: no barriers to healthy eating    Patient states that he has made no major changes to his diet. Patient was trying to eat 4 times a day to lose weight and he forgot about doing that so he's back to 3 meals a day.    Physical Activity:   When asked if exercising, patient responded: yes    Patient participates in the following activities: walking, yard/housework and sports    He identified the following barriers to physical activity: no barriers to being active    Patient plays golf 3x a week and goes walking with his dog daily in the mornings.    Medication Adherence:       Patient identified the following reasons for non-compliance: none    Patient reports no s/s or issues taking medication as prescribed.       [facility]    Intervention/Plan    There are no preventive care reminders to display for this patient.    Last 5 Patient Entered Readings                                      Current 30 Day Average: 138/76     Recent Readings 10/9/2019 10/2/2019 10/2/2019 9/25/2019 9/20/2019    SBP (mmHg) 135 145 141 132 139    DBP (mmHg) 71 74 91 72 78    Pulse 71 53 71 55 66

## 2019-10-14 ENCOUNTER — TELEPHONE (OUTPATIENT)
Dept: CARDIOLOGY | Facility: CLINIC | Age: 74
End: 2019-10-14

## 2019-10-14 NOTE — TELEPHONE ENCOUNTER
Spoke with the patient to confirm that he has enough of his medicine Rosuvastatin 40mg. Explained to the patient that he has three refills with 90 tabs. Date signed on 10/7/19. He states he does not know why the refill was sent but he has enough.

## 2019-10-16 ENCOUNTER — OFFICE VISIT (OUTPATIENT)
Dept: OPTOMETRY | Facility: CLINIC | Age: 74
End: 2019-10-16
Payer: MEDICARE

## 2019-10-16 DIAGNOSIS — H52.4 ASTIGMATISM WITH PRESBYOPIA, BILATERAL: ICD-10-CM

## 2019-10-16 DIAGNOSIS — H52.203 ASTIGMATISM WITH PRESBYOPIA, BILATERAL: ICD-10-CM

## 2019-10-16 DIAGNOSIS — H25.13 NUCLEAR SCLEROSIS, BILATERAL: Primary | ICD-10-CM

## 2019-10-16 PROCEDURE — 99999 PR PBB SHADOW E&M-EST. PATIENT-LVL II: CPT | Mod: PBBFAC,HCNC,, | Performed by: OPTOMETRIST

## 2019-10-16 PROCEDURE — 99999 PR PBB SHADOW E&M-EST. PATIENT-LVL II: ICD-10-PCS | Mod: PBBFAC,HCNC,, | Performed by: OPTOMETRIST

## 2019-10-16 PROCEDURE — 92014 PR EYE EXAM, EST PATIENT,COMPREHESV: ICD-10-PCS | Mod: HCNC,S$GLB,, | Performed by: OPTOMETRIST

## 2019-10-16 PROCEDURE — 92014 COMPRE OPH EXAM EST PT 1/>: CPT | Mod: HCNC,S$GLB,, | Performed by: OPTOMETRIST

## 2019-10-16 NOTE — PROGRESS NOTES
KHAI CONTRERAS 07/2019  Patient had new glasses made in July and the distance is   better but the reading is not as good as his old ones.  Patient states he   had them re check at [facility] told him they were made   correctly.  Patient had old glasses made here and may be another brand of   progressives.     Last edited by Unique Quan on 10/16/2019 10:13 AM. (History)            Assessment /Plan     For exam results, see Encounter Report.    Nuclear sclerosis, bilateral    Astigmatism with presbyopia, bilateral      1. Educated pt on presence of cataracts and effects on vision. No surgery at this time. Recheck in one year.  2. Remake spec rx, increase bifocal height.

## 2019-10-18 ENCOUNTER — LAB VISIT (OUTPATIENT)
Dept: LAB | Facility: HOSPITAL | Age: 74
End: 2019-10-18
Attending: INTERNAL MEDICINE
Payer: MEDICARE

## 2019-10-18 DIAGNOSIS — E78.2 MIXED HYPERLIPIDEMIA: ICD-10-CM

## 2019-10-18 DIAGNOSIS — R73.03 PREDIABETES: ICD-10-CM

## 2019-10-18 DIAGNOSIS — I10 ESSENTIAL HYPERTENSION: ICD-10-CM

## 2019-10-18 DIAGNOSIS — R35.1 NOCTURIA: ICD-10-CM

## 2019-10-18 LAB
ALBUMIN SERPL BCP-MCNC: 3.8 G/DL (ref 3.5–5.2)
ALP SERPL-CCNC: 91 U/L (ref 55–135)
ALT SERPL W/O P-5'-P-CCNC: 33 U/L (ref 10–44)
ANION GAP SERPL CALC-SCNC: 8 MMOL/L (ref 8–16)
AST SERPL-CCNC: 31 U/L (ref 10–40)
BASOPHILS # BLD AUTO: 0.03 K/UL (ref 0–0.2)
BASOPHILS NFR BLD: 0.6 % (ref 0–1.9)
BILIRUB SERPL-MCNC: 1 MG/DL (ref 0.1–1)
BUN SERPL-MCNC: 15 MG/DL (ref 8–23)
CALCIUM SERPL-MCNC: 9.1 MG/DL (ref 8.7–10.5)
CHLORIDE SERPL-SCNC: 103 MMOL/L (ref 95–110)
CHOLEST SERPL-MCNC: 122 MG/DL (ref 120–199)
CHOLEST/HDLC SERPL: 3.1 {RATIO} (ref 2–5)
CO2 SERPL-SCNC: 28 MMOL/L (ref 23–29)
COMPLEXED PSA SERPL-MCNC: 0.12 NG/ML (ref 0–4)
CREAT SERPL-MCNC: 0.8 MG/DL (ref 0.5–1.4)
DIFFERENTIAL METHOD: ABNORMAL
EOSINOPHIL # BLD AUTO: 0.1 K/UL (ref 0–0.5)
EOSINOPHIL NFR BLD: 2.2 % (ref 0–8)
ERYTHROCYTE [DISTWIDTH] IN BLOOD BY AUTOMATED COUNT: 13.1 % (ref 11.5–14.5)
EST. GFR  (AFRICAN AMERICAN): >60 ML/MIN/1.73 M^2
EST. GFR  (NON AFRICAN AMERICAN): >60 ML/MIN/1.73 M^2
ESTIMATED AVG GLUCOSE: 120 MG/DL (ref 68–131)
GLUCOSE SERPL-MCNC: 116 MG/DL (ref 70–110)
HBA1C MFR BLD HPLC: 5.8 % (ref 4–5.6)
HCT VFR BLD AUTO: 40.8 % (ref 40–54)
HDLC SERPL-MCNC: 39 MG/DL (ref 40–75)
HDLC SERPL: 32 % (ref 20–50)
HGB BLD-MCNC: 13.4 G/DL (ref 14–18)
IMM GRANULOCYTES # BLD AUTO: 0.01 K/UL (ref 0–0.04)
IMM GRANULOCYTES NFR BLD AUTO: 0.2 % (ref 0–0.5)
LDLC SERPL CALC-MCNC: 47.6 MG/DL (ref 63–159)
LYMPHOCYTES # BLD AUTO: 1.6 K/UL (ref 1–4.8)
LYMPHOCYTES NFR BLD: 29.7 % (ref 18–48)
MCH RBC QN AUTO: 32.1 PG (ref 27–31)
MCHC RBC AUTO-ENTMCNC: 32.8 G/DL (ref 32–36)
MCV RBC AUTO: 98 FL (ref 82–98)
MONOCYTES # BLD AUTO: 0.6 K/UL (ref 0.3–1)
MONOCYTES NFR BLD: 11 % (ref 4–15)
NEUTROPHILS # BLD AUTO: 3.1 K/UL (ref 1.8–7.7)
NEUTROPHILS NFR BLD: 56.3 % (ref 38–73)
NONHDLC SERPL-MCNC: 83 MG/DL
NRBC BLD-RTO: 0 /100 WBC
PLATELET # BLD AUTO: 162 K/UL (ref 150–350)
PMV BLD AUTO: 11.6 FL (ref 9.2–12.9)
POTASSIUM SERPL-SCNC: 4.9 MMOL/L (ref 3.5–5.1)
PROT SERPL-MCNC: 6.9 G/DL (ref 6–8.4)
RBC # BLD AUTO: 4.17 M/UL (ref 4.6–6.2)
SODIUM SERPL-SCNC: 139 MMOL/L (ref 136–145)
TRIGL SERPL-MCNC: 177 MG/DL (ref 30–150)
TSH SERPL DL<=0.005 MIU/L-ACNC: 2.03 UIU/ML (ref 0.4–4)
WBC # BLD AUTO: 5.45 K/UL (ref 3.9–12.7)

## 2019-10-18 PROCEDURE — 85025 COMPLETE CBC W/AUTO DIFF WBC: CPT | Mod: HCNC

## 2019-10-18 PROCEDURE — 80053 COMPREHEN METABOLIC PANEL: CPT | Mod: HCNC

## 2019-10-18 PROCEDURE — 80061 LIPID PANEL: CPT | Mod: HCNC

## 2019-10-18 PROCEDURE — 83036 HEMOGLOBIN GLYCOSYLATED A1C: CPT | Mod: HCNC

## 2019-10-18 PROCEDURE — 84153 ASSAY OF PSA TOTAL: CPT | Mod: HCNC

## 2019-10-18 PROCEDURE — 84443 ASSAY THYROID STIM HORMONE: CPT | Mod: HCNC

## 2019-10-18 PROCEDURE — 36415 COLL VENOUS BLD VENIPUNCTURE: CPT | Mod: HCNC,PO

## 2019-10-21 ENCOUNTER — TELEPHONE (OUTPATIENT)
Dept: INTERNAL MEDICINE | Facility: CLINIC | Age: 74
End: 2019-10-21

## 2019-10-21 NOTE — TELEPHONE ENCOUNTER
----- Message from Amber Huertas MD sent at 10/18/2019  4:27 PM CDT -----  Please review your lab work and we will discuss at your pending office visit.  Amber Lebron

## 2019-10-23 ENCOUNTER — ANTI-COAG VISIT (OUTPATIENT)
Dept: CARDIOLOGY | Facility: CLINIC | Age: 74
End: 2019-10-23
Payer: MEDICARE

## 2019-10-23 DIAGNOSIS — Z79.01 LONG TERM CURRENT USE OF ANTICOAGULANT THERAPY: ICD-10-CM

## 2019-10-23 LAB — INR PPP: 3.1

## 2019-10-23 PROCEDURE — 93793 PR ANTICOAGULANT MGMT FOR PT TAKING WARFARIN: ICD-10-PCS | Mod: S$GLB,,, | Performed by: PHARMACIST

## 2019-10-23 PROCEDURE — 93793 ANTICOAG MGMT PT WARFARIN: CPT | Mod: S$GLB,,, | Performed by: PHARMACIST

## 2019-10-25 ENCOUNTER — OFFICE VISIT (OUTPATIENT)
Dept: INTERNAL MEDICINE | Facility: CLINIC | Age: 74
End: 2019-10-25
Payer: MEDICARE

## 2019-10-25 VITALS
HEART RATE: 70 BPM | DIASTOLIC BLOOD PRESSURE: 62 MMHG | RESPIRATION RATE: 16 BRPM | WEIGHT: 312.38 LBS | SYSTOLIC BLOOD PRESSURE: 100 MMHG | TEMPERATURE: 98 F | HEIGHT: 75 IN | BODY MASS INDEX: 38.84 KG/M2

## 2019-10-25 DIAGNOSIS — Z00.00 ANNUAL PHYSICAL EXAM: Primary | ICD-10-CM

## 2019-10-25 DIAGNOSIS — I25.5 ISCHEMIC CARDIOMYOPATHY: ICD-10-CM

## 2019-10-25 DIAGNOSIS — E66.01 SEVERE OBESITY (BMI 35.0-39.9) WITH COMORBIDITY: ICD-10-CM

## 2019-10-25 DIAGNOSIS — I77.9 BILATERAL CAROTID ARTERY DISEASE, UNSPECIFIED TYPE: ICD-10-CM

## 2019-10-25 DIAGNOSIS — E88.810 METABOLIC SYNDROME: ICD-10-CM

## 2019-10-25 DIAGNOSIS — I10 HYPERTENSION, UNSPECIFIED TYPE: ICD-10-CM

## 2019-10-25 DIAGNOSIS — I25.810 CORONARY ARTERY DISEASE INVOLVING CORONARY BYPASS GRAFT, ANGINA PRESENCE UNSPECIFIED, UNSPECIFIED WHETHER NATIVE OR TRANSPLANTED HEART: ICD-10-CM

## 2019-10-25 DIAGNOSIS — R73.01 IMPAIRED FASTING GLUCOSE: ICD-10-CM

## 2019-10-25 DIAGNOSIS — K76.0 FATTY LIVER: ICD-10-CM

## 2019-10-25 DIAGNOSIS — E78.5 HYPERLIPIDEMIA, UNSPECIFIED HYPERLIPIDEMIA TYPE: ICD-10-CM

## 2019-10-25 PROCEDURE — 99397 PR PREVENTIVE VISIT,EST,65 & OVER: ICD-10-PCS | Mod: HCNC,25,S$GLB, | Performed by: INTERNAL MEDICINE

## 2019-10-25 PROCEDURE — 99999 PR PBB SHADOW E&M-EST. PATIENT-LVL IV: CPT | Mod: PBBFAC,HCNC,, | Performed by: INTERNAL MEDICINE

## 2019-10-25 PROCEDURE — 3074F PR MOST RECENT SYSTOLIC BLOOD PRESSURE < 130 MM HG: ICD-10-PCS | Mod: HCNC,CPTII,S$GLB, | Performed by: INTERNAL MEDICINE

## 2019-10-25 PROCEDURE — G0008 FLU VACCINE - HIGH DOSE (65+) PRESERVATIVE FREE IM: ICD-10-PCS | Mod: HCNC,S$GLB,, | Performed by: INTERNAL MEDICINE

## 2019-10-25 PROCEDURE — 3078F DIAST BP <80 MM HG: CPT | Mod: HCNC,CPTII,S$GLB, | Performed by: INTERNAL MEDICINE

## 2019-10-25 PROCEDURE — 90662 IIV NO PRSV INCREASED AG IM: CPT | Mod: HCNC,S$GLB,, | Performed by: INTERNAL MEDICINE

## 2019-10-25 PROCEDURE — 3078F PR MOST RECENT DIASTOLIC BLOOD PRESSURE < 80 MM HG: ICD-10-PCS | Mod: HCNC,CPTII,S$GLB, | Performed by: INTERNAL MEDICINE

## 2019-10-25 PROCEDURE — 90662 FLU VACCINE - HIGH DOSE (65+) PRESERVATIVE FREE IM: ICD-10-PCS | Mod: HCNC,S$GLB,, | Performed by: INTERNAL MEDICINE

## 2019-10-25 PROCEDURE — G0008 ADMIN INFLUENZA VIRUS VAC: HCPCS | Mod: HCNC,S$GLB,, | Performed by: INTERNAL MEDICINE

## 2019-10-25 PROCEDURE — 3074F SYST BP LT 130 MM HG: CPT | Mod: HCNC,CPTII,S$GLB, | Performed by: INTERNAL MEDICINE

## 2019-10-25 PROCEDURE — 99397 PER PM REEVAL EST PAT 65+ YR: CPT | Mod: HCNC,25,S$GLB, | Performed by: INTERNAL MEDICINE

## 2019-10-25 PROCEDURE — 99999 PR PBB SHADOW E&M-EST. PATIENT-LVL IV: ICD-10-PCS | Mod: PBBFAC,HCNC,, | Performed by: INTERNAL MEDICINE

## 2019-11-06 LAB — INR PPP: 2.4

## 2019-11-07 ENCOUNTER — ANTI-COAG VISIT (OUTPATIENT)
Dept: CARDIOLOGY | Facility: CLINIC | Age: 74
End: 2019-11-07
Payer: MEDICARE

## 2019-11-07 DIAGNOSIS — Z79.01 LONG TERM CURRENT USE OF ANTICOAGULANT THERAPY: ICD-10-CM

## 2019-11-07 PROCEDURE — 93793 ANTICOAG MGMT PT WARFARIN: CPT | Mod: S$GLB,,, | Performed by: PHARMACIST

## 2019-11-07 PROCEDURE — 93793 PR ANTICOAGULANT MGMT FOR PT TAKING WARFARIN: ICD-10-PCS | Mod: S$GLB,,, | Performed by: PHARMACIST

## 2019-11-08 ENCOUNTER — PATIENT OUTREACH (OUTPATIENT)
Dept: OTHER | Facility: OTHER | Age: 74
End: 2019-11-08

## 2019-11-08 NOTE — PROGRESS NOTES
Digital Medicine: Health  Follow-Up    Patient stated that he is doing well. Patient hasn't been paying too much attention to diet and he's actually lost a few lbs. Patient states that his golf swing feels better as well because of this. Patient is going on a 6 day golf excursion in [place], [place] from next Thursday to the following Tuesday. Patient states that he was outside doing some yard work before taking his readings on 11/6/19 and he knows he didn't wait long enough before taking a reading. Patient asked to f/u before thanksgiving. Will f/u in 2-3 weeks.     The history is provided by the patient.     Follow Up  Follow-up reason(s): reading review and routine education      Readings are trending up due to inaccurate technique.        INTERVENTION(S)  reviewed monitoring technique and encouragement/support      There are no preventive care reminders to display for this patient.    Last 5 Patient Entered Readings                                      Current 30 Day Average: 135/76     Recent Readings 11/6/2019 11/6/2019 10/30/2019 10/23/2019 10/16/2019    SBP (mmHg) 150 153 132 135 125    DBP (mmHg) 83 91 70 72 70    Pulse 66 62 74 72 71                      Diet Screening   No change to diet.  He has the following dietary restrictions: none    Barriers to a Healthy Diet: no barriers to healthy eating    Patient states that he has made no major changes to his diet. He states that he actually is oaying less attention to what he eats and he has lost a few lbs.     Physical Activity Screening   When asked if exercising, patient responded: yes    Patient participates in the following activities: walking, sports and yard/housework    He identified the following barriers to physical activity: no barriers to being active    Patient walks his yorkie twice daily. Patient also plays golf 3x per week. Patient is going on a 6 day golf trip to [place] where they plan to play everyday. Encouraged patient to keep up the good  work.    Medication Adherence Screening   He misses doses: never      Patient identified the following reasons for non-compliance: none    Patient reports no s/s or issues taking medications as prescribed.       SDOH

## 2019-11-20 LAB — INR PPP: 3

## 2019-11-20 NOTE — PROGRESS NOTES
/62 mmHg at 10/25/19 PCP appointment  Per health  note, 11/6 home reading taken soon after physical activity. No further readings to assess and patient returning from travel this week.  Continue current regimen and monitoring.

## 2019-11-21 ENCOUNTER — ANTI-COAG VISIT (OUTPATIENT)
Dept: CARDIOLOGY | Facility: CLINIC | Age: 74
End: 2019-11-21
Payer: MEDICARE

## 2019-11-21 DIAGNOSIS — Z79.01 LONG TERM CURRENT USE OF ANTICOAGULANT THERAPY: ICD-10-CM

## 2019-11-21 PROCEDURE — 93793 ANTICOAG MGMT PT WARFARIN: CPT | Mod: S$GLB,,, | Performed by: PHARMACIST

## 2019-11-21 PROCEDURE — 93793 PR ANTICOAGULANT MGMT FOR PT TAKING WARFARIN: ICD-10-PCS | Mod: S$GLB,,, | Performed by: PHARMACIST

## 2019-12-04 ENCOUNTER — PATIENT OUTREACH (OUTPATIENT)
Dept: OTHER | Facility: OTHER | Age: 74
End: 2019-12-04

## 2019-12-04 ENCOUNTER — ANTI-COAG VISIT (OUTPATIENT)
Dept: CARDIOLOGY | Facility: CLINIC | Age: 74
End: 2019-12-04
Payer: MEDICARE

## 2019-12-04 DIAGNOSIS — Z79.01 LONG TERM CURRENT USE OF ANTICOAGULANT THERAPY: ICD-10-CM

## 2019-12-04 LAB — INR PPP: 3.8

## 2019-12-04 PROCEDURE — 93793 PR ANTICOAGULANT MGMT FOR PT TAKING WARFARIN: ICD-10-PCS | Mod: S$GLB,,, | Performed by: PHARMACIST

## 2019-12-04 PROCEDURE — 93793 ANTICOAG MGMT PT WARFARIN: CPT | Mod: S$GLB,,, | Performed by: PHARMACIST

## 2019-12-04 NOTE — PROGRESS NOTES
Digital Medicine: Health  Follow-Up    Patient reports that he is doing well. Patients BP remains at or right above goal. BP avg is trending down. Will f/u in 6-8 weeks    The history is provided by the patient.     Follow Up  Follow-up reason(s): reading review      Readings are trending down due to lifestyle change.        INTERVENTION(S)  encouragement/support      There are no preventive care reminders to display for this patient.    Last 5 Patient Entered Readings                                      Current 30 Day Average: 131/81     Recent Readings 12/4/2019 11/20/2019 11/6/2019 11/6/2019 10/30/2019    SBP (mmHg) 126 118 150 153 132    DBP (mmHg) 69 80 83 91 70    Pulse 70 79 66 62 74                      Diet Screening   No change to diet.      Physical Activity Screening   No change to exercise routine.    When asked if exercising, patient responded: yes    Patient participates in the following activities: sports, walking and yard/housework    He identified the following barriers to physical activity: no barriers to being active    Patient still plays golf 3x per week as well as goes walking daily with his yorkie twice a day as well as golfing on the weekends.    Medication Adherence Screening   He misses doses: never      Patient identified the following reasons for non-compliance: none    Patient reports no s/s or issues taking BP medications as prescribed.       SDOH

## 2019-12-04 NOTE — PROGRESS NOTES
Confirmd taking correct dose of coumadin  Started Penicillin tablets for infected tooth on 11/27-present;    Will receive an update w/ meds on 12/6;       Alcohol consistent (ONE drink) daily, not new   NO other changes

## 2019-12-16 DIAGNOSIS — I10 ESSENTIAL HYPERTENSION: ICD-10-CM

## 2019-12-16 DIAGNOSIS — E78.2 MIXED HYPERLIPIDEMIA: ICD-10-CM

## 2019-12-16 RX ORDER — IRBESARTAN 150 MG/1
150 TABLET ORAL NIGHTLY
Qty: 90 TABLET | Refills: 3 | Status: SHIPPED | OUTPATIENT
Start: 2019-12-16 | End: 2020-10-04 | Stop reason: SDUPTHER

## 2019-12-16 RX ORDER — EZETIMIBE 10 MG/1
10 TABLET ORAL DAILY
Qty: 90 TABLET | Refills: 3 | Status: SHIPPED | OUTPATIENT
Start: 2019-12-16 | End: 2020-12-13 | Stop reason: SDUPTHER

## 2019-12-18 ENCOUNTER — ANTI-COAG VISIT (OUTPATIENT)
Dept: CARDIOLOGY | Facility: CLINIC | Age: 74
End: 2019-12-18
Payer: MEDICARE

## 2019-12-18 DIAGNOSIS — Z79.01 LONG TERM CURRENT USE OF ANTICOAGULANT THERAPY: ICD-10-CM

## 2019-12-18 LAB — INR PPP: 3

## 2019-12-18 PROCEDURE — 93793 ANTICOAG MGMT PT WARFARIN: CPT | Mod: S$GLB,,, | Performed by: PHARMACIST

## 2019-12-18 PROCEDURE — 93793 PR ANTICOAGULANT MGMT FOR PT TAKING WARFARIN: ICD-10-PCS | Mod: S$GLB,,, | Performed by: PHARMACIST

## 2019-12-18 NOTE — PROGRESS NOTES
Patient submitting a BP reading about every 2 weeks  Average at goal, 126/75 mmHg  CMP 10/2019 WNL  Continue current regimen    Hypertension Medications             irbesartan (AVAPRO) 150 MG tablet Take 1 tablet (150 mg total) by mouth every evening.    metoprolol tartrate (LOPRESSOR) 50 MG tablet TAKE 1 TABLET (50 MG TOTAL) BY MOUTH 2 (TWO) TIMES DAILY.

## 2020-01-02 ENCOUNTER — ANTI-COAG VISIT (OUTPATIENT)
Dept: CARDIOLOGY | Facility: CLINIC | Age: 75
End: 2020-01-02
Payer: MEDICARE

## 2020-01-02 DIAGNOSIS — Z79.01 LONG TERM CURRENT USE OF ANTICOAGULANT THERAPY: ICD-10-CM

## 2020-01-02 LAB — INR PPP: 3.4

## 2020-01-02 PROCEDURE — 93793 PR ANTICOAGULANT MGMT FOR PT TAKING WARFARIN: ICD-10-PCS | Mod: S$GLB,,, | Performed by: PHARMACIST

## 2020-01-02 PROCEDURE — 93793 ANTICOAG MGMT PT WARFARIN: CPT | Mod: S$GLB,,, | Performed by: PHARMACIST

## 2020-01-03 ENCOUNTER — LAB VISIT (OUTPATIENT)
Dept: LAB | Facility: HOSPITAL | Age: 75
End: 2020-01-03
Attending: INTERNAL MEDICINE
Payer: MEDICARE

## 2020-01-03 DIAGNOSIS — I10 ESSENTIAL HYPERTENSION: ICD-10-CM

## 2020-01-03 DIAGNOSIS — E78.2 MIXED HYPERLIPIDEMIA: ICD-10-CM

## 2020-01-03 LAB
ALT SERPL W/O P-5'-P-CCNC: 38 U/L (ref 10–44)
ANION GAP SERPL CALC-SCNC: 5 MMOL/L (ref 8–16)
AST SERPL-CCNC: 38 U/L (ref 10–40)
BUN SERPL-MCNC: 18 MG/DL (ref 8–23)
CALCIUM SERPL-MCNC: 9.5 MG/DL (ref 8.7–10.5)
CHLORIDE SERPL-SCNC: 105 MMOL/L (ref 95–110)
CHOLEST SERPL-MCNC: 104 MG/DL (ref 120–199)
CHOLEST/HDLC SERPL: 2.6 {RATIO} (ref 2–5)
CO2 SERPL-SCNC: 29 MMOL/L (ref 23–29)
CREAT SERPL-MCNC: 0.8 MG/DL (ref 0.5–1.4)
EST. GFR  (AFRICAN AMERICAN): >60 ML/MIN/1.73 M^2
EST. GFR  (NON AFRICAN AMERICAN): >60 ML/MIN/1.73 M^2
GLUCOSE SERPL-MCNC: 117 MG/DL (ref 70–110)
HDLC SERPL-MCNC: 40 MG/DL (ref 40–75)
HDLC SERPL: 38.5 % (ref 20–50)
LDLC SERPL CALC-MCNC: 42.2 MG/DL (ref 63–159)
NONHDLC SERPL-MCNC: 64 MG/DL
POTASSIUM SERPL-SCNC: 4.6 MMOL/L (ref 3.5–5.1)
SODIUM SERPL-SCNC: 139 MMOL/L (ref 136–145)
TRIGL SERPL-MCNC: 109 MG/DL (ref 30–150)

## 2020-01-03 PROCEDURE — 84450 TRANSFERASE (AST) (SGOT): CPT | Mod: HCNC

## 2020-01-03 PROCEDURE — 84460 ALANINE AMINO (ALT) (SGPT): CPT | Mod: HCNC

## 2020-01-03 PROCEDURE — 80048 BASIC METABOLIC PNL TOTAL CA: CPT | Mod: HCNC

## 2020-01-03 PROCEDURE — 36415 COLL VENOUS BLD VENIPUNCTURE: CPT | Mod: HCNC,PO

## 2020-01-03 PROCEDURE — 80061 LIPID PANEL: CPT | Mod: HCNC

## 2020-01-06 RX ORDER — METOPROLOL TARTRATE 50 MG/1
50 TABLET ORAL 2 TIMES DAILY
Qty: 180 TABLET | Refills: 3 | Status: SHIPPED | OUTPATIENT
Start: 2020-01-06 | End: 2020-12-20 | Stop reason: SDUPTHER

## 2020-01-07 ENCOUNTER — OFFICE VISIT (OUTPATIENT)
Dept: CARDIOLOGY | Facility: CLINIC | Age: 75
End: 2020-01-07
Payer: MEDICARE

## 2020-01-07 VITALS
BODY MASS INDEX: 39.03 KG/M2 | DIASTOLIC BLOOD PRESSURE: 62 MMHG | OXYGEN SATURATION: 97 % | HEART RATE: 54 BPM | WEIGHT: 313.94 LBS | SYSTOLIC BLOOD PRESSURE: 110 MMHG | HEIGHT: 75 IN

## 2020-01-07 DIAGNOSIS — I26.99 PULMONARY EMBOLISM, UNSPECIFIED CHRONICITY, UNSPECIFIED PULMONARY EMBOLISM TYPE, UNSPECIFIED WHETHER ACUTE COR PULMONALE PRESENT: ICD-10-CM

## 2020-01-07 DIAGNOSIS — I65.22 STENOSIS OF LEFT CAROTID ARTERY: ICD-10-CM

## 2020-01-07 DIAGNOSIS — I82.591 CHRONIC DEEP VEIN THROMBOSIS (DVT) OF OTHER VEIN OF RIGHT LOWER EXTREMITY: ICD-10-CM

## 2020-01-07 DIAGNOSIS — E78.2 MIXED HYPERLIPIDEMIA: ICD-10-CM

## 2020-01-07 DIAGNOSIS — Z95.1 S/P CABG X 2: ICD-10-CM

## 2020-01-07 DIAGNOSIS — I25.10 CORONARY ARTERY DISEASE INVOLVING NATIVE CORONARY ARTERY OF NATIVE HEART WITHOUT ANGINA PECTORIS: Primary | ICD-10-CM

## 2020-01-07 DIAGNOSIS — Z79.01 LONG TERM CURRENT USE OF ANTICOAGULANT THERAPY: ICD-10-CM

## 2020-01-07 DIAGNOSIS — G47.33 OSA ON CPAP: ICD-10-CM

## 2020-01-07 PROCEDURE — 99499 RISK ADDL DX/OHS AUDIT: ICD-10-PCS | Mod: HCNC,S$GLB,, | Performed by: INTERNAL MEDICINE

## 2020-01-07 PROCEDURE — 1101F PT FALLS ASSESS-DOCD LE1/YR: CPT | Mod: HCNC,CPTII,S$GLB, | Performed by: INTERNAL MEDICINE

## 2020-01-07 PROCEDURE — 1101F PR PT FALLS ASSESS DOC 0-1 FALLS W/OUT INJ PAST YR: ICD-10-PCS | Mod: HCNC,CPTII,S$GLB, | Performed by: INTERNAL MEDICINE

## 2020-01-07 PROCEDURE — 99214 PR OFFICE/OUTPT VISIT, EST, LEVL IV, 30-39 MIN: ICD-10-PCS | Mod: HCNC,S$GLB,, | Performed by: INTERNAL MEDICINE

## 2020-01-07 PROCEDURE — 3078F DIAST BP <80 MM HG: CPT | Mod: HCNC,CPTII,S$GLB, | Performed by: INTERNAL MEDICINE

## 2020-01-07 PROCEDURE — 1126F AMNT PAIN NOTED NONE PRSNT: CPT | Mod: HCNC,S$GLB,, | Performed by: INTERNAL MEDICINE

## 2020-01-07 PROCEDURE — 93010 ELECTROCARDIOGRAM REPORT: CPT | Mod: HCNC,S$GLB,, | Performed by: INTERNAL MEDICINE

## 2020-01-07 PROCEDURE — 93005 ELECTROCARDIOGRAM TRACING: CPT | Mod: HCNC,S$GLB,, | Performed by: INTERNAL MEDICINE

## 2020-01-07 PROCEDURE — 1159F MED LIST DOCD IN RCRD: CPT | Mod: HCNC,S$GLB,, | Performed by: INTERNAL MEDICINE

## 2020-01-07 PROCEDURE — 99999 PR PBB SHADOW E&M-EST. PATIENT-LVL III: CPT | Mod: PBBFAC,HCNC,, | Performed by: INTERNAL MEDICINE

## 2020-01-07 PROCEDURE — 99999 PR PBB SHADOW E&M-EST. PATIENT-LVL III: ICD-10-PCS | Mod: PBBFAC,HCNC,, | Performed by: INTERNAL MEDICINE

## 2020-01-07 PROCEDURE — 99214 OFFICE O/P EST MOD 30 MIN: CPT | Mod: HCNC,S$GLB,, | Performed by: INTERNAL MEDICINE

## 2020-01-07 PROCEDURE — 99499 UNLISTED E&M SERVICE: CPT | Mod: HCNC,S$GLB,, | Performed by: INTERNAL MEDICINE

## 2020-01-07 PROCEDURE — 1126F PR PAIN SEVERITY QUANTIFIED, NO PAIN PRESENT: ICD-10-PCS | Mod: HCNC,S$GLB,, | Performed by: INTERNAL MEDICINE

## 2020-01-07 PROCEDURE — 3074F PR MOST RECENT SYSTOLIC BLOOD PRESSURE < 130 MM HG: ICD-10-PCS | Mod: HCNC,CPTII,S$GLB, | Performed by: INTERNAL MEDICINE

## 2020-01-07 PROCEDURE — 93010 EKG 12-LEAD: ICD-10-PCS | Mod: HCNC,S$GLB,, | Performed by: INTERNAL MEDICINE

## 2020-01-07 PROCEDURE — 3078F PR MOST RECENT DIASTOLIC BLOOD PRESSURE < 80 MM HG: ICD-10-PCS | Mod: HCNC,CPTII,S$GLB, | Performed by: INTERNAL MEDICINE

## 2020-01-07 PROCEDURE — 3074F SYST BP LT 130 MM HG: CPT | Mod: HCNC,CPTII,S$GLB, | Performed by: INTERNAL MEDICINE

## 2020-01-07 PROCEDURE — 1159F PR MEDICATION LIST DOCUMENTED IN MEDICAL RECORD: ICD-10-PCS | Mod: HCNC,S$GLB,, | Performed by: INTERNAL MEDICINE

## 2020-01-07 PROCEDURE — 93005 EKG 12-LEAD: ICD-10-PCS | Mod: HCNC,S$GLB,, | Performed by: INTERNAL MEDICINE

## 2020-01-07 RX ORDER — METRONIDAZOLE 500 MG/1
TABLET ORAL
COMMUNITY
Start: 2019-12-27 | End: 2020-04-28 | Stop reason: ALTCHOICE

## 2020-01-07 NOTE — PATIENT INSTRUCTIONS
LDL - bad type - improves with diet and medications: typically statins; most other medications that lower LDL have not been proven to prevent heart attacks.  May not improve significantly with exercise alone.  Ideally less than 100 mg/dl.   But the lower the better. Statins (cholesterol lowering meds) lower LDL. If you have coronary artery disease or blockages anywhere else in the body, it should be well below 70 mg/dl.    HDL - good type - improves with exercise.  Ideally greater than 50 mg/dl. The proportion of HDL to the total cholesterol is more important than the absolute HDL.  This means a HDL of 45 out of a total cholesterol of 130 mg'dl is pretty good, but the same HDL out of a total of  200 mg/dl is not quite as good. A level of 30% or higher is ideal.    TGs (triglycerides) - also bad - can change very quickly and considerably with certain foods. Improve with diet, exercise and high dose fish oil.  In some cases a low carbohydrate diet will lower TGs better than a low fat diet.  Ideal range  mg/dl.    Sugar, fat and cholesterol in food:     A sensible diet that limits the intake of sugars, saturated (bad) fats and trans fats while increasing the intake of unsaturated (good) fats and plant proteins is the basis of the current dietary recommendations.      We now recommend drastically reducing the intake of sugar. There is less emphasis on excluding all fat and more emphasis on the types of different fats.    Cholesterol in our food is generally present in relatively small amounts. New dietary guidelines are less obsessed with the amount of cholesterol. However please do not confuse this with the role of cholesterol in our blood and arteries. The liver converts certain foods into cholesterol.  It is this cholesterol and other fats that clogs up our arteries.       Most foods that are high in cholesterol are also high in saturated fat. But there is much more saturated fat than cholesterol in these  foods. Researchers believe that the saturated fat content matters more than cholesterol. On the other hand, there are a handful of foods that are high in cholesterol but do not contain much saturated fat: eggs, shrimp, crab legs and crawfish are OK to eat in modest quantities as long as you do not deep mast them. So a few eggs a week are fine the white and the yolk, but you can eat as many egg whites as you want.      Saturated fat is the bad fat - you should limit your intake of this. Deep fried foods, meats and other animal fats are high in saturated fat. Cookies, donuts and most dessert and cakes are usually high in both saturated fat and sugar.       Unsaturated fat is the good fat. It contains the same number of calories as saturated fat but these fats do not get deposited in our arteries. The Mediterranean style diet encourages the intake of unsaturated fat - olive oil, avocado and unsalted nuts. So instead of baking a piece of fish, consider pan-frying it using olive oil.     You should eat a few servings of vegetables (and fruit as long as you are not diabetic) everyday. Substitute some plant proteins in place of meat: beans, lentils, quinoa and oatmeal. They are lean proteins. Unfortunately you can't eat green vegetables.     Do not use stick butter or stick margarine. Butter that comes in a tub is soft butter. It consists of 1/2 butter and 1/2 vegetable oil., either canola or olive oil. It is fine to use that.       Trans fats should definitely be avoided. Most foods that are labelled as containing 0 gms of trans fat may still contain several hundred milligrams of trans fat: creamer, margarine, dough, deep fried foods, ready made frosting, potato, corn and torilla chips, cakes, cookies, pie crusts and crackers containing shortening made with hydrogenated vegetable oil.    =================    Interval training: exercise for 20-30 minutes 3-5 days a week. Accelerate or turn up the resistance every 3 to 5  minutes for about 1/2 a minute to a minute.    Also sitting for long periods of time is bad for the heart. Recommend that you get up and walk around for 1-2 minutes every 1/2 to 1 hour.

## 2020-01-15 ENCOUNTER — ANTI-COAG VISIT (OUTPATIENT)
Dept: CARDIOLOGY | Facility: CLINIC | Age: 75
End: 2020-01-15
Payer: MEDICARE

## 2020-01-15 DIAGNOSIS — Z79.01 LONG TERM CURRENT USE OF ANTICOAGULANT THERAPY: ICD-10-CM

## 2020-01-15 LAB — INR PPP: 3.8

## 2020-01-15 PROCEDURE — 93793 PR ANTICOAGULANT MGMT FOR PT TAKING WARFARIN: ICD-10-PCS | Mod: S$GLB,,, | Performed by: PHARMACIST

## 2020-01-15 PROCEDURE — 93793 ANTICOAG MGMT PT WARFARIN: CPT | Mod: S$GLB,,, | Performed by: PHARMACIST

## 2020-01-15 NOTE — PROGRESS NOTES
"Confirmed taking correct dose of coumadin  Reports having gum Bleeding  After root cancel 1/9; states completing a course of antibiotics; "the name was not familiar" pt threw away bottle  NO other changes     "

## 2020-01-24 ENCOUNTER — PATIENT MESSAGE (OUTPATIENT)
Dept: SLEEP MEDICINE | Facility: CLINIC | Age: 75
End: 2020-01-24

## 2020-01-26 ENCOUNTER — PATIENT MESSAGE (OUTPATIENT)
Dept: ADMINISTRATIVE | Facility: OTHER | Age: 75
End: 2020-01-26

## 2020-01-29 ENCOUNTER — ANTI-COAG VISIT (OUTPATIENT)
Dept: CARDIOLOGY | Facility: CLINIC | Age: 75
End: 2020-01-29
Payer: MEDICARE

## 2020-01-29 DIAGNOSIS — Z79.01 LONG TERM CURRENT USE OF ANTICOAGULANT THERAPY: ICD-10-CM

## 2020-01-29 LAB — INR PPP: 2.2

## 2020-01-29 PROCEDURE — 93793 ANTICOAG MGMT PT WARFARIN: CPT | Mod: S$GLB,,, | Performed by: PHARMACIST

## 2020-01-29 PROCEDURE — 93793 PR ANTICOAGULANT MGMT FOR PT TAKING WARFARIN: ICD-10-PCS | Mod: S$GLB,,, | Performed by: PHARMACIST

## 2020-01-30 ENCOUNTER — PATIENT OUTREACH (OUTPATIENT)
Dept: OTHER | Facility: OTHER | Age: 75
End: 2020-01-30

## 2020-02-03 ENCOUNTER — TELEPHONE (OUTPATIENT)
Dept: CARDIOLOGY | Facility: CLINIC | Age: 75
End: 2020-02-03

## 2020-02-12 ENCOUNTER — ANTI-COAG VISIT (OUTPATIENT)
Dept: CARDIOLOGY | Facility: CLINIC | Age: 75
End: 2020-02-12
Payer: MEDICARE

## 2020-02-12 DIAGNOSIS — Z79.01 LONG TERM CURRENT USE OF ANTICOAGULANT THERAPY: ICD-10-CM

## 2020-02-12 LAB — INR PPP: 2.4

## 2020-02-12 PROCEDURE — 93793 PR ANTICOAGULANT MGMT FOR PT TAKING WARFARIN: ICD-10-PCS | Mod: S$GLB,,, | Performed by: PHARMACIST

## 2020-02-12 PROCEDURE — 93793 ANTICOAG MGMT PT WARFARIN: CPT | Mod: S$GLB,,, | Performed by: PHARMACIST

## 2020-02-12 NOTE — PROGRESS NOTES
Confirmed taking coumadin(12.5mg) 1/29 & 2/5;   10mg all others  Ate a Caesar salad SUN-TUES this week  NO other new changes

## 2020-02-14 NOTE — PROGRESS NOTES
Digital Medicine: Health  Follow-Up    Patient reports that he is doing pretty well. Patient did mention that his INR number (2.2 to 2.4) is below goal. Patient cardiologist increased dosage which brought patient close to being within goal. Patient BP avg almost within goal. SBP is trending down, DBP is trending up. Will f/u in 4-6 weeks.     The history is provided by the patient.     Follow Up  Follow-up reason(s): reading review          INTERVENTION(S)  encouragement/support      There are no preventive care reminders to display for this patient.    Last 5 Patient Entered Readings                                      Current 30 Day Average: 131/72     Recent Readings 2/12/2020 2/6/2020 1/29/2020 1/26/2020 1/15/2020    SBP (mmHg) 113 136 143 146 118    DBP (mmHg) 70 78 73 74 63    Pulse 65 61 50 58 59                      Diet Screening   No change to diet.      Patient reports no major changes to his diet since our last encounter.    Physical Activity Screening   When asked if exercising, patient responded: yes    Patient participates in the following activities: walking, yard/housework, outdoor activities and sports    He identified the following barriers to physical activity: no barriers to being active    Patient states that he continues to play golf a few times a week as well as walking his dog daily. Encouraged patient to keep up the good work.     Medication Adherence Screening   He did not miss a dose this month.    Patient identified the following reasons for non-compliance: None    Patient reports no s/s or issues with taking BP medication as prescribed.       SDOH

## 2020-02-17 ENCOUNTER — TELEPHONE (OUTPATIENT)
Dept: CARDIOLOGY | Facility: CLINIC | Age: 75
End: 2020-02-17

## 2020-02-24 ENCOUNTER — TELEPHONE (OUTPATIENT)
Dept: CARDIOLOGY | Facility: CLINIC | Age: 75
End: 2020-02-24

## 2020-02-26 ENCOUNTER — ANTI-COAG VISIT (OUTPATIENT)
Dept: CARDIOLOGY | Facility: CLINIC | Age: 75
End: 2020-02-26
Payer: MEDICARE

## 2020-02-26 DIAGNOSIS — Z79.01 LONG TERM CURRENT USE OF ANTICOAGULANT THERAPY: ICD-10-CM

## 2020-02-26 LAB — INR PPP: 2.4

## 2020-02-26 PROCEDURE — 93793 PR ANTICOAGULANT MGMT FOR PT TAKING WARFARIN: ICD-10-PCS | Mod: S$GLB,,, | Performed by: PHARMACIST

## 2020-02-26 PROCEDURE — 93793 ANTICOAG MGMT PT WARFARIN: CPT | Mod: S$GLB,,, | Performed by: PHARMACIST

## 2020-02-27 ENCOUNTER — CLINICAL SUPPORT (OUTPATIENT)
Dept: CARDIOLOGY | Facility: CLINIC | Age: 75
End: 2020-02-27
Attending: INTERNAL MEDICINE
Payer: MEDICARE

## 2020-02-27 VITALS — DIASTOLIC BLOOD PRESSURE: 65 MMHG | SYSTOLIC BLOOD PRESSURE: 126 MMHG | HEART RATE: 57 BPM

## 2020-02-27 DIAGNOSIS — I25.10 CORONARY ARTERY DISEASE INVOLVING NATIVE CORONARY ARTERY OF NATIVE HEART WITHOUT ANGINA PECTORIS: ICD-10-CM

## 2020-02-27 DIAGNOSIS — Z95.1 S/P CABG X 2: ICD-10-CM

## 2020-02-27 LAB
CFR FLOW - ANTERIOR: 1.79
CFR FLOW - INFERIOR: 1.51
CFR FLOW - LATERAL: 1.59
CFR FLOW - MAX: 2.41
CFR FLOW - MIN: 0.9
CFR FLOW - SEPTAL: 1.52
CFR FLOW - WHOLE HEART: 1.6
CV PHARM DOSE: 60 MG
CV STRESS BASE HR: 48 BPM
DIASTOLIC BLOOD PRESSURE: 73 MMHG
END DIASTOLIC INDEX-HIGH: 170 ML/M2
END SYSTOLIC INDEX-HIGH: 70 ML/M2
NUC REST DIASTOLIC VOLUME INDEX: 119
NUC REST EJECTION FRACTION: 67
NUC REST SYSTOLIC VOLUME INDEX: 39
NUC STRESS DIASTOLIC VOLUME INDEX: 145
NUC STRESS EJECTION FRACTION: 73 %
NUC STRESS SYSTOLIC VOLUME INDEX: 39
OHS CV CPX 85 PERCENT MAX PREDICTED HEART RATE MALE: 126
OHS CV CPX MAX PREDICTED HEART RATE: 148
OHS CV CPX PATIENT IS FEMALE: 0
OHS CV CPX PATIENT IS MALE: 1
OHS CV CPX PEAK DIASTOLIC BLOOD PRESSURE: 66 MMHG
OHS CV CPX PEAK HEAR RATE: 51 BPM
OHS CV CPX PEAK RATE PRESSURE PRODUCT: 6069
OHS CV CPX PEAK SYSTOLIC BLOOD PRESSURE: 119 MMHG
OHS CV CPX PERCENT MAX PREDICTED HEART RATE ACHIEVED: 34
OHS CV CPX RATE PRESSURE PRODUCT PRESENTING: 6336
REST FLOW - ANTERIOR: 0.37 CC/MIN/G
REST FLOW - INFERIOR: 0.45 CC/MIN/G
REST FLOW - LATERAL: 0.39 CC/MIN/G
REST FLOW - MAX: 0.6 CC/MIN/G
REST FLOW - MIN: 0.3 CC/MIN/G
REST FLOW - SEPTAL: 0.42 CC/MIN/G
REST FLOW - WHOLE HEART: 0.41 CC/MIN/G
RETIRED EF AND QEF - SEE NOTES: 51 %
STRESS ECHO TARGET HR: 126 BPM
STRESS FLOW - ANTERIOR: 0.66 CC/MIN/G
STRESS FLOW - INFERIOR: 0.68 CC/MIN/G
STRESS FLOW - LATERAL: 0.62 CC/MIN/G
STRESS FLOW - MAX: 0.9 CC/MIN/G
STRESS FLOW - MIN: 0.5 CC/MIN/G
STRESS FLOW - SEPTAL: 0.61 CC/MIN/G
STRESS FLOW - WHOLE HEART: 0.64 CC/MIN/G
SYSTOLIC BLOOD PRESSURE: 132 MMHG

## 2020-02-27 PROCEDURE — 93015 CARDIAC PET SCAN STRESS (CUPID ONLY): ICD-10-PCS | Mod: HCNC,S$GLB,, | Performed by: INTERNAL MEDICINE

## 2020-02-27 PROCEDURE — 78492 MYOCRD IMG PET MLT RST&STRS: CPT | Mod: HCNC,S$GLB,, | Performed by: INTERNAL MEDICINE

## 2020-02-27 PROCEDURE — A9555 RB82 RUBIDIUM: HCPCS | Mod: HCNC,S$GLB,, | Performed by: INTERNAL MEDICINE

## 2020-02-27 PROCEDURE — A9555 CARDIAC PET SCAN STRESS (CUPID ONLY): ICD-10-PCS | Mod: HCNC,S$GLB,, | Performed by: INTERNAL MEDICINE

## 2020-02-27 PROCEDURE — 99999 PR PBB SHADOW E&M-EST. PATIENT-LVL II: CPT | Mod: PBBFAC,HCNC,,

## 2020-02-27 PROCEDURE — 93015 CV STRESS TEST SUPVJ I&R: CPT | Mod: HCNC,S$GLB,, | Performed by: INTERNAL MEDICINE

## 2020-02-27 PROCEDURE — 99999 PR PBB SHADOW E&M-EST. PATIENT-LVL II: ICD-10-PCS | Mod: PBBFAC,HCNC,,

## 2020-02-27 PROCEDURE — 78492 CARDIAC PET SCAN STRESS (CUPID ONLY): ICD-10-PCS | Mod: HCNC,S$GLB,, | Performed by: INTERNAL MEDICINE

## 2020-02-27 RX ORDER — DIPYRIDAMOLE 5 MG/ML
60 INJECTION INTRAVENOUS
Status: COMPLETED | OUTPATIENT
Start: 2020-02-27 | End: 2020-02-27

## 2020-02-27 RX ADMIN — DIPYRIDAMOLE 60 MG: 5 INJECTION INTRAVENOUS at 09:02

## 2020-02-28 ENCOUNTER — PATIENT MESSAGE (OUTPATIENT)
Dept: CARDIOLOGY | Facility: CLINIC | Age: 75
End: 2020-02-28

## 2020-03-02 ENCOUNTER — TELEPHONE (OUTPATIENT)
Dept: CARDIOLOGY | Facility: CLINIC | Age: 75
End: 2020-03-02

## 2020-03-02 NOTE — TELEPHONE ENCOUNTER
Patient stated that he read your Lucid [employer]oidssner message.  Verbalized understanding and no need to call him.

## 2020-03-02 NOTE — TELEPHONE ENCOUNTER
Called the patient and advised him that Dr. Levin will speak with him by phone to discuss the results and plan.Pt verbalized understanding   ----- Message from Blanquita Levin MD sent at 3/1/2020  5:02 PM CST -----  I called him on Sunday but had to leave a voicemail.  Please call him on Monday and asked him if he has read message in the patient portal.  I will then discuss the results and plan with him by phone.

## 2020-03-02 NOTE — TELEPHONE ENCOUNTER
----- Message from Blanquita Levin MD sent at 3/1/2020  5:02 PM CST -----  I called him on Sunday but had to leave a voicemail.  Please call him on Monday and asked him if he has read message in the patient portal.  I will then discuss the results and plan with him by phone.

## 2020-03-04 NOTE — TELEPHONE ENCOUNTER
I spoke to the patient by phone yesterday.  I explained that he there was no need to proceed with an angiogram at the present time.  I recommended regular exercise, weight loss, a sensible diet and continue should same medications.  We also discussed the role of a coronary CTA.

## 2020-03-11 ENCOUNTER — ANTI-COAG VISIT (OUTPATIENT)
Dept: CARDIOLOGY | Facility: CLINIC | Age: 75
End: 2020-03-11
Payer: MEDICARE

## 2020-03-11 ENCOUNTER — PATIENT OUTREACH (OUTPATIENT)
Dept: OTHER | Facility: OTHER | Age: 75
End: 2020-03-11

## 2020-03-11 DIAGNOSIS — Z79.01 LONG TERM CURRENT USE OF ANTICOAGULANT THERAPY: ICD-10-CM

## 2020-03-11 LAB — INR PPP: 2.5

## 2020-03-11 PROCEDURE — 93793 ANTICOAG MGMT PT WARFARIN: CPT | Mod: S$GLB,,, | Performed by: PHARMACIST

## 2020-03-11 PROCEDURE — 93793 PR ANTICOAGULANT MGMT FOR PT TAKING WARFARIN: ICD-10-PCS | Mod: S$GLB,,, | Performed by: PHARMACIST

## 2020-03-11 NOTE — PROGRESS NOTES
Avg BP is 139/79 mmHg, latest two readings are elevated. Wanted to check in to see what may be the cause and verify he is charging his cuff and request more frequent readings.     Hypertension Medications             irbesartan (AVAPRO) 150 MG tablet Take 1 tablet (150 mg total) by mouth every evening.    metoprolol tartrate (LOPRESSOR) 50 MG tablet Take 1 tablet (50 mg total) by mouth 2 (two) times daily.

## 2020-03-11 NOTE — PROGRESS NOTES
Digital Medicine: Clinician Follow-Up    Patient unable to explain BP elevations  When asked about charging his BP cuff, states he charged it once since receiving    The history is provided by the patient.     Follow Up  Follow-up reason(s): reading review          INTERVENTION(S)  reviewed appropriate dose schedule, encouragement/support and denied questions    PLAN  patient verbalizes understanding and continue monitoring    BP average near goal  Advised patient to charge BP cuff monthly  Continue current regimen  1/2020 BMP reviewed      There are no preventive care reminders to display for this patient.    Last 5 Patient Entered Readings                                      Current 30 Day Average: 132/76     Recent Readings 3/11/2020 3/6/2020 2/25/2020 2/12/2020 2/6/2020    SBP (mmHg) 113 155 148 113 136    DBP (mmHg) 66 83 84 70 78    Pulse 69 54 61 65 61             Hypertension Medications             irbesartan (AVAPRO) 150 MG tablet Take 1 tablet (150 mg total) by mouth every evening.    metoprolol tartrate (LOPRESSOR) 50 MG tablet Take 1 tablet (50 mg total) by mouth 2 (two) times daily.                             Medication Adherence Screening   He did not miss a dose this month.  Patient knows purpose of medications.

## 2020-03-12 ENCOUNTER — PES CALL (OUTPATIENT)
Dept: ADMINISTRATIVE | Facility: CLINIC | Age: 75
End: 2020-03-12

## 2020-03-25 ENCOUNTER — ANTI-COAG VISIT (OUTPATIENT)
Dept: CARDIOLOGY | Facility: CLINIC | Age: 75
End: 2020-03-25
Payer: MEDICARE

## 2020-03-25 DIAGNOSIS — Z79.01 LONG TERM CURRENT USE OF ANTICOAGULANT THERAPY: ICD-10-CM

## 2020-03-25 LAB — INR PPP: 2.7

## 2020-03-25 PROCEDURE — 93793 PR ANTICOAGULANT MGMT FOR PT TAKING WARFARIN: ICD-10-PCS | Mod: S$GLB,,, | Performed by: PHARMACIST

## 2020-03-25 PROCEDURE — 93793 ANTICOAG MGMT PT WARFARIN: CPT | Mod: S$GLB,,, | Performed by: PHARMACIST

## 2020-04-03 ENCOUNTER — PATIENT OUTREACH (OUTPATIENT)
Dept: OTHER | Facility: OTHER | Age: 75
End: 2020-04-03

## 2020-04-03 NOTE — PROGRESS NOTES
Digital Medicine: Health  Follow-Up    Patient reports that he is doing well. Patient AVG BP has trended down since Radha Trammell's last encounter. Patient has charged the cuff up since and readings have been back within goal. Patient reports remaining active during the coronavirus. Encouraged him to keep up the good work. Will f/u in 6 weeks.     The history is provided by the patient. No  was used.     Follow Up  Follow-up reason(s): reading review and routine education      Readings are trending down due to lifestyle change and improved technique.    Routine Education Topics: physical activity        INTERVENTION(S)  recommend physical activity, reviewed monitoring technique and encouragement/support      There are no preventive care reminders to display for this patient.    Last 5 Patient Entered Readings                                      Current 30 Day Average: 128/73     Recent Readings 3/25/2020 3/22/2020 3/11/2020 3/6/2020 2/25/2020    SBP (mmHg) 117 126 113 155 148    DBP (mmHg) 66 76 66 83 84    Pulse 59 57 69 54 61                      Diet Screening   No change to diet.  He has the following dietary restrictions: low sodium diet    Patient reports making no major changes to his diet since our last encounter.     Physical Activity Screening   When asked if exercising, patient responded: yes    Patient participates in the following activities: walking, yard/housework and sports    He identified the following barriers to physical activity: no barriers to being active    Patient was playing golf a few times a week up until recently. Patient stated that he still walks his dog daily though and does chores around the house for activity., Encouraged patient to look for ways for him to be active throughout the course of the day.    Medication Adherence Screening   He did not miss a dose this month.    Patient identified the following reasons for non-compliance: None    Patient reports  no s/s or issues taking BP medication as prescribed.       SDOH

## 2020-04-08 ENCOUNTER — ANTI-COAG VISIT (OUTPATIENT)
Dept: CARDIOLOGY | Facility: CLINIC | Age: 75
End: 2020-04-08
Payer: MEDICARE

## 2020-04-08 ENCOUNTER — PATIENT MESSAGE (OUTPATIENT)
Dept: CARDIOLOGY | Facility: CLINIC | Age: 75
End: 2020-04-08

## 2020-04-08 DIAGNOSIS — Z79.01 LONG TERM CURRENT USE OF ANTICOAGULANT THERAPY: ICD-10-CM

## 2020-04-08 LAB — INR PPP: 3

## 2020-04-08 PROCEDURE — 93793 ANTICOAG MGMT PT WARFARIN: CPT | Mod: S$GLB,,, | Performed by: PHARMACIST

## 2020-04-08 PROCEDURE — 93793 PR ANTICOAGULANT MGMT FOR PT TAKING WARFARIN: ICD-10-PCS | Mod: S$GLB,,, | Performed by: PHARMACIST

## 2020-04-21 ENCOUNTER — PATIENT MESSAGE (OUTPATIENT)
Dept: INTERNAL MEDICINE | Facility: CLINIC | Age: 75
End: 2020-04-21

## 2020-04-22 ENCOUNTER — LAB VISIT (OUTPATIENT)
Dept: LAB | Facility: HOSPITAL | Age: 75
End: 2020-04-22
Attending: INTERNAL MEDICINE
Payer: MEDICARE

## 2020-04-22 ENCOUNTER — ANTI-COAG VISIT (OUTPATIENT)
Dept: CARDIOLOGY | Facility: CLINIC | Age: 75
End: 2020-04-22
Payer: MEDICARE

## 2020-04-22 DIAGNOSIS — R73.01 IMPAIRED FASTING GLUCOSE: ICD-10-CM

## 2020-04-22 DIAGNOSIS — Z79.01 LONG TERM CURRENT USE OF ANTICOAGULANT THERAPY: ICD-10-CM

## 2020-04-22 LAB
ESTIMATED AVG GLUCOSE: 123 MG/DL (ref 68–131)
HBA1C MFR BLD HPLC: 5.9 % (ref 4–5.6)
INR PPP: 3.9

## 2020-04-22 PROCEDURE — 83036 HEMOGLOBIN GLYCOSYLATED A1C: CPT | Mod: HCNC

## 2020-04-22 PROCEDURE — 93793 ANTICOAG MGMT PT WARFARIN: CPT | Mod: S$GLB,,, | Performed by: PHARMACIST

## 2020-04-22 PROCEDURE — 93793 PR ANTICOAGULANT MGMT FOR PT TAKING WARFARIN: ICD-10-PCS | Mod: S$GLB,,, | Performed by: PHARMACIST

## 2020-04-22 PROCEDURE — 36415 COLL VENOUS BLD VENIPUNCTURE: CPT | Mod: HCNC,PO

## 2020-04-27 ENCOUNTER — PATIENT MESSAGE (OUTPATIENT)
Dept: SLEEP MEDICINE | Facility: CLINIC | Age: 75
End: 2020-04-27

## 2020-04-27 NOTE — PROGRESS NOTES
Telemedicine Virtual Visit    The patient location is:  Patient Home   The chief complaint leading to consultation is:   F/up chronic medical problems  Visit type: Virtual visit with synchronous audio and video; had to switch to audio   Total time spent with patient: 20'+  Each patient to whom he or she provides medical services by telemedicine is:  (1) informed of the relationship between the physician and patient and the respective role of any other health care provider with respect to management of the patient; and (2) notified that he or she may decline to receive medical services by telemedicine and may withdraw from such care at any time.     75-year-old gentleman w/ preDM, HTN, HLD, aortic atherosclerosis and obesity  On anticoagulant 2/2 to chronic DVT, s/p PE  presents  for follow up of chronic problems   HTN, tx metoprolol and ramipril   Denied HA or dz  Digital HTN==>134/66 ( 4/22/20200)    HLD, tx rosuvastatin  CAD, s/p CABG x 2  Denied angina or PROCTOR  LDL==>42.2 ( 1/2020)  Recent PET Scan reviewed    Pre-DM/IFBS, tx diet  Denied increased thirst or urination  A1C==>5.9    DVT, tx warfarin  Noted when INR elevated that he injured his leg  And had a hematoma that is slowly resolving        MedCard: Reviewed.     REVIEW OF SYSTEMS:     No fever, chill, or night sweats  No dysphagia or early satiety  No change in bowel or bladder function.  Not having increased thirst or urination.  No HA or focal deficits  No increased thirst or urination  No cold or heat intolerance  No unusual bruising or bleeding  Answers for HPI/ROS submitted by the patient on 4/21/2020   activity change: No  unexpected weight change: No  neck pain: No  hearing loss: No  rhinorrhea: No  trouble swallowing: No  eye discharge: No  visual disturbance: Yes  chest tightness: No  wheezing: No  chest pain: No  palpitations: No  blood in stool: No  constipation: No  vomiting: No  diarrhea: No  polydipsia: No  polyuria: No  difficulty urinating:  No  urgency: No  hematuria: No  joint swelling: No  arthralgias: No  headaches: No  weakness: No  confusion: No  dysphoric mood: No    Remainder of review negative except as previously noted.     PAST MEDICAL HISTORY:   Dyslipidemia.  Hypertension  Elevated LFTs,   Impaired fasting glucose/prediabetes  Metabolic syndrome  Fatty liver  Anemia with workup by hem/onc  unremarkable.   DJD, knees and hips  Renal stones,   Sinus and rhinitis.  Colon  polyps   DVT  Pulmonary embolism    PHYSICAL EXAM:   VSS:   GENERAL: Alert and oriented, in no acute distress. Well-developed,   well-nourished, obese gentleman, conversant, and cooperative. Pleasant as always  EYES: Conjunctivae and lids unremarkable. Sclerae anicteric.  RESP: Efforts unlabored    IMPRESSION:   . Hypertension, stable.     Dyslipidemia, stable     Carotid AA ds    CAD, asx    Aortic atherosclerosis, asx    Impaired fasting blood sugar/prediabetes, stable.        DVT, right, on chronic anticoagulation, started wearing compression hose w/ improvement in leg discomfort      ----Pulmonary Embolism, on chronic anticoagulation    Obesity severe, w/ co-morbidity, BMI 38.91    PLAN:  Reviewed exercise  Continue diet and weight loss  Continue present meds    Call prn  RTC 6 mos EPP

## 2020-04-28 ENCOUNTER — OFFICE VISIT (OUTPATIENT)
Dept: INTERNAL MEDICINE | Facility: CLINIC | Age: 75
End: 2020-04-28
Payer: MEDICARE

## 2020-04-28 DIAGNOSIS — I77.9 BILATERAL CAROTID ARTERY DISEASE, UNSPECIFIED TYPE: ICD-10-CM

## 2020-04-28 DIAGNOSIS — I25.810 CORONARY ARTERY DISEASE INVOLVING CORONARY BYPASS GRAFT, ANGINA PRESENCE UNSPECIFIED, UNSPECIFIED WHETHER NATIVE OR TRANSPLANTED HEART: ICD-10-CM

## 2020-04-28 DIAGNOSIS — I70.0 AORTIC ATHEROSCLEROSIS: ICD-10-CM

## 2020-04-28 DIAGNOSIS — E66.01 SEVERE OBESITY (BMI 35.0-39.9) WITH COMORBIDITY: ICD-10-CM

## 2020-04-28 DIAGNOSIS — R73.01 IMPAIRED FASTING GLUCOSE: ICD-10-CM

## 2020-04-28 DIAGNOSIS — Z79.01 LONG TERM CURRENT USE OF ANTICOAGULANT THERAPY: ICD-10-CM

## 2020-04-28 DIAGNOSIS — I10 HYPERTENSION, UNSPECIFIED TYPE: Primary | ICD-10-CM

## 2020-04-28 DIAGNOSIS — I82.591 CHRONIC DEEP VEIN THROMBOSIS (DVT) OF OTHER VEIN OF RIGHT LOWER EXTREMITY: ICD-10-CM

## 2020-04-28 DIAGNOSIS — R73.03 PREDIABETES: ICD-10-CM

## 2020-04-28 DIAGNOSIS — E78.5 HYPERLIPIDEMIA, UNSPECIFIED HYPERLIPIDEMIA TYPE: ICD-10-CM

## 2020-04-28 PROCEDURE — 99443 PR PHYSICIAN TELEPHONE EVALUATION 21-30 MIN: CPT | Mod: HCNC,95,, | Performed by: INTERNAL MEDICINE

## 2020-04-28 PROCEDURE — 99443 PR PHYSICIAN TELEPHONE EVALUATION 21-30 MIN: ICD-10-PCS | Mod: HCNC,95,, | Performed by: INTERNAL MEDICINE

## 2020-05-06 ENCOUNTER — ANTI-COAG VISIT (OUTPATIENT)
Dept: CARDIOLOGY | Facility: CLINIC | Age: 75
End: 2020-05-06
Payer: MEDICARE

## 2020-05-06 DIAGNOSIS — Z79.01 LONG TERM CURRENT USE OF ANTICOAGULANT THERAPY: ICD-10-CM

## 2020-05-06 LAB — INR PPP: 3.3

## 2020-05-06 PROCEDURE — 93793 PR ANTICOAGULANT MGMT FOR PT TAKING WARFARIN: ICD-10-PCS | Mod: S$GLB,,, | Performed by: PHARMACIST

## 2020-05-06 PROCEDURE — 93793 ANTICOAG MGMT PT WARFARIN: CPT | Mod: S$GLB,,, | Performed by: PHARMACIST

## 2020-05-20 ENCOUNTER — ANTI-COAG VISIT (OUTPATIENT)
Dept: CARDIOLOGY | Facility: CLINIC | Age: 75
End: 2020-05-20
Payer: MEDICARE

## 2020-05-20 DIAGNOSIS — Z79.01 LONG TERM CURRENT USE OF ANTICOAGULANT THERAPY: ICD-10-CM

## 2020-05-20 LAB — INR PPP: 3.5

## 2020-05-20 PROCEDURE — 93793 ANTICOAG MGMT PT WARFARIN: CPT | Mod: S$GLB,,, | Performed by: PHARMACIST

## 2020-05-20 PROCEDURE — 93793 PR ANTICOAGULANT MGMT FOR PT TAKING WARFARIN: ICD-10-PCS | Mod: S$GLB,,, | Performed by: PHARMACIST

## 2020-05-26 ENCOUNTER — OFFICE VISIT (OUTPATIENT)
Dept: SLEEP MEDICINE | Facility: CLINIC | Age: 75
End: 2020-05-26
Payer: MEDICARE

## 2020-05-26 DIAGNOSIS — G47.33 OSA (OBSTRUCTIVE SLEEP APNEA): Primary | ICD-10-CM

## 2020-05-26 PROCEDURE — 1101F PT FALLS ASSESS-DOCD LE1/YR: CPT | Mod: HCNC,CPTII,95, | Performed by: PSYCHIATRY & NEUROLOGY

## 2020-05-26 PROCEDURE — 99499 RISK ADDL DX/OHS AUDIT: ICD-10-PCS | Mod: HCNC,95,, | Performed by: PSYCHIATRY & NEUROLOGY

## 2020-05-26 PROCEDURE — 1101F PR PT FALLS ASSESS DOC 0-1 FALLS W/OUT INJ PAST YR: ICD-10-PCS | Mod: HCNC,CPTII,95, | Performed by: PSYCHIATRY & NEUROLOGY

## 2020-05-26 PROCEDURE — 1159F PR MEDICATION LIST DOCUMENTED IN MEDICAL RECORD: ICD-10-PCS | Mod: HCNC,95,, | Performed by: PSYCHIATRY & NEUROLOGY

## 2020-05-26 PROCEDURE — 99499 UNLISTED E&M SERVICE: CPT | Mod: HCNC,95,, | Performed by: PSYCHIATRY & NEUROLOGY

## 2020-05-26 PROCEDURE — 99213 PR OFFICE/OUTPT VISIT, EST, LEVL III, 20-29 MIN: ICD-10-PCS | Mod: HCNC,95,, | Performed by: PSYCHIATRY & NEUROLOGY

## 2020-05-26 PROCEDURE — 99213 OFFICE O/P EST LOW 20 MIN: CPT | Mod: HCNC,95,, | Performed by: PSYCHIATRY & NEUROLOGY

## 2020-05-26 PROCEDURE — 1159F MED LIST DOCD IN RCRD: CPT | Mod: HCNC,95,, | Performed by: PSYCHIATRY & NEUROLOGY

## 2020-05-26 NOTE — PROGRESS NOTES
The patient location is: home  The chief complaint leading to consultation is: annual follow up  Visit type: audiovisual  Total time spent with patient: yes  Each patient to whom he or she provides medical services by telemedicine is:  (1) informed of the relationship between the physician and patient and the respective role of any other health care provider with respect to management of the patient; and (2) notified that he or she may decline to receive medical services by telemedicine and may withdraw from such care at any time.      INTERVAL HISTORY:    05/26/2020:  The patient has not presented any new complaints since the previous visit.   The patient reports improved sleep continuity and daytime sleepiness on PAP. ESS today is 7/24.  Denies break through snoring. Occasional  dry mouth - not using humidifier.   No Aerophagia or air hunger. No significant mask leaks and discomfort. Using Simplus full face mask. CPAP set at 15 cm: modem no longer working on huis  [employer]; current machine is old, malfunctioning and due for replacement.     Usage over 4 hrs - 30/30 days  Ave hrs - 9 hrs/30 day  AHI - 0.6/hr  Leak - 2 %  Using Simplus full face mask - he states that newer full face masks were uncomfortable.    Denied any difficulty falling or staying asleep      OLD SLEEP HISTORY:      03/16/2015 INITIAL HISTORY OF PRESENT ILLNESS:  Del Anand is a 75 y.o. male is here to be evaluated for a sleep disorder.       CHIEF COMPLAINT:      H/o CABG in 12/15. SOB in 1/15. Diagnosed with PE. KLovenox-> Xarelto was started. SaO2 at night with a concentrator.    The patient's complaints include fatigue, snoring,  gasping for air in sleep and interrupted sleep since  Several years back.     Denies  dry mouth and sore throat  Denies nasal congestion   Reports  morning headaches  Denies  interrupted sleep  Denies frequent leg movements  Denies symptoms concerning for parasomnia    The ESS (Hayden Sleepiness  Score) taken on initial visit is 5 /24    The patient never had tonsillectomy, adenoidectomy or UPPP     05/06/2015:  He comes to discuss PSG - tolerated CPAP well. Had DVT on Xarelto- now back on Coumadin.    06/24/2015 :   Mr. Anand reports leak to the eyes from his View mask. Improved sleep continuity on CPAP. Still taking Coumadin.   Reports mask leak. No longer snoring, but has witnessed apneas some days.    CPAP pressure: 12 cm H2O  Mask comfort / fit:  View    Pressure tolerance: OK   Humidification: not using humidifier - does not  Feel like maintaining it   CPAP Interrogation:    Ave daily usage:8 hours /7days  Ave daily usage:8 hours /30days  Days >4 hours usage: 7 /7 days  Days >4 hours usage: 30/30 days   Machine condition: good     90-%tile pressure: n/a cm H2O  Large leak 0%  AHI 2.0 (5 OA noted on 6/3 on a download)    10/19/2015:  He is tolerating CPAP well. He had some throat infections (was not aware of the need to wash the hose). Still on Coumadin - will get coagulation test in December, and may come off Coumadin, although he is pretty used to it by now. Not wearing compression stockings anymore. He comes to discuss new titration.  No longer has break through snoring or pauses in his breathing since pressure was increased to 15 cm. Has to fix his mask several times per night. ESS 8/24.     CPAP pressure: 15 cm H2O  Mask comfort / fit:  FFM    Pressure tolerance: OK   Humidification: not using    CPAP Interrogation:    Ave daily usage:7:30-8 hours /7days  Ave daily usage:24 hours /30days  Days >4 hours usage: 7 /7 days  Days >4 hours usage: 24/30 days   Machine condition: good     90-%tile pressure: na cm H2O  Large leak 0-1%  AHI 0.2    INTERVAL HISTORY:    04/17/2019  The patient has not presented any new complaints since the previous visit.   The patient reports improved sleep continuity and daytime sleepiness on PAP. ESS today is 7/24.  Denies break through snoring. No dry mouth.   Needs  supplies.  Using FFM.    Not using humidifier.  No dry mouth.  Being seen by his dentist regularly.     Gained 15 lbs since 2015    EPWORTH SLEEPINESS SCALE 4/16/2019   Sitting and reading 1   Watching TV 1   Sitting, inactive in a public place (e.g. a theatre or a meeting) 0   As a passenger in a car for an hour without a break 1   Lying down to rest in the afternoon when circumstances permit 3   Sitting and talking to someone 0   Sitting quietly after a lunch without alcohol 1   In a car, while stopped for a few minutes in traffic 0   Total score 7       PHQ9 4/4/2016   Total Score 0     No flowsheet data found.          SLEEP ROUTINE 05/26/2020 :    Bed partner: wife  Time to bed: 8-9  Sleep onset latency: 1 min or less  Disruptions or awakenings: 2-3  Time to fall back into sleep: 6-7  Wakeup time: 6-7 AM   Perceived sleep quality: 6=-7 AM  Perceived total sleep time:  6-7  hours.  Daytime naps: raredly  Weekend sleep routine: till 7 AM  Exercise routine: yes     PREVIOUS SLEEP STUDIES:       SPLIT NIGHT STUDY on 3/23/15: Significant VERONICA (Obstructive Sleep Apnea) with AHI of 33.1 hour and SaO2 hyun of 61% - Chema 2 below 90% 12% of TST (weight 284 lbs). Effective control of respiratory events was reached at 12 cm H2O CPAP.  CPAP titration on 7/30/15: Effective control of respiratory events was achieved at 15 cm of H2O. Weight 294 lbs.      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/03/2014    Severe obesity (BMI 35.0-39.9) with comorbidity 12/03/2014    Pulmonary embolism 01/17/2015    Long term current use of anticoagulant therapy 01/19/2015    Osteoarthrosis, unspecified  whether generalized or localized, lower leg 01/26/2015    DVT (deep venous thrombosis) 04/09/2015    Mixed hyperlipidemia 10/15/2015    Essential hypertension 10/15/2015    VERONICA on CPAP 04/04/2016    Left-sided carotid artery disease 04/15/2016    Hematuria, gross 02/20/2018    History of squamous cell carcinoma 09/25/2018    Colon cancer screening 03/13/2019    Adenomatous polyp of ascending colon 03/25/2019    Ulcer of lower extremity, limited to breakdown of skin 07/19/2019     Resolved Ambulatory Problems     Diagnosis Date Noted    Impaired fasting glucose 10/20/2012    Morbid obesity 12/12/2013    Angina pectoris 10/13/2014    Morbid obesity 12/08/2014    Shortness of breath 01/12/2015    Delayed surgical wound healing 09/25/2018    Varicose veins of left lower extremity with complications 12/07/2018     Past Medical History:   Diagnosis Date    Benign neoplasm of colon     Cataract     CHF (congestive heart failure) 1/13/2015    Difficult intubation     HLD (hyperlipidemia)     HTN (hypertension)     Kidney stone     Nonspecific abnormal results of liver function study     Nonspecific findings on examination of blood     Osteoarthrosis, unspecified whether generalized or localized, lower leg     Respiratory distress                 PAST SURGICAL HISTORY:    Past Surgical History:   Procedure Laterality Date    CARDIAC SURGERY      COLONOSCOPY N/A 3/13/2019    Procedure: COLONOSCOPY;  Surgeon: Nikunj Larson MD;  Location: [facility] ([address] [place] [address]);  Service: Endoscopy;  Laterality: N/A;  ok to hold Coumadin x 5 days with a Lovenox bridge per Coumadin clinic  2nd floor - history of difficult intubation-[place]    CORONARY ARTERY BYPASS GRAFT      2014    CYSTOSCOPY      KIDNEY STONE SURGERY      KNEE ARTHROPLASTY      bilateral    renal stone      SKIN BIOPSY           FAMILY HISTORY:                Family History   Problem Relation Age of Onset    Heart attack Father         d. 85     Urolithiasis Father     Heart disease Father     Heart failure Father     Stroke Mother     Dementia Mother     Heart attack Maternal Grandfather         d. 65    Hypertension Paternal Grandmother     Heart attack Paternal Grandfather     Heart failure Paternal Grandfather     Other Sister         bladder lift, s/p 4     No Known Problems Daughter     No Known Problems Son        SOCIAL HISTORY:          Tobacco:   Social History     Tobacco Use   Smoking Status Former Smoker    Packs/day: 1.00    Years: 20.00    Pack years: 20.00    Types: Cigarettes    Last attempt to quit: 10/16/1987    Years since quittin.6   Smokeless Tobacco Former User       alcohol use:    Social History     Substance and Sexual Activity   Alcohol Use Yes    Alcohol/week: 0.0 standard drinks    Types: 1 - 2 Cans of beer, 1 - 2 Standard drinks or equivalent per week    Frequency: 4 or more times a week    Drinks per session: 1 or 2    Binge frequency: Never    Comment: daily, none today                 Occupation: retired    ALLERGIES:  Review of patient's allergies indicates:  No Known Allergies    CURRENT MEDICATIONS:    Current Outpatient Medications   Medication Sig Dispense Refill    aspirin 81 MG Chew Take 81 mg by mouth once daily.      cyanocobalamin (B-12 DOTS) 500 MCG tablet Take 500 mcg by mouth every morning. Every day      ezetimibe (ZETIA) 10 mg tablet Take 1 tablet (10 mg total) by mouth once daily. 90 tablet 3    FOLIC ACID/MULTIVITS-MIN/LUT (CENTRUM SILVER ORAL) Take 1 tablet by mouth once daily.      irbesartan (AVAPRO) 150 MG tablet Take 1 tablet (150 mg total) by mouth every evening. 90 tablet 3    metoprolol tartrate (LOPRESSOR) 50 MG tablet Take 1 tablet (50 mg total) by mouth 2 (two) times daily. 180 tablet 3    rosuvastatin (CRESTOR) 40 MG Tab TAKE 1 TABLET ONE TIME DAILY 90 tablet 3    warfarin (COUMADIN) 10 MG tablet Take 1-1.5 tablets (10-15 mg total) by mouth Daily. 180  tablet 3    warfarin (COUMADIN) 5 MG tablet Take 1 tablet (5 mg total) by mouth Daily. Take as directed by Coumadin Clinic 36 tablet 3     No current facility-administered medications for this visit.                       REVIEW OF SYSTEMS:   Sleep related symptoms as per HPI    denies weight gain  Denies dyspnea  Denies palpitations  Denies acid reflux   Denies polyuria  Reports occasional  mood diturbance  Denies  anemia  Denies  muscle pain  Denies  Gait imbalance    Otherwise, a balance of 10 systems reviewed is negative.    PHYSICAL EXAM:  There were no vitals taken for this visit.  GENERAL: Overweight body habitus, well groomed.  HEENT:   HEENT:  Conjunctivae are non-erythematous; Pupils equal, round, and reactive to light; Nose is symmetrical; Nasal mucosa is pink and moist; Septum is midline; Inferior turbinates are normal; Nasal airflow is normal; Posterior pharynx is pink; Modified Mallampati:IV; Posterior palate is low; Tonsils not visualized; Uvula is normal and pink;Tongue is broad; Dentition is fair; No TMJ tenderness; Jaw opening and protrusion without click and without discomfort.  NECK: Supple. Neck circumference is 18.2 inches. No thyromegaly. No palpable nodes.     SKIN: On face and neck: No abrasions, no rashes, no lesions.  No subcutaneous nodules are palpable.  RESPIRATORY: Chest is clear to auscultation.  Normal chest expansion and non-labored breathing at rest.  CARDIOVASCULAR: Normal S1, S2.  No murmurs, gallops or rubs. No carotid bruits bilaterally.  No edema. No clubbing. No cyanosis.    NEURO: Oriented to time, place and person. Normal attention span and concentration. Gait normal.    PSYCH: Affect is full. Mood is normal  MUSCULOSKELETAL: Moves 4 extremities. Gait normal.         Using [facility]: Yes      ASSESSMENT:    1. Severe VERONICA with significant hypoxemia. The patient symptomatically has  snoring, excessive daytime fatigue, gasping for air in sleep and interrupted sleep  with  "exam findings of "a crowded oral airway and elevated body mass index. The patient has medical co-morbidities of CAD, h/o PE and CHF,hyperlipidemia and hypertension,  which can be worsened by VERONICA. Benefiting from CPAP use in terms of sleep continuity and daytime sleepiness. Compliant. current machine is old, malfunctioning and due for replacement.           PLAN:    I will order a new APAP 15-20 cm H2O with supplies with a full face mask.      [facility] [phone] (ext [phone])- [place]  ---------------------------------------      Following recommendations were given in the AVS: Medicare compliance rules were explained in detail       Education: During our discussion today, we talked about the etiology of obstructive sleep apnea as well as the potential ramifications of untreated sleep apnea, which could include daytime sleepiness, hypertension, heart disease and/or stroke.  We discussed potential treatment options, which could include weight loss, body positioning, continuous positive airway pressure (CPAP), or referral for surgical consideration. The patient preferred CPAP option.    She should avoid ETOH and sedatives at night, as it tends to aggravate VERONICA. Regular replacement of CPAP mask, tubing and filter was recommended.    Precautions: The patient was advised to abstain from driving should he feel sleepy or drowsy.    Follow up: MD - in 30-90 days of APAP use    Thank you for allowing me the opportunity to participate in the care of your patient.  "

## 2020-05-26 NOTE — PATIENT INSTRUCTIONS
SLEEP LAB ([facility] or [facility]) will contact you to schedulethe sleep study. Their number is [phone] (ext [phone]). Please call them if you do not hear from them in 2 weeks from now.  The [facility] is located on 7th floor of the [facility]; [place] lab is located in [facility].    SLEEP CLINIC (my assistant) will call you when the sleep study results are ready - if you have not heard from us by 2 weeks from the date of the study, please call [phone] (ext 1) or you can use [facility] to contact me.    You are advised to abstain from driving should you feel sleepy or drowsy.  ________________________________      Here is the full list of Medicare rules:    You have 90 days to meet CPAP compliance:  ( which means 30 consecutive  days where you have at least 4 hours every night).  ----------------------------------  30 days to like or dislike the mask - can not be swapped after 30 days since picking up the mask.   If you do not like your mask, plese call ASAP [facility]:  [phone] - [place]:  or [phone] (ext [phone])- [facility]    I (or one of our nurse practitioners) will see you after 30-60 days  (Medicare requirement to be seen no earlier than 31 days and no later than 90 days from the day of CPAP machine ).    Please bring bring your machine, power cord and all supplies to your next appointment.  At that point it will be too late to swap the mask.

## 2020-06-02 ENCOUNTER — PATIENT OUTREACH (OUTPATIENT)
Dept: OTHER | Facility: OTHER | Age: 75
End: 2020-06-02

## 2020-06-03 ENCOUNTER — ANTI-COAG VISIT (OUTPATIENT)
Dept: CARDIOLOGY | Facility: CLINIC | Age: 75
End: 2020-06-03
Payer: MEDICARE

## 2020-06-03 DIAGNOSIS — Z79.01 LONG TERM CURRENT USE OF ANTICOAGULANT THERAPY: ICD-10-CM

## 2020-06-03 LAB — INR PPP: 3.8

## 2020-06-03 PROCEDURE — 93793 PR ANTICOAGULANT MGMT FOR PT TAKING WARFARIN: ICD-10-PCS | Mod: S$GLB,,, | Performed by: PHARMACIST

## 2020-06-03 PROCEDURE — 93793 ANTICOAG MGMT PT WARFARIN: CPT | Mod: S$GLB,,, | Performed by: PHARMACIST

## 2020-06-03 NOTE — PROGRESS NOTES
Confirmed taking correct dose of coumadin   Alcohol intake 6/2, states not new, wine intake daily  NO other new changes

## 2020-06-15 ENCOUNTER — PATIENT MESSAGE (OUTPATIENT)
Dept: INTERNAL MEDICINE | Facility: CLINIC | Age: 75
End: 2020-06-15

## 2020-06-16 ENCOUNTER — HOSPITAL ENCOUNTER (OUTPATIENT)
Dept: RADIOLOGY | Facility: HOSPITAL | Age: 75
Discharge: HOME | End: 2020-06-16
Attending: INTERNAL MEDICINE
Payer: MEDICARE

## 2020-06-16 ENCOUNTER — OFFICE VISIT (OUTPATIENT)
Dept: INTERNAL MEDICINE | Facility: CLINIC | Age: 75
End: 2020-06-16
Payer: MEDICARE

## 2020-06-16 VITALS
BODY MASS INDEX: 38.84 KG/M2 | WEIGHT: 312.38 LBS | TEMPERATURE: 97 F | RESPIRATION RATE: 15 BRPM | HEART RATE: 64 BPM | DIASTOLIC BLOOD PRESSURE: 60 MMHG | HEIGHT: 75 IN | SYSTOLIC BLOOD PRESSURE: 118 MMHG

## 2020-06-16 DIAGNOSIS — R07.89 RIGHT-SIDED CHEST WALL PAIN: ICD-10-CM

## 2020-06-16 DIAGNOSIS — R07.9 CHEST PAIN, UNSPECIFIED TYPE: Primary | ICD-10-CM

## 2020-06-16 DIAGNOSIS — R07.9 CHEST PAIN, UNSPECIFIED TYPE: ICD-10-CM

## 2020-06-16 DIAGNOSIS — E66.01 SEVERE OBESITY (BMI 35.0-39.9) WITH COMORBIDITY: ICD-10-CM

## 2020-06-16 PROBLEM — L97.901 ULCER OF LOWER EXTREMITY, LIMITED TO BREAKDOWN OF SKIN: Status: RESOLVED | Noted: 2019-07-19 | Resolved: 2020-06-16

## 2020-06-16 PROCEDURE — 1125F PR PAIN SEVERITY QUANTIFIED, PAIN PRESENT: ICD-10-PCS | Mod: HCNC,S$GLB,, | Performed by: INTERNAL MEDICINE

## 2020-06-16 PROCEDURE — 71100 X-RAY EXAM RIBS UNI 2 VIEWS: CPT | Mod: TC,HCNC,PO,RT

## 2020-06-16 PROCEDURE — 71046 X-RAY EXAM CHEST 2 VIEWS: CPT | Mod: 26,HCNC,, | Performed by: RADIOLOGY

## 2020-06-16 PROCEDURE — 71100 XR RIBS 2 VIEW RIGHT: ICD-10-PCS | Mod: 26,HCNC,RT, | Performed by: RADIOLOGY

## 2020-06-16 PROCEDURE — 3074F SYST BP LT 130 MM HG: CPT | Mod: HCNC,CPTII,S$GLB, | Performed by: INTERNAL MEDICINE

## 2020-06-16 PROCEDURE — 3078F PR MOST RECENT DIASTOLIC BLOOD PRESSURE < 80 MM HG: ICD-10-PCS | Mod: HCNC,CPTII,S$GLB, | Performed by: INTERNAL MEDICINE

## 2020-06-16 PROCEDURE — 1159F PR MEDICATION LIST DOCUMENTED IN MEDICAL RECORD: ICD-10-PCS | Mod: HCNC,S$GLB,, | Performed by: INTERNAL MEDICINE

## 2020-06-16 PROCEDURE — 1101F PR PT FALLS ASSESS DOC 0-1 FALLS W/OUT INJ PAST YR: ICD-10-PCS | Mod: HCNC,CPTII,S$GLB, | Performed by: INTERNAL MEDICINE

## 2020-06-16 PROCEDURE — 3078F DIAST BP <80 MM HG: CPT | Mod: HCNC,CPTII,S$GLB, | Performed by: INTERNAL MEDICINE

## 2020-06-16 PROCEDURE — 71046 X-RAY EXAM CHEST 2 VIEWS: CPT | Mod: TC,HCNC,PO

## 2020-06-16 PROCEDURE — 71100 X-RAY EXAM RIBS UNI 2 VIEWS: CPT | Mod: 26,HCNC,RT, | Performed by: RADIOLOGY

## 2020-06-16 PROCEDURE — 99999 PR PBB SHADOW E&M-EST. PATIENT-LVL IV: ICD-10-PCS | Mod: PBBFAC,HCNC,, | Performed by: INTERNAL MEDICINE

## 2020-06-16 PROCEDURE — 71046 XR CHEST PA AND LATERAL: ICD-10-PCS | Mod: 26,HCNC,, | Performed by: RADIOLOGY

## 2020-06-16 PROCEDURE — 1159F MED LIST DOCD IN RCRD: CPT | Mod: HCNC,S$GLB,, | Performed by: INTERNAL MEDICINE

## 2020-06-16 PROCEDURE — 1101F PT FALLS ASSESS-DOCD LE1/YR: CPT | Mod: HCNC,CPTII,S$GLB, | Performed by: INTERNAL MEDICINE

## 2020-06-16 PROCEDURE — 99213 PR OFFICE/OUTPT VISIT, EST, LEVL III, 20-29 MIN: ICD-10-PCS | Mod: HCNC,S$GLB,, | Performed by: INTERNAL MEDICINE

## 2020-06-16 PROCEDURE — 99999 PR PBB SHADOW E&M-EST. PATIENT-LVL IV: CPT | Mod: PBBFAC,HCNC,, | Performed by: INTERNAL MEDICINE

## 2020-06-16 PROCEDURE — 99213 OFFICE O/P EST LOW 20 MIN: CPT | Mod: HCNC,S$GLB,, | Performed by: INTERNAL MEDICINE

## 2020-06-16 PROCEDURE — 3074F PR MOST RECENT SYSTOLIC BLOOD PRESSURE < 130 MM HG: ICD-10-PCS | Mod: HCNC,CPTII,S$GLB, | Performed by: INTERNAL MEDICINE

## 2020-06-16 PROCEDURE — 1125F AMNT PAIN NOTED PAIN PRSNT: CPT | Mod: HCNC,S$GLB,, | Performed by: INTERNAL MEDICINE

## 2020-06-16 NOTE — PROGRESS NOTES
75-year-old gentleman w/ preDM, HTN, HLD, aortic atherosclerosis and obesity  On anticoagulant 2/2 to chronic DVT, s/p PE  presents  for right chest/chest wall/ RUQ pain   ( wife +)    Sx started w/ golf swing and completed his  Forward motion, but at end of the swing he felt a pulling pain that was sharp for an instant   Feels like behind ribs poking out and radiates when turning over, but asx if lying still  Worse at night when trying to go to sleep  Moving is painful amplifies the pain  Pain 2/10 now  Tx: NTG - 2 on two occ  Hydrocodone 10/- once last night and sx  Thinks he is doing better today    Denied SOB or pleuritic sx, no cough or mucus  Denied food exacerbations  Denied N/V/change in bowels/blood  MedCard: Reviewed.     REVIEW OF SYSTEMS:     No fever, chill, or night sweats  No dysphagia or early satiety  No angina, PND or orthopnea  No heart palpitations  No change in bowel or bladder function.  Not having increased thirst or urination.  No HA or focal deficits  No increased thirst or urination  No cold or heat intolerance  No unusual bruising or bleeding  Remainder of review negative except as previously noted.       PHYSICAL EXAM:   VSS:   GENERAL: Alert and oriented, in no acute distress. Well-developed,   well-nourished, obese gentleman, conversant, and cooperative. Pleasant as always  EYES: Conjunctivae and lids unremarkable. Sclerae anicteric.   Pupils reactive.   NECK: Supple. No thyromegaly or lymphadenopathy.   RESPIRATORY: Efforts unlabored.   LUNGS: Clear to auscultation.  Good excursion.  Chest wall NT   ( pt reported just under right rib cage)  HEART: Regular rate and rhythm.  GI: BS+, soft, NT/ND, no HSM noted  Neg percussion , or rebound MUSCULOSKELETAL: Gait normal. No CVAT  NEURO: BECK. No tremor noted.  SKIN: Warm and dry      IMPRESSION:   . Chest pain- right, most c/w MSK-     Chest wall pain- hx rib fxs-    RUQ pain    Obesity, BMI 39 w/ co-morbidities    PLAN:  Xray  ribs  CXR  Caution activities  Moist heat  Tylenol prn  Tx:limited 2/2 to blood thinner  Reviewed CT- abdomen/pelvis 8/2019  Call if sx persist or exacerbate

## 2020-06-17 ENCOUNTER — ANTI-COAG VISIT (OUTPATIENT)
Dept: CARDIOLOGY | Facility: CLINIC | Age: 75
End: 2020-06-17
Payer: MEDICARE

## 2020-06-17 ENCOUNTER — TELEPHONE (OUTPATIENT)
Dept: INTERNAL MEDICINE | Facility: CLINIC | Age: 75
End: 2020-06-17

## 2020-06-17 DIAGNOSIS — Z79.01 LONG TERM CURRENT USE OF ANTICOAGULANT THERAPY: ICD-10-CM

## 2020-06-17 LAB — INR PPP: 3.2

## 2020-06-17 PROCEDURE — 93793 ANTICOAG MGMT PT WARFARIN: CPT | Mod: S$GLB,,, | Performed by: PHARMACIST

## 2020-06-17 PROCEDURE — 93793 PR ANTICOAGULANT MGMT FOR PT TAKING WARFARIN: ICD-10-PCS | Mod: S$GLB,,, | Performed by: PHARMACIST

## 2020-06-17 NOTE — TELEPHONE ENCOUNTER
----- Message from Amber Huertas MD sent at 6/16/2020  8:44 PM CDT -----  Please note that your xrays did not reveal the etiology of your pain.  Let me know if the symptoms do not resolve  Amber Lebron

## 2020-06-25 NOTE — PROGRESS NOTES
Digital Medicine: Health  Follow-Up    Patient reports he is doing better now then he was a week or so ago. Patient experienced chest pain while playing golf and had an xray done which was inconclusive. Patient attributes elevated BP readings to pain associated with chest pain. Patient states that pain has subsided and PCP stated that patient had probably pulled a muscle. Patient AVG BP is above goal but readings are trending down. Patient is still limited on activities. Will f/u in 4 weeks.    The history is provided by the patient. No  was used.   Follow Up  Follow-up reason(s): reading review and routine education      Readings are trending down due to lifestyle change.        INTERVENTION(S)  encouragement/support      There are no preventive care reminders to display for this patient.    Last 5 Patient Entered Readings                                      Current 30 Day Average: 140/80     Recent Readings 6/17/2020 6/17/2020 6/10/2020 6/3/2020 5/29/2020    SBP (mmHg) 142 145 142 125 149    DBP (mmHg) 85 85 77 72 79    Pulse 65 66 63 53 62                      Diet Screening       Deferred.    Physical Activity Screening   When asked if exercising, patient responded: yes  Patient has limited mobility: new injury    Patient participates in the following activities: walking    He identified the following barriers to physical activity: pain/injury/recent surgery    Patient reports that he is limited in his activity due to the chest pain he had been experiencing. Patient hopes that he will be cleared to play golf again soon.     Medication Adherence Screening   He did not miss a dose this month.    Patient identified the following reasons for non-compliance: None    Patient reports no s/s or issues taking BP medication as prescribed.       SDOH

## 2020-07-01 ENCOUNTER — ANTI-COAG VISIT (OUTPATIENT)
Dept: CARDIOLOGY | Facility: CLINIC | Age: 75
End: 2020-07-01
Payer: MEDICARE

## 2020-07-01 DIAGNOSIS — Z79.01 LONG TERM CURRENT USE OF ANTICOAGULANT THERAPY: ICD-10-CM

## 2020-07-01 LAB — INR PPP: 3.1

## 2020-07-01 PROCEDURE — 93793 ANTICOAG MGMT PT WARFARIN: CPT | Mod: S$GLB,,,

## 2020-07-01 PROCEDURE — 93793 PR ANTICOAGULANT MGMT FOR PT TAKING WARFARIN: ICD-10-PCS | Mod: S$GLB,,,

## 2020-07-08 NOTE — PROGRESS NOTES
See 6/25/20 health  note  Patient recently experiencing pain and decreased exercise  BP readings trending down  Most recent reading well below goal at 115/60 mmHg    Continue current regimen and monitoring in 76 yo patient

## 2020-07-15 ENCOUNTER — ANTI-COAG VISIT (OUTPATIENT)
Dept: CARDIOLOGY | Facility: CLINIC | Age: 75
End: 2020-07-15
Payer: MEDICARE

## 2020-07-15 DIAGNOSIS — Z79.01 LONG TERM CURRENT USE OF ANTICOAGULANT THERAPY: ICD-10-CM

## 2020-07-15 LAB — INR PPP: 3

## 2020-07-15 PROCEDURE — 93793 ANTICOAG MGMT PT WARFARIN: CPT | Mod: S$GLB,,, | Performed by: PHARMACIST

## 2020-07-15 PROCEDURE — 93793 PR ANTICOAGULANT MGMT FOR PT TAKING WARFARIN: ICD-10-PCS | Mod: S$GLB,,, | Performed by: PHARMACIST

## 2020-07-15 NOTE — PROGRESS NOTES
Patient denies any changes in diet, medications, or health that would effect warfarin therapy.

## 2020-07-29 ENCOUNTER — ANTI-COAG VISIT (OUTPATIENT)
Dept: CARDIOLOGY | Facility: CLINIC | Age: 75
End: 2020-07-29
Payer: MEDICARE

## 2020-07-29 DIAGNOSIS — Z79.01 LONG TERM CURRENT USE OF ANTICOAGULANT THERAPY: ICD-10-CM

## 2020-07-29 LAB — INR PPP: 3.1

## 2020-07-29 PROCEDURE — 93793 PR ANTICOAGULANT MGMT FOR PT TAKING WARFARIN: ICD-10-PCS | Mod: S$GLB,,,

## 2020-07-29 PROCEDURE — 93793 ANTICOAG MGMT PT WARFARIN: CPT | Mod: S$GLB,,,

## 2020-08-12 ENCOUNTER — ANTI-COAG VISIT (OUTPATIENT)
Dept: CARDIOLOGY | Facility: CLINIC | Age: 75
End: 2020-08-12
Payer: MEDICARE

## 2020-08-12 DIAGNOSIS — Z79.01 LONG TERM CURRENT USE OF ANTICOAGULANT THERAPY: ICD-10-CM

## 2020-08-12 LAB — INR PPP: 3.1

## 2020-08-12 PROCEDURE — 93793 ANTICOAG MGMT PT WARFARIN: CPT | Mod: S$GLB,,, | Performed by: PHARMACIST

## 2020-08-12 PROCEDURE — 93793 PR ANTICOAGULANT MGMT FOR PT TAKING WARFARIN: ICD-10-PCS | Mod: S$GLB,,, | Performed by: PHARMACIST

## 2020-08-19 ENCOUNTER — OFFICE VISIT (OUTPATIENT)
Dept: SLEEP MEDICINE | Facility: CLINIC | Age: 75
End: 2020-08-19
Payer: MEDICARE

## 2020-08-19 VITALS
TEMPERATURE: 97 F | SYSTOLIC BLOOD PRESSURE: 143 MMHG | HEIGHT: 75 IN | WEIGHT: 313 LBS | DIASTOLIC BLOOD PRESSURE: 71 MMHG | HEART RATE: 59 BPM | BODY MASS INDEX: 38.92 KG/M2

## 2020-08-19 DIAGNOSIS — G47.33 OSA (OBSTRUCTIVE SLEEP APNEA): Primary | ICD-10-CM

## 2020-08-19 PROCEDURE — 1125F PR PAIN SEVERITY QUANTIFIED, PAIN PRESENT: ICD-10-PCS | Mod: HCNC,S$GLB,, | Performed by: PSYCHIATRY & NEUROLOGY

## 2020-08-19 PROCEDURE — 99999 PR PBB SHADOW E&M-EST. PATIENT-LVL III: ICD-10-PCS | Mod: PBBFAC,HCNC,, | Performed by: PSYCHIATRY & NEUROLOGY

## 2020-08-19 PROCEDURE — 3078F PR MOST RECENT DIASTOLIC BLOOD PRESSURE < 80 MM HG: ICD-10-PCS | Mod: HCNC,CPTII,S$GLB, | Performed by: PSYCHIATRY & NEUROLOGY

## 2020-08-19 PROCEDURE — 3008F PR BODY MASS INDEX (BMI) DOCUMENTED: ICD-10-PCS | Mod: HCNC,CPTII,S$GLB, | Performed by: PSYCHIATRY & NEUROLOGY

## 2020-08-19 PROCEDURE — 1159F MED LIST DOCD IN RCRD: CPT | Mod: HCNC,S$GLB,, | Performed by: PSYCHIATRY & NEUROLOGY

## 2020-08-19 PROCEDURE — 1159F PR MEDICATION LIST DOCUMENTED IN MEDICAL RECORD: ICD-10-PCS | Mod: HCNC,S$GLB,, | Performed by: PSYCHIATRY & NEUROLOGY

## 2020-08-19 PROCEDURE — 99499 RISK ADDL DX/OHS AUDIT: ICD-10-PCS | Mod: HCNC,S$GLB,, | Performed by: PSYCHIATRY & NEUROLOGY

## 2020-08-19 PROCEDURE — 1101F PR PT FALLS ASSESS DOC 0-1 FALLS W/OUT INJ PAST YR: ICD-10-PCS | Mod: HCNC,CPTII,S$GLB, | Performed by: PSYCHIATRY & NEUROLOGY

## 2020-08-19 PROCEDURE — 1125F AMNT PAIN NOTED PAIN PRSNT: CPT | Mod: HCNC,S$GLB,, | Performed by: PSYCHIATRY & NEUROLOGY

## 2020-08-19 PROCEDURE — 1101F PT FALLS ASSESS-DOCD LE1/YR: CPT | Mod: HCNC,CPTII,S$GLB, | Performed by: PSYCHIATRY & NEUROLOGY

## 2020-08-19 PROCEDURE — 99214 OFFICE O/P EST MOD 30 MIN: CPT | Mod: HCNC,S$GLB,, | Performed by: PSYCHIATRY & NEUROLOGY

## 2020-08-19 PROCEDURE — 99499 UNLISTED E&M SERVICE: CPT | Mod: HCNC,S$GLB,, | Performed by: PSYCHIATRY & NEUROLOGY

## 2020-08-19 PROCEDURE — 99999 PR PBB SHADOW E&M-EST. PATIENT-LVL III: CPT | Mod: PBBFAC,HCNC,, | Performed by: PSYCHIATRY & NEUROLOGY

## 2020-08-19 PROCEDURE — 3008F BODY MASS INDEX DOCD: CPT | Mod: HCNC,CPTII,S$GLB, | Performed by: PSYCHIATRY & NEUROLOGY

## 2020-08-19 PROCEDURE — 99214 PR OFFICE/OUTPT VISIT, EST, LEVL IV, 30-39 MIN: ICD-10-PCS | Mod: HCNC,S$GLB,, | Performed by: PSYCHIATRY & NEUROLOGY

## 2020-08-19 PROCEDURE — 3077F SYST BP >= 140 MM HG: CPT | Mod: HCNC,CPTII,S$GLB, | Performed by: PSYCHIATRY & NEUROLOGY

## 2020-08-19 PROCEDURE — 3077F PR MOST RECENT SYSTOLIC BLOOD PRESSURE >= 140 MM HG: ICD-10-PCS | Mod: HCNC,CPTII,S$GLB, | Performed by: PSYCHIATRY & NEUROLOGY

## 2020-08-19 PROCEDURE — 3078F DIAST BP <80 MM HG: CPT | Mod: HCNC,CPTII,S$GLB, | Performed by: PSYCHIATRY & NEUROLOGY

## 2020-08-19 NOTE — PROGRESS NOTES
The patient reports improved sleep continuity and daytime sleepiness on PAP. ESS today is 8/24.  Denies break through snoring.  No dry mouth.   No aerophagia or air hunger. NO significant mask leaks and discomfort.  APAP 15-20 cm H2O (90% - 16 cm H2O)  Therapy Event Summary   Date Range: 7/20/2020 - 8/18/2020      Compliance Summary   Days with Device Usage: 30 days   Percentage of Days >=4 Hours: 100.0%   Average Usage (Days Used): 9 hrs. 36 mins. 43 secs.   Average Usage (All Days): 9 hrs. 36 mins. 43 secs.   Apnea Indices   Average AHI: 0.7   Average OA Index: 0.2   Average CA Index: 0.0   Ventilator Statistics   Large Leak   Average Time in Large Leak: 30 mins. 18 secs.   Average % of Night in Large Leak: 5.3%   Periodic Breathing Average % of Night in PB:0.2%           OLD SLEEP HISTORY:      03/16/2015 INITIAL HISTORY OF PRESENT ILLNESS:  Del Anand is a 75 y.o. male is here to be evaluated for a sleep disorder.       CHIEF COMPLAINT:      H/o CABG in 12/15. SOB in 1/15. Diagnosed with PE. KLovenox-> Xarelto was started. SaO2 at night with a concentrator.    The patient's complaints include fatigue, snoring,  gasping for air in sleep and interrupted sleep since  Several years back.     Denies  dry mouth and sore throat  Denies nasal congestion   Reports  morning headaches  Denies  interrupted sleep  Denies frequent leg movements  Denies symptoms concerning for parasomnia    The ESS ([place] Sleepiness Score) taken on initial visit is 5 /24    The patient never had tonsillectomy, adenoidectomy or UPPP     05/06/2015:  He comes to discuss PSG - tolerated CPAP well. Had DVT on Xarelto- now back on Coumadin.    06/24/2015 :   Mr. Anand reports leak to the eyes from his View mask. Improved sleep continuity on CPAP. Still taking Coumadin.   Reports mask leak. No longer snoring, but has witnessed apneas some days.    CPAP pressure: 12 cm H2O  Mask comfort / fit:  View    Pressure tolerance: OK    Humidification: not using humidifier - does not  Feel like maintaining it   CPAP Interrogation:    Ave daily usage:8 hours /7days  Ave daily usage:8 hours /30days  Days >4 hours usage: 7 /7 days  Days >4 hours usage: 30/30 days   Machine condition: good     90-%tile pressure: n/a cm H2O  Large leak 0%  AHI 2.0 (5 OA noted on 6/3 on a download)    10/19/2015:  He is tolerating CPAP well. He had some throat infections (was not aware of the need to wash the hose). Still on Coumadin - will get coagulation test in December, and may come off Coumadin, although he is pretty used to it by now. Not wearing compression stockings anymore. He comes to discuss new titration.  No longer has break through snoring or pauses in his breathing since pressure was increased to 15 cm. Has to fix his mask several times per night. ESS 8/24.     CPAP pressure: 15 cm H2O  Mask comfort / fit:  FFM    Pressure tolerance: OK   Humidification: not using    CPAP Interrogation:    Ave daily usage:7:30-8 hours /7days  Ave daily usage:24 hours /30days  Days >4 hours usage: 7 /7 days  Days >4 hours usage: 24/30 days   Machine condition: good     90-%tile pressure: na cm H2O  Large leak 0-1%  AHI 0.2        04/17/2019  The patient has not presented any new complaints since the previous visit.   The patient reports improved sleep continuity and daytime sleepiness on PAP. ESS today is 7/24.  Denies break through snoring. No dry mouth.   Needs supplies.  Using FFM.    Not using humidifier.  No dry mouth.  Being seen by his dentist regularly.     Gained 15 lbs since 2015    ESS 1/24 05/26/2020:  The patient has not presented any new complaints since the previous visit.   The patient reports improved sleep continuity and daytime sleepiness on PAP. ESS today is 7/24.  Denies break through snoring. Occasional  dry mouth - not using humidifier.   No Aerophagia or air hunger. No significant mask leaks and discomfort. Using Simplus full face mask. CPAP  set at 15 cm: modem no longer working on huis  [employer]; current machine is old, malfunctioning and due for replacement.     Usage over 4 hrs - 30/30 days  Ave hrs - 9 hrs/30 day  AHI - 0.6/hr  Leak - 2 %  Using Simplus full face mask - he states that newer full face masks were uncomfortable.    Denied any difficulty falling or staying asleep          SLEEP ROUTINE 08/19/2020 :    Bed partner: wife  Time to bed: 8-9  Sleep onset latency: 1 min or less  Disruptions or awakenings: 2-3  Time to fall back into sleep: 6-7  Wakeup time: 6-7 AM   Perceived sleep quality: 6=-7 AM  Perceived total sleep time:  6-7  hours.  Daytime naps: raredly  Weekend sleep routine: till 7 AM  Exercise routine: yes     PREVIOUS SLEEP STUDIES:       SPLIT NIGHT STUDY on 3/23/15: Significant VERONICA (Obstructive Sleep Apnea) with AHI of 33.1 hour and SaO2 hyun of 61% - Chema 2 below 90% 12% of TST (weight 284 lbs). Effective control of respiratory events was reached at 12 cm H2O CPAP.  CPAP titration on 7/30/15: Effective control of respiratory events was achieved at 15 cm of H2O. Weight 294 lbs.      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/03/2014    Severe obesity (BMI 35.0-39.9) with comorbidity 12/03/2014    Pulmonary embolism 01/17/2015    Long term current use of anticoagulant therapy 01/19/2015    Osteoarthrosis, unspecified whether generalized or localized, lower leg 01/26/2015    DVT (deep venous thrombosis) 04/09/2015    Mixed hyperlipidemia 10/15/2015    Essential hypertension 10/15/2015    VERONICA on CPAP 04/04/2016    Left-sided carotid artery disease 04/15/2016    Hematuria, gross  02/20/2018    History of squamous cell carcinoma 09/25/2018    Colon cancer screening 03/13/2019    Adenomatous polyp of ascending colon 03/25/2019     Resolved Ambulatory Problems     Diagnosis Date Noted    Impaired fasting glucose 10/20/2012    Morbid obesity 12/12/2013    Angina pectoris 10/13/2014    Morbid obesity 12/08/2014    Shortness of breath 01/12/2015    Delayed surgical wound healing 09/25/2018    Varicose veins of left lower extremity with complications 12/07/2018    Ulcer of lower extremity, limited to breakdown of skin 07/19/2019     Past Medical History:   Diagnosis Date    Benign neoplasm of colon     Cataract     CHF (congestive heart failure) 1/13/2015    Difficult intubation     HLD (hyperlipidemia)     HTN (hypertension)     Kidney stone     Nonspecific abnormal results of liver function study     Nonspecific findings on examination of blood     Osteoarthrosis, unspecified whether generalized or localized, lower leg     Respiratory distress                 PAST SURGICAL HISTORY:    Past Surgical History:   Procedure Laterality Date    CARDIAC SURGERY      COLONOSCOPY N/A 3/13/2019    Procedure: COLONOSCOPY;  Surgeon: Nikunj Larson MD;  Location: [facility] ([address] [place] [address]);  Service: Endoscopy;  Laterality: N/A;  ok to hold Coumadin x 5 days with a Lovenox bridge per Coumadin clinic  2nd floor - history of difficult intubation-MS    CORONARY ARTERY BYPASS GRAFT      2014    CYSTOSCOPY      KIDNEY STONE SURGERY      KNEE ARTHROPLASTY      bilateral    renal stone      SKIN BIOPSY           FAMILY HISTORY:                Family History   Problem Relation Age of Onset    Heart attack Father         d. 85    Urolithiasis Father     Heart disease Father     Heart failure Father     Stroke Mother     Dementia Mother     Heart attack Maternal Grandfather         d. 65    Hypertension Paternal Grandmother     Heart attack Paternal Grandfather     Heart failure  Paternal Grandfather     Other Sister         bladder lift, s/p 4     No Known Problems Daughter     No Known Problems Son        SOCIAL HISTORY:          Tobacco:   Social History     Tobacco Use   Smoking Status Former Smoker    Packs/day: 1.00    Years: 20.00    Pack years: 20.00    Types: Cigarettes    Quit date: 10/16/1987    Years since quittin.8   Smokeless Tobacco Former User       alcohol use:    Social History     Substance and Sexual Activity   Alcohol Use Yes    Alcohol/week: 0.0 standard drinks    Types: 1 - 2 Cans of beer, 1 - 2 Standard drinks or equivalent per week    Frequency: 4 or more times a week    Drinks per session: 1 or 2    Binge frequency: Never    Comment: daily, none today                 Occupation: retired    ALLERGIES:  Review of patient's allergies indicates:  No Known Allergies    CURRENT MEDICATIONS:    Current Outpatient Medications   Medication Sig Dispense Refill    aspirin 81 MG Chew Take 81 mg by mouth once daily.      cyanocobalamin (B-12 DOTS) 500 MCG tablet Take 500 mcg by mouth every morning. Every day      FOLIC ACID/MULTIVITS-MIN/LUT (CENTRUM SILVER ORAL) Take 1 tablet by mouth once daily.      irbesartan (AVAPRO) 150 MG tablet Take 1 tablet (150 mg total) by mouth every evening. 90 tablet 3    metoprolol tartrate (LOPRESSOR) 50 MG tablet Take 1 tablet (50 mg total) by mouth 2 (two) times daily. 180 tablet 3    rosuvastatin (CRESTOR) 40 MG Tab TAKE 1 TABLET ONE TIME DAILY 90 tablet 3    warfarin (COUMADIN) 10 MG tablet Take 1-1.5 tablets (10-15 mg total) by mouth Daily. 180 tablet 3    warfarin (COUMADIN) 5 MG tablet Take 1 tablet (5 mg total) by mouth Daily. Take as directed by Coumadin Clinic 36 tablet 3    ezetimibe (ZETIA) 10 mg tablet Take 1 tablet (10 mg total) by mouth once daily. 90 tablet 3     No current facility-administered medications for this visit.                       REVIEW OF SYSTEMS:   Sleep related symptoms as per  "HPI    denies weight gain  Denies dyspnea  Denies palpitations  Denies acid reflux   Denies polyuria  Reports occasional  mood diturbance  Denies  anemia  Denies  muscle pain  Denies  Gait imbalance    Otherwise, a balance of 10 systems reviewed is negative.    PHYSICAL EXAM:  BP (!) 143/71 (BP Location: Left arm, Patient Position: Sitting, BP Method: Large (Automatic))   Pulse (!) 59   Temp 96.9 °F (36.1 °C)   Ht 6' 3" (1.905 m)   Wt (!) 142 kg (313 lb)   BMI 39.12 kg/m²   GENERAL: Overweight body habitus, well groomed.  HEENT:   HEENT:  Conjunctivae are non-erythematous; Pupils equal, round, and reactive to light; Nose is symmetrical; Nasal mucosa is pink and moist; Septum is midline; Inferior turbinates are normal; Nasal airflow is normal; Posterior pharynx is pink; Modified Mallampati:IV; Posterior palate is low; Tonsils not visualized; Uvula is normal and pink;Tongue is broad; Dentition is fair; No TMJ tenderness; Jaw opening and protrusion without click and without discomfort.  NECK: Supple. Neck circumference is 18.2 inches. No thyromegaly. No palpable nodes.     SKIN: On face and neck: No abrasions, no rashes, no lesions.  No subcutaneous nodules are palpable.  RESPIRATORY: Chest is clear to auscultation.  Normal chest expansion and non-labored breathing at rest.  CARDIOVASCULAR: Normal S1, S2.  No murmurs, gallops or rubs. No carotid bruits bilaterally.  No edema. No clubbing. No cyanosis.    NEURO: Oriented to time, place and person. Normal attention span and concentration. Gait normal.    PSYCH: Affect is full. Mood is normal  MUSCULOSKELETAL: Moves 4 extremities. Gait normal.         Using [facility]: Yes      ASSESSMENT:    1. Severe VERONICA with significant hypoxemia. The patient symptomatically has  snoring, excessive daytime fatigue, gasping for air in sleep and interrupted sleep  with exam findings of "a crowded oral airway and elevated body mass index. The patient has medical co-morbidities of " CAD, h/o PE and CHF,hyperlipidemia and hypertension,  which can be worsened by VERONICA. Benefiting from CPAP use in terms of sleep continuity and daytime sleepiness. Compliant.Likes his new APAP machine.           PLAN:    CONTIINUE  APAP 15-20 cm H2O with supplies with a full face mask.    Periodic supplies replacement was recommended     Education: During our discussion today, we talked about the etiology of obstructive sleep apnea as well as the potential ramifications of untreated sleep apnea, which could include daytime sleepiness, hypertension, heart disease and/or stroke.  We discussed potential treatment options, which could include weight loss, body positioning, continuous positive airway pressure (CPAP), or referral for surgical consideration. The patient preferred CPAP option.    She should avoid ETOH and sedatives at night, as it tends to aggravate VERONICA. Regular replacement of CPAP mask, tubing and filter was recommended.    Precautions: The patient was advised to abstain from driving should he feel sleepy or drowsy.    Follow up: MD-12 months.     Thank you for allowing me the opportunity to participate in the care of your patient.

## 2020-08-20 DIAGNOSIS — M79.671 RIGHT FOOT PAIN: Primary | ICD-10-CM

## 2020-08-21 ENCOUNTER — OFFICE VISIT (OUTPATIENT)
Dept: ORTHOPEDICS | Facility: CLINIC | Age: 75
End: 2020-08-21
Payer: MEDICARE

## 2020-08-21 ENCOUNTER — HOSPITAL ENCOUNTER (OUTPATIENT)
Dept: RADIOLOGY | Facility: HOSPITAL | Age: 75
Discharge: HOME | End: 2020-08-21
Attending: ORTHOPAEDIC SURGERY
Payer: MEDICARE

## 2020-08-21 DIAGNOSIS — M79.671 RIGHT FOOT PAIN: ICD-10-CM

## 2020-08-21 DIAGNOSIS — M76.61 ACHILLES TENDINITIS OF RIGHT LOWER EXTREMITY: Primary | ICD-10-CM

## 2020-08-21 PROCEDURE — 73630 X-RAY EXAM OF FOOT: CPT | Mod: 26,HCNC,RT, | Performed by: RADIOLOGY

## 2020-08-21 PROCEDURE — 73630 X-RAY EXAM OF FOOT: CPT | Mod: TC,HCNC,RT

## 2020-08-21 PROCEDURE — 1101F PR PT FALLS ASSESS DOC 0-1 FALLS W/OUT INJ PAST YR: ICD-10-PCS | Mod: HCNC,CPTII,S$GLB, | Performed by: ORTHOPAEDIC SURGERY

## 2020-08-21 PROCEDURE — 1159F PR MEDICATION LIST DOCUMENTED IN MEDICAL RECORD: ICD-10-PCS | Mod: HCNC,S$GLB,, | Performed by: ORTHOPAEDIC SURGERY

## 2020-08-21 PROCEDURE — 99204 OFFICE O/P NEW MOD 45 MIN: CPT | Mod: HCNC,S$GLB,, | Performed by: ORTHOPAEDIC SURGERY

## 2020-08-21 PROCEDURE — 1125F PR PAIN SEVERITY QUANTIFIED, PAIN PRESENT: ICD-10-PCS | Mod: HCNC,S$GLB,, | Performed by: ORTHOPAEDIC SURGERY

## 2020-08-21 PROCEDURE — 99999 PR PBB SHADOW E&M-EST. PATIENT-LVL III: CPT | Mod: PBBFAC,HCNC,, | Performed by: ORTHOPAEDIC SURGERY

## 2020-08-21 PROCEDURE — 99999 PR PBB SHADOW E&M-EST. PATIENT-LVL III: ICD-10-PCS | Mod: PBBFAC,HCNC,, | Performed by: ORTHOPAEDIC SURGERY

## 2020-08-21 PROCEDURE — 1125F AMNT PAIN NOTED PAIN PRSNT: CPT | Mod: HCNC,S$GLB,, | Performed by: ORTHOPAEDIC SURGERY

## 2020-08-21 PROCEDURE — 99204 PR OFFICE/OUTPT VISIT, NEW, LEVL IV, 45-59 MIN: ICD-10-PCS | Mod: HCNC,S$GLB,, | Performed by: ORTHOPAEDIC SURGERY

## 2020-08-21 PROCEDURE — 1101F PT FALLS ASSESS-DOCD LE1/YR: CPT | Mod: HCNC,CPTII,S$GLB, | Performed by: ORTHOPAEDIC SURGERY

## 2020-08-21 PROCEDURE — 1159F MED LIST DOCD IN RCRD: CPT | Mod: HCNC,S$GLB,, | Performed by: ORTHOPAEDIC SURGERY

## 2020-08-21 PROCEDURE — 73630 XR FOOT COMPLETE 3 VIEW RIGHT: ICD-10-PCS | Mod: 26,HCNC,RT, | Performed by: RADIOLOGY

## 2020-08-21 RX ORDER — DICLOFENAC SODIUM 10 MG/G
2 GEL TOPICAL 4 TIMES DAILY
Qty: 1 TUBE | Refills: 1 | Status: SHIPPED | OUTPATIENT
Start: 2020-08-21 | End: 2020-10-09

## 2020-08-21 NOTE — LETTER
August 22, 2020        Amber Huertas MD  [address] [facility] [address]             [facility] 5th Fl  [address], [address]  [facility] [address]  Phone: [phone]   Patient: Del Anand   MR Number: [id number]   YOB: 1945   Date of Visit: 8/21/2020     Dear Dr. Huertas,     I saw our mutual patient today for right Achilles tendinitis pain.  I believe this should resolve with supportive/symptomatic care.  I have placed him in a boot for 2 weeks to rest the Achilles and he will slowly wean out.  Given he cannot take oral anti-inflammatories, I prescribed him a topical anti-inflammatory (Voltaren gel).    Please not hesitate to call reach out questions.    Sincerely,    Bridget Day MD  [phone]  Foot & Ankle Orthopedic Surgery  08/22/2020

## 2020-08-21 NOTE — PATIENT INSTRUCTIONS
"Today, you saw Dr. Day and were seen for non-insertional achilles tendinopathy     We decided the next step(s) in in treatment is/are   RICE guidelines (see below)   Anti-inflammatory medications (see below)   Therapy: Achilles stretching exercises provided (see below)   Activity: Boot immobilization with heel lifts and transitioning to shoes with heel lifts after 2 weeks    Thank you for allowing me to participate in your care.  We will see you back in 6 weeks.    How to treat inflammation?  1.) What does your doctor mean when they tell you to follow R.I.C.E. Guidelines?     Rest your ankle/foot by limiting activities that cause pain.  A good indicator of activity level is your pain the night following the activity and the day after.  If you have pain that lasts until the next day, you did too much.   Ice it to keep the swelling down. Don't put ice directly on the skin (use a thin piece of cloth between the ice bag and skin) and don't ice more than 20 minutes at a time to avoid frostbite.   Compressive bandages immobilize and support your injury.  Make sure it isn't too tight - your toes should not be turning purple and the wrap should not hurt.   Elevate your ankle above your heart level - "toes above your nose". This applies to acute injuries and should be followed for first 48 hours as well as afterward when you have increased pain and swelling after activity or therapy.    2.) Another common way of treating pain and inflammation from an injury is anti-inflammatory medications.  Dr. Day may prescribe you a medication for inflammation or you can take an over-the-counter anti-inflammatory (also known as NSAIDs - nonsteroidal anti-inflammatory drugs).  These include such medications as aleve, motrin, ibuprofen, naproxen, etc.  They should be taken as prescribed or according to the over-the-counter packaging instructions.  These medications can upset the stomach or rare cases causes ulcer disease or " kidney injury.  If you are concerned about using these medications long-term, you should discuss it with you primary doctor.  You can also combine or substitute an NSAID with acetaminophen (commonly known as Tylenol).  Take according to bottle instructions, and you can take up to 3000 mg daily (important to keep in mind if you take other medications that contain acetaminophen).  Again long term use should be discussed with your primary doctor.    WHAT IS ACHILLES TENDINITIS?    The Achilles tendon is the largest tendon in the body. It connects the upper calf muscles to the back of the heel bone. Achilles tendinitis is an injury to this tendon that causes pain in the back of the calf/heel. Typically this injury results from inflammation of the surrounding sheath (paratenonitis), degeneration within the tendon (tendinosis), or a combination of the two.    Symptoms  Paratenonitis is more typical in younger people. Symptoms start gradually and spontaneously. You may experience aching and burning pain, especially with morning activity, that gets worse with exercise.    Tendinosis feels similar but is more typical in middle-aged people. If you have severe pain and difficulty walking, it may indicate a partial tear of the tendon.    Causes  The cause of paratenonitis is not well understood although it is common in people who have recently increased the intensity of running or jumping workouts. It can be associated with repetitive activities, problems such as flatfoot or high-arched feet, or running on slanted surfaces or uneven/hard ground. Tendinosis which is thickening and scarring of the tendon can occur as you age.    Diagnosis  Your tendon may feel enlarged and warm 1-4 inches above your heel. Gently squeezing the tendon between your thumb and forefinger while you move your ankle may cause pain and sometimes a scratching feeling. You may have weakness in push-off strength with walking.    A foot and ankle orthopaedic  surgeon will evaluate your symptoms and history to make a diagnosis. An MRI may be used to define the extent of the degeneration, the degree to which the tendon sheath is involved, and the presence of other problems in this area.    Treatments    Non-surgical Treatment    Most cases are successfully treated non-surgically with nonsteroidal anti-inflammatory medications, rest, immobilization, limitation of activity, ice, contrast baths, stretching, and/or heel lifts. It takes time for symptoms to improve, which may be frustrating if you are used to being active. Treatment is less likely to be successful if symptoms have been present for more than six months and the pain is located much closer to the heel bone itself.    If you still experience symptoms after 2-3 months, your surgeon may recommend a formal physical therapy program, or a cast or brace to completely rest the area. An arch support may help if you also have flatfoot. Extracorporeal shock wave therapy and platelet-rich plasma injections may be explored as well.    Surgical Treatment    Brisement is another option for paratenonitis. In this procedure, local anesthetic is injected into the space between the tendon and its surrounding sheath to break up scar tissue. This can be beneficial in the earlier stages of Achilles tendinitis, but may need to be repeated 2 or 3 times. This technique is not used for tendinosis.    If non-surgical treatments do not help, surgery may be considered. Surgery consists of cutting out the surrounding thickened and scarred sheath and repairing any tears within the tendon. You will be asked to move your leg almost immediately to prevent repeat scarring of the tendon. You can put weight on your leg as soon as pain and swelling permit, usually less than 1-2 weeks. Return to competitive activity takes 3-6 months.    Surgery for tendinosis involves removing scar tissue and calcification deposits in the tendon. The tendon is then  repaired with suture. In older patients, or when more than half of the tendon is removed, one of the other tendons at the back of the ankle is transferred to the heel bone to improve the strength of the Achilles tendon and the blood supply to this area.     Recovery  It may take up to two years for symptoms to go away completely. At least 3-6 months of non-surgical treatment is recommended before considering surgery. You will need to wear a cast, splint, or brace for 4-8 weeks following surgery, although you will start doing range-of-motion exercises before that.    Non-surgical management of paratenonitis may take several months but patients return to pre-injury activities in 90% of cases. For the other 10%, subsequent surgical treatment is successful in % of cases.     Recovery from non-surgical and surgical treatment for tendinosis is less predictable. Your age, the extent of your injury, the amount of time you experienced symptoms, and whether a tendon transfer was performed can affect the surgerys success.     Risks and Complications  The risk of Achilles tendon rupture is small but present. You should not return to normal running and jumping athletic activities until the pain is gone and the area is no longer tender to touch.     FAQs  Would a cortisone injection help?  Cortisone injections are not recommended for the treatment of Achilles tendinitis because they can weaken the tendon and make it much easier to rupture.    https://www.footcaremd.org/conditions-treatments/ankle/achilles-tendinitis  The American [employer] & [employer] Society (AOFAS) offers information on this site as an educational service. The content of [facility], including text, images, and graphics, is for informational purposes only. The content is not intended to substitute for professional medical advice, diagnoses or treatments.         ACHILLES STRETCHING PROGRAM    The stretch is called an eccentric step stretch.    Find a  suitable step where you can stand upright (you should have no bend at your waist) with heels over the edge of the step (you should be on the balls of your feet) and lower yourself down over the step (you are allowed to use your hands to balance).    Each stretch should last 15 to 20 seconds. Tightness should be felt in calf not Achilles tendon. As a general rule I would encourage you to stretch irrespective of the pain that occurs DURING the stretch.    Number of Stretches    You do 6 stretches with knees straight and then 3 stretches with knees slightly bent.    This is 1 set. Weight will either be on both legs (Figures A and B) or on one leg (Figures C and D).        Program    Level 1: 1 Set with weight on both legs once per day    Level 2: 1 Set with weight on both legs twice per day    Level 3: 1 Set with weight on both legs three times per day    Level 4: 1 Set with weight on one leg (the affected leg) once per day    Level 5: 1 Set with weight on one leg twice per day    Level 6: 1 Set with weight on one leg three times per day    How to progress to the next level    You should not progress to the next level unless     You have done three days in that level     You have not had any increase in morning after pain or stiffness      You have not had increase in pain measured two hours after completing the last repetition for that day.    When to rest or stop?    If you are unable to complete the level's 6 stretches, stop the program rest for 2 days and then resume at Level 1     How long do I do the program for?    6 weeks and then you return to running irrespective whether you consider whether you have improved or not.    What results can you expect?  This program is based on a study done on [place] patients.  It showed this stretching program was successful at alleviating pain and getting patients back to their preferred activity in 86% of cases.  It took an average of 10 weeks to get back to activity  level and often symptoms took 6 months to resolve.  Only 1 patient ever required surgery.  This works better when symptoms are in middle of the tendon instead of where it attaches to bone.  Even when symptoms are at the bone, it was still effective in 50% of cases.

## 2020-08-22 NOTE — PROGRESS NOTES
Subjective:   Chief complaint:   Chief Complaint   Patient presents with    Right Foot - Pain       HPI:   Del Anand is a 75 y.o. male who presents today for evaluation of right achilles pain.  Rates pain as 7/10.  Pain has been ongoing for 1 month.  Inciting event: none known.  Treatments tried:  APAP and rest.    Patient localizes pain to the midsubstance of the Achilles.  Denies specific injury or pop.  Denies prodrome of pain.  It is associated with swelling and bump.  Pain is worse with activity and pressure of the area and alleviated by rest.    Answers for HPI/ROS submitted by the patient on 8/18/2020   Leg pain  Pain Chronicity: chronic  History of trauma: No  Onset: 1 to 4 weeks ago  Frequency: constantly  Progression since onset: waxing and waning  injury location: at home  pain- numeric: 7/10  pain location: right ankle  pain quality: throbbing  Radiating Pain: Yes  Aggravating factors: bearing weight, walking  fever: No  inability to bear weight: No  itching: No  joint locking: No  limited range of motion: No  stiffness: No  tingling: No  Treatments tried: exercise  physical therapy: not tried  Improvement on treatment: no relief      ROS:  Musculoskeletal: per HPI  Constitutional: Negative for fever  Cardiovascular: Negative for chest pain  Respiratory: Negative for shortness of breath  Skin: Negative for ulcers or lesions  Neurological: Negative for burning, tingling and numbness  Vascular: Negative for peripheral edema  Heme:  Yes for chronic anticoagulation; yes for history of blood clot  Endocrine: Negative for diabetes  Immune: Negative for inflammatory arthritis  /Nephrology: Negative for ESRD-on hemodialysis       Objective:   Exam:  There were no vitals filed for this visit.  General: No acute distress, well-appearing  Neurologic: Alert and oriented x3  Psychiatric: Appropriate mood and affect, cooperative  Cardiovascular: Regular pulse  Respiratory: Breathing on room air  Skin: No  rashes or ulcers  Vascular:  Right foot with palpable dorsalis pedis pulse.  Musculoskeletal:  Right ankle with not irritable ankle range of motion except max dorsiflexion.  Pain is localized to the Achilles as well as generalized calf.  He lacks full dorsiflexion.  There is swelling and tenderness in the midsubstance of the Achilles.  Achilles palpates in continuity.  Nontender over the Achilles insertion.  Nontender over the plantar fascia origin.  Fires tibialis anterior, gastrocsoleus, posterior tibialis, peroneals.  Sensation intact to light touch in able to localize throughout superficial peroneal, deep peroneal, tibial nerve distributions.    Imaging:  Radiographs were ordered, obtained and personally reviewed today.    Three views standing right foot demonstrate no acute osseous abnormalities.  There is no Achilles calcifications.  There is small insertional enthesophyte of the plantar fascia.    Additional records/labs reviewed:  None      Assessment:     1. Achilles tendinitis of right lower extremity         I had a lengthy discussion with the patient today regarding clinical history, imaging findings, and physical exam.  I discussed insertional versus non-insertional Achilles tendon pathology. I emphasized role for nonsurgical treatment modalities, notably eccentric stretching, anti-inflammatory medications, ice and other modalities.  I strongly advised against any injections around the achilles tendon or its insertion due to risk of rupture.  Given propensity for symptoms to resolve with treatment as above, I discussed with patient that surgery is reserved for refractory symptoms lasting at least 6 months despite adequate treatment as above.         Plan:       1.  Therapy: Home stretching program based on literature by Gemma et al (ZAMZAM 2011) provided   2.  Symptomatic treatment: Rx provided: Topical Voltaren  3.  Restrictions:  Avoid high impact activities until symptoms resolve  4.  Immobilization:   "Boot with heel lift x2 weeks and then wean out using the heel lift in his shoe.  5.  Brace/orthotics/etc: Discussed gel heel cups/lifts and backless shoes to alleviate pressure on achilles as he weans out of the boot  6.  Follow-up:  6 weeks if symptoms not improved        No orders of the defined types were placed in this encounter.      Past Medical History:   Diagnosis Date    Benign neoplasm of colon     Cataract     CHF (congestive heart failure) 2015    Coronary artery disease     Difficult intubation     DVT (deep venous thrombosis)     HLD (hyperlipidemia)     HTN (hypertension)     Impaired fasting glucose     Kidney stone     Metabolic syndrome     Nonspecific abnormal results of liver function study     Nonspecific findings on examination of blood     Nuclear sclerosis - Both Eyes 10/18/2013    Osteoarthrosis, unspecified whether generalized or localized, lower leg     Respiratory distress     PT STATES IT WAS "CRAP", "VERY UNCOMFORTABLE"       Past Surgical History:   Procedure Laterality Date    CARDIAC SURGERY      COLONOSCOPY N/A 3/13/2019    Procedure: COLONOSCOPY;  Surgeon: Nikunj Larson MD;  Location: [facility] ([address] [place] [address]);  Service: Endoscopy;  Laterality: N/A;  ok to hold Coumadin x 5 days with a Lovenox bridge per Coumadin clinic  2nd floor - history of difficult intubation-MS    CORONARY ARTERY BYPASS GRAFT      2014    CYSTOSCOPY      KIDNEY STONE SURGERY      KNEE ARTHROPLASTY      bilateral    renal stone      SKIN BIOPSY         Family History   Problem Relation Age of Onset    Heart attack Father         d. 85    Urolithiasis Father     Heart disease Father     Heart failure Father     Stroke Mother     Dementia Mother     Heart attack Maternal Grandfather         d. 65    Hypertension Paternal Grandmother     Heart attack Paternal Grandfather     Heart failure Paternal Grandfather     Other Sister         bladder lift, s/p 4     No Known " Problems Daughter     No Known Problems Son        Social History     Socioeconomic History    Marital status:      Spouse name: Not on file    Number of children: Not on file    Years of education: Not on file    Highest education level: Not on file   Occupational History    Not on file   Social Needs    Financial resource strain: Not hard at all    Food insecurity     Worry: Never true     Inability: Never true    Transportation needs     Medical: No     Non-medical: No   Tobacco Use    Smoking status: Former Smoker     Packs/day: 1.00     Years: 20.00     Pack years: 20.00     Types: Cigarettes     Quit date: 10/16/1987     Years since quittin.8    Smokeless tobacco: Former User   Substance and Sexual Activity    Alcohol use: Yes     Alcohol/week: 0.0 standard drinks     Types: 1 - 2 Cans of beer, 1 - 2 Standard drinks or equivalent per week     Frequency: 4 or more times a week     Drinks per session: 1 or 2     Binge frequency: Never     Comment: daily, none today    Drug use: No    Sexual activity: Yes     Partners: Female     Birth control/protection: None   Lifestyle    Physical activity     Days per week: 3 days     Minutes per session: 150+ min    Stress: Not at all   Relationships    Social connections     Talks on phone: More than three times a week     Gets together: More than three times a week     Attends [employer] service: Not on file     Active member of club or organization: Yes     Attends meetings of clubs or organizations: More than 4 times per year     Relationship status:    Other Topics Concern    Not on file   Social History Narrative    SOCIAL HISTORY:     .     Retired  for [employer].     Son in [place]    Daughter lives in [place].     +/- on exercise with the joint problems.      Plays golf, now going to gym        FAMILY HISTORY:     Mom d. 2006 with dementia secondary to subarachnoid     hemorrhage and stroke.     Dad d. 85,  sudden MI.     One sister living in the [facility] doing well.

## 2020-08-25 ENCOUNTER — PATIENT OUTREACH (OUTPATIENT)
Dept: OTHER | Facility: OTHER | Age: 75
End: 2020-08-25

## 2020-08-25 NOTE — PROGRESS NOTES
Digital Medicine: Health  Follow-Up                Reason for review: Blood pressure at goal        Topics Covered on Call: physical activity    Additional Follow-up details: Patient returned call to let me know he is doing well. Patient AVG BP is within goal but readings are trending up. Patient reports taking his BP only every other week. Reminded patient that we do need at least one BP a week. Patient reports he'll get the messages but forget to take it. Patient reports having tendonitis in his Achilles and attributes recent elevated BP reading to that. Patient will be wearing a boot for the next two weeks and laying off of golf until he is healed up. Will f/u in 6 weeks.         Diet-Not assessed          Physical Activity-Change      Additional physical activity details: Patient is limited due to achilles tendonitis. Patient will be in a walking boot for th next two week with some at home exercises that he is supposed to do. Patient reports that he is already starting to feel better with it.       Medication Adherence-Medication adherence was assessed.      Substance, Sleep, Stress-Not assessed      Additional monitoring needed. Encouraged patient to try and rememebr to take a reading per week  Continue current diet/physical activity routine.       Addressed any questions or concerns and patient has my contact information if needed prior to next outreach. Patient verbalizes understanding.          There are no preventive care reminders to display for this patient.    Last 5 Patient Entered Readings                                      Current 30 Day Average: 128/69     Recent Readings 8/12/2020 7/29/2020 7/15/2020 7/14/2020 7/1/2020    SBP (mmHg) 147 109 142 140 115    DBP (mmHg) 68 70 63 72 60    Pulse 69 106 76 73 76

## 2020-08-26 ENCOUNTER — OFFICE VISIT (OUTPATIENT)
Dept: OPHTHALMOLOGY | Facility: CLINIC | Age: 75
End: 2020-08-26
Payer: MEDICARE

## 2020-08-26 DIAGNOSIS — H25.13 NUCLEAR SCLEROSIS, BILATERAL: Primary | ICD-10-CM

## 2020-08-26 DIAGNOSIS — Z01.818 PRE-OP TESTING: ICD-10-CM

## 2020-08-26 PROCEDURE — 99999 PR PBB SHADOW E&M-EST. PATIENT-LVL III: ICD-10-PCS | Mod: PBBFAC,HCNC,, | Performed by: OPHTHALMOLOGY

## 2020-08-26 PROCEDURE — 92014 COMPRE OPH EXAM EST PT 1/>: CPT | Mod: HCNC,S$GLB,, | Performed by: OPHTHALMOLOGY

## 2020-08-26 PROCEDURE — 92136 OPHTHALMIC BIOMETRY: CPT | Mod: HCNC,RT,S$GLB, | Performed by: OPHTHALMOLOGY

## 2020-08-26 PROCEDURE — 92136 IOL MASTER - OU - BOTH EYES: ICD-10-PCS | Mod: HCNC,RT,S$GLB, | Performed by: OPHTHALMOLOGY

## 2020-08-26 PROCEDURE — 92014 PR EYE EXAM, EST PATIENT,COMPREHESV: ICD-10-PCS | Mod: HCNC,S$GLB,, | Performed by: OPHTHALMOLOGY

## 2020-08-26 PROCEDURE — 99999 PR PBB SHADOW E&M-EST. PATIENT-LVL III: CPT | Mod: PBBFAC,HCNC,, | Performed by: OPHTHALMOLOGY

## 2020-08-26 RX ORDER — TROPICAMIDE 10 MG/ML
1 SOLUTION/ DROPS OPHTHALMIC
Status: CANCELLED | OUTPATIENT
Start: 2020-08-26

## 2020-08-26 RX ORDER — PREDNISOLONE ACETATE-GATIFLOXACIN-BROMFENAC .75; 5; 1 MG/ML; MG/ML; MG/ML
1 SUSPENSION/ DROPS OPHTHALMIC 3 TIMES DAILY
Qty: 7 ML | Refills: 3 | Status: SHIPPED | OUTPATIENT
Start: 2020-08-26 | End: 2020-08-26 | Stop reason: SDUPTHER

## 2020-08-26 RX ORDER — PHENYLEPHRINE HYDROCHLORIDE 25 MG/ML
1 SOLUTION/ DROPS OPHTHALMIC
Status: CANCELLED | OUTPATIENT
Start: 2020-08-26

## 2020-08-26 RX ORDER — SODIUM CHLORIDE 0.9 % (FLUSH) 0.9 %
10 SYRINGE (ML) INJECTION
Status: DISCONTINUED | OUTPATIENT
Start: 2020-08-26 | End: 2021-03-30

## 2020-08-26 RX ORDER — PREDNISOLONE ACETATE-GATIFLOXACIN-BROMFENAC .75; 5; 1 MG/ML; MG/ML; MG/ML
1 SUSPENSION/ DROPS OPHTHALMIC 3 TIMES DAILY
Qty: 5 ML | Refills: 3 | Status: SHIPPED | OUTPATIENT
Start: 2020-08-26 | End: 2020-10-09

## 2020-08-26 RX ORDER — TETRACAINE HYDROCHLORIDE 5 MG/ML
1 SOLUTION OPHTHALMIC
Status: CANCELLED | OUTPATIENT
Start: 2020-08-26

## 2020-08-26 RX ORDER — MOXIFLOXACIN 5 MG/ML
1 SOLUTION/ DROPS OPHTHALMIC
Status: CANCELLED | OUTPATIENT
Start: 2020-08-26

## 2020-08-26 NOTE — H&P (VIEW-ONLY)
KHAI     DIANE: 10/16/2019  Dr. Gentile     Patient reports that his vision is significantly getting worse over time   at all ranges. Glare is bothersome but not significant at this time.   Glasses have not seem to be helping much for distance vision so he really   only wears them to read.     Drops: At's prN ou    History:  Catarcts OU  Astig         Last edited by Rob Hodges on 8/26/2020  9:12 AM. (History)            Assessment /Plan     For exam results, see Encounter Report.    Nuclear sclerosis, bilateral      Visually Significant Cataract: Patient reports decreased vision consistent with the clinical amount of lenticular opacity, which reaches the level of visual significance and affects activities of daily living. Risks, benefits, and alternatives to cataract surgery were discussed and the consent reviewed. IOL options were discussed, including ATIOLs and the associated side effects and additional patient cost associated with them.   IOL Selections:   Right eye  IOL: TFNT00 21.0     Left eye  IOL: TFNT00 21.0    Pt wishes to have right eye done first.  The patient expresses a desire to reduce spectacle dependence. I reviewed various IOL and LASER refractive surgical options and we will attempt to minimize spectacle dependence by managing astigmatism and optimizing IOL selection. Femtosecond LASER assisted cataract surgery (FLACS) technology was explained to the patient with educational videos and discussion.  The patient voices understanding and wishes to implement this technology during the cataract procedure.  I explained the increased precision of the LASER versus manual techniques, especially as it relates to astigmatism reduction with arcuate incisions.  I emphasized that although our goal is to reduce the need for refractive correction after surgery, there may still be a need for spectacle correction to achieve optimal visual acuity, and that a reasonable range of functional vision should be the  expectation.  No guarantees are made about post operative refraction or visual acuity, as the eye may heal in unpredictable ways, and the standard risks, benefits, and alternatives to cataract surgery were explained.  The patient understands that the refractive portions of this cataract procedure are not covered by insurance, and that there is an out of pocket expense of $2250 per eye. I also explained that even though our pre-operative plan is to utilize advanced refractive technologies during surgery, that I may decide to eliminate part or all of this plan if surgical challenges or complications arise, or I feel that it is not in the patient's best interest. Consent forms and an ABN form were given to the patient to review.      Catalys Parameters:  Right Eye:   ORLANDO:  12mm   ?Need to olayinka patient sitting up?: n  Capsulotomy: Scanned Capsule   rdGrdrrdarddrderd:rd rd3rd Arcuate: OFF  Incisions: OFF  Left Eye:   ORLANDO:  12mm   ?Need to olayinka patient sitting up?: n  Capsulotomy: Scanned Capsule  rdGrdrrdarddrderd:rd rd3rd Arcuate:  OFF  Incisions:  OFF                    [FreeTextEntry1] : Ms. Najera is a 72 year old female with a history of abnormal chest CT, asthma, carcinoid tumor of lung, chronic GERD, eosinophilic asthma, low vitamin D, snoring, OW, and SOB presenting to the office today for a follow up visit. Her chief complaint is SOB.\par -she states that she has been having difficulty breathing / SOB when she is walking and active. she reports occasional palpitations as well\par -she notes that she has been sleeping well although her sleep is interrupted and occasionally only gets 2 hours of sleep at a time\par -she reports a mild cough\par -she states that she has been getting frequent reflux/ heartburn\par -she denies any headaches, nausea, vomiting, fever, chills, sweats, chest pain, chest pressure, diarrhea, constipation, dysphagia, dizziness, leg swelling, leg pain, itchy eyes, itchy ears, or sour taste in the mouth.

## 2020-08-26 NOTE — PROGRESS NOTES
KHAI     DIANE: 10/16/2019  Dr. Gentile     Patient reports that his vision is significantly getting worse over time   at all ranges. Glare is bothersome but not significant at this time.   Glasses have not seem to be helping much for distance vision so he really   only wears them to read.     Drops: At's prN ou    History:  Catarcts OU  Astig         Last edited by Rob Hodges on 8/26/2020  9:12 AM. (History)            Assessment /Plan     For exam results, see Encounter Report.    Nuclear sclerosis, bilateral      Visually Significant Cataract: Patient reports decreased vision consistent with the clinical amount of lenticular opacity, which reaches the level of visual significance and affects activities of daily living. Risks, benefits, and alternatives to cataract surgery were discussed and the consent reviewed. IOL options were discussed, including ATIOLs and the associated side effects and additional patient cost associated with them.   IOL Selections:   Right eye  IOL: TFNT00 21.0     Left eye  IOL: TFNT00 21.0    Pt wishes to have right eye done first.  The patient expresses a desire to reduce spectacle dependence. I reviewed various IOL and LASER refractive surgical options and we will attempt to minimize spectacle dependence by managing astigmatism and optimizing IOL selection. Femtosecond LASER assisted cataract surgery (FLACS) technology was explained to the patient with educational videos and discussion.  The patient voices understanding and wishes to implement this technology during the cataract procedure.  I explained the increased precision of the LASER versus manual techniques, especially as it relates to astigmatism reduction with arcuate incisions.  I emphasized that although our goal is to reduce the need for refractive correction after surgery, there may still be a need for spectacle correction to achieve optimal visual acuity, and that a reasonable range of functional vision should be the  expectation.  No guarantees are made about post operative refraction or visual acuity, as the eye may heal in unpredictable ways, and the standard risks, benefits, and alternatives to cataract surgery were explained.  The patient understands that the refractive portions of this cataract procedure are not covered by insurance, and that there is an out of pocket expense of $2250 per eye. I also explained that even though our pre-operative plan is to utilize advanced refractive technologies during surgery, that I may decide to eliminate part or all of this plan if surgical challenges or complications arise, or I feel that it is not in the patient's best interest. Consent forms and an ABN form were given to the patient to review.      Catalys Parameters:  Right Eye:   ORLANDO:  12mm   ?Need to olayinka patient sitting up?: n  Capsulotomy: Scanned Capsule   stGstrstastdstest:st st1st Arcuate: OFF  Incisions: OFF  Left Eye:   ORLANDO:  12mm   ?Need to olayinka patient sitting up?: n  Capsulotomy: Scanned Capsule  stGstrstastdstest:st st1st Arcuate:  OFF  Incisions:  OFF

## 2020-08-27 ENCOUNTER — ANTI-COAG VISIT (OUTPATIENT)
Dept: CARDIOLOGY | Facility: CLINIC | Age: 75
End: 2020-08-27
Payer: MEDICARE

## 2020-08-27 DIAGNOSIS — Z79.01 LONG TERM CURRENT USE OF ANTICOAGULANT THERAPY: ICD-10-CM

## 2020-08-27 DIAGNOSIS — I26.99 PULMONARY EMBOLISM, UNSPECIFIED CHRONICITY, UNSPECIFIED PULMONARY EMBOLISM TYPE, UNSPECIFIED WHETHER ACUTE COR PULMONALE PRESENT: ICD-10-CM

## 2020-08-27 LAB — INR PPP: 3.4

## 2020-08-27 PROCEDURE — 93793 ANTICOAG MGMT PT WARFARIN: CPT | Mod: S$GLB,,, | Performed by: PHARMACIST

## 2020-08-27 PROCEDURE — 93793 PR ANTICOAGULANT MGMT FOR PT TAKING WARFARIN: ICD-10-PCS | Mod: S$GLB,,, | Performed by: PHARMACIST

## 2020-08-27 NOTE — PROGRESS NOTES
See 8/25/20 health  note regarding patient's pain and requesting more frequent BP readings  BP average 137/71 mmHg  Continue current regimen and monitoring, await more readings

## 2020-09-01 ENCOUNTER — TELEPHONE (OUTPATIENT)
Dept: INTERNAL MEDICINE | Facility: CLINIC | Age: 75
End: 2020-09-01

## 2020-09-01 NOTE — TELEPHONE ENCOUNTER
----- Message from Camille Lim sent at 9/1/2020  3:14 PM CDT -----  Contact: patient [phone]  Please switch the following appointments and notify patient to confirm .     Rylee Anand [birth year] has an appt with  at 9:30 am and needs it switched with her 's appt on 9/23/20  2:30pm. Thanks Giovana.

## 2020-09-09 ENCOUNTER — ANTI-COAG VISIT (OUTPATIENT)
Dept: CARDIOLOGY | Facility: CLINIC | Age: 75
End: 2020-09-09
Payer: MEDICARE

## 2020-09-09 DIAGNOSIS — Z79.01 LONG TERM CURRENT USE OF ANTICOAGULANT THERAPY: ICD-10-CM

## 2020-09-09 LAB — INR PPP: 3.3

## 2020-09-09 PROCEDURE — 93793 PR ANTICOAGULANT MGMT FOR PT TAKING WARFARIN: ICD-10-PCS | Mod: S$GLB,,, | Performed by: PHARMACIST

## 2020-09-09 PROCEDURE — 93793 ANTICOAG MGMT PT WARFARIN: CPT | Mod: S$GLB,,, | Performed by: PHARMACIST

## 2020-09-11 DIAGNOSIS — H25.11 NUCLEAR SCLEROTIC CATARACT OF RIGHT EYE: Primary | ICD-10-CM

## 2020-09-11 DIAGNOSIS — H25.12 NUCLEAR SCLEROTIC CATARACT OF LEFT EYE: ICD-10-CM

## 2020-09-14 ENCOUNTER — LAB VISIT (OUTPATIENT)
Dept: FAMILY MEDICINE | Facility: CLINIC | Age: 75
End: 2020-09-14
Payer: MEDICARE

## 2020-09-14 LAB — SARS-COV-2 RNA RESP QL NAA+PROBE: NOT DETECTED

## 2020-09-14 PROCEDURE — U0003 INFECTIOUS AGENT DETECTION BY NUCLEIC ACID (DNA OR RNA); SEVERE ACUTE RESPIRATORY SYNDROME CORONAVIRUS 2 (SARS-COV-2) (CORONAVIRUS DISEASE [COVID-19]), AMPLIFIED PROBE TECHNIQUE, MAKING USE OF HIGH THROUGHPUT TECHNOLOGIES AS DESCRIBED BY CMS-2020-01-R: HCPCS | Mod: HCNC

## 2020-09-15 ENCOUNTER — TELEPHONE (OUTPATIENT)
Dept: OPHTHALMOLOGY | Facility: CLINIC | Age: 75
End: 2020-09-15

## 2020-09-15 ENCOUNTER — PATIENT OUTREACH (OUTPATIENT)
Dept: OTHER | Facility: OTHER | Age: 75
End: 2020-09-15

## 2020-09-15 NOTE — TELEPHONE ENCOUNTER
Patient given arrival time for surgery as 10 am.  Nothing to eat or drink after 9 pm night before.  May have water/gatorade/powerade from 9 pm until leaves house morning of surgery.

## 2020-09-15 NOTE — PROGRESS NOTES
Digital Medicine: Clinician Follow-Up    Patient reports getting a boot for his tendonitis/achilles pain and starting exercises for it about 3 weeks ago. He reports it is a slow process but the pain is starting to improve.    He reports decreased vision 2/2 cataracts has caused him stress. He reports cataract removal surgeries planned for 9/17 and 10/1/20. He expects stress to decrease after surgeries.        The history is provided by the patient.   Follow-up reason(s): routine follow up.         Last 5 Patient Entered Readings                                      Current 30 Day Average: 150/76     Recent Readings 9/9/2020 8/27/2020 8/12/2020 7/29/2020 7/15/2020    SBP (mmHg) 145 155 147 109 142    DBP (mmHg) 77 75 68 70 63    Pulse 60 65 69 106 76                 Depression Screening  Did not address depression screening.    Sleep Apnea Screening    Did not address sleep apnea screening.     Medication Affordability Screening  Did not address medication affordability screening.     Medication Adherence-Medication adherence was assessed.        Denies missed medication doses      ASSESSMENT(S)  Patients BP average is 150/76 mmHg, which is above goal. Patient's BP goal is less than or equal to 130/80 per 2017 ACC/AHA Hypertension Guidelines.     BP starting to trend down with improvement in pain  Patient also expects stress to decrease over next 3 weeks after cataract removal    BP had typically been controlled prior to recent pain issues      Hypertension Plan  Continue current therapy. Discussed increasing irbesartan if BP does not continue on downward trend as pain and cataracts are addressed       Addressed patient questions and patient has my contact information if needed prior to next outreach. Patient verbalizes understanding.              Hypertension Medications             irbesartan (AVAPRO) 150 MG tablet Take 1 tablet (150 mg total) by mouth every evening.    metoprolol tartrate (LOPRESSOR) 50 MG tablet  Take 1 tablet (50 mg total) by mouth 2 (two) times daily.

## 2020-09-17 ENCOUNTER — ANESTHESIA (OUTPATIENT)
Dept: SURGERY | Facility: OTHER | Age: 75
End: 2020-09-17
Payer: MEDICARE

## 2020-09-17 ENCOUNTER — ANESTHESIA EVENT (OUTPATIENT)
Dept: SURGERY | Facility: OTHER | Age: 75
End: 2020-09-17
Payer: MEDICARE

## 2020-09-17 ENCOUNTER — HOSPITAL ENCOUNTER (OUTPATIENT)
Facility: OTHER | Age: 75
Discharge: HOME | End: 2020-09-17
Attending: OPHTHALMOLOGY | Admitting: OPHTHALMOLOGY
Payer: MEDICARE

## 2020-09-17 VITALS
HEART RATE: 51 BPM | TEMPERATURE: 98 F | SYSTOLIC BLOOD PRESSURE: 136 MMHG | BODY MASS INDEX: 37.3 KG/M2 | RESPIRATION RATE: 16 BRPM | DIASTOLIC BLOOD PRESSURE: 69 MMHG | HEIGHT: 75 IN | WEIGHT: 300 LBS | OXYGEN SATURATION: 98 %

## 2020-09-17 DIAGNOSIS — H25.13 NUCLEAR SCLEROSIS, BILATERAL: ICD-10-CM

## 2020-09-17 DIAGNOSIS — H25.12 NUCLEAR SCLEROTIC CATARACT OF LEFT EYE: Primary | ICD-10-CM

## 2020-09-17 PROCEDURE — 36000706: Mod: HCNC | Performed by: OPHTHALMOLOGY

## 2020-09-17 PROCEDURE — 37000009 HC ANESTHESIA EA ADD 15 MINS: Mod: HCNC | Performed by: OPHTHALMOLOGY

## 2020-09-17 PROCEDURE — 66984 PR REMOVAL, CATARACT, W/INSRT INTRAOC LENS, W/O ENDO CYCLO: ICD-10-PCS | Mod: HCNC,RT,, | Performed by: OPHTHALMOLOGY

## 2020-09-17 PROCEDURE — 66984 XCAPSL CTRC RMVL W/O ECP: CPT | Mod: HCNC,RT,, | Performed by: OPHTHALMOLOGY

## 2020-09-17 PROCEDURE — 66999 UNLISTED PX ANT SEGMENT EYE: CPT | Mod: CSM,HCNC,RT, | Performed by: OPHTHALMOLOGY

## 2020-09-17 PROCEDURE — 36000707: Mod: HCNC | Performed by: OPHTHALMOLOGY

## 2020-09-17 PROCEDURE — 25000003 PHARM REV CODE 250: Mod: HCNC | Performed by: OPHTHALMOLOGY

## 2020-09-17 PROCEDURE — 71000015 HC POSTOP RECOV 1ST HR: Mod: HCNC | Performed by: OPHTHALMOLOGY

## 2020-09-17 PROCEDURE — 37000008 HC ANESTHESIA 1ST 15 MINUTES: Mod: HCNC | Performed by: OPHTHALMOLOGY

## 2020-09-17 PROCEDURE — 63600175 PHARM REV CODE 636 W HCPCS: Mod: HCNC | Performed by: NURSE ANESTHETIST, CERTIFIED REGISTERED

## 2020-09-17 PROCEDURE — V2788 PRESBYOPIA-CORRECT FUNCTION: HCPCS | Mod: HCNC,WS | Performed by: OPHTHALMOLOGY

## 2020-09-17 PROCEDURE — 66999 PR FEMTO MFIOL: ICD-10-PCS | Mod: CSM,HCNC,RT, | Performed by: OPHTHALMOLOGY

## 2020-09-17 RX ORDER — ACETAMINOPHEN 325 MG/1
650 TABLET ORAL EVERY 4 HOURS PRN
Status: DISCONTINUED | OUTPATIENT
Start: 2020-09-17 | End: 2020-09-17 | Stop reason: HOSPADM

## 2020-09-17 RX ORDER — MOXIFLOXACIN 5 MG/ML
1 SOLUTION/ DROPS OPHTHALMIC
Status: COMPLETED | OUTPATIENT
Start: 2020-09-17 | End: 2020-09-17

## 2020-09-17 RX ORDER — MIDAZOLAM HYDROCHLORIDE 1 MG/ML
INJECTION INTRAMUSCULAR; INTRAVENOUS
Status: DISCONTINUED | OUTPATIENT
Start: 2020-09-17 | End: 2020-09-17

## 2020-09-17 RX ORDER — TETRACAINE HYDROCHLORIDE 5 MG/ML
1 SOLUTION OPHTHALMIC
Status: COMPLETED | OUTPATIENT
Start: 2020-09-17 | End: 2020-09-17

## 2020-09-17 RX ORDER — PROPARACAINE HYDROCHLORIDE 5 MG/ML
1 SOLUTION/ DROPS OPHTHALMIC
Status: DISCONTINUED | OUTPATIENT
Start: 2020-09-17 | End: 2020-09-17 | Stop reason: HOSPADM

## 2020-09-17 RX ORDER — LIDOCAINE HYDROCHLORIDE 40 MG/ML
INJECTION, SOLUTION RETROBULBAR
Status: DISCONTINUED | OUTPATIENT
Start: 2020-09-17 | End: 2020-09-17 | Stop reason: HOSPADM

## 2020-09-17 RX ORDER — TROPICAMIDE 10 MG/ML
1 SOLUTION/ DROPS OPHTHALMIC
Status: COMPLETED | OUTPATIENT
Start: 2020-09-17 | End: 2020-09-17

## 2020-09-17 RX ORDER — TETRACAINE HYDROCHLORIDE 5 MG/ML
SOLUTION OPHTHALMIC
Status: DISCONTINUED | OUTPATIENT
Start: 2020-09-17 | End: 2020-09-17 | Stop reason: HOSPADM

## 2020-09-17 RX ORDER — PHENYLEPHRINE HYDROCHLORIDE 100 MG/ML
1 SOLUTION/ DROPS OPHTHALMIC
Status: DISCONTINUED | OUTPATIENT
Start: 2020-09-17 | End: 2020-09-17 | Stop reason: HOSPADM

## 2020-09-17 RX ORDER — PHENYLEPHRINE HYDROCHLORIDE 25 MG/ML
1 SOLUTION/ DROPS OPHTHALMIC
Status: COMPLETED | OUTPATIENT
Start: 2020-09-17 | End: 2020-09-17

## 2020-09-17 RX ADMIN — TROPICAMIDE 1 DROP: 10 SOLUTION/ DROPS OPHTHALMIC at 10:09

## 2020-09-17 RX ADMIN — MIDAZOLAM HYDROCHLORIDE 1 MG: 1 INJECTION, SOLUTION INTRAMUSCULAR; INTRAVENOUS at 12:09

## 2020-09-17 RX ADMIN — MOXIFLOXACIN 1 DROP: 5 SOLUTION/ DROPS OPHTHALMIC at 12:09

## 2020-09-17 RX ADMIN — TROPICAMIDE 1 DROP: 10 SOLUTION/ DROPS OPHTHALMIC at 11:09

## 2020-09-17 RX ADMIN — MOXIFLOXACIN 1 DROP: 5 SOLUTION/ DROPS OPHTHALMIC at 01:09

## 2020-09-17 RX ADMIN — MOXIFLOXACIN 1 DROP: 5 SOLUTION/ DROPS OPHTHALMIC at 10:09

## 2020-09-17 RX ADMIN — TETRACAINE HYDROCHLORIDE 1 DROP: 5 SOLUTION OPHTHALMIC at 10:09

## 2020-09-17 RX ADMIN — PHENYLEPHRINE HYDROCHLORIDE 1 DROP: 25 SOLUTION/ DROPS OPHTHALMIC at 11:09

## 2020-09-17 RX ADMIN — MIDAZOLAM HYDROCHLORIDE 2 MG: 1 INJECTION, SOLUTION INTRAMUSCULAR; INTRAVENOUS at 12:09

## 2020-09-17 RX ADMIN — TETRACAINE HYDROCHLORIDE 1 DROP: 5 SOLUTION OPHTHALMIC at 11:09

## 2020-09-17 RX ADMIN — MOXIFLOXACIN 1 DROP: 5 SOLUTION/ DROPS OPHTHALMIC at 11:09

## 2020-09-17 RX ADMIN — PHENYLEPHRINE HYDROCHLORIDE 1 DROP: 25 SOLUTION/ DROPS OPHTHALMIC at 10:09

## 2020-09-17 NOTE — PLAN OF CARE
Del Anand has met all discharge criteria from Phase II. Vital Signs are stable, ambulating  without difficulty. Discharge instructions given, patient verbalized understanding. Discharged from facility via wheelchair in stable condition. With wife Rylee

## 2020-09-17 NOTE — DISCHARGE SUMMARY
Outcome: Successful outpatient ophthalmic surgical procedure  Preprinted Instructions given to patient.  Regular diet.  Activity: No restrictions  Meds: see Med Rec  Condition: stable  Follow up: 1 day with Dr Klein  Disposition: Home  Diagnosis: s/p eye surgery

## 2020-09-17 NOTE — DISCHARGE INSTRUCTIONS
Chanel Klein MD  [facility]  Department of Ophthalmology      AFTER: Cataract Surgery:  Relax at home and DO NOT exert yourself for the rest of the day.  Plan to see Dr. Klein tomorrow at the eye clinic:   [facility] [facility] [address]  [place] [address]    Refer to attached eye drop instruction sheet     Precautions:  DO NOT rub your eye.  You may resume moderate activity the day after surgery.  Wear protective sunglasses during the day and a shield at night for 1(one) week.  If you have pain, redness and decreased vision, call Dr. Klein (or the on-call doctor after hours) @[phone].            Home Care Instructions for Eye Surgeries    1. ACTIVITY:  Limit your activity today. Relax at home and DO NOT exert yourself for the rest of the day. Increase activity gradually. You may return to work or school as directed by your physician.    2. DIET:  Drink plenty of fluids. Resume your normal diet unless instructed otherwise.    3. PAIN:  Expect a moderate amount of pain. If a prescription for pain is not sent home with you, you may take your commonly used pain reliever as directed. If this is not sufficient, call your physician. You may resume any other prescription medication unless otherwise directed by your physician.     Discuss any problem with your physician as soon as it arises. Do not Delay.      EMERGENCY- If you are unable to contact your physician, please go to the nearest Emergency Room.       Anesthesia: Monitored Anesthesia Care (MAC)    Anesthesia Safety  · Have an adult family member or friend drive you home after the procedure.  · For the first 24 hours after your surgery:  · Do not drive or use heavy equipment.  · Do not make important decisions or sign documents.  · Avoid alcohol.  · Have someone stay with you, if possible. They can watch for problems and help keep you safe.

## 2020-09-17 NOTE — ANESTHESIA POSTPROCEDURE EVALUATION
Anesthesia Post Evaluation    Patient: Del Anand    Procedure(s) Performed: Procedure(s) (LRB):  EXTRACTION, CATARACT, WITH IOL INSERTION (Right)    Final Anesthesia Type: MAC    Patient location during evaluation: [facility]  Patient participation: Yes- Able to Participate  Level of consciousness: awake and alert  Post-procedure vital signs: reviewed and stable  Pain management: adequate  Airway patency: patent    PONV status at discharge: No PONV  Anesthetic complications: no      Cardiovascular status: blood pressure returned to baseline  Respiratory status: unassisted  Hydration status: euvolemic  Follow-up not needed.          Vitals Value Taken Time   /67 09/17/20 1055   Temp 36.5 °C (97.7 °F) 09/17/20 1054   Pulse 50 09/17/20 1054   Resp 20 09/17/20 1054   SpO2 97 % 09/17/20 1054         No case tracking events are documented in the log.      Pain/Brigitte Score: No data recorded

## 2020-09-17 NOTE — ANESTHESIA PREPROCEDURE EVALUATION
09/17/2020  Del Anand is a 75 y.o., male.    Anesthesia Evaluation    I have reviewed the Patient Summary Reports.    I have reviewed the Nursing Notes. I have reviewed the NPO Status.   I have reviewed the Medications.     Review of Systems  Anesthesia Hx:  Denies Family Hx of Anesthesia complications.   Denies Personal Hx of Anesthesia complications.   Social:  Non-Smoker    Hematology/Oncology:  Hematology Normal   Oncology Normal     EENT/Dental:EENT/Dental Normal   Cardiovascular:   Hypertension CAD asymptomatic CABG/stent (CABG 2014)  CHF (comp) DVT's   Pulmonary:   Sleep Apnea, CPAP    Renal/:   renal calculi    Hepatic/GI:  Hepatic/GI Normal    Musculoskeletal:   Arthritis     Neurological:  Neurology Normal    Endocrine:  Endocrine Normal    Dermatological:  Skin Normal    Psych:  Psychiatric Normal           Physical Exam  General:  Obesity    Airway/Jaw/Neck:  Airway Findings: Mouth Opening: Normal Tongue: Normal  General Airway Assessment: Adult, Average, Possible difficult intubation  Mallampati: III  Improves to II with phonation.  TM Distance: Normal, at least 6 cm         Dental:  Dental Findings: In tact, Molar caps    Heart/Vascular:  Heart Findings: Rate: Normal  Rhythm: Regular Rhythm  Sounds: Normal        Mental Status:  Mental Status Findings:  Cooperative, Alert and Oriented         Anesthesia Plan  Type of Anesthesia, risks & benefits discussed:  Anesthesia Type:  MAC  Patient's Preference:   Intra-op Monitoring Plan: standard ASA monitors  Intra-op Monitoring Plan Comments:   Post Op Pain Control Plan: per primary service following discharge from PACU  Post Op Pain Control Plan Comments:   Induction:    Beta Blocker:         Informed Consent: Patient understands risks and agrees with Anesthesia plan.  Questions answered. Anesthesia consent signed with patient.  ASA Score: 3      Day of Surgery Review of History & Physical:    H&P update referred to the surgeon.         Ready For Surgery From Anesthesia Perspective.

## 2020-09-18 ENCOUNTER — OFFICE VISIT (OUTPATIENT)
Dept: OPHTHALMOLOGY | Facility: CLINIC | Age: 75
End: 2020-09-18
Attending: OPHTHALMOLOGY
Payer: MEDICARE

## 2020-09-18 DIAGNOSIS — H25.13 NUCLEAR SCLEROSIS, BILATERAL: ICD-10-CM

## 2020-09-18 DIAGNOSIS — Z98.890 POST-OPERATIVE STATE: Primary | ICD-10-CM

## 2020-09-18 PROCEDURE — 99024 PR POST-OP FOLLOW-UP VISIT: ICD-10-PCS | Mod: HCNC,S$GLB,, | Performed by: OPHTHALMOLOGY

## 2020-09-18 PROCEDURE — 99999 PR PBB SHADOW E&M-EST. PATIENT-LVL III: CPT | Mod: PBBFAC,HCNC,, | Performed by: OPHTHALMOLOGY

## 2020-09-18 PROCEDURE — 99999 PR PBB SHADOW E&M-EST. PATIENT-LVL III: ICD-10-PCS | Mod: PBBFAC,HCNC,, | Performed by: OPHTHALMOLOGY

## 2020-09-18 PROCEDURE — 99024 POSTOP FOLLOW-UP VISIT: CPT | Mod: HCNC,S$GLB,, | Performed by: OPHTHALMOLOGY

## 2020-09-18 NOTE — PROGRESS NOTES
HPI     POD1 CE w/IOL OD. Patient states he is doing well, notes that eye is a   little scratchy.     PGB OD TID     Last edited by Tiff Castillo, PCT on 9/18/2020 10:07 AM. (History)            Assessment /Plan     For exam results, see Encounter Report.    Post-operative state    Nuclear sclerosis, bilateral      Slit lamp exam:  L/L: nl  K: clear, wound sealed  AC: 1+ cell  Lens: IOL centered and stable    POD1 s/p Phaco/IOL  Appropriate precautions and post op medications reviewed.  Patient instructed to call or come in if symptoms of redness, decreased vision, or pain are experienced.

## 2020-09-20 NOTE — PROGRESS NOTES
75-year-old gentleman presents  for Annual PE   Ex- smoker having quit over 20 years ago,  Social alcohol consumer.     SCREENING TESTS:   Cholesterol/lab, reviewed today   US carotids 40-49%, Cards following  Colonoscopy 08/2019,     . 5 polyps,  rec 3 yrs  PSA 0.12  Eye exam and dental work are up-to-date.    VACCINATIONS:  Zostavax, ~ 5 yrs ago  TDAP, 12/2013  Pneumovax, 10/2012  Prevnar, 2015  Flu vaccine., yearly    MedCard: Reviewed.     REVIEW OF SYSTEMS:     No fever, chill, or night sweats  No dysphagia or early satiety  No change in bowel or bladder function.  Not having increased thirst or urination.  No HA or focal deficits  No increased thirst or urination  No cold or heat intolerance  No unusual bruising or bleeding    ADL's: 100% independent  Memory: Good----delayed recall, Mom d. dementia  Mental Health: Good   AD's: + will, living will, and POA  Nutrition: Good  Gait: No falls  Safety: Intact  Urinary incontinence: n/a, nocturia- occ; ++ urinary hesitency  Remainder of review negative except as previously noted.     PAST MEDICAL HISTORY:   Dyslipidemia.  Hypertension  Elevated LFTs,   Impaired fasting glucose/prediabetes  Metabolic syndrome  Fatty liver  Anemia with workup by hem/onc  unremarkable.   DJD, knees and hips  Renal stones,   Sinus and rhinitis.  Colon  polyps   DVT  Pulmonary embolism    PHYSICAL EXAM:   VSS:   GENERAL: Alert and oriented, in no acute distress. Well-developed,   well-nourished, obese gentleman, conversant, and cooperative. Pleasant as always  EYES: Conjunctivae and lids unremarkable. Sclerae anicteric.   Pupils reactive.   NECK: Supple. No thyromegaly or lymphadenopathy.   RESPIRATORY: Efforts unlabored.   LUNGS: Clear to auscultation.   HEART: Regular rate and rhythm. No carotid bruits,   1+ pedal pulse. No edema.   ABDOMEN: Bowel sounds present, soft, nontender.   No hepatosplenomegaly appreciated.   MUSCULOSKELETAL: Gait normal.   No clubbing, cyanosis, or  edema.  NEURO: BECK. No tremor noted.  SKIN: Warm and dry      IMPRESSION:     Annual PE.     Family history of cardiovascular disease.     Ischemic cardiomyopathy, asx  . Hypertension, stable.      HLD    Carotid ds, left 40-49% 0-19% right- 2016       CAD, s/p bypass    Aortic atherosclerosis    Impaired fasting blood sugar/prediabetes, stable.     Dysmetabolic syndrome/obesity.     Fatty liver     DVT, right, on chronic anticoagulation    Pulmonary Embolism, on chronic anticoagulation    Anemia, stable    VERONICA, on CPAP    Obesity, BMI 39.05    BPH w/ nocturia    PLAN:  Pneumovax  Fasting lab  Continue diet and weight loss  Continue present meds  Diet, reviewed avoid sugar and simple carbs to help w/ triglycerides  Exercise, continue gym and golfing    Call prn  RTC 6 mos

## 2020-09-22 ENCOUNTER — OFFICE VISIT (OUTPATIENT)
Dept: INTERNAL MEDICINE | Facility: CLINIC | Age: 75
End: 2020-09-22
Payer: MEDICARE

## 2020-09-22 VITALS
WEIGHT: 315 LBS | HEIGHT: 75 IN | HEART RATE: 86 BPM | DIASTOLIC BLOOD PRESSURE: 72 MMHG | SYSTOLIC BLOOD PRESSURE: 118 MMHG | BODY MASS INDEX: 39.17 KG/M2 | RESPIRATION RATE: 16 BRPM | OXYGEN SATURATION: 100 % | TEMPERATURE: 98 F

## 2020-09-22 DIAGNOSIS — I82.591 CHRONIC DEEP VEIN THROMBOSIS (DVT) OF OTHER VEIN OF RIGHT LOWER EXTREMITY: ICD-10-CM

## 2020-09-22 DIAGNOSIS — E78.2 MIXED HYPERLIPIDEMIA: ICD-10-CM

## 2020-09-22 DIAGNOSIS — Z95.1 S/P CABG X 2: ICD-10-CM

## 2020-09-22 DIAGNOSIS — Z00.00 ANNUAL PHYSICAL EXAM: Primary | ICD-10-CM

## 2020-09-22 DIAGNOSIS — N40.1 BPH ASSOCIATED WITH NOCTURIA: ICD-10-CM

## 2020-09-22 DIAGNOSIS — R73.03 PREDIABETES: ICD-10-CM

## 2020-09-22 DIAGNOSIS — K76.0 FATTY LIVER: ICD-10-CM

## 2020-09-22 DIAGNOSIS — Z79.01 LONG TERM CURRENT USE OF ANTICOAGULANT THERAPY: ICD-10-CM

## 2020-09-22 DIAGNOSIS — I77.9 BILATERAL CAROTID ARTERY DISEASE, UNSPECIFIED TYPE: ICD-10-CM

## 2020-09-22 DIAGNOSIS — I70.0 AORTIC ATHEROSCLEROSIS: ICD-10-CM

## 2020-09-22 DIAGNOSIS — Z23 NEED FOR 23-POLYVALENT PNEUMOCOCCAL POLYSACCHARIDE VACCINE: ICD-10-CM

## 2020-09-22 DIAGNOSIS — E66.01 SEVERE OBESITY (BMI 35.0-39.9) WITH COMORBIDITY: ICD-10-CM

## 2020-09-22 DIAGNOSIS — I25.5 ISCHEMIC CARDIOMYOPATHY: ICD-10-CM

## 2020-09-22 DIAGNOSIS — R35.1 BPH ASSOCIATED WITH NOCTURIA: ICD-10-CM

## 2020-09-22 DIAGNOSIS — R73.01 IMPAIRED FASTING GLUCOSE: ICD-10-CM

## 2020-09-22 DIAGNOSIS — I26.99 PULMONARY EMBOLISM, UNSPECIFIED CHRONICITY, UNSPECIFIED PULMONARY EMBOLISM TYPE, UNSPECIFIED WHETHER ACUTE COR PULMONALE PRESENT: ICD-10-CM

## 2020-09-22 DIAGNOSIS — D64.9 ANEMIA, UNSPECIFIED TYPE: ICD-10-CM

## 2020-09-22 DIAGNOSIS — I10 ESSENTIAL HYPERTENSION: ICD-10-CM

## 2020-09-22 PROCEDURE — 3074F PR MOST RECENT SYSTOLIC BLOOD PRESSURE < 130 MM HG: ICD-10-PCS | Mod: HCNC,CPTII,S$GLB, | Performed by: INTERNAL MEDICINE

## 2020-09-22 PROCEDURE — 3078F PR MOST RECENT DIASTOLIC BLOOD PRESSURE < 80 MM HG: ICD-10-PCS | Mod: HCNC,CPTII,S$GLB, | Performed by: INTERNAL MEDICINE

## 2020-09-22 PROCEDURE — G0009 ADMIN PNEUMOCOCCAL VACCINE: HCPCS | Mod: HCNC,S$GLB,, | Performed by: INTERNAL MEDICINE

## 2020-09-22 PROCEDURE — 3078F DIAST BP <80 MM HG: CPT | Mod: HCNC,CPTII,S$GLB, | Performed by: INTERNAL MEDICINE

## 2020-09-22 PROCEDURE — 99999 PR PBB SHADOW E&M-EST. PATIENT-LVL V: CPT | Mod: PBBFAC,HCNC,, | Performed by: INTERNAL MEDICINE

## 2020-09-22 PROCEDURE — 99499 RISK ADDL DX/OHS AUDIT: ICD-10-PCS | Mod: HCNC,S$GLB,, | Performed by: INTERNAL MEDICINE

## 2020-09-22 PROCEDURE — 99397 PER PM REEVAL EST PAT 65+ YR: CPT | Mod: HCNC,25,S$GLB, | Performed by: INTERNAL MEDICINE

## 2020-09-22 PROCEDURE — 3074F SYST BP LT 130 MM HG: CPT | Mod: HCNC,CPTII,S$GLB, | Performed by: INTERNAL MEDICINE

## 2020-09-22 PROCEDURE — 90732 PPSV23 VACC 2 YRS+ SUBQ/IM: CPT | Mod: HCNC,S$GLB,, | Performed by: INTERNAL MEDICINE

## 2020-09-22 PROCEDURE — 90732 PNEUMOCOCCAL POLYSACCHARIDE VACCINE 23-VALENT =>2YO SQ IM: ICD-10-PCS | Mod: HCNC,S$GLB,, | Performed by: INTERNAL MEDICINE

## 2020-09-22 PROCEDURE — 99999 PR PBB SHADOW E&M-EST. PATIENT-LVL V: ICD-10-PCS | Mod: PBBFAC,HCNC,, | Performed by: INTERNAL MEDICINE

## 2020-09-22 PROCEDURE — 99397 PR PREVENTIVE VISIT,EST,65 & OVER: ICD-10-PCS | Mod: HCNC,25,S$GLB, | Performed by: INTERNAL MEDICINE

## 2020-09-22 PROCEDURE — G0009 PNEUMOCOCCAL POLYSACCHARIDE VACCINE 23-VALENT =>2YO SQ IM: ICD-10-PCS | Mod: HCNC,S$GLB,, | Performed by: INTERNAL MEDICINE

## 2020-09-22 PROCEDURE — 99499 UNLISTED E&M SERVICE: CPT | Mod: HCNC,S$GLB,, | Performed by: INTERNAL MEDICINE

## 2020-09-23 ENCOUNTER — ANTI-COAG VISIT (OUTPATIENT)
Dept: CARDIOLOGY | Facility: CLINIC | Age: 75
End: 2020-09-23
Payer: MEDICARE

## 2020-09-23 DIAGNOSIS — Z79.01 LONG TERM CURRENT USE OF ANTICOAGULANT THERAPY: ICD-10-CM

## 2020-09-23 LAB — INR PPP: 3.1

## 2020-09-23 PROCEDURE — 93793 PR ANTICOAGULANT MGMT FOR PT TAKING WARFARIN: ICD-10-PCS | Mod: S$GLB,,, | Performed by: PHARMACIST

## 2020-09-23 PROCEDURE — 93793 ANTICOAG MGMT PT WARFARIN: CPT | Mod: S$GLB,,, | Performed by: PHARMACIST

## 2020-09-24 ENCOUNTER — LAB VISIT (OUTPATIENT)
Dept: LAB | Facility: HOSPITAL | Age: 75
End: 2020-09-24
Attending: INTERNAL MEDICINE
Payer: MEDICARE

## 2020-09-24 DIAGNOSIS — R35.1 BPH ASSOCIATED WITH NOCTURIA: ICD-10-CM

## 2020-09-24 DIAGNOSIS — E78.2 MIXED HYPERLIPIDEMIA: ICD-10-CM

## 2020-09-24 DIAGNOSIS — N40.1 BPH ASSOCIATED WITH NOCTURIA: ICD-10-CM

## 2020-09-24 DIAGNOSIS — R73.03 PREDIABETES: ICD-10-CM

## 2020-09-24 DIAGNOSIS — D64.9 ANEMIA, UNSPECIFIED TYPE: ICD-10-CM

## 2020-09-24 LAB
ALBUMIN SERPL BCP-MCNC: 3.6 G/DL (ref 3.5–5.2)
ALP SERPL-CCNC: 103 U/L (ref 55–135)
ALT SERPL W/O P-5'-P-CCNC: 31 U/L (ref 10–44)
ANION GAP SERPL CALC-SCNC: 7 MMOL/L (ref 8–16)
AST SERPL-CCNC: 25 U/L (ref 10–40)
BASOPHILS # BLD AUTO: 0.07 K/UL (ref 0–0.2)
BASOPHILS NFR BLD: 1.2 % (ref 0–1.9)
BILIRUB SERPL-MCNC: 0.7 MG/DL (ref 0.1–1)
BUN SERPL-MCNC: 25 MG/DL (ref 8–23)
CALCIUM SERPL-MCNC: 9.1 MG/DL (ref 8.7–10.5)
CHLORIDE SERPL-SCNC: 105 MMOL/L (ref 95–110)
CHOLEST SERPL-MCNC: 118 MG/DL (ref 120–199)
CHOLEST/HDLC SERPL: 3 {RATIO} (ref 2–5)
CO2 SERPL-SCNC: 28 MMOL/L (ref 23–29)
COMPLEXED PSA SERPL-MCNC: 0.17 NG/ML (ref 0–4)
CREAT SERPL-MCNC: 0.9 MG/DL (ref 0.5–1.4)
DIFFERENTIAL METHOD: ABNORMAL
EOSINOPHIL # BLD AUTO: 0.1 K/UL (ref 0–0.5)
EOSINOPHIL NFR BLD: 1.8 % (ref 0–8)
ERYTHROCYTE [DISTWIDTH] IN BLOOD BY AUTOMATED COUNT: 13.2 % (ref 11.5–14.5)
EST. GFR  (AFRICAN AMERICAN): >60 ML/MIN/1.73 M^2
EST. GFR  (NON AFRICAN AMERICAN): >60 ML/MIN/1.73 M^2
ESTIMATED AVG GLUCOSE: 126 MG/DL (ref 68–131)
GLUCOSE SERPL-MCNC: 130 MG/DL (ref 70–110)
HBA1C MFR BLD HPLC: 6 % (ref 4–5.6)
HCT VFR BLD AUTO: 43.2 % (ref 40–54)
HDLC SERPL-MCNC: 39 MG/DL (ref 40–75)
HDLC SERPL: 33.1 % (ref 20–50)
HGB BLD-MCNC: 13.8 G/DL (ref 14–18)
IMM GRANULOCYTES # BLD AUTO: 0.01 K/UL (ref 0–0.04)
IMM GRANULOCYTES NFR BLD AUTO: 0.2 % (ref 0–0.5)
LDLC SERPL CALC-MCNC: 49.2 MG/DL (ref 63–159)
LYMPHOCYTES # BLD AUTO: 1.8 K/UL (ref 1–4.8)
LYMPHOCYTES NFR BLD: 30.7 % (ref 18–48)
MCH RBC QN AUTO: 32.1 PG (ref 27–31)
MCHC RBC AUTO-ENTMCNC: 31.9 G/DL (ref 32–36)
MCV RBC AUTO: 101 FL (ref 82–98)
MONOCYTES # BLD AUTO: 0.6 K/UL (ref 0.3–1)
MONOCYTES NFR BLD: 10.2 % (ref 4–15)
NEUTROPHILS # BLD AUTO: 3.4 K/UL (ref 1.8–7.7)
NEUTROPHILS NFR BLD: 55.9 % (ref 38–73)
NONHDLC SERPL-MCNC: 79 MG/DL
NRBC BLD-RTO: 0 /100 WBC
PLATELET # BLD AUTO: 198 K/UL (ref 150–350)
PMV BLD AUTO: 11.4 FL (ref 9.2–12.9)
POTASSIUM SERPL-SCNC: 4.7 MMOL/L (ref 3.5–5.1)
PROT SERPL-MCNC: 6.8 G/DL (ref 6–8.4)
RBC # BLD AUTO: 4.3 M/UL (ref 4.6–6.2)
SODIUM SERPL-SCNC: 140 MMOL/L (ref 136–145)
TRIGL SERPL-MCNC: 149 MG/DL (ref 30–150)
WBC # BLD AUTO: 6 K/UL (ref 3.9–12.7)

## 2020-09-24 PROCEDURE — 36415 COLL VENOUS BLD VENIPUNCTURE: CPT | Mod: HCNC,PO

## 2020-09-24 PROCEDURE — 80053 COMPREHEN METABOLIC PANEL: CPT | Mod: HCNC

## 2020-09-24 PROCEDURE — 80061 LIPID PANEL: CPT | Mod: HCNC

## 2020-09-24 PROCEDURE — 84153 ASSAY OF PSA TOTAL: CPT | Mod: HCNC

## 2020-09-24 PROCEDURE — 85025 COMPLETE CBC W/AUTO DIFF WBC: CPT | Mod: HCNC

## 2020-09-24 PROCEDURE — 83036 HEMOGLOBIN GLYCOSYLATED A1C: CPT | Mod: HCNC

## 2020-09-25 ENCOUNTER — OFFICE VISIT (OUTPATIENT)
Dept: OPHTHALMOLOGY | Facility: CLINIC | Age: 75
End: 2020-09-25
Attending: OPHTHALMOLOGY
Payer: MEDICARE

## 2020-09-25 ENCOUNTER — PATIENT MESSAGE (OUTPATIENT)
Dept: INTERNAL MEDICINE | Facility: CLINIC | Age: 75
End: 2020-09-25

## 2020-09-25 ENCOUNTER — TELEPHONE (OUTPATIENT)
Dept: INTERNAL MEDICINE | Facility: CLINIC | Age: 75
End: 2020-09-25

## 2020-09-25 DIAGNOSIS — H25.13 NUCLEAR SCLEROSIS, BILATERAL: Primary | ICD-10-CM

## 2020-09-25 DIAGNOSIS — Z98.890 POST-OPERATIVE STATE: ICD-10-CM

## 2020-09-25 PROCEDURE — 99024 PR POST-OP FOLLOW-UP VISIT: ICD-10-PCS | Mod: HCNC,S$GLB,, | Performed by: OPHTHALMOLOGY

## 2020-09-25 PROCEDURE — 92136 IOL MASTER - OU - BOTH EYES: ICD-10-PCS | Mod: 26,HCNC,LT,S$GLB | Performed by: OPHTHALMOLOGY

## 2020-09-25 PROCEDURE — 99024 POSTOP FOLLOW-UP VISIT: CPT | Mod: HCNC,S$GLB,, | Performed by: OPHTHALMOLOGY

## 2020-09-25 PROCEDURE — 99999 PR PBB SHADOW E&M-EST. PATIENT-LVL III: ICD-10-PCS | Mod: PBBFAC,HCNC,, | Performed by: OPHTHALMOLOGY

## 2020-09-25 PROCEDURE — 99999 PR PBB SHADOW E&M-EST. PATIENT-LVL III: CPT | Mod: PBBFAC,HCNC,, | Performed by: OPHTHALMOLOGY

## 2020-09-25 PROCEDURE — 92136 OPHTHALMIC BIOMETRY: CPT | Mod: 26,HCNC,LT,S$GLB | Performed by: OPHTHALMOLOGY

## 2020-09-25 RX ORDER — MOXIFLOXACIN 5 MG/ML
1 SOLUTION/ DROPS OPHTHALMIC
Status: CANCELLED | OUTPATIENT
Start: 2020-09-25

## 2020-09-25 RX ORDER — TETRACAINE HYDROCHLORIDE 5 MG/ML
1 SOLUTION OPHTHALMIC
Status: CANCELLED | OUTPATIENT
Start: 2020-09-25

## 2020-09-25 RX ORDER — PHENYLEPHRINE HYDROCHLORIDE 25 MG/ML
1 SOLUTION/ DROPS OPHTHALMIC
Status: CANCELLED | OUTPATIENT
Start: 2020-09-25

## 2020-09-25 RX ORDER — SODIUM CHLORIDE 0.9 % (FLUSH) 0.9 %
10 SYRINGE (ML) INJECTION
Status: DISCONTINUED | OUTPATIENT
Start: 2020-09-25 | End: 2021-03-30

## 2020-09-25 RX ORDER — TROPICAMIDE 10 MG/ML
1 SOLUTION/ DROPS OPHTHALMIC
Status: CANCELLED | OUTPATIENT
Start: 2020-09-25

## 2020-09-25 NOTE — H&P (VIEW-ONLY)
HPI     POW1 CE w/IOL OD. Patient states he is doing well, notes no problems or   complaints.     PGB OD TID     Last edited by Tiff Castillo, PCT on 9/25/2020 11:09 AM. (History)            Assessment /Plan     For exam results, see Encounter Report.    Nuclear sclerosis, bilateral    Post-operative state      Slit lamp exam:  L/L: nl  K: clear, wound sealed  AC: trace cell  Iris/Lens: IOL centered and stable    POW1 s/p phaco: Surgery healing well with no signs of infection or abnormal inflammation.    Patient wishes to proceed with surgery in the second eye. Risks, benefits, alternatives reviewed. IOL selection reviewed.     Right eye  IOL: TFNT00 21.0, implanted with plano result...     Left eye  IOL: TFNT00 21.0      The patient expresses a desire to reduce spectacle dependence. I reviewed various IOL and LASER refractive surgical options and we will attempt to minimize spectacle dependence by managing astigmatism and optimizing IOL selection. Femtosecond LASER assisted cataract surgery (FLACS) technology was explained to the patient with educational videos and discussion.  The patient voices understanding and wishes to implement this technology during the cataract procedure.  I explained the increased precision of the LASER versus manual techniques, especially as it relates to astigmatism reduction with arcuate incisions.  I emphasized that although our goal is to reduce the need for refractive correction after surgery, there may still be a need for spectacle correction to achieve optimal visual acuity, and that a reasonable range of functional vision should be the expectation.  No guarantees are made about post operative refraction or visual acuity, as the eye may heal in unpredictable ways, and the standard risks, benefits, and alternatives to cataract surgery were explained.  The patient understands that the refractive portions of this cataract procedure are not covered by insurance, and that there is an  out of pocket expense of $2250 per eye. I also explained that even though our pre-operative plan is to utilize advanced refractive technologies during surgery, that I may decide to eliminate part or all of this plan if surgical challenges or complications arise, or I feel that it is not in the patient's best interest. Consent forms and an ABN form were given to the patient to review.      Catalys Parameters:    Left Eye:   OR[place]O:  12mm   ?Need to olayinka patient sitting up?: n  Capsulotomy: Scanned Capsule  stGstrstastdstest:st st1st Arcuate:  OFF  Incisions:  OFF

## 2020-09-25 NOTE — TELEPHONE ENCOUNTER
----- Message from Amber Huertas MD sent at 9/25/2020  9:15 AM CDT -----  Please note that your urine was unremarkable  Your chemistries revealed an elevated glucose ( in the diabetic range)  And your Hemoglobin A1C ( your 3 month average blood glucose) has elevated but it is still in the pre-diabetic range.  Your cholesterol is very good at 118.  You still have a borderline anemia, but it is stable.  Your prostate check is normal.  Please do not hesitate to call/email if you have any questions or concerns.  Amber Lebron

## 2020-09-25 NOTE — PROGRESS NOTES
HPI     POW1 CE w/IOL OD. Patient states he is doing well, notes no problems or   complaints.     PGB OD TID     Last edited by Tiff Castillo, PCT on 9/25/2020 11:09 AM. (History)            Assessment /Plan     For exam results, see Encounter Report.    Nuclear sclerosis, bilateral    Post-operative state      Slit lamp exam:  L/L: nl  K: clear, wound sealed  AC: trace cell  Iris/Lens: IOL centered and stable    POW1 s/p phaco: Surgery healing well with no signs of infection or abnormal inflammation.    Patient wishes to proceed with surgery in the second eye. Risks, benefits, alternatives reviewed. IOL selection reviewed.     Right eye  IOL: TFNT00 21.0, implanted with plano result...     Left eye  IOL: TFNT00 21.0      The patient expresses a desire to reduce spectacle dependence. I reviewed various IOL and LASER refractive surgical options and we will attempt to minimize spectacle dependence by managing astigmatism and optimizing IOL selection. Femtosecond LASER assisted cataract surgery (FLACS) technology was explained to the patient with educational videos and discussion.  The patient voices understanding and wishes to implement this technology during the cataract procedure.  I explained the increased precision of the LASER versus manual techniques, especially as it relates to astigmatism reduction with arcuate incisions.  I emphasized that although our goal is to reduce the need for refractive correction after surgery, there may still be a need for spectacle correction to achieve optimal visual acuity, and that a reasonable range of functional vision should be the expectation.  No guarantees are made about post operative refraction or visual acuity, as the eye may heal in unpredictable ways, and the standard risks, benefits, and alternatives to cataract surgery were explained.  The patient understands that the refractive portions of this cataract procedure are not covered by insurance, and that there is an  out of pocket expense of $2250 per eye. I also explained that even though our pre-operative plan is to utilize advanced refractive technologies during surgery, that I may decide to eliminate part or all of this plan if surgical challenges or complications arise, or I feel that it is not in the patient's best interest. Consent forms and an ABN form were given to the patient to review.      Catalys Parameters:    Left Eye:   [place]O:  12mm   ?Need to olayinka patient sitting up?: n  Capsulotomy: Scanned Capsule  rdGrdrrdarddrderd:rd rd3rd Arcuate:  OFF  Incisions:  OFF

## 2020-09-26 NOTE — TELEPHONE ENCOUNTER
When the  CBC is trended, the RBC and H/H actually up a little since last year    Primary rec would be to eat healthy, including foods rich in B vitamins or if unable to take 2/2 to warfarin restrictions  Would be sure to take a daily MVI

## 2020-09-28 ENCOUNTER — LAB VISIT (OUTPATIENT)
Dept: FAMILY MEDICINE | Facility: CLINIC | Age: 75
End: 2020-09-28
Payer: MEDICARE

## 2020-09-28 LAB — SARS-COV-2 RNA RESP QL NAA+PROBE: NOT DETECTED

## 2020-09-28 PROCEDURE — U0003 INFECTIOUS AGENT DETECTION BY NUCLEIC ACID (DNA OR RNA); SEVERE ACUTE RESPIRATORY SYNDROME CORONAVIRUS 2 (SARS-COV-2) (CORONAVIRUS DISEASE [COVID-19]), AMPLIFIED PROBE TECHNIQUE, MAKING USE OF HIGH THROUGHPUT TECHNOLOGIES AS DESCRIBED BY CMS-2020-01-R: HCPCS | Mod: HCNC

## 2020-09-30 ENCOUNTER — TELEPHONE (OUTPATIENT)
Dept: OPHTHALMOLOGY | Facility: CLINIC | Age: 75
End: 2020-09-30

## 2020-09-30 NOTE — TELEPHONE ENCOUNTER
Patient given arrival time for surgery as 830 am.  Nothing to eat or drink after 9 pm night before.  May have water/gatorade/powerade from 9 pm until leaves house morning of surgery.

## 2020-10-01 ENCOUNTER — HOSPITAL ENCOUNTER (OUTPATIENT)
Facility: OTHER | Age: 75
Discharge: HOME | End: 2020-10-01
Attending: OPHTHALMOLOGY | Admitting: OPHTHALMOLOGY
Payer: MEDICARE

## 2020-10-01 ENCOUNTER — ANESTHESIA EVENT (OUTPATIENT)
Dept: SURGERY | Facility: OTHER | Age: 75
End: 2020-10-01
Payer: MEDICARE

## 2020-10-01 ENCOUNTER — ANESTHESIA (OUTPATIENT)
Dept: SURGERY | Facility: OTHER | Age: 75
End: 2020-10-01
Payer: MEDICARE

## 2020-10-01 VITALS
OXYGEN SATURATION: 98 % | HEART RATE: 55 BPM | SYSTOLIC BLOOD PRESSURE: 154 MMHG | TEMPERATURE: 98 F | DIASTOLIC BLOOD PRESSURE: 72 MMHG | BODY MASS INDEX: 39.17 KG/M2 | RESPIRATION RATE: 18 BRPM | HEIGHT: 75 IN | WEIGHT: 315 LBS

## 2020-10-01 DIAGNOSIS — H25.13 NUCLEAR SCLEROSIS, BILATERAL: ICD-10-CM

## 2020-10-01 PROCEDURE — 71000015 HC POSTOP RECOV 1ST HR: Mod: HCNC | Performed by: OPHTHALMOLOGY

## 2020-10-01 PROCEDURE — 37000008 HC ANESTHESIA 1ST 15 MINUTES: Mod: HCNC | Performed by: OPHTHALMOLOGY

## 2020-10-01 PROCEDURE — 66999 PR FEMTO MFIOL: ICD-10-PCS | Mod: CSM,HCNC,LT, | Performed by: OPHTHALMOLOGY

## 2020-10-01 PROCEDURE — 37000009 HC ANESTHESIA EA ADD 15 MINS: Mod: HCNC | Performed by: OPHTHALMOLOGY

## 2020-10-01 PROCEDURE — 66999 UNLISTED PX ANT SEGMENT EYE: CPT | Mod: CSM,HCNC,LT, | Performed by: OPHTHALMOLOGY

## 2020-10-01 PROCEDURE — 66984 XCAPSL CTRC RMVL W/O ECP: CPT | Mod: 79,HCNC,LT, | Performed by: OPHTHALMOLOGY

## 2020-10-01 PROCEDURE — V2788 PRESBYOPIA-CORRECT FUNCTION: HCPCS | Mod: HCNC | Performed by: OPHTHALMOLOGY

## 2020-10-01 PROCEDURE — 36000707: Mod: HCNC | Performed by: OPHTHALMOLOGY

## 2020-10-01 PROCEDURE — 36000706: Mod: HCNC | Performed by: OPHTHALMOLOGY

## 2020-10-01 PROCEDURE — 63600175 PHARM REV CODE 636 W HCPCS: Mod: HCNC | Performed by: NURSE ANESTHETIST, CERTIFIED REGISTERED

## 2020-10-01 PROCEDURE — 66984 PR REMOVAL, CATARACT, W/INSRT INTRAOC LENS, W/O ENDO CYCLO: ICD-10-PCS | Mod: 79,HCNC,LT, | Performed by: OPHTHALMOLOGY

## 2020-10-01 PROCEDURE — 25000003 PHARM REV CODE 250: Mod: HCNC | Performed by: OPHTHALMOLOGY

## 2020-10-01 RX ORDER — ACETAMINOPHEN 325 MG/1
650 TABLET ORAL EVERY 4 HOURS PRN
Status: DISCONTINUED | OUTPATIENT
Start: 2020-10-01 | End: 2020-10-01 | Stop reason: HOSPADM

## 2020-10-01 RX ORDER — MOXIFLOXACIN 5 MG/ML
1 SOLUTION/ DROPS OPHTHALMIC
Status: COMPLETED | OUTPATIENT
Start: 2020-10-01 | End: 2020-10-01

## 2020-10-01 RX ORDER — PHENYLEPHRINE HYDROCHLORIDE 100 MG/ML
1 SOLUTION/ DROPS OPHTHALMIC
Status: COMPLETED | OUTPATIENT
Start: 2020-10-01 | End: 2020-10-01

## 2020-10-01 RX ORDER — PROPARACAINE HYDROCHLORIDE 5 MG/ML
1 SOLUTION/ DROPS OPHTHALMIC
Status: DISCONTINUED | OUTPATIENT
Start: 2020-10-01 | End: 2020-10-01 | Stop reason: HOSPADM

## 2020-10-01 RX ORDER — LIDOCAINE HYDROCHLORIDE 40 MG/ML
INJECTION, SOLUTION RETROBULBAR
Status: DISCONTINUED | OUTPATIENT
Start: 2020-10-01 | End: 2020-10-01 | Stop reason: HOSPADM

## 2020-10-01 RX ORDER — TETRACAINE HYDROCHLORIDE 5 MG/ML
SOLUTION OPHTHALMIC
Status: DISCONTINUED | OUTPATIENT
Start: 2020-10-01 | End: 2020-10-01 | Stop reason: HOSPADM

## 2020-10-01 RX ORDER — MOXIFLOXACIN 5 MG/ML
SOLUTION/ DROPS OPHTHALMIC
Status: DISCONTINUED | OUTPATIENT
Start: 2020-10-01 | End: 2020-10-01 | Stop reason: HOSPADM

## 2020-10-01 RX ORDER — TETRACAINE HYDROCHLORIDE 5 MG/ML
1 SOLUTION OPHTHALMIC
Status: COMPLETED | OUTPATIENT
Start: 2020-10-01 | End: 2020-10-01

## 2020-10-01 RX ORDER — TROPICAMIDE 10 MG/ML
1 SOLUTION/ DROPS OPHTHALMIC
Status: COMPLETED | OUTPATIENT
Start: 2020-10-01 | End: 2020-10-01

## 2020-10-01 RX ORDER — MIDAZOLAM HYDROCHLORIDE 1 MG/ML
INJECTION INTRAMUSCULAR; INTRAVENOUS
Status: DISCONTINUED | OUTPATIENT
Start: 2020-10-01 | End: 2020-10-01

## 2020-10-01 RX ORDER — PHENYLEPHRINE HYDROCHLORIDE 25 MG/ML
1 SOLUTION/ DROPS OPHTHALMIC
Status: COMPLETED | OUTPATIENT
Start: 2020-10-01 | End: 2020-10-01

## 2020-10-01 RX ADMIN — MOXIFLOXACIN 1 DROP: 5 SOLUTION/ DROPS OPHTHALMIC at 09:10

## 2020-10-01 RX ADMIN — TETRACAINE HYDROCHLORIDE 1 DROP: 5 SOLUTION OPHTHALMIC at 09:10

## 2020-10-01 RX ADMIN — TROPICAMIDE 1 DROP: 10 SOLUTION/ DROPS OPHTHALMIC at 09:10

## 2020-10-01 RX ADMIN — MIDAZOLAM HYDROCHLORIDE 2 MG: 1 INJECTION, SOLUTION INTRAMUSCULAR; INTRAVENOUS at 10:10

## 2020-10-01 RX ADMIN — PHENYLEPHRINE HYDROCHLORIDE 1 DROP: 25 SOLUTION/ DROPS OPHTHALMIC at 09:10

## 2020-10-01 RX ADMIN — MOXIFLOXACIN 1 DROP: 5 SOLUTION/ DROPS OPHTHALMIC at 11:10

## 2020-10-01 RX ADMIN — MIDAZOLAM HYDROCHLORIDE 1 MG: 1 INJECTION, SOLUTION INTRAMUSCULAR; INTRAVENOUS at 10:10

## 2020-10-01 NOTE — PLAN OF CARE
Del BURCH Kika has met all discharge criteria from Phase II. Vital Signs are stable, ambulating  without difficulty. Discharge instructions given, patient verbalized understanding. Discharged from facility via wheelchair in stable condition.        09-Feb-2018

## 2020-10-01 NOTE — ANESTHESIA POSTPROCEDURE EVALUATION
Anesthesia Post Evaluation    Patient: Del Anand    Procedure(s) Performed: Procedure(s) (LRB):  EXTRACTION, CATARACT, WITH IOL INSERTION (Left)    Final Anesthesia Type: MAC    Patient location during evaluation: [facility]  Patient participation: Yes- Able to Participate  Level of consciousness: awake and alert  Post-procedure vital signs: reviewed and stable  Pain management: adequate  Airway patency: patent    PONV status at discharge: No PONV  Anesthetic complications: no      Cardiovascular status: blood pressure returned to baseline  Respiratory status: unassisted, room air and spontaneous ventilation  Hydration status: euvolemic  Follow-up not needed.          Vitals Value Taken Time   /72 10/01/20 0913   Temp 36.4 °C (97.6 °F) 10/01/20 0913   Pulse 55 10/01/20 0913   Resp 18 10/01/20 0913   SpO2 98 % 10/01/20 0913         No case tracking events are documented in the log.      Pain/Brigitte Score: No data recorded

## 2020-10-01 NOTE — DISCHARGE INSTRUCTIONS
Home Care Instructions for Eye Surgeries    1. ACTIVITY:   Limit your activity today. Increase activity gradually. You may return to work or school as directed by your physician.    2. DIET:   Drink plenty of fluids. Resume your normal diet unless instructed otherwise.  3. PAIN:   Expect a moderate amount of pain. If a prescription for pain is not sent home with you, you may take your commonly used pain reliever as directed. If this is not sufficient, call your physician. You may resume any other prescription medication unless otherwise directed by your physician.     4. DRESSING:   Keep your dressing dry and intact.  5. COMMENTS:   No driving, operating heavy machinery or signing legal documents for 24 hours.    No bending forward at the waist.   See attached schedule of eye drops.    Discuss any problem with your physician as soon as it arises. Do not Delay.      EMERGENCY- If you are unable to contact your physician, please go to the nearest Emergency Room.     Anesthesia: Monitored Anesthesia Care (MAC)  Anesthesia safety  Tips for anesthesia safety include the following:   · Follow all instructions you are given for how long not to eat or drink before your procedure.  · Be sure your healthcare provider knows what medicines you take, especially any anti-inflammatory medicine or blood thinners. This includes aspirin and any other over-the-counter medicines, herbs, and supplements.  · Have an adult family member or friend drive you home after the procedure.  · For the first 24 hours after your surgery:  ¨ Do not drive or use heavy equipment.  ¨ Do not make important decisions or sign documents.  ¨ Avoid alcohol.  ¨ Have someone stay with you, if possible. They can watch for problems and help keep you safe.  Date Last Reviewed: 12/1/2016  © 4485-6432 [employer], "[employer]". [address] [place] [address], [place] [address]. All rights reserved. This information is not intended as a substitute for professional medical  care. Always follow your healthcare professional's instructions.

## 2020-10-01 NOTE — ANESTHESIA PREPROCEDURE EVALUATION
10/01/2020  Del Anand is a 75 y.o., male.    Pre-op Assessment    I have reviewed the Patient Summary Reports.     I have reviewed the Nursing Notes. I have reviewed the NPO Status.   I have reviewed the Medications.     Review of Systems  Anesthesia Hx:  Denies Family Hx of Anesthesia complications.   Denies Personal Hx of Anesthesia complications.   Social:  Non-Smoker    Hematology/Oncology:  Hematology Normal   Oncology Normal     EENT/Dental:EENT/Dental Normal   Cardiovascular:   Hypertension CAD asymptomatic CABG/stent (CABG 2014)  CHF (comp) DVT's   Pulmonary:   Sleep Apnea, CPAP    Renal/:   renal calculi    Hepatic/GI:  Hepatic/GI Normal    Musculoskeletal:   Arthritis     Neurological:  Neurology Normal    Endocrine:  Endocrine Normal    Dermatological:  Skin Normal    Psych:  Psychiatric Normal           Physical Exam  General:  Obesity    Airway/Jaw/Neck:  Airway Findings: Mouth Opening: Normal Tongue: Normal  General Airway Assessment: Adult, Average, Possible difficult intubation  Mallampati: III  Improves to II with phonation.  TM Distance: Normal, at least 6 cm         Dental:  Dental Findings: In tact, Molar caps    Heart/Vascular:  Heart Findings: Rate: Normal  Rhythm: Regular Rhythm  Sounds: Normal        Mental Status:  Mental Status Findings:  Cooperative, Alert and Oriented         Anesthesia Plan  Type of Anesthesia, risks & benefits discussed:  Anesthesia Type:  MAC  Patient's Preference:   Intra-op Monitoring Plan: standard ASA monitors  Intra-op Monitoring Plan Comments:   Post Op Pain Control Plan: per primary service following discharge from PACU  Post Op Pain Control Plan Comments:   Induction:    Beta Blocker:         Informed Consent: Patient understands risks and agrees with Anesthesia plan.  Questions answered. Anesthesia consent signed with patient.  ASA Score: 3     Day  of Surgery Review of History & Physical:    H&P update referred to the surgeon.         Ready For Surgery From Anesthesia Perspective.

## 2020-10-01 NOTE — OP NOTE
SURGEON:  Chanel Klein M.D.    PREOPERATIVE DIAGNOSIS:    Nuclear Sclerotic Cataract Left Eye    POSTOPERATIVE DIAGNOSIS:    Nuclear Sclerotic Cataract Left Eye    PROCEDURES:    Phacoemulsification with  intraocular lens, Left eye (42991)  With Femtosecond LASER assist    DATE OF SURGERY: 10/01/2020    IMPLANT: TFNT00 21.0    ANESTHESIA:  MAC with topical Lidocaine    COMPLICATIONS:  None    ESTIMATED BLOOD LOSS: None    SPECIMENS: None    INDICATIONS:    The patient has a history of painless progressive visual loss and  difficulty with activities of daily living secondary to cataract formation.  After a thorough discussion of the risks, benefits, and alternatives to cataract surgery, including, but not limited to, the rare risks of infection, retinal detachment, hemorrhage, need for additional surgery, loss of vision, and even loss of the eye, the patient voices understanding and desires to proceed.    DESCRIPTION OF PROCEDURE:    After verification of consent and marking of the operative eye, the patient was positioned under the femtosecond LASER. Topical anesthetic drops were administered. A surgical timeout was initiated with verification of patient identifiers and the laser surgical plan. The eye was docked securely and the laser portion of the cataract procedure was carried out without complication.  The patient was returned to the pre-operative area to await the intraocular surgical portion of the cataract procedure.  The patients IOL calculations were reviewed, and the lens selection confirmed.   After verification and marking of the proper eye in the preop holding area, the patient was brought to the operating room in supine position where the eye was prepped and draped in standard sterile fashion with 5% Betadine and a lid speculum placed in the eye.   Topical 4% Lidocaine was used in addition to the preoperative anesthesia and the procedure was begun by the creation of a paracentesis incision through  which viscoelastic was used to fill the anterior chamber.  Next, a keratome blade was used to create a triplanar temporal clear corneal incision and a cystotome and Utrata forceps used to fashion a continuous curvilinear capsulorrhexis.  Hydrodissection was carried out using the Khan hydrodissection cannula and the nucleus was found to be mobile.  Phacoemulsification of the nucleus was carried out using a quick chop technique, and all remaining epinuclear and cortical material was removed.  The eye was then reformed with Viscoelastic and the  intraocular lens was implanted into the capsular bag.  All remaining viscoelastics were removed from the eye and at the end of the case the pupil was round, the lens was well-centered within the capsular bag and all wounds were found to be water tight.  Drops of Vigamox and Pred Forte were instilled and a shield was placed over the eye. The patient will follow up with Dr. Klein in the morning.

## 2020-10-02 ENCOUNTER — OFFICE VISIT (OUTPATIENT)
Dept: OPHTHALMOLOGY | Facility: CLINIC | Age: 75
End: 2020-10-02
Attending: OPHTHALMOLOGY
Payer: MEDICARE

## 2020-10-02 DIAGNOSIS — H25.10 NUCLEAR SCLEROSIS, UNSPECIFIED LATERALITY: ICD-10-CM

## 2020-10-02 DIAGNOSIS — Z98.890 POST-OPERATIVE STATE: Primary | ICD-10-CM

## 2020-10-02 PROCEDURE — 99024 POSTOP FOLLOW-UP VISIT: CPT | Mod: HCNC,S$GLB,, | Performed by: OPHTHALMOLOGY

## 2020-10-02 PROCEDURE — 99999 PR PBB SHADOW E&M-EST. PATIENT-LVL III: ICD-10-PCS | Mod: PBBFAC,HCNC,, | Performed by: OPHTHALMOLOGY

## 2020-10-02 PROCEDURE — 99024 PR POST-OP FOLLOW-UP VISIT: ICD-10-PCS | Mod: HCNC,S$GLB,, | Performed by: OPHTHALMOLOGY

## 2020-10-02 PROCEDURE — 99999 PR PBB SHADOW E&M-EST. PATIENT-LVL III: CPT | Mod: PBBFAC,HCNC,, | Performed by: OPHTHALMOLOGY

## 2020-10-02 NOTE — PROGRESS NOTES
Assessment /Plan     For exam results, see Encounter Report.    Post-operative state    Nuclear sclerosis, unspecified laterality      Slit lamp exam:  L/L: nl  K: clear, wound sealed  AC: 1+ cell  Lens: IOL centered and stable    POD1 s/p Phaco/IOL  Appropriate precautions and post op medications reviewed.  Patient instructed to call or come in if symptoms of redness, decreased vision, or pain are experienced.

## 2020-10-04 ENCOUNTER — PATIENT MESSAGE (OUTPATIENT)
Dept: INTERNAL MEDICINE | Facility: CLINIC | Age: 75
End: 2020-10-04

## 2020-10-04 DIAGNOSIS — I10 ESSENTIAL HYPERTENSION: ICD-10-CM

## 2020-10-05 ENCOUNTER — PATIENT MESSAGE (OUTPATIENT)
Dept: INTERNAL MEDICINE | Facility: CLINIC | Age: 75
End: 2020-10-05

## 2020-10-06 RX ORDER — IRBESARTAN 150 MG/1
150 TABLET ORAL NIGHTLY
Qty: 90 TABLET | Refills: 3 | Status: SHIPPED | OUTPATIENT
Start: 2020-10-06 | End: 2021-03-08

## 2020-10-07 ENCOUNTER — ANTI-COAG VISIT (OUTPATIENT)
Dept: CARDIOLOGY | Facility: CLINIC | Age: 75
End: 2020-10-07
Payer: MEDICARE

## 2020-10-07 DIAGNOSIS — Z79.01 LONG TERM CURRENT USE OF ANTICOAGULANT THERAPY: ICD-10-CM

## 2020-10-07 LAB — INR PPP: 2.7

## 2020-10-07 PROCEDURE — 93793 ANTICOAG MGMT PT WARFARIN: CPT | Mod: S$GLB,,, | Performed by: PHARMACIST

## 2020-10-07 PROCEDURE — 93793 PR ANTICOAGULANT MGMT FOR PT TAKING WARFARIN: ICD-10-PCS | Mod: S$GLB,,, | Performed by: PHARMACIST

## 2020-10-09 ENCOUNTER — OFFICE VISIT (OUTPATIENT)
Dept: ORTHOPEDICS | Facility: CLINIC | Age: 75
End: 2020-10-09
Payer: MEDICARE

## 2020-10-09 DIAGNOSIS — M76.61 ACHILLES TENDINITIS OF RIGHT LOWER EXTREMITY: Primary | ICD-10-CM

## 2020-10-09 PROCEDURE — 99214 PR OFFICE/OUTPT VISIT, EST, LEVL IV, 30-39 MIN: ICD-10-PCS | Mod: HCNC,S$GLB,, | Performed by: ORTHOPAEDIC SURGERY

## 2020-10-09 PROCEDURE — 1159F MED LIST DOCD IN RCRD: CPT | Mod: HCNC,S$GLB,, | Performed by: ORTHOPAEDIC SURGERY

## 2020-10-09 PROCEDURE — 99999 PR PBB SHADOW E&M-EST. PATIENT-LVL III: ICD-10-PCS | Mod: PBBFAC,HCNC,, | Performed by: ORTHOPAEDIC SURGERY

## 2020-10-09 PROCEDURE — 1126F PR PAIN SEVERITY QUANTIFIED, NO PAIN PRESENT: ICD-10-PCS | Mod: HCNC,S$GLB,, | Performed by: ORTHOPAEDIC SURGERY

## 2020-10-09 PROCEDURE — 1101F PR PT FALLS ASSESS DOC 0-1 FALLS W/OUT INJ PAST YR: ICD-10-PCS | Mod: HCNC,CPTII,S$GLB, | Performed by: ORTHOPAEDIC SURGERY

## 2020-10-09 PROCEDURE — 99214 OFFICE O/P EST MOD 30 MIN: CPT | Mod: HCNC,S$GLB,, | Performed by: ORTHOPAEDIC SURGERY

## 2020-10-09 PROCEDURE — 1101F PT FALLS ASSESS-DOCD LE1/YR: CPT | Mod: HCNC,CPTII,S$GLB, | Performed by: ORTHOPAEDIC SURGERY

## 2020-10-09 PROCEDURE — 1159F PR MEDICATION LIST DOCUMENTED IN MEDICAL RECORD: ICD-10-PCS | Mod: HCNC,S$GLB,, | Performed by: ORTHOPAEDIC SURGERY

## 2020-10-09 PROCEDURE — 1126F AMNT PAIN NOTED NONE PRSNT: CPT | Mod: HCNC,S$GLB,, | Performed by: ORTHOPAEDIC SURGERY

## 2020-10-09 PROCEDURE — 99999 PR PBB SHADOW E&M-EST. PATIENT-LVL III: CPT | Mod: PBBFAC,HCNC,, | Performed by: ORTHOPAEDIC SURGERY

## 2020-10-09 NOTE — PATIENT INSTRUCTIONS
Today, you saw Dr. Day and were seen for follow-up achilles tendinitis - it partially better but still not all the way better.    We decided the next step(s) in in treatment is/are   Tylenol/acetaminophen 1000mg before golf (or activity) and repeat 6 hours later if needed   Stretch before walks and golf   Heat to warm up before exercises   Continue exercises but use a bigger stair   Discontinue boot and transition with the gel insert in your shoes/sneakers/golf shoes; limit wearing the moccasins       Thank you for allowing me to participate in your care.  We will see you back as needed to discuss next steps in treatment if current treatment plan does not provide relief.     If you aren't better but still really want to avoid surgery, we will consider physical therapy for dry needling and scraping.    If you are really frustrated and want to discuss surgery further, we will order MRI.

## 2020-10-09 NOTE — PROGRESS NOTES
Subjective:   Chief complaint: Follow-up right achilles    HPI:   Del Anand is a 75 y.o. male who presents today for follow-up.  Reports he wore the boot and did the exercises diligently for a couple weeks after seeing me last.  He initially had resolution of pain and return his walking.  As he returned to full activity (walking the dog, golfing), he had recurrence of the Achilles pain.  He has placed himself back in the boot and again had resolution of the pain.  He has no pain when wearing the boot no matter how far he walks.  He has recurrence of pain localized to the Achilles midsubstance with walking.    For shoe wear, he wears moccasin surround the house and a loafer otherwise.  He denies any injuries or pops.    When he was doing the exercise program, he noted he would be able to do it but it was on a low stair in his heel contact the ground.  He had no pain during exercise but would have aching the following day and he would wait for resolution to resume the exercises.  He has not used APAP recently.  He still golfs.    PMH significant for DVT on warfarin.  Cannot take NSAIDS.    ROS:  Musculoskeletal: per HPI  Constitutional: Negative for fever  Skin: Negative for ulcers or lesions  Neurological: Negative for burning, tingling and numbness  Endocrine: Negative for diabetes     Objective:   Exam:  There were no vitals filed for this visit.  General: No acute distress, well-appearing  Musculoskeletal:  Skin benign except for a swelling over the midsubstance of the Achilles.  Achilles palpate in continuity.  There is tenderness over the swelling of the Achilles.  It feels thickened.  There is no tenderness over the retrocalcaneal bursa.  There is no tenderness over the Achilles insertion.  He dorsiflexes at least 10° past neutral with only slight discomfort.  Fires tibialis anterior, gastrocsoleus, posterior tibialis, peroneals.  Sensation intact to light touch in able to localize throughout superficial  peroneal, deep peroneal, tibial nerve distributions.    Imaging:  None new    Additional testing/results reviewed:  None      Assessment:     1. Achilles tendinitis of right lower extremity        I again reviewed imaging, clinical history, and diagnosis as above with the patient. I attempted to use layman's terms to educate the patient as well as utilize foot models and/or pictures.   At this point, attempted non-operative treatment is working partially but I think could be further maximized.  The next step is boot weaning, gel lifts and more supportive shoes, scheduled APAP.    I also had a brief discussion regarding the surgical treatment for this problem.  It would involve at least 2-3 months before patient could return to golfing.  Would also be at increased risk of complications going to his chronic anticoagulation history of blood clot.  For this and other reasons notably only mild functional impairments, patient strongly interested in avoiding surgery.    Discussed continued treatment as above.  May consider PT (dry needling/scraping) if no response.  Reassured patient this is not a precursor to rupture and encouraged him to advance activities as tolerated.       Plan:       1.  Therapy: Continue with current Achilles protocol  2.  Symptomatic treatment: RICE modalities recommended with emphasis on ice and elevation as needed and Acetaminophen extra-strength  3.  Restrictions: Advance activity as tolerated, use pain as guide  4.  Brace/orthotics/etc:  Gel heel inserts in golf and sneakers, discontinue moccasin, wean out of boot  5.  Follow-up:  As needed  --if wants to avoid surgery, order PT  --if considering surgery, order MRI    Total visit time = 25 minutes, of which greater than 50% was spent in direct counseling and/or coordination of care.        No orders of the defined types were placed in this encounter.      Past Medical History:   Diagnosis Date    Benign neoplasm of colon     Cataract     CHF  "(congestive heart failure) 2015    Coronary artery disease     Difficult intubation     DVT (deep venous thrombosis)     HLD (hyperlipidemia)     HTN (hypertension)     Impaired fasting glucose     Kidney stone     Metabolic syndrome     Nonspecific abnormal results of liver function study     Nonspecific findings on examination of blood     Nuclear sclerosis - Both Eyes 10/18/2013    Osteoarthrosis, unspecified whether generalized or localized, lower leg     Respiratory distress     PT STATES IT WAS "CRAP", "VERY UNCOMFORTABLE"       Past Surgical History:   Procedure Laterality Date    CARDIAC SURGERY      CATARACT EXTRACTION W/  INTRAOCULAR LENS IMPLANT Right 2020    Procedure: EXTRACTION, CATARACT, WITH IOL INSERTION;  Surgeon: Chanel Klein MD;  Location: [facility];  Service: Ophthalmology;  Laterality: Right;    CATARACT EXTRACTION W/  INTRAOCULAR LENS IMPLANT Left 10/1/2020    Procedure: EXTRACTION, CATARACT, WITH IOL INSERTION;  Surgeon: Chanel Klein MD;  Location: [facility] OR;  Service: Ophthalmology;  Laterality: Left;    COLONOSCOPY N/A 3/13/2019    Procedure: COLONOSCOPY;  Surgeon: Nikunj Larson MD;  Location: [facility] ([address] [place] [address]);  Service: Endoscopy;  Laterality: N/A;  ok to hold Coumadin x 5 days with a Lovenox bridge per Coumadin clinic  2nd floor - history of difficult intubation-MS    CORONARY ARTERY BYPASS GRAFT      2014    CYSTOSCOPY      KIDNEY STONE SURGERY      KNEE ARTHROPLASTY      bilateral    renal stone      SKIN BIOPSY         Family History   Problem Relation Age of Onset    Heart attack Father         d. 85    Urolithiasis Father     Heart disease Father     Heart failure Father     Stroke Mother     Dementia Mother     Heart attack Maternal Grandfather         d. 65    Hypertension Paternal Grandmother     Heart attack Paternal Grandfather     Heart failure Paternal Grandfather     Other Sister         bladder lift, s/p 4     No Known " Problems Daughter     No Known Problems Son        Social History     Socioeconomic History    Marital status:      Spouse name: Not on file    Number of children: Not on file    Years of education: Not on file    Highest education level: Not on file   Occupational History    Not on file   Social Needs    Financial resource strain: Not hard at all    Food insecurity     Worry: Never true     Inability: Never true    Transportation needs     Medical: No     Non-medical: No   Tobacco Use    Smoking status: Former Smoker     Packs/day: 1.00     Years: 20.00     Pack years: 20.00     Types: Cigarettes     Quit date: 10/16/1987     Years since quittin.0    Smokeless tobacco: Former User   Substance and Sexual Activity    Alcohol use: Yes     Alcohol/week: 0.0 standard drinks     Types: 1 - 2 Cans of beer, 1 - 2 Standard drinks or equivalent per week     Frequency: 4 or more times a week     Drinks per session: 1 or 2     Binge frequency: Never     Comment: daily, none today    Drug use: No    Sexual activity: Yes     Partners: Female     Birth control/protection: None   Lifestyle    Physical activity     Days per week: 3 days     Minutes per session: 150+ min    Stress: Not at all   Relationships    Social connections     Talks on phone: More than three times a week     Gets together: More than three times a week     Attends Baptism service: Not on file     Active member of club or organization: Yes     Attends meetings of clubs or organizations: More than 4 times per year     Relationship status:    Other Topics Concern    Not on file   Social History Narrative    SOCIAL HISTORY:     .     Retired  for [employer].     Son in [place]    Daughter lives in [place].     +/- on exercise with the joint problems.      Plays golf, now going to gym        FAMILY HISTORY:     Mom d. 2006 with dementia secondary to subarachnoid     hemorrhage and stroke.     Dad d. 85,  sudden MI.     One sister living in the [facility] doing well.

## 2020-10-16 ENCOUNTER — TELEPHONE (OUTPATIENT)
Dept: INTERNAL MEDICINE | Facility: CLINIC | Age: 75
End: 2020-10-16

## 2020-10-16 NOTE — TELEPHONE ENCOUNTER
Would recommend they quarantine and if they become symptomatic we can get them tested.     Or, they can go to a community testing center to get tested.

## 2020-10-16 NOTE — TELEPHONE ENCOUNTER
"Please see following message sent from the pt's wife, arabella.:    "Sabina, I see that she is out of the office.  Can you ask another MD?  Del and I found out today that we were exposed to someone who tested positive for covid.  This exposure happened on Monday.  Neither of us have sx.  I called the local urgent care and they will NOT test unless you are symptomatic!"    Please advise.  "

## 2020-10-21 ENCOUNTER — PATIENT MESSAGE (OUTPATIENT)
Dept: INTERNAL MEDICINE | Facility: CLINIC | Age: 75
End: 2020-10-21

## 2020-10-21 ENCOUNTER — ANTI-COAG VISIT (OUTPATIENT)
Dept: CARDIOLOGY | Facility: CLINIC | Age: 75
End: 2020-10-21
Payer: MEDICARE

## 2020-10-21 DIAGNOSIS — B97.21 SARS-ASSOCIATED CORONAVIRUS INFECTION: Primary | ICD-10-CM

## 2020-10-21 DIAGNOSIS — Z79.01 LONG TERM CURRENT USE OF ANTICOAGULANT THERAPY: ICD-10-CM

## 2020-10-21 LAB — INR PPP: 2.5

## 2020-10-21 PROCEDURE — 93793 PR ANTICOAGULANT MGMT FOR PT TAKING WARFARIN: ICD-10-PCS | Mod: S$GLB,,, | Performed by: PHARMACIST

## 2020-10-21 PROCEDURE — 93793 ANTICOAG MGMT PT WARFARIN: CPT | Mod: S$GLB,,, | Performed by: PHARMACIST

## 2020-10-21 RX ORDER — FLUTICASONE FUROATE AND VILANTEROL TRIFENATATE 100; 25 UG/1; UG/1
1 POWDER RESPIRATORY (INHALATION) DAILY
Qty: 1 EACH | Refills: 0 | Status: SHIPPED | OUTPATIENT
Start: 2020-10-21 | End: 2020-11-06

## 2020-10-21 NOTE — TELEPHONE ENCOUNTER
Will order Covid Surveillance where someone checks in with you daily    Make sure you are taking Vitamin D 3 two thousand IU daily  Vitamin C 1,000  And Zinc 40mg ( max)  Rx Breo ( steroid inhaler)- escripted to his [facility]   Continue warfarin and ASA    Does he have a pulse ox?    Also, wear your mask at home, keep minimum of 6 ' from spouse   And she can start the supplements too

## 2020-10-21 NOTE — PROGRESS NOTES
Patient was diagnosed with COVID yesterday. INR has trended to the lower end of the range. Patient would like to target the higher end of the range at this time. I am in agreement and we will repeat INR next week.

## 2020-10-21 NOTE — PROGRESS NOTES
BP has significantly improved  Average approaching goal, 132/72 mmHg  Continue current regimen and monitoring  9/24/20 CMP reviewed

## 2020-10-22 ENCOUNTER — PATIENT MESSAGE (OUTPATIENT)
Dept: OTHER | Facility: OTHER | Age: 75
End: 2020-10-22

## 2020-10-26 ENCOUNTER — PATIENT MESSAGE (OUTPATIENT)
Dept: INTERNAL MEDICINE | Facility: CLINIC | Age: 75
End: 2020-10-26

## 2020-10-26 NOTE — PROGRESS NOTES
Pt has never seen me. Routing to Dr. Nieto to ascertain if there is anything else he wishes to have ordered other than pending d dimer.   Subjective:   Patient ID:  Del Anand is a 74 y.o. male who presents for follow-up of Coronary artery disease involving native coronary artery of       Problem List:  CAD   CABG x 2 - SVG-LAD, SVG-ramus   HTN  Hyperlipidemia   Obesity   Prior tobacco use - 1ppd x 20 yrs (quit 1987)   Pulm embolism 1/15   DVT (R) leg while on rivaroxaban     HPI:   Del Anand is here for fup of CAD. He does not report angina or shortness of breath with exertion.     He is not exercising except for walking his dog. He used to exercise at a gym for 3 years after he completed cardiac rehab. He is also playing golf less frequently.  On 40 mg of rosuvastatin his LDL(c) is <50 mg/dl. Transaminases are at the  upper limits of normal.   He uses compression stockings for swelling in the left leg.  On warfarin for prevention of VTE.  He had a DVT in the right lower extremity while on rivaroxaban.  He checks his INR at home. INR range is 2.5-3.5. He does not report excessive bruising, nose bleeds, melena or bleeding from other sites.   Carotid u/s 2017: 40-49% stenosis on the left.      Review of Systems   Constitution: Negative for fever, malaise/fatigue, weight gain and weight loss.   HENT: Negative for hearing loss and nosebleeds.    Eyes: Negative for vision loss in left eye and vision loss in right eye.   Cardiovascular: Negative for chest pain, claudication, dyspnea on exertion, irregular heartbeat, leg swelling, palpitations and syncope.   Respiratory: Negative for cough, hemoptysis, sputum production and wheezing.    Hematologic/Lymphatic: Negative for bleeding problem. Does not bruise/bleed easily.   Musculoskeletal: Positive for arthritis and joint pain. Negative for back pain, falls, muscle cramps, muscle weakness and neck pain.   Gastrointestinal: Negative for abdominal pain, constipation, diarrhea, heartburn, hematochezia and melena.   Genitourinary: Negative for frequency, hematuria and nocturia.   Neurological:  "Negative for excessive daytime sleepiness, dizziness, focal weakness, headaches, light-headedness, loss of balance, numbness, paresthesias, seizures and weakness.   Psychiatric/Behavioral: Negative for depression. The patient is not nervous/anxious.        Current Outpatient Medications   Medication Sig    aspirin 81 MG Chew Take 81 mg by mouth once daily.    cyanocobalamin (B-12 DOTS) 500 MCG tablet Take 500 mcg by mouth every morning. Every day    ezetimibe (ZETIA) 10 mg tablet Take 1 tablet (10 mg total) by mouth once daily.    FOLIC ACID/MULTIVITS-MIN/LUT (CENTRUM SILVER ORAL) Take 1 tablet by mouth once daily.    irbesartan (AVAPRO) 150 MG tablet Take 1 tablet (150 mg total) by mouth every evening.    metoprolol tartrate (LOPRESSOR) 50 MG tablet Take 1 tablet (50 mg total) by mouth 2 (two) times daily.    metroNIDAZOLE (FLAGYL) 500 MG tablet Pt states he takes 4 times a day every 6 hours    rosuvastatin (CRESTOR) 40 MG Tab TAKE 1 TABLET ONE TIME DAILY    warfarin (COUMADIN) 10 MG tablet Take 1-1.5 tablets (10-15 mg total) by mouth Daily.    warfarin (COUMADIN) 5 MG tablet Take 1 tablet (5 mg total) by mouth Daily. Take as directed by Coumadin Clinic         Social history:  Del Anand  reports that he quit smoking about 32 years ago. His smoking use included cigarettes. He has a 20.00 pack-year smoking history. He has quit using smokeless tobacco. He reports that he drinks alcohol. He reports that he does not use drugs.      Objective:     Physical Exam   Constitutional: He is oriented to person, place, and time. He appears well-developed and well-nourished.   /62   Pulse (!) 54   Ht 6' 3" (1.905 m)   Wt (!) 142.4 kg (313 lb 15 oz)   SpO2 97%   BMI 39.24 kg/m²      HENT:   Head: Normocephalic and atraumatic.   Neck: No JVD present. Carotid bruit is not present.   Cardiovascular: Normal rate, regular rhythm, S1 normal and S2 normal. Exam reveals no gallop.   No murmur " heard.  Pulses:       Radial pulses are 2+ on the right side, and 2+ on the left side.        Posterior tibial pulses are 2+ on the right side, and 2+ on the left side.   Pulmonary/Chest: Effort normal. He has no wheezes. He has no rales. Chest wall is not dull to percussion.   Abdominal: Soft. There is no splenomegaly or hepatomegaly. There is no tenderness.   Musculoskeletal:        Right lower leg: He exhibits no edema.        Left lower leg: He exhibits no edema.   Neurological: He is alert and oriented to person, place, and time. Gait normal.   Skin: Skin is warm and dry. No bruising noted. No cyanosis. Nails show no clubbing.   Psychiatric: He has a normal mood and affect. His speech is normal and behavior is normal. Judgment and thought content normal. Cognition and memory are normal.           Lab Results   Component Value Date    CHOL 104 (L) 01/03/2020    HDL 40 01/03/2020    LDLCALC 42.2 (L) 01/03/2020    TRIG 109 01/03/2020    CHOLHDL 38.5 01/03/2020     Lab Results   Component Value Date     (H) 01/03/2020    CREATININE 0.8 01/03/2020    BUN 18 01/03/2020     01/03/2020    K 4.6 01/03/2020     01/03/2020    CO2 29 01/03/2020     Lab Results   Component Value Date    ALT 38 01/03/2020    AST 38 01/03/2020    ALKPHOS 91 10/18/2019    BILITOT 1.0 10/18/2019       ECG today reviewed by me. It reveals sinus rhythm with no ST abnormality. There is decreased R wave volatge in the lateral leads that is most likely due to lead position.       Assessment and Plan:     Coronary artery disease involving native coronary artery of native heart without angina pectoris  S/P CABG x 2  Stable. Continue same meds.   Recommend a stress test. No stress test since CPX after surgery. Suspect abnormal ECG is due to lead position.  Exercise counseling.  -     IN OFFICE EKG 12-LEAD (to Muse)  -     Cardiac PET Scan Stress; Future; Expected date: 01/08/2020. BMI 39    Mixed hyperlipidemia  LDL at goal. Continue  rosuvastatin and ezetimibe.  Nutritional counseling.     Chronic deep vein thrombosis (DVT) of other vein of right lower extremity  Pulmonary embolism, old  Long term current use of anticoagulant therapy  VTE while on rivaroxaban. Needs to remain on warfarin.   Continue home INR monitoring.     Stenosis of left carotid artery  Nonobstructive. Will repeat u/s at the time of next visit.     VERONICA on CPAP    Follow up in about 1 year (around 1/7/2021), or if symptoms worsen or fail to improve.

## 2020-10-27 NOTE — TELEPHONE ENCOUNTER
That is a sx of Covid, and the stool is infectious,   replenishing electrolytes is appropriate, especially potassium  And keeping well hydrated , water is best w/ G2 added a couple of times daily

## 2020-10-28 ENCOUNTER — ANTI-COAG VISIT (OUTPATIENT)
Dept: CARDIOLOGY | Facility: CLINIC | Age: 75
End: 2020-10-28
Payer: MEDICARE

## 2020-10-28 DIAGNOSIS — Z79.01 LONG TERM CURRENT USE OF ANTICOAGULANT THERAPY: ICD-10-CM

## 2020-10-28 DIAGNOSIS — I26.99 PULMONARY EMBOLISM, UNSPECIFIED CHRONICITY, UNSPECIFIED PULMONARY EMBOLISM TYPE, UNSPECIFIED WHETHER ACUTE COR PULMONALE PRESENT: ICD-10-CM

## 2020-10-28 LAB — INR PPP: 3.9

## 2020-10-28 PROCEDURE — 93793 ANTICOAG MGMT PT WARFARIN: CPT | Mod: S$GLB,,, | Performed by: PHARMACIST

## 2020-10-28 PROCEDURE — 93793 PR ANTICOAGULANT MGMT FOR PT TAKING WARFARIN: ICD-10-PCS | Mod: S$GLB,,, | Performed by: PHARMACIST

## 2020-10-29 NOTE — PROGRESS NOTES
Patient experienced issues with diarrhea recently but reports his diet is normal and his diarrhea has subsided. INR increased likely in part from diarrhea and also possibly due to the coumadin increase. I will place him back on his previous weekly dose of coumadin

## 2020-11-06 ENCOUNTER — OFFICE VISIT (OUTPATIENT)
Dept: OPTOMETRY | Facility: CLINIC | Age: 75
End: 2020-11-06
Payer: MEDICARE

## 2020-11-06 DIAGNOSIS — H26.493 AFTER-CATARACT OBSCURING VISION, BILATERAL: ICD-10-CM

## 2020-11-06 DIAGNOSIS — Z98.42 S/P BILATERAL CATARACT EXTRACTION: Primary | ICD-10-CM

## 2020-11-06 DIAGNOSIS — Z98.41 S/P BILATERAL CATARACT EXTRACTION: Primary | ICD-10-CM

## 2020-11-06 PROCEDURE — 99024 POSTOP FOLLOW-UP VISIT: CPT | Mod: HCNC,S$GLB,, | Performed by: OPTOMETRIST

## 2020-11-06 PROCEDURE — 99999 PR PBB SHADOW E&M-EST. PATIENT-LVL III: CPT | Mod: PBBFAC,HCNC,, | Performed by: OPTOMETRIST

## 2020-11-06 PROCEDURE — 99024 PR POST-OP FOLLOW-UP VISIT: ICD-10-PCS | Mod: HCNC,S$GLB,, | Performed by: OPTOMETRIST

## 2020-11-06 PROCEDURE — 99999 PR PBB SHADOW E&M-EST. PATIENT-LVL III: ICD-10-PCS | Mod: PBBFAC,HCNC,, | Performed by: OPTOMETRIST

## 2020-11-06 NOTE — PROGRESS NOTES
HPI     Post-op Evaluation      Additional comments: 1 month phaco OS              Comments     Last eye exam was 10/2/20 with Dr. Klein.  Patient states has difficulty seeing golf ball @ 100 yards and feels   vision fluctuates during the day.   Everything seems brighter.     09/17/2020 IMPLANT: TFNT00 21.0 OD  10/01/2020 IMPLANT: TFNT00 21.0 OS          Last edited by Nelida Sutton on 11/6/2020 10:09 AM. (History)            Assessment /Plan     For exam results, see Encounter Report.    S/P bilateral cataract extraction  -     OCT- Retina    After-cataract obscuring vision, bilateral            1.  Pt doing well.  No rx given.  Recommend artificial tears at least 2x/day OU.  No CME OU.  Retina flat and intact OU--no holes, tears, breaks, or RDs.  Return care to Dr. Gentile.    2.  Educated pt.  RTC 3-4 months for YAG OU.

## 2020-11-10 ENCOUNTER — PATIENT OUTREACH (OUTPATIENT)
Dept: OTHER | Facility: OTHER | Age: 75
End: 2020-11-10

## 2020-11-11 ENCOUNTER — ANTI-COAG VISIT (OUTPATIENT)
Dept: CARDIOLOGY | Facility: CLINIC | Age: 75
End: 2020-11-11
Payer: MEDICARE

## 2020-11-11 DIAGNOSIS — Z79.01 LONG TERM CURRENT USE OF ANTICOAGULANT THERAPY: ICD-10-CM

## 2020-11-11 LAB — INR PPP: 3.1

## 2020-11-11 PROCEDURE — 93793 PR ANTICOAGULANT MGMT FOR PT TAKING WARFARIN: ICD-10-PCS | Mod: S$GLB,,, | Performed by: PHARMACIST

## 2020-11-11 PROCEDURE — 93793 ANTICOAG MGMT PT WARFARIN: CPT | Mod: S$GLB,,, | Performed by: PHARMACIST

## 2020-11-13 RX ORDER — FLUTICASONE FUROATE AND VILANTEROL TRIFENATATE 100; 25 UG/1; UG/1
POWDER RESPIRATORY (INHALATION)
Refills: 0 | OUTPATIENT
Start: 2020-11-13

## 2020-11-27 ENCOUNTER — ANTI-COAG VISIT (OUTPATIENT)
Dept: CARDIOLOGY | Facility: CLINIC | Age: 75
End: 2020-11-27
Payer: MEDICARE

## 2020-11-27 DIAGNOSIS — Z79.01 LONG TERM CURRENT USE OF ANTICOAGULANT THERAPY: ICD-10-CM

## 2020-11-27 LAB — INR PPP: 2.6

## 2020-11-27 PROCEDURE — 93793 PR ANTICOAGULANT MGMT FOR PT TAKING WARFARIN: ICD-10-PCS | Mod: S$GLB,,, | Performed by: PHARMACIST

## 2020-11-27 PROCEDURE — 93793 ANTICOAG MGMT PT WARFARIN: CPT | Mod: S$GLB,,, | Performed by: PHARMACIST

## 2020-11-28 ENCOUNTER — PATIENT MESSAGE (OUTPATIENT)
Dept: CARDIOLOGY | Facility: CLINIC | Age: 75
End: 2020-11-28

## 2020-12-02 NOTE — PROGRESS NOTES
Patient concerned about recent downward trend in INR. We will increase his weekly dose slightly and watch closely.

## 2020-12-09 ENCOUNTER — ANTI-COAG VISIT (OUTPATIENT)
Dept: CARDIOLOGY | Facility: CLINIC | Age: 75
End: 2020-12-09
Payer: MEDICARE

## 2020-12-09 DIAGNOSIS — Z79.01 LONG TERM CURRENT USE OF ANTICOAGULANT THERAPY: ICD-10-CM

## 2020-12-09 LAB — INR PPP: 3.4

## 2020-12-09 PROCEDURE — 93793 PR ANTICOAGULANT MGMT FOR PT TAKING WARFARIN: ICD-10-PCS | Mod: S$GLB,,, | Performed by: PHARMACIST

## 2020-12-09 PROCEDURE — 93793 ANTICOAG MGMT PT WARFARIN: CPT | Mod: S$GLB,,, | Performed by: PHARMACIST

## 2020-12-09 NOTE — PROGRESS NOTES
INR has increased to the higher end of the range since last week's dose increase. I suggest going back to 10mg daily dose. We can check next week to make sure INR is therapeutic and also to bypass week of [place]

## 2020-12-13 DIAGNOSIS — E78.2 MIXED HYPERLIPIDEMIA: ICD-10-CM

## 2020-12-14 DIAGNOSIS — Z79.01 LONG TERM CURRENT USE OF ANTICOAGULANT THERAPY: ICD-10-CM

## 2020-12-14 DIAGNOSIS — I25.10 CORONARY ARTERY DISEASE INVOLVING NATIVE CORONARY ARTERY OF NATIVE HEART WITHOUT ANGINA PECTORIS: Primary | ICD-10-CM

## 2020-12-14 DIAGNOSIS — E78.2 MIXED HYPERLIPIDEMIA: ICD-10-CM

## 2020-12-14 DIAGNOSIS — I10 ESSENTIAL HYPERTENSION: ICD-10-CM

## 2020-12-14 RX ORDER — EZETIMIBE 10 MG/1
10 TABLET ORAL DAILY
Qty: 90 TABLET | Refills: 3 | Status: SHIPPED | OUTPATIENT
Start: 2020-12-14 | End: 2021-03-08

## 2020-12-16 ENCOUNTER — PATIENT MESSAGE (OUTPATIENT)
Dept: INTERNAL MEDICINE | Facility: CLINIC | Age: 75
End: 2020-12-16

## 2020-12-16 DIAGNOSIS — D64.9 ANEMIA, UNSPECIFIED TYPE: ICD-10-CM

## 2020-12-16 DIAGNOSIS — R73.03 PREDIABETES: Primary | ICD-10-CM

## 2020-12-22 NOTE — PROGRESS NOTES
Sara with Dr Levin called, states Patient needs a prescritpion for Warfarin called in to [facility]

## 2020-12-22 NOTE — PROGRESS NOTES
Pt was supposed to test last week due to holiday week this week and INR on upper end 2 weeks ago and dose adjustments. Please advise pt to test in AM as we will not get results at this time today (4:25pm).     Refill sent in by Dr. Levin already

## 2020-12-23 ENCOUNTER — ANTI-COAG VISIT (OUTPATIENT)
Dept: CARDIOLOGY | Facility: CLINIC | Age: 75
End: 2020-12-23
Payer: MEDICARE

## 2020-12-23 DIAGNOSIS — Z79.01 LONG TERM CURRENT USE OF ANTICOAGULANT THERAPY: ICD-10-CM

## 2020-12-23 LAB — INR PPP: 2.7

## 2020-12-23 PROCEDURE — 93793 PR ANTICOAGULANT MGMT FOR PT TAKING WARFARIN: ICD-10-PCS | Mod: S$GLB,,,

## 2020-12-23 PROCEDURE — 93793 ANTICOAG MGMT PT WARFARIN: CPT | Mod: S$GLB,,,

## 2020-12-28 NOTE — PROGRESS NOTES
[facility]: [facility]  Follow-Up    The history is provided by the patient.             Reason for review: Blood pressure at goal        Topics Covered on Call: physical activity, Diet and achilles tendonitis    Additional Follow-up details: Patient reports that he is doing well. Patient AVG BP is within goal and readings are trending down. Patient was experiencing pain from achilles tendonitis at last encounter. Patient reports that this issue has subsided and he is no longer experiencing achilles pain. Patient reports making no major changes to his diet since our last encounter and patient's activity levels have returned to normal. Will f/u in 4-6 weeks to check in.             Diet-no change to diet  No 24 hour dietary recall  No change to diet.  Patient reports eating or drinking the following: Patient reports making no major changes to his diet since our last encounter.       Physical Activity-Change      He added golf, walking, house chores/yard work to His physical activity routine.        Additional physical activity details: Patient reports that he has returned to normal activity including walking, golf, house chores and yard work. Patient had been experiencing achilles tendonitis, which was limiting but this has subsided since our last encounter. Encouraged patient to keep up the good work.       Medication Adherence-Medication adherence was assessed.      Substance, Sleep, Stress-Not assessed      Continue current diet/physical activity routine.       Addressed patient questions and patient has my contact information if needed prior to next outreach. Patient verbalizes understanding.      Explained the importance of self-monitoring and medication adherence. Encouraged the patient to communicate with their health  for lifestyle modifications to help improve or maintain a healthy lifestyle.               There are no preventive care reminders to display for this patient.      Last 5 Patient  Entered Readings                                      Current 30 Day Average: 126/73     Recent Readings 12/23/2020 12/18/2020 12/9/2020 12/3/2020 12/3/2020    SBP (mmHg) 112 121 130 139 132    DBP (mmHg) 63 69 74 85 76    Pulse 72 73 67 65 66

## 2021-01-06 ENCOUNTER — ANTI-COAG VISIT (OUTPATIENT)
Dept: CARDIOLOGY | Facility: CLINIC | Age: 76
End: 2021-01-06
Payer: MEDICARE

## 2021-01-06 DIAGNOSIS — Z79.01 LONG TERM CURRENT USE OF ANTICOAGULANT THERAPY: ICD-10-CM

## 2021-01-06 LAB — INR PPP: 2.2

## 2021-01-06 PROCEDURE — 93793 PR ANTICOAGULANT MGMT FOR PT TAKING WARFARIN: ICD-10-PCS | Mod: S$GLB,,, | Performed by: PHARMACIST

## 2021-01-06 PROCEDURE — 93793 ANTICOAG MGMT PT WARFARIN: CPT | Mod: S$GLB,,, | Performed by: PHARMACIST

## 2021-01-20 ENCOUNTER — ANTI-COAG VISIT (OUTPATIENT)
Dept: CARDIOLOGY | Facility: CLINIC | Age: 76
End: 2021-01-20
Payer: MEDICARE

## 2021-01-20 DIAGNOSIS — Z79.01 LONG TERM CURRENT USE OF ANTICOAGULANT THERAPY: ICD-10-CM

## 2021-01-20 DIAGNOSIS — I26.99 PULMONARY EMBOLISM, UNSPECIFIED CHRONICITY, UNSPECIFIED PULMONARY EMBOLISM TYPE, UNSPECIFIED WHETHER ACUTE COR PULMONALE PRESENT: ICD-10-CM

## 2021-01-20 LAB — INR PPP: 3

## 2021-01-20 PROCEDURE — 93793 PR ANTICOAGULANT MGMT FOR PT TAKING WARFARIN: ICD-10-PCS | Mod: S$GLB,,, | Performed by: PHARMACIST

## 2021-01-20 PROCEDURE — 93793 ANTICOAG MGMT PT WARFARIN: CPT | Mod: S$GLB,,, | Performed by: PHARMACIST

## 2021-01-22 ENCOUNTER — PATIENT MESSAGE (OUTPATIENT)
Dept: ADMINISTRATIVE | Facility: OTHER | Age: 76
End: 2021-01-22

## 2021-01-27 ENCOUNTER — IMMUNIZATION (OUTPATIENT)
Dept: PHARMACY | Facility: CLINIC | Age: 76
End: 2021-01-27
Payer: MEDICARE

## 2021-01-27 DIAGNOSIS — Z23 NEED FOR VACCINATION: Primary | ICD-10-CM

## 2021-02-03 ENCOUNTER — ANTI-COAG VISIT (OUTPATIENT)
Dept: CARDIOLOGY | Facility: CLINIC | Age: 76
End: 2021-02-03
Payer: MEDICARE

## 2021-02-03 DIAGNOSIS — Z79.01 LONG TERM CURRENT USE OF ANTICOAGULANT THERAPY: Primary | ICD-10-CM

## 2021-02-03 LAB — INR PPP: 2.9

## 2021-02-03 PROCEDURE — 93793 ANTICOAG MGMT PT WARFARIN: CPT | Mod: S$GLB,,, | Performed by: PHARMACIST

## 2021-02-03 PROCEDURE — 93793 PR ANTICOAGULANT MGMT FOR PT TAKING WARFARIN: ICD-10-PCS | Mod: S$GLB,,, | Performed by: PHARMACIST

## 2021-02-12 ENCOUNTER — PROCEDURE VISIT (OUTPATIENT)
Dept: OPHTHALMOLOGY | Facility: CLINIC | Age: 76
End: 2021-02-12
Attending: OPHTHALMOLOGY
Payer: MEDICARE

## 2021-02-12 DIAGNOSIS — H26.493 POSTERIOR CAPSULAR OPACIFICATION, BILATERAL: Primary | ICD-10-CM

## 2021-02-12 PROCEDURE — 92014 COMPRE OPH EXAM EST PT 1/>: CPT | Mod: 57,S$GLB,, | Performed by: OPHTHALMOLOGY

## 2021-02-12 PROCEDURE — 92014 PR EYE EXAM, EST PATIENT,COMPREHESV: ICD-10-PCS | Mod: 57,S$GLB,, | Performed by: OPHTHALMOLOGY

## 2021-02-12 PROCEDURE — 66821 PR DISCISSION,2ND CATARACT,LASER: ICD-10-PCS | Mod: 50,S$GLB,, | Performed by: OPHTHALMOLOGY

## 2021-02-12 PROCEDURE — 66821 AFTER CATARACT LASER SURGERY: CPT | Mod: 50,S$GLB,, | Performed by: OPHTHALMOLOGY

## 2021-02-24 ENCOUNTER — IMMUNIZATION (OUTPATIENT)
Dept: PHARMACY | Facility: CLINIC | Age: 76
End: 2021-02-24
Payer: MEDICARE

## 2021-02-24 DIAGNOSIS — Z23 NEED FOR VACCINATION: Primary | ICD-10-CM

## 2021-02-26 ENCOUNTER — ANTI-COAG VISIT (OUTPATIENT)
Dept: CARDIOLOGY | Facility: CLINIC | Age: 76
End: 2021-02-26
Payer: MEDICARE

## 2021-02-26 ENCOUNTER — PATIENT MESSAGE (OUTPATIENT)
Dept: OTHER | Facility: OTHER | Age: 76
End: 2021-02-26

## 2021-02-26 DIAGNOSIS — Z79.01 LONG TERM CURRENT USE OF ANTICOAGULANT THERAPY: Primary | ICD-10-CM

## 2021-02-26 LAB — INR PPP: 3.2

## 2021-02-26 PROCEDURE — 93793 ANTICOAG MGMT PT WARFARIN: CPT | Mod: S$GLB,,,

## 2021-02-26 PROCEDURE — 93793 PR ANTICOAGULANT MGMT FOR PT TAKING WARFARIN: ICD-10-PCS | Mod: S$GLB,,,

## 2021-03-03 ENCOUNTER — ANTI-COAG VISIT (OUTPATIENT)
Dept: CARDIOLOGY | Facility: CLINIC | Age: 76
End: 2021-03-03
Payer: MEDICARE

## 2021-03-03 DIAGNOSIS — Z79.01 LONG TERM CURRENT USE OF ANTICOAGULANT THERAPY: Primary | ICD-10-CM

## 2021-03-03 LAB — INR PPP: 3.8

## 2021-03-03 PROCEDURE — 93793 ANTICOAG MGMT PT WARFARIN: CPT | Mod: S$GLB,,, | Performed by: PHARMACIST

## 2021-03-03 PROCEDURE — 93793 PR ANTICOAGULANT MGMT FOR PT TAKING WARFARIN: ICD-10-PCS | Mod: S$GLB,,, | Performed by: PHARMACIST

## 2021-03-16 ENCOUNTER — HOSPITAL ENCOUNTER (OUTPATIENT)
Dept: CARDIOLOGY | Facility: HOSPITAL | Age: 76
Discharge: HOME | End: 2021-03-16
Attending: INTERNAL MEDICINE
Payer: MEDICARE

## 2021-03-16 DIAGNOSIS — I65.22 STENOSIS OF LEFT CAROTID ARTERY: ICD-10-CM

## 2021-03-16 LAB
LEFT ARM DIASTOLIC BLOOD PRESSURE: 80 MMHG
LEFT ARM SYSTOLIC BLOOD PRESSURE: 130 MMHG
LEFT CBA DIAS: 12 CM/S
LEFT CBA SYS: 43 CM/S
LEFT CCA DIST DIAS: 13 CM/S
LEFT CCA DIST SYS: 56 CM/S
LEFT CCA MID DIAS: 16 CM/S
LEFT CCA MID SYS: 65 CM/S
LEFT CCA PROX DIAS: 18 CM/S
LEFT CCA PROX SYS: 87 CM/S
LEFT ECA DIAS: 15 CM/S
LEFT ECA SYS: 116 CM/S
LEFT ICA DIST DIAS: 20 CM/S
LEFT ICA DIST SYS: 80 CM/S
LEFT ICA MID DIAS: 18 CM/S
LEFT ICA MID SYS: 122 CM/S
LEFT ICA PROX DIAS: 39 CM/S
LEFT ICA PROX SYS: 183 CM/S
LEFT VERTEBRAL DIAS: 13 CM/S
LEFT VERTEBRAL SYS: 72 CM/S
OHS CV CAROTID RIGHT ICA EDV HIGHEST: 28
OHS CV CAROTID ULTRASOUND LEFT ICA/CCA RATIO: 3.27
OHS CV CAROTID ULTRASOUND RIGHT ICA/CCA RATIO: 1.25
OHS CV PV CAROTID LEFT HIGHEST CCA: 87
OHS CV PV CAROTID LEFT HIGHEST ICA: 183
OHS CV PV CAROTID RIGHT HIGHEST CCA: 75
OHS CV PV CAROTID RIGHT HIGHEST ICA: 91
OHS CV US CAROTID LEFT HIGHEST EDV: 39
RIGHT ARM DIASTOLIC BLOOD PRESSURE: 80 MMHG
RIGHT ARM SYSTOLIC BLOOD PRESSURE: 130 MMHG
RIGHT CBA DIAS: 14 CM/S
RIGHT CBA SYS: 70 CM/S
RIGHT CCA DIST DIAS: 17 CM/S
RIGHT CCA DIST SYS: 73 CM/S
RIGHT CCA MID DIAS: 14 CM/S
RIGHT CCA MID SYS: 71 CM/S
RIGHT CCA PROX DIAS: 11 CM/S
RIGHT CCA PROX SYS: 75 CM/S
RIGHT ECA DIAS: 23 CM/S
RIGHT ECA SYS: 103 CM/S
RIGHT ICA DIST DIAS: 28 CM/S
RIGHT ICA DIST SYS: 91 CM/S
RIGHT ICA MID DIAS: 14 CM/S
RIGHT ICA MID SYS: 88 CM/S
RIGHT ICA PROX DIAS: 18 CM/S
RIGHT ICA PROX SYS: 74 CM/S
RIGHT VERTEBRAL DIAS: 11 CM/S
RIGHT VERTEBRAL SYS: 45 CM/S

## 2021-03-16 PROCEDURE — 93880 EXTRACRANIAL BILAT STUDY: CPT | Mod: 26,,, | Performed by: INTERNAL MEDICINE

## 2021-03-16 PROCEDURE — 93880 EXTRACRANIAL BILAT STUDY: CPT

## 2021-03-16 PROCEDURE — 93880 CV US DOPPLER CAROTID (CUPID ONLY): ICD-10-PCS | Mod: 26,,, | Performed by: INTERNAL MEDICINE

## 2021-03-17 ENCOUNTER — ANTI-COAG VISIT (OUTPATIENT)
Dept: CARDIOLOGY | Facility: CLINIC | Age: 76
End: 2021-03-17
Payer: MEDICARE

## 2021-03-17 DIAGNOSIS — Z79.01 LONG TERM CURRENT USE OF ANTICOAGULANT THERAPY: Primary | ICD-10-CM

## 2021-03-17 LAB — INR PPP: 2.8

## 2021-03-17 PROCEDURE — 93793 ANTICOAG MGMT PT WARFARIN: CPT | Mod: S$GLB,,, | Performed by: PHARMACIST

## 2021-03-17 PROCEDURE — 93793 PR ANTICOAGULANT MGMT FOR PT TAKING WARFARIN: ICD-10-PCS | Mod: S$GLB,,, | Performed by: PHARMACIST

## 2021-03-19 ENCOUNTER — TELEPHONE (OUTPATIENT)
Dept: INTERNAL MEDICINE | Facility: CLINIC | Age: 76
End: 2021-03-19

## 2021-03-23 ENCOUNTER — OFFICE VISIT (OUTPATIENT)
Dept: INTERNAL MEDICINE | Facility: CLINIC | Age: 76
End: 2021-03-23
Payer: MEDICARE

## 2021-03-23 VITALS
SYSTOLIC BLOOD PRESSURE: 124 MMHG | OXYGEN SATURATION: 98 % | TEMPERATURE: 98 F | WEIGHT: 308.63 LBS | HEART RATE: 72 BPM | HEIGHT: 75 IN | DIASTOLIC BLOOD PRESSURE: 74 MMHG | BODY MASS INDEX: 38.37 KG/M2

## 2021-03-23 DIAGNOSIS — R73.01 IMPAIRED FASTING GLUCOSE: ICD-10-CM

## 2021-03-23 DIAGNOSIS — I10 ESSENTIAL HYPERTENSION: Primary | ICD-10-CM

## 2021-03-23 DIAGNOSIS — I70.0 AORTIC ATHEROSCLEROSIS: ICD-10-CM

## 2021-03-23 DIAGNOSIS — E66.01 SEVERE OBESITY (BMI 35.0-39.9) WITH COMORBIDITY: ICD-10-CM

## 2021-03-23 DIAGNOSIS — I77.9 BILATERAL CAROTID ARTERY DISEASE, UNSPECIFIED TYPE: ICD-10-CM

## 2021-03-23 DIAGNOSIS — I25.810 CORONARY ARTERY DISEASE INVOLVING CORONARY BYPASS GRAFT, ANGINA PRESENCE UNSPECIFIED, UNSPECIFIED WHETHER NATIVE OR TRANSPLANTED HEART: ICD-10-CM

## 2021-03-23 DIAGNOSIS — E78.2 MIXED HYPERLIPIDEMIA: ICD-10-CM

## 2021-03-23 PROCEDURE — 3288F PR FALLS RISK ASSESSMENT DOCUMENTED: ICD-10-PCS | Mod: CPTII,S$GLB,, | Performed by: INTERNAL MEDICINE

## 2021-03-23 PROCEDURE — 1159F PR MEDICATION LIST DOCUMENTED IN MEDICAL RECORD: ICD-10-PCS | Mod: S$GLB,,, | Performed by: INTERNAL MEDICINE

## 2021-03-23 PROCEDURE — 3074F PR MOST RECENT SYSTOLIC BLOOD PRESSURE < 130 MM HG: ICD-10-PCS | Mod: CPTII,S$GLB,, | Performed by: INTERNAL MEDICINE

## 2021-03-23 PROCEDURE — 1159F MED LIST DOCD IN RCRD: CPT | Mod: S$GLB,,, | Performed by: INTERNAL MEDICINE

## 2021-03-23 PROCEDURE — 3008F BODY MASS INDEX DOCD: CPT | Mod: CPTII,S$GLB,, | Performed by: INTERNAL MEDICINE

## 2021-03-23 PROCEDURE — 99499 RISK ADDL DX/OHS AUDIT: ICD-10-PCS | Mod: S$GLB,,, | Performed by: INTERNAL MEDICINE

## 2021-03-23 PROCEDURE — 99999 PR PBB SHADOW E&M-EST. PATIENT-LVL III: CPT | Mod: PBBFAC,,, | Performed by: INTERNAL MEDICINE

## 2021-03-23 PROCEDURE — 3074F SYST BP LT 130 MM HG: CPT | Mod: CPTII,S$GLB,, | Performed by: INTERNAL MEDICINE

## 2021-03-23 PROCEDURE — 3288F FALL RISK ASSESSMENT DOCD: CPT | Mod: CPTII,S$GLB,, | Performed by: INTERNAL MEDICINE

## 2021-03-23 PROCEDURE — 99999 PR PBB SHADOW E&M-EST. PATIENT-LVL III: ICD-10-PCS | Mod: PBBFAC,,, | Performed by: INTERNAL MEDICINE

## 2021-03-23 PROCEDURE — 1157F ADVNC CARE PLAN IN RCRD: CPT | Mod: S$GLB,,, | Performed by: INTERNAL MEDICINE

## 2021-03-23 PROCEDURE — 1126F PR PAIN SEVERITY QUANTIFIED, NO PAIN PRESENT: ICD-10-PCS | Mod: S$GLB,,, | Performed by: INTERNAL MEDICINE

## 2021-03-23 PROCEDURE — 1101F PT FALLS ASSESS-DOCD LE1/YR: CPT | Mod: CPTII,S$GLB,, | Performed by: INTERNAL MEDICINE

## 2021-03-23 PROCEDURE — 1157F PR ADVANCE CARE PLAN OR EQUIV PRESENT IN MEDICAL RECORD: ICD-10-PCS | Mod: S$GLB,,, | Performed by: INTERNAL MEDICINE

## 2021-03-23 PROCEDURE — 3008F PR BODY MASS INDEX (BMI) DOCUMENTED: ICD-10-PCS | Mod: CPTII,S$GLB,, | Performed by: INTERNAL MEDICINE

## 2021-03-23 PROCEDURE — 1126F AMNT PAIN NOTED NONE PRSNT: CPT | Mod: S$GLB,,, | Performed by: INTERNAL MEDICINE

## 2021-03-23 PROCEDURE — 99499 UNLISTED E&M SERVICE: CPT | Mod: S$GLB,,, | Performed by: INTERNAL MEDICINE

## 2021-03-23 PROCEDURE — 99214 PR OFFICE/OUTPT VISIT, EST, LEVL IV, 30-39 MIN: ICD-10-PCS | Mod: S$GLB,,, | Performed by: INTERNAL MEDICINE

## 2021-03-23 PROCEDURE — 99214 OFFICE O/P EST MOD 30 MIN: CPT | Mod: S$GLB,,, | Performed by: INTERNAL MEDICINE

## 2021-03-23 PROCEDURE — 1101F PR PT FALLS ASSESS DOC 0-1 FALLS W/OUT INJ PAST YR: ICD-10-PCS | Mod: CPTII,S$GLB,, | Performed by: INTERNAL MEDICINE

## 2021-03-23 PROCEDURE — 3078F PR MOST RECENT DIASTOLIC BLOOD PRESSURE < 80 MM HG: ICD-10-PCS | Mod: CPTII,S$GLB,, | Performed by: INTERNAL MEDICINE

## 2021-03-23 PROCEDURE — 3078F DIAST BP <80 MM HG: CPT | Mod: CPTII,S$GLB,, | Performed by: INTERNAL MEDICINE

## 2021-03-30 ENCOUNTER — OFFICE VISIT (OUTPATIENT)
Dept: CARDIOLOGY | Facility: CLINIC | Age: 76
End: 2021-03-30
Payer: MEDICARE

## 2021-03-30 VITALS
BODY MASS INDEX: 38.24 KG/M2 | WEIGHT: 307.56 LBS | SYSTOLIC BLOOD PRESSURE: 129 MMHG | DIASTOLIC BLOOD PRESSURE: 59 MMHG | HEART RATE: 52 BPM | HEIGHT: 75 IN

## 2021-03-30 DIAGNOSIS — I65.22 STENOSIS OF LEFT CAROTID ARTERY: ICD-10-CM

## 2021-03-30 DIAGNOSIS — I10 ESSENTIAL HYPERTENSION: ICD-10-CM

## 2021-03-30 DIAGNOSIS — Z79.01 LONG TERM CURRENT USE OF ANTICOAGULANT THERAPY: ICD-10-CM

## 2021-03-30 DIAGNOSIS — Z95.1 S/P CABG X 2: ICD-10-CM

## 2021-03-30 DIAGNOSIS — I25.10 CORONARY ARTERY DISEASE INVOLVING NATIVE CORONARY ARTERY OF NATIVE HEART WITHOUT ANGINA PECTORIS: Primary | ICD-10-CM

## 2021-03-30 DIAGNOSIS — I26.99 PULMONARY EMBOLISM, UNSPECIFIED CHRONICITY, UNSPECIFIED PULMONARY EMBOLISM TYPE, UNSPECIFIED WHETHER ACUTE COR PULMONALE PRESENT: ICD-10-CM

## 2021-03-30 DIAGNOSIS — N52.9 ERECTILE DYSFUNCTION, UNSPECIFIED ERECTILE DYSFUNCTION TYPE: ICD-10-CM

## 2021-03-30 DIAGNOSIS — E78.2 MIXED HYPERLIPIDEMIA: ICD-10-CM

## 2021-03-30 PROCEDURE — 99499 UNLISTED E&M SERVICE: CPT | Mod: S$GLB,,, | Performed by: INTERNAL MEDICINE

## 2021-03-30 PROCEDURE — 93010 EKG 12-LEAD: ICD-10-PCS | Mod: S$GLB,,, | Performed by: INTERNAL MEDICINE

## 2021-03-30 PROCEDURE — 99214 OFFICE O/P EST MOD 30 MIN: CPT | Mod: S$GLB,,, | Performed by: INTERNAL MEDICINE

## 2021-03-30 PROCEDURE — 3008F PR BODY MASS INDEX (BMI) DOCUMENTED: ICD-10-PCS | Mod: CPTII,S$GLB,, | Performed by: INTERNAL MEDICINE

## 2021-03-30 PROCEDURE — 3008F BODY MASS INDEX DOCD: CPT | Mod: CPTII,S$GLB,, | Performed by: INTERNAL MEDICINE

## 2021-03-30 PROCEDURE — 99999 PR PBB SHADOW E&M-EST. PATIENT-LVL IV: ICD-10-PCS | Mod: PBBFAC,,, | Performed by: INTERNAL MEDICINE

## 2021-03-30 PROCEDURE — 1159F MED LIST DOCD IN RCRD: CPT | Mod: S$GLB,,, | Performed by: INTERNAL MEDICINE

## 2021-03-30 PROCEDURE — 1101F PR PT FALLS ASSESS DOC 0-1 FALLS W/OUT INJ PAST YR: ICD-10-PCS | Mod: CPTII,S$GLB,, | Performed by: INTERNAL MEDICINE

## 2021-03-30 PROCEDURE — 1126F AMNT PAIN NOTED NONE PRSNT: CPT | Mod: S$GLB,,, | Performed by: INTERNAL MEDICINE

## 2021-03-30 PROCEDURE — 99499 RISK ADDL DX/OHS AUDIT: ICD-10-PCS | Mod: S$GLB,,, | Performed by: INTERNAL MEDICINE

## 2021-03-30 PROCEDURE — 99999 PR PBB SHADOW E&M-EST. PATIENT-LVL IV: CPT | Mod: PBBFAC,,, | Performed by: INTERNAL MEDICINE

## 2021-03-30 PROCEDURE — 93005 EKG 12-LEAD: ICD-10-PCS | Mod: S$GLB,,, | Performed by: INTERNAL MEDICINE

## 2021-03-30 PROCEDURE — 1157F ADVNC CARE PLAN IN RCRD: CPT | Mod: S$GLB,,, | Performed by: INTERNAL MEDICINE

## 2021-03-30 PROCEDURE — 1159F PR MEDICATION LIST DOCUMENTED IN MEDICAL RECORD: ICD-10-PCS | Mod: S$GLB,,, | Performed by: INTERNAL MEDICINE

## 2021-03-30 PROCEDURE — 93010 ELECTROCARDIOGRAM REPORT: CPT | Mod: S$GLB,,, | Performed by: INTERNAL MEDICINE

## 2021-03-30 PROCEDURE — 93005 ELECTROCARDIOGRAM TRACING: CPT | Mod: S$GLB,,, | Performed by: INTERNAL MEDICINE

## 2021-03-30 PROCEDURE — 3288F FALL RISK ASSESSMENT DOCD: CPT | Mod: CPTII,S$GLB,, | Performed by: INTERNAL MEDICINE

## 2021-03-30 PROCEDURE — 3288F PR FALLS RISK ASSESSMENT DOCUMENTED: ICD-10-PCS | Mod: CPTII,S$GLB,, | Performed by: INTERNAL MEDICINE

## 2021-03-30 PROCEDURE — 99214 PR OFFICE/OUTPT VISIT, EST, LEVL IV, 30-39 MIN: ICD-10-PCS | Mod: S$GLB,,, | Performed by: INTERNAL MEDICINE

## 2021-03-30 PROCEDURE — 1101F PT FALLS ASSESS-DOCD LE1/YR: CPT | Mod: CPTII,S$GLB,, | Performed by: INTERNAL MEDICINE

## 2021-03-30 PROCEDURE — 1157F PR ADVANCE CARE PLAN OR EQUIV PRESENT IN MEDICAL RECORD: ICD-10-PCS | Mod: S$GLB,,, | Performed by: INTERNAL MEDICINE

## 2021-03-30 PROCEDURE — 1126F PR PAIN SEVERITY QUANTIFIED, NO PAIN PRESENT: ICD-10-PCS | Mod: S$GLB,,, | Performed by: INTERNAL MEDICINE

## 2021-03-30 RX ORDER — METOPROLOL TARTRATE 50 MG/1
25 TABLET ORAL 2 TIMES DAILY
Qty: 1 TABLET | Refills: 0 | Status: SHIPPED | OUTPATIENT
Start: 2021-03-30 | End: 2021-05-11 | Stop reason: ALTCHOICE

## 2021-03-30 RX ORDER — SILDENAFIL 100 MG/1
100 TABLET, FILM COATED ORAL DAILY PRN
Qty: 30 TABLET | Refills: 6 | Status: SHIPPED | OUTPATIENT
Start: 2021-03-30 | End: 2022-05-19

## 2021-03-30 RX ORDER — IRBESARTAN 150 MG/1
300 TABLET ORAL NIGHTLY
Qty: 90 TABLET | Refills: 3 | Status: SHIPPED | OUTPATIENT
Start: 2021-03-30 | End: 2021-05-20 | Stop reason: SDUPTHER

## 2021-03-31 ENCOUNTER — ANTI-COAG VISIT (OUTPATIENT)
Dept: CARDIOLOGY | Facility: CLINIC | Age: 76
End: 2021-03-31
Payer: MEDICARE

## 2021-03-31 DIAGNOSIS — Z79.01 LONG TERM CURRENT USE OF ANTICOAGULANT THERAPY: Primary | ICD-10-CM

## 2021-03-31 LAB — INR PPP: 2.4

## 2021-03-31 PROCEDURE — 93793 PR ANTICOAGULANT MGMT FOR PT TAKING WARFARIN: ICD-10-PCS | Mod: S$GLB,,, | Performed by: PHARMACIST

## 2021-03-31 PROCEDURE — 93793 ANTICOAG MGMT PT WARFARIN: CPT | Mod: S$GLB,,, | Performed by: PHARMACIST

## 2021-04-01 ENCOUNTER — TELEPHONE (OUTPATIENT)
Dept: INTERNAL MEDICINE | Facility: CLINIC | Age: 76
End: 2021-04-01

## 2021-04-01 DIAGNOSIS — E78.2 MIXED HYPERLIPIDEMIA: ICD-10-CM

## 2021-04-01 DIAGNOSIS — R73.03 PREDIABETES: ICD-10-CM

## 2021-04-01 DIAGNOSIS — I10 ESSENTIAL HYPERTENSION: Primary | ICD-10-CM

## 2021-04-01 DIAGNOSIS — R35.1 BPH ASSOCIATED WITH NOCTURIA: ICD-10-CM

## 2021-04-01 DIAGNOSIS — D64.9 ANEMIA, UNSPECIFIED TYPE: ICD-10-CM

## 2021-04-01 DIAGNOSIS — N40.1 BPH ASSOCIATED WITH NOCTURIA: ICD-10-CM

## 2021-04-14 ENCOUNTER — ANTI-COAG VISIT (OUTPATIENT)
Dept: CARDIOLOGY | Facility: CLINIC | Age: 76
End: 2021-04-14
Payer: MEDICARE

## 2021-04-14 DIAGNOSIS — Z79.01 LONG TERM CURRENT USE OF ANTICOAGULANT THERAPY: Primary | ICD-10-CM

## 2021-04-14 LAB — INR PPP: 3.4

## 2021-04-14 PROCEDURE — 93793 ANTICOAG MGMT PT WARFARIN: CPT | Mod: S$GLB,,, | Performed by: PHARMACIST

## 2021-04-14 PROCEDURE — 93793 PR ANTICOAGULANT MGMT FOR PT TAKING WARFARIN: ICD-10-PCS | Mod: S$GLB,,, | Performed by: PHARMACIST

## 2021-04-28 ENCOUNTER — ANTI-COAG VISIT (OUTPATIENT)
Dept: CARDIOLOGY | Facility: CLINIC | Age: 76
End: 2021-04-28
Payer: MEDICARE

## 2021-04-28 DIAGNOSIS — Z79.01 LONG TERM CURRENT USE OF ANTICOAGULANT THERAPY: Primary | ICD-10-CM

## 2021-04-28 LAB — INR PPP: 3.4

## 2021-04-28 PROCEDURE — 93793 PR ANTICOAGULANT MGMT FOR PT TAKING WARFARIN: ICD-10-PCS | Mod: S$GLB,,, | Performed by: PHARMACIST

## 2021-04-28 PROCEDURE — 93793 ANTICOAG MGMT PT WARFARIN: CPT | Mod: S$GLB,,, | Performed by: PHARMACIST

## 2021-05-10 ENCOUNTER — PATIENT MESSAGE (OUTPATIENT)
Dept: CARDIOLOGY | Facility: CLINIC | Age: 76
End: 2021-05-10

## 2021-05-11 DIAGNOSIS — I10 ESSENTIAL HYPERTENSION: Primary | ICD-10-CM

## 2021-05-11 RX ORDER — AMLODIPINE BESYLATE 2.5 MG/1
2.5 TABLET ORAL DAILY
Qty: 30 TABLET | Refills: 11 | Status: SHIPPED | OUTPATIENT
Start: 2021-05-11 | End: 2021-07-25 | Stop reason: SDUPTHER

## 2021-05-12 ENCOUNTER — ANTI-COAG VISIT (OUTPATIENT)
Dept: CARDIOLOGY | Facility: CLINIC | Age: 76
End: 2021-05-12
Payer: MEDICARE

## 2021-05-12 DIAGNOSIS — Z79.01 LONG TERM CURRENT USE OF ANTICOAGULANT THERAPY: Primary | ICD-10-CM

## 2021-05-12 LAB — INR PPP: 3.5

## 2021-05-12 PROCEDURE — 93793 ANTICOAG MGMT PT WARFARIN: CPT | Mod: S$GLB,,, | Performed by: PHARMACIST

## 2021-05-12 PROCEDURE — 93793 PR ANTICOAGULANT MGMT FOR PT TAKING WARFARIN: ICD-10-PCS | Mod: S$GLB,,, | Performed by: PHARMACIST

## 2021-05-26 ENCOUNTER — ANTI-COAG VISIT (OUTPATIENT)
Dept: CARDIOLOGY | Facility: CLINIC | Age: 76
End: 2021-05-26
Payer: MEDICARE

## 2021-05-26 DIAGNOSIS — Z79.01 LONG TERM CURRENT USE OF ANTICOAGULANT THERAPY: Primary | ICD-10-CM

## 2021-05-26 LAB — INR PPP: 3.3

## 2021-05-26 PROCEDURE — 93793 PR ANTICOAGULANT MGMT FOR PT TAKING WARFARIN: ICD-10-PCS | Mod: S$GLB,,, | Performed by: PHARMACIST

## 2021-05-26 PROCEDURE — 93793 ANTICOAG MGMT PT WARFARIN: CPT | Mod: S$GLB,,, | Performed by: PHARMACIST

## 2021-06-09 ENCOUNTER — ANTI-COAG VISIT (OUTPATIENT)
Dept: CARDIOLOGY | Facility: CLINIC | Age: 76
End: 2021-06-09
Payer: MEDICARE

## 2021-06-09 DIAGNOSIS — Z79.01 LONG TERM CURRENT USE OF ANTICOAGULANT THERAPY: Primary | ICD-10-CM

## 2021-06-09 DIAGNOSIS — I26.99 PULMONARY EMBOLISM, UNSPECIFIED CHRONICITY, UNSPECIFIED PULMONARY EMBOLISM TYPE, UNSPECIFIED WHETHER ACUTE COR PULMONALE PRESENT: ICD-10-CM

## 2021-06-09 LAB — INR PPP: 3.2

## 2021-06-09 PROCEDURE — 93793 ANTICOAG MGMT PT WARFARIN: CPT | Mod: S$GLB,,, | Performed by: PHARMACIST

## 2021-06-09 PROCEDURE — 93793 PR ANTICOAGULANT MGMT FOR PT TAKING WARFARIN: ICD-10-PCS | Mod: S$GLB,,, | Performed by: PHARMACIST

## 2021-06-17 ENCOUNTER — TELEPHONE (OUTPATIENT)
Dept: CARDIOLOGY | Facility: CLINIC | Age: 76
End: 2021-06-17

## 2021-06-23 ENCOUNTER — ANTI-COAG VISIT (OUTPATIENT)
Dept: CARDIOLOGY | Facility: CLINIC | Age: 76
End: 2021-06-23
Payer: MEDICARE

## 2021-06-23 DIAGNOSIS — Z79.01 LONG TERM CURRENT USE OF ANTICOAGULANT THERAPY: Primary | ICD-10-CM

## 2021-06-23 DIAGNOSIS — I26.99 PULMONARY EMBOLISM, UNSPECIFIED CHRONICITY, UNSPECIFIED PULMONARY EMBOLISM TYPE, UNSPECIFIED WHETHER ACUTE COR PULMONALE PRESENT: ICD-10-CM

## 2021-06-23 LAB — INR PPP: 3.1

## 2021-06-23 PROCEDURE — 93793 ANTICOAG MGMT PT WARFARIN: CPT | Mod: S$GLB,,, | Performed by: PHARMACIST

## 2021-06-23 PROCEDURE — 93793 PR ANTICOAGULANT MGMT FOR PT TAKING WARFARIN: ICD-10-PCS | Mod: S$GLB,,, | Performed by: PHARMACIST

## 2021-06-29 ENCOUNTER — PATIENT MESSAGE (OUTPATIENT)
Dept: CARDIOLOGY | Facility: CLINIC | Age: 76
End: 2021-06-29

## 2021-06-29 RX ORDER — IRBESARTAN 150 MG/1
150 TABLET ORAL NIGHTLY
COMMUNITY
End: 2021-06-29 | Stop reason: SDUPTHER

## 2021-06-30 RX ORDER — IRBESARTAN 150 MG/1
TABLET ORAL
Qty: 180 TABLET | Refills: 3 | Status: SHIPPED | OUTPATIENT
Start: 2021-06-30 | End: 2021-07-07 | Stop reason: DRUGHIGH

## 2021-07-07 ENCOUNTER — ANTI-COAG VISIT (OUTPATIENT)
Dept: CARDIOLOGY | Facility: CLINIC | Age: 76
End: 2021-07-07
Payer: MEDICARE

## 2021-07-07 DIAGNOSIS — Z79.01 LONG TERM CURRENT USE OF ANTICOAGULANT THERAPY: Primary | ICD-10-CM

## 2021-07-07 DIAGNOSIS — I26.99 PULMONARY EMBOLISM, UNSPECIFIED CHRONICITY, UNSPECIFIED PULMONARY EMBOLISM TYPE, UNSPECIFIED WHETHER ACUTE COR PULMONALE PRESENT: ICD-10-CM

## 2021-07-07 LAB — INR PPP: 3.2

## 2021-07-07 PROCEDURE — 93793 PR ANTICOAGULANT MGMT FOR PT TAKING WARFARIN: ICD-10-PCS | Mod: S$GLB,,, | Performed by: PHARMACIST

## 2021-07-07 PROCEDURE — 93793 ANTICOAG MGMT PT WARFARIN: CPT | Mod: S$GLB,,, | Performed by: PHARMACIST

## 2021-07-07 RX ORDER — IRBESARTAN 300 MG/1
300 TABLET ORAL NIGHTLY
Qty: 90 TABLET | Refills: 3 | Status: SHIPPED | OUTPATIENT
Start: 2021-07-07 | End: 2022-01-09 | Stop reason: SDUPTHER

## 2021-07-07 RX ORDER — IRBESARTAN 300 MG/1
300 TABLET ORAL NIGHTLY
COMMUNITY
End: 2021-07-07 | Stop reason: SDUPTHER

## 2021-07-11 ENCOUNTER — PATIENT MESSAGE (OUTPATIENT)
Dept: ADMINISTRATIVE | Facility: OTHER | Age: 76
End: 2021-07-11

## 2021-07-18 ENCOUNTER — PATIENT MESSAGE (OUTPATIENT)
Dept: ADMINISTRATIVE | Facility: OTHER | Age: 76
End: 2021-07-18

## 2021-07-21 ENCOUNTER — ANTI-COAG VISIT (OUTPATIENT)
Dept: CARDIOLOGY | Facility: CLINIC | Age: 76
End: 2021-07-21
Payer: MEDICARE

## 2021-07-21 DIAGNOSIS — Z79.01 LONG TERM CURRENT USE OF ANTICOAGULANT THERAPY: Primary | ICD-10-CM

## 2021-07-21 LAB — INR PPP: 3.7

## 2021-07-21 PROCEDURE — 93793 PR ANTICOAGULANT MGMT FOR PT TAKING WARFARIN: ICD-10-PCS | Mod: S$GLB,,, | Performed by: PHARMACIST

## 2021-07-21 PROCEDURE — 93793 ANTICOAG MGMT PT WARFARIN: CPT | Mod: S$GLB,,, | Performed by: PHARMACIST

## 2021-07-25 DIAGNOSIS — I10 ESSENTIAL HYPERTENSION: ICD-10-CM

## 2021-07-26 RX ORDER — AMLODIPINE BESYLATE 2.5 MG/1
2.5 TABLET ORAL DAILY
Qty: 90 TABLET | Refills: 3 | Status: SHIPPED | OUTPATIENT
Start: 2021-07-26 | End: 2022-07-13 | Stop reason: SDUPTHER

## 2021-08-04 ENCOUNTER — ANTI-COAG VISIT (OUTPATIENT)
Dept: CARDIOLOGY | Facility: CLINIC | Age: 76
End: 2021-08-04
Payer: MEDICARE

## 2021-08-04 DIAGNOSIS — I26.99 PULMONARY EMBOLISM, UNSPECIFIED CHRONICITY, UNSPECIFIED PULMONARY EMBOLISM TYPE, UNSPECIFIED WHETHER ACUTE COR PULMONALE PRESENT: ICD-10-CM

## 2021-08-04 DIAGNOSIS — Z79.01 LONG TERM CURRENT USE OF ANTICOAGULANT THERAPY: Primary | ICD-10-CM

## 2021-08-04 LAB — INR PPP: 2.9

## 2021-08-04 PROCEDURE — 93793 PR ANTICOAGULANT MGMT FOR PT TAKING WARFARIN: ICD-10-PCS | Mod: S$GLB,,, | Performed by: PHARMACIST

## 2021-08-04 PROCEDURE — 93793 ANTICOAG MGMT PT WARFARIN: CPT | Mod: S$GLB,,, | Performed by: PHARMACIST

## 2021-08-18 ENCOUNTER — ANTI-COAG VISIT (OUTPATIENT)
Dept: CARDIOLOGY | Facility: CLINIC | Age: 76
End: 2021-08-18
Payer: MEDICARE

## 2021-08-18 DIAGNOSIS — I26.99 PULMONARY EMBOLISM, UNSPECIFIED CHRONICITY, UNSPECIFIED PULMONARY EMBOLISM TYPE, UNSPECIFIED WHETHER ACUTE COR PULMONALE PRESENT: ICD-10-CM

## 2021-08-18 DIAGNOSIS — Z79.01 LONG TERM CURRENT USE OF ANTICOAGULANT THERAPY: Primary | ICD-10-CM

## 2021-08-18 LAB — INR PPP: 3.1

## 2021-08-18 PROCEDURE — 93793 ANTICOAG MGMT PT WARFARIN: CPT | Mod: S$GLB,,, | Performed by: PHARMACIST

## 2021-08-18 PROCEDURE — 93793 PR ANTICOAGULANT MGMT FOR PT TAKING WARFARIN: ICD-10-PCS | Mod: S$GLB,,, | Performed by: PHARMACIST

## 2021-09-07 ENCOUNTER — PATIENT MESSAGE (OUTPATIENT)
Dept: CARDIOLOGY | Facility: CLINIC | Age: 76
End: 2021-09-07

## 2021-09-14 ENCOUNTER — ANTI-COAG VISIT (OUTPATIENT)
Dept: CARDIOLOGY | Facility: CLINIC | Age: 76
End: 2021-09-14
Payer: MEDICARE

## 2021-09-14 ENCOUNTER — LAB VISIT (OUTPATIENT)
Dept: LAB | Facility: HOSPITAL | Age: 76
End: 2021-09-14
Payer: MEDICARE

## 2021-09-14 DIAGNOSIS — Z79.01 LONG TERM CURRENT USE OF ANTICOAGULANT THERAPY: ICD-10-CM

## 2021-09-14 DIAGNOSIS — I26.99 PULMONARY EMBOLISM, UNSPECIFIED CHRONICITY, UNSPECIFIED PULMONARY EMBOLISM TYPE, UNSPECIFIED WHETHER ACUTE COR PULMONALE PRESENT: ICD-10-CM

## 2021-09-14 DIAGNOSIS — Z79.01 LONG TERM CURRENT USE OF ANTICOAGULANT THERAPY: Primary | ICD-10-CM

## 2021-09-14 LAB
INR PPP: 3.1 (ref 0.8–1.2)
PROTHROMBIN TIME: 31.9 SEC (ref 9–12.5)

## 2021-09-14 PROCEDURE — 93793 ANTICOAG MGMT PT WARFARIN: CPT | Mod: S$GLB,,, | Performed by: PHARMACIST

## 2021-09-14 PROCEDURE — 85610 PROTHROMBIN TIME: CPT | Mod: HCNC | Performed by: INTERNAL MEDICINE

## 2021-09-14 PROCEDURE — 93793 PR ANTICOAGULANT MGMT FOR PT TAKING WARFARIN: ICD-10-PCS | Mod: S$GLB,,, | Performed by: PHARMACIST

## 2021-09-14 PROCEDURE — 36415 COLL VENOUS BLD VENIPUNCTURE: CPT | Mod: HCNC | Performed by: INTERNAL MEDICINE

## 2021-09-17 ENCOUNTER — PATIENT OUTREACH (OUTPATIENT)
Dept: ADMINISTRATIVE | Facility: OTHER | Age: 76
End: 2021-09-17

## 2021-09-20 ENCOUNTER — OFFICE VISIT (OUTPATIENT)
Dept: SLEEP MEDICINE | Facility: CLINIC | Age: 76
End: 2021-09-20
Payer: MEDICARE

## 2021-09-20 VITALS — HEART RATE: 56 BPM | DIASTOLIC BLOOD PRESSURE: 71 MMHG | SYSTOLIC BLOOD PRESSURE: 133 MMHG

## 2021-09-20 DIAGNOSIS — G47.33 OSA (OBSTRUCTIVE SLEEP APNEA): Primary | ICD-10-CM

## 2021-09-20 PROCEDURE — 1160F PR REVIEW ALL MEDS BY PRESCRIBER/CLIN PHARMACIST DOCUMENTED: ICD-10-PCS | Mod: HCNC,CPTII,S$GLB, | Performed by: PSYCHIATRY & NEUROLOGY

## 2021-09-20 PROCEDURE — 99214 OFFICE O/P EST MOD 30 MIN: CPT | Mod: HCNC,S$GLB,, | Performed by: PSYCHIATRY & NEUROLOGY

## 2021-09-20 PROCEDURE — 1157F PR ADVANCE CARE PLAN OR EQUIV PRESENT IN MEDICAL RECORD: ICD-10-PCS | Mod: HCNC,CPTII,S$GLB, | Performed by: PSYCHIATRY & NEUROLOGY

## 2021-09-20 PROCEDURE — 1126F PR PAIN SEVERITY QUANTIFIED, NO PAIN PRESENT: ICD-10-PCS | Mod: HCNC,CPTII,S$GLB, | Performed by: PSYCHIATRY & NEUROLOGY

## 2021-09-20 PROCEDURE — 99999 PR PBB SHADOW E&M-EST. PATIENT-LVL III: CPT | Mod: PBBFAC,HCNC,, | Performed by: PSYCHIATRY & NEUROLOGY

## 2021-09-20 PROCEDURE — 99499 RISK ADDL DX/OHS AUDIT: ICD-10-PCS | Mod: HCNC,S$GLB,, | Performed by: PSYCHIATRY & NEUROLOGY

## 2021-09-20 PROCEDURE — 99214 PR OFFICE/OUTPT VISIT, EST, LEVL IV, 30-39 MIN: ICD-10-PCS | Mod: HCNC,S$GLB,, | Performed by: PSYCHIATRY & NEUROLOGY

## 2021-09-20 PROCEDURE — 3078F DIAST BP <80 MM HG: CPT | Mod: HCNC,CPTII,S$GLB, | Performed by: PSYCHIATRY & NEUROLOGY

## 2021-09-20 PROCEDURE — 1159F PR MEDICATION LIST DOCUMENTED IN MEDICAL RECORD: ICD-10-PCS | Mod: HCNC,CPTII,S$GLB, | Performed by: PSYCHIATRY & NEUROLOGY

## 2021-09-20 PROCEDURE — 3044F HG A1C LEVEL LT 7.0%: CPT | Mod: HCNC,CPTII,S$GLB, | Performed by: PSYCHIATRY & NEUROLOGY

## 2021-09-20 PROCEDURE — 1126F AMNT PAIN NOTED NONE PRSNT: CPT | Mod: HCNC,CPTII,S$GLB, | Performed by: PSYCHIATRY & NEUROLOGY

## 2021-09-20 PROCEDURE — 1159F MED LIST DOCD IN RCRD: CPT | Mod: HCNC,CPTII,S$GLB, | Performed by: PSYCHIATRY & NEUROLOGY

## 2021-09-20 PROCEDURE — 3078F PR MOST RECENT DIASTOLIC BLOOD PRESSURE < 80 MM HG: ICD-10-PCS | Mod: HCNC,CPTII,S$GLB, | Performed by: PSYCHIATRY & NEUROLOGY

## 2021-09-20 PROCEDURE — 4010F ACE/ARB THERAPY RXD/TAKEN: CPT | Mod: HCNC,CPTII,S$GLB, | Performed by: PSYCHIATRY & NEUROLOGY

## 2021-09-20 PROCEDURE — 3075F PR MOST RECENT SYSTOLIC BLOOD PRESS GE 130-139MM HG: ICD-10-PCS | Mod: HCNC,CPTII,S$GLB, | Performed by: PSYCHIATRY & NEUROLOGY

## 2021-09-20 PROCEDURE — 3075F SYST BP GE 130 - 139MM HG: CPT | Mod: HCNC,CPTII,S$GLB, | Performed by: PSYCHIATRY & NEUROLOGY

## 2021-09-20 PROCEDURE — 3044F PR MOST RECENT HEMOGLOBIN A1C LEVEL <7.0%: ICD-10-PCS | Mod: HCNC,CPTII,S$GLB, | Performed by: PSYCHIATRY & NEUROLOGY

## 2021-09-20 PROCEDURE — 1157F ADVNC CARE PLAN IN RCRD: CPT | Mod: HCNC,CPTII,S$GLB, | Performed by: PSYCHIATRY & NEUROLOGY

## 2021-09-20 PROCEDURE — 4010F PR ACE/ARB THEARPY RXD/TAKEN: ICD-10-PCS | Mod: HCNC,CPTII,S$GLB, | Performed by: PSYCHIATRY & NEUROLOGY

## 2021-09-20 PROCEDURE — 99999 PR PBB SHADOW E&M-EST. PATIENT-LVL III: ICD-10-PCS | Mod: PBBFAC,HCNC,, | Performed by: PSYCHIATRY & NEUROLOGY

## 2021-09-20 PROCEDURE — 1160F RVW MEDS BY RX/DR IN RCRD: CPT | Mod: HCNC,CPTII,S$GLB, | Performed by: PSYCHIATRY & NEUROLOGY

## 2021-09-20 PROCEDURE — 99499 UNLISTED E&M SERVICE: CPT | Mod: HCNC,S$GLB,, | Performed by: PSYCHIATRY & NEUROLOGY

## 2021-09-29 ENCOUNTER — ANTI-COAG VISIT (OUTPATIENT)
Dept: CARDIOLOGY | Facility: CLINIC | Age: 76
End: 2021-09-29
Payer: MEDICARE

## 2021-09-29 DIAGNOSIS — Z79.01 LONG TERM CURRENT USE OF ANTICOAGULANT THERAPY: Primary | ICD-10-CM

## 2021-09-29 LAB — INR PPP: 3.3

## 2021-09-29 PROCEDURE — 93793 PR ANTICOAGULANT MGMT FOR PT TAKING WARFARIN: ICD-10-PCS | Mod: S$GLB,,, | Performed by: PHARMACIST

## 2021-09-29 PROCEDURE — 93793 ANTICOAG MGMT PT WARFARIN: CPT | Mod: S$GLB,,, | Performed by: PHARMACIST

## 2021-10-14 ENCOUNTER — ANTI-COAG VISIT (OUTPATIENT)
Dept: CARDIOLOGY | Facility: CLINIC | Age: 76
End: 2021-10-14
Payer: MEDICARE

## 2021-10-14 DIAGNOSIS — I26.99 PULMONARY EMBOLISM, UNSPECIFIED CHRONICITY, UNSPECIFIED PULMONARY EMBOLISM TYPE, UNSPECIFIED WHETHER ACUTE COR PULMONALE PRESENT: ICD-10-CM

## 2021-10-14 DIAGNOSIS — Z79.01 LONG TERM CURRENT USE OF ANTICOAGULANT THERAPY: Primary | ICD-10-CM

## 2021-10-14 LAB — INR PPP: 3.3

## 2021-10-14 PROCEDURE — 93793 ANTICOAG MGMT PT WARFARIN: CPT | Mod: S$GLB,,, | Performed by: PHARMACIST

## 2021-10-14 PROCEDURE — 93793 PR ANTICOAGULANT MGMT FOR PT TAKING WARFARIN: ICD-10-PCS | Mod: S$GLB,,, | Performed by: PHARMACIST

## 2021-10-19 ENCOUNTER — LAB VISIT (OUTPATIENT)
Dept: LAB | Facility: HOSPITAL | Age: 76
End: 2021-10-19
Attending: INTERNAL MEDICINE
Payer: MEDICARE

## 2021-10-19 DIAGNOSIS — E78.2 MIXED HYPERLIPIDEMIA: ICD-10-CM

## 2021-10-19 DIAGNOSIS — R73.03 PREDIABETES: ICD-10-CM

## 2021-10-19 DIAGNOSIS — I10 ESSENTIAL HYPERTENSION: ICD-10-CM

## 2021-10-19 DIAGNOSIS — R35.1 BPH ASSOCIATED WITH NOCTURIA: ICD-10-CM

## 2021-10-19 DIAGNOSIS — D64.9 ANEMIA, UNSPECIFIED TYPE: ICD-10-CM

## 2021-10-19 DIAGNOSIS — N40.1 BPH ASSOCIATED WITH NOCTURIA: ICD-10-CM

## 2021-10-19 LAB
ALBUMIN SERPL BCP-MCNC: 3.7 G/DL (ref 3.5–5.2)
ALP SERPL-CCNC: 86 U/L (ref 55–135)
ALT SERPL W/O P-5'-P-CCNC: 26 U/L (ref 10–44)
ANION GAP SERPL CALC-SCNC: 8 MMOL/L (ref 8–16)
AST SERPL-CCNC: 25 U/L (ref 10–40)
BASOPHILS # BLD AUTO: 0.03 K/UL (ref 0–0.2)
BASOPHILS NFR BLD: 0.5 % (ref 0–1.9)
BILIRUB SERPL-MCNC: 0.7 MG/DL (ref 0.1–1)
BUN SERPL-MCNC: 15 MG/DL (ref 8–23)
CALCIUM SERPL-MCNC: 8.6 MG/DL (ref 8.7–10.5)
CHLORIDE SERPL-SCNC: 104 MMOL/L (ref 95–110)
CHOLEST SERPL-MCNC: 104 MG/DL (ref 120–199)
CHOLEST/HDLC SERPL: 2.4 {RATIO} (ref 2–5)
CO2 SERPL-SCNC: 27 MMOL/L (ref 23–29)
COMPLEXED PSA SERPL-MCNC: 0.27 NG/ML (ref 0–4)
CREAT SERPL-MCNC: 0.7 MG/DL (ref 0.5–1.4)
DIFFERENTIAL METHOD: ABNORMAL
EOSINOPHIL # BLD AUTO: 0.1 K/UL (ref 0–0.5)
EOSINOPHIL NFR BLD: 1.8 % (ref 0–8)
ERYTHROCYTE [DISTWIDTH] IN BLOOD BY AUTOMATED COUNT: 12.9 % (ref 11.5–14.5)
EST. GFR  (AFRICAN AMERICAN): >60 ML/MIN/1.73 M^2
EST. GFR  (NON AFRICAN AMERICAN): >60 ML/MIN/1.73 M^2
ESTIMATED AVG GLUCOSE: 126 MG/DL (ref 68–131)
GLUCOSE SERPL-MCNC: 121 MG/DL (ref 70–110)
HBA1C MFR BLD: 6 % (ref 4–5.6)
HCT VFR BLD AUTO: 41.1 % (ref 40–54)
HDLC SERPL-MCNC: 43 MG/DL (ref 40–75)
HDLC SERPL: 41.3 % (ref 20–50)
HGB BLD-MCNC: 13.2 G/DL (ref 14–18)
IMM GRANULOCYTES # BLD AUTO: 0.02 K/UL (ref 0–0.04)
IMM GRANULOCYTES NFR BLD AUTO: 0.3 % (ref 0–0.5)
IRON SERPL-MCNC: 78 UG/DL (ref 45–160)
LDLC SERPL CALC-MCNC: 42.4 MG/DL (ref 63–159)
LYMPHOCYTES # BLD AUTO: 1.8 K/UL (ref 1–4.8)
LYMPHOCYTES NFR BLD: 28.1 % (ref 18–48)
MCH RBC QN AUTO: 31.7 PG (ref 27–31)
MCHC RBC AUTO-ENTMCNC: 32.1 G/DL (ref 32–36)
MCV RBC AUTO: 99 FL (ref 82–98)
MONOCYTES # BLD AUTO: 0.6 K/UL (ref 0.3–1)
MONOCYTES NFR BLD: 9.7 % (ref 4–15)
NEUTROPHILS # BLD AUTO: 3.9 K/UL (ref 1.8–7.7)
NEUTROPHILS NFR BLD: 59.6 % (ref 38–73)
NONHDLC SERPL-MCNC: 61 MG/DL
NRBC BLD-RTO: 0 /100 WBC
PLATELET # BLD AUTO: 176 K/UL (ref 150–450)
PMV BLD AUTO: 11.1 FL (ref 9.2–12.9)
POTASSIUM SERPL-SCNC: 4.5 MMOL/L (ref 3.5–5.1)
PROT SERPL-MCNC: 6.5 G/DL (ref 6–8.4)
RBC # BLD AUTO: 4.17 M/UL (ref 4.6–6.2)
SATURATED IRON: 21 % (ref 20–50)
SODIUM SERPL-SCNC: 139 MMOL/L (ref 136–145)
TOTAL IRON BINDING CAPACITY: 371 UG/DL (ref 250–450)
TRANSFERRIN SERPL-MCNC: 251 MG/DL (ref 200–375)
TRIGL SERPL-MCNC: 93 MG/DL (ref 30–150)
TSH SERPL DL<=0.005 MIU/L-ACNC: 1.82 UIU/ML (ref 0.4–4)
WBC # BLD AUTO: 6.51 K/UL (ref 3.9–12.7)

## 2021-10-19 PROCEDURE — 36415 COLL VENOUS BLD VENIPUNCTURE: CPT | Mod: HCNC,PO | Performed by: INTERNAL MEDICINE

## 2021-10-19 PROCEDURE — 80053 COMPREHEN METABOLIC PANEL: CPT | Mod: HCNC | Performed by: INTERNAL MEDICINE

## 2021-10-19 PROCEDURE — 84466 ASSAY OF TRANSFERRIN: CPT | Mod: HCNC | Performed by: INTERNAL MEDICINE

## 2021-10-19 PROCEDURE — 84153 ASSAY OF PSA TOTAL: CPT | Mod: HCNC | Performed by: INTERNAL MEDICINE

## 2021-10-19 PROCEDURE — 84443 ASSAY THYROID STIM HORMONE: CPT | Mod: HCNC | Performed by: INTERNAL MEDICINE

## 2021-10-19 PROCEDURE — 85025 COMPLETE CBC W/AUTO DIFF WBC: CPT | Mod: HCNC | Performed by: INTERNAL MEDICINE

## 2021-10-19 PROCEDURE — 83036 HEMOGLOBIN GLYCOSYLATED A1C: CPT | Mod: HCNC | Performed by: INTERNAL MEDICINE

## 2021-10-19 PROCEDURE — 80061 LIPID PANEL: CPT | Mod: HCNC | Performed by: INTERNAL MEDICINE

## 2021-10-20 ENCOUNTER — TELEPHONE (OUTPATIENT)
Dept: INTERNAL MEDICINE | Facility: CLINIC | Age: 76
End: 2021-10-20

## 2021-10-26 ENCOUNTER — OFFICE VISIT (OUTPATIENT)
Dept: INTERNAL MEDICINE | Facility: CLINIC | Age: 76
End: 2021-10-26
Payer: MEDICARE

## 2021-10-26 VITALS
OXYGEN SATURATION: 96 % | TEMPERATURE: 98 F | BODY MASS INDEX: 39.17 KG/M2 | SYSTOLIC BLOOD PRESSURE: 128 MMHG | RESPIRATION RATE: 16 BRPM | HEART RATE: 84 BPM | HEIGHT: 75 IN | DIASTOLIC BLOOD PRESSURE: 62 MMHG | WEIGHT: 315 LBS

## 2021-10-26 DIAGNOSIS — E66.01 SEVERE OBESITY (BMI 35.0-39.9) WITH COMORBIDITY: ICD-10-CM

## 2021-10-26 DIAGNOSIS — Z95.1 S/P CABG X 2: ICD-10-CM

## 2021-10-26 DIAGNOSIS — K76.0 FATTY LIVER: ICD-10-CM

## 2021-10-26 DIAGNOSIS — N40.1 BPH ASSOCIATED WITH NOCTURIA: ICD-10-CM

## 2021-10-26 DIAGNOSIS — Z00.00 ANNUAL PHYSICAL EXAM: Primary | ICD-10-CM

## 2021-10-26 DIAGNOSIS — E78.2 MIXED HYPERLIPIDEMIA: ICD-10-CM

## 2021-10-26 DIAGNOSIS — I77.9 BILATERAL CAROTID ARTERY DISEASE, UNSPECIFIED TYPE: ICD-10-CM

## 2021-10-26 DIAGNOSIS — I82.591 CHRONIC DEEP VEIN THROMBOSIS (DVT) OF OTHER VEIN OF RIGHT LOWER EXTREMITY: ICD-10-CM

## 2021-10-26 DIAGNOSIS — D64.9 ANEMIA, UNSPECIFIED TYPE: ICD-10-CM

## 2021-10-26 DIAGNOSIS — I70.0 AORTIC ATHEROSCLEROSIS: ICD-10-CM

## 2021-10-26 DIAGNOSIS — R35.1 BPH ASSOCIATED WITH NOCTURIA: ICD-10-CM

## 2021-10-26 DIAGNOSIS — G47.33 OSA ON CPAP: ICD-10-CM

## 2021-10-26 DIAGNOSIS — R26.9 GAIT DISTURBANCE: ICD-10-CM

## 2021-10-26 DIAGNOSIS — I10 ESSENTIAL HYPERTENSION: ICD-10-CM

## 2021-10-26 DIAGNOSIS — I25.5 ISCHEMIC CARDIOMYOPATHY: ICD-10-CM

## 2021-10-26 DIAGNOSIS — R73.01 IMPAIRED FASTING GLUCOSE: ICD-10-CM

## 2021-10-26 PROCEDURE — 3288F PR FALLS RISK ASSESSMENT DOCUMENTED: ICD-10-PCS | Mod: HCNC,CPTII,S$GLB, | Performed by: INTERNAL MEDICINE

## 2021-10-26 PROCEDURE — 1157F ADVNC CARE PLAN IN RCRD: CPT | Mod: HCNC,CPTII,S$GLB, | Performed by: INTERNAL MEDICINE

## 2021-10-26 PROCEDURE — 3066F PR DOCUMENTATION OF TREATMENT FOR NEPHROPATHY: ICD-10-PCS | Mod: HCNC,CPTII,S$GLB, | Performed by: INTERNAL MEDICINE

## 2021-10-26 PROCEDURE — 3074F PR MOST RECENT SYSTOLIC BLOOD PRESSURE < 130 MM HG: ICD-10-PCS | Mod: HCNC,CPTII,S$GLB, | Performed by: INTERNAL MEDICINE

## 2021-10-26 PROCEDURE — 3078F PR MOST RECENT DIASTOLIC BLOOD PRESSURE < 80 MM HG: ICD-10-PCS | Mod: HCNC,CPTII,S$GLB, | Performed by: INTERNAL MEDICINE

## 2021-10-26 PROCEDURE — 4010F PR ACE/ARB THEARPY RXD/TAKEN: ICD-10-PCS | Mod: HCNC,CPTII,S$GLB, | Performed by: INTERNAL MEDICINE

## 2021-10-26 PROCEDURE — 99999 PR PBB SHADOW E&M-EST. PATIENT-LVL V: ICD-10-PCS | Mod: PBBFAC,HCNC,, | Performed by: INTERNAL MEDICINE

## 2021-10-26 PROCEDURE — 99499 RISK ADDL DX/OHS AUDIT: ICD-10-PCS | Mod: HCNC,S$GLB,, | Performed by: INTERNAL MEDICINE

## 2021-10-26 PROCEDURE — 1159F MED LIST DOCD IN RCRD: CPT | Mod: HCNC,CPTII,S$GLB, | Performed by: INTERNAL MEDICINE

## 2021-10-26 PROCEDURE — 3008F PR BODY MASS INDEX (BMI) DOCUMENTED: ICD-10-PCS | Mod: HCNC,CPTII,S$GLB, | Performed by: INTERNAL MEDICINE

## 2021-10-26 PROCEDURE — 3044F HG A1C LEVEL LT 7.0%: CPT | Mod: HCNC,CPTII,S$GLB, | Performed by: INTERNAL MEDICINE

## 2021-10-26 PROCEDURE — 1157F PR ADVANCE CARE PLAN OR EQUIV PRESENT IN MEDICAL RECORD: ICD-10-PCS | Mod: HCNC,CPTII,S$GLB, | Performed by: INTERNAL MEDICINE

## 2021-10-26 PROCEDURE — 99397 PER PM REEVAL EST PAT 65+ YR: CPT | Mod: HCNC,S$GLB,, | Performed by: INTERNAL MEDICINE

## 2021-10-26 PROCEDURE — 3288F FALL RISK ASSESSMENT DOCD: CPT | Mod: HCNC,CPTII,S$GLB, | Performed by: INTERNAL MEDICINE

## 2021-10-26 PROCEDURE — G0008 FLU VACCINE - QUADRIVALENT - ADJUVANTED: ICD-10-PCS | Mod: HCNC,S$GLB,, | Performed by: INTERNAL MEDICINE

## 2021-10-26 PROCEDURE — 3061F NEG MICROALBUMINURIA REV: CPT | Mod: HCNC,CPTII,S$GLB, | Performed by: INTERNAL MEDICINE

## 2021-10-26 PROCEDURE — G0008 ADMIN INFLUENZA VIRUS VAC: HCPCS | Mod: HCNC,S$GLB,, | Performed by: INTERNAL MEDICINE

## 2021-10-26 PROCEDURE — 1160F RVW MEDS BY RX/DR IN RCRD: CPT | Mod: HCNC,CPTII,S$GLB, | Performed by: INTERNAL MEDICINE

## 2021-10-26 PROCEDURE — 1160F PR REVIEW ALL MEDS BY PRESCRIBER/CLIN PHARMACIST DOCUMENTED: ICD-10-PCS | Mod: HCNC,CPTII,S$GLB, | Performed by: INTERNAL MEDICINE

## 2021-10-26 PROCEDURE — 1101F PR PT FALLS ASSESS DOC 0-1 FALLS W/OUT INJ PAST YR: ICD-10-PCS | Mod: HCNC,CPTII,S$GLB, | Performed by: INTERNAL MEDICINE

## 2021-10-26 PROCEDURE — 99999 PR PBB SHADOW E&M-EST. PATIENT-LVL V: CPT | Mod: PBBFAC,HCNC,, | Performed by: INTERNAL MEDICINE

## 2021-10-26 PROCEDURE — 3074F SYST BP LT 130 MM HG: CPT | Mod: HCNC,CPTII,S$GLB, | Performed by: INTERNAL MEDICINE

## 2021-10-26 PROCEDURE — 3066F NEPHROPATHY DOC TX: CPT | Mod: HCNC,CPTII,S$GLB, | Performed by: INTERNAL MEDICINE

## 2021-10-26 PROCEDURE — 3008F BODY MASS INDEX DOCD: CPT | Mod: HCNC,CPTII,S$GLB, | Performed by: INTERNAL MEDICINE

## 2021-10-26 PROCEDURE — 4010F ACE/ARB THERAPY RXD/TAKEN: CPT | Mod: HCNC,CPTII,S$GLB, | Performed by: INTERNAL MEDICINE

## 2021-10-26 PROCEDURE — 1126F PR PAIN SEVERITY QUANTIFIED, NO PAIN PRESENT: ICD-10-PCS | Mod: HCNC,CPTII,S$GLB, | Performed by: INTERNAL MEDICINE

## 2021-10-26 PROCEDURE — 90694 VACC AIIV4 NO PRSRV 0.5ML IM: CPT | Mod: HCNC,S$GLB,, | Performed by: INTERNAL MEDICINE

## 2021-10-26 PROCEDURE — 3044F PR MOST RECENT HEMOGLOBIN A1C LEVEL <7.0%: ICD-10-PCS | Mod: HCNC,CPTII,S$GLB, | Performed by: INTERNAL MEDICINE

## 2021-10-26 PROCEDURE — 1159F PR MEDICATION LIST DOCUMENTED IN MEDICAL RECORD: ICD-10-PCS | Mod: HCNC,CPTII,S$GLB, | Performed by: INTERNAL MEDICINE

## 2021-10-26 PROCEDURE — 99397 PR PREVENTIVE VISIT,EST,65 & OVER: ICD-10-PCS | Mod: HCNC,S$GLB,, | Performed by: INTERNAL MEDICINE

## 2021-10-26 PROCEDURE — 1126F AMNT PAIN NOTED NONE PRSNT: CPT | Mod: HCNC,CPTII,S$GLB, | Performed by: INTERNAL MEDICINE

## 2021-10-26 PROCEDURE — 3078F DIAST BP <80 MM HG: CPT | Mod: HCNC,CPTII,S$GLB, | Performed by: INTERNAL MEDICINE

## 2021-10-26 PROCEDURE — 90694 FLU VACCINE - QUADRIVALENT - ADJUVANTED: ICD-10-PCS | Mod: HCNC,S$GLB,, | Performed by: INTERNAL MEDICINE

## 2021-10-26 PROCEDURE — 1101F PT FALLS ASSESS-DOCD LE1/YR: CPT | Mod: HCNC,CPTII,S$GLB, | Performed by: INTERNAL MEDICINE

## 2021-10-26 PROCEDURE — 99499 UNLISTED E&M SERVICE: CPT | Mod: HCNC,S$GLB,, | Performed by: INTERNAL MEDICINE

## 2021-10-26 PROCEDURE — 3061F PR NEG MICROALBUMINURIA RESULT DOCUMENTED/REVIEW: ICD-10-PCS | Mod: HCNC,CPTII,S$GLB, | Performed by: INTERNAL MEDICINE

## 2021-10-27 ENCOUNTER — ANTI-COAG VISIT (OUTPATIENT)
Dept: CARDIOLOGY | Facility: CLINIC | Age: 76
End: 2021-10-27
Payer: MEDICARE

## 2021-10-27 DIAGNOSIS — Z79.01 LONG TERM CURRENT USE OF ANTICOAGULANT THERAPY: Primary | ICD-10-CM

## 2021-10-27 LAB — INR PPP: 2.6

## 2021-10-27 PROCEDURE — 93793 ANTICOAG MGMT PT WARFARIN: CPT | Mod: S$GLB,,, | Performed by: PHARMACIST

## 2021-10-27 PROCEDURE — 93793 PR ANTICOAGULANT MGMT FOR PT TAKING WARFARIN: ICD-10-PCS | Mod: S$GLB,,, | Performed by: PHARMACIST

## 2021-11-01 ENCOUNTER — TELEPHONE (OUTPATIENT)
Dept: INTERNAL MEDICINE | Facility: CLINIC | Age: 76
End: 2021-11-01
Payer: MEDICARE

## 2021-11-01 DIAGNOSIS — R35.1 BPH ASSOCIATED WITH NOCTURIA: ICD-10-CM

## 2021-11-01 DIAGNOSIS — E78.2 MIXED HYPERLIPIDEMIA: ICD-10-CM

## 2021-11-01 DIAGNOSIS — I10 ESSENTIAL HYPERTENSION: Primary | ICD-10-CM

## 2021-11-01 DIAGNOSIS — R73.03 PREDIABETES: ICD-10-CM

## 2021-11-01 DIAGNOSIS — N40.1 BPH ASSOCIATED WITH NOCTURIA: ICD-10-CM

## 2021-11-05 PROBLEM — Z78.9 IMPAIRED MOBILITY AND ACTIVITIES OF DAILY LIVING: Status: ACTIVE | Noted: 2021-11-05

## 2021-11-05 PROBLEM — Z74.09 IMPAIRED MOBILITY AND ACTIVITIES OF DAILY LIVING: Status: ACTIVE | Noted: 2021-11-05

## 2021-11-10 ENCOUNTER — ANTI-COAG VISIT (OUTPATIENT)
Dept: CARDIOLOGY | Facility: CLINIC | Age: 76
End: 2021-11-10
Payer: MEDICARE

## 2021-11-10 DIAGNOSIS — Z79.01 LONG TERM CURRENT USE OF ANTICOAGULANT THERAPY: Primary | ICD-10-CM

## 2021-11-10 LAB — INR PPP: 3.1

## 2021-11-10 PROCEDURE — 93793 PR ANTICOAGULANT MGMT FOR PT TAKING WARFARIN: ICD-10-PCS | Mod: S$GLB,,, | Performed by: PHARMACIST

## 2021-11-10 PROCEDURE — 93793 ANTICOAG MGMT PT WARFARIN: CPT | Mod: S$GLB,,, | Performed by: PHARMACIST

## 2021-11-12 ENCOUNTER — IMMUNIZATION (OUTPATIENT)
Dept: INTERNAL MEDICINE | Facility: CLINIC | Age: 76
End: 2021-11-12
Payer: MEDICARE

## 2021-11-12 DIAGNOSIS — Z23 NEED FOR VACCINATION: Primary | ICD-10-CM

## 2021-11-12 PROCEDURE — 0064A COVID-19, MRNA, LNP-S, PF, 100 MCG/0.25 ML DOSE VACCINE (MODERNA BOOSTER): CPT | Mod: HCNC,CV19,PBBFAC | Performed by: INTERNAL MEDICINE

## 2021-11-23 ENCOUNTER — PATIENT MESSAGE (OUTPATIENT)
Dept: INTERNAL MEDICINE | Facility: CLINIC | Age: 76
End: 2021-11-23
Payer: MEDICARE

## 2021-11-23 DIAGNOSIS — R20.0 NUMBNESS OF ARM: Primary | ICD-10-CM

## 2021-11-29 ENCOUNTER — PATIENT MESSAGE (OUTPATIENT)
Dept: INTERNAL MEDICINE | Facility: CLINIC | Age: 76
End: 2021-11-29
Payer: MEDICARE

## 2021-11-29 ENCOUNTER — ANTI-COAG VISIT (OUTPATIENT)
Dept: CARDIOLOGY | Facility: CLINIC | Age: 76
End: 2021-11-29
Payer: MEDICARE

## 2021-11-29 DIAGNOSIS — I26.99 PULMONARY EMBOLISM, UNSPECIFIED CHRONICITY, UNSPECIFIED PULMONARY EMBOLISM TYPE, UNSPECIFIED WHETHER ACUTE COR PULMONALE PRESENT: ICD-10-CM

## 2021-11-29 DIAGNOSIS — Z79.01 LONG TERM CURRENT USE OF ANTICOAGULANT THERAPY: Primary | ICD-10-CM

## 2021-11-29 LAB — INR PPP: 3.1

## 2021-11-29 PROCEDURE — 93793 ANTICOAG MGMT PT WARFARIN: CPT | Mod: S$GLB,,, | Performed by: PHARMACIST

## 2021-11-29 PROCEDURE — 93793 PR ANTICOAGULANT MGMT FOR PT TAKING WARFARIN: ICD-10-PCS | Mod: S$GLB,,, | Performed by: PHARMACIST

## 2021-12-07 ENCOUNTER — PATIENT OUTREACH (OUTPATIENT)
Dept: ADMINISTRATIVE | Facility: OTHER | Age: 76
End: 2021-12-07
Payer: MEDICARE

## 2021-12-08 ENCOUNTER — ANTI-COAG VISIT (OUTPATIENT)
Dept: CARDIOLOGY | Facility: CLINIC | Age: 76
End: 2021-12-08
Payer: MEDICARE

## 2021-12-08 DIAGNOSIS — Z79.01 LONG TERM CURRENT USE OF ANTICOAGULANT THERAPY: Primary | ICD-10-CM

## 2021-12-08 LAB — INR PPP: 2.2

## 2021-12-08 PROCEDURE — 93793 ANTICOAG MGMT PT WARFARIN: CPT | Mod: S$GLB,,, | Performed by: PHARMACIST

## 2021-12-08 PROCEDURE — 93793 PR ANTICOAGULANT MGMT FOR PT TAKING WARFARIN: ICD-10-PCS | Mod: S$GLB,,, | Performed by: PHARMACIST

## 2021-12-14 ENCOUNTER — OFFICE VISIT (OUTPATIENT)
Dept: NEUROLOGY | Facility: CLINIC | Age: 76
End: 2021-12-14
Payer: MEDICARE

## 2021-12-14 VITALS
BODY MASS INDEX: 39.12 KG/M2 | SYSTOLIC BLOOD PRESSURE: 104 MMHG | RESPIRATION RATE: 18 BRPM | DIASTOLIC BLOOD PRESSURE: 62 MMHG | HEART RATE: 86 BPM | HEIGHT: 75 IN | WEIGHT: 314.63 LBS

## 2021-12-14 DIAGNOSIS — R20.2 NUMBNESS AND TINGLING IN RIGHT HAND: Primary | ICD-10-CM

## 2021-12-14 DIAGNOSIS — R20.0 NUMBNESS AND TINGLING IN RIGHT HAND: Primary | ICD-10-CM

## 2021-12-14 DIAGNOSIS — R20.0 NUMBNESS OF ARM: ICD-10-CM

## 2021-12-14 PROCEDURE — 3061F PR NEG MICROALBUMINURIA RESULT DOCUMENTED/REVIEW: ICD-10-PCS | Mod: HCNC,CPTII,S$GLB, | Performed by: PHYSICIAN ASSISTANT

## 2021-12-14 PROCEDURE — 99999 PR PBB SHADOW E&M-EST. PATIENT-LVL IV: CPT | Mod: PBBFAC,HCNC,, | Performed by: PHYSICIAN ASSISTANT

## 2021-12-14 PROCEDURE — 3061F NEG MICROALBUMINURIA REV: CPT | Mod: HCNC,CPTII,S$GLB, | Performed by: PHYSICIAN ASSISTANT

## 2021-12-14 PROCEDURE — 1157F ADVNC CARE PLAN IN RCRD: CPT | Mod: HCNC,CPTII,S$GLB, | Performed by: PHYSICIAN ASSISTANT

## 2021-12-14 PROCEDURE — 4010F ACE/ARB THERAPY RXD/TAKEN: CPT | Mod: HCNC,CPTII,S$GLB, | Performed by: PHYSICIAN ASSISTANT

## 2021-12-14 PROCEDURE — 99999 PR PBB SHADOW E&M-EST. PATIENT-LVL IV: ICD-10-PCS | Mod: PBBFAC,HCNC,, | Performed by: PHYSICIAN ASSISTANT

## 2021-12-14 PROCEDURE — 4010F PR ACE/ARB THEARPY RXD/TAKEN: ICD-10-PCS | Mod: HCNC,CPTII,S$GLB, | Performed by: PHYSICIAN ASSISTANT

## 2021-12-14 PROCEDURE — 3066F NEPHROPATHY DOC TX: CPT | Mod: HCNC,CPTII,S$GLB, | Performed by: PHYSICIAN ASSISTANT

## 2021-12-14 PROCEDURE — 3066F PR DOCUMENTATION OF TREATMENT FOR NEPHROPATHY: ICD-10-PCS | Mod: HCNC,CPTII,S$GLB, | Performed by: PHYSICIAN ASSISTANT

## 2021-12-14 PROCEDURE — 99204 PR OFFICE/OUTPT VISIT, NEW, LEVL IV, 45-59 MIN: ICD-10-PCS | Mod: HCNC,S$GLB,, | Performed by: PHYSICIAN ASSISTANT

## 2021-12-14 PROCEDURE — 99204 OFFICE O/P NEW MOD 45 MIN: CPT | Mod: HCNC,S$GLB,, | Performed by: PHYSICIAN ASSISTANT

## 2021-12-14 PROCEDURE — 1157F PR ADVANCE CARE PLAN OR EQUIV PRESENT IN MEDICAL RECORD: ICD-10-PCS | Mod: HCNC,CPTII,S$GLB, | Performed by: PHYSICIAN ASSISTANT

## 2021-12-22 ENCOUNTER — ANTI-COAG VISIT (OUTPATIENT)
Dept: CARDIOLOGY | Facility: CLINIC | Age: 76
End: 2021-12-22
Payer: MEDICARE

## 2021-12-22 DIAGNOSIS — Z79.01 LONG TERM CURRENT USE OF ANTICOAGULANT THERAPY: Primary | ICD-10-CM

## 2021-12-22 LAB — INR PPP: 3

## 2021-12-22 PROCEDURE — 93793 PR ANTICOAGULANT MGMT FOR PT TAKING WARFARIN: ICD-10-PCS | Mod: S$GLB,,, | Performed by: PHARMACIST

## 2021-12-22 PROCEDURE — 93793 ANTICOAG MGMT PT WARFARIN: CPT | Mod: S$GLB,,, | Performed by: PHARMACIST

## 2022-01-03 RX ORDER — WARFARIN SODIUM 5 MG/1
TABLET ORAL
Qty: 180 TABLET | Refills: 3 | Status: SHIPPED | OUTPATIENT
Start: 2022-01-03

## 2022-01-06 ENCOUNTER — ANTI-COAG VISIT (OUTPATIENT)
Dept: CARDIOLOGY | Facility: CLINIC | Age: 77
End: 2022-01-06
Payer: MEDICARE

## 2022-01-06 DIAGNOSIS — Z79.01 LONG TERM CURRENT USE OF ANTICOAGULANT THERAPY: Primary | ICD-10-CM

## 2022-01-06 DIAGNOSIS — I26.99 PULMONARY EMBOLISM, UNSPECIFIED CHRONICITY, UNSPECIFIED PULMONARY EMBOLISM TYPE, UNSPECIFIED WHETHER ACUTE COR PULMONALE PRESENT: ICD-10-CM

## 2022-01-06 LAB — INR PPP: 3.1

## 2022-01-06 PROCEDURE — 93793 ANTICOAG MGMT PT WARFARIN: CPT | Mod: S$GLB,,, | Performed by: PHARMACIST

## 2022-01-06 PROCEDURE — 93793 PR ANTICOAGULANT MGMT FOR PT TAKING WARFARIN: ICD-10-PCS | Mod: S$GLB,,, | Performed by: PHARMACIST

## 2022-01-09 DIAGNOSIS — Z79.01 LONG TERM CURRENT USE OF ANTICOAGULANT THERAPY: ICD-10-CM

## 2022-01-10 RX ORDER — IRBESARTAN 300 MG/1
300 TABLET ORAL NIGHTLY
Qty: 90 TABLET | Refills: 3 | Status: SHIPPED | OUTPATIENT
Start: 2022-01-10 | End: 2023-03-19 | Stop reason: SDUPTHER

## 2022-01-10 RX ORDER — WARFARIN 10 MG/1
TABLET ORAL
Qty: 180 TABLET | Refills: 3 | Status: SHIPPED | OUTPATIENT
Start: 2022-01-10 | End: 2022-01-11 | Stop reason: SDUPTHER

## 2022-01-11 DIAGNOSIS — Z79.01 LONG TERM CURRENT USE OF ANTICOAGULANT THERAPY: ICD-10-CM

## 2022-01-11 RX ORDER — WARFARIN 10 MG/1
TABLET ORAL
Qty: 180 TABLET | Refills: 3 | Status: CANCELLED | OUTPATIENT
Start: 2022-01-11

## 2022-01-11 RX ORDER — WARFARIN 10 MG/1
TABLET ORAL
Qty: 180 TABLET | Refills: 3 | OUTPATIENT
Start: 2022-01-11 | End: 2022-01-16 | Stop reason: SDUPTHER

## 2022-01-11 RX ORDER — WARFARIN SODIUM 5 MG/1
TABLET ORAL
Qty: 180 TABLET | Refills: 3 | Status: CANCELLED | OUTPATIENT
Start: 2022-01-11

## 2022-01-11 RX ORDER — IRBESARTAN 300 MG/1
300 TABLET ORAL NIGHTLY
Qty: 90 TABLET | Refills: 3 | Status: CANCELLED | OUTPATIENT
Start: 2022-01-11

## 2022-01-16 ENCOUNTER — PATIENT MESSAGE (OUTPATIENT)
Dept: ADMINISTRATIVE | Facility: OTHER | Age: 77
End: 2022-01-16
Payer: MEDICARE

## 2022-01-19 ENCOUNTER — ANTI-COAG VISIT (OUTPATIENT)
Dept: CARDIOLOGY | Facility: CLINIC | Age: 77
End: 2022-01-19
Payer: MEDICARE

## 2022-01-19 DIAGNOSIS — Z79.01 LONG TERM CURRENT USE OF ANTICOAGULANT THERAPY: Primary | ICD-10-CM

## 2022-01-19 LAB — INR PPP: 2.4

## 2022-01-19 PROCEDURE — 93793 ANTICOAG MGMT PT WARFARIN: CPT | Mod: S$GLB,,, | Performed by: PHARMACIST

## 2022-01-19 PROCEDURE — 93793 PR ANTICOAGULANT MGMT FOR PT TAKING WARFARIN: ICD-10-PCS | Mod: S$GLB,,, | Performed by: PHARMACIST

## 2022-01-28 ENCOUNTER — PATIENT MESSAGE (OUTPATIENT)
Dept: INTERNAL MEDICINE | Facility: CLINIC | Age: 77
End: 2022-01-28
Payer: MEDICARE

## 2022-01-28 RX ORDER — AZITHROMYCIN 250 MG/1
TABLET, FILM COATED ORAL
Qty: 6 TABLET | Refills: 0 | Status: SHIPPED | OUTPATIENT
Start: 2022-01-28 | End: 2022-02-02

## 2022-01-28 RX ORDER — BENZONATATE 200 MG/1
200 CAPSULE ORAL 3 TIMES DAILY PRN
Qty: 30 CAPSULE | Refills: 0 | Status: SHIPPED | OUTPATIENT
Start: 2022-01-28 | End: 2022-02-07

## 2022-02-02 ENCOUNTER — ANTI-COAG VISIT (OUTPATIENT)
Dept: CARDIOLOGY | Facility: CLINIC | Age: 77
End: 2022-02-02
Payer: MEDICARE

## 2022-02-02 DIAGNOSIS — Z79.01 LONG TERM CURRENT USE OF ANTICOAGULANT THERAPY: Primary | ICD-10-CM

## 2022-02-02 LAB — INR PPP: 2.1

## 2022-02-02 PROCEDURE — 93793 PR ANTICOAGULANT MGMT FOR PT TAKING WARFARIN: ICD-10-PCS | Mod: S$GLB,,, | Performed by: PHARMACIST

## 2022-02-02 PROCEDURE — 93793 ANTICOAG MGMT PT WARFARIN: CPT | Mod: S$GLB,,, | Performed by: PHARMACIST

## 2022-02-02 NOTE — PROGRESS NOTES
Patient states he was taking a zpack, which he has now completed.  He will have a dental implant on 3/11 and taking  Warfarin 12.5 on wed and 10mg all other days

## 2022-02-16 ENCOUNTER — ANTI-COAG VISIT (OUTPATIENT)
Dept: CARDIOLOGY | Facility: CLINIC | Age: 77
End: 2022-02-16
Payer: MEDICARE

## 2022-02-16 DIAGNOSIS — I26.99 PULMONARY EMBOLISM, UNSPECIFIED CHRONICITY, UNSPECIFIED PULMONARY EMBOLISM TYPE, UNSPECIFIED WHETHER ACUTE COR PULMONALE PRESENT: ICD-10-CM

## 2022-02-16 DIAGNOSIS — Z79.01 LONG TERM CURRENT USE OF ANTICOAGULANT THERAPY: Primary | ICD-10-CM

## 2022-02-16 LAB — INR PPP: 3.7

## 2022-02-16 PROCEDURE — 93793 PR ANTICOAGULANT MGMT FOR PT TAKING WARFARIN: ICD-10-PCS | Mod: S$GLB,,, | Performed by: PHARMACIST

## 2022-02-16 PROCEDURE — 93793 ANTICOAG MGMT PT WARFARIN: CPT | Mod: S$GLB,,, | Performed by: PHARMACIST

## 2022-02-23 ENCOUNTER — PATIENT MESSAGE (OUTPATIENT)
Dept: CARDIOLOGY | Facility: CLINIC | Age: 77
End: 2022-02-23
Payer: MEDICARE

## 2022-02-24 ENCOUNTER — TELEPHONE (OUTPATIENT)
Dept: CARDIOLOGY | Facility: CLINIC | Age: 77
End: 2022-02-24

## 2022-02-24 ENCOUNTER — HOSPITAL ENCOUNTER (OUTPATIENT)
Dept: CARDIOLOGY | Facility: HOSPITAL | Age: 77
Discharge: HOME | End: 2022-02-24
Attending: INTERNAL MEDICINE
Payer: MEDICARE

## 2022-02-24 DIAGNOSIS — I82.592 CHRONIC EMBOLISM AND THROMBOSIS OF OTHER SPECIFIED DEEP VEIN OF LEFT LOWER EXTREMITY: ICD-10-CM

## 2022-02-24 DIAGNOSIS — I82.499 DEEP VEIN THROMBOSIS (DVT) OF OTHER VEIN OF LOWER EXTREMITY, UNSPECIFIED CHRONICITY, UNSPECIFIED LATERALITY: ICD-10-CM

## 2022-02-24 DIAGNOSIS — M79.89 LEFT LEG SWELLING: Primary | ICD-10-CM

## 2022-02-24 DIAGNOSIS — M79.89 LEFT LEG SWELLING: ICD-10-CM

## 2022-02-24 PROCEDURE — 93971 CV US DOPPLER VENOUS LEG LEFT (CUPID ONLY): ICD-10-PCS | Mod: 26,HCNC,LT, | Performed by: INTERNAL MEDICINE

## 2022-02-24 PROCEDURE — 93971 EXTREMITY STUDY: CPT | Mod: HCNC,LT

## 2022-02-24 PROCEDURE — 93971 EXTREMITY STUDY: CPT | Mod: 26,HCNC,LT, | Performed by: INTERNAL MEDICINE

## 2022-02-24 NOTE — TELEPHONE ENCOUNTER
----- Message from Blanquita Levin MD sent at 2/24/2022  4:17 PM CST -----  No blood clot in the leg but there is a cyst behind the knee. Nothing to do for that

## 2022-02-24 NOTE — PROGRESS NOTES
Wife called to report acute swelling in left lower extremity.  Patient has a prior history of DVT and pulmonary embolism. Will obtain u/s urgently.

## 2022-03-03 ENCOUNTER — ANTI-COAG VISIT (OUTPATIENT)
Dept: CARDIOLOGY | Facility: CLINIC | Age: 77
End: 2022-03-03
Payer: MEDICARE

## 2022-03-03 DIAGNOSIS — Z79.01 LONG TERM CURRENT USE OF ANTICOAGULANT THERAPY: Primary | ICD-10-CM

## 2022-03-03 LAB — INR PPP: 2.5

## 2022-03-03 PROCEDURE — 93793 PR ANTICOAGULANT MGMT FOR PT TAKING WARFARIN: ICD-10-PCS | Mod: S$GLB,,,

## 2022-03-03 PROCEDURE — 93793 ANTICOAG MGMT PT WARFARIN: CPT | Mod: S$GLB,,,

## 2022-03-10 ENCOUNTER — PATIENT MESSAGE (OUTPATIENT)
Dept: CARDIOLOGY | Facility: CLINIC | Age: 77
End: 2022-03-10
Payer: MEDICARE

## 2022-03-10 ENCOUNTER — ANTI-COAG VISIT (OUTPATIENT)
Dept: CARDIOLOGY | Facility: CLINIC | Age: 77
End: 2022-03-10
Payer: MEDICARE

## 2022-03-10 DIAGNOSIS — Z79.01 LONG TERM CURRENT USE OF ANTICOAGULANT THERAPY: Primary | ICD-10-CM

## 2022-03-10 LAB — INR PPP: 2.4

## 2022-03-10 PROCEDURE — 93793 ANTICOAG MGMT PT WARFARIN: CPT | Mod: S$GLB,,, | Performed by: PHARMACIST

## 2022-03-10 PROCEDURE — 93793 PR ANTICOAGULANT MGMT FOR PT TAKING WARFARIN: ICD-10-PCS | Mod: S$GLB,,, | Performed by: PHARMACIST

## 2022-03-10 NOTE — PROGRESS NOTES
Patient is reporting that he will have a dental implant tomorrow. We do not hold coumadin for a dental implant. In addition, INR on lower(thicker) end of the range.  We will check next week to see if still hovering low and if so will make adjustments at that time.

## 2022-03-16 ENCOUNTER — ANTI-COAG VISIT (OUTPATIENT)
Dept: CARDIOLOGY | Facility: CLINIC | Age: 77
End: 2022-03-16
Payer: MEDICARE

## 2022-03-16 DIAGNOSIS — Z79.01 LONG TERM CURRENT USE OF ANTICOAGULANT THERAPY: Primary | ICD-10-CM

## 2022-03-16 DIAGNOSIS — I26.99 PULMONARY EMBOLISM, UNSPECIFIED CHRONICITY, UNSPECIFIED PULMONARY EMBOLISM TYPE, UNSPECIFIED WHETHER ACUTE COR PULMONALE PRESENT: ICD-10-CM

## 2022-03-16 LAB — INR PPP: 2

## 2022-03-16 PROCEDURE — 93793 PR ANTICOAGULANT MGMT FOR PT TAKING WARFARIN: ICD-10-PCS | Mod: S$GLB,,, | Performed by: PHARMACIST

## 2022-03-16 PROCEDURE — 93793 ANTICOAG MGMT PT WARFARIN: CPT | Mod: S$GLB,,, | Performed by: PHARMACIST

## 2022-03-30 ENCOUNTER — ANTI-COAG VISIT (OUTPATIENT)
Dept: CARDIOLOGY | Facility: CLINIC | Age: 77
End: 2022-03-30
Payer: MEDICARE

## 2022-03-30 DIAGNOSIS — I26.99 PULMONARY EMBOLISM, UNSPECIFIED CHRONICITY, UNSPECIFIED PULMONARY EMBOLISM TYPE, UNSPECIFIED WHETHER ACUTE COR PULMONALE PRESENT: ICD-10-CM

## 2022-03-30 DIAGNOSIS — Z79.01 LONG TERM CURRENT USE OF ANTICOAGULANT THERAPY: Primary | ICD-10-CM

## 2022-03-30 LAB — INR PPP: 2.4

## 2022-03-30 PROCEDURE — 93793 PR ANTICOAGULANT MGMT FOR PT TAKING WARFARIN: ICD-10-PCS | Mod: S$GLB,,, | Performed by: PHARMACIST

## 2022-03-30 PROCEDURE — 93793 ANTICOAG MGMT PT WARFARIN: CPT | Mod: S$GLB,,, | Performed by: PHARMACIST

## 2022-04-13 ENCOUNTER — ANTI-COAG VISIT (OUTPATIENT)
Dept: CARDIOLOGY | Facility: CLINIC | Age: 77
End: 2022-04-13
Payer: MEDICARE

## 2022-04-13 DIAGNOSIS — Z79.01 LONG TERM CURRENT USE OF ANTICOAGULANT THERAPY: Primary | ICD-10-CM

## 2022-04-13 LAB — INR PPP: 2.7

## 2022-04-13 PROCEDURE — 93793 PR ANTICOAGULANT MGMT FOR PT TAKING WARFARIN: ICD-10-PCS | Mod: S$GLB,,, | Performed by: PHARMACIST

## 2022-04-13 PROCEDURE — 93793 ANTICOAG MGMT PT WARFARIN: CPT | Mod: S$GLB,,, | Performed by: PHARMACIST

## 2022-04-14 ENCOUNTER — PES CALL (OUTPATIENT)
Dept: ADMINISTRATIVE | Facility: CLINIC | Age: 77
End: 2022-04-14
Payer: MEDICARE

## 2022-04-20 NOTE — PROGRESS NOTES
Hourly rounds completed. All needs met. 46 Pt requested something to eat and drink after arriving back on the floor from PACU. Made MD aware. Orders were placed. 2345 Pt c/o nausea and stated PRN zofran \"does not work\". Made MD aware. Orders were placed. Intervention per [facility] 0972 Pt hypertensive and tachycardiac. Made MD aware. 0153 Pt c/o itching. Made MD aware. Orders were placed. Interventions per MAR. Will give report to the oncoming day shift nurse. Subjective:       Patient ID: Del Anand is a 73 y.o. male.    Chief Complaint: Wound Check    Mr. 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 He underwent MOH's procedure on 8/15/5018. He reports his incision became necrotic and eventually opened. A wound culture was done post op week 3 that was + for K.pneumoniae. He was prescribed doxycycline for two weeks.  PMHx includes CAD, CABG 2014, HTN, DVT, PE, and kidney stones. He is on coumadin therapy for hx of PE. Sharps debridement along with enzymatic debridement was initiated. The wound is also cauterized with silver nitrate for hypergranulation tissue as indicated.  The left leg developed generalized edema especially to ankle area and the tissue is smooth and non granular following a vacation. Moderate amounts of drainage. Patient placed in compression therapy last week and his wound is now healed.        Review of Systems   Constitutional: Negative.  Negative for chills, diaphoresis, fatigue and fever.   HENT: Negative.  Negative for ear pain, nosebleeds, sinus pain, sore throat and trouble swallowing.    Eyes: Positive for visual disturbance. Negative for pain and redness.   Respiratory: Negative.  Negative for cough, shortness of breath and wheezing.         Hx of PE  DVT   Cardiovascular: Negative.  Negative for chest pain, palpitations and leg swelling.        CAD  Hx of COPD   Gastrointestinal: Negative.  Negative for abdominal distention, abdominal pain, blood in stool, nausea and vomiting.   Endocrine: Negative.  Negative for polydipsia, polyphagia and polyuria.   Genitourinary: Negative.  Negative for flank pain and hematuria.   Musculoskeletal: Positive for arthralgias (Knees, hands). Negative for back pain, joint swelling and myalgias.   Skin: Negative for wound.   Allergic/Immunologic: Negative.    Neurological: Negative.  Negative for seizures, facial asymmetry, weakness and headaches.    Hematological: Negative for adenopathy. Bruises/bleeds easily.        Coumadin therapy   Psychiatric/Behavioral: Negative.  Negative for agitation, dysphoric mood and sleep disturbance. The patient is not nervous/anxious.        Objective:      Physical Exam   Constitutional: He is oriented to person, place, and time. Vital signs are normal. He appears well-developed. No distress.   HENT:   Head: Normocephalic.   Neck: Trachea normal. Carotid bruit is not present.   Cardiovascular: Intact distal pulses.   Pulses:       Dorsalis pedis pulses are 2+ on the right side, and 2+ on the left side.        Posterior tibial pulses are 2+ on the right side, and 2+ on the left side.   Musculoskeletal: Normal range of motion.        Left lower leg: He exhibits edema.   Neurological: He is alert and oriented to person, place, and time.   Skin: Skin is warm and dry. Capillary refill takes less than 2 seconds. He is not diaphoretic. No erythema.   Psychiatric: He has a normal mood and affect. Thought content normal.   Vitals reviewed.          Assessment:       1. 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  Return to this clinic as needed.

## 2022-04-27 ENCOUNTER — ANTI-COAG VISIT (OUTPATIENT)
Dept: CARDIOLOGY | Facility: CLINIC | Age: 77
End: 2022-04-27
Payer: MEDICARE

## 2022-04-27 DIAGNOSIS — Z79.01 LONG TERM CURRENT USE OF ANTICOAGULANT THERAPY: Primary | ICD-10-CM

## 2022-04-27 LAB — INR PPP: 2.9

## 2022-04-27 PROCEDURE — 93793 ANTICOAG MGMT PT WARFARIN: CPT | Mod: S$GLB,,,

## 2022-04-27 PROCEDURE — 93793 PR ANTICOAGULANT MGMT FOR PT TAKING WARFARIN: ICD-10-PCS | Mod: S$GLB,,,

## 2022-05-03 ENCOUNTER — TELEPHONE (OUTPATIENT)
Dept: ENDOSCOPY | Facility: HOSPITAL | Age: 77
End: 2022-05-03
Payer: MEDICARE

## 2022-05-03 DIAGNOSIS — Z86.010 HISTORY OF COLON POLYPS: Primary | ICD-10-CM

## 2022-05-05 ENCOUNTER — ANTI-COAG VISIT (OUTPATIENT)
Dept: CARDIOLOGY | Facility: CLINIC | Age: 77
End: 2022-05-05
Payer: MEDICARE

## 2022-05-05 DIAGNOSIS — I26.99 PULMONARY EMBOLISM, UNSPECIFIED CHRONICITY, UNSPECIFIED PULMONARY EMBOLISM TYPE, UNSPECIFIED WHETHER ACUTE COR PULMONALE PRESENT: ICD-10-CM

## 2022-05-05 DIAGNOSIS — Z79.01 LONG TERM CURRENT USE OF ANTICOAGULANT THERAPY: Primary | ICD-10-CM

## 2022-05-05 LAB — INR PPP: 3.2

## 2022-05-05 PROCEDURE — 93793 ANTICOAG MGMT PT WARFARIN: CPT | Mod: S$GLB,,, | Performed by: PHARMACIST

## 2022-05-05 PROCEDURE — 93793 PR ANTICOAGULANT MGMT FOR PT TAKING WARFARIN: ICD-10-PCS | Mod: S$GLB,,, | Performed by: PHARMACIST

## 2022-05-13 ENCOUNTER — TELEPHONE (OUTPATIENT)
Dept: INTERNAL MEDICINE | Facility: CLINIC | Age: 77
End: 2022-05-13
Payer: MEDICARE

## 2022-05-13 ENCOUNTER — LAB VISIT (OUTPATIENT)
Dept: LAB | Facility: HOSPITAL | Age: 77
End: 2022-05-13
Attending: INTERNAL MEDICINE
Payer: MEDICARE

## 2022-05-13 DIAGNOSIS — E78.2 MIXED HYPERLIPIDEMIA: ICD-10-CM

## 2022-05-13 DIAGNOSIS — I10 ESSENTIAL HYPERTENSION: ICD-10-CM

## 2022-05-13 DIAGNOSIS — R35.1 BPH ASSOCIATED WITH NOCTURIA: ICD-10-CM

## 2022-05-13 DIAGNOSIS — N40.1 BPH ASSOCIATED WITH NOCTURIA: ICD-10-CM

## 2022-05-13 DIAGNOSIS — R73.03 PREDIABETES: ICD-10-CM

## 2022-05-13 LAB
ALBUMIN SERPL BCP-MCNC: 3.8 G/DL (ref 3.5–5.2)
ALP SERPL-CCNC: 87 U/L (ref 55–135)
ALT SERPL W/O P-5'-P-CCNC: 27 U/L (ref 10–44)
ANION GAP SERPL CALC-SCNC: 8 MMOL/L (ref 8–16)
AST SERPL-CCNC: 25 U/L (ref 10–40)
BASOPHILS # BLD AUTO: 0.03 K/UL (ref 0–0.2)
BASOPHILS NFR BLD: 0.5 % (ref 0–1.9)
BILIRUB SERPL-MCNC: 1 MG/DL (ref 0.1–1)
BUN SERPL-MCNC: 17 MG/DL (ref 8–23)
CALCIUM SERPL-MCNC: 9.4 MG/DL (ref 8.7–10.5)
CHLORIDE SERPL-SCNC: 104 MMOL/L (ref 95–110)
CHOLEST SERPL-MCNC: 114 MG/DL (ref 120–199)
CHOLEST/HDLC SERPL: 2.4 {RATIO} (ref 2–5)
CO2 SERPL-SCNC: 24 MMOL/L (ref 23–29)
COMPLEXED PSA SERPL-MCNC: 0.15 NG/ML (ref 0–4)
CREAT SERPL-MCNC: 0.7 MG/DL (ref 0.5–1.4)
DIFFERENTIAL METHOD: ABNORMAL
EOSINOPHIL # BLD AUTO: 0.1 K/UL (ref 0–0.5)
EOSINOPHIL NFR BLD: 1.9 % (ref 0–8)
ERYTHROCYTE [DISTWIDTH] IN BLOOD BY AUTOMATED COUNT: 13 % (ref 11.5–14.5)
EST. GFR  (AFRICAN AMERICAN): >60 ML/MIN/1.73 M^2
EST. GFR  (NON AFRICAN AMERICAN): >60 ML/MIN/1.73 M^2
ESTIMATED AVG GLUCOSE: 128 MG/DL (ref 68–131)
GLUCOSE SERPL-MCNC: 121 MG/DL (ref 70–110)
HBA1C MFR BLD: 6.1 % (ref 4–5.6)
HCT VFR BLD AUTO: 41.5 % (ref 40–54)
HDLC SERPL-MCNC: 48 MG/DL (ref 40–75)
HDLC SERPL: 42.1 % (ref 20–50)
HGB BLD-MCNC: 13.6 G/DL (ref 14–18)
IMM GRANULOCYTES # BLD AUTO: 0.01 K/UL (ref 0–0.04)
IMM GRANULOCYTES NFR BLD AUTO: 0.2 % (ref 0–0.5)
LDLC SERPL CALC-MCNC: 44 MG/DL (ref 63–159)
LYMPHOCYTES # BLD AUTO: 1.8 K/UL (ref 1–4.8)
LYMPHOCYTES NFR BLD: 31.4 % (ref 18–48)
MCH RBC QN AUTO: 31.6 PG (ref 27–31)
MCHC RBC AUTO-ENTMCNC: 32.8 G/DL (ref 32–36)
MCV RBC AUTO: 96 FL (ref 82–98)
MONOCYTES # BLD AUTO: 0.6 K/UL (ref 0.3–1)
MONOCYTES NFR BLD: 10.2 % (ref 4–15)
NEUTROPHILS # BLD AUTO: 3.2 K/UL (ref 1.8–7.7)
NEUTROPHILS NFR BLD: 55.8 % (ref 38–73)
NONHDLC SERPL-MCNC: 66 MG/DL
NRBC BLD-RTO: 0 /100 WBC
PLATELET # BLD AUTO: 191 K/UL (ref 150–450)
PMV BLD AUTO: 11 FL (ref 9.2–12.9)
POTASSIUM SERPL-SCNC: 4.8 MMOL/L (ref 3.5–5.1)
PROT SERPL-MCNC: 6.9 G/DL (ref 6–8.4)
RBC # BLD AUTO: 4.31 M/UL (ref 4.6–6.2)
SODIUM SERPL-SCNC: 136 MMOL/L (ref 136–145)
TRIGL SERPL-MCNC: 110 MG/DL (ref 30–150)
TSH SERPL DL<=0.005 MIU/L-ACNC: 2.04 UIU/ML (ref 0.4–4)
WBC # BLD AUTO: 5.76 K/UL (ref 3.9–12.7)

## 2022-05-13 PROCEDURE — 83036 HEMOGLOBIN GLYCOSYLATED A1C: CPT | Performed by: INTERNAL MEDICINE

## 2022-05-13 PROCEDURE — 84443 ASSAY THYROID STIM HORMONE: CPT | Performed by: INTERNAL MEDICINE

## 2022-05-13 PROCEDURE — 80053 COMPREHEN METABOLIC PANEL: CPT | Performed by: INTERNAL MEDICINE

## 2022-05-13 PROCEDURE — 84153 ASSAY OF PSA TOTAL: CPT | Performed by: INTERNAL MEDICINE

## 2022-05-13 PROCEDURE — 36415 COLL VENOUS BLD VENIPUNCTURE: CPT | Mod: PO | Performed by: INTERNAL MEDICINE

## 2022-05-13 PROCEDURE — 80061 LIPID PANEL: CPT | Performed by: INTERNAL MEDICINE

## 2022-05-13 PROCEDURE — 85025 COMPLETE CBC W/AUTO DIFF WBC: CPT | Performed by: INTERNAL MEDICINE

## 2022-05-13 NOTE — TELEPHONE ENCOUNTER
----- Message from Amber Huertas MD sent at 5/13/2022  2:42 PM CDT -----  Please review your lab work and we will discuss at your pending office visit.   Amber Jimenez
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
## 2022-05-17 ENCOUNTER — OFFICE VISIT (OUTPATIENT)
Dept: OPTOMETRY | Facility: CLINIC | Age: 77
End: 2022-05-17
Payer: MEDICARE

## 2022-05-17 DIAGNOSIS — Z96.1 PSEUDOPHAKIA OF BOTH EYES: ICD-10-CM

## 2022-05-17 DIAGNOSIS — H04.123 BILATERAL DRY EYES: Primary | ICD-10-CM

## 2022-05-17 PROCEDURE — 92014 COMPRE OPH EXAM EST PT 1/>: CPT | Mod: S$GLB,,, | Performed by: OPTOMETRIST

## 2022-05-17 PROCEDURE — 1126F PR PAIN SEVERITY QUANTIFIED, NO PAIN PRESENT: ICD-10-PCS | Mod: CPTII,S$GLB,, | Performed by: OPTOMETRIST

## 2022-05-17 PROCEDURE — 1160F PR REVIEW ALL MEDS BY PRESCRIBER/CLIN PHARMACIST DOCUMENTED: ICD-10-PCS | Mod: CPTII,S$GLB,, | Performed by: OPTOMETRIST

## 2022-05-17 PROCEDURE — 3044F PR MOST RECENT HEMOGLOBIN A1C LEVEL <7.0%: ICD-10-PCS | Mod: CPTII,S$GLB,, | Performed by: OPTOMETRIST

## 2022-05-17 PROCEDURE — 3066F PR DOCUMENTATION OF TREATMENT FOR NEPHROPATHY: ICD-10-PCS | Mod: CPTII,S$GLB,, | Performed by: OPTOMETRIST

## 2022-05-17 PROCEDURE — 1160F RVW MEDS BY RX/DR IN RCRD: CPT | Mod: CPTII,S$GLB,, | Performed by: OPTOMETRIST

## 2022-05-17 PROCEDURE — 1101F PT FALLS ASSESS-DOCD LE1/YR: CPT | Mod: CPTII,S$GLB,, | Performed by: OPTOMETRIST

## 2022-05-17 PROCEDURE — 1101F PR PT FALLS ASSESS DOC 0-1 FALLS W/OUT INJ PAST YR: ICD-10-PCS | Mod: CPTII,S$GLB,, | Performed by: OPTOMETRIST

## 2022-05-17 PROCEDURE — 4010F ACE/ARB THERAPY RXD/TAKEN: CPT | Mod: CPTII,S$GLB,, | Performed by: OPTOMETRIST

## 2022-05-17 PROCEDURE — 1159F MED LIST DOCD IN RCRD: CPT | Mod: CPTII,S$GLB,, | Performed by: OPTOMETRIST

## 2022-05-17 PROCEDURE — 1157F ADVNC CARE PLAN IN RCRD: CPT | Mod: CPTII,S$GLB,, | Performed by: OPTOMETRIST

## 2022-05-17 PROCEDURE — 4010F PR ACE/ARB THEARPY RXD/TAKEN: ICD-10-PCS | Mod: CPTII,S$GLB,, | Performed by: OPTOMETRIST

## 2022-05-17 PROCEDURE — 99999 PR PBB SHADOW E&M-EST. PATIENT-LVL III: CPT | Mod: PBBFAC,,, | Performed by: OPTOMETRIST

## 2022-05-17 PROCEDURE — 3061F NEG MICROALBUMINURIA REV: CPT | Mod: CPTII,S$GLB,, | Performed by: OPTOMETRIST

## 2022-05-17 PROCEDURE — 99999 PR PBB SHADOW E&M-EST. PATIENT-LVL III: ICD-10-PCS | Mod: PBBFAC,,, | Performed by: OPTOMETRIST

## 2022-05-17 PROCEDURE — 3066F NEPHROPATHY DOC TX: CPT | Mod: CPTII,S$GLB,, | Performed by: OPTOMETRIST

## 2022-05-17 PROCEDURE — 1157F PR ADVANCE CARE PLAN OR EQUIV PRESENT IN MEDICAL RECORD: ICD-10-PCS | Mod: CPTII,S$GLB,, | Performed by: OPTOMETRIST

## 2022-05-17 PROCEDURE — 3044F HG A1C LEVEL LT 7.0%: CPT | Mod: CPTII,S$GLB,, | Performed by: OPTOMETRIST

## 2022-05-17 PROCEDURE — 3061F PR NEG MICROALBUMINURIA RESULT DOCUMENTED/REVIEW: ICD-10-PCS | Mod: CPTII,S$GLB,, | Performed by: OPTOMETRIST

## 2022-05-17 PROCEDURE — 92014 PR EYE EXAM, EST PATIENT,COMPREHESV: ICD-10-PCS | Mod: S$GLB,,, | Performed by: OPTOMETRIST

## 2022-05-17 PROCEDURE — 1159F PR MEDICATION LIST DOCUMENTED IN MEDICAL RECORD: ICD-10-PCS | Mod: CPTII,S$GLB,, | Performed by: OPTOMETRIST

## 2022-05-17 PROCEDURE — 3288F FALL RISK ASSESSMENT DOCD: CPT | Mod: CPTII,S$GLB,, | Performed by: OPTOMETRIST

## 2022-05-17 PROCEDURE — 3288F PR FALLS RISK ASSESSMENT DOCUMENTED: ICD-10-PCS | Mod: CPTII,S$GLB,, | Performed by: OPTOMETRIST

## 2022-05-17 PROCEDURE — 1126F AMNT PAIN NOTED NONE PRSNT: CPT | Mod: CPTII,S$GLB,, | Performed by: OPTOMETRIST

## 2022-05-17 NOTE — PROGRESS NOTES
HPI     78 Y/o male is here for routine eye exam with C/o pt states when he reads   things he sometimes has a film over vision and has to blink a few times   before vision is clear   Pt denies pain and discomfort   No f/f    Eye med: no gtt     Last edited by Ronald Gamez MA on 5/17/2022  8:38 AM. (History)            Assessment /Plan     For exam results, see Encounter Report.    Bilateral dry eyes    Pseudophakia of both eyes      1. Blur with blink, Recommend artificial tears. 1 drop 2x per day. Chronicity of disease and treatment discussed.  2. Monitor condition. Patient to report any changes. RTC 1 year recheck.

## 2022-05-18 ENCOUNTER — PATIENT OUTREACH (OUTPATIENT)
Dept: ADMINISTRATIVE | Facility: OTHER | Age: 77
End: 2022-05-18
Payer: MEDICARE

## 2022-05-18 ENCOUNTER — ANTI-COAG VISIT (OUTPATIENT)
Dept: CARDIOLOGY | Facility: CLINIC | Age: 77
End: 2022-05-18
Payer: MEDICARE

## 2022-05-18 DIAGNOSIS — I26.99 PULMONARY EMBOLISM, UNSPECIFIED CHRONICITY, UNSPECIFIED PULMONARY EMBOLISM TYPE, UNSPECIFIED WHETHER ACUTE COR PULMONALE PRESENT: ICD-10-CM

## 2022-05-18 DIAGNOSIS — Z79.01 LONG TERM CURRENT USE OF ANTICOAGULANT THERAPY: Primary | ICD-10-CM

## 2022-05-18 LAB — INR PPP: 3

## 2022-05-18 PROCEDURE — 93793 ANTICOAG MGMT PT WARFARIN: CPT | Mod: S$GLB,,, | Performed by: PHARMACIST

## 2022-05-18 PROCEDURE — 93793 PR ANTICOAGULANT MGMT FOR PT TAKING WARFARIN: ICD-10-PCS | Mod: S$GLB,,, | Performed by: PHARMACIST

## 2022-05-18 NOTE — PROGRESS NOTES
Care Everywhere: updated  Immunization: updated  Health Maintenance: updated  Media Review: review for outside colon cancer report   Legacy Review:   DIS:  Order placed:   Upcoming appts:  EFAX:  Task Tickets:  Referrals:  Colonoscopy case request 5.12.2022

## 2022-05-19 ENCOUNTER — OFFICE VISIT (OUTPATIENT)
Dept: CARDIOLOGY | Facility: CLINIC | Age: 77
End: 2022-05-19
Payer: MEDICARE

## 2022-05-19 VITALS
SYSTOLIC BLOOD PRESSURE: 136 MMHG | WEIGHT: 314.38 LBS | HEIGHT: 75 IN | BODY MASS INDEX: 39.09 KG/M2 | HEART RATE: 68 BPM | DIASTOLIC BLOOD PRESSURE: 74 MMHG

## 2022-05-19 DIAGNOSIS — Z79.01 LONG TERM CURRENT USE OF ANTICOAGULANT THERAPY: ICD-10-CM

## 2022-05-19 DIAGNOSIS — E78.2 MIXED HYPERLIPIDEMIA: ICD-10-CM

## 2022-05-19 DIAGNOSIS — I26.99 PULMONARY EMBOLISM, UNSPECIFIED CHRONICITY, UNSPECIFIED PULMONARY EMBOLISM TYPE, UNSPECIFIED WHETHER ACUTE COR PULMONALE PRESENT: ICD-10-CM

## 2022-05-19 DIAGNOSIS — G47.33 OSA ON CPAP: ICD-10-CM

## 2022-05-19 DIAGNOSIS — E66.01 SEVERE OBESITY (BMI 35.0-39.9) WITH COMORBIDITY: ICD-10-CM

## 2022-05-19 DIAGNOSIS — I25.10 CORONARY ARTERY DISEASE INVOLVING NATIVE CORONARY ARTERY OF NATIVE HEART WITHOUT ANGINA PECTORIS: Primary | ICD-10-CM

## 2022-05-19 DIAGNOSIS — I10 ESSENTIAL HYPERTENSION: ICD-10-CM

## 2022-05-19 DIAGNOSIS — Z95.1 S/P CABG X 2: ICD-10-CM

## 2022-05-19 DIAGNOSIS — I82.499 DEEP VEIN THROMBOSIS (DVT) OF OTHER VEIN OF LOWER EXTREMITY, UNSPECIFIED CHRONICITY, UNSPECIFIED LATERALITY: ICD-10-CM

## 2022-05-19 DIAGNOSIS — N52.9 ERECTILE DYSFUNCTION, UNSPECIFIED ERECTILE DYSFUNCTION TYPE: ICD-10-CM

## 2022-05-19 PROCEDURE — 3044F HG A1C LEVEL LT 7.0%: CPT | Mod: CPTII,S$GLB,, | Performed by: INTERNAL MEDICINE

## 2022-05-19 PROCEDURE — 4010F PR ACE/ARB THEARPY RXD/TAKEN: ICD-10-PCS | Mod: CPTII,S$GLB,, | Performed by: INTERNAL MEDICINE

## 2022-05-19 PROCEDURE — 1101F PR PT FALLS ASSESS DOC 0-1 FALLS W/OUT INJ PAST YR: ICD-10-PCS | Mod: CPTII,S$GLB,, | Performed by: INTERNAL MEDICINE

## 2022-05-19 PROCEDURE — 99499 RISK ADDL DX/OHS AUDIT: ICD-10-PCS | Mod: S$GLB,,, | Performed by: INTERNAL MEDICINE

## 2022-05-19 PROCEDURE — 1160F PR REVIEW ALL MEDS BY PRESCRIBER/CLIN PHARMACIST DOCUMENTED: ICD-10-PCS | Mod: CPTII,S$GLB,, | Performed by: INTERNAL MEDICINE

## 2022-05-19 PROCEDURE — 1159F PR MEDICATION LIST DOCUMENTED IN MEDICAL RECORD: ICD-10-PCS | Mod: CPTII,S$GLB,, | Performed by: INTERNAL MEDICINE

## 2022-05-19 PROCEDURE — 3066F NEPHROPATHY DOC TX: CPT | Mod: CPTII,S$GLB,, | Performed by: INTERNAL MEDICINE

## 2022-05-19 PROCEDURE — 1159F MED LIST DOCD IN RCRD: CPT | Mod: CPTII,S$GLB,, | Performed by: INTERNAL MEDICINE

## 2022-05-19 PROCEDURE — 1157F ADVNC CARE PLAN IN RCRD: CPT | Mod: CPTII,S$GLB,, | Performed by: INTERNAL MEDICINE

## 2022-05-19 PROCEDURE — 93010 EKG 12-LEAD: ICD-10-PCS | Mod: S$GLB,,, | Performed by: INTERNAL MEDICINE

## 2022-05-19 PROCEDURE — 3061F PR NEG MICROALBUMINURIA RESULT DOCUMENTED/REVIEW: ICD-10-PCS | Mod: CPTII,S$GLB,, | Performed by: INTERNAL MEDICINE

## 2022-05-19 PROCEDURE — 1160F RVW MEDS BY RX/DR IN RCRD: CPT | Mod: CPTII,S$GLB,, | Performed by: INTERNAL MEDICINE

## 2022-05-19 PROCEDURE — 1126F PR PAIN SEVERITY QUANTIFIED, NO PAIN PRESENT: ICD-10-PCS | Mod: CPTII,S$GLB,, | Performed by: INTERNAL MEDICINE

## 2022-05-19 PROCEDURE — 99215 OFFICE O/P EST HI 40 MIN: CPT | Mod: S$GLB,,, | Performed by: INTERNAL MEDICINE

## 2022-05-19 PROCEDURE — 93005 ELECTROCARDIOGRAM TRACING: CPT | Mod: S$GLB,,, | Performed by: INTERNAL MEDICINE

## 2022-05-19 PROCEDURE — 99999 PR PBB SHADOW E&M-EST. PATIENT-LVL III: ICD-10-PCS | Mod: PBBFAC,,, | Performed by: INTERNAL MEDICINE

## 2022-05-19 PROCEDURE — 93005 EKG 12-LEAD: ICD-10-PCS | Mod: S$GLB,,, | Performed by: INTERNAL MEDICINE

## 2022-05-19 PROCEDURE — 99499 UNLISTED E&M SERVICE: CPT | Mod: S$GLB,,, | Performed by: INTERNAL MEDICINE

## 2022-05-19 PROCEDURE — 1157F PR ADVANCE CARE PLAN OR EQUIV PRESENT IN MEDICAL RECORD: ICD-10-PCS | Mod: CPTII,S$GLB,, | Performed by: INTERNAL MEDICINE

## 2022-05-19 PROCEDURE — 99999 PR PBB SHADOW E&M-EST. PATIENT-LVL III: CPT | Mod: PBBFAC,,, | Performed by: INTERNAL MEDICINE

## 2022-05-19 PROCEDURE — 99215 PR OFFICE/OUTPT VISIT, EST, LEVL V, 40-54 MIN: ICD-10-PCS | Mod: S$GLB,,, | Performed by: INTERNAL MEDICINE

## 2022-05-19 PROCEDURE — 3288F PR FALLS RISK ASSESSMENT DOCUMENTED: ICD-10-PCS | Mod: CPTII,S$GLB,, | Performed by: INTERNAL MEDICINE

## 2022-05-19 PROCEDURE — 1101F PT FALLS ASSESS-DOCD LE1/YR: CPT | Mod: CPTII,S$GLB,, | Performed by: INTERNAL MEDICINE

## 2022-05-19 PROCEDURE — 3078F DIAST BP <80 MM HG: CPT | Mod: CPTII,S$GLB,, | Performed by: INTERNAL MEDICINE

## 2022-05-19 PROCEDURE — 93010 ELECTROCARDIOGRAM REPORT: CPT | Mod: S$GLB,,, | Performed by: INTERNAL MEDICINE

## 2022-05-19 PROCEDURE — 3075F SYST BP GE 130 - 139MM HG: CPT | Mod: CPTII,S$GLB,, | Performed by: INTERNAL MEDICINE

## 2022-05-19 PROCEDURE — 3075F PR MOST RECENT SYSTOLIC BLOOD PRESS GE 130-139MM HG: ICD-10-PCS | Mod: CPTII,S$GLB,, | Performed by: INTERNAL MEDICINE

## 2022-05-19 PROCEDURE — 3078F PR MOST RECENT DIASTOLIC BLOOD PRESSURE < 80 MM HG: ICD-10-PCS | Mod: CPTII,S$GLB,, | Performed by: INTERNAL MEDICINE

## 2022-05-19 PROCEDURE — 1126F AMNT PAIN NOTED NONE PRSNT: CPT | Mod: CPTII,S$GLB,, | Performed by: INTERNAL MEDICINE

## 2022-05-19 PROCEDURE — 3044F PR MOST RECENT HEMOGLOBIN A1C LEVEL <7.0%: ICD-10-PCS | Mod: CPTII,S$GLB,, | Performed by: INTERNAL MEDICINE

## 2022-05-19 PROCEDURE — 3288F FALL RISK ASSESSMENT DOCD: CPT | Mod: CPTII,S$GLB,, | Performed by: INTERNAL MEDICINE

## 2022-05-19 PROCEDURE — 3061F NEG MICROALBUMINURIA REV: CPT | Mod: CPTII,S$GLB,, | Performed by: INTERNAL MEDICINE

## 2022-05-19 PROCEDURE — 3066F PR DOCUMENTATION OF TREATMENT FOR NEPHROPATHY: ICD-10-PCS | Mod: CPTII,S$GLB,, | Performed by: INTERNAL MEDICINE

## 2022-05-19 PROCEDURE — 4010F ACE/ARB THERAPY RXD/TAKEN: CPT | Mod: CPTII,S$GLB,, | Performed by: INTERNAL MEDICINE

## 2022-05-19 NOTE — PROGRESS NOTES
Subjective:     Problem List:  CAD   CABG x 2 - SVG-LAD, SVG-ramus 2014  - PET stress 2020: borderline ischemic thresholds  Hyperlipidemia   Pulm embolism 1/2015   DVT (R) leg while on rivaroxaban   - no recurrence w warfarin  Hypertension  Sleep apnea  Obesity   Prior tobacco use - 1ppd x 20 yrs (quit 1987)     HPI:   Del Anand is a 77 y.o. male who presents for follow-up of Coronary Artery Disease and s/p CABG  He does not report angina or shortness of breath with exertion. No ischemia on last stress test but perfusion was severely reduced at stress.    In March 2021 he reported erectile dysfunction and wanted to wean of metoprolol.  The dose was reduced at that time and discontinued completely in May 2021. He has noted higher heart rates 80s while at rest. On 2.5 mg of amlodipine. He reports swelling in the left ankle and leg.  Tolerating warfarin. Checks INR at home.      Review of patient's allergies indicates:  No Known Allergies     Current Outpatient Medications   Medication Sig    amLODIPine (NORVASC) 2.5 MG tablet Take 1 tablet (2.5 mg total) by mouth once daily.    aspirin 81 MG Chew Take 81 mg by mouth once daily.    cyanocobalamin 500 MCG tablet Take 500 mcg by mouth every morning. Every day    ezetimibe (ZETIA) 10 mg tablet TAKE 1 TABLET (10 MG TOTAL) BY MOUTH ONCE DAILY.    FOLIC ACID/MULTIVITS-MIN/LUT (CENTRUM SILVER ORAL) Take 1 tablet by mouth once daily.    irbesartan (AVAPRO) 300 MG tablet Take 1 tablet (300 mg total) by mouth every evening.    rosuvastatin (CRESTOR) 40 MG Tab TAKE 1 TABLET EVERY DAY    sildenafiL (VIAGRA) 100 MG tablet Take 1 tablet (100 mg total) by mouth daily as needed for Erectile Dysfunction. (Patient not taking: Reported on 12/14/2021)    warfarin (COUMADIN) 10 MG tablet Take 1 tablet (10 mg total) by mouth Daily. As directed by coumadin clinic    warfarin (COUMADIN) 5 MG tablet Take 10mg daily except 12.5mg Wednesdays         Social history:  Del Anand   "reports that he quit smoking in 1987. His smoking use included cigarettes. He has a 20.00 pack-year smoking history. He has quit using smokeless tobacco. He reports current alcohol use. He reports that he does not use drugs.      Objective:       Physical Exam  Constitutional:       Appearance: He is well-developed.      Comments: /74   Pulse 68   Ht 6' 3" (1.905 m)   Wt (!) 142.6 kg (314 lb 6 oz)   BMI 39.29 kg/m²      HENT:      Head: Normocephalic.   Neck:      Vascular: No JVD.   Cardiovascular:      Rate and Rhythm: Normal rate and regular rhythm.      Pulses:           Radial pulses are 2+ on the right side and 2+ on the left side.        Dorsalis pedis pulses are 2+ on the right side and 2+ on the left side.        Posterior tibial pulses are 0 on the right side and 1+ on the left side.      Heart sounds: S1 normal and S2 normal. No murmur heard.     Comments: I was unable to feel the posterior tibial pulse on the right side but that may be due to swelling and adipose tissue.  Pulmonary:      Breath sounds: Normal breath sounds.   Chest:      Chest wall: There is no dullness to percussion.   Abdominal:      Palpations: Abdomen is soft. There is no hepatomegaly, splenomegaly or mass.   Musculoskeletal:      Right lower leg: No swelling.      Left lower leg: Swelling present.   Skin:     Findings: No bruising.      Nails: There is no clubbing.   Neurological:      Mental Status: He is alert and oriented to person, place, and time.      Gait: Gait normal.   Psychiatric:         Speech: Speech normal.         Behavior: Behavior normal.             Lab Results   Component Value Date    CHOL 114 (L) 05/13/2022    HDL 48 05/13/2022    LDLCALC 44.0 (L) 05/13/2022    TRIG 110 05/13/2022    CHOLHDL 42.1 05/13/2022     Lab Results   Component Value Date     (H) 05/13/2022    CREATININE 0.7 05/13/2022    BUN 17 05/13/2022     05/13/2022    K 4.8 05/13/2022     05/13/2022    CO2 24 05/13/2022 "     Lab Results   Component Value Date    ALT 27 05/13/2022    AST 25 05/13/2022    ALKPHOS 87 05/13/2022    BILITOT 1.0 05/13/2022       No results found for this or any previous visit.       No valid procedures specified.        Assessment and Plan:       ICD-10-CM ICD-9-CM   1. Coronary artery disease involving native coronary artery of native heart without angina pectoris  I25.10 414.01   2. S/P CABG x 2  Z95.1 V45.81   3. Mixed hyperlipidemia  E78.2 272.2   4. Pulmonary embolism, unspecified chronicity, unspecified pulmonary embolism type, unspecified whether acute cor pulmonale present  I26.99 415.19   5. Deep vein thrombosis (DVT) of other vein of lower extremity, unspecified chronicity, unspecified laterality  I82.499 453.40   6. Long term current use of anticoagulant therapy  Z79.01 V58.61   7. Essential hypertension  I10 401.9   8. Severe obesity (BMI 35.0-39.9) with comorbidity  E66.01 278.01   9. Erectile dysfunction, unspecified   N52.9 607.84   10. VERONICA on CPAP  G47.33 327.23    Z99.89 V46.8        Coronary artery disease  CAD stable.  CABG x 2 - 12/2/14: SVG-LAD, SVG-ramus. Last ischemic evaluation was a PET stress in 1/2020: There were no significant regional testing stress-induced perfusion defects.  Myocardial perfusion was reduced at rest and severely reduced at stress. CFR was mildly reduced.  The stress flows were diffusely reduced reaching borderline ischemic thresholds.     Mixed hyperlipidemia  Untreated total chol 200, LDL 120s, HDL <40 and triglycerides ~ 200 ~mg/dl.  On rosuvastatin and ezetimibe LDL is less 50 and HDL is 48.  Triglycerides are 110 mg/dl. Hepatic transaminases are within normal limits.     Pulmonary embolism  Pulmonary embolism in 2015. Treated w rivaroxaban but developed a DVT, therefore switched to warfarin.  No recurrence on warfarin.  Tolerating warfarin. Checks INR at home.   Continue warfarin. Avoid NSAIDs.    Essential hypertension  On angiotensin receptor blocker  and dihydropyridine calcium-channel blocker. Betablocker weaned off due to erectile dysfunction. SBP in the 130s in clinic today. Monitor BP closely.    Erectile dysfunction  Betablocker weaned off recently at patient's request to help w 5/19/2022. OK to use sildenafil. Counseled about the potential for severe hypotension and possible death from the simultaneous use of a type 5 phoshodiesterase (PDE) inhibitor and nitrate.        Severe obesity (BMI 35.0-39.9) with comorbidity  Weight loss recommended but unlikely.      INR range is 2.5-3.5    Orders placed during this encounter:     Coronary artery disease involving native coronary artery of native heart without angina pectoris  -     IN OFFICE EKG 12-LEAD (to Muse)  -     Lipid Panel; Future; Expected date: 05/05/2023  -     EKG 12-lead; Future; Expected date: 05/05/2023    S/P CABG x 2  -     Lipid Panel; Future; Expected date: 05/05/2023    Mixed hyperlipidemia  -     Lipid Panel; Future; Expected date: 05/05/2023  -     Comprehensive Metabolic Panel; Future; Expected date: 05/05/2023    Pulmonary embolism, unspecified chronicity, unspecified pulmonary embolism type, unspecified whether acute cor pulmonale present    Deep vein thrombosis (DVT) of other vein of lower extremity, unspecified chronicity, unspecified laterality    Long term current use of anticoagulant therapy    Essential hypertension  -     Comprehensive Metabolic Panel; Future; Expected date: 05/05/2023    Severe obesity (BMI 35.0-39.9) with comorbidity    Erectile dysfunction, unspecified     VERONICA on CPAP         Follow up in about 1 year (around 5/19/2023), or if symptoms worsen or fail to improve.

## 2022-05-20 ENCOUNTER — OFFICE VISIT (OUTPATIENT)
Dept: INTERNAL MEDICINE | Facility: CLINIC | Age: 77
End: 2022-05-20
Payer: MEDICARE

## 2022-05-20 VITALS
BODY MASS INDEX: 39.21 KG/M2 | SYSTOLIC BLOOD PRESSURE: 126 MMHG | HEART RATE: 86 BPM | WEIGHT: 313.69 LBS | DIASTOLIC BLOOD PRESSURE: 62 MMHG

## 2022-05-20 DIAGNOSIS — I70.0 AORTIC ATHEROSCLEROSIS: ICD-10-CM

## 2022-05-20 DIAGNOSIS — I77.9 BILATERAL CAROTID ARTERY DISEASE, UNSPECIFIED TYPE: ICD-10-CM

## 2022-05-20 DIAGNOSIS — D12.2 ADENOMATOUS POLYP OF ASCENDING COLON: ICD-10-CM

## 2022-05-20 DIAGNOSIS — E66.01 SEVERE OBESITY (BMI 35.0-39.9) WITH COMORBIDITY: ICD-10-CM

## 2022-05-20 DIAGNOSIS — Z12.11 COLON CANCER SCREENING: ICD-10-CM

## 2022-05-20 DIAGNOSIS — E78.2 MIXED HYPERLIPIDEMIA: ICD-10-CM

## 2022-05-20 DIAGNOSIS — I10 ESSENTIAL HYPERTENSION: Primary | ICD-10-CM

## 2022-05-20 DIAGNOSIS — R73.01 IMPAIRED FASTING GLUCOSE: ICD-10-CM

## 2022-05-20 DIAGNOSIS — F43.20 ADJUSTMENT DISORDER, UNSPECIFIED TYPE: ICD-10-CM

## 2022-05-20 DIAGNOSIS — I25.10 CORONARY ARTERY DISEASE INVOLVING NATIVE CORONARY ARTERY OF NATIVE HEART WITHOUT ANGINA PECTORIS: ICD-10-CM

## 2022-05-20 PROCEDURE — 3061F NEG MICROALBUMINURIA REV: CPT | Mod: CPTII,S$GLB,, | Performed by: INTERNAL MEDICINE

## 2022-05-20 PROCEDURE — 99499 UNLISTED E&M SERVICE: CPT | Mod: S$GLB,,, | Performed by: INTERNAL MEDICINE

## 2022-05-20 PROCEDURE — 99215 PR OFFICE/OUTPT VISIT, EST, LEVL V, 40-54 MIN: ICD-10-PCS | Mod: S$GLB,,, | Performed by: INTERNAL MEDICINE

## 2022-05-20 PROCEDURE — 3066F NEPHROPATHY DOC TX: CPT | Mod: CPTII,S$GLB,, | Performed by: INTERNAL MEDICINE

## 2022-05-20 PROCEDURE — 3044F PR MOST RECENT HEMOGLOBIN A1C LEVEL <7.0%: ICD-10-PCS | Mod: CPTII,S$GLB,, | Performed by: INTERNAL MEDICINE

## 2022-05-20 PROCEDURE — 99499 RISK ADDL DX/OHS AUDIT: ICD-10-PCS | Mod: S$GLB,,, | Performed by: INTERNAL MEDICINE

## 2022-05-20 PROCEDURE — 3074F SYST BP LT 130 MM HG: CPT | Mod: CPTII,S$GLB,, | Performed by: INTERNAL MEDICINE

## 2022-05-20 PROCEDURE — 3078F PR MOST RECENT DIASTOLIC BLOOD PRESSURE < 80 MM HG: ICD-10-PCS | Mod: CPTII,S$GLB,, | Performed by: INTERNAL MEDICINE

## 2022-05-20 PROCEDURE — 99999 PR PBB SHADOW E&M-EST. PATIENT-LVL III: ICD-10-PCS | Mod: PBBFAC,,, | Performed by: INTERNAL MEDICINE

## 2022-05-20 PROCEDURE — 1157F ADVNC CARE PLAN IN RCRD: CPT | Mod: CPTII,S$GLB,, | Performed by: INTERNAL MEDICINE

## 2022-05-20 PROCEDURE — 4010F PR ACE/ARB THEARPY RXD/TAKEN: ICD-10-PCS | Mod: CPTII,S$GLB,, | Performed by: INTERNAL MEDICINE

## 2022-05-20 PROCEDURE — 3074F PR MOST RECENT SYSTOLIC BLOOD PRESSURE < 130 MM HG: ICD-10-PCS | Mod: CPTII,S$GLB,, | Performed by: INTERNAL MEDICINE

## 2022-05-20 PROCEDURE — 3078F DIAST BP <80 MM HG: CPT | Mod: CPTII,S$GLB,, | Performed by: INTERNAL MEDICINE

## 2022-05-20 PROCEDURE — 1157F PR ADVANCE CARE PLAN OR EQUIV PRESENT IN MEDICAL RECORD: ICD-10-PCS | Mod: CPTII,S$GLB,, | Performed by: INTERNAL MEDICINE

## 2022-05-20 PROCEDURE — 4010F ACE/ARB THERAPY RXD/TAKEN: CPT | Mod: CPTII,S$GLB,, | Performed by: INTERNAL MEDICINE

## 2022-05-20 PROCEDURE — 99999 PR PBB SHADOW E&M-EST. PATIENT-LVL III: CPT | Mod: PBBFAC,,, | Performed by: INTERNAL MEDICINE

## 2022-05-20 PROCEDURE — 3044F HG A1C LEVEL LT 7.0%: CPT | Mod: CPTII,S$GLB,, | Performed by: INTERNAL MEDICINE

## 2022-05-20 PROCEDURE — 3066F PR DOCUMENTATION OF TREATMENT FOR NEPHROPATHY: ICD-10-PCS | Mod: CPTII,S$GLB,, | Performed by: INTERNAL MEDICINE

## 2022-05-20 PROCEDURE — 99215 OFFICE O/P EST HI 40 MIN: CPT | Mod: S$GLB,,, | Performed by: INTERNAL MEDICINE

## 2022-05-20 PROCEDURE — 3061F PR NEG MICROALBUMINURIA RESULT DOCUMENTED/REVIEW: ICD-10-PCS | Mod: CPTII,S$GLB,, | Performed by: INTERNAL MEDICINE

## 2022-05-21 ENCOUNTER — PATIENT MESSAGE (OUTPATIENT)
Dept: INTERNAL MEDICINE | Facility: CLINIC | Age: 77
End: 2022-05-21
Payer: MEDICARE

## 2022-05-22 ENCOUNTER — PATIENT MESSAGE (OUTPATIENT)
Dept: INTERNAL MEDICINE | Facility: CLINIC | Age: 77
End: 2022-05-22
Payer: MEDICARE

## 2022-05-27 ENCOUNTER — TELEPHONE (OUTPATIENT)
Dept: ENDOSCOPY | Facility: HOSPITAL | Age: 77
End: 2022-05-27
Payer: MEDICARE

## 2022-05-27 NOTE — TELEPHONE ENCOUNTER
Returned patient's call regarding upcoming endoscopy procedure. Left voice mail for patient to return call.

## 2022-05-31 DIAGNOSIS — Z12.11 SPECIAL SCREENING FOR MALIGNANT NEOPLASMS, COLON: Primary | ICD-10-CM

## 2022-05-31 RX ORDER — SODIUM, POTASSIUM,MAG SULFATES 17.5-3.13G
1 SOLUTION, RECONSTITUTED, ORAL ORAL DAILY
Qty: 1 KIT | Refills: 0 | Status: SHIPPED | OUTPATIENT
Start: 2022-05-31 | End: 2022-06-02

## 2022-06-01 ENCOUNTER — ANTI-COAG VISIT (OUTPATIENT)
Dept: CARDIOLOGY | Facility: CLINIC | Age: 77
End: 2022-06-01
Payer: MEDICARE

## 2022-06-01 DIAGNOSIS — I26.99 PULMONARY EMBOLISM, UNSPECIFIED CHRONICITY, UNSPECIFIED PULMONARY EMBOLISM TYPE, UNSPECIFIED WHETHER ACUTE COR PULMONALE PRESENT: ICD-10-CM

## 2022-06-01 DIAGNOSIS — Z79.01 LONG TERM CURRENT USE OF ANTICOAGULANT THERAPY: Primary | ICD-10-CM

## 2022-06-01 LAB — INR PPP: 3.3

## 2022-06-01 PROCEDURE — 93793 ANTICOAG MGMT PT WARFARIN: CPT | Mod: S$GLB,,, | Performed by: PHARMACIST

## 2022-06-01 PROCEDURE — 93793 PR ANTICOAGULANT MGMT FOR PT TAKING WARFARIN: ICD-10-PCS | Mod: S$GLB,,, | Performed by: PHARMACIST

## 2022-06-05 PROBLEM — N52.9 ERECTILE DYSFUNCTION: Status: ACTIVE | Noted: 2022-06-05

## 2022-06-05 NOTE — ASSESSMENT & PLAN NOTE
On angiotensin receptor blocker and dihydropyridine calcium-channel blocker. Betablocker weaned off due to erectile dysfunction. SBP in the 130s in clinic today. Monitor BP closely.

## 2022-06-05 NOTE — ASSESSMENT & PLAN NOTE
Betablocker weaned off recently at patient's request to help w 5/19/2022. OK to use sildenafil. Counseled about the potential for severe hypotension and possible death from the simultaneous use of a type 5 phoshodiesterase (PDE) inhibitor and nitrate.

## 2022-06-05 NOTE — ASSESSMENT & PLAN NOTE
Pulmonary embolism in 2015. Treated w rivaroxaban but developed a DVT, therefore switched to warfarin.  No recurrence on warfarin.  Tolerating warfarin. Checks INR at home.   Continue warfarin. Avoid NSAIDs.

## 2022-06-05 NOTE — ASSESSMENT & PLAN NOTE
CAD stable.  CABG x 2 - 12/2/14: SVG-LAD, SVG-ramus. Last ischemic evaluation was a PET stress in 1/2020: There were no significant regional testing stress-induced perfusion defects.  Myocardial perfusion was reduced at rest and severely reduced at stress. CFR was mildly reduced.  The stress flows were diffusely reduced reaching borderline ischemic thresholds.

## 2022-06-06 ENCOUNTER — PATIENT MESSAGE (OUTPATIENT)
Dept: INTERNAL MEDICINE | Facility: CLINIC | Age: 77
End: 2022-06-06
Payer: MEDICARE

## 2022-06-16 ENCOUNTER — ANTI-COAG VISIT (OUTPATIENT)
Dept: CARDIOLOGY | Facility: CLINIC | Age: 77
End: 2022-06-16
Payer: MEDICARE

## 2022-06-16 DIAGNOSIS — Z79.01 LONG TERM CURRENT USE OF ANTICOAGULANT THERAPY: Primary | ICD-10-CM

## 2022-06-16 LAB — INR PPP: 3.6

## 2022-06-16 PROCEDURE — 93793 ANTICOAG MGMT PT WARFARIN: CPT | Mod: S$GLB,,, | Performed by: PHARMACIST

## 2022-06-16 PROCEDURE — 93793 PR ANTICOAGULANT MGMT FOR PT TAKING WARFARIN: ICD-10-PCS | Mod: S$GLB,,, | Performed by: PHARMACIST

## 2022-06-28 ENCOUNTER — TELEPHONE (OUTPATIENT)
Dept: INTERNAL MEDICINE | Facility: CLINIC | Age: 77
End: 2022-06-28
Payer: MEDICARE

## 2022-06-28 DIAGNOSIS — R73.03 PREDIABETES: Primary | ICD-10-CM

## 2022-06-28 NOTE — TELEPHONE ENCOUNTER
----- Message -----   From: Del Anand   Sent: 6/28/2022   9:09 AM CDT   To: [facility] Clinical Support   Subject: Appointment Request                                Appointment Request From: Del Anand      With Provider: Soo Huertas MD [[facility] Internal Medicine]      Preferred Date Range: 6/28/2022 - 7/28/2022      Preferred Times: Any Time      Reason for visit: Id like to talk to a dietian  on the 5th floor in [place] about my wife and I loosing 100 lbs each      Comments:   I need help planning a diet to loose weight

## 2022-06-29 ENCOUNTER — ANTI-COAG VISIT (OUTPATIENT)
Dept: CARDIOLOGY | Facility: CLINIC | Age: 77
End: 2022-06-29
Payer: MEDICARE

## 2022-06-29 DIAGNOSIS — I26.99 PULMONARY EMBOLISM, UNSPECIFIED CHRONICITY, UNSPECIFIED PULMONARY EMBOLISM TYPE, UNSPECIFIED WHETHER ACUTE COR PULMONALE PRESENT: ICD-10-CM

## 2022-06-29 DIAGNOSIS — Z79.01 LONG TERM CURRENT USE OF ANTICOAGULANT THERAPY: Primary | ICD-10-CM

## 2022-06-29 LAB — INR PPP: 2.9

## 2022-06-29 PROCEDURE — 93793 ANTICOAG MGMT PT WARFARIN: CPT | Mod: S$GLB,,, | Performed by: PHARMACIST

## 2022-06-29 PROCEDURE — 93793 PR ANTICOAGULANT MGMT FOR PT TAKING WARFARIN: ICD-10-PCS | Mod: S$GLB,,, | Performed by: PHARMACIST

## 2022-06-29 RX ORDER — ENOXAPARIN SODIUM 150 MG/ML
INJECTION SUBCUTANEOUS
Qty: 14 EACH | Refills: 1 | Status: SHIPPED | OUTPATIENT
Start: 2022-06-29 | End: 2022-11-29

## 2022-06-29 NOTE — PROGRESS NOTES
"Patient is here for lovenox bridging instructions. He is scheduled for colonoscopy on 7/7/22.   Height  6'3"              Weight    142kg          SCr     0.7           CrCl     >30            Pre-Procedure Instructions:  Take last dose of warfarin on :       7/1/22                          Start enoxaparin injections the evening of:          7/3/22                       Enoxaparin dose is:         150mg               Every 12 hours (Twice Daily)  Last dose of enoxaparin will be the morning of:       7/6/22 **at least 24 hours prior to procedure time**                          Post-Procedure Instructions:  Restart warfarin on    7/7/22 evening                        At a dose of       Per calendar                     **Unless directed otherwise by your physician  Restart lovenox injections on the morning of     7/8/22                      **Unless directed otherwise by your physician    Call the coumadin clinic your physician has different recommendations post procedure  Enoxaparin rx sent to [facility] on file            "

## 2022-07-06 ENCOUNTER — PATIENT MESSAGE (OUTPATIENT)
Dept: ENDOSCOPY | Facility: HOSPITAL | Age: 77
End: 2022-07-06
Payer: MEDICARE

## 2022-07-07 ENCOUNTER — ANESTHESIA EVENT (OUTPATIENT)
Dept: ENDOSCOPY | Facility: HOSPITAL | Age: 77
End: 2022-07-07
Payer: MEDICARE

## 2022-07-07 ENCOUNTER — ANESTHESIA (OUTPATIENT)
Dept: ENDOSCOPY | Facility: HOSPITAL | Age: 77
End: 2022-07-07
Payer: MEDICARE

## 2022-07-07 ENCOUNTER — HOSPITAL ENCOUNTER (OUTPATIENT)
Facility: HOSPITAL | Age: 77
Discharge: HOME | End: 2022-07-07
Attending: INTERNAL MEDICINE | Admitting: INTERNAL MEDICINE
Payer: MEDICARE

## 2022-07-07 VITALS
OXYGEN SATURATION: 100 % | HEART RATE: 53 BPM | DIASTOLIC BLOOD PRESSURE: 78 MMHG | SYSTOLIC BLOOD PRESSURE: 147 MMHG | TEMPERATURE: 98 F | RESPIRATION RATE: 18 BRPM

## 2022-07-07 DIAGNOSIS — Z86.010 HISTORY OF COLON POLYPS: Primary | ICD-10-CM

## 2022-07-07 PROCEDURE — D9220A PRA ANESTHESIA: ICD-10-PCS | Mod: PT,CRNA,, | Performed by: NURSE ANESTHETIST, CERTIFIED REGISTERED

## 2022-07-07 PROCEDURE — 45380 PR COLONOSCOPY,BIOPSY: ICD-10-PCS | Mod: PT,,, | Performed by: INTERNAL MEDICINE

## 2022-07-07 PROCEDURE — 37000008 HC ANESTHESIA 1ST 15 MINUTES: Performed by: INTERNAL MEDICINE

## 2022-07-07 PROCEDURE — 45380 COLONOSCOPY AND BIOPSY: CPT | Mod: PT,,, | Performed by: INTERNAL MEDICINE

## 2022-07-07 PROCEDURE — 37000009 HC ANESTHESIA EA ADD 15 MINS: Performed by: INTERNAL MEDICINE

## 2022-07-07 PROCEDURE — 88305 TISSUE EXAM BY PATHOLOGIST: ICD-10-PCS | Mod: 26,,, | Performed by: STUDENT IN AN ORGANIZED HEALTH CARE EDUCATION/TRAINING PROGRAM

## 2022-07-07 PROCEDURE — 45380 COLONOSCOPY AND BIOPSY: CPT | Mod: PT | Performed by: INTERNAL MEDICINE

## 2022-07-07 PROCEDURE — D9220A PRA ANESTHESIA: Mod: PT,ANES,, | Performed by: ANESTHESIOLOGY

## 2022-07-07 PROCEDURE — 63600175 PHARM REV CODE 636 W HCPCS: Performed by: NURSE ANESTHETIST, CERTIFIED REGISTERED

## 2022-07-07 PROCEDURE — 88305 TISSUE EXAM BY PATHOLOGIST: CPT | Performed by: STUDENT IN AN ORGANIZED HEALTH CARE EDUCATION/TRAINING PROGRAM

## 2022-07-07 PROCEDURE — 27201012 HC FORCEPS, HOT/COLD, DISP: Performed by: INTERNAL MEDICINE

## 2022-07-07 PROCEDURE — 88305 TISSUE EXAM BY PATHOLOGIST: CPT | Mod: 26,,, | Performed by: STUDENT IN AN ORGANIZED HEALTH CARE EDUCATION/TRAINING PROGRAM

## 2022-07-07 PROCEDURE — D9220A PRA ANESTHESIA: ICD-10-PCS | Mod: PT,ANES,, | Performed by: ANESTHESIOLOGY

## 2022-07-07 PROCEDURE — 25000003 PHARM REV CODE 250: Performed by: NURSE ANESTHETIST, CERTIFIED REGISTERED

## 2022-07-07 PROCEDURE — D9220A PRA ANESTHESIA: Mod: PT,CRNA,, | Performed by: NURSE ANESTHETIST, CERTIFIED REGISTERED

## 2022-07-07 RX ORDER — PROPOFOL 10 MG/ML
VIAL (ML) INTRAVENOUS CONTINUOUS PRN
Status: DISCONTINUED | OUTPATIENT
Start: 2022-07-07 | End: 2022-07-07

## 2022-07-07 RX ORDER — LIDOCAINE HYDROCHLORIDE 20 MG/ML
INJECTION INTRAVENOUS
Status: DISCONTINUED | OUTPATIENT
Start: 2022-07-07 | End: 2022-07-07

## 2022-07-07 RX ORDER — PROPOFOL 10 MG/ML
VIAL (ML) INTRAVENOUS
Status: DISCONTINUED | OUTPATIENT
Start: 2022-07-07 | End: 2022-07-07

## 2022-07-07 RX ORDER — SODIUM CHLORIDE 0.9 % (FLUSH) 0.9 %
3 SYRINGE (ML) INJECTION
Status: DISCONTINUED | OUTPATIENT
Start: 2022-07-07 | End: 2022-07-07 | Stop reason: HOSPADM

## 2022-07-07 RX ORDER — SODIUM CHLORIDE 9 MG/ML
INJECTION, SOLUTION INTRAVENOUS CONTINUOUS
Status: DISCONTINUED | OUTPATIENT
Start: 2022-07-07 | End: 2022-07-07 | Stop reason: HOSPADM

## 2022-07-07 RX ADMIN — Medication 150 MCG/KG/MIN: at 11:07

## 2022-07-07 RX ADMIN — SODIUM CHLORIDE: 0.9 INJECTION, SOLUTION INTRAVENOUS at 10:07

## 2022-07-07 RX ADMIN — LIDOCAINE HYDROCHLORIDE 50 MG: 20 INJECTION, SOLUTION INTRAVENOUS at 11:07

## 2022-07-07 RX ADMIN — PROPOFOL 120 MG: 10 INJECTION, EMULSION INTRAVENOUS at 11:07

## 2022-07-07 NOTE — PROGRESS NOTES
Dr. Larson at bedside. Results and report explained to patient and questions answered. Wife at bedside.

## 2022-07-07 NOTE — [employer]OVATION PATIENT INSTRUCTIONS
Discharge Summary/Instructions after an Endoscopic Procedure  Patient Name: Del Anand  Patient MRN: [id number]  Patient YOB: 1945  Thursday, July 7, 2022  Nikunj Larson MD  Dear patient,  As a result of recent federal legislation (The Federal Cures Act), you may   receive lab or pathology results from your procedure in your [facility]   account before your physician is able to contact you. Your physician or   their representative will relay the results to you with their   recommendations at their soonest availability.  Thank you,  RESTRICTIONS:  During your procedure today, you received medications for sedation.  These   medications may affect your judgment, balance and coordination.  Therefore,   for 24 hours, you have the following restrictions:   - DO NOT drive a car, operate machinery, make legal/financial decisions,   sign important papers or drink alcohol.    ACTIVITY:  Today: no heavy lifting, straining or running due to procedural   sedation/anesthesia.  The following day: return to full activity including work.  DIET:  Eat and drink normally unless instructed otherwise.     TREATMENT FOR COMMON SIDE EFFECTS:  - Mild abdominal pain, nausea, belching, bloating or excessive gas:  rest,   eat lightly and use a heating pad.  - Sore Throat: treat with throat lozenges and/or gargle with warm salt   water.  - Because air was used during the procedure, expelling large amounts of air   from your rectum or belching is normal.  - If a bowel prep was taken, you may not have a bowel movement for 1-3 days.    This is normal.  SYMPTOMS TO WATCH FOR AND REPORT TO YOUR PHYSICIAN:  1. Abdominal pain or bloating, other than gas cramps.  2. Chest pain.  3. Back pain.  4. Signs of infection such as: chills or fever occurring within 24 hours   after the procedure.  5. Rectal bleeding, which would show as bright red, maroon, or black stools.   (A tablespoon of blood from the rectum is not serious, especially if    hemorrhoids are present.)  6. Vomiting.  7. Weakness or dizziness.  GO DIRECTLY TO THE NEAREST EMERGENCY ROOM IF YOU HAVE ANY OF THE FOLLOWING:      Difficulty breathing              Chills and/or fever over 101 F   Persistent vomiting and/or vomiting blood   Severe abdominal pain   Severe chest pain   Black, tarry stools   Bleeding- more than one tablespoon   Any other symptom or condition that you feel may need urgent attention  Your doctor recommends these additional instructions:  If any biopsies were taken, your doctors clinic will contact you in 1 to 2   weeks with any results.  - Discharge patient to home.   - Patient has a contact number available for emergencies.  The signs and   symptoms of potential delayed complications were discussed with the   patient.  Return to normal activities tomorrow.  Written discharge   instructions were provided to the patient.   - Resume previous diet.   - Continue present medications.   - Await pathology results.   - No repeat colonoscopy due to age.   - Resume Coumadin (warfarin) today and Lovenox (enoxaparin) today at prior   doses.  For questions, problems or results please call your physician - Nikunj Larson MD at Work:  [phone].  [facility] [place], EMERGENCY ROOM PHONE NUMBER: [phone]  IF A COMPLICATION OR EMERGENCY SITUATION ARISES AND YOU ARE UNABLE TO REACH   YOUR PHYSICIAN - GO DIRECTLY TO THE EMERGENCY ROOM.  Nikunj Larson MD  7/7/2022 11:48:36 AM  This report has been verified and signed electronically.  Dear patient,  As a result of recent federal legislation (The Federal Cures Act), you may   receive lab or pathology results from your procedure in your [facility]   account before your physician is able to contact you. Your physician or   their representative will relay the results to you with their   recommendations at their soonest availability.  Thank you,  PROVATION

## 2022-07-07 NOTE — H&P
Short Stay Endoscopy History and Physical      Procedure - Colonoscopy  ASA - per anesthesia  Mallampati - per anesthesia  History of Anesthesia problems - no  Family history Anesthesia problems - no   Plan of anesthesia - MAC    HPI:  This is a 77 y.o. male here for surveillance of colon polyps.       ROS:  Constitutional: No fevers, chills  CV: No chest pain  Pulm: No cough, No shortness of breath  GI: see HPI    Medical History:  has a past medical history of Benign neoplasm of colon, Cataract, CHF (congestive heart failure) (1/13/2015), Coronary artery disease, Difficult intubation, DVT (deep venous thrombosis), HLD (hyperlipidemia), HTN (hypertension), Impaired fasting glucose, Kidney stone, Metabolic syndrome, Nonspecific abnormal results of liver function study, Nonspecific findings on examination of blood, Nuclear sclerosis - Both Eyes (10/18/2013), Osteoarthrosis, unspecified whether generalized or localized, lower leg, and Respiratory distress.    Surgical History:  has a past surgical history that includes Knee Arthroplasty; renal stone; Cystoscopy; Kidney stone surgery; Cardiac surgery; Skin biopsy; Coronary artery bypass graft; Colonoscopy (N/A, 3/13/2019); Cataract extraction w/  intraocular lens implant (Right, 9/17/2020); and Cataract extraction w/  intraocular lens implant (Left, 10/1/2020).    Family History: family history includes Dementia in his mother; Heart attack in his father, maternal grandfather, and paternal grandfather; Heart disease in his father; Heart failure in his father and paternal grandfather; Hypertension in his paternal grandmother; No Known Problems in his daughter and son; Other in his sister; Stroke in his mother; Urolithiasis in his father.    Social History:  reports that he quit smoking about 34 years ago. His smoking use included cigarettes. He has a 20.00 pack-year smoking history. He has quit using smokeless tobacco. He reports current alcohol use. He reports that  he does not use drugs.    Review of patient's allergies indicates:  No Known Allergies    Medications:   Medications Prior to Admission   Medication Sig Dispense Refill Last Dose    amLODIPine (NORVASC) 2.5 MG tablet Take 1 tablet (2.5 mg total) by mouth once daily. 90 tablet 3 7/6/2022 at Unknown time    aspirin 81 MG Chew Take 81 mg by mouth once daily.   7/7/2022 at Unknown time    cyanocobalamin 500 MCG tablet Take 500 mcg by mouth every morning. Every day   7/6/2022 at Unknown time    enoxaparin (LOVENOX) 150 mg/mL Syrg Inject 150mg subcutaneously every 12 hours as directed by coumadin clinic 14 each 1 7/6/2022 at Unknown time    ezetimibe (ZETIA) 10 mg tablet TAKE 1 TABLET EVERY DAY 90 tablet 3 7/7/2022 at Unknown time    FOLIC ACID/MULTIVITS-MIN/LUT (CENTRUM SILVER ORAL) Take 1 tablet by mouth once daily.   7/7/2022 at Unknown time    irbesartan (AVAPRO) 300 MG tablet Take 1 tablet (300 mg total) by mouth every evening. 90 tablet 3 7/6/2022 at Unknown time    rosuvastatin (CRESTOR) 40 MG Tab TAKE 1 TABLET EVERY DAY 90 tablet 3 7/7/2022 at Unknown time    warfarin (COUMADIN) 10 MG tablet Take 1 tablet (10 mg total) by mouth Daily. As directed by coumadin clinic (Patient taking differently: Take 10 mg by mouth Daily. 10mg 4 days per week, 12.5mg 3 days per week) 100 tablet 3 7/1/2022    warfarin (COUMADIN) 5 MG tablet Take 10mg daily except 12.5mg Wednesdays (Patient taking differently: Take 10mg 4 days per week,  12.5mg 3 days per week, followed by Coumadin clinic) 180 tablet 3 7/1/2022         Physical Exam:    Vital Signs:   Vitals:    07/07/22 1025   BP: (!) 140/68   Pulse: 66   Resp: 18   Temp: 97.8 °F (36.6 °C)       General Appearance: Well appearing in no acute distress  Eyes:    No scleral icterus  Lungs: CTA bilaterally  Heart:  reg rate and rhythm   Abdomen: Soft, non tender, non distended with positive bowel sounds      Labs:  Lab Results   Component Value Date    WBC 5.76 05/13/2022     HGB 13.6 (L) 05/13/2022    HCT 41.5 05/13/2022    MCV 96 05/13/2022     05/13/2022        BMP  Lab Results   Component Value Date     05/13/2022    K 4.8 05/13/2022     05/13/2022    CO2 24 05/13/2022    BUN 17 05/13/2022    CREATININE 0.7 05/13/2022    CALCIUM 9.4 05/13/2022    ANIONGAP 8 05/13/2022    ESTGFRAFRICA >60.0 05/13/2022    EGFRNONAA >60.0 05/13/2022     Lab Results   Component Value Date    INR 2.9 06/29/2022    INR 3.6 06/15/2022    INR 3.3 06/01/2022          Assessment:  77 y.o. male here for surveillance of colon polyps.     Plan:  Proceed with colonoscopy today.  I have explained the risks and benefits of endoscopy procedures to the patient including but not limited to bleeding, perforation, infection, and death.  All questions and answered.        Nikunj Larson MD

## 2022-07-07 NOTE — ANESTHESIA PREPROCEDURE EVALUATION
07/07/2022  Del Anand is a 77 y.o., male.      Pre-op Assessment    I have reviewed the Patient Summary Reports.       I have reviewed the Medications.     Review of Systems  Anesthesia Hx:  Grade I view with video laryngoscopy History of prior surgery of interest to airway management or planning: Personal Hx of Anesthesia complications, Post-Operative Nausea/Vomiting  Difficult Intubation   Social:  Former Smoker    Cardiovascular:   Hypertension CAD  CABG/stent  CHF NL LVEF in 2020  PE Hx noted   Pulmonary:   Sleep Apnea    Renal/:   renal calculi    Hepatic/GI:   Liver Disease,    Musculoskeletal:   Arthritis         Physical Exam  General: Well nourished    Airway:  Mallampati: III / II  Mouth Opening: Normal  TM Distance: Normal  Tongue: Normal  Neck ROM: Normal ROM    Chest/Lungs:  Normal Respiratory Rate    Heart:  Rate: Normal        Anesthesia Plan  Type of Anesthesia, risks & benefits discussed:    Anesthesia Type: Gen Natural Airway  Intra-op Monitoring Plan: Standard ASA Monitors  Post Op Pain Control Plan: multimodal analgesia  Induction:  IV  Informed Consent: Informed consent signed with the Patient and all parties understand the risks and agree with anesthesia plan.  All questions answered.   ASA Score: 3  Day of Surgery Review of History & Physical: H&P Update referred to the surgeon/provider.  Anesthesia Plan Notes: NPO confirmed.   Difficult intubation in past but easy with video laryngoscopy.  PONV responds well to therapy.     Ready For Surgery From Anesthesia Perspective.     .

## 2022-07-07 NOTE — ANESTHESIA POSTPROCEDURE EVALUATION
Anesthesia Post Evaluation    Patient: Del Anand    Procedure(s) Performed: Procedure(s) (LRB):  COLONOSCOPY (N/A)    Final Anesthesia Type: general      Patient location during evaluation: PACU  Patient participation: Yes- Able to Participate  Level of consciousness: awake and alert  Post-procedure vital signs: reviewed and stable  Pain management: adequate  Airway patency: patent    PONV status at discharge: No PONV  Anesthetic complications: no      Cardiovascular status: blood pressure returned to baseline  Respiratory status: unassisted  Hydration status: euvolemic  Follow-up not needed.          Vitals Value Taken Time   /68 07/07/22 1219   Temp 36.7 °C (98 °F) 07/07/22 1145   Pulse 53 07/07/22 1218   Resp 11 07/07/22 1215   SpO2 99 % 07/07/22 1218   Vitals shown include unvalidated device data.      No case tracking events are documented in the log.      Pain/Brigitte Score: Brigitte Score: 10 (7/7/2022 12:00 PM)

## 2022-07-07 NOTE — TRANSFER OF CARE
Anesthesia Transfer of Care Note    Patient: Bill T Kika    Procedure(s) Performed: Procedure(s) (LRB):  COLONOSCOPY (N/A)    Patient location: PACU    Anesthesia Type: general    Transport from OR: Transported from OR on room air with adequate spontaneous ventilation    Post pain: adequate analgesia    Post assessment: no apparent anesthetic complications and tolerated procedure well    Post vital signs: stable    Level of consciousness: awake, alert and oriented    Nausea/Vomiting: no nausea/vomiting    Complications: none    Transfer of care protocol was followed      Last vitals:   Visit Vitals  BP (!) 137/56   Pulse 57   Temp 98.4   Resp 18   SpO2 99%

## 2022-07-11 ENCOUNTER — ANTI-COAG VISIT (OUTPATIENT)
Dept: CARDIOLOGY | Facility: CLINIC | Age: 77
End: 2022-07-11
Payer: MEDICARE

## 2022-07-11 DIAGNOSIS — Z79.01 LONG TERM CURRENT USE OF ANTICOAGULANT THERAPY: Primary | ICD-10-CM

## 2022-07-11 LAB — INR PPP: 1.4

## 2022-07-11 NOTE — PROGRESS NOTES
"INR low due to holding for  cscope 7/7. Per cscope report, " Hemorrhoids found on perianal exam. - Diverticulosis in the sigmoid colon and in the descending colon. - Two 1 to 2 mm polyps in the proximal ascending colon, removed with a jumbo cold forceps. Resected and retrieved."     Pt reports resuming warfarin per calendar as planned. No info about lovenox in pt findings. Bolus dose again and repeat INR in 2 days. Pt also needs to continue/resume lovenox q12h until INR back in range.      Pt missed lovenox 7/11 & advised to resume 7/12 per AM. Pt confirmed resuming lovenox after procedure as planned except 7/11   "

## 2022-07-13 ENCOUNTER — ANTI-COAG VISIT (OUTPATIENT)
Dept: CARDIOLOGY | Facility: CLINIC | Age: 77
End: 2022-07-13
Payer: MEDICARE

## 2022-07-13 DIAGNOSIS — I26.99 PULMONARY EMBOLISM, UNSPECIFIED CHRONICITY, UNSPECIFIED PULMONARY EMBOLISM TYPE, UNSPECIFIED WHETHER ACUTE COR PULMONALE PRESENT: ICD-10-CM

## 2022-07-13 DIAGNOSIS — Z79.01 LONG TERM CURRENT USE OF ANTICOAGULANT THERAPY: Primary | ICD-10-CM

## 2022-07-13 LAB — INR PPP: 1.9

## 2022-07-13 PROCEDURE — 93793 ANTICOAG MGMT PT WARFARIN: CPT | Mod: S$GLB,,, | Performed by: PHARMACIST

## 2022-07-13 PROCEDURE — 93793 PR ANTICOAGULANT MGMT FOR PT TAKING WARFARIN: ICD-10-PCS | Mod: S$GLB,,, | Performed by: PHARMACIST

## 2022-07-13 NOTE — PROGRESS NOTES
Patient denies any changes in diet, medications, or health that would effect warfarin therapy.  INR improved with boosted doses of coumadin given over the last 48 hours. Prior to patient holding for procedure, he was stable on weekly coumadin dose of 10mg daily except 12.5mg Sunday, Wednesday, Friday. Since today is Wednesday he will receive the higher dose of coumadin today. He has one syringe left of lovenox and He will complete his last injection of lovenox today for him pm dose

## 2022-07-14 LAB
FINAL PATHOLOGIC DIAGNOSIS: NORMAL
Lab: NORMAL

## 2022-07-17 DIAGNOSIS — Z79.01 LONG TERM CURRENT USE OF ANTICOAGULANT THERAPY: ICD-10-CM

## 2022-07-17 DIAGNOSIS — I10 ESSENTIAL HYPERTENSION: ICD-10-CM

## 2022-07-19 ENCOUNTER — ANTI-COAG VISIT (OUTPATIENT)
Dept: CARDIOLOGY | Facility: CLINIC | Age: 77
End: 2022-07-19
Payer: MEDICARE

## 2022-07-19 DIAGNOSIS — I26.99 PULMONARY EMBOLISM, UNSPECIFIED CHRONICITY, UNSPECIFIED PULMONARY EMBOLISM TYPE, UNSPECIFIED WHETHER ACUTE COR PULMONALE PRESENT: ICD-10-CM

## 2022-07-19 DIAGNOSIS — Z79.01 LONG TERM CURRENT USE OF ANTICOAGULANT THERAPY: Primary | ICD-10-CM

## 2022-07-19 LAB — INR PPP: 3.2

## 2022-07-19 PROCEDURE — 93793 PR ANTICOAGULANT MGMT FOR PT TAKING WARFARIN: ICD-10-PCS | Mod: S$GLB,,, | Performed by: PHARMACIST

## 2022-07-19 PROCEDURE — 93793 ANTICOAG MGMT PT WARFARIN: CPT | Mod: S$GLB,,, | Performed by: PHARMACIST

## 2022-07-19 RX ORDER — WARFARIN 10 MG/1
10 TABLET ORAL DAILY
Qty: 90 TABLET | Refills: 3 | Status: SHIPPED | OUTPATIENT
Start: 2022-07-19 | End: 2023-03-12 | Stop reason: SDUPTHER

## 2022-07-19 RX ORDER — AMLODIPINE BESYLATE 2.5 MG/1
2.5 TABLET ORAL DAILY
Qty: 90 TABLET | Refills: 3 | Status: SHIPPED | OUTPATIENT
Start: 2022-07-19 | End: 2023-01-08 | Stop reason: SDUPTHER

## 2022-07-19 RX ORDER — WARFARIN 10 MG/1
10 TABLET ORAL DAILY
Qty: 100 TABLET | Refills: 3 | OUTPATIENT
Start: 2022-07-19

## 2022-07-19 NOTE — PROGRESS NOTES
INR at goal. Medications and chart reviewed. No changes noted to necessitate adjustment of warfarin or follow-up plan. See calendar.    Lovenox stopped

## 2022-07-20 ENCOUNTER — PATIENT MESSAGE (OUTPATIENT)
Dept: CARDIOLOGY | Facility: CLINIC | Age: 77
End: 2022-07-20
Payer: MEDICARE

## 2022-07-20 NOTE — PROGRESS NOTES
New prescription for coumadin 10mg tablets was sent in yesterday 7/19/22 to [facility]. Patient reports it was declined. I spoke to PharmD at [facility] and clarified that patient is using 10mg and 5mg tablets and approved both strengths verbally. Per PharmD at [facility], they will fill this script for patient.

## 2022-07-27 ENCOUNTER — ANTI-COAG VISIT (OUTPATIENT)
Dept: CARDIOLOGY | Facility: CLINIC | Age: 77
End: 2022-07-27
Payer: MEDICARE

## 2022-07-27 DIAGNOSIS — Z79.01 LONG TERM CURRENT USE OF ANTICOAGULANT THERAPY: Primary | ICD-10-CM

## 2022-07-27 LAB — INR PPP: 5.2

## 2022-07-27 PROCEDURE — 93793 ANTICOAG MGMT PT WARFARIN: CPT | Mod: S$GLB,,, | Performed by: PHARMACIST

## 2022-07-27 PROCEDURE — 93793 PR ANTICOAGULANT MGMT FOR PT TAKING WARFARIN: ICD-10-PCS | Mod: S$GLB,,, | Performed by: PHARMACIST

## 2022-08-03 ENCOUNTER — ANTI-COAG VISIT (OUTPATIENT)
Dept: CARDIOLOGY | Facility: CLINIC | Age: 77
End: 2022-08-03
Payer: MEDICARE

## 2022-08-03 DIAGNOSIS — Z79.01 LONG TERM CURRENT USE OF ANTICOAGULANT THERAPY: Primary | ICD-10-CM

## 2022-08-03 DIAGNOSIS — I26.99 PULMONARY EMBOLISM, UNSPECIFIED CHRONICITY, UNSPECIFIED PULMONARY EMBOLISM TYPE, UNSPECIFIED WHETHER ACUTE COR PULMONALE PRESENT: ICD-10-CM

## 2022-08-03 LAB — INR PPP: 2.9

## 2022-08-03 PROCEDURE — 93793 PR ANTICOAGULANT MGMT FOR PT TAKING WARFARIN: ICD-10-PCS | Mod: S$GLB,,, | Performed by: PHARMACIST

## 2022-08-03 PROCEDURE — 93793 ANTICOAG MGMT PT WARFARIN: CPT | Mod: S$GLB,,, | Performed by: PHARMACIST

## 2022-08-10 ENCOUNTER — ANTI-COAG VISIT (OUTPATIENT)
Dept: CARDIOLOGY | Facility: CLINIC | Age: 77
End: 2022-08-10
Payer: MEDICARE

## 2022-08-10 DIAGNOSIS — I26.99 PULMONARY EMBOLISM, UNSPECIFIED CHRONICITY, UNSPECIFIED PULMONARY EMBOLISM TYPE, UNSPECIFIED WHETHER ACUTE COR PULMONALE PRESENT: ICD-10-CM

## 2022-08-10 DIAGNOSIS — Z79.01 LONG TERM CURRENT USE OF ANTICOAGULANT THERAPY: Primary | ICD-10-CM

## 2022-08-10 LAB — INR PPP: 2.2

## 2022-08-10 PROCEDURE — 93793 PR ANTICOAGULANT MGMT FOR PT TAKING WARFARIN: ICD-10-PCS | Mod: S$GLB,,, | Performed by: PHARMACIST

## 2022-08-10 PROCEDURE — 93793 ANTICOAG MGMT PT WARFARIN: CPT | Mod: S$GLB,,, | Performed by: PHARMACIST

## 2022-08-10 NOTE — PROGRESS NOTES
Patient denies any changes in diet, medications, or health that would effect warfarin therapy.  We will return to his previous dose which includes the 12.5mg dose of warfarin on Wednesdays

## 2022-08-17 ENCOUNTER — ANTI-COAG VISIT (OUTPATIENT)
Dept: CARDIOLOGY | Facility: CLINIC | Age: 77
End: 2022-08-17
Payer: MEDICARE

## 2022-08-17 DIAGNOSIS — Z79.01 LONG TERM CURRENT USE OF ANTICOAGULANT THERAPY: Primary | ICD-10-CM

## 2022-08-17 LAB — INR PPP: 3.1

## 2022-08-17 PROCEDURE — 93793 ANTICOAG MGMT PT WARFARIN: CPT | Mod: S$GLB,,, | Performed by: PHARMACIST

## 2022-08-17 PROCEDURE — 93793 PR ANTICOAGULANT MGMT FOR PT TAKING WARFARIN: ICD-10-PCS | Mod: S$GLB,,, | Performed by: PHARMACIST

## 2022-08-24 ENCOUNTER — PATIENT MESSAGE (OUTPATIENT)
Dept: INTERNAL MEDICINE | Facility: CLINIC | Age: 77
End: 2022-08-24
Payer: MEDICARE

## 2022-08-24 ENCOUNTER — ANTI-COAG VISIT (OUTPATIENT)
Dept: CARDIOLOGY | Facility: CLINIC | Age: 77
End: 2022-08-24
Payer: MEDICARE

## 2022-08-24 DIAGNOSIS — Z79.01 LONG TERM CURRENT USE OF ANTICOAGULANT THERAPY: Primary | ICD-10-CM

## 2022-08-24 DIAGNOSIS — I26.99 PULMONARY EMBOLISM, UNSPECIFIED CHRONICITY, UNSPECIFIED PULMONARY EMBOLISM TYPE, UNSPECIFIED WHETHER ACUTE COR PULMONALE PRESENT: ICD-10-CM

## 2022-08-24 LAB — INR PPP: 2.6

## 2022-08-24 PROCEDURE — 93793 PR ANTICOAGULANT MGMT FOR PT TAKING WARFARIN: ICD-10-PCS | Mod: S$GLB,,, | Performed by: PHARMACIST

## 2022-08-24 PROCEDURE — 93793 ANTICOAG MGMT PT WARFARIN: CPT | Mod: S$GLB,,, | Performed by: PHARMACIST

## 2022-08-30 ENCOUNTER — PATIENT MESSAGE (OUTPATIENT)
Dept: INTERNAL MEDICINE | Facility: CLINIC | Age: 77
End: 2022-08-30
Payer: MEDICARE

## 2022-08-30 NOTE — TELEPHONE ENCOUNTER
Often pre-diabetes are treated w/ meds to prevent progression to full blown diabetes  This is often managed w/ the diabetes nurse practitioner at this office as some of the potential meds are also effective w/ weight loss    The other option is Bariatric Med Consult

## 2022-09-07 ENCOUNTER — ANTI-COAG VISIT (OUTPATIENT)
Dept: CARDIOLOGY | Facility: CLINIC | Age: 77
End: 2022-09-07
Payer: MEDICARE

## 2022-09-07 DIAGNOSIS — I26.99 PULMONARY EMBOLISM, UNSPECIFIED CHRONICITY, UNSPECIFIED PULMONARY EMBOLISM TYPE, UNSPECIFIED WHETHER ACUTE COR PULMONALE PRESENT: ICD-10-CM

## 2022-09-07 DIAGNOSIS — Z79.01 LONG TERM CURRENT USE OF ANTICOAGULANT THERAPY: Primary | ICD-10-CM

## 2022-09-07 LAB — INR PPP: 2.8

## 2022-09-07 PROCEDURE — 93793 PR ANTICOAGULANT MGMT FOR PT TAKING WARFARIN: ICD-10-PCS | Mod: S$GLB,,, | Performed by: PHARMACIST

## 2022-09-07 PROCEDURE — 93793 ANTICOAG MGMT PT WARFARIN: CPT | Mod: S$GLB,,, | Performed by: PHARMACIST

## 2022-09-12 ENCOUNTER — PATIENT MESSAGE (OUTPATIENT)
Dept: INTERNAL MEDICINE | Facility: CLINIC | Age: 77
End: 2022-09-12
Payer: MEDICARE

## 2022-09-14 ENCOUNTER — PATIENT MESSAGE (OUTPATIENT)
Dept: OTHER | Facility: OTHER | Age: 77
End: 2022-09-14
Payer: MEDICARE

## 2022-09-21 ENCOUNTER — ANTI-COAG VISIT (OUTPATIENT)
Dept: CARDIOLOGY | Facility: CLINIC | Age: 77
End: 2022-09-21
Payer: MEDICARE

## 2022-09-21 DIAGNOSIS — Z79.01 LONG TERM CURRENT USE OF ANTICOAGULANT THERAPY: Primary | ICD-10-CM

## 2022-09-21 DIAGNOSIS — I26.99 PULMONARY EMBOLISM, UNSPECIFIED CHRONICITY, UNSPECIFIED PULMONARY EMBOLISM TYPE, UNSPECIFIED WHETHER ACUTE COR PULMONALE PRESENT: ICD-10-CM

## 2022-09-21 LAB — INR PPP: 3

## 2022-09-21 PROCEDURE — 93793 ANTICOAG MGMT PT WARFARIN: CPT | Mod: S$GLB,,, | Performed by: PHARMACIST

## 2022-09-21 PROCEDURE — 93793 PR ANTICOAGULANT MGMT FOR PT TAKING WARFARIN: ICD-10-PCS | Mod: S$GLB,,, | Performed by: PHARMACIST

## 2022-09-21 NOTE — PROGRESS NOTES
INR at goal. Medications and chart reviewed. No changes noted to necessitate adjustment of warfarin or follow-up plan. See calendar.      
Home

## 2022-09-30 DIAGNOSIS — N40.1 BPH ASSOCIATED WITH NOCTURIA: ICD-10-CM

## 2022-09-30 DIAGNOSIS — R35.1 BPH ASSOCIATED WITH NOCTURIA: ICD-10-CM

## 2022-09-30 DIAGNOSIS — R73.03 PREDIABETES: Primary | ICD-10-CM

## 2022-09-30 DIAGNOSIS — I10 ESSENTIAL HYPERTENSION: ICD-10-CM

## 2022-09-30 DIAGNOSIS — E78.2 MIXED HYPERLIPIDEMIA: ICD-10-CM

## 2022-10-04 ENCOUNTER — OFFICE VISIT (OUTPATIENT)
Dept: SLEEP MEDICINE | Facility: CLINIC | Age: 77
End: 2022-10-04
Payer: MEDICARE

## 2022-10-04 VITALS
BODY MASS INDEX: 39.32 KG/M2 | WEIGHT: 314.63 LBS | SYSTOLIC BLOOD PRESSURE: 157 MMHG | HEART RATE: 67 BPM | DIASTOLIC BLOOD PRESSURE: 74 MMHG

## 2022-10-04 DIAGNOSIS — G47.33 OSA (OBSTRUCTIVE SLEEP APNEA): Primary | ICD-10-CM

## 2022-10-04 PROCEDURE — 3077F PR MOST RECENT SYSTOLIC BLOOD PRESSURE >= 140 MM HG: ICD-10-PCS | Mod: CPTII,S$GLB,, | Performed by: PSYCHIATRY & NEUROLOGY

## 2022-10-04 PROCEDURE — 4010F ACE/ARB THERAPY RXD/TAKEN: CPT | Mod: CPTII,S$GLB,, | Performed by: PSYCHIATRY & NEUROLOGY

## 2022-10-04 PROCEDURE — 1101F PT FALLS ASSESS-DOCD LE1/YR: CPT | Mod: CPTII,S$GLB,, | Performed by: PSYCHIATRY & NEUROLOGY

## 2022-10-04 PROCEDURE — 3066F NEPHROPATHY DOC TX: CPT | Mod: CPTII,S$GLB,, | Performed by: PSYCHIATRY & NEUROLOGY

## 2022-10-04 PROCEDURE — 99999 PR PBB SHADOW E&M-EST. PATIENT-LVL III: CPT | Mod: PBBFAC,,, | Performed by: PSYCHIATRY & NEUROLOGY

## 2022-10-04 PROCEDURE — 1157F ADVNC CARE PLAN IN RCRD: CPT | Mod: CPTII,S$GLB,, | Performed by: PSYCHIATRY & NEUROLOGY

## 2022-10-04 PROCEDURE — 99499 UNLISTED E&M SERVICE: CPT | Mod: S$GLB,,, | Performed by: PSYCHIATRY & NEUROLOGY

## 2022-10-04 PROCEDURE — 3078F PR MOST RECENT DIASTOLIC BLOOD PRESSURE < 80 MM HG: ICD-10-PCS | Mod: CPTII,S$GLB,, | Performed by: PSYCHIATRY & NEUROLOGY

## 2022-10-04 PROCEDURE — 3044F HG A1C LEVEL LT 7.0%: CPT | Mod: CPTII,S$GLB,, | Performed by: PSYCHIATRY & NEUROLOGY

## 2022-10-04 PROCEDURE — 99999 PR PBB SHADOW E&M-EST. PATIENT-LVL III: ICD-10-PCS | Mod: PBBFAC,,, | Performed by: PSYCHIATRY & NEUROLOGY

## 2022-10-04 PROCEDURE — 1101F PR PT FALLS ASSESS DOC 0-1 FALLS W/OUT INJ PAST YR: ICD-10-PCS | Mod: CPTII,S$GLB,, | Performed by: PSYCHIATRY & NEUROLOGY

## 2022-10-04 PROCEDURE — 3061F PR NEG MICROALBUMINURIA RESULT DOCUMENTED/REVIEW: ICD-10-PCS | Mod: CPTII,S$GLB,, | Performed by: PSYCHIATRY & NEUROLOGY

## 2022-10-04 PROCEDURE — 3044F PR MOST RECENT HEMOGLOBIN A1C LEVEL <7.0%: ICD-10-PCS | Mod: CPTII,S$GLB,, | Performed by: PSYCHIATRY & NEUROLOGY

## 2022-10-04 PROCEDURE — 1126F AMNT PAIN NOTED NONE PRSNT: CPT | Mod: CPTII,S$GLB,, | Performed by: PSYCHIATRY & NEUROLOGY

## 2022-10-04 PROCEDURE — 3077F SYST BP >= 140 MM HG: CPT | Mod: CPTII,S$GLB,, | Performed by: PSYCHIATRY & NEUROLOGY

## 2022-10-04 PROCEDURE — 1159F PR MEDICATION LIST DOCUMENTED IN MEDICAL RECORD: ICD-10-PCS | Mod: CPTII,S$GLB,, | Performed by: PSYCHIATRY & NEUROLOGY

## 2022-10-04 PROCEDURE — 3288F FALL RISK ASSESSMENT DOCD: CPT | Mod: CPTII,S$GLB,, | Performed by: PSYCHIATRY & NEUROLOGY

## 2022-10-04 PROCEDURE — 99214 PR OFFICE/OUTPT VISIT, EST, LEVL IV, 30-39 MIN: ICD-10-PCS | Mod: S$GLB,,, | Performed by: PSYCHIATRY & NEUROLOGY

## 2022-10-04 PROCEDURE — 3061F NEG MICROALBUMINURIA REV: CPT | Mod: CPTII,S$GLB,, | Performed by: PSYCHIATRY & NEUROLOGY

## 2022-10-04 PROCEDURE — 3066F PR DOCUMENTATION OF TREATMENT FOR NEPHROPATHY: ICD-10-PCS | Mod: CPTII,S$GLB,, | Performed by: PSYCHIATRY & NEUROLOGY

## 2022-10-04 PROCEDURE — 1126F PR PAIN SEVERITY QUANTIFIED, NO PAIN PRESENT: ICD-10-PCS | Mod: CPTII,S$GLB,, | Performed by: PSYCHIATRY & NEUROLOGY

## 2022-10-04 PROCEDURE — 99214 OFFICE O/P EST MOD 30 MIN: CPT | Mod: S$GLB,,, | Performed by: PSYCHIATRY & NEUROLOGY

## 2022-10-04 PROCEDURE — 4010F PR ACE/ARB THEARPY RXD/TAKEN: ICD-10-PCS | Mod: CPTII,S$GLB,, | Performed by: PSYCHIATRY & NEUROLOGY

## 2022-10-04 PROCEDURE — 1159F MED LIST DOCD IN RCRD: CPT | Mod: CPTII,S$GLB,, | Performed by: PSYCHIATRY & NEUROLOGY

## 2022-10-04 PROCEDURE — 3078F DIAST BP <80 MM HG: CPT | Mod: CPTII,S$GLB,, | Performed by: PSYCHIATRY & NEUROLOGY

## 2022-10-04 PROCEDURE — 1157F PR ADVANCE CARE PLAN OR EQUIV PRESENT IN MEDICAL RECORD: ICD-10-PCS | Mod: CPTII,S$GLB,, | Performed by: PSYCHIATRY & NEUROLOGY

## 2022-10-04 PROCEDURE — 3288F PR FALLS RISK ASSESSMENT DOCUMENTED: ICD-10-PCS | Mod: CPTII,S$GLB,, | Performed by: PSYCHIATRY & NEUROLOGY

## 2022-10-04 NOTE — PROGRESS NOTES
EPWORTH SLEEPINESS SCALE TOTAL SCORE  9/27/2022 9/14/2021 8/17/2020 5/5/2020 4/16/2019 5/6/2015   Total score 6 4 8 7 7 6       Del Anand is a 77 y.o. male seen today for CPAP  follow up. Last seen on 9/20/2021.    Got Dreamstation 2 several year ago. APAP 16-20    The patient reports improved sleep continuity and daytime sleepiness on PAP. ESS today is 8/24.  Reports occasional break through snoring.  No  dry mouth.  No aerophagia or air hunger. Reports occasional occasional mask leaks and discomfort. Using a FFm Simplus mask       DME: [facility] got machine in Dreamstation 2 With Simplus Mediu msk  9:22  hrs : mins  Avg Usage (Days Used)  37.1  l/min  Avg total leak  4.4  l/min  Avg unintentional leak  34.0  l/min  Median total leak  2.0  l/min  Median unintentional leak  0.5  Avg AHI  0.0  Avg CA Index  0.2  Avg OA index  17.0  cmH?O  Avg 90% CPAP  Patterns of use    Sleep studies:       The patient reports improved sleep continuity and daytime sleepiness on PAP. ESS today is 4/24.  REPORTS break through snoring.  No dry mouth.   No aerophagia or air hunger. No significant mask leaks and discomfort.  APAP 15-20     APAP 15 to 20; 90% was 16 cm H2O  With Simplus Medium.    He     PREVIOUS SLEEP STUDIES:       SPLIT NIGHT STUDY on 3/23/15: Significant VERONICA (Obstructive Sleep Apnea) with AHI of 33.1 hour and SaO2 hyun of 61% - Chema 2 below 90% 12% of TST (weight 284 lbs). Effective control of respiratory events was reached at 12 cm H2O CPAP.  CPAP titration on 7/30/15: Effective control of respiratory events was achieved at 15 cm of H2O. Weight 294 lbs.      DME: n/a  REVIEW OF SYSTEMS:   Sleep related symptoms as per HPI    denies weight gain  Denies dyspnea  Denies palpitations  Denies acid reflux   Denies polyuria  Reports occasional  mood diturbance  Denies  anemia  Denies  muscle pain  Denies  Gait imbalance    Otherwise, a balance of 10 systems reviewed is negative.    PHYSICAL EXAM:  BP (!) 157/74 (BP  "Location: Right arm, Patient Position: Sitting, BP Method: Large (Automatic))   Pulse 67   Wt (!) 142.7 kg (314 lb 9.6 oz)   BMI 39.32 kg/m²   GENERAL: Overweight body habitus, well groomed.        Using [facility]: Yes      ASSESSMENT:    1. Severe VERONICA with significant hypoxemia. The patient symptomatically has  snoring, excessive daytime fatigue, gasping for air in sleep and interrupted sleep  with exam findings of "a crowded oral airway and elevated body mass index. The patient has medical co-morbidities of CAD, h/o PE and CHF,hyperlipidemia and hypertension,  which can be worsened by VERONICA. Benefiting from CPAP use in terms of sleep continuity and daytime sleepiness. Compliant.Likes his new APAP machine.  Recall on Respironics machines was discussed. Given severe excessive daytime sleepiness affecting driving prior to CPAP use, he would prefer using current machine with a bacterial filter.  I have also mentioned the option of purchasing a travel APAP to use for now, until Respironics resolves the polyurethane foam problem.          PLAN:  Will increase 16-20 to 17-20 due to break through snoring    Will reorder supplied    Periodic supplies replacement was recommended     Education: During our discussion today, we talked about the etiology of obstructive sleep apnea as well as the potential ramifications of untreated sleep apnea, which could include daytime sleepiness, hypertension, heart disease and/or stroke.  We discussed potential treatment options, which could include weight loss, body positioning, continuous positive airway pressure (CPAP), or referral for surgical consideration. The patient preferred CPAP option.    She should avoid ETOH and sedatives at night, as it tends to aggravate VERONICA. Regular replacement of CPAP mask, tubing and filter was recommended.    Precautions: The patient was advised to abstain from driving should he feel sleepy or drowsy.    Follow up: MD-12 months.     Thank you for " allowing me the opportunity to participate in the care of your patient.

## 2022-10-05 ENCOUNTER — ANTI-COAG VISIT (OUTPATIENT)
Dept: CARDIOLOGY | Facility: CLINIC | Age: 77
End: 2022-10-05
Payer: MEDICARE

## 2022-10-05 DIAGNOSIS — Z79.01 LONG TERM CURRENT USE OF ANTICOAGULANT THERAPY: Primary | ICD-10-CM

## 2022-10-05 DIAGNOSIS — I26.99 PULMONARY EMBOLISM, UNSPECIFIED CHRONICITY, UNSPECIFIED PULMONARY EMBOLISM TYPE, UNSPECIFIED WHETHER ACUTE COR PULMONALE PRESENT: ICD-10-CM

## 2022-10-05 LAB — INR PPP: 3.2

## 2022-10-05 PROCEDURE — 93793 ANTICOAG MGMT PT WARFARIN: CPT | Mod: S$GLB,,,

## 2022-10-05 PROCEDURE — 93793 PR ANTICOAGULANT MGMT FOR PT TAKING WARFARIN: ICD-10-PCS | Mod: S$GLB,,,

## 2022-10-19 ENCOUNTER — ANTI-COAG VISIT (OUTPATIENT)
Dept: CARDIOLOGY | Facility: CLINIC | Age: 77
End: 2022-10-19
Payer: MEDICARE

## 2022-10-19 DIAGNOSIS — I26.99 PULMONARY EMBOLISM, UNSPECIFIED CHRONICITY, UNSPECIFIED PULMONARY EMBOLISM TYPE, UNSPECIFIED WHETHER ACUTE COR PULMONALE PRESENT: ICD-10-CM

## 2022-10-19 DIAGNOSIS — Z79.01 LONG TERM CURRENT USE OF ANTICOAGULANT THERAPY: Primary | ICD-10-CM

## 2022-10-19 LAB — INR PPP: 2.5

## 2022-10-19 PROCEDURE — 93793 ANTICOAG MGMT PT WARFARIN: CPT | Mod: S$GLB,,, | Performed by: PHARMACIST

## 2022-10-19 PROCEDURE — 93793 PR ANTICOAGULANT MGMT FOR PT TAKING WARFARIN: ICD-10-PCS | Mod: S$GLB,,, | Performed by: PHARMACIST

## 2022-10-25 ENCOUNTER — PATIENT MESSAGE (OUTPATIENT)
Dept: INTERNAL MEDICINE | Facility: CLINIC | Age: 77
End: 2022-10-25
Payer: MEDICARE

## 2022-10-25 ENCOUNTER — PATIENT MESSAGE (OUTPATIENT)
Dept: CARDIOLOGY | Facility: CLINIC | Age: 77
End: 2022-10-25
Payer: MEDICARE

## 2022-10-25 NOTE — TELEPHONE ENCOUNTER
Tumeric interacts w/ blood thinners making bleeding more likely and I would not recommend that you take it as you take warfarin and aspirin    Your Cardiologist may have more experience w/ the med combinations and if there are treatment modifications that are acceptable

## 2022-10-27 ENCOUNTER — PATIENT MESSAGE (OUTPATIENT)
Dept: SLEEP MEDICINE | Facility: CLINIC | Age: 77
End: 2022-10-27
Payer: MEDICARE

## 2022-11-02 ENCOUNTER — ANTI-COAG VISIT (OUTPATIENT)
Dept: CARDIOLOGY | Facility: CLINIC | Age: 77
End: 2022-11-02
Payer: MEDICARE

## 2022-11-02 DIAGNOSIS — Z79.01 LONG TERM CURRENT USE OF ANTICOAGULANT THERAPY: Primary | ICD-10-CM

## 2022-11-02 DIAGNOSIS — I26.99 PULMONARY EMBOLISM, UNSPECIFIED CHRONICITY, UNSPECIFIED PULMONARY EMBOLISM TYPE, UNSPECIFIED WHETHER ACUTE COR PULMONALE PRESENT: ICD-10-CM

## 2022-11-02 LAB — INR PPP: 3

## 2022-11-02 PROCEDURE — 93793 ANTICOAG MGMT PT WARFARIN: CPT | Mod: S$GLB,,, | Performed by: PHARMACIST

## 2022-11-02 PROCEDURE — 93793 PR ANTICOAGULANT MGMT FOR PT TAKING WARFARIN: ICD-10-PCS | Mod: S$GLB,,, | Performed by: PHARMACIST

## 2022-11-16 ENCOUNTER — ANTI-COAG VISIT (OUTPATIENT)
Dept: CARDIOLOGY | Facility: CLINIC | Age: 77
End: 2022-11-16
Payer: MEDICARE

## 2022-11-16 DIAGNOSIS — Z79.01 LONG TERM CURRENT USE OF ANTICOAGULANT THERAPY: Primary | ICD-10-CM

## 2022-11-16 DIAGNOSIS — I26.99 PULMONARY EMBOLISM, UNSPECIFIED CHRONICITY, UNSPECIFIED PULMONARY EMBOLISM TYPE, UNSPECIFIED WHETHER ACUTE COR PULMONALE PRESENT: ICD-10-CM

## 2022-11-16 LAB — INR PPP: 3.4

## 2022-11-16 PROCEDURE — 93793 PR ANTICOAGULANT MGMT FOR PT TAKING WARFARIN: ICD-10-PCS | Mod: S$GLB,,, | Performed by: PHARMACIST

## 2022-11-16 PROCEDURE — 93793 ANTICOAG MGMT PT WARFARIN: CPT | Mod: S$GLB,,, | Performed by: PHARMACIST

## 2022-11-18 ENCOUNTER — TELEPHONE (OUTPATIENT)
Dept: BARIATRICS | Facility: CLINIC | Age: 77
End: 2022-11-18
Payer: MEDICARE

## 2022-11-21 ENCOUNTER — TELEPHONE (OUTPATIENT)
Dept: BARIATRICS | Facility: CLINIC | Age: 77
End: 2022-11-21
Payer: MEDICARE

## 2022-11-23 ENCOUNTER — PATIENT MESSAGE (OUTPATIENT)
Dept: SLEEP MEDICINE | Facility: CLINIC | Age: 77
End: 2022-11-23
Payer: MEDICARE

## 2022-11-25 NOTE — PROGRESS NOTES
77 year-old gentleman presents  for Annual PE   Ex- smoker having quit over 20 years ago,  Social alcohol consumer. ( Spouse +)    SCREENING TESTS:   Cholesterol/lab, reviewed today   US carotids 40-49%, Cards following  Colonoscopy   7/2022,  2 polyps, tubular adenoma  08/2019,     . 5 polyps,  rec 3 yrs  PSA 0.27  Eye exam and dental work are up-to-date.    VACCINATIONS:  Zostavax, ~ 5 yrs ago  TDAP, 12/2013  Pneumovax, 10/2012  Prevnar, 2015  Flu vaccine., yearly  Covid: 2/2, + - rec bivalent booster    MedCard: Reviewed.     REVIEW OF SYSTEMS:     No fever, chill, or night sweats  No dysphagia or early satiety  No change in bowel or bladder function.  Not having increased thirst or urination.  No HA or focal deficits  No increased thirst or urination  No cold or heat intolerance  No unusual bruising or bleeding  ++ hand pain, stiffness especially in AM  ADL's: 100% independent  Memory: Good----delayed recall, Mom d. dementia  Mental Health: Good   AD's: + will, living will, and POA  Nutrition: Good  Gait: No falls  Safety: Intact  Urinary incontinence: n/a, nocturia- occ; ++ urinary hesitency  Remainder of review negative except as previously noted.     PAST MEDICAL HISTORY:   Dyslipidemia.  Hypertension  Elevated LFTs,   Impaired fasting glucose/prediabetes  Metabolic syndrome  Fatty liver  Anemia with workup by hem/onc  unremarkable.   DJD, knees and hips  Renal stones,   Sinus and rhinitis.  Colon  polyps   DVT  Pulmonary embolism    PHYSICAL EXAM:   VSS:   GENERAL: Alert and oriented, in no acute distress. Well-developed,   well-nourished, obese gentleman, conversant, and cooperative. Pleasant as always  EYES: Conjunctivae and lids unremarkable. Sclerae anicteric.   Pupils reactive.   NECK: Supple. No thyromegaly or lymphadenopathy.   RESPIRATORY: Efforts unlabored.   LUNGS: Clear to auscultation.   HEART: Regular rate and rhythm. No carotid bruits,   1+ pedal pulse. No edema.   ABDOMEN: Bowel sounds  present, soft, nontender.   No hepatosplenomegaly appreciated.   MUSCULOSKELETAL: Gait normal.   Hands w/ DJD signs, tenderness over the 2nd and 3rd metacarpals, + trigger finger  No clubbing, cyanosis, or edema.  NEURO: BECK. No tremor noted.  SKIN: Warm and dry      IMPRESSION:     Annual PE.     Family history of cardiovascular disease.     Ischemic cardiomyopathy, asx  . Hypertension, stable, on amlodipine 2.5mg and irbesartan 300mg, continue qd     HLD, stable on rosuvastatin 40mg continue qd  And Zetai 10mg qd    Carotid ds, left 40-49% 0-19% right- 2016       CAD, s/p bypass    Aortic atherosclerosis    Impaired fasting blood sugar/prediabetes, stable.     Dysmetabolic syndrome/obesity.     Fatty liver     -DVT, right, on chronic anticoagulation     -Pulmonary Embolism, on chronic anticoagulation, on warfarin     Anemia, stable- MVI and dark , green leafy veggies as allowed w/ warfarin use    VERONICA, on CPAP       BPH w/ nocturia    Hand OA    US LLE w/ probable Baker's cyst, asx    PLAN:  HAnd xrays  Voltaren gel  Tylenol arthritis  OT an option   Wax bath an option  Continue diet and weight loss  Continue present meds  Consider metformin  Diet, reviewed avoid sugar and simple carbs to help w/ triglycerides  Exercise, continue gym and golfing as tolerated  Rec bivalent Covid booster  Call prn  RTC 6 mos w/ lab

## 2022-11-29 ENCOUNTER — OFFICE VISIT (OUTPATIENT)
Dept: INTERNAL MEDICINE | Facility: CLINIC | Age: 77
End: 2022-11-29
Payer: MEDICARE

## 2022-11-29 ENCOUNTER — HOSPITAL ENCOUNTER (OUTPATIENT)
Dept: RADIOLOGY | Facility: HOSPITAL | Age: 77
Discharge: HOME | End: 2022-11-29
Attending: INTERNAL MEDICINE
Payer: MEDICARE

## 2022-11-29 VITALS
SYSTOLIC BLOOD PRESSURE: 130 MMHG | DIASTOLIC BLOOD PRESSURE: 70 MMHG | RESPIRATION RATE: 20 BRPM | HEART RATE: 72 BPM | TEMPERATURE: 97 F | WEIGHT: 311.94 LBS | OXYGEN SATURATION: 97 % | HEIGHT: 75 IN | BODY MASS INDEX: 38.78 KG/M2

## 2022-11-29 DIAGNOSIS — I25.5 ISCHEMIC CARDIOMYOPATHY: ICD-10-CM

## 2022-11-29 DIAGNOSIS — Z00.00 ANNUAL PHYSICAL EXAM: Primary | ICD-10-CM

## 2022-11-29 DIAGNOSIS — I65.22 STENOSIS OF LEFT CAROTID ARTERY: ICD-10-CM

## 2022-11-29 DIAGNOSIS — K76.0 FATTY LIVER: ICD-10-CM

## 2022-11-29 DIAGNOSIS — M79.642 BILATERAL HAND PAIN: ICD-10-CM

## 2022-11-29 DIAGNOSIS — E78.2 MIXED HYPERLIPIDEMIA: ICD-10-CM

## 2022-11-29 DIAGNOSIS — I70.0 AORTIC ATHEROSCLEROSIS: ICD-10-CM

## 2022-11-29 DIAGNOSIS — G47.33 OSA ON CPAP: ICD-10-CM

## 2022-11-29 DIAGNOSIS — R73.01 IMPAIRED FASTING GLUCOSE: ICD-10-CM

## 2022-11-29 DIAGNOSIS — E66.01 SEVERE OBESITY (BMI 35.0-39.9) WITH COMORBIDITY: ICD-10-CM

## 2022-11-29 DIAGNOSIS — D64.9 ANEMIA, UNSPECIFIED TYPE: ICD-10-CM

## 2022-11-29 DIAGNOSIS — R73.03 PREDIABETES: ICD-10-CM

## 2022-11-29 DIAGNOSIS — M79.641 BILATERAL HAND PAIN: ICD-10-CM

## 2022-11-29 DIAGNOSIS — I25.10 CORONARY ARTERY DISEASE INVOLVING NATIVE CORONARY ARTERY OF NATIVE HEART WITHOUT ANGINA PECTORIS: ICD-10-CM

## 2022-11-29 DIAGNOSIS — I10 ESSENTIAL HYPERTENSION: ICD-10-CM

## 2022-11-29 PROCEDURE — 3061F NEG MICROALBUMINURIA REV: CPT | Mod: CPTII,S$GLB,, | Performed by: INTERNAL MEDICINE

## 2022-11-29 PROCEDURE — 1101F PT FALLS ASSESS-DOCD LE1/YR: CPT | Mod: CPTII,S$GLB,, | Performed by: INTERNAL MEDICINE

## 2022-11-29 PROCEDURE — 3061F PR NEG MICROALBUMINURIA RESULT DOCUMENTED/REVIEW: ICD-10-PCS | Mod: CPTII,S$GLB,, | Performed by: INTERNAL MEDICINE

## 2022-11-29 PROCEDURE — 73130 XR HAND COMPLETE 3 VIEWS BILATERAL: ICD-10-PCS | Mod: 26,50,, | Performed by: RADIOLOGY

## 2022-11-29 PROCEDURE — 73130 X-RAY EXAM OF HAND: CPT | Mod: 26,50,, | Performed by: RADIOLOGY

## 2022-11-29 PROCEDURE — 3078F PR MOST RECENT DIASTOLIC BLOOD PRESSURE < 80 MM HG: ICD-10-PCS | Mod: CPTII,S$GLB,, | Performed by: INTERNAL MEDICINE

## 2022-11-29 PROCEDURE — 99999 PR PBB SHADOW E&M-EST. PATIENT-LVL IV: CPT | Mod: PBBFAC,,, | Performed by: INTERNAL MEDICINE

## 2022-11-29 PROCEDURE — 3288F PR FALLS RISK ASSESSMENT DOCUMENTED: ICD-10-PCS | Mod: CPTII,S$GLB,, | Performed by: INTERNAL MEDICINE

## 2022-11-29 PROCEDURE — 1157F PR ADVANCE CARE PLAN OR EQUIV PRESENT IN MEDICAL RECORD: ICD-10-PCS | Mod: CPTII,S$GLB,, | Performed by: INTERNAL MEDICINE

## 2022-11-29 PROCEDURE — 1159F MED LIST DOCD IN RCRD: CPT | Mod: CPTII,S$GLB,, | Performed by: INTERNAL MEDICINE

## 2022-11-29 PROCEDURE — 4010F PR ACE/ARB THEARPY RXD/TAKEN: ICD-10-PCS | Mod: CPTII,S$GLB,, | Performed by: INTERNAL MEDICINE

## 2022-11-29 PROCEDURE — 1157F ADVNC CARE PLAN IN RCRD: CPT | Mod: CPTII,S$GLB,, | Performed by: INTERNAL MEDICINE

## 2022-11-29 PROCEDURE — 73130 X-RAY EXAM OF HAND: CPT | Mod: TC,50,PO

## 2022-11-29 PROCEDURE — 99397 PER PM REEVAL EST PAT 65+ YR: CPT | Mod: S$GLB,,, | Performed by: INTERNAL MEDICINE

## 2022-11-29 PROCEDURE — 1126F PR PAIN SEVERITY QUANTIFIED, NO PAIN PRESENT: ICD-10-PCS | Mod: CPTII,S$GLB,, | Performed by: INTERNAL MEDICINE

## 2022-11-29 PROCEDURE — 3078F DIAST BP <80 MM HG: CPT | Mod: CPTII,S$GLB,, | Performed by: INTERNAL MEDICINE

## 2022-11-29 PROCEDURE — 3044F PR MOST RECENT HEMOGLOBIN A1C LEVEL <7.0%: ICD-10-PCS | Mod: CPTII,S$GLB,, | Performed by: INTERNAL MEDICINE

## 2022-11-29 PROCEDURE — 99397 PR PREVENTIVE VISIT,EST,65 & OVER: ICD-10-PCS | Mod: S$GLB,,, | Performed by: INTERNAL MEDICINE

## 2022-11-29 PROCEDURE — 4010F ACE/ARB THERAPY RXD/TAKEN: CPT | Mod: CPTII,S$GLB,, | Performed by: INTERNAL MEDICINE

## 2022-11-29 PROCEDURE — 3075F PR MOST RECENT SYSTOLIC BLOOD PRESS GE 130-139MM HG: ICD-10-PCS | Mod: CPTII,S$GLB,, | Performed by: INTERNAL MEDICINE

## 2022-11-29 PROCEDURE — 3066F NEPHROPATHY DOC TX: CPT | Mod: CPTII,S$GLB,, | Performed by: INTERNAL MEDICINE

## 2022-11-29 PROCEDURE — 1159F PR MEDICATION LIST DOCUMENTED IN MEDICAL RECORD: ICD-10-PCS | Mod: CPTII,S$GLB,, | Performed by: INTERNAL MEDICINE

## 2022-11-29 PROCEDURE — 1126F AMNT PAIN NOTED NONE PRSNT: CPT | Mod: CPTII,S$GLB,, | Performed by: INTERNAL MEDICINE

## 2022-11-29 PROCEDURE — 3066F PR DOCUMENTATION OF TREATMENT FOR NEPHROPATHY: ICD-10-PCS | Mod: CPTII,S$GLB,, | Performed by: INTERNAL MEDICINE

## 2022-11-29 PROCEDURE — 3075F SYST BP GE 130 - 139MM HG: CPT | Mod: CPTII,S$GLB,, | Performed by: INTERNAL MEDICINE

## 2022-11-29 PROCEDURE — 3288F FALL RISK ASSESSMENT DOCD: CPT | Mod: CPTII,S$GLB,, | Performed by: INTERNAL MEDICINE

## 2022-11-29 PROCEDURE — 99999 PR PBB SHADOW E&M-EST. PATIENT-LVL IV: ICD-10-PCS | Mod: PBBFAC,,, | Performed by: INTERNAL MEDICINE

## 2022-11-29 PROCEDURE — 1101F PR PT FALLS ASSESS DOC 0-1 FALLS W/OUT INJ PAST YR: ICD-10-PCS | Mod: CPTII,S$GLB,, | Performed by: INTERNAL MEDICINE

## 2022-11-29 PROCEDURE — 3044F HG A1C LEVEL LT 7.0%: CPT | Mod: CPTII,S$GLB,, | Performed by: INTERNAL MEDICINE

## 2022-11-30 ENCOUNTER — PATIENT MESSAGE (OUTPATIENT)
Dept: INTERNAL MEDICINE | Facility: CLINIC | Age: 77
End: 2022-11-30
Payer: MEDICARE

## 2022-11-30 ENCOUNTER — ANTI-COAG VISIT (OUTPATIENT)
Dept: CARDIOLOGY | Facility: CLINIC | Age: 77
End: 2022-11-30
Payer: MEDICARE

## 2022-11-30 DIAGNOSIS — Z79.01 LONG TERM CURRENT USE OF ANTICOAGULANT THERAPY: Primary | ICD-10-CM

## 2022-11-30 DIAGNOSIS — M19.049 HAND ARTHRITIS: Primary | ICD-10-CM

## 2022-11-30 DIAGNOSIS — I26.99 PULMONARY EMBOLISM, UNSPECIFIED CHRONICITY, UNSPECIFIED PULMONARY EMBOLISM TYPE, UNSPECIFIED WHETHER ACUTE COR PULMONALE PRESENT: ICD-10-CM

## 2022-11-30 LAB — INR PPP: 3.3

## 2022-11-30 PROCEDURE — 93793 PR ANTICOAGULANT MGMT FOR PT TAKING WARFARIN: ICD-10-PCS | Mod: S$GLB,,, | Performed by: PHARMACIST

## 2022-11-30 PROCEDURE — 93793 ANTICOAG MGMT PT WARFARIN: CPT | Mod: S$GLB,,, | Performed by: PHARMACIST

## 2022-12-01 ENCOUNTER — PATIENT MESSAGE (OUTPATIENT)
Dept: SLEEP MEDICINE | Facility: CLINIC | Age: 77
End: 2022-12-01
Payer: MEDICARE

## 2022-12-01 ENCOUNTER — PATIENT MESSAGE (OUTPATIENT)
Dept: CARDIOLOGY | Facility: CLINIC | Age: 77
End: 2022-12-01
Payer: MEDICARE

## 2022-12-07 NOTE — PROGRESS NOTES
On 12/1/22, patient sent a message to the  via portal, requesting for his insurance information be updated with his home meter monitoring company. [employer] was contacted regarding this matter, and a POF was sent to Dr. Levin's staff for his signature on 12/7/22.    12/13 - Called  to verify that the POF was received. Per Amber with [employer], no POF was recieved from the Md's office. Second request sent to Dr. Levin's staff for POF to be signed and faxed to . Staff confirmed fax to , after second request.    12/16 - Per Amber, with , clinical notes are being requested. Notes faxed per request.

## 2022-12-08 ENCOUNTER — CLINICAL SUPPORT (OUTPATIENT)
Dept: REHABILITATION | Facility: HOSPITAL | Age: 77
End: 2022-12-08
Payer: MEDICARE

## 2022-12-08 DIAGNOSIS — M25.542 JOINT PAIN IN BOTH HANDS: ICD-10-CM

## 2022-12-08 DIAGNOSIS — M25.642 STIFFNESS OF JOINTS OF BOTH HANDS: ICD-10-CM

## 2022-12-08 DIAGNOSIS — M25.641 STIFFNESS OF JOINTS OF BOTH HANDS: ICD-10-CM

## 2022-12-08 DIAGNOSIS — M19.049 HAND ARTHRITIS: ICD-10-CM

## 2022-12-08 DIAGNOSIS — M25.541 JOINT PAIN IN BOTH HANDS: ICD-10-CM

## 2022-12-08 PROCEDURE — 97530 THERAPEUTIC ACTIVITIES: CPT | Mod: PN

## 2022-12-08 PROCEDURE — 97165 OT EVAL LOW COMPLEX 30 MIN: CPT | Mod: PN

## 2022-12-08 NOTE — PLAN OF CARE
[facility] Occupational Therapy  Initial Evaluation     Date: 12/8/2022  Name: Del Anand  [facility] Number: [id number]    Therapy Diagnosis:   Encounter Diagnoses   Name Primary?    Hand arthritis     Joint pain in both hands     Stiffness of joints of both hands      Physician: Amber Huertas    Physician Orders: eval and treat  Medical Diagnosis: M19.049 (ICD-10-CM) - Hand arthritis  Date of Injury/Onset: 10 years ago  Evaluation Date: 12/8/22  Insurance Authorization Period Expiration: 12/31/22  Plan of Care Expiration: 2/3/23  Date of Return to MD: TBD  Visit # / Visits authorized: 1 / 1  FOTO: initial eval     Precautions:  Standard    Time In: 1:35 pm  Time Out: 2:15 pm  Total Appointment Time (timed & untimed codes): 40 minutes    Subjective     Date of Onset: 10 years ago    History of Current Condition/Mechanism of Injury: Del reports: he has been having pain in B hands for 10 years that has progressively worsened over time. Pain gets worse if he plays golf multiple days in a row. Pt states he has had intermittent triggering in L MF and RF also.    Involved Side: Both  Dominant Side: Right  Imaging:  see chart for x-rays  Prior Therapy: none  Occupation:  retired  Working presently: retired  Duties: n/a    Functional Limitations/Social History:    Previous functional status includes: Independent with all ADLs.     Current Functional Status   Home/Living environment: lives with their spouse      Limitation of Functional Status as follows:   ADLs/IADLs:     - Feeding: ind    - Bathing: ind    - Dressing/Grooming: ind    - Driving: ind     Leisure: plays golf and poker    Pain:  Functional Pain Scale Rating 0-10: Current 0/10, worst 8/10, best 0/10   Location: B hands MP joints  Description: Aching  Aggravating Factors: gripping and playing golf 3 days in a row  Easing Factors:  tylenol, voltaren gel    Patient's Goals for Therapy: pain relief    Medical History:   Past Medical  "History:   Diagnosis Date    Benign neoplasm of colon     Cataract     CHF (congestive heart failure) 1/13/2015    Coronary artery disease     Difficult intubation     DVT (deep venous thrombosis)     HLD (hyperlipidemia)     HTN (hypertension)     Impaired fasting glucose     Kidney stone     Metabolic syndrome     Nonspecific abnormal results of liver function study     Nonspecific findings on examination of blood     Nuclear sclerosis - Both Eyes 10/18/2013    Osteoarthrosis, unspecified whether generalized or localized, lower leg     Respiratory distress     PT STATES IT WAS "CRAP", "VERY UNCOMFORTABLE"       Surgical History:    has a past surgical history that includes Knee Arthroplasty; renal stone; Cystoscopy; Kidney stone surgery; Cardiac surgery; Skin biopsy; Coronary artery bypass graft; Colonoscopy (N/A, 3/13/2019); Cataract extraction w/  intraocular lens implant (Right, 9/17/2020); Cataract extraction w/  intraocular lens implant (Left, 10/1/2020); and Colonoscopy (N/A, 7/7/2022).    Medications:   has a current medication list which includes the following prescription(s): amlodipine, aspirin, cyanocobalamin, ezetimibe, folic acid/multivit-min/lutein, irbesartan, rosuvastatin, warfarin, and warfarin.    Allergies:   Review of patient's allergies indicates:  No Known Allergies       Objective     Observation/Appearance: no apparent changes in appearance    Elbow, Wrist and Hand ROM. Measured in degrees.     Strength (Dynamometer) and Pinch Strength (Pinch Gauge)  Measured in pounds.   12/8/2022 12/8/2022      Left Right     Rung II 80 90     Carrillo Pinch 24 24     3pt Pinch 21 19       Sensation: no numbness or tingling reported; not formally assessed    CMS Impairment/Limitation/Restriction for FOTO Hand Survey    Therapist reviewed FOTO scores for Del BURCH Kika on 12/8/2022.   FOTO documents entered into [employer] - see Media section.    Limitation Score: 37%         Treatment     Total Treatment time " (time-based codes) separate from Evaluation: 20 minutes    Del received the following supervised modalities after being cleared for contradictions for 10 minutes:   -Paraffin to decrease pain and stiffness and increase tissue elasticity    Del received therapeutic activities for 10 minutes including:  -TGEs   -pt education re: activity modification    Patient Education and Home Exercises      Education provided:   - activity modification and joint protection    Written Home Exercises Provided: Patient instructed to cont prior HEP.  Exercises were reviewed and Del was able to demonstrate them prior to the end of the session.  Del demonstrated good  understanding of the education provided. See EMR under Patient Instructions for exercises provided during therapy sessions.     Pt was advised to perform these exercises free of pain, and to stop performing them if pain occurs.    Patient/Family Education: role of OT, goals for OT, scheduling/cancellations - pt verbalized understanding. Discussed insurance limitations with patient.    Assessment     Del Anand is a 77 y.o. male referred to outpatient occupational therapy and presents with a medical diagnosis of B hand arthritis.  Patient presents with the following therapy deficits: Increased pain and Joint Stiffness and demonstrates limitations as described in the chart below. Educated pt in activity modification and joint protection. Following medical record review it is determined that pt does not need further therapy at this time.     Anticipated barriers to occupational therapy: none  Pt has no cultural, educational or language barriers to learning provided.    Profile and History Assessment of Occupational Performance Level of Clinical Decision Making Complexity Score   Occupational Profile:   Del Anand is a 77 y.o. male who lives with their spouse and is retired Del Anand has difficulty with  ADLs and IADLs as listed previously, which  Affecting hisdaily  functional abilities.      Comorbidities:    has a past medical history of Benign neoplasm of colon, Cataract, CHF (congestive heart failure), Coronary artery disease, Difficult intubation, DVT (deep venous thrombosis), HLD (hyperlipidemia), HTN (hypertension), Impaired fasting glucose, Kidney stone, Metabolic syndrome, Nonspecific abnormal results of liver function study, Nonspecific findings on examination of blood, Nuclear sclerosis - Both Eyes, Osteoarthrosis, unspecified whether generalized or localized, lower leg, and Respiratory distress.    Medical and Therapy History Review:   Brief               Performance Deficits    Physical:  Pain    Cognitive:  No Deficits    Psychosocial:    No Deficits     Clinical Decision Making:  low    Assessment Process:  Problem-Focused Assessments    Modification/Need for Assistance:  Not Necessary    Intervention Selection:  Multiple Treatment Options       low  Based on PMHX, co morbidities , data from assessments and functional level of assistance required with task and clinical presentation directly impacting function.       Plan   Plan of Care Certification: 12/8/2022 to 2/3/22.     No further therapy needed at this time.     Margaret Boasberg, OT      I CERTIFY THE NEED FOR THESE SERVICES FURNISHED UNDER THIS PLAN OF TREATMENT AND WHILE UNDER MY CARE  Physician's comments:      Physician's Signature: ___________________________________________________

## 2022-12-08 NOTE — PATIENT INSTRUCTIONS
"[facility], OCCUPATIONAL THERAPY  HOME EXERCISE PROGRAM                                  Complete the following exercises for 10 repetitions, 3x/day:       AROM: Isolated IPJ Flexion / Extension ("Hook")   Bend only your middle and end knuckles. Hold 3 seconds.   Straighten your fingers.       AROM: MCP and PIP Flexion / Extension ("Straight Fist")  Bend your bottom and middle knuckles, keeping the tips of your fingers straight.   Try to touch the pads of your fingers on your palm. Hold 3 seconds.   Straighten your fingers.       AROM: Composite Flexion / Extension ("Full Fist")  Bend every joint in your hand into a fist. Hold 3 seconds.   Straighten your fingers.       AROM: Abduction / Adduction  With hand flat on table, spread all fingers apart,   then bring them together as close as possible.  possible then straighten                                Arthritis Conservative Management  Joint Protection:  Use larger joints and muscles when possible  Stop activities before the point of discomfort  Decrease activities that cause pain that lasts more than 2 hours  Avoid activities that put strain on painful or stiff joints  Avoid a tight or prolonged grasp on objects  Balance Rest and Activity:  Rest before exhaustion  Take frequent, short breaks  Avoid activities that cannot be stopped  Avoid staying in one position for a long time  Alternate heavy and light activities  Take more breaks when inflammation is active  Allow extra time for activities--avoid rushing  Plan your day ahead of time  Eliminate unnecessary activities  Reduce the effort:  Avoid excessive loads. Dont be afraid to ask for help. Use appliances to make tasks easier.  Keep items near (i.e. keeping commonly used appliances on the counter)  Use prepared foods as needed.  Avoid low chairs  Maintain a healthy body weight  Freeze leftovers for an easy meal later  Sit while working when possible  Avoid positions of deformity:  Rheumatoid " Arthritis (RA): avoid the use of the hands that push the fingers ulnarly, such as wringing a cloth or opening tight jars.   Osteoarthritis (OA): Avoid stress to the CMC joint, such as sustained pinching.      Helpful Tips: slide objects; use your palms instead of fingers, keeps heavy items close to your body, use built up handles, look for lightweight options, use wheels to roll objects, use scissors to open packages, use lever type handles, pizza cutters for cutting, electrical toothbrush, avoid small buttons or use Velcro, use pumps for soaps/shampoos, etc.

## 2022-12-14 ENCOUNTER — ANTI-COAG VISIT (OUTPATIENT)
Dept: CARDIOLOGY | Facility: CLINIC | Age: 77
End: 2022-12-14
Payer: MEDICARE

## 2022-12-14 DIAGNOSIS — I26.99 PULMONARY EMBOLISM, UNSPECIFIED CHRONICITY, UNSPECIFIED PULMONARY EMBOLISM TYPE, UNSPECIFIED WHETHER ACUTE COR PULMONALE PRESENT: ICD-10-CM

## 2022-12-14 DIAGNOSIS — Z79.01 LONG TERM CURRENT USE OF ANTICOAGULANT THERAPY: Primary | ICD-10-CM

## 2022-12-14 LAB — INR PPP: 2.3

## 2022-12-14 PROCEDURE — 93793 PR ANTICOAGULANT MGMT FOR PT TAKING WARFARIN: ICD-10-PCS | Mod: S$GLB,,, | Performed by: PHARMACIST

## 2022-12-14 PROCEDURE — 93793 ANTICOAG MGMT PT WARFARIN: CPT | Mod: S$GLB,,, | Performed by: PHARMACIST

## 2022-12-19 NOTE — PROGRESS NOTES
Ms. Gloria with [facility]im[facility] is not in network people's health insurance. Dr. Levin faxed over the order for patient to be on coumadin and test his INR. Received  a call back number to her, [phone]

## 2022-12-22 NOTE — PROGRESS NOTES
Patient called the [facility]in Clinic, on 22, and gave his consent to be processed for home INR monitoring with [employer]. Physician Order Form sent to Dr. Blanquita Levin's staff for signature.    (Insurance ) [employer]  Patient Name - Del Anand  JANELLE - 1945  Member ID - [id number]

## 2022-12-27 ENCOUNTER — PATIENT OUTREACH (OUTPATIENT)
Dept: ADMINISTRATIVE | Facility: HOSPITAL | Age: 77
End: 2022-12-27
Payer: MEDICARE

## 2022-12-28 ENCOUNTER — ANTI-COAG VISIT (OUTPATIENT)
Dept: CARDIOLOGY | Facility: CLINIC | Age: 77
End: 2022-12-28
Payer: MEDICARE

## 2022-12-28 DIAGNOSIS — I26.99 PULMONARY EMBOLISM, UNSPECIFIED CHRONICITY, UNSPECIFIED PULMONARY EMBOLISM TYPE, UNSPECIFIED WHETHER ACUTE COR PULMONALE PRESENT: ICD-10-CM

## 2022-12-28 DIAGNOSIS — Z79.01 LONG TERM CURRENT USE OF ANTICOAGULANT THERAPY: Primary | ICD-10-CM

## 2022-12-28 LAB — INR PPP: 2.7

## 2022-12-28 PROCEDURE — 93793 PR ANTICOAGULANT MGMT FOR PT TAKING WARFARIN: ICD-10-PCS | Mod: S$GLB,,,

## 2022-12-28 PROCEDURE — 93793 ANTICOAG MGMT PT WARFARIN: CPT | Mod: S$GLB,,,

## 2023-01-02 ENCOUNTER — PATIENT MESSAGE (OUTPATIENT)
Dept: ADMINISTRATIVE | Facility: OTHER | Age: 78
End: 2023-01-02
Payer: MEDICARE

## 2023-01-08 DIAGNOSIS — I10 ESSENTIAL HYPERTENSION: ICD-10-CM

## 2023-01-08 DIAGNOSIS — E78.2 MIXED HYPERLIPIDEMIA: ICD-10-CM

## 2023-01-10 RX ORDER — AMLODIPINE BESYLATE 2.5 MG/1
2.5 TABLET ORAL DAILY
Qty: 90 TABLET | Refills: 3 | Status: SHIPPED | OUTPATIENT
Start: 2023-01-10 | End: 2023-11-07

## 2023-01-10 RX ORDER — EZETIMIBE 10 MG/1
10 TABLET ORAL DAILY
Qty: 90 TABLET | Refills: 3 | Status: SHIPPED | OUTPATIENT
Start: 2023-01-10 | End: 2023-11-07

## 2023-01-11 ENCOUNTER — ANTI-COAG VISIT (OUTPATIENT)
Dept: CARDIOLOGY | Facility: CLINIC | Age: 78
End: 2023-01-11
Payer: MEDICARE

## 2023-01-11 DIAGNOSIS — I26.99 PULMONARY EMBOLISM, UNSPECIFIED CHRONICITY, UNSPECIFIED PULMONARY EMBOLISM TYPE, UNSPECIFIED WHETHER ACUTE COR PULMONALE PRESENT: ICD-10-CM

## 2023-01-11 DIAGNOSIS — Z79.01 LONG TERM CURRENT USE OF ANTICOAGULANT THERAPY: Primary | ICD-10-CM

## 2023-01-11 LAB — INR PPP: 2.4

## 2023-01-11 PROCEDURE — 93793 PR ANTICOAGULANT MGMT FOR PT TAKING WARFARIN: ICD-10-PCS | Mod: S$GLB,,, | Performed by: PHARMACIST

## 2023-01-11 PROCEDURE — 93793 ANTICOAG MGMT PT WARFARIN: CPT | Mod: S$GLB,,, | Performed by: PHARMACIST

## 2023-01-11 NOTE — PROGRESS NOTES
Patient had two draft beers yesterday, 01/10; for his wedding anniversary. Patient verified correct doses of warfarin. Denies any other changes.

## 2023-01-23 ENCOUNTER — PATIENT MESSAGE (OUTPATIENT)
Dept: SLEEP MEDICINE | Facility: CLINIC | Age: 78
End: 2023-01-23
Payer: MEDICARE

## 2023-01-24 DIAGNOSIS — G47.33 OSA (OBSTRUCTIVE SLEEP APNEA): Primary | ICD-10-CM

## 2023-01-25 ENCOUNTER — ANTI-COAG VISIT (OUTPATIENT)
Dept: CARDIOLOGY | Facility: CLINIC | Age: 78
End: 2023-01-25
Payer: MEDICARE

## 2023-01-25 DIAGNOSIS — I26.99 PULMONARY EMBOLISM, UNSPECIFIED CHRONICITY, UNSPECIFIED PULMONARY EMBOLISM TYPE, UNSPECIFIED WHETHER ACUTE COR PULMONALE PRESENT: ICD-10-CM

## 2023-01-25 DIAGNOSIS — Z79.01 LONG TERM CURRENT USE OF ANTICOAGULANT THERAPY: Primary | ICD-10-CM

## 2023-01-25 LAB — INR PPP: 3.5

## 2023-01-25 PROCEDURE — 93793 PR ANTICOAGULANT MGMT FOR PT TAKING WARFARIN: ICD-10-PCS | Mod: S$GLB,,, | Performed by: PHARMACIST

## 2023-01-25 PROCEDURE — 93793 ANTICOAG MGMT PT WARFARIN: CPT | Mod: S$GLB,,, | Performed by: PHARMACIST

## 2023-02-03 ENCOUNTER — OFFICE VISIT (OUTPATIENT)
Dept: DERMATOLOGY | Facility: CLINIC | Age: 78
End: 2023-02-03
Payer: MEDICARE

## 2023-02-03 DIAGNOSIS — Z85.828 HISTORY OF NONMELANOMA SKIN CANCER: ICD-10-CM

## 2023-02-03 DIAGNOSIS — D22.9 MULTIPLE BENIGN NEVI: ICD-10-CM

## 2023-02-03 DIAGNOSIS — L57.0 AK (ACTINIC KERATOSIS): Primary | ICD-10-CM

## 2023-02-03 DIAGNOSIS — L82.1 SK (SEBORRHEIC KERATOSIS): ICD-10-CM

## 2023-02-03 DIAGNOSIS — D18.01 CHERRY ANGIOMA: ICD-10-CM

## 2023-02-03 DIAGNOSIS — Z12.83 SKIN CANCER SCREENING: ICD-10-CM

## 2023-02-03 PROCEDURE — 1160F RVW MEDS BY RX/DR IN RCRD: CPT | Mod: CPTII,S$GLB,, | Performed by: DERMATOLOGY

## 2023-02-03 PROCEDURE — 1160F PR REVIEW ALL MEDS BY PRESCRIBER/CLIN PHARMACIST DOCUMENTED: ICD-10-PCS | Mod: CPTII,S$GLB,, | Performed by: DERMATOLOGY

## 2023-02-03 PROCEDURE — 1159F MED LIST DOCD IN RCRD: CPT | Mod: CPTII,S$GLB,, | Performed by: DERMATOLOGY

## 2023-02-03 PROCEDURE — 99203 PR OFFICE/OUTPT VISIT, NEW, LEVL III, 30-44 MIN: ICD-10-PCS | Mod: 25,S$GLB,, | Performed by: DERMATOLOGY

## 2023-02-03 PROCEDURE — 1101F PR PT FALLS ASSESS DOC 0-1 FALLS W/OUT INJ PAST YR: ICD-10-PCS | Mod: CPTII,S$GLB,, | Performed by: DERMATOLOGY

## 2023-02-03 PROCEDURE — 1126F AMNT PAIN NOTED NONE PRSNT: CPT | Mod: CPTII,S$GLB,, | Performed by: DERMATOLOGY

## 2023-02-03 PROCEDURE — 99203 OFFICE O/P NEW LOW 30 MIN: CPT | Mod: 25,S$GLB,, | Performed by: DERMATOLOGY

## 2023-02-03 PROCEDURE — 17003 DESTRUCT PREMALG LES 2-14: CPT | Mod: S$GLB,,, | Performed by: DERMATOLOGY

## 2023-02-03 PROCEDURE — 1157F ADVNC CARE PLAN IN RCRD: CPT | Mod: CPTII,S$GLB,, | Performed by: DERMATOLOGY

## 2023-02-03 PROCEDURE — 17000 PR DESTRUCTION(LASER SURGERY,CRYOSURGERY,CHEMOSURGERY),PREMALIGNANT LESIONS,FIRST LESION: ICD-10-PCS | Mod: S$GLB,,, | Performed by: DERMATOLOGY

## 2023-02-03 PROCEDURE — 1157F PR ADVANCE CARE PLAN OR EQUIV PRESENT IN MEDICAL RECORD: ICD-10-PCS | Mod: CPTII,S$GLB,, | Performed by: DERMATOLOGY

## 2023-02-03 PROCEDURE — 17003 DESTRUCTION, PREMALIGNANT LESIONS; SECOND THROUGH 14 LESIONS: ICD-10-PCS | Mod: S$GLB,,, | Performed by: DERMATOLOGY

## 2023-02-03 PROCEDURE — 99999 PR PBB SHADOW E&M-EST. PATIENT-LVL III: ICD-10-PCS | Mod: PBBFAC,,, | Performed by: DERMATOLOGY

## 2023-02-03 PROCEDURE — 3288F PR FALLS RISK ASSESSMENT DOCUMENTED: ICD-10-PCS | Mod: CPTII,S$GLB,, | Performed by: DERMATOLOGY

## 2023-02-03 PROCEDURE — 3288F FALL RISK ASSESSMENT DOCD: CPT | Mod: CPTII,S$GLB,, | Performed by: DERMATOLOGY

## 2023-02-03 PROCEDURE — 1101F PT FALLS ASSESS-DOCD LE1/YR: CPT | Mod: CPTII,S$GLB,, | Performed by: DERMATOLOGY

## 2023-02-03 PROCEDURE — 1126F PR PAIN SEVERITY QUANTIFIED, NO PAIN PRESENT: ICD-10-PCS | Mod: CPTII,S$GLB,, | Performed by: DERMATOLOGY

## 2023-02-03 PROCEDURE — 1159F PR MEDICATION LIST DOCUMENTED IN MEDICAL RECORD: ICD-10-PCS | Mod: CPTII,S$GLB,, | Performed by: DERMATOLOGY

## 2023-02-03 PROCEDURE — 17000 DESTRUCT PREMALG LESION: CPT | Mod: S$GLB,,, | Performed by: DERMATOLOGY

## 2023-02-03 PROCEDURE — 99999 PR PBB SHADOW E&M-EST. PATIENT-LVL III: CPT | Mod: PBBFAC,,, | Performed by: DERMATOLOGY

## 2023-02-03 NOTE — PROGRESS NOTES
Subjective:       Patient ID:  Del Anand is a 77 y.o. male who presents for   Chief Complaint   Patient presents with    Spot       Spot  Pt presents today for a spot on the right arm and spot on the right leg. Denies any treatment to spots.     Has hx of SCC L lower leg s/p Mohs with Dr. Rosenbaum in 2018 and subsequent wound dehiscence which required wound care for months.    Interested in upper body skin check today.    Review of Systems   Constitutional:  Negative for fever, chills and fatigue.   Skin:  Positive for activity-related sunscreen use and wears hat. Negative for daily sunscreen use and recent sunburn.   Hematologic/Lymphatic: Does not bruise/bleed easily.      Objective:    Physical Exam   Constitutional: He appears well-developed and well-nourished. No distress.   Neurological: He is alert and oriented to person, place, and time. He is not disoriented.   Psychiatric: He has a normal mood and affect.   Skin:   Areas Examined (abnormalities noted in diagram):   Head / Face Inspection Performed  Neck Inspection Performed  Chest / Axilla Inspection Performed  Back Inspection Performed  RUE Inspected  LUE Inspection Performed                 Diagram Legend     Erythematous scaling macule/papule c/w actinic keratosis       Vascular papule c/w angioma      Pigmented verrucoid papule/plaque c/w seborrheic keratosis      Yellow umbilicated papule c/w sebaceous hyperplasia      Irregularly shaped tan macule c/w lentigo     1-2 mm smooth white papules consistent with Milia      Movable subcutaneous cyst with punctum c/w epidermal inclusion cyst      Subcutaneous movable cyst c/w pilar cyst      Firm pink to brown papule c/w dermatofibroma      Pedunculated fleshy papule(s) c/w skin tag(s)      Evenly pigmented macule c/w junctional nevus     Mildly variegated pigmented, slightly irregular-bordered macule c/w mildly atypical nevus      Flesh colored to evenly pigmented papule c/w intradermal nevus       Pink  pearly papule/plaque c/w basal cell carcinoma      Erythematous hyperkeratotic cursted plaque c/w SCC      Surgical scar with no sign of skin cancer recurrence      Open and closed comedones      Inflammatory papules and pustules      Verrucoid papule consistent consistent with wart     Erythematous eczematous patches and plaques     Dystrophic onycholytic nail with subungual debris c/w onychomycosis     Umbilicated papule    Erythematous-base heme-crusted tan verrucoid plaque consistent with inflamed seborrheic keratosis     Erythematous Silvery Scaling Plaque c/w Psoriasis     See annotation      Assessment / Plan:        AK (actinic keratosis)  Cryosurgery Procedure Note    Verbal consent from the patient is obtained including, but not limited to, risk of hypopigmentation/hyperpigmentation, scar, recurrence of lesion. The patient is aware of the precancerous quality and need for treatment of these lesions. Liquid nitrogen cryosurgery is applied to the 2 actinic keratoses, as detailed in the physical exam, to produce a freeze injury. The patient is aware that blisters may form and is instructed on wound care with gentle cleansing and use of vaseline ointment to keep moist until healed. The patient is supplied a handout on cryosurgery and is instructed to call if lesions do not completely resolve.    SK (seborrheic keratosis)  These are benign inherited growths without a malignant potential. Reassurance given to patient. No treatment is necessary.   Treatment of benign, asymptomatic lesions may be considered cosmetic.  Warned about risk of hypo- or hyperpigmentation with treatment and risk of recurrence.    Multiple benign nevi  Benign-appearing nevi present on exam today. Reassurance provided. Periodically examine moles and return to clinic if any moles change or become symptomatic (bleeding, itching, pain, etc).    Cherry angioma  This is a benign vascular lesion. Reassurance given. No treatment required.  Treatment of benign, asymptomatic lesions may be considered cosmetic.    Skin cancer screening  History of nonmelanoma skin cancer  Upper body skin examination performed today including at least 6 points as noted in physical examination. No lesions suspicious for malignancy noted.  Patient instructed in importance of daily broad spectrum sunscreen use with spf at least 30. Sun avoidance and topical protection/protective clothing discussed.    Follow up in about 3 months (around 5/3/2023) for skin check or sooner for any concerns. (If lesions treated today with cryo aren't gone in a month, return for sooner visit).

## 2023-02-03 NOTE — PATIENT INSTRUCTIONS
CRYOSURGERY      Your doctor has used a method called cryosurgery to treat your skin condition. Cryosurgery refers to the use of very cold substances to treat a variety of skin conditions such as warts, pre-skin cancers, molluscum contagiosum, sun spots, and several benign growths. The substance we use in cryosurgery is liquid nitrogen and is so cold (-195 degrees Celsius) that is burns when administered.     Following treatment in the office, the skin may immediately burn and become red. You may find the area around the lesion is affected as well. It is sometimes necessary to treat not only the lesion, but a small area of the surrounding normal skin to achieve a good response.     A blister, and even a blood filled blister, may form after treatment.   This is a normal response. If the blister is painful, it is acceptable to sterilize a needle and with rubbing alcohol and gently pop the blister. It is important that you gently wash the area with soap and warm water as the blister fluid may contain wart virus if a wart was treated. Do no remove the roof of the blister.     The area treated can take anywhere from 1-3 weeks to heal. Healing time depends on the kind skin lesion treated, the location, and how aggressively the lesion was treated. It is recommended that the areas treated are covered with Vaseline or bacitracin ointment and a band-aid. If a band-aid is not practical, just ointment applied several times per day will do. Keeping these areas moist will speed the healing time.    Treatment with liquid nitrogen can leave a scar. In dark skin, it may be a light or dark scar, in light skin it may be a white or pink scar. These will generally fade with time, but may never go away completely.     If you have any concerns after your treatment, please feel free to call the office.       [address] [place] [address]/ [phone] FAX/ [email or web address] Sun Protection      The [facility] would like to remind you of the importance of sun protection all year round and particularly during the summer when the suns rays are the strongest. It has been proven that both acute and chronic sun exposure damages our cells and leads to skin cancer. Beyond skin cancer, the sun causes 90% of the symptoms of premature skin aging, including wrinkles, lentigines (brown spots), and thin, easily bruised skin. Proper sun protection can help prevent these unwanted conditions.    Many patients report that they dont go in the sun. It has been shown that the average person receives 18 hours of incidental sun exposure per week during activities such as walking through parking lots, driving, or sitting next to windows. This accumulates to several bad sunburns per year!    In choosing sunscreen, you want one that protects against both UVA and UVB rays (broad spectrum). It is recommended that you use one of SPF 30 or higher. It is important to apply the sunscreen about 20 minutes prior to sun exposure. Most sunscreens are chemical sunscreens and a reaction must take place in the skin so that they are effective. If they are applied and then you are immediately exposed to the sun or start sweating, this reaction has not had time to take place and you are therefore unprotected. Sunscreen needs to be reapplied every 2 hours if you are participating in water sports or sweating. We recommend Elta MD or CeraVe sunscreens for daily use; however there are many options and it is most important for you to find one that you will use on a consistent basis.    If you have sensitive skin, you may do best with a sunscreen that contains only physical blockers in the active ingredient section. The only physical blockers available in the [place] currently are titanium dioxide or zinc oxide. These are typically thicker and harder to apply, however they afford very good protection. Neutrogena Sensitive Skin, Blue Lizard  Sensitive Skin (pink top) or Neutrogena Pure and Free are popular ones.     Aside from sunscreen, clothes with UV protection (UPF), wide brimmed hats, and sunglasses are other means of sun protection that we recommend.      Based on a recent study (6/2021) and out of an abundance of caution, we are recommending that you AVOID the following sunscreens as they may contain the carcinogen, benzene:    Spray and gel sunscreens  Any [facility] or [facility] brands as well as Max Block and TopCare brands   Neutrogena Ultra Sheer Dry-touch Water Resistant Sunscreen LOTION SPF 70   Neutrogena Sheer Zinc Dry-touch Face Sunscreen LOTION SPF 50   5.   Aveeno Baby Continuous Protection Sensitive Skin Sunscreen LOTION - Broad Spectrum SPF 50    Please note that Benzene is not an ingredient or the degradation product of any ingredient in any sunscreen. This study suggested that the findings are a result of contamination in the manufacturing process. At this point, we don't know how effectively Benzene gets through the skin, if it gets absorbed systemically, and what effects it may have.     We do know that ultraviolet radiation is a well-established carcinogen. Please use daily sun protection/avoidance and use of at least SPF 30, broad-spectrum sunscreen not listed above.                       [facility]  [facility]ERMAT[facility] [address]  [facility] [address]  Dept: [phone]  Dept Fax: [phone]

## 2023-02-08 ENCOUNTER — ANTI-COAG VISIT (OUTPATIENT)
Dept: CARDIOLOGY | Facility: CLINIC | Age: 78
End: 2023-02-08
Payer: MEDICARE

## 2023-02-08 DIAGNOSIS — I26.99 PULMONARY EMBOLISM, UNSPECIFIED CHRONICITY, UNSPECIFIED PULMONARY EMBOLISM TYPE, UNSPECIFIED WHETHER ACUTE COR PULMONALE PRESENT: ICD-10-CM

## 2023-02-08 DIAGNOSIS — Z79.01 LONG TERM CURRENT USE OF ANTICOAGULANT THERAPY: Primary | ICD-10-CM

## 2023-02-08 LAB — INR PPP: 4

## 2023-02-08 PROCEDURE — 93793 ANTICOAG MGMT PT WARFARIN: CPT | Mod: S$GLB,,, | Performed by: PHARMACIST

## 2023-02-08 PROCEDURE — 93793 PR ANTICOAGULANT MGMT FOR PT TAKING WARFARIN: ICD-10-PCS | Mod: S$GLB,,, | Performed by: PHARMACIST

## 2023-02-22 ENCOUNTER — PATIENT MESSAGE (OUTPATIENT)
Dept: CARDIOLOGY | Facility: CLINIC | Age: 78
End: 2023-02-22

## 2023-02-22 ENCOUNTER — ANTI-COAG VISIT (OUTPATIENT)
Dept: CARDIOLOGY | Facility: CLINIC | Age: 78
End: 2023-02-22
Payer: MEDICARE

## 2023-02-22 DIAGNOSIS — Z79.01 LONG TERM CURRENT USE OF ANTICOAGULANT THERAPY: Primary | ICD-10-CM

## 2023-02-22 DIAGNOSIS — I26.99 PULMONARY EMBOLISM, UNSPECIFIED CHRONICITY, UNSPECIFIED PULMONARY EMBOLISM TYPE, UNSPECIFIED WHETHER ACUTE COR PULMONALE PRESENT: ICD-10-CM

## 2023-02-22 LAB — INR PPP: 3

## 2023-02-22 PROCEDURE — 93793 PR ANTICOAGULANT MGMT FOR PT TAKING WARFARIN: ICD-10-PCS | Mod: S$GLB,,, | Performed by: PHARMACIST

## 2023-02-22 PROCEDURE — 93793 ANTICOAG MGMT PT WARFARIN: CPT | Mod: S$GLB,,, | Performed by: PHARMACIST

## 2023-02-22 NOTE — PROGRESS NOTES
INR at goal. Medications and chart reviewed. No changes noted to necessitate adjustment of warfarin or follow-up plan. See calendar.Patient was re-educated on situations that would require placing a call to the coumadin clinic, including bleeding or unusual bruising issues, changes in health, diet or medications, upcoming procedures that require warfarin interruption, and missed coumadin dose(s). Patient expressed understanding that avoidance of consistency with these parameters could cause fluctuations in INR, leading to more frequent visits and increase risk of adverse events.   Of note, patient expressed concern on lower weekly dose of 10mg daily and now requesting 12.5mg on Wednesdays.

## 2023-03-03 ENCOUNTER — OFFICE VISIT (OUTPATIENT)
Dept: BARIATRICS | Facility: CLINIC | Age: 78
End: 2023-03-03
Payer: MEDICARE

## 2023-03-03 VITALS
BODY MASS INDEX: 39.23 KG/M2 | WEIGHT: 313.88 LBS | SYSTOLIC BLOOD PRESSURE: 142 MMHG | DIASTOLIC BLOOD PRESSURE: 78 MMHG | HEART RATE: 67 BPM | OXYGEN SATURATION: 95 %

## 2023-03-03 DIAGNOSIS — G47.33 OSA ON CPAP: ICD-10-CM

## 2023-03-03 DIAGNOSIS — K76.0 FATTY LIVER: ICD-10-CM

## 2023-03-03 DIAGNOSIS — I10 ESSENTIAL HYPERTENSION: ICD-10-CM

## 2023-03-03 DIAGNOSIS — Z71.3 ENCOUNTER FOR WEIGHT LOSS COUNSELING: ICD-10-CM

## 2023-03-03 DIAGNOSIS — E78.2 MIXED HYPERLIPIDEMIA: ICD-10-CM

## 2023-03-03 DIAGNOSIS — R73.03 PREDIABETES: ICD-10-CM

## 2023-03-03 DIAGNOSIS — E66.01 CLASS 2 SEVERE OBESITY DUE TO EXCESS CALORIES WITH SERIOUS COMORBIDITY AND BODY MASS INDEX (BMI) OF 39.0 TO 39.9 IN ADULT: Primary | ICD-10-CM

## 2023-03-03 PROCEDURE — 1159F PR MEDICATION LIST DOCUMENTED IN MEDICAL RECORD: ICD-10-PCS | Mod: CPTII,S$GLB,, | Performed by: STUDENT IN AN ORGANIZED HEALTH CARE EDUCATION/TRAINING PROGRAM

## 2023-03-03 PROCEDURE — 1126F AMNT PAIN NOTED NONE PRSNT: CPT | Mod: CPTII,S$GLB,, | Performed by: STUDENT IN AN ORGANIZED HEALTH CARE EDUCATION/TRAINING PROGRAM

## 2023-03-03 PROCEDURE — 99999 PR PBB SHADOW E&M-EST. PATIENT-LVL III: CPT | Mod: PBBFAC,,, | Performed by: STUDENT IN AN ORGANIZED HEALTH CARE EDUCATION/TRAINING PROGRAM

## 2023-03-03 PROCEDURE — 3288F PR FALLS RISK ASSESSMENT DOCUMENTED: ICD-10-PCS | Mod: CPTII,S$GLB,, | Performed by: STUDENT IN AN ORGANIZED HEALTH CARE EDUCATION/TRAINING PROGRAM

## 2023-03-03 PROCEDURE — 99204 PR OFFICE/OUTPT VISIT, NEW, LEVL IV, 45-59 MIN: ICD-10-PCS | Mod: S$GLB,,, | Performed by: STUDENT IN AN ORGANIZED HEALTH CARE EDUCATION/TRAINING PROGRAM

## 2023-03-03 PROCEDURE — 1157F ADVNC CARE PLAN IN RCRD: CPT | Mod: CPTII,S$GLB,, | Performed by: STUDENT IN AN ORGANIZED HEALTH CARE EDUCATION/TRAINING PROGRAM

## 2023-03-03 PROCEDURE — 1101F PT FALLS ASSESS-DOCD LE1/YR: CPT | Mod: CPTII,S$GLB,, | Performed by: STUDENT IN AN ORGANIZED HEALTH CARE EDUCATION/TRAINING PROGRAM

## 2023-03-03 PROCEDURE — 1126F PR PAIN SEVERITY QUANTIFIED, NO PAIN PRESENT: ICD-10-PCS | Mod: CPTII,S$GLB,, | Performed by: STUDENT IN AN ORGANIZED HEALTH CARE EDUCATION/TRAINING PROGRAM

## 2023-03-03 PROCEDURE — 3288F FALL RISK ASSESSMENT DOCD: CPT | Mod: CPTII,S$GLB,, | Performed by: STUDENT IN AN ORGANIZED HEALTH CARE EDUCATION/TRAINING PROGRAM

## 2023-03-03 PROCEDURE — 1160F PR REVIEW ALL MEDS BY PRESCRIBER/CLIN PHARMACIST DOCUMENTED: ICD-10-PCS | Mod: CPTII,S$GLB,, | Performed by: STUDENT IN AN ORGANIZED HEALTH CARE EDUCATION/TRAINING PROGRAM

## 2023-03-03 PROCEDURE — 1101F PR PT FALLS ASSESS DOC 0-1 FALLS W/OUT INJ PAST YR: ICD-10-PCS | Mod: CPTII,S$GLB,, | Performed by: STUDENT IN AN ORGANIZED HEALTH CARE EDUCATION/TRAINING PROGRAM

## 2023-03-03 PROCEDURE — 3078F PR MOST RECENT DIASTOLIC BLOOD PRESSURE < 80 MM HG: ICD-10-PCS | Mod: CPTII,S$GLB,, | Performed by: STUDENT IN AN ORGANIZED HEALTH CARE EDUCATION/TRAINING PROGRAM

## 2023-03-03 PROCEDURE — 3077F SYST BP >= 140 MM HG: CPT | Mod: CPTII,S$GLB,, | Performed by: STUDENT IN AN ORGANIZED HEALTH CARE EDUCATION/TRAINING PROGRAM

## 2023-03-03 PROCEDURE — 1159F MED LIST DOCD IN RCRD: CPT | Mod: CPTII,S$GLB,, | Performed by: STUDENT IN AN ORGANIZED HEALTH CARE EDUCATION/TRAINING PROGRAM

## 2023-03-03 PROCEDURE — 99204 OFFICE O/P NEW MOD 45 MIN: CPT | Mod: S$GLB,,, | Performed by: STUDENT IN AN ORGANIZED HEALTH CARE EDUCATION/TRAINING PROGRAM

## 2023-03-03 PROCEDURE — 3077F PR MOST RECENT SYSTOLIC BLOOD PRESSURE >= 140 MM HG: ICD-10-PCS | Mod: CPTII,S$GLB,, | Performed by: STUDENT IN AN ORGANIZED HEALTH CARE EDUCATION/TRAINING PROGRAM

## 2023-03-03 PROCEDURE — 1160F RVW MEDS BY RX/DR IN RCRD: CPT | Mod: CPTII,S$GLB,, | Performed by: STUDENT IN AN ORGANIZED HEALTH CARE EDUCATION/TRAINING PROGRAM

## 2023-03-03 PROCEDURE — 99999 PR PBB SHADOW E&M-EST. PATIENT-LVL III: ICD-10-PCS | Mod: PBBFAC,,, | Performed by: STUDENT IN AN ORGANIZED HEALTH CARE EDUCATION/TRAINING PROGRAM

## 2023-03-03 PROCEDURE — 3078F DIAST BP <80 MM HG: CPT | Mod: CPTII,S$GLB,, | Performed by: STUDENT IN AN ORGANIZED HEALTH CARE EDUCATION/TRAINING PROGRAM

## 2023-03-03 PROCEDURE — 1157F PR ADVANCE CARE PLAN OR EQUIV PRESENT IN MEDICAL RECORD: ICD-10-PCS | Mod: CPTII,S$GLB,, | Performed by: STUDENT IN AN ORGANIZED HEALTH CARE EDUCATION/TRAINING PROGRAM

## 2023-03-03 NOTE — PROGRESS NOTES
Subjective:       Patient ID: Del Anand is a 77 y.o. male.    Chief Complaint: Consult and Obesity    Patient presents for treatment of obesity.   216 lbs when moved to [place] about 30 years ago  Got down to 280 lbs after CABG    Co-morbidities  HTN  HLD  Prediabetes  Fatty Liver  CAD s/p CABG x2  Kidney Stones      Current Physical Activity  Golf 2-3x/week  Walks dog about 100 yards, limited due to a couple falls    Current Eating Habits  Working on improving eating habits  No longer eats red meat, only chicken, fish, and seafood    Medical Weight Loss  3/3/2023: 313 lbs 14.4 oz, BMI 39.23    Review of Systems   Constitutional:  Negative for chills and fever.   Respiratory:  Negative for shortness of breath.    Cardiovascular:  Negative for chest pain.   Gastrointestinal:  Negative for abdominal pain.   Musculoskeletal:  Positive for arthralgias.   Neurological:  Positive for dizziness.       Objective:       Latest Reference Range & Units 11/22/22 07:23   WBC 3.90 - 12.70 K/uL 5.29   RBC 4.60 - 6.20 M/uL 4.47 (L)   Hemoglobin 14.0 - 18.0 g/dL 13.9 (L)   Hematocrit 40.0 - 54.0 % 43.0   MCV 82 - 98 fL 96   MCH 27.0 - 31.0 pg 31.1 (H)   MCHC 32.0 - 36.0 g/dL 32.3   RDW 11.5 - 14.5 % 12.9   Platelets 150 - 450 K/uL 182   MPV 9.2 - 12.9 fL 10.4   Gran % 38.0 - 73.0 % 61.6   Lymph % 18.0 - 48.0 % 23.1   Mono % 4.0 - 15.0 % 13.0   Eosinophil % 0.0 - 8.0 % 1.7   Basophil % 0.0 - 1.9 % 0.4   Immature Granulocytes 0.0 - 0.5 % 0.2   Gran # (ANC) 1.8 - 7.7 K/uL 3.3   Lymph # 1.0 - 4.8 K/uL 1.2   Mono # 0.3 - 1.0 K/uL 0.7   Eos # 0.0 - 0.5 K/uL 0.1   Baso # 0.00 - 0.20 K/uL 0.02   Immature Grans (Abs) 0.00 - 0.04 K/uL 0.01   nRBC 0 /100 WBC 0   Differential Method  Automated   Sodium 136 - 145 mmol/L 140   Potassium 3.5 - 5.1 mmol/L 4.8   Chloride 95 - 110 mmol/L 103   CO2 23 - 29 mmol/L 30 (H)   Anion Gap 8 - 16 mmol/L 7 (L)   BUN 2 - 20 mg/dL 26 (H)   Creatinine 0.50 - 1.40 mg/dL 0.74   eGFR >60 mL/min/1.73 m^2 >60.0    Glucose 70 - 110 mg/dL 150 (H)   Calcium 8.7 - 10.5 mg/dL 9.1   Alkaline Phosphatase 38 - 126 U/L 96   PROTEIN TOTAL 6.0 - 8.4 g/dL 7.2   Albumin 3.5 - 5.2 g/dL 4.3   BILIRUBIN TOTAL 0.1 - 1.0 mg/dL 0.9   AST 15 - 46 U/L 37   ALT 10 - 44 U/L 41   Cholesterol 120 - 199 mg/dL 123   HDL 40 - 75 mg/dL 41   HDL/Cholesterol Ratio 20.0 - 50.0 % 33.3   LDL Cholesterol External 63.0 - 159.0 mg/dL 47.4 (L)   Non-HDL Cholesterol mg/dL 82   Total Cholesterol/HDL Ratio 2.0 - 5.0  3.0   Triglycerides 30 - 150 mg/dL 173 (H)   Hemoglobin A1C External 4.0 - 5.6 % 6.2 (H)   Estimated Avg Glucose 68 - 131 mg/dL 131   TSH 0.400 - 4.000 uIU/mL 2.390   PSA Diagnostic 0.00 - 4.00 ng/mL 0.12   (L): Data is abnormally low  (H): Data is abnormally high      Vitals:    03/03/23 1037   BP: (!) 142/78   Pulse: 67       Physical Exam  Vitals reviewed.   Constitutional:       General: He is not in acute distress.     Appearance: Normal appearance. He is obese. He is not ill-appearing, toxic-appearing or diaphoretic.   HENT:      Head: Normocephalic and atraumatic.   Cardiovascular:      Rate and Rhythm: Normal rate.   Pulmonary:      Effort: Pulmonary effort is normal. No respiratory distress.   Skin:     General: Skin is warm and dry.   Neurological:      Mental Status: He is alert and oriented to person, place, and time.       Assessment:     1. Class 2 severe obesity due to excess calories with serious comorbidity and body mass index (BMI) of 39.0 to 39.9 in adult        2. Fatty liver        3. Prediabetes        4. Mixed hyperlipidemia        5. Essential hypertension        6. VERONICA on CPAP        7. Encounter for weight loss counseling              Plan:   - No weight loss medications prescribed    - Log all food and beverage intake with a daily calorie goal of 2000 calories per day    - Low to moderate intensity aerobic exercise for 15-30 minutes 3-5x/week

## 2023-03-08 ENCOUNTER — PATIENT MESSAGE (OUTPATIENT)
Dept: INTERNAL MEDICINE | Facility: CLINIC | Age: 78
End: 2023-03-08
Payer: MEDICARE

## 2023-03-08 ENCOUNTER — PATIENT MESSAGE (OUTPATIENT)
Dept: RHEUMATOLOGY | Facility: CLINIC | Age: 78
End: 2023-03-08
Payer: MEDICARE

## 2023-03-08 DIAGNOSIS — G89.29 CHRONIC HAND PAIN, UNSPECIFIED LATERALITY: Primary | ICD-10-CM

## 2023-03-08 DIAGNOSIS — M79.643 CHRONIC HAND PAIN, UNSPECIFIED LATERALITY: Primary | ICD-10-CM

## 2023-03-08 NOTE — TELEPHONE ENCOUNTER
----- Message from Mary Jane Barrett sent at 3/8/2023  8:31 AM CST -----  Del Anand calling regarding requesting a referral to be sent to Rheumatology for the issues with his hand, he stated the doctor referred him to therapy and it did not work, call back to PT if needed  [phone]...fax# for rheum is [phone]

## 2023-03-08 NOTE — TELEPHONE ENCOUNTER
Certainly reasonable to see if Rheum has other tx options    Consult order placed- Rheum - OA hands

## 2023-03-09 ENCOUNTER — ANTI-COAG VISIT (OUTPATIENT)
Dept: CARDIOLOGY | Facility: CLINIC | Age: 78
End: 2023-03-09
Payer: MEDICARE

## 2023-03-09 ENCOUNTER — OFFICE VISIT (OUTPATIENT)
Dept: RHEUMATOLOGY | Facility: CLINIC | Age: 78
End: 2023-03-09
Payer: MEDICARE

## 2023-03-09 ENCOUNTER — LAB VISIT (OUTPATIENT)
Dept: LAB | Facility: HOSPITAL | Age: 78
End: 2023-03-09
Attending: INTERNAL MEDICINE
Payer: MEDICARE

## 2023-03-09 VITALS
SYSTOLIC BLOOD PRESSURE: 115 MMHG | BODY MASS INDEX: 39.17 KG/M2 | HEART RATE: 82 BPM | DIASTOLIC BLOOD PRESSURE: 60 MMHG | HEIGHT: 75 IN | WEIGHT: 315 LBS

## 2023-03-09 DIAGNOSIS — Z79.01 LONG TERM CURRENT USE OF ANTICOAGULANT: ICD-10-CM

## 2023-03-09 DIAGNOSIS — M79.642 BILATERAL HAND PAIN: ICD-10-CM

## 2023-03-09 DIAGNOSIS — G89.29 CHRONIC HAND PAIN, UNSPECIFIED LATERALITY: ICD-10-CM

## 2023-03-09 DIAGNOSIS — M79.643 CHRONIC HAND PAIN, UNSPECIFIED LATERALITY: ICD-10-CM

## 2023-03-09 DIAGNOSIS — Z79.01 LONG TERM CURRENT USE OF ANTICOAGULANT THERAPY: Primary | ICD-10-CM

## 2023-03-09 DIAGNOSIS — Z55.9 EDUCATIONAL CIRCUMSTANCE: ICD-10-CM

## 2023-03-09 DIAGNOSIS — M79.641 BILATERAL HAND PAIN: ICD-10-CM

## 2023-03-09 DIAGNOSIS — M79.642 BILATERAL HAND PAIN: Primary | ICD-10-CM

## 2023-03-09 DIAGNOSIS — E66.01 MORBID OBESITY WITH BMI OF 40.0-44.9, ADULT: ICD-10-CM

## 2023-03-09 DIAGNOSIS — M79.641 BILATERAL HAND PAIN: Primary | ICD-10-CM

## 2023-03-09 DIAGNOSIS — I26.99 PULMONARY EMBOLISM, UNSPECIFIED CHRONICITY, UNSPECIFIED PULMONARY EMBOLISM TYPE, UNSPECIFIED WHETHER ACUTE COR PULMONALE PRESENT: ICD-10-CM

## 2023-03-09 LAB
ALBUMIN SERPL BCP-MCNC: 3.9 G/DL (ref 3.5–5.2)
ALP SERPL-CCNC: 90 U/L (ref 55–135)
ALT SERPL W/O P-5'-P-CCNC: 26 U/L (ref 10–44)
ANION GAP SERPL CALC-SCNC: 11 MMOL/L (ref 8–16)
AST SERPL-CCNC: 28 U/L (ref 10–40)
BASOPHILS # BLD AUTO: 0.04 K/UL (ref 0–0.2)
BASOPHILS NFR BLD: 0.6 % (ref 0–1.9)
BILIRUB SERPL-MCNC: 0.7 MG/DL (ref 0.1–1)
BUN SERPL-MCNC: 21 MG/DL (ref 8–23)
CALCIUM SERPL-MCNC: 9.6 MG/DL (ref 8.7–10.5)
CCP AB SER IA-ACNC: <0.5 U/ML
CHLORIDE SERPL-SCNC: 106 MMOL/L (ref 95–110)
CO2 SERPL-SCNC: 24 MMOL/L (ref 23–29)
CREAT SERPL-MCNC: 0.8 MG/DL (ref 0.5–1.4)
CRP SERPL-MCNC: <0.3 MG/L (ref 0–8.2)
DIFFERENTIAL METHOD: ABNORMAL
EOSINOPHIL # BLD AUTO: 0.1 K/UL (ref 0–0.5)
EOSINOPHIL NFR BLD: 1.3 % (ref 0–8)
ERYTHROCYTE [DISTWIDTH] IN BLOOD BY AUTOMATED COUNT: 13.2 % (ref 11.5–14.5)
ERYTHROCYTE [SEDIMENTATION RATE] IN BLOOD BY PHOTOMETRIC METHOD: 10 MM/HR (ref 0–23)
EST. GFR  (NO RACE VARIABLE): >60 ML/MIN/1.73 M^2
GLUCOSE SERPL-MCNC: 90 MG/DL (ref 70–110)
HCT VFR BLD AUTO: 39.6 % (ref 40–54)
HGB BLD-MCNC: 13.2 G/DL (ref 14–18)
IMM GRANULOCYTES # BLD AUTO: 0.02 K/UL (ref 0–0.04)
IMM GRANULOCYTES NFR BLD AUTO: 0.3 % (ref 0–0.5)
INR PPP: 2.3
LYMPHOCYTES # BLD AUTO: 1.8 K/UL (ref 1–4.8)
LYMPHOCYTES NFR BLD: 26.1 % (ref 18–48)
MCH RBC QN AUTO: 32.4 PG (ref 27–31)
MCHC RBC AUTO-ENTMCNC: 33.3 G/DL (ref 32–36)
MCV RBC AUTO: 97 FL (ref 82–98)
MONOCYTES # BLD AUTO: 0.7 K/UL (ref 0.3–1)
MONOCYTES NFR BLD: 10.5 % (ref 4–15)
NEUTROPHILS # BLD AUTO: 4.2 K/UL (ref 1.8–7.7)
NEUTROPHILS NFR BLD: 61.2 % (ref 38–73)
NRBC BLD-RTO: 0 /100 WBC
PLATELET # BLD AUTO: 194 K/UL (ref 150–450)
PMV BLD AUTO: 10.8 FL (ref 9.2–12.9)
POTASSIUM SERPL-SCNC: 4.3 MMOL/L (ref 3.5–5.1)
PROT SERPL-MCNC: 6.9 G/DL (ref 6–8.4)
RBC # BLD AUTO: 4.07 M/UL (ref 4.6–6.2)
RHEUMATOID FACT SERPL-ACNC: <13 IU/ML (ref 0–15)
SODIUM SERPL-SCNC: 141 MMOL/L (ref 136–145)
WBC # BLD AUTO: 6.83 K/UL (ref 3.9–12.7)

## 2023-03-09 PROCEDURE — 1125F PR PAIN SEVERITY QUANTIFIED, PAIN PRESENT: ICD-10-PCS | Mod: CPTII,S$GLB,, | Performed by: INTERNAL MEDICINE

## 2023-03-09 PROCEDURE — 85652 RBC SED RATE AUTOMATED: CPT | Performed by: INTERNAL MEDICINE

## 2023-03-09 PROCEDURE — 86140 C-REACTIVE PROTEIN: CPT | Performed by: INTERNAL MEDICINE

## 2023-03-09 PROCEDURE — 3074F SYST BP LT 130 MM HG: CPT | Mod: CPTII,S$GLB,, | Performed by: INTERNAL MEDICINE

## 2023-03-09 PROCEDURE — 1157F ADVNC CARE PLAN IN RCRD: CPT | Mod: CPTII,S$GLB,, | Performed by: INTERNAL MEDICINE

## 2023-03-09 PROCEDURE — 99205 OFFICE O/P NEW HI 60 MIN: CPT | Mod: S$GLB,,, | Performed by: INTERNAL MEDICINE

## 2023-03-09 PROCEDURE — 3074F PR MOST RECENT SYSTOLIC BLOOD PRESSURE < 130 MM HG: ICD-10-PCS | Mod: CPTII,S$GLB,, | Performed by: INTERNAL MEDICINE

## 2023-03-09 PROCEDURE — 99499 UNLISTED E&M SERVICE: CPT | Mod: S$GLB,,, | Performed by: INTERNAL MEDICINE

## 2023-03-09 PROCEDURE — 99999 PR PBB SHADOW E&M-EST. PATIENT-LVL IV: ICD-10-PCS | Mod: PBBFAC,,, | Performed by: INTERNAL MEDICINE

## 2023-03-09 PROCEDURE — 1157F PR ADVANCE CARE PLAN OR EQUIV PRESENT IN MEDICAL RECORD: ICD-10-PCS | Mod: CPTII,S$GLB,, | Performed by: INTERNAL MEDICINE

## 2023-03-09 PROCEDURE — 3078F DIAST BP <80 MM HG: CPT | Mod: CPTII,S$GLB,, | Performed by: INTERNAL MEDICINE

## 2023-03-09 PROCEDURE — 1125F AMNT PAIN NOTED PAIN PRSNT: CPT | Mod: CPTII,S$GLB,, | Performed by: INTERNAL MEDICINE

## 2023-03-09 PROCEDURE — 99205 PR OFFICE/OUTPT VISIT, NEW, LEVL V, 60-74 MIN: ICD-10-PCS | Mod: S$GLB,,, | Performed by: INTERNAL MEDICINE

## 2023-03-09 PROCEDURE — 86431 RHEUMATOID FACTOR QUANT: CPT | Performed by: INTERNAL MEDICINE

## 2023-03-09 PROCEDURE — 99999 PR PBB SHADOW E&M-EST. PATIENT-LVL IV: CPT | Mod: PBBFAC,,, | Performed by: INTERNAL MEDICINE

## 2023-03-09 PROCEDURE — 93793 ANTICOAG MGMT PT WARFARIN: CPT | Mod: S$GLB,,, | Performed by: PHARMACIST

## 2023-03-09 PROCEDURE — 36415 COLL VENOUS BLD VENIPUNCTURE: CPT | Performed by: INTERNAL MEDICINE

## 2023-03-09 PROCEDURE — 86200 CCP ANTIBODY: CPT | Performed by: INTERNAL MEDICINE

## 2023-03-09 PROCEDURE — 93793 PR ANTICOAGULANT MGMT FOR PT TAKING WARFARIN: ICD-10-PCS | Mod: S$GLB,,, | Performed by: PHARMACIST

## 2023-03-09 PROCEDURE — 3078F PR MOST RECENT DIASTOLIC BLOOD PRESSURE < 80 MM HG: ICD-10-PCS | Mod: CPTII,S$GLB,, | Performed by: INTERNAL MEDICINE

## 2023-03-09 PROCEDURE — 80053 COMPREHEN METABOLIC PANEL: CPT | Performed by: INTERNAL MEDICINE

## 2023-03-09 PROCEDURE — 85025 COMPLETE CBC W/AUTO DIFF WBC: CPT | Performed by: INTERNAL MEDICINE

## 2023-03-09 SDOH — SOCIAL DETERMINANTS OF HEALTH (SDOH): PROBLEMS RELATED TO EDUCATION AND LITERACY, UNSPECIFIED: Z55.9

## 2023-03-09 ASSESSMENT — ROUTINE ASSESSMENT OF PATIENT INDEX DATA (RAPID3)
FATIGUE SCORE: 0
PATIENT GLOBAL ASSESSMENT SCORE: 7
MDHAQ FUNCTION SCORE: 0.1
AM STIFFNESS SCORE: 1, YES
PAIN SCORE: 7
PSYCHOLOGICAL DISTRESS SCORE: 0
TOTAL RAPID3 SCORE: 4.78

## 2023-03-09 NOTE — PROGRESS NOTES
Patient was re-educated on situations that would require placing a call to the coumadin clinic, including bleeding or unusual bruising issues, changes in health, diet or medications, upcoming procedures that require warfarin interruption, and missed coumadin dose(s). Patient expressed understanding that avoidance of consistency with these parameters could cause fluctuations in INR, leading to more frequent visits and increase risk of adverse events. INR not at goal. Medications, chart, and patient findings reviewed. See calendar for adjustments to dose and follow up plan.

## 2023-03-09 NOTE — PATIENT INSTRUCTIONS
Try a good brand of glucosamine/chondroitin such as Cosamin ASU or Osteobiflex    I will contact Dr. Levin about using celecoxib.    You can try paraffin wax for temporary relief.

## 2023-03-09 NOTE — PROGRESS NOTES
Subjective:     Patient ID: Del Anand is a 77 y.o. male sent by Dr. Huertas for bilateral hand pain    Chief Complaint: No chief complaint on file.       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    Patient states he has been having bilateral hand pain x 10 years & it impedes his functioning. For one thing, he cannot play golf well. Making a fist hurts over MCPs.  He is not complaining much of other joint pain elsewhere. He denies more than a few minutes of AM stiffness in his hands, but has stiffness/pain in making a fist all the time.  He denies CTD sxs or skin rashes or PsO.   He has taken large doses of acetaminophen wo response.  Voltaren gel has not helped. Elastic gloves help just a tad. Running hands under warm water help only very temporarily.  He is on warfarin & cannot take any traditional NSAIDs.  Has never been given steroids for any reason.         Current Outpatient Medications   Medication Sig Dispense Refill    amLODIPine (NORVASC) 2.5 MG tablet Take 1 tablet (2.5 mg total) by mouth once daily. 90 tablet 3    aspirin 81 MG Chew Take 81 mg by mouth once daily.      cyanocobalamin 500 MCG tablet Take 500 mcg by mouth every morning. Every day      ezetimibe (ZETIA) 10 mg tablet Take 1 tablet (10 mg total) by mouth once daily. 90 tablet 3    FOLIC ACID/MULTIVITS-MIN/LUT (CENTRUM SILVER ORAL) Take 1 tablet by mouth once daily.      irbesartan (AVAPRO) 300 MG tablet Take 1 tablet (300 mg total) by mouth every evening. 90 tablet 3    rosuvastatin (CRESTOR) 40 MG Tab TAKE 1 TABLET EVERY DAY 90 tablet 3    warfarin (COUMADIN) 10 MG tablet Take 1 tablet (10 mg total) by mouth Daily. 10mg 4 days per week, 12.5mg 3 days per week.  Also uses warfarin 5mg tablet strength 90 tablet 3    warfarin (COUMADIN) 5 MG tablet Take 10mg daily except  "12.5mg Wednesdays (Patient taking differently: Take 10mg 4 days per week,  12.5mg 3 days per week, followed by Coumadin clinic) 180 tablet 3     No current facility-administered medications for this visit.       Review of patient's allergies indicates:  No Known Allergies    Review of Systems   Constitutional: Negative.  Negative for fatigue, fever and unexpected weight change.   HENT: Negative.  Negative for mouth sores.    Eyes: Negative.    Respiratory: Negative.  Negative for cough and shortness of breath.    Cardiovascular: Negative.  Negative for chest pain.   Gastrointestinal: Negative.  Negative for constipation and diarrhea.   Genitourinary:  Positive for frequency. Negative for genital sores.   Musculoskeletal: Negative.    Skin: Negative.  Negative for rash.   Neurological: Negative.  Negative for headaches.   Hematological:  Does not bruise/bleed easily.   Psychiatric/Behavioral:  Positive for sleep disturbance. Negative for dysphoric mood. The patient is not nervous/anxious.      Past Medical History:   Diagnosis Date    Benign neoplasm of colon     Cataract     CHF (congestive heart failure) 1/13/2015    Coronary artery disease     Difficult intubation     DVT (deep venous thrombosis)     HLD (hyperlipidemia)     HTN (hypertension)     Impaired fasting glucose     Kidney stone     Metabolic syndrome     Nonspecific abnormal results of liver function study     Nonspecific findings on examination of blood     Nuclear sclerosis - Both Eyes 10/18/2013    Osteoarthrosis, unspecified whether generalized or localized, lower leg     Respiratory distress     PT STATES IT WAS "CRAP", "VERY UNCOMFORTABLE"       Past Surgical History:   Procedure Laterality Date    CARDIAC SURGERY      CATARACT EXTRACTION W/  INTRAOCULAR LENS IMPLANT Right 9/17/2020    Procedure: EXTRACTION, CATARACT, WITH IOL INSERTION;  Surgeon: Chanel Klein MD;  Location: [facility];  Service: Ophthalmology;  Laterality: Right;    CATARACT " "EXTRACTION W/  INTRAOCULAR LENS IMPLANT Left 10/1/2020    Procedure: EXTRACTION, CATARACT, WITH IOL INSERTION;  Surgeon: Chanel Klein MD;  Location: [facility];  Service: Ophthalmology;  Laterality: Left;    COLONOSCOPY N/A 3/13/2019    Procedure: COLONOSCOPY;  Surgeon: Nikunj Larson MD;  Location: [facility] ENDO (2ND FLR);  Service: Endoscopy;  Laterality: N/A;  ok to hold Coumadin x 5 days with a Lovenox bridge per Coumadin clinic  2nd floor - history of difficult intubation-MS    COLONOSCOPY N/A 2022    Procedure: COLONOSCOPY;  Surgeon: Nikunj Larson MD;  Location: [facility] ENDO (2ND FLR);  Service: Endoscopy;  Laterality: N/A;  ef 45%--coumadin hold per coumadin clinic see coag visist -tb-vacc- clears 4 hrs prior-am prep 2-3-am-inst portal-tb    CORONARY ARTERY BYPASS GRAFT          CYSTOSCOPY      KIDNEY STONE SURGERY      KNEE ARTHROPLASTY      bilateral    renal stone      SKIN BIOPSY         Family History   Problem Relation Age of Onset    Heart attack Father         d. 85    Urolithiasis Father     Heart disease Father     Heart failure Father     Stroke Mother     Dementia Mother     Heart attack Maternal Grandfather         d. 65    Hypertension Paternal Grandmother     Heart attack Paternal Grandfather     Heart failure Paternal Grandfather     Other Sister         bladder lift, s/p 4     No Known Problems Daughter     No Known Problems Son    No hx of gout  Son & daughter w LBP  Grandkids ok;     Social History     Tobacco Use    Smoking status: Former     Packs/day: 1.00     Years: 20.00     Pack years: 20.00     Types: Cigarettes     Quit date: 10/16/1987     Years since quittin.4    Smokeless tobacco: Former   Substance Use Topics    Alcohol use: Yes     Alcohol/week: 0.0 standard drinks     Types: 1 - 2 Cans of beer, 1 - 2 Standard drinks or equivalent per week     Comment: daily, none today    Drug use: No       Objective:   /60   Pulse 82   Ht 6' 3" (1.905 m)   Wt " (!) 145.7 kg (321 lb 3.4 oz)   BMI 40.15 kg/m²   Accompanied by wife.  Physical Exam   Constitutional: He is oriented to person, place, and time. He appears obese. No distress.   HENT:   Head: Normocephalic and atraumatic.   Mouth/Throat: Oropharynx is clear and moist. No oropharyngeal exudate.   No facial rashes  Parotids not enlarged     Eyes: Pupils are equal, round, and reactive to light. Conjunctivae are normal. Right eye exhibits no discharge. Left eye exhibits no discharge. No scleral icterus.   Neck: No JVD present. No tracheal deviation present. No thyromegaly present.   Cardiovascular: Regular rhythm and normal heart sounds. Exam reveals no gallop and no friction rub.   No murmur heard.  Pulmonary/Chest: Effort normal and breath sounds normal. No respiratory distress. He has no wheezes. He has no rales. He exhibits no tenderness.   Abdominal: Soft. Bowel sounds are normal. He exhibits no distension and no mass. There is no abdominal tenderness. There is no rebound and no guarding.   Obese   Musculoskeletal:         General: No tenderness. Normal range of motion.      Cervical back: Neck supple.      Right lower leg: Edema present.      Left lower leg: Edema present.      Comments: Has mild decrease in extension, rotation, lateral flexion of Cspine.  FROM upper extremities.  Has bilateral Heberden's & Amara's that are not TTP.  Makes adequate fists that are painful.  Minimal suggestion of thickening of MCPs.  Minimal suggestion of wrist puffiness.  But ROM is full.  Hips ok;  Knees w bilateral bony hypertrophy & waylon PF crepitus; no synovitis  Feet wo metatarsalgia.       Lymphadenopathy:     He has no cervical adenopathy.   Neurological: He is alert and oriented to person, place, and time. He has normal reflexes. No cranial nerve deficit. Gait normal.   Proximal and distal muscle strength 5/5.   Skin: Skin is warm and dry. No rash noted. He is not diaphoretic.   Psychiatric: His behavior is normal.  Mood, memory, affect, judgment and thought content normal.   Vitals reviewed.      11/22/22: CBC Hg 13.9; CMP BUN 26; glu 150;    11/29/22: Bilateral hands: personally viewed: bilat DJD DIPs & 1st C-MCP jts  8/21/20 R foot: personally viewed: inferior calcaneal spur; mild OA  2/3/11: Bilateral knees: personally viewed: mild L mjsn & waylon PF jsn;   Assessment:   Bilateral hand pain   OA changes on imaging   Metacarpal pain on making fists   R/O RA or inflammatory arthritis--doubt     Bilateral Knee OA   W probable popliteal cyst on R    Multiple morbidities               ;  CAD S/P CABG  S/P PE 1 month following CABG  S/P DVT on DOAC  Metabolic syndromeHTN  HLD  Morbid obesity  Plan:   Reviewed & educated on possibilities.  Likely dealing w OA but MCP location of pain slightly concerning.  In an abundance of caution:   Labs today to include ESR/CRP/RF/CCP  MRI of hands  Can start cosamin ASU empirically  Consider celecoxib, however need Dr. Levin's ok.  We can begin this b/f MRI but hold a few days b/f MRI.  Patient to contact us after MRI is done.    CC: Amber Huertas MD  CC: Blanquita Levin MD    Addendum: 3/9/23: CRP <0.3; CBC Hg 13.2; Ht 39.6; CMP ok;

## 2023-03-09 NOTE — Clinical Note
Hi Cory: He seems to really be inconvenience by his hand pain & I doubt an inflammatory arthritis, but am getting an MRI. What do you think about celecoxib--carrasco 2 inhibitor (for his joint pain). It is the only NSAID that can be given with warfarin without increasing risk of bleeding. However, there is the potential for other CV side effects. Are you ok with it?  Rajani

## 2023-03-12 DIAGNOSIS — Z79.01 LONG TERM CURRENT USE OF ANTICOAGULANT THERAPY: ICD-10-CM

## 2023-03-13 ENCOUNTER — PATIENT MESSAGE (OUTPATIENT)
Dept: RHEUMATOLOGY | Facility: CLINIC | Age: 78
End: 2023-03-13
Payer: MEDICARE

## 2023-03-13 RX ORDER — WARFARIN 10 MG/1
TABLET ORAL
Qty: 90 TABLET | Refills: 3 | Status: SHIPPED | OUTPATIENT
Start: 2023-03-13 | End: 2024-02-01 | Stop reason: SDUPTHER

## 2023-03-20 RX ORDER — ROSUVASTATIN CALCIUM 40 MG/1
40 TABLET, COATED ORAL DAILY
Qty: 90 TABLET | Refills: 3 | Status: SHIPPED | OUTPATIENT
Start: 2023-03-20 | End: 2024-02-29

## 2023-03-20 RX ORDER — IRBESARTAN 300 MG/1
300 TABLET ORAL NIGHTLY
Qty: 90 TABLET | Refills: 3 | Status: SHIPPED | OUTPATIENT
Start: 2023-03-20 | End: 2024-01-31 | Stop reason: SDUPTHER

## 2023-03-22 ENCOUNTER — ANTI-COAG VISIT (OUTPATIENT)
Dept: CARDIOLOGY | Facility: CLINIC | Age: 78
End: 2023-03-22
Payer: MEDICARE

## 2023-03-22 DIAGNOSIS — I26.99 PULMONARY EMBOLISM, UNSPECIFIED CHRONICITY, UNSPECIFIED PULMONARY EMBOLISM TYPE, UNSPECIFIED WHETHER ACUTE COR PULMONALE PRESENT: ICD-10-CM

## 2023-03-22 DIAGNOSIS — Z79.01 LONG TERM CURRENT USE OF ANTICOAGULANT THERAPY: Primary | ICD-10-CM

## 2023-03-22 LAB — INR PPP: 2.1

## 2023-03-22 PROCEDURE — 93793 ANTICOAG MGMT PT WARFARIN: CPT | Mod: S$GLB,,, | Performed by: PHARMACIST

## 2023-03-22 PROCEDURE — 93793 PR ANTICOAGULANT MGMT FOR PT TAKING WARFARIN: ICD-10-PCS | Mod: S$GLB,,, | Performed by: PHARMACIST

## 2023-03-22 NOTE — PROGRESS NOTES
INR not at goal. Medications, chart, and patient findings reviewed. See calendar for adjustments to dose and follow up plan.  Patient confirmed taking all correct doses of warfarin. Pt has started taking Glucosamine OTC

## 2023-03-29 ENCOUNTER — ANTI-COAG VISIT (OUTPATIENT)
Dept: CARDIOLOGY | Facility: CLINIC | Age: 78
End: 2023-03-29
Payer: MEDICARE

## 2023-03-29 ENCOUNTER — HOSPITAL ENCOUNTER (OUTPATIENT)
Dept: RADIOLOGY | Facility: HOSPITAL | Age: 78
Discharge: HOME | End: 2023-03-29
Attending: INTERNAL MEDICINE
Payer: MEDICARE

## 2023-03-29 ENCOUNTER — PATIENT MESSAGE (OUTPATIENT)
Dept: RHEUMATOLOGY | Facility: CLINIC | Age: 78
End: 2023-03-29
Payer: MEDICARE

## 2023-03-29 DIAGNOSIS — M79.642 BILATERAL HAND PAIN: ICD-10-CM

## 2023-03-29 DIAGNOSIS — I26.99 PULMONARY EMBOLISM, UNSPECIFIED CHRONICITY, UNSPECIFIED PULMONARY EMBOLISM TYPE, UNSPECIFIED WHETHER ACUTE COR PULMONALE PRESENT: ICD-10-CM

## 2023-03-29 DIAGNOSIS — M79.641 BILATERAL HAND PAIN: ICD-10-CM

## 2023-03-29 DIAGNOSIS — Z79.01 LONG TERM CURRENT USE OF ANTICOAGULANT THERAPY: Primary | ICD-10-CM

## 2023-03-29 LAB — INR PPP: 3

## 2023-03-29 PROCEDURE — 73223 MRI JOINT UPR EXTR W/O&W/DYE: CPT | Mod: TC,50

## 2023-03-29 PROCEDURE — 25500020 PHARM REV CODE 255: Performed by: INTERNAL MEDICINE

## 2023-03-29 PROCEDURE — 93793 ANTICOAG MGMT PT WARFARIN: CPT | Mod: S$GLB,,, | Performed by: PHARMACIST

## 2023-03-29 PROCEDURE — 93793 PR ANTICOAGULANT MGMT FOR PT TAKING WARFARIN: ICD-10-PCS | Mod: S$GLB,,, | Performed by: PHARMACIST

## 2023-03-29 PROCEDURE — A9585 GADOBUTROL INJECTION: HCPCS | Performed by: INTERNAL MEDICINE

## 2023-03-29 PROCEDURE — 73223 MRI JOINT UPR EXTR W/O&W/DYE: CPT | Mod: 26,50,GC, | Performed by: RADIOLOGY

## 2023-03-29 PROCEDURE — 73223 MRI HAND_WRIST W WO BILATERAL_RHEUMATOID ARTHRITIS: ICD-10-PCS | Mod: 26,50,GC, | Performed by: RADIOLOGY

## 2023-03-29 RX ORDER — GADOBUTROL 604.72 MG/ML
10 INJECTION INTRAVENOUS
Status: COMPLETED | OUTPATIENT
Start: 2023-03-29 | End: 2023-03-29

## 2023-03-29 RX ADMIN — GADOBUTROL 10 ML: 604.72 INJECTION INTRAVENOUS at 06:03

## 2023-04-12 ENCOUNTER — ANTI-COAG VISIT (OUTPATIENT)
Dept: CARDIOLOGY | Facility: CLINIC | Age: 78
End: 2023-04-12
Payer: MEDICARE

## 2023-04-12 DIAGNOSIS — Z79.01 LONG TERM CURRENT USE OF ANTICOAGULANT THERAPY: Primary | ICD-10-CM

## 2023-04-12 DIAGNOSIS — I26.99 PULMONARY EMBOLISM, UNSPECIFIED CHRONICITY, UNSPECIFIED PULMONARY EMBOLISM TYPE, UNSPECIFIED WHETHER ACUTE COR PULMONALE PRESENT: ICD-10-CM

## 2023-04-12 LAB — INR PPP: 2.4

## 2023-04-12 PROCEDURE — 93793 PR ANTICOAGULANT MGMT FOR PT TAKING WARFARIN: ICD-10-PCS | Mod: S$GLB,,, | Performed by: PHARMACIST

## 2023-04-12 PROCEDURE — 93793 ANTICOAG MGMT PT WARFARIN: CPT | Mod: S$GLB,,, | Performed by: PHARMACIST

## 2023-04-26 ENCOUNTER — ANTI-COAG VISIT (OUTPATIENT)
Dept: CARDIOLOGY | Facility: CLINIC | Age: 78
End: 2023-04-26
Payer: MEDICARE

## 2023-04-26 DIAGNOSIS — Z79.01 LONG TERM CURRENT USE OF ANTICOAGULANT THERAPY: Primary | ICD-10-CM

## 2023-04-26 LAB — INR PPP: 3

## 2023-04-26 PROCEDURE — 93793 PR ANTICOAGULANT MGMT FOR PT TAKING WARFARIN: ICD-10-PCS | Mod: S$GLB,,,

## 2023-04-26 PROCEDURE — 93793 ANTICOAG MGMT PT WARFARIN: CPT | Mod: S$GLB,,,

## 2023-05-01 ENCOUNTER — PES CALL (OUTPATIENT)
Dept: ADMINISTRATIVE | Facility: CLINIC | Age: 78
End: 2023-05-01
Payer: MEDICARE

## 2023-05-09 ENCOUNTER — PATIENT MESSAGE (OUTPATIENT)
Dept: INTERNAL MEDICINE | Facility: CLINIC | Age: 78
End: 2023-05-09
Payer: MEDICARE

## 2023-05-10 ENCOUNTER — ANTI-COAG VISIT (OUTPATIENT)
Dept: CARDIOLOGY | Facility: CLINIC | Age: 78
End: 2023-05-10
Payer: MEDICARE

## 2023-05-10 ENCOUNTER — PATIENT MESSAGE (OUTPATIENT)
Dept: INTERNAL MEDICINE | Facility: CLINIC | Age: 78
End: 2023-05-10
Payer: MEDICARE

## 2023-05-10 DIAGNOSIS — R73.01 IMPAIRED FASTING GLUCOSE: ICD-10-CM

## 2023-05-10 DIAGNOSIS — I26.99 PULMONARY EMBOLISM, UNSPECIFIED CHRONICITY, UNSPECIFIED PULMONARY EMBOLISM TYPE, UNSPECIFIED WHETHER ACUTE COR PULMONALE PRESENT: ICD-10-CM

## 2023-05-10 DIAGNOSIS — Z79.01 LONG TERM CURRENT USE OF ANTICOAGULANT THERAPY: Primary | ICD-10-CM

## 2023-05-10 DIAGNOSIS — D50.9 IRON DEFICIENCY ANEMIA, UNSPECIFIED IRON DEFICIENCY ANEMIA TYPE: Primary | ICD-10-CM

## 2023-05-10 LAB — INR PPP: 2.7

## 2023-05-10 PROCEDURE — 93793 ANTICOAG MGMT PT WARFARIN: CPT | Mod: S$GLB,,,

## 2023-05-10 PROCEDURE — 93793 PR ANTICOAGULANT MGMT FOR PT TAKING WARFARIN: ICD-10-PCS | Mod: S$GLB,,,

## 2023-05-11 ENCOUNTER — PATIENT MESSAGE (OUTPATIENT)
Dept: INTERNAL MEDICINE | Facility: CLINIC | Age: 78
End: 2023-05-11
Payer: MEDICARE

## 2023-05-11 NOTE — TELEPHONE ENCOUNTER
Pt has a CMP and lipid done yesterday for Dr. Levin. Will he need any additional lab for his upcoming visit?   Pt is scheduled for a 6 month f/u.

## 2023-05-16 ENCOUNTER — OFFICE VISIT (OUTPATIENT)
Dept: CARDIOLOGY | Facility: CLINIC | Age: 78
End: 2023-05-16
Payer: MEDICARE

## 2023-05-16 VITALS
SYSTOLIC BLOOD PRESSURE: 126 MMHG | DIASTOLIC BLOOD PRESSURE: 62 MMHG | HEIGHT: 75 IN | BODY MASS INDEX: 39.09 KG/M2 | WEIGHT: 314.38 LBS | HEART RATE: 84 BPM

## 2023-05-16 DIAGNOSIS — G47.33 OSA ON CPAP: ICD-10-CM

## 2023-05-16 DIAGNOSIS — I26.99 PULMONARY EMBOLISM, UNSPECIFIED CHRONICITY, UNSPECIFIED PULMONARY EMBOLISM TYPE, UNSPECIFIED WHETHER ACUTE COR PULMONALE PRESENT: ICD-10-CM

## 2023-05-16 DIAGNOSIS — Z95.1 S/P CABG X 2: ICD-10-CM

## 2023-05-16 DIAGNOSIS — I82.591 CHRONIC DEEP VEIN THROMBOSIS (DVT) OF OTHER VEIN OF RIGHT LOWER EXTREMITY: ICD-10-CM

## 2023-05-16 DIAGNOSIS — I65.22 STENOSIS OF LEFT CAROTID ARTERY: ICD-10-CM

## 2023-05-16 DIAGNOSIS — I10 ESSENTIAL HYPERTENSION: ICD-10-CM

## 2023-05-16 DIAGNOSIS — I25.10 CORONARY ARTERY DISEASE INVOLVING NATIVE CORONARY ARTERY OF NATIVE HEART WITHOUT ANGINA PECTORIS: Primary | ICD-10-CM

## 2023-05-16 DIAGNOSIS — E78.2 MIXED HYPERLIPIDEMIA: ICD-10-CM

## 2023-05-16 DIAGNOSIS — Z79.01 LONG TERM CURRENT USE OF ANTICOAGULANT THERAPY: ICD-10-CM

## 2023-05-16 PROCEDURE — 1101F PT FALLS ASSESS-DOCD LE1/YR: CPT | Mod: CPTII,,, | Performed by: PHYSICIAN ASSISTANT

## 2023-05-16 PROCEDURE — 99213 OFFICE O/P EST LOW 20 MIN: CPT | Mod: PO | Performed by: PHYSICIAN ASSISTANT

## 2023-05-16 PROCEDURE — 1101F PR PT FALLS ASSESS DOC 0-1 FALLS W/OUT INJ PAST YR: ICD-10-PCS | Mod: CPTII,,, | Performed by: PHYSICIAN ASSISTANT

## 2023-05-16 PROCEDURE — 99214 OFFICE O/P EST MOD 30 MIN: CPT | Mod: 25,,, | Performed by: PHYSICIAN ASSISTANT

## 2023-05-16 PROCEDURE — 3078F DIAST BP <80 MM HG: CPT | Mod: CPTII,,, | Performed by: PHYSICIAN ASSISTANT

## 2023-05-16 PROCEDURE — 1157F ADVNC CARE PLAN IN RCRD: CPT | Mod: CPTII,,, | Performed by: PHYSICIAN ASSISTANT

## 2023-05-16 PROCEDURE — 3288F FALL RISK ASSESSMENT DOCD: CPT | Mod: CPTII,,, | Performed by: PHYSICIAN ASSISTANT

## 2023-05-16 PROCEDURE — 99999 PR PBB SHADOW E&M-EST. PATIENT-LVL III: ICD-10-PCS | Mod: PBBFAC,,, | Performed by: PHYSICIAN ASSISTANT

## 2023-05-16 PROCEDURE — 99214 PR OFFICE/OUTPT VISIT, EST, LEVL IV, 30-39 MIN: ICD-10-PCS | Mod: 25,,, | Performed by: PHYSICIAN ASSISTANT

## 2023-05-16 PROCEDURE — 1159F MED LIST DOCD IN RCRD: CPT | Mod: CPTII,,, | Performed by: PHYSICIAN ASSISTANT

## 2023-05-16 PROCEDURE — 3074F PR MOST RECENT SYSTOLIC BLOOD PRESSURE < 130 MM HG: ICD-10-PCS | Mod: CPTII,,, | Performed by: PHYSICIAN ASSISTANT

## 2023-05-16 PROCEDURE — 99999 PR PBB SHADOW E&M-EST. PATIENT-LVL III: CPT | Mod: PBBFAC,,, | Performed by: PHYSICIAN ASSISTANT

## 2023-05-16 PROCEDURE — 1126F PR PAIN SEVERITY QUANTIFIED, NO PAIN PRESENT: ICD-10-PCS | Mod: CPTII,,, | Performed by: PHYSICIAN ASSISTANT

## 2023-05-16 PROCEDURE — 93010 EKG 12-LEAD: ICD-10-PCS | Mod: S$GLB,,, | Performed by: INTERNAL MEDICINE

## 2023-05-16 PROCEDURE — 3288F PR FALLS RISK ASSESSMENT DOCUMENTED: ICD-10-PCS | Mod: CPTII,,, | Performed by: PHYSICIAN ASSISTANT

## 2023-05-16 PROCEDURE — 3074F SYST BP LT 130 MM HG: CPT | Mod: CPTII,,, | Performed by: PHYSICIAN ASSISTANT

## 2023-05-16 PROCEDURE — 3078F PR MOST RECENT DIASTOLIC BLOOD PRESSURE < 80 MM HG: ICD-10-PCS | Mod: CPTII,,, | Performed by: PHYSICIAN ASSISTANT

## 2023-05-16 PROCEDURE — 1159F PR MEDICATION LIST DOCUMENTED IN MEDICAL RECORD: ICD-10-PCS | Mod: CPTII,,, | Performed by: PHYSICIAN ASSISTANT

## 2023-05-16 PROCEDURE — 93010 ELECTROCARDIOGRAM REPORT: CPT | Mod: S$GLB,,, | Performed by: INTERNAL MEDICINE

## 2023-05-16 PROCEDURE — 93005 ELECTROCARDIOGRAM TRACING: CPT | Mod: S$GLB,,, | Performed by: INTERNAL MEDICINE

## 2023-05-16 PROCEDURE — 1126F AMNT PAIN NOTED NONE PRSNT: CPT | Mod: CPTII,,, | Performed by: PHYSICIAN ASSISTANT

## 2023-05-16 PROCEDURE — 93005 EKG 12-LEAD: ICD-10-PCS | Mod: S$GLB,,, | Performed by: INTERNAL MEDICINE

## 2023-05-16 PROCEDURE — 1157F PR ADVANCE CARE PLAN OR EQUIV PRESENT IN MEDICAL RECORD: ICD-10-PCS | Mod: CPTII,,, | Performed by: PHYSICIAN ASSISTANT

## 2023-05-16 NOTE — PROGRESS NOTES
Cardiology Clinic Note  Reason for Visit: Annual visit   TANNA lennon/ Dr. Levin: 3/30/2021    HPI:     Problem List:  CAD   CABG x 2 - SVG-LAD, SVG-ramus, 2014  HTN  Hyperlipidemia   Obesity   Prior tobacco use - 1ppd x 20 yrs (quit 1987)   Pulm embolism 1/15   DVT (R) leg while on rivaroxaban  VERONICA, CPAP compliant       Del Anand is a 78 y.o. male who presents to clinic for annual visit.  He denies any acute complaints today.  He is currently caring for his wife who had a complicated foot surgery.  He has a history of pulmonary embolism and DVT while on Xarelto.  He is previously been evaluated by Hematology in 2014, and although he was recommended to continue lifelong anticoagulation there was no definitive diagnosis at that time.  He would like to return to hematology to evaluate if possible diagnosis can be made so that he can discuss in advise his children and grandchildren accordingly.  He is compliant with his CPAP.  He underwent a PET stress test in 2020 that was negative for ischemia.  His blood work drawn May 10th including CMP and lipid are reviewed and were within normal limits.  He is planning on going on a cruise around [place] in [place].  He golfs regularly, but does not exercise consistently.  He is motivated to begin exercising again, and plans to try to lose weight by using a meal plan. He denies chest pain/pressure/tightness/discomfort, dyspnea on regular exertion, orthopnea, PND, peripheral edema, sustained palpitations, syncope or claudication symptoms.        ROS:    Pertinent ROS included in HPI and below.  PMH:     Past Medical History:   Diagnosis Date    Benign neoplasm of colon     Cataract     CHF (congestive heart failure) 1/13/2015    Coronary artery disease     Difficult intubation     DVT (deep venous thrombosis)     HLD (hyperlipidemia)     HTN (hypertension)     Impaired fasting glucose     Kidney stone     Metabolic syndrome     Nonspecific abnormal results  "of liver function study     Nonspecific findings on examination of blood     Nuclear sclerosis - Both Eyes 10/18/2013    Osteoarthrosis, unspecified whether generalized or localized, lower leg     Respiratory distress     PT STATES IT WAS "CRAP", "VERY UNCOMFORTABLE"     Past Surgical History:   Procedure Laterality Date    CARDIAC SURGERY      CATARACT EXTRACTION W/  INTRAOCULAR LENS IMPLANT Right 9/17/2020    Procedure: EXTRACTION, CATARACT, WITH IOL INSERTION;  Surgeon: Chanel Klein MD;  Location: [facility] OR;  Service: Ophthalmology;  Laterality: Right;    CATARACT EXTRACTION W/  INTRAOCULAR LENS IMPLANT Left 10/1/2020    Procedure: EXTRACTION, CATARACT, WITH IOL INSERTION;  Surgeon: Chanel Klein MD;  Location: [facility] OR;  Service: Ophthalmology;  Laterality: Left;    COLONOSCOPY N/A 3/13/2019    Procedure: COLONOSCOPY;  Surgeon: Nikunj Larsno MD;  Location: [facility] ([address] [place] [address]);  Service: Endoscopy;  Laterality: N/A;  ok to hold Coumadin x 5 days with a Lovenox bridge per Coumadin clinic  2nd floor - history of difficult intubation-MS    COLONOSCOPY N/A 7/7/2022    Procedure: COLONOSCOPY;  Surgeon: Nikunj Larson MD;  Location: [facility] ([address] [place] [address]);  Service: Endoscopy;  Laterality: N/A;  ef 45%-5/31-coumadin hold per coumadin clinic see coag visist 6/29-tb-vacc- clears 4 hrs prior-am prep 2-3-am-inst portal-tb    CORONARY ARTERY BYPASS GRAFT      2014    CYSTOSCOPY      KIDNEY STONE SURGERY      KNEE ARTHROPLASTY      bilateral    renal stone      SKIN BIOPSY       Allergies:   Review of patient's allergies indicates:  No Known Allergies  Medications:     Current Outpatient Medications on File Prior to Visit   Medication Sig Dispense Refill    amLODIPine (NORVASC) 2.5 MG tablet Take 1 tablet (2.5 mg total) by mouth once daily. 90 tablet 3    aspirin 81 MG Chew Take 81 mg by mouth once daily.      cyanocobalamin 500 MCG tablet Take 500 mcg by mouth every morning. Every day      ezetimibe (ZETIA) 10 mg tablet " "Take 1 tablet (10 mg total) by mouth once daily. 90 tablet 3    FOLIC ACID/MULTIVITS-MIN/LUT (CENTRUM SILVER ORAL) Take 1 tablet by mouth once daily.      irbesartan (AVAPRO) 300 MG tablet Take 1 tablet (300 mg total) by mouth every evening. 90 tablet 3    rosuvastatin (CRESTOR) 40 MG Tab Take 1 tablet (40 mg total) by mouth once daily. 90 tablet 3    warfarin (COUMADIN) 10 MG tablet Take 10mg daily except 12.5mg on  or as directed by Coumadin Clinic.   Also uses warfarin 5mg tablet strength 90 tablet 3    warfarin (COUMADIN) 5 MG tablet Take 10mg daily except 12.5mg  (Patient taking differently: Take 10mg 4 days per week,  12.5mg 3 days per week, followed by Coumadin clinic) 180 tablet 3     No current facility-administered medications on file prior to visit.     Social History:     Social History     Tobacco Use    Smoking status: Former     Packs/day: 1.00     Years: 20.00     Pack years: 20.00     Types: Cigarettes     Quit date: 10/16/1987     Years since quittin.6    Smokeless tobacco: Former   Substance Use Topics    Alcohol use: Yes     Alcohol/week: 0.0 standard drinks     Types: 1 - 2 Cans of beer, 1 - 2 Standard drinks or equivalent per week     Comment: daily, none today     Family History:     Family History   Problem Relation Age of Onset    Heart attack Father         d. 85    Urolithiasis Father     Heart disease Father     Heart failure Father     Stroke Mother     Dementia Mother     Heart attack Maternal Grandfather         d. 65    Hypertension Paternal Grandmother     Heart attack Paternal Grandfather     Heart failure Paternal Grandfather     Other Sister         bladder lift, s/p 4     No Known Problems Daughter     No Known Problems Son      Physical Exam:   /62   Pulse 84   Ht 6' 3" (1.905 m)   Wt (!) 142.6 kg (314 lb 6 oz)   BMI 39.29 kg/m²      Physical Exam  Vitals and nursing note reviewed.   Constitutional:       Appearance: Normal appearance. He " is obese.   HENT:      Head: Normocephalic and atraumatic.   Neck:      Vascular: No carotid bruit or hepatojugular reflux.   Cardiovascular:      Rate and Rhythm: Normal rate and regular rhythm.      Pulses:           Radial pulses are 2+ on the right side and 2+ on the left side.        Dorsalis pedis pulses are 2+ on the right side and 2+ on the left side.        Posterior tibial pulses are 2+ on the right side and 2+ on the left side.      Heart sounds: S1 normal and S2 normal.   Pulmonary:      Effort: Pulmonary effort is normal.      Breath sounds: Normal breath sounds.   Abdominal:      General: Bowel sounds are normal.      Palpations: Abdomen is soft.      Tenderness: There is no abdominal tenderness.   Feet:      Right foot:      Skin integrity: Skin integrity normal.      Left foot:      Skin integrity: Skin integrity normal.   Neurological:      Mental Status: He is alert.   Psychiatric:         Behavior: Behavior is cooperative.        Labs:     Blood Tests:  Lab Results   Component Value Date     (H) 01/13/2015     05/10/2023    K 4.9 05/10/2023     05/10/2023    CO2 28 05/10/2023    BUN 23 (H) 05/10/2023    CREATININE 0.66 05/10/2023     (H) 05/10/2023    HGBA1C 6.2 (H) 11/22/2022    MG 2.0 04/11/2015    AST 33 05/10/2023    ALT 34 05/10/2023    ALBUMIN 4.2 05/10/2023    PROT 7.3 05/10/2023    BILITOT 1.0 05/10/2023    WBC 6.83 03/09/2023    HGB 13.2 (L) 03/09/2023    HCT 39.6 (L) 03/09/2023    HCT 31 (L) 12/02/2014    MCV 97 03/09/2023     03/09/2023    INR 2.7 05/10/2023    TSH 2.390 11/22/2022       Lab Results   Component Value Date    CHOL 128 05/10/2023    HDL 45 05/10/2023    TRIG 149 05/10/2023       Lab Results   Component Value Date    LDLCALC 53.2 (L) 05/10/2023         Imaging:     Stress testing  PET Stress Test 2/27/2020    The relative PET images are normal showing no clinically significant regional resting or stress induced perfusion defects.    Whole  heart absolute myocardial perfusion (cc/min/g) averaged 0.41 cc/min/g at rest (which is reduced), 0.64 cc/min/g at stress (which is severely reduced), and 1.60 CFR (which is mildly reduced).    Stress flow is reduced diffusely throughout the heart with regions that are on the borderline of ischemic thresholds. These findings are consistent with patients history of multivessel CAD.    Gated perfusion images showed an ejection fraction of 67% at rest and 73% during stress. Normal ejection fraction is greater than 51%.    Wall motion was normal at rest and during stress.    LV cavity size is normal at rest and stress.    The EKG portion of this study is negative for ischemia.    There were no arrhythmias during stress.    The patient reported no chest pain during the stress test.    There are no prior studies for comparison.    EK2023  Normal sinus rhythm   Left axis deviation   Cannot rule out Anterior infarct ,age undetermined   Abnormal ECG   When compared with ECG of 19-MAY-2022 11:13,   The axis Shifted left     Assessment:     1. Coronary artery disease involving native coronary artery of native heart without angina pectoris    2. S/P CABG x 2    3. Mixed hyperlipidemia    4. Essential hypertension    5. Stenosis of left carotid artery    6. Pulmonary embolism, unspecified chronicity, unspecified pulmonary embolism type, unspecified whether acute cor pulmonale present    7. Long term current use of anticoagulant therapy    8. Chronic deep vein thrombosis (DVT) of other vein of right lower extremity    9. VERONICA on CPAP    10. BMI 39.0-39.9,adult        Plan:     Coronary artery disease involving native coronary artery of native heart without angina pectoris  S/P CABG x 2  Asymptomatic, continue statin and aspirin    Mixed hyperlipidemia  Stable, continue rosuvastatin 40 mg and ezetimibe 10 mg   Recommend Mediterranean diet    Essential hypertension  Stable, continue amlodipine 2.5 mg and irbesartan 300 mg    Recommend monitoring at home   Avoid dietary sodium    Stenosis of left carotid artery  Asymptomatic  Continue aspirin and statin  Last carotid ultrasound in 2017 showed 40-49% stenosis left, recommend updating prior to follow-up    Pulmonary embolism, unspecified chronicity, unspecified pulmonary embolism type, unspecified whether acute cor pulmonale present  Long term current use of anticoagulant therapy  Chronic deep vein thrombosis (DVT) of other vein of right lower extremity  -     Ambulatory referral/consult to Hematology / Oncology; Future; Expected date: 05/24/2023    VERONICA on CPAP  Encouraged continued CPAP compliance    BMI 39.0-39.9,adult  Weight loss through a combination of diet and exercise encouraged.         Signed:  Cris Soliz PA-C  Cardiology     5/17/2023 1:10 PM    Follow-up:     Future Appointments   Date Time Provider Department Center   5/23/2023 10:00 AM LAB, [facility] [place]   5/30/2023 11:00 AM Amber Huertas MD [facility] [place]   6/30/2023 11:00 AM Pricilla Fraga MD [facility]

## 2023-05-24 ENCOUNTER — HOSPITAL ENCOUNTER (OUTPATIENT)
Dept: CARDIOLOGY | Facility: HOSPITAL | Age: 78
Discharge: HOME | End: 2023-05-24
Attending: PHYSICIAN ASSISTANT
Payer: MEDICARE

## 2023-05-24 DIAGNOSIS — I65.22 STENOSIS OF LEFT CAROTID ARTERY: ICD-10-CM

## 2023-05-24 LAB
LEFT ARM DIASTOLIC BLOOD PRESSURE: 80 MMHG
LEFT ARM SYSTOLIC BLOOD PRESSURE: 140 MMHG
LEFT CBA DIAS: 11 CM/S
LEFT CBA SYS: 68 CM/S
LEFT CCA DIST DIAS: 9 CM/S
LEFT CCA DIST SYS: 63 CM/S
LEFT CCA MID DIAS: 15 CM/S
LEFT CCA MID SYS: 91 CM/S
LEFT CCA PROX DIAS: 15 CM/S
LEFT CCA PROX SYS: 84 CM/S
LEFT ECA DIAS: 14 CM/S
LEFT ECA SYS: 174 CM/S
LEFT ICA DIST DIAS: 17 CM/S
LEFT ICA DIST SYS: 96 CM/S
LEFT ICA MID DIAS: 21 CM/S
LEFT ICA MID SYS: 119 CM/S
LEFT ICA PROX DIAS: 20 CM/S
LEFT ICA PROX SYS: 160 CM/S
LEFT VERTEBRAL DIAS: 9 CM/S
LEFT VERTEBRAL SYS: 48 CM/S
OHS CV CAROTID RIGHT ICA EDV HIGHEST: 21
OHS CV CAROTID ULTRASOUND LEFT ICA/CCA RATIO: 2.54
OHS CV CAROTID ULTRASOUND RIGHT ICA/CCA RATIO: 1.03
OHS CV PV CAROTID LEFT HIGHEST CCA: 91
OHS CV PV CAROTID LEFT HIGHEST ICA: 160
OHS CV PV CAROTID RIGHT HIGHEST CCA: 85
OHS CV PV CAROTID RIGHT HIGHEST ICA: 78
OHS CV US CAROTID LEFT HIGHEST EDV: 21
RIGHT ARM DIASTOLIC BLOOD PRESSURE: 80 MMHG
RIGHT ARM SYSTOLIC BLOOD PRESSURE: 130 MMHG
RIGHT CBA DIAS: 12 CM/S
RIGHT CBA SYS: 73 CM/S
RIGHT CCA DIST DIAS: 9 CM/S
RIGHT CCA DIST SYS: 76 CM/S
RIGHT CCA MID DIAS: 15 CM/S
RIGHT CCA MID SYS: 85 CM/S
RIGHT CCA PROX DIAS: 17 CM/S
RIGHT CCA PROX SYS: 76 CM/S
RIGHT ECA DIAS: 8 CM/S
RIGHT ECA SYS: 116 CM/S
RIGHT ICA DIST DIAS: 15 CM/S
RIGHT ICA DIST SYS: 67 CM/S
RIGHT ICA MID DIAS: 21 CM/S
RIGHT ICA MID SYS: 78 CM/S
RIGHT ICA PROX DIAS: 13 CM/S
RIGHT ICA PROX SYS: 77 CM/S
RIGHT VERTEBRAL DIAS: 7 CM/S
RIGHT VERTEBRAL SYS: 46 CM/S

## 2023-05-24 PROCEDURE — 93880 CV US DOPPLER CAROTID (CUPID ONLY): ICD-10-PCS | Mod: 26,,, | Performed by: INTERNAL MEDICINE

## 2023-05-24 PROCEDURE — 93880 EXTRACRANIAL BILAT STUDY: CPT

## 2023-05-24 PROCEDURE — 93880 EXTRACRANIAL BILAT STUDY: CPT | Mod: 26,,, | Performed by: INTERNAL MEDICINE

## 2023-05-26 ENCOUNTER — TELEPHONE (OUTPATIENT)
Dept: OPTOMETRY | Facility: CLINIC | Age: 78
End: 2023-05-26
Payer: MEDICARE

## 2023-05-26 NOTE — TELEPHONE ENCOUNTER
----- Message from Ronald Gamez MA sent at 5/26/2023  4:07 PM CDT -----  Regarding: FW: Appt  Contact: Pt  Please contact pt about an issues he has about his cataract Sx Pt refused a routine eye exam with Dr. Gentile   ----- Message -----  From: Jacinda Gore  Sent: 5/26/2023   9:20 AM CDT  To: Ronald Gamez MA, Sussy Cosby  Subject: FW: Appt                                           ----- Message -----  From: Nelida Sutton MA  Sent: 5/26/2023   9:14 AM CDT  To: Krupa MALONEY Staff  Subject: FW: Appt                                         Patient due for routine eye exam with Dr. Gentile    ----- Message -----  From: Jody Miranda  Sent: 5/26/2023   9:03 AM CDT  To: Ernie TYLER Staff  Subject: Appt                                             Pt is requesting a callback in regards to scheduling a follow up from past cataract surgery. Pt stated he is having trouble seeing at a distance. Please adv pt      Confirmed contact below:   Contact Name:Del Anand  Phone Number: [phone]  or [phone]

## 2023-05-26 NOTE — TELEPHONE ENCOUNTER
----- Message from Jacinda Gore sent at 5/26/2023  9:19 AM CDT -----  Regarding: FW: Appt  Contact: Pt    ----- Message -----  From: Nelida Sutton MA  Sent: 5/26/2023   9:14 AM CDT  To: Krupa MALONEY Staff  Subject: FW: Appt                                         Patient due for routine eye exam with Dr. Gentile    ----- Message -----  From: Jody Miranda  Sent: 5/26/2023   9:03 AM CDT  To: Ernie TLYER Staff  Subject: Appt                                             Pt is requesting a callback in regards to scheduling a follow up from past cataract surgery. Pt stated he is having trouble seeing at a distance. Please adv pt      Confirmed contact below:   Contact Name:Del Anand  Phone Number: [phone]  or [phone]        
Clinic spoke to pt to schedule annual eye exam with Dr. Gentile. Pt declined appt with Dr. Gentile and requested an appointment with Dr. Klein.    Pt stated that he had cataract sx with Dr. Klein (2020) and believes that his vision has changed since the surgery. Pt states that he can no longer see clearly while golfing and uses a CPAP machine which is causing discharge in his eyes. Pt states that he is concerned about the possibly of needing a laser treatment due to his visual changes and CPAP machine use. Clinic informed pt that a message would be sent to Dr. Klein's team and someone would reach out to him regarding his concerns. Pt noted understanding.  
You can access the [facility], offered by [employer], by registering with the following website: [email or web address]

## 2023-05-26 NOTE — TELEPHONE ENCOUNTER
Patient states decrease in contrast when golfing and waking up with crust OU (sleeps with CPAP). Discussed at length signs/symptoms of THERESA and advised patient to use AT gel at night before bed and AT's BID OU. Patient verbalized understanding.

## 2023-05-27 NOTE — PROGRESS NOTES
78 y.o. malepresents in f/u to Pre-diabetes, hypertension, and dyslipidemia  Pre- DM tx w/diet  Denied increased thirst, urination, or hypoglycemia episodes  A1C ==>    Lab Results   Component Value Date    HGBA1C 5.8 (H) 05/23/2023         HTN tx w/amlodipine 2.5 mg q.day and irbesartan 300 mg q.day  Denied HA or dizziness  BP today==>120/70    Dyslipidemia tx w/rosuvastatin 40 mg q.day and Zetia 10 mg q.  Carotid artery disease, left, no focal deficits are stroke-like symptoms noted  Coronary artery disease/ischemic cardiomyopathy  Aortic atherosclerosis, asymptomatic, no claudication  Denied unusual muscle pain or chest pain  LDL==>  Lab Results   Component Value Date    CHOL 128 05/10/2023    CHOL 123 11/22/2022    CHOL 114 (L) 05/13/2022     Lab Results   Component Value Date    HDL 45 05/10/2023    HDL 41 11/22/2022    HDL 48 05/13/2022     Lab Results   Component Value Date    LDLCALC 53.2 (L) 05/10/2023    LDLCALC 47.4 (L) 11/22/2022    LDLCALC 44.0 (L) 05/13/2022     Lab Results   Component Value Date    TRIG 149 05/10/2023    TRIG 173 (H) 11/22/2022    TRIG 110 05/13/2022     Lab Results   Component Value Date    CHOLHDL 35.2 05/10/2023    CHOLHDL 33.3 11/22/2022    CHOLHDL 42.1 05/13/2022         ROS:  No fever, chills, or night sweats  No blurry vision or visual disturbance  No dysphagia or early satiety  No palpitations or tachycardia  No cough or wheezing  No GERD or abdominal pain  No numbness or focal deficits  Remainder of review negative except as previously noted    PAST MEDICAL HX: Reviewed  PAST SURGICAL HX: Reviewed  SOCIAL HX: Reviewed  FAMILY HX: Reviewed    PHYSICAL EXAM:  VS: As noted  GENERAL: WDWN, A&O, NAD, conversant and co-operative  EYES: Conj/lids unremarkable, sclera anicteric, PERRL, EOMI  ENT: Hearing intact.   NECK: Supple w/o lymphadenopathy or thyromegaly  RESPIRATORY: Efforts are unlabored. Lungs CTAP  CARDIOVASCULAR: Heart RRR. No carotid bruits noted. 1+ pedal pulses. No LE  edema  GASTROINTESTINAL: BS+, soft , NT/ND, no HSM noted  MUSCULOSKELETAL: Gait normal. No CCE  NEUROLOGIC: BECK. No tremor noted  SKIN: warm and dry    IMPRESSION:  HTN, stable continue amlodipine 2.5 mg and irbesartan 300 mg q.day  HLD, stable continue rosuvastatin 40 mg and Zetia 10 mg q.day  Stenosis carotid,asx  ICM, stable  Aortic atherosclerosis, asx  Pre-, DM, continue diabetic diet A1c stable  BPH w/ nocturia, stable  Obesity-BMI 39.10 starting new diet- 1200 gerald/day    PLAN:  Continue present medication  Med recs as noted above  Diet  Exercise as tolerated   Weight loss, that pt has put into place w/ new diet  Call prn  RTC 6 mos

## 2023-05-30 ENCOUNTER — TELEPHONE (OUTPATIENT)
Dept: OPHTHALMOLOGY | Facility: CLINIC | Age: 78
End: 2023-05-30
Payer: MEDICARE

## 2023-05-30 ENCOUNTER — OFFICE VISIT (OUTPATIENT)
Dept: INTERNAL MEDICINE | Facility: CLINIC | Age: 78
End: 2023-05-30
Payer: MEDICARE

## 2023-05-30 VITALS
DIASTOLIC BLOOD PRESSURE: 70 MMHG | BODY MASS INDEX: 38.89 KG/M2 | OXYGEN SATURATION: 97 % | TEMPERATURE: 97 F | SYSTOLIC BLOOD PRESSURE: 120 MMHG | HEART RATE: 65 BPM | WEIGHT: 312.81 LBS | HEIGHT: 75 IN | RESPIRATION RATE: 16 BRPM

## 2023-05-30 DIAGNOSIS — N40.1 BPH ASSOCIATED WITH NOCTURIA: ICD-10-CM

## 2023-05-30 DIAGNOSIS — I25.10 CORONARY ARTERY DISEASE INVOLVING NATIVE CORONARY ARTERY OF NATIVE HEART WITHOUT ANGINA PECTORIS: ICD-10-CM

## 2023-05-30 DIAGNOSIS — E78.2 MIXED HYPERLIPIDEMIA: ICD-10-CM

## 2023-05-30 DIAGNOSIS — R73.03 PREDIABETES: ICD-10-CM

## 2023-05-30 DIAGNOSIS — I65.22 STENOSIS OF LEFT CAROTID ARTERY: ICD-10-CM

## 2023-05-30 DIAGNOSIS — I25.5 ISCHEMIC CARDIOMYOPATHY: ICD-10-CM

## 2023-05-30 DIAGNOSIS — I10 ESSENTIAL HYPERTENSION: Primary | ICD-10-CM

## 2023-05-30 DIAGNOSIS — E66.01 SEVERE OBESITY (BMI 35.0-39.9) WITH COMORBIDITY: ICD-10-CM

## 2023-05-30 DIAGNOSIS — R35.1 BPH ASSOCIATED WITH NOCTURIA: ICD-10-CM

## 2023-05-30 DIAGNOSIS — I70.0 AORTIC ATHEROSCLEROSIS: ICD-10-CM

## 2023-05-30 PROCEDURE — 1101F PT FALLS ASSESS-DOCD LE1/YR: CPT | Mod: CPTII,S$GLB,, | Performed by: INTERNAL MEDICINE

## 2023-05-30 PROCEDURE — 1159F MED LIST DOCD IN RCRD: CPT | Mod: CPTII,S$GLB,, | Performed by: INTERNAL MEDICINE

## 2023-05-30 PROCEDURE — 1157F ADVNC CARE PLAN IN RCRD: CPT | Mod: CPTII,S$GLB,, | Performed by: INTERNAL MEDICINE

## 2023-05-30 PROCEDURE — 99214 PR OFFICE/OUTPT VISIT, EST, LEVL IV, 30-39 MIN: ICD-10-PCS | Mod: S$GLB,,, | Performed by: INTERNAL MEDICINE

## 2023-05-30 PROCEDURE — 99214 OFFICE O/P EST MOD 30 MIN: CPT | Mod: S$GLB,,, | Performed by: INTERNAL MEDICINE

## 2023-05-30 PROCEDURE — 1159F PR MEDICATION LIST DOCUMENTED IN MEDICAL RECORD: ICD-10-PCS | Mod: CPTII,S$GLB,, | Performed by: INTERNAL MEDICINE

## 2023-05-30 PROCEDURE — 1126F AMNT PAIN NOTED NONE PRSNT: CPT | Mod: CPTII,S$GLB,, | Performed by: INTERNAL MEDICINE

## 2023-05-30 PROCEDURE — 3078F DIAST BP <80 MM HG: CPT | Mod: CPTII,S$GLB,, | Performed by: INTERNAL MEDICINE

## 2023-05-30 PROCEDURE — 3074F SYST BP LT 130 MM HG: CPT | Mod: CPTII,S$GLB,, | Performed by: INTERNAL MEDICINE

## 2023-05-30 PROCEDURE — 99999 PR PBB SHADOW E&M-EST. PATIENT-LVL IV: ICD-10-PCS | Mod: PBBFAC,,, | Performed by: INTERNAL MEDICINE

## 2023-05-30 PROCEDURE — 1126F PR PAIN SEVERITY QUANTIFIED, NO PAIN PRESENT: ICD-10-PCS | Mod: CPTII,S$GLB,, | Performed by: INTERNAL MEDICINE

## 2023-05-30 PROCEDURE — 3078F PR MOST RECENT DIASTOLIC BLOOD PRESSURE < 80 MM HG: ICD-10-PCS | Mod: CPTII,S$GLB,, | Performed by: INTERNAL MEDICINE

## 2023-05-30 PROCEDURE — 1160F RVW MEDS BY RX/DR IN RCRD: CPT | Mod: CPTII,S$GLB,, | Performed by: INTERNAL MEDICINE

## 2023-05-30 PROCEDURE — 3074F PR MOST RECENT SYSTOLIC BLOOD PRESSURE < 130 MM HG: ICD-10-PCS | Mod: CPTII,S$GLB,, | Performed by: INTERNAL MEDICINE

## 2023-05-30 PROCEDURE — 3288F PR FALLS RISK ASSESSMENT DOCUMENTED: ICD-10-PCS | Mod: CPTII,S$GLB,, | Performed by: INTERNAL MEDICINE

## 2023-05-30 PROCEDURE — 99999 PR PBB SHADOW E&M-EST. PATIENT-LVL IV: CPT | Mod: PBBFAC,,, | Performed by: INTERNAL MEDICINE

## 2023-05-30 PROCEDURE — 1157F PR ADVANCE CARE PLAN OR EQUIV PRESENT IN MEDICAL RECORD: ICD-10-PCS | Mod: CPTII,S$GLB,, | Performed by: INTERNAL MEDICINE

## 2023-05-30 PROCEDURE — 1160F PR REVIEW ALL MEDS BY PRESCRIBER/CLIN PHARMACIST DOCUMENTED: ICD-10-PCS | Mod: CPTII,S$GLB,, | Performed by: INTERNAL MEDICINE

## 2023-05-30 PROCEDURE — 3288F FALL RISK ASSESSMENT DOCD: CPT | Mod: CPTII,S$GLB,, | Performed by: INTERNAL MEDICINE

## 2023-05-30 PROCEDURE — 1101F PR PT FALLS ASSESS DOC 0-1 FALLS W/OUT INJ PAST YR: ICD-10-PCS | Mod: CPTII,S$GLB,, | Performed by: INTERNAL MEDICINE

## 2023-05-30 NOTE — TELEPHONE ENCOUNTER
Left message for pt to call me back.  We will schedule him for YAG OU with Dr Klein.    ----- Message from Sussy Cosby sent at 5/26/2023  2:10 PM CDT -----  Regarding: Previous Cataract Surgery Patient  Ankur Zhou,    I spoke with patient Mr. Del Anand on the telephone this afternoon. Mr. Anand had cataract surgery with Dr. Klein in 2020 and is due for his annual eye exam with Dr. Gentile. When I offered to schedule with Dr. Gentile, Mr. Garcia declined and requested an appointment with Dr. Klein.    Mr. Garcia stated that he has noticed visual changes while golfing and is currently using a CPAP machine which is causing discharge in his eyes. Mr. Garcia expressed concern is the need for a laser treatment due to his visual changes and CPAP machine use. Can you please reach out to him to discuss his concerns? Mr. Garcia's confirmed phone number is [phone].    Thank You,    Sussy

## 2023-05-31 ENCOUNTER — ANTI-COAG VISIT (OUTPATIENT)
Dept: CARDIOLOGY | Facility: CLINIC | Age: 78
End: 2023-05-31
Payer: MEDICARE

## 2023-05-31 DIAGNOSIS — I26.99 PULMONARY EMBOLISM, UNSPECIFIED CHRONICITY, UNSPECIFIED PULMONARY EMBOLISM TYPE, UNSPECIFIED WHETHER ACUTE COR PULMONALE PRESENT: ICD-10-CM

## 2023-05-31 DIAGNOSIS — Z79.01 LONG TERM CURRENT USE OF ANTICOAGULANT THERAPY: Primary | ICD-10-CM

## 2023-05-31 LAB — INR PPP: 3.1

## 2023-05-31 PROCEDURE — 93793 PR ANTICOAGULANT MGMT FOR PT TAKING WARFARIN: ICD-10-PCS | Mod: S$GLB,,, | Performed by: PHARMACIST

## 2023-05-31 PROCEDURE — 93793 ANTICOAG MGMT PT WARFARIN: CPT | Mod: S$GLB,,, | Performed by: PHARMACIST

## 2023-06-01 ENCOUNTER — TELEPHONE (OUTPATIENT)
Dept: INTERNAL MEDICINE | Facility: CLINIC | Age: 78
End: 2023-06-01
Payer: MEDICARE

## 2023-06-01 DIAGNOSIS — Z12.5 ENCOUNTER FOR SCREENING FOR MALIGNANT NEOPLASM OF PROSTATE: ICD-10-CM

## 2023-06-01 DIAGNOSIS — R73.03 PREDIABETES: ICD-10-CM

## 2023-06-01 DIAGNOSIS — I10 ESSENTIAL HYPERTENSION: Primary | ICD-10-CM

## 2023-06-01 DIAGNOSIS — E78.2 MIXED HYPERLIPIDEMIA: ICD-10-CM

## 2023-06-01 NOTE — TELEPHONE ENCOUNTER
"----- Message from Hillary Muir sent at 6/1/2023  8:11 AM CDT -----  Contact: [phone]  type: Lab    Caller is requesting to schedule their Lab appointment prior to annual appointment.  Order is not listed in EPIC.  Please enter order and contact patient to schedule.    Name of Caller:arabella Johnson Date and Time of Labs:week before appt at 7    Date of Annual Physical Appointment:12/01    Where would they like the lab performed?[facility]     Would the patient rather a call back or a response via My [email or web address]? Call back     Best Call Back Number:[phone]    Additional Information:no    "Do not send messages requesting lab orders prior to Physical appt on these providers: Jermain Saleem Giambrone,  Cm Linares and Felipe."       "

## 2023-06-14 ENCOUNTER — ANTI-COAG VISIT (OUTPATIENT)
Dept: CARDIOLOGY | Facility: CLINIC | Age: 78
End: 2023-06-14
Payer: MEDICARE

## 2023-06-14 ENCOUNTER — OFFICE VISIT (OUTPATIENT)
Dept: HEMATOLOGY/ONCOLOGY | Facility: CLINIC | Age: 78
End: 2023-06-14
Payer: MEDICARE

## 2023-06-14 ENCOUNTER — LAB VISIT (OUTPATIENT)
Dept: LAB | Facility: HOSPITAL | Age: 78
End: 2023-06-14
Attending: INTERNAL MEDICINE
Payer: MEDICARE

## 2023-06-14 VITALS
RESPIRATION RATE: 18 BRPM | HEART RATE: 64 BPM | BODY MASS INDEX: 38.72 KG/M2 | WEIGHT: 309.75 LBS | SYSTOLIC BLOOD PRESSURE: 118 MMHG | OXYGEN SATURATION: 96 % | DIASTOLIC BLOOD PRESSURE: 57 MMHG

## 2023-06-14 DIAGNOSIS — I82.591 CHRONIC DEEP VEIN THROMBOSIS (DVT) OF OTHER VEIN OF RIGHT LOWER EXTREMITY: ICD-10-CM

## 2023-06-14 DIAGNOSIS — Z79.01 LONG TERM CURRENT USE OF ANTICOAGULANT THERAPY: Primary | ICD-10-CM

## 2023-06-14 DIAGNOSIS — I26.99 PULMONARY EMBOLISM, UNSPECIFIED CHRONICITY, UNSPECIFIED PULMONARY EMBOLISM TYPE, UNSPECIFIED WHETHER ACUTE COR PULMONALE PRESENT: ICD-10-CM

## 2023-06-14 DIAGNOSIS — I26.99 PULMONARY EMBOLISM, UNSPECIFIED CHRONICITY, UNSPECIFIED PULMONARY EMBOLISM TYPE, UNSPECIFIED WHETHER ACUTE COR PULMONALE PRESENT: Primary | ICD-10-CM

## 2023-06-14 DIAGNOSIS — Z79.01 LONG TERM CURRENT USE OF ANTICOAGULANT THERAPY: ICD-10-CM

## 2023-06-14 LAB — INR PPP: 2.4

## 2023-06-14 PROCEDURE — 93793 ANTICOAG MGMT PT WARFARIN: CPT | Mod: S$GLB,,,

## 2023-06-14 PROCEDURE — 1126F AMNT PAIN NOTED NONE PRSNT: CPT | Mod: CPTII,S$GLB,, | Performed by: INTERNAL MEDICINE

## 2023-06-14 PROCEDURE — 93793 PR ANTICOAGULANT MGMT FOR PT TAKING WARFARIN: ICD-10-PCS | Mod: S$GLB,,,

## 2023-06-14 PROCEDURE — 1126F PR PAIN SEVERITY QUANTIFIED, NO PAIN PRESENT: ICD-10-PCS | Mod: CPTII,S$GLB,, | Performed by: INTERNAL MEDICINE

## 2023-06-14 PROCEDURE — 99999 PR PBB SHADOW E&M-EST. PATIENT-LVL III: ICD-10-PCS | Mod: PBBFAC,,, | Performed by: INTERNAL MEDICINE

## 2023-06-14 PROCEDURE — 3078F DIAST BP <80 MM HG: CPT | Mod: CPTII,S$GLB,, | Performed by: INTERNAL MEDICINE

## 2023-06-14 PROCEDURE — 3074F PR MOST RECENT SYSTOLIC BLOOD PRESSURE < 130 MM HG: ICD-10-PCS | Mod: CPTII,S$GLB,, | Performed by: INTERNAL MEDICINE

## 2023-06-14 PROCEDURE — 1159F MED LIST DOCD IN RCRD: CPT | Mod: CPTII,S$GLB,, | Performed by: INTERNAL MEDICINE

## 2023-06-14 PROCEDURE — 99205 OFFICE O/P NEW HI 60 MIN: CPT | Mod: S$GLB,,, | Performed by: INTERNAL MEDICINE

## 2023-06-14 PROCEDURE — 1159F PR MEDICATION LIST DOCUMENTED IN MEDICAL RECORD: ICD-10-PCS | Mod: CPTII,S$GLB,, | Performed by: INTERNAL MEDICINE

## 2023-06-14 PROCEDURE — 1101F PT FALLS ASSESS-DOCD LE1/YR: CPT | Mod: CPTII,S$GLB,, | Performed by: INTERNAL MEDICINE

## 2023-06-14 PROCEDURE — 3078F PR MOST RECENT DIASTOLIC BLOOD PRESSURE < 80 MM HG: ICD-10-PCS | Mod: CPTII,S$GLB,, | Performed by: INTERNAL MEDICINE

## 2023-06-14 PROCEDURE — 3288F FALL RISK ASSESSMENT DOCD: CPT | Mod: CPTII,S$GLB,, | Performed by: INTERNAL MEDICINE

## 2023-06-14 PROCEDURE — 36415 COLL VENOUS BLD VENIPUNCTURE: CPT | Performed by: INTERNAL MEDICINE

## 2023-06-14 PROCEDURE — 3288F PR FALLS RISK ASSESSMENT DOCUMENTED: ICD-10-PCS | Mod: CPTII,S$GLB,, | Performed by: INTERNAL MEDICINE

## 2023-06-14 PROCEDURE — 99205 PR OFFICE/OUTPT VISIT, NEW, LEVL V, 60-74 MIN: ICD-10-PCS | Mod: S$GLB,,, | Performed by: INTERNAL MEDICINE

## 2023-06-14 PROCEDURE — 1157F PR ADVANCE CARE PLAN OR EQUIV PRESENT IN MEDICAL RECORD: ICD-10-PCS | Mod: CPTII,S$GLB,, | Performed by: INTERNAL MEDICINE

## 2023-06-14 PROCEDURE — 1157F ADVNC CARE PLAN IN RCRD: CPT | Mod: CPTII,S$GLB,, | Performed by: INTERNAL MEDICINE

## 2023-06-14 PROCEDURE — 99999 PR PBB SHADOW E&M-EST. PATIENT-LVL III: CPT | Mod: PBBFAC,,, | Performed by: INTERNAL MEDICINE

## 2023-06-14 PROCEDURE — 3074F SYST BP LT 130 MM HG: CPT | Mod: CPTII,S$GLB,, | Performed by: INTERNAL MEDICINE

## 2023-06-14 PROCEDURE — 1101F PR PT FALLS ASSESS DOC 0-1 FALLS W/OUT INJ PAST YR: ICD-10-PCS | Mod: CPTII,S$GLB,, | Performed by: INTERNAL MEDICINE

## 2023-06-14 NOTE — PROGRESS NOTES
PATIENT: Del Anand  MRN: [id number]  DATE: 6/14/2023    Diagnosis:   1. Pulmonary embolism, unspecified chronicity, unspecified pulmonary embolism type, unspecified whether acute cor pulmonale present    2. Long term current use of anticoagulant therapy    3. Chronic deep vein thrombosis (DVT) of other vein of right lower extremity        Chief Complaint: No chief complaint on file.     Previously documented heme hx per Dr. Sanchez (2015)  HPI  Mr. Anand is here for follow up of clotting problems 6 months into therapy with coumadin. His issues had started in January after 10 days of rest following three weeks of PT at which time he developed a pulmonary embolism 1/2015. He was begun on heparin and 1/29/15 switched to xaralto maybe missed three doses over the period since that time. Unfortunately, on 4/8/15 he developed new right leg symptoms and 4/9 was seen by his primary physician who referred him to [facility] where doppler showed DVT in the popliteal vein. He was started on IV heparin and transferred to [facility] main campus. There he underwent a venogram the following day and the popliteal DVT had cleared and the only thing left was a small infrapopliteal DVT. He was discharged to home on Lovenox and warfarin which he continues on now. He has been active throughout this period.      On 4/10/15 (one day after he developed his new clot) he underwent the following testing: Normal factor V leiden, normal prothrombin gene, protein S antigen/activity and protein C activity normal (protein C antigen slightly low at 67%), ATIII 87%, and APA IgG/IgM not elevated.      Despite ongoing anticoagulation his D-dimer level remains elevated today.  Subjective:    History of Present Illness: Mr. Anand is a 78 y.o. male who presents for evaluation and management of VTE. He has been maintained on coumadin and stable on that regimen for several years. He is referred for re-evaluation with regards to educating and mitigating the  "risk of VTE to his family members.       Past medical, surgical, family, and social histories have been reviewed and updated below.    Past Medical History:   Past Medical History:   Diagnosis Date    Benign neoplasm of colon     Cataract     CHF (congestive heart failure) 1/13/2015    Coronary artery disease     Difficult intubation     DVT (deep venous thrombosis)     HLD (hyperlipidemia)     HTN (hypertension)     Impaired fasting glucose     Kidney stone     Metabolic syndrome     Nonspecific abnormal results of liver function study     Nonspecific findings on examination of blood     Nuclear sclerosis - Both Eyes 10/18/2013    Osteoarthrosis, unspecified whether generalized or localized, lower leg     Respiratory distress     PT STATES IT WAS "CRAP", "VERY UNCOMFORTABLE"       Past Surgical History:   Past Surgical History:   Procedure Laterality Date    CARDIAC SURGERY      CATARACT EXTRACTION W/  INTRAOCULAR LENS IMPLANT Right 9/17/2020    Procedure: EXTRACTION, CATARACT, WITH IOL INSERTION;  Surgeon: Chanel Klein MD;  Location: [facility];  Service: Ophthalmology;  Laterality: Right;    CATARACT EXTRACTION W/  INTRAOCULAR LENS IMPLANT Left 10/1/2020    Procedure: EXTRACTION, CATARACT, WITH IOL INSERTION;  Surgeon: Chanel Klein MD;  Location: [facility];  Service: Ophthalmology;  Laterality: Left;    COLONOSCOPY N/A 3/13/2019    Procedure: COLONOSCOPY;  Surgeon: Nikunj Larson MD;  Location: [facility] ([address] [place] [address]);  Service: Endoscopy;  Laterality: N/A;  ok to hold Coumadin x 5 days with a Lovenox bridge per Coumadin clinic  2nd floor - history of difficult intubation-MS    COLONOSCOPY N/A 7/7/2022    Procedure: COLONOSCOPY;  Surgeon: Nikunj Larson MD;  [facility]  Service: Endoscopy;  Laterality: N/A;  ef 45%-5/31-coumadin hold per coumadin clinic see coag visist 6/29-tb-vacc- clears 4 hrs prior-am prep 2-3-am-inst portal-tb    CORONARY ARTERY BYPASS GRAFT      2014    CYSTOSCOPY      " KIDNEY STONE SURGERY      KNEE ARTHROPLASTY      bilateral    renal stone      SKIN BIOPSY         Family History:   Family History   Problem Relation Age of Onset    Heart attack Father         d. 85    Urolithiasis Father     Heart disease Father     Heart failure Father     Stroke Mother     Dementia Mother     Heart attack Maternal Grandfather         d. 65    Hypertension Paternal Grandmother     Heart attack Paternal Grandfather     Heart failure Paternal Grandfather     Other Sister         bladder lift, s/p 4     No Known Problems Daughter     No Known Problems Son        Social History:  reports that he quit smoking about 35 years ago. His smoking use included cigarettes. He has a 20.00 pack-year smoking history. He has quit using smokeless tobacco. He reports current alcohol use. He reports that he does not use drugs.    Allergies:  Review of patient's allergies indicates:  No Known Allergies    Medications:  Current Outpatient Medications   Medication Sig Dispense Refill    amLODIPine (NORVASC) 2.5 MG tablet Take 1 tablet (2.5 mg total) by mouth once daily. 90 tablet 3    aspirin 81 MG Chew Take 81 mg by mouth once daily.      cyanocobalamin 500 MCG tablet Take 500 mcg by mouth every morning. Every day      ezetimibe (ZETIA) 10 mg tablet Take 1 tablet (10 mg total) by mouth once daily. 90 tablet 3    FOLIC ACID/MULTIVITS-MIN/LUT (CENTRUM SILVER ORAL) Take 1 tablet by mouth once daily.      irbesartan (AVAPRO) 300 MG tablet Take 1 tablet (300 mg total) by mouth every evening. 90 tablet 3    rosuvastatin (CRESTOR) 40 MG Tab Take 1 tablet (40 mg total) by mouth once daily. 90 tablet 3    warfarin (COUMADIN) 10 MG tablet Take 10mg daily except 12.5mg on  or as directed by Coumadin Clinic.   Also uses warfarin 5mg tablet strength 90 tablet 3    warfarin (COUMADIN) 5 MG tablet Take 10mg daily except 12.5mg  (Patient taking differently: Take 10mg 4 days per week,  12.5mg 3 days per week,  followed by Coumadin clinic) 180 tablet 3     No current facility-administered medications for this visit.           Objective:      Vitals:   Vitals:    06/14/23 1354   BP: (!) 118/57   BP Location: Right arm   Patient Position: Sitting   BP Method: Medium (Automatic)   Pulse: 64   Resp: 18   SpO2: 96%   Weight: (!) 140.5 kg (309 lb 11.9 oz)     BMI: Body mass index is 38.72 kg/m².    Physical Exam  Vitals and nursing note reviewed.   Constitutional:       General: He is not in acute distress.     Appearance: Normal appearance. He is normal weight. He is not toxic-appearing.   HENT:      Head: Normocephalic and atraumatic.   Eyes:      General: No scleral icterus.     Conjunctiva/sclera: Conjunctivae normal.   Cardiovascular:      Rate and Rhythm: Normal rate.   Pulmonary:      Effort: Pulmonary effort is normal. No respiratory distress.   Musculoskeletal:         General: No deformity.      Cervical back: Neck supple.   Lymphadenopathy:      Cervical: No cervical adenopathy.   Skin:     Coloration: Skin is not jaundiced.      Findings: No erythema.   Neurological:      General: No focal deficit present.      Mental Status: He is alert and oriented to person, place, and time. Mental status is at baseline.   Psychiatric:         Mood and Affect: Mood normal.         Behavior: Behavior normal.         Thought Content: Thought content normal.       Laboratory Data:  Labs have been reviewed.      Assessment:       1. Pulmonary embolism, unspecified chronicity, unspecified pulmonary embolism type, unspecified whether acute cor pulmonale present    2. Long term current use of anticoagulant therapy    3. Chronic deep vein thrombosis (DVT) of other vein of right lower extremity      History of recurrent VTE with development of DVT while on Xarelto.  Previously evaluated extensively with no clear thrombophilia or underlying cause identified.     Plan:     Discussed historical labs--neg FVL, neg prothrombin gene mutation.  Prot C/S, AT3 wnl.   Could evaluate possible MTHFR gene mutation and also pharmacogenomic evaluation to possibly better understand reason for his failure of previous Xarelto therapy. He would like to do this.    Thus will check pharmacogenomic profile.         Scott Coughlin MD, FACP  Hematology/Oncology  [facility] [address], [address]  [place] [address]  Phone: [phone]  Fax: [phone]    Total time of this visit, including time spent face to face with patient and/or via video/audio, and also in preparing for today's visit for MDM and documentation. (Medical Decision Making, including consideration of possible diagnoses, management options, complex medical record review, review of diagnostic tests and information, consideration and discussion of significant complications based on comorbidities, and discussion with providers involved with the care of the patient) 61 minutes. Greater than 50% was spent face to face with the patient counseling and coordinating care.

## 2023-06-22 LAB
ONEOME COMMENT: NORMAL
ONEOME METHOD: NORMAL

## 2023-06-27 ENCOUNTER — PROCEDURE VISIT (OUTPATIENT)
Dept: OPHTHALMOLOGY | Facility: CLINIC | Age: 78
End: 2023-06-27
Payer: MEDICARE

## 2023-06-27 DIAGNOSIS — M35.01 KERATITIS SICCA: Primary | ICD-10-CM

## 2023-06-27 PROCEDURE — 92014 COMPRE OPH EXAM EST PT 1/>: CPT | Mod: S$GLB,,, | Performed by: OPHTHALMOLOGY

## 2023-06-27 PROCEDURE — 92014 PR EYE EXAM, EST PATIENT,COMPREHESV: ICD-10-PCS | Mod: S$GLB,,, | Performed by: OPHTHALMOLOGY

## 2023-06-27 NOTE — PROGRESS NOTES
"HPI    Last eye exam was 5/17/22 with Dr. Gentile.  Patient states expected vision would by 20/20 after cataract sx. Plays   golf and can't follow the ball since cataract sx. Contrast is hard to see   if they colors are close. Unhappy with the "fine tune" vision that he   thought would be corrected. Wanted to know if there was any surgical   correction that can be done. Was told by Dr. Gentile that he had dry   eyes (sleeps with a CPAP) and use AT's during the day and gel at night but   couldn't fine gel form.    Systane/Refresh BID OU  AT liquidgel QHS OU  Last edited by Nelida Sutton MA on 6/27/2023  1:20 PM.            Assessment /Plan     For exam results, see Encounter Report.    Keratitis sicca      PCIOL OU with open PC  Excellent result                   "

## 2023-06-28 ENCOUNTER — ANTI-COAG VISIT (OUTPATIENT)
Dept: CARDIOLOGY | Facility: CLINIC | Age: 78
End: 2023-06-28
Payer: MEDICARE

## 2023-06-28 DIAGNOSIS — Z79.01 LONG TERM CURRENT USE OF ANTICOAGULANT THERAPY: Primary | ICD-10-CM

## 2023-06-28 DIAGNOSIS — I26.99 PULMONARY EMBOLISM, UNSPECIFIED CHRONICITY, UNSPECIFIED PULMONARY EMBOLISM TYPE, UNSPECIFIED WHETHER ACUTE COR PULMONALE PRESENT: ICD-10-CM

## 2023-06-28 LAB — INR PPP: 3.5

## 2023-06-28 PROCEDURE — 93793 ANTICOAG MGMT PT WARFARIN: CPT | Mod: S$GLB,,, | Performed by: PHARMACIST

## 2023-06-28 PROCEDURE — 93793 PR ANTICOAGULANT MGMT FOR PT TAKING WARFARIN: ICD-10-PCS | Mod: S$GLB,,, | Performed by: PHARMACIST

## 2023-06-30 ENCOUNTER — OFFICE VISIT (OUTPATIENT)
Dept: DERMATOLOGY | Facility: CLINIC | Age: 78
End: 2023-06-30
Payer: MEDICARE

## 2023-06-30 DIAGNOSIS — Z12.83 SKIN CANCER SCREENING: Primary | ICD-10-CM

## 2023-06-30 DIAGNOSIS — D22.9 MULTIPLE BENIGN NEVI: ICD-10-CM

## 2023-06-30 DIAGNOSIS — L57.0 AK (ACTINIC KERATOSIS): ICD-10-CM

## 2023-06-30 DIAGNOSIS — L72.0 EIC (EPIDERMAL INCLUSION CYST): ICD-10-CM

## 2023-06-30 DIAGNOSIS — L81.4 LENTIGINES: ICD-10-CM

## 2023-06-30 DIAGNOSIS — Z85.828 HISTORY OF NONMELANOMA SKIN CANCER: ICD-10-CM

## 2023-06-30 DIAGNOSIS — D18.01 CHERRY ANGIOMA: ICD-10-CM

## 2023-06-30 DIAGNOSIS — L82.1 SK (SEBORRHEIC KERATOSIS): ICD-10-CM

## 2023-06-30 PROCEDURE — 3288F PR FALLS RISK ASSESSMENT DOCUMENTED: ICD-10-PCS | Mod: CPTII,S$GLB,, | Performed by: DERMATOLOGY

## 2023-06-30 PROCEDURE — 17003 DESTRUCT PREMALG LES 2-14: CPT | Mod: S$GLB,,, | Performed by: DERMATOLOGY

## 2023-06-30 PROCEDURE — 17000 PR DESTRUCTION(LASER SURGERY,CRYOSURGERY,CHEMOSURGERY),PREMALIGNANT LESIONS,FIRST LESION: ICD-10-PCS | Mod: S$GLB,,, | Performed by: DERMATOLOGY

## 2023-06-30 PROCEDURE — 1101F PT FALLS ASSESS-DOCD LE1/YR: CPT | Mod: CPTII,S$GLB,, | Performed by: DERMATOLOGY

## 2023-06-30 PROCEDURE — 99999 PR PBB SHADOW E&M-EST. PATIENT-LVL III: CPT | Mod: PBBFAC,,, | Performed by: DERMATOLOGY

## 2023-06-30 PROCEDURE — 1159F MED LIST DOCD IN RCRD: CPT | Mod: CPTII,S$GLB,, | Performed by: DERMATOLOGY

## 2023-06-30 PROCEDURE — 1157F PR ADVANCE CARE PLAN OR EQUIV PRESENT IN MEDICAL RECORD: ICD-10-PCS | Mod: CPTII,S$GLB,, | Performed by: DERMATOLOGY

## 2023-06-30 PROCEDURE — 99213 PR OFFICE/OUTPT VISIT, EST, LEVL III, 20-29 MIN: ICD-10-PCS | Mod: 25,S$GLB,, | Performed by: DERMATOLOGY

## 2023-06-30 PROCEDURE — 17003 DESTRUCTION, PREMALIGNANT LESIONS; SECOND THROUGH 14 LESIONS: ICD-10-PCS | Mod: S$GLB,,, | Performed by: DERMATOLOGY

## 2023-06-30 PROCEDURE — 1101F PR PT FALLS ASSESS DOC 0-1 FALLS W/OUT INJ PAST YR: ICD-10-PCS | Mod: CPTII,S$GLB,, | Performed by: DERMATOLOGY

## 2023-06-30 PROCEDURE — 1159F PR MEDICATION LIST DOCUMENTED IN MEDICAL RECORD: ICD-10-PCS | Mod: CPTII,S$GLB,, | Performed by: DERMATOLOGY

## 2023-06-30 PROCEDURE — 99999 PR PBB SHADOW E&M-EST. PATIENT-LVL III: ICD-10-PCS | Mod: PBBFAC,,, | Performed by: DERMATOLOGY

## 2023-06-30 PROCEDURE — 3288F FALL RISK ASSESSMENT DOCD: CPT | Mod: CPTII,S$GLB,, | Performed by: DERMATOLOGY

## 2023-06-30 PROCEDURE — 1157F ADVNC CARE PLAN IN RCRD: CPT | Mod: CPTII,S$GLB,, | Performed by: DERMATOLOGY

## 2023-06-30 PROCEDURE — 99213 OFFICE O/P EST LOW 20 MIN: CPT | Mod: 25,S$GLB,, | Performed by: DERMATOLOGY

## 2023-06-30 PROCEDURE — 17000 DESTRUCT PREMALG LESION: CPT | Mod: S$GLB,,, | Performed by: DERMATOLOGY

## 2023-06-30 NOTE — PROGRESS NOTES
Subjective:      Patient ID:  Del Anand is a 78 y.o. male who presents for   Chief Complaint   Patient presents with    Skin Check     tbse     HPI  Interested in total body skin check today.  Would like the area on his R forearm that was treated with cryo last visit re-checked today. States it is completely gone (AKs).  Also has a lesion on leg x months, scaly, asymptomatic, no prior tx.    Has hx of SCC L lower leg s/p Mohs with Dr. Rosenbaum in 2018 and subsequent wound dehiscence which required wound care for months.     Review of Systems   Constitutional:  Negative for fever and chills.   Skin:  Negative for itching and rash.     Objective:   Physical Exam   Constitutional: He appears well-developed and well-nourished. No distress.   Neurological: He is alert and oriented to person, place, and time. He is not disoriented.   Psychiatric: He has a normal mood and affect.   Skin:   Areas Examined (abnormalities noted in diagram):   Scalp / Hair Palpated and Inspected  Head / Face Inspection Performed  Neck Inspection Performed  Chest / Axilla Inspection Performed  Abdomen Inspection Performed  Genitals / Buttocks / Groin Inspection Performed  Back Inspection Performed  RUE Inspected  LUE Inspection Performed  RLE Inspected  LLE Inspection Performed  Nails and Digits Inspection Performed               Diagram Legend     Erythematous scaling macule/papule c/w actinic keratosis       Vascular papule c/w angioma      Pigmented verrucoid papule/plaque c/w seborrheic keratosis      Yellow umbilicated papule c/w sebaceous hyperplasia      Irregularly shaped tan macule c/w lentigo     1-2 mm smooth white papules consistent with Milia      Movable subcutaneous cyst with punctum c/w epidermal inclusion cyst      Subcutaneous movable cyst c/w pilar cyst      Firm pink to brown papule c/w dermatofibroma      Pedunculated fleshy papule(s) c/w skin tag(s)      Evenly pigmented macule c/w junctional nevus     Mildly  variegated pigmented, slightly irregular-bordered macule c/w mildly atypical nevus      Flesh colored to evenly pigmented papule c/w intradermal nevus       Pink pearly papule/plaque c/w basal cell carcinoma      Erythematous hyperkeratotic cursted plaque c/w SCC      Surgical scar with no sign of skin cancer recurrence      Open and closed comedones      Inflammatory papules and pustules      Verrucoid papule consistent consistent with wart     Erythematous eczematous patches and plaques     Dystrophic onycholytic nail with subungual debris c/w onychomycosis     Umbilicated papule    Erythematous-base heme-crusted tan verrucoid plaque consistent with inflamed seborrheic keratosis     Erythematous Silvery Scaling Plaque c/w Psoriasis     See annotation      Assessment / Plan:      History of nonmelanoma skin cancer  Skin cancer screening  Total body skin examination performed today including at least 12 points as noted in physical examination. No lesions suspicious for malignancy noted.  Patient instructed in importance of daily broad spectrum sunscreen use with spf at least 30. Sun avoidance and topical protection/protective clothing discussed.    Ward angioma  This is a benign vascular lesion. Reassurance given. No treatment required. Treatment of benign, asymptomatic lesions may be considered cosmetic.    SK (seborrheic keratosis)  These are benign inherited growths without a malignant potential. Reassurance given to patient. No treatment is necessary.   Treatment of benign, asymptomatic lesions may be considered cosmetic.  Warned about risk of hypo- or hyperpigmentation with treatment and risk of recurrence.    EIC (epidermal inclusion cyst)  This is a benign cyst of the hair follicle. Reassurance provided. No treatment is necessary unless it is symptomatic.     Lentigines  These are benign sun spots which should be monitored for changes. Patient instructed in importance of daily broad spectrum sunscreen use  with spf at least 30. Sun avoidance and topical protection/protective clothing discussed.    AK (actinic keratosis)  Cryosurgery Procedure Note    Verbal consent from the patient is obtained including, but not limited to, risk of hypopigmentation/hyperpigmentation, scar, recurrence of lesion. The patient is aware of the precancerous quality and need for treatment of these lesions. Liquid nitrogen cryosurgery is applied to the 4 actinic keratoses, as detailed in the physical exam, to produce a freeze injury. The patient is aware that blisters may form and is instructed on wound care with gentle cleansing and use of vaseline ointment to keep moist until healed. The patient is supplied a handout on cryosurgery and is instructed to call if lesions do not completely resolve.    Multiple benign nevi  Benign-appearing nevi present on exam today. Reassurance provided. Periodically examine moles and return to clinic if any moles change or become symptomatic (bleeding, itching, pain, etc).      Follow up in about 6 months (around 12/30/2023) for skin check or sooner for any concerns.

## 2023-06-30 NOTE — PATIENT INSTRUCTIONS
CRYOSURGERY      Your doctor has used a method called cryosurgery to treat your skin condition. Cryosurgery refers to the use of very cold substances to treat a variety of skin conditions such as warts, pre-skin cancers, molluscum contagiosum, sun spots, and several benign growths. The substance we use in cryosurgery is liquid nitrogen and is so cold (-195 degrees Celsius) that is burns when administered.     Following treatment in the office, the skin may immediately burn and become red. You may find the area around the lesion is affected as well. It is sometimes necessary to treat not only the lesion, but a small area of the surrounding normal skin to achieve a good response.     A blister, and even a blood filled blister, may form after treatment.   This is a normal response. If the blister is painful, it is acceptable to sterilize a needle and with rubbing alcohol and gently pop the blister. It is important that you gently wash the area with soap and warm water as the blister fluid may contain wart virus if a wart was treated. Do no remove the roof of the blister.     The area treated can take anywhere from 1-3 weeks to heal. Healing time depends on the kind skin lesion treated, the location, and how aggressively the lesion was treated. It is recommended that the areas treated are covered with Vaseline or bacitracin ointment and a band-aid. If a band-aid is not practical, just ointment applied several times per day will do. Keeping these areas moist will speed the healing time.    Treatment with liquid nitrogen can leave a scar. In dark skin, it may be a light or dark scar, in light skin it may be a white or pink scar. These will generally fade with time, but may never go away completely.     If you have any concerns after your treatment, please feel free to call the office.       [address] [place] [address]/ [phone] FAX/ [email or web address] Sun Protection      The [facility] would like to remind you of the importance of sun protection all year round and particularly during the summer when the suns rays are the strongest. It has been proven that both acute and chronic sun exposure damages our cells and leads to skin cancer. Beyond skin cancer, the sun causes 90% of the symptoms of premature skin aging, including wrinkles, lentigines (brown spots), and thin, easily bruised skin. Proper sun protection can help prevent these unwanted conditions.    Many patients report that they dont go in the sun. It has been shown that the average person receives 18 hours of incidental sun exposure per week during activities such as walking through parking lots, driving, or sitting next to windows. This accumulates to several bad sunburns per year!    In choosing sunscreen, you want one that protects against both UVA and UVB rays (broad spectrum). It is recommended that you use one of SPF 30 or higher. It is important to apply the sunscreen about 20 minutes prior to sun exposure. Most sunscreens are chemical sunscreens and a reaction must take place in the skin so that they are effective. If they are applied and then you are immediately exposed to the sun or start sweating, this reaction has not had time to take place and you are therefore unprotected. Sunscreen needs to be reapplied every 2 hours if you are participating in water sports or sweating. We recommend Elta MD or CeraVe sunscreens for daily use; however there are many options and it is most important for you to find one that you will use on a consistent basis.    If you have sensitive skin, you may do best with a sunscreen that contains only physical blockers in the active ingredient section. The only physical blockers available in the [place] currently are titanium dioxide or zinc oxide. These are typically thicker and harder to apply, however they afford very good protection. Neutrogena Sensitive Skin, Blue Lizard  Sensitive Skin (pink top) or Neutrogena Pure and Free are popular ones.     Aside from sunscreen, clothes with UV protection (UPF), wide brimmed hats, and sunglasses are other means of sun protection that we recommend.      Based on a recent study (6/2021) and out of an abundance of caution, we are recommending that you AVOID the following sunscreens as they may contain the carcinogen, benzene:    Spray and gel sunscreens  Any [employer] or [employer] brands as well as Max Block and TopCare brands   Neutrogena Ultra Sheer Dry-touch Water Resistant Sunscreen LOTION SPF 70   Neutrogena Sheer Zinc Dry-touch Face Sunscreen LOTION SPF 50   5.   Aveeno Baby Continuous Protection Sensitive Skin Sunscreen LOTION - Broad Spectrum SPF 50    Please note that Benzene is not an ingredient or the degradation product of any ingredient in any sunscreen. This study suggested that the findings are a result of contamination in the manufacturing process. At this point, we don't know how effectively Benzene gets through the skin, if it gets absorbed systemically, and what effects it may have.     We do know that ultraviolet radiation is a well-established carcinogen. Please use daily sun protection/avoidance and use of at least SPF 30, broad-spectrum sunscreen not listed above.                       [facility]  [facility] DERMATOLOGY [address]  [facility] [address]  Dept: [phone]  Dept Fax: [phone]

## 2023-07-04 ENCOUNTER — PATIENT MESSAGE (OUTPATIENT)
Dept: CARDIOLOGY | Facility: CLINIC | Age: 78
End: 2023-07-04
Payer: MEDICARE

## 2023-07-05 ENCOUNTER — PATIENT MESSAGE (OUTPATIENT)
Dept: ADMINISTRATIVE | Facility: OTHER | Age: 78
End: 2023-07-05
Payer: MEDICARE

## 2023-07-12 ENCOUNTER — PATIENT MESSAGE (OUTPATIENT)
Dept: ADMINISTRATIVE | Facility: OTHER | Age: 78
End: 2023-07-12
Payer: MEDICARE

## 2023-07-12 ENCOUNTER — ANTI-COAG VISIT (OUTPATIENT)
Dept: CARDIOLOGY | Facility: CLINIC | Age: 78
End: 2023-07-12
Payer: MEDICARE

## 2023-07-12 DIAGNOSIS — I26.99 PULMONARY EMBOLISM, UNSPECIFIED CHRONICITY, UNSPECIFIED PULMONARY EMBOLISM TYPE, UNSPECIFIED WHETHER ACUTE COR PULMONALE PRESENT: ICD-10-CM

## 2023-07-12 DIAGNOSIS — Z79.01 LONG TERM CURRENT USE OF ANTICOAGULANT THERAPY: Primary | ICD-10-CM

## 2023-07-12 LAB — INR PPP: 3.2

## 2023-07-12 PROCEDURE — 93793 ANTICOAG MGMT PT WARFARIN: CPT | Mod: S$GLB,,, | Performed by: PHARMACIST

## 2023-07-12 PROCEDURE — 93793 PR ANTICOAGULANT MGMT FOR PT TAKING WARFARIN: ICD-10-PCS | Mod: S$GLB,,, | Performed by: PHARMACIST

## 2023-07-24 NOTE — TRANSFER OF CARE
"Anesthesia Transfer of Care Note    Patient: Del Anand    Procedure(s) Performed: Procedure(s) (LRB):  COLONOSCOPY (N/A)    Patient location: PACU    Anesthesia Type: general    Transport from OR: Transported from OR on 6-10 L/min O2 by face mask with adequate spontaneous ventilation    Post pain: adequate analgesia    Post assessment: no apparent anesthetic complications    Post vital signs: stable    Level of consciousness: awake, alert and oriented    Nausea/Vomiting: no nausea/vomiting    Complications: none    Transfer of care protocol was followed      Last vitals:   Visit Vitals  /61 (BP Location: Left arm, Patient Position: Lying)   Pulse (P) 70   Temp (P) 37.1 °C (98.8 °F) (Temporal)   Resp (P) 18   Ht 6' 3" (1.905 m)   Wt 136.1 kg (300 lb)   SpO2 (P) 100%   BMI 37.50 kg/m²     " Post-Care Instructions: Patient instructed to not lie down for 4 hours and limit physical activity for 24 hours.

## 2023-07-26 ENCOUNTER — ANTI-COAG VISIT (OUTPATIENT)
Dept: CARDIOLOGY | Facility: CLINIC | Age: 78
End: 2023-07-26
Payer: MEDICARE

## 2023-07-26 DIAGNOSIS — Z79.01 LONG TERM CURRENT USE OF ANTICOAGULANT THERAPY: Primary | ICD-10-CM

## 2023-07-26 DIAGNOSIS — I26.99 PULMONARY EMBOLISM, UNSPECIFIED CHRONICITY, UNSPECIFIED PULMONARY EMBOLISM TYPE, UNSPECIFIED WHETHER ACUTE COR PULMONALE PRESENT: ICD-10-CM

## 2023-07-26 LAB — INR PPP: 3.4

## 2023-07-26 PROCEDURE — 93793 PR ANTICOAGULANT MGMT FOR PT TAKING WARFARIN: ICD-10-PCS | Mod: S$GLB,,,

## 2023-07-26 PROCEDURE — 93793 ANTICOAG MGMT PT WARFARIN: CPT | Mod: S$GLB,,,

## 2023-07-28 ENCOUNTER — TELEPHONE (OUTPATIENT)
Dept: DERMATOLOGY | Facility: CLINIC | Age: 78
End: 2023-07-28
Payer: MEDICARE

## 2023-08-09 ENCOUNTER — ANTI-COAG VISIT (OUTPATIENT)
Dept: CARDIOLOGY | Facility: CLINIC | Age: 78
End: 2023-08-09
Payer: MEDICARE

## 2023-08-09 DIAGNOSIS — I26.99 PULMONARY EMBOLISM, UNSPECIFIED CHRONICITY, UNSPECIFIED PULMONARY EMBOLISM TYPE, UNSPECIFIED WHETHER ACUTE COR PULMONALE PRESENT: ICD-10-CM

## 2023-08-09 DIAGNOSIS — Z79.01 LONG TERM CURRENT USE OF ANTICOAGULANT THERAPY: Primary | ICD-10-CM

## 2023-08-09 LAB — INR PPP: 3

## 2023-08-09 PROCEDURE — 93793 PR ANTICOAGULANT MGMT FOR PT TAKING WARFARIN: ICD-10-PCS | Mod: S$GLB,,, | Performed by: PHARMACIST

## 2023-08-09 PROCEDURE — 93793 ANTICOAG MGMT PT WARFARIN: CPT | Mod: S$GLB,,, | Performed by: PHARMACIST

## 2023-08-22 NOTE — TELEPHONE ENCOUNTER
Pt here for BP check as advised recently by . BP (L) arm using pt's LIFE SOurce brand cuff 119/64, BP(L) arm using our adult large cuff 116/64. Please advise.  
Patient requests all Lab, Cardiology, and Radiology Results on their Discharge Instructions

## 2023-08-23 ENCOUNTER — ANTI-COAG VISIT (OUTPATIENT)
Dept: CARDIOLOGY | Facility: CLINIC | Age: 78
End: 2023-08-23
Payer: MEDICARE

## 2023-08-23 DIAGNOSIS — Z79.01 LONG TERM CURRENT USE OF ANTICOAGULANT THERAPY: Primary | ICD-10-CM

## 2023-08-23 DIAGNOSIS — I26.99 PULMONARY EMBOLISM, UNSPECIFIED CHRONICITY, UNSPECIFIED PULMONARY EMBOLISM TYPE, UNSPECIFIED WHETHER ACUTE COR PULMONALE PRESENT: ICD-10-CM

## 2023-08-23 LAB — INR PPP: 2.2

## 2023-08-23 PROCEDURE — 93793 PR ANTICOAGULANT MGMT FOR PT TAKING WARFARIN: ICD-10-PCS | Mod: S$GLB,,, | Performed by: PHARMACIST

## 2023-08-23 PROCEDURE — 93793 ANTICOAG MGMT PT WARFARIN: CPT | Mod: S$GLB,,, | Performed by: PHARMACIST

## 2023-08-23 NOTE — PROGRESS NOTES
[facility]    2023 9:47 AM    Assessment/Plan:    Patient presents today with subtherapeutic  INR.    Assessment of patient findings and chart review: INR not at goal. Medications, chart, and patient findings reviewed. See calendar for adjustments to dose and follow up plan.      Recommendation for patient's warfarin regimen: Boost dose today to 15mg then resume current maintenance dose    Recommend repeat INR in 2 weeks  _________________________________________________________________    Bill MARCIN Anand (78 y.o.) is followed by the [facility].    Anticoagulation Summary  As of 2023      INR goal:  2.5-3.5   TTR:  75.2 % (8.3 y)   INR used for dosin.2 (2023)   Warfarin maintenance plan:  12.5 mg (10 mg x 1 and 5 mg x 0.5) every Sun, Wed; 10 mg (10 mg x 1) all other days   Weekly warfarin total:  75 mg   Plan last modified:  Jennifer Hunt, PharmD (3/22/2023)   Next INR check:  2023   Target end date:      Indications    Long term current use of anticoagulant therapy [Z79.01]  Pulmonary embolism [I26.99]                 Anticoagulation Episode Summary       INR check location:  Home Draw    Preferred lab:      Send INR reminders to:  [facility]ITOR    Comments:  [facility] every other Wednesday          Anticoagulation Care Providers       Provider Role Specialty Phone number    Blanquita Levin MD [facility] Cardiology [phone]

## 2023-09-06 ENCOUNTER — ANTI-COAG VISIT (OUTPATIENT)
Dept: CARDIOLOGY | Facility: CLINIC | Age: 78
End: 2023-09-06
Payer: MEDICARE

## 2023-09-06 DIAGNOSIS — I26.99 PULMONARY EMBOLISM, UNSPECIFIED CHRONICITY, UNSPECIFIED PULMONARY EMBOLISM TYPE, UNSPECIFIED WHETHER ACUTE COR PULMONALE PRESENT: ICD-10-CM

## 2023-09-06 DIAGNOSIS — Z79.01 LONG TERM CURRENT USE OF ANTICOAGULANT THERAPY: Primary | ICD-10-CM

## 2023-09-06 LAB — INR PPP: 2.9

## 2023-09-06 PROCEDURE — 93793 ANTICOAG MGMT PT WARFARIN: CPT | Mod: S$GLB,,, | Performed by: PHARMACIST

## 2023-09-06 PROCEDURE — 93793 PR ANTICOAGULANT MGMT FOR PT TAKING WARFARIN: ICD-10-PCS | Mod: S$GLB,,, | Performed by: PHARMACIST

## 2023-09-20 ENCOUNTER — ANTI-COAG VISIT (OUTPATIENT)
Dept: CARDIOLOGY | Facility: CLINIC | Age: 78
End: 2023-09-20
Payer: MEDICARE

## 2023-09-20 DIAGNOSIS — Z79.01 LONG TERM CURRENT USE OF ANTICOAGULANT THERAPY: Primary | ICD-10-CM

## 2023-09-20 LAB — INR PPP: 3.3

## 2023-09-20 PROCEDURE — 93793 PR ANTICOAGULANT MGMT FOR PT TAKING WARFARIN: ICD-10-PCS | Mod: S$GLB,,,

## 2023-09-20 PROCEDURE — 93793 ANTICOAG MGMT PT WARFARIN: CPT | Mod: S$GLB,,,

## 2023-09-20 NOTE — PROGRESS NOTES
[facility]    09/20/2023 9:32 AM    Assessment/Plan:    Patient presents today with therapeutic INR.    Assessment of patient findings and chart review: reviewed    Recommendation for patient's warfarin regimen: Continue current maintenance dose    Recommend repeat INR in 2 weeks with meter  _________________________________________________________________    Del Anand (78 y.o.) is followed by the [facility].    Anticoagulation Summary  As of 9/20/2023      INR goal:  2.5-3.5   TTR:  75.3 % (8.4 y)   INR used for dosing:  3.3 (9/20/2023)   Warfarin maintenance plan:  12.5 mg (10 mg x 1 and 5 mg x 0.5) every Sun, Wed; 10 mg (10 mg x 1) all other days   Weekly warfarin total:  75 mg   Plan last modified:  Jennifer Hunt, PharmD (3/22/2023)   Next INR check:  10/4/2023   Target end date:      Indications    Long term current use of anticoagulant therapy [Z79.01]  Pulmonary embolism [I26.99]                 Anticoagulation Episode Summary       INR check location:  Home Draw    Preferred lab:      Send INR reminders to:  [facility] COUMADIN HOME MONITOR    Comments:  [facility] every other Wednesday          Anticoagulation Care Providers       Provider Role Specialty Phone number    Blanquita Levin MD [facility] Cardiology [phone]

## 2023-10-04 ENCOUNTER — ANTI-COAG VISIT (OUTPATIENT)
Dept: CARDIOLOGY | Facility: CLINIC | Age: 78
End: 2023-10-04
Payer: MEDICARE

## 2023-10-04 DIAGNOSIS — Z79.01 LONG TERM CURRENT USE OF ANTICOAGULANT THERAPY: Primary | ICD-10-CM

## 2023-10-04 DIAGNOSIS — I26.99 PULMONARY EMBOLISM, UNSPECIFIED CHRONICITY, UNSPECIFIED PULMONARY EMBOLISM TYPE, UNSPECIFIED WHETHER ACUTE COR PULMONALE PRESENT: ICD-10-CM

## 2023-10-04 LAB — INR PPP: 3.1

## 2023-10-04 PROCEDURE — 93793 ANTICOAG MGMT PT WARFARIN: CPT | Mod: S$GLB,,, | Performed by: PHARMACIST

## 2023-10-04 PROCEDURE — 93793 PR ANTICOAGULANT MGMT FOR PT TAKING WARFARIN: ICD-10-PCS | Mod: S$GLB,,, | Performed by: PHARMACIST

## 2023-10-18 ENCOUNTER — ANTI-COAG VISIT (OUTPATIENT)
Dept: CARDIOLOGY | Facility: CLINIC | Age: 78
End: 2023-10-18
Payer: MEDICARE

## 2023-10-18 DIAGNOSIS — Z79.01 LONG TERM CURRENT USE OF ANTICOAGULANT THERAPY: Primary | ICD-10-CM

## 2023-10-18 DIAGNOSIS — I26.99 PULMONARY EMBOLISM, UNSPECIFIED CHRONICITY, UNSPECIFIED PULMONARY EMBOLISM TYPE, UNSPECIFIED WHETHER ACUTE COR PULMONALE PRESENT: ICD-10-CM

## 2023-10-18 LAB — INR PPP: 3.3

## 2023-10-18 PROCEDURE — 93793 PR ANTICOAGULANT MGMT FOR PT TAKING WARFARIN: ICD-10-PCS | Mod: S$GLB,,,

## 2023-10-18 PROCEDURE — 93793 ANTICOAG MGMT PT WARFARIN: CPT | Mod: S$GLB,,,

## 2023-10-18 NOTE — PROGRESS NOTES
[facility]    10/18/2023 11:06 AM    Assessment/Plan:    Patient presents today with therapeutic INR.    Assessment of patient findings and chart review: no significant change noted since last INR    Recommendation for patient's warfarin regimen: Continue current maintenance dose    Recommend repeat INR in 2 weeks  _________________________________________________________________    Del Anand (78 y.o.) is followed by the [facility].    Anticoagulation Summary  As of 10/18/2023      INR goal:  2.5-3.5   TTR:  75.5 % (8.5 y)   INR used for dosing:  3.3 (10/18/2023)   Warfarin maintenance plan:  12.5 mg (10 mg x 1 and 5 mg x 0.5) every Sun, Wed; 10 mg (10 mg x 1) all other days   Weekly warfarin total:  75 mg   Plan last modified:  Jennifer Hunt, PharmD (3/22/2023)   Next INR check:  11/1/2023   Target end date:      Indications    Long term current use of anticoagulant therapy [Z79.01]  Pulmonary embolism [I26.99]                 Anticoagulation Episode Summary       INR check location:  Home Draw    Preferred lab:      Send INR reminders to:  [facility] COUMADIN HOME MONITOR    Comments:  [facility] every other Wednesday          Anticoagulation Care Providers       Provider Role Specialty Phone number    Blanquita Levin MD [facility] Cardiology [phone]

## 2023-11-01 ENCOUNTER — ANTI-COAG VISIT (OUTPATIENT)
Dept: CARDIOLOGY | Facility: CLINIC | Age: 78
End: 2023-11-01
Payer: MEDICARE

## 2023-11-01 DIAGNOSIS — Z79.01 LONG TERM CURRENT USE OF ANTICOAGULANT THERAPY: Primary | ICD-10-CM

## 2023-11-01 LAB — INR PPP: 3.8

## 2023-11-01 PROCEDURE — 93793 ANTICOAG MGMT PT WARFARIN: CPT | Mod: S$GLB,,,

## 2023-11-01 PROCEDURE — 93793 PR ANTICOAGULANT MGMT FOR PT TAKING WARFARIN: ICD-10-PCS | Mod: S$GLB,,,

## 2023-11-07 DIAGNOSIS — E78.2 MIXED HYPERLIPIDEMIA: ICD-10-CM

## 2023-11-07 DIAGNOSIS — I10 ESSENTIAL HYPERTENSION: ICD-10-CM

## 2023-11-07 RX ORDER — AMLODIPINE BESYLATE 2.5 MG/1
2.5 TABLET ORAL
Qty: 90 TABLET | Refills: 3 | Status: SHIPPED | OUTPATIENT
Start: 2023-11-07 | End: 2024-03-31 | Stop reason: SDUPTHER

## 2023-11-07 RX ORDER — EZETIMIBE 10 MG/1
10 TABLET ORAL
Qty: 90 TABLET | Refills: 3 | Status: SHIPPED | OUTPATIENT
Start: 2023-11-07

## 2023-11-08 ENCOUNTER — ANTI-COAG VISIT (OUTPATIENT)
Dept: CARDIOLOGY | Facility: CLINIC | Age: 78
End: 2023-11-08
Payer: MEDICARE

## 2023-11-08 DIAGNOSIS — Z79.01 LONG TERM CURRENT USE OF ANTICOAGULANT THERAPY: Primary | ICD-10-CM

## 2023-11-08 DIAGNOSIS — I26.99 PULMONARY EMBOLISM, UNSPECIFIED CHRONICITY, UNSPECIFIED PULMONARY EMBOLISM TYPE, UNSPECIFIED WHETHER ACUTE COR PULMONALE PRESENT: ICD-10-CM

## 2023-11-08 LAB — INR PPP: 2.8

## 2023-11-08 PROCEDURE — 93793 ANTICOAG MGMT PT WARFARIN: CPT | Mod: S$GLB,,, | Performed by: PHARMACIST

## 2023-11-08 PROCEDURE — 93793 PR ANTICOAGULANT MGMT FOR PT TAKING WARFARIN: ICD-10-PCS | Mod: S$GLB,,, | Performed by: PHARMACIST

## 2023-11-21 ENCOUNTER — LAB VISIT (OUTPATIENT)
Dept: LAB | Facility: HOSPITAL | Age: 78
End: 2023-11-21
Attending: INTERNAL MEDICINE
Payer: MEDICARE

## 2023-11-21 DIAGNOSIS — R73.03 PREDIABETES: ICD-10-CM

## 2023-11-21 DIAGNOSIS — I10 ESSENTIAL HYPERTENSION: ICD-10-CM

## 2023-11-21 DIAGNOSIS — E78.2 MIXED HYPERLIPIDEMIA: ICD-10-CM

## 2023-11-21 LAB
BACTERIA #/AREA URNS AUTO: NORMAL /HPF
BILIRUB UR QL STRIP: NEGATIVE
CLARITY UR REFRACT.AUTO: CLEAR
COLOR UR AUTO: YELLOW
GLUCOSE UR QL STRIP: NEGATIVE
HGB UR QL STRIP: NEGATIVE
KETONES UR QL STRIP: NEGATIVE
LEUKOCYTE ESTERASE UR QL STRIP: NEGATIVE
MICROSCOPIC COMMENT: NORMAL
NITRITE UR QL STRIP: NEGATIVE
PH UR STRIP: 7 [PH] (ref 5–8)
PROT UR QL STRIP: NEGATIVE
RBC #/AREA URNS AUTO: 2 /HPF (ref 0–4)
SP GR UR STRIP: 1.02 (ref 1–1.03)
SQUAMOUS #/AREA URNS AUTO: 0 /HPF
URN SPEC COLLECT METH UR: NORMAL
WBC #/AREA URNS AUTO: 2 /HPF (ref 0–5)

## 2023-11-21 PROCEDURE — 81001 URINALYSIS AUTO W/SCOPE: CPT | Performed by: INTERNAL MEDICINE

## 2023-11-21 PROCEDURE — 81003 URINALYSIS AUTO W/O SCOPE: CPT | Performed by: INTERNAL MEDICINE

## 2023-11-23 NOTE — PROGRESS NOTES
78 year-old gentleman presents  for f/up chronic medical problems, including but not limited to pre-DM, HTN, and HLD and risks and complications associated and HM      Social ETOH  Ex- smoker having quit over 20 years ago,    HTN tx w/amlodipine 2.5 mg q.day and irbesartan 300 mg q.day  Denied HA or dizziness  BP today==>130/64     Dyslipidemia tx w/rosuvastatin 40 mg q.day and Zetia 10 mg q.  Carotid artery disease, left, no focal deficits are stroke-like symptoms noted  Coronary artery disease/ischemic cardiomyopathy  Aortic atherosclerosis, asymptomatic, no claudication  Denied unusual muscle pain or chest pain  LDL==>    Pre- DM tx w/diet  Denied increased thirst, urination, or hypoglycemia episodes  A1C ==>          Lab Results   Component Value Date     HGBA1C 5.8 (H) 05/23/2023    Social alcohol consumer. ( Spouse +)    SCREENING TESTS:   Cholesterol/lab, reviewed today   US carotids 40-49%, Cards following  Colonoscopy   7/2022,  2 polyps, tubular adenoma  08/2019,     . 5 polyps,  rec 3 yrs  PSA 0.27  Eye exam and dental work are up-to-date.    VACCINATIONS:  Zostavax, ~ 5 yrs ago  TDAP, 12/2013  Pneumovax, 10/2012  Prevnar, 2015  Flu vaccine., yearly  Covid: 2/2, + - rec bivalent booster    MedCard: Reviewed.     REVIEW OF SYSTEMS:     No fever, chill, or night sweats  No dysphagia or early satiety  No change in bowel or bladder function.  Not having increased thirst or urination.  No HA or focal deficits  No increased thirst or urination  No cold or heat intolerance  No unusual bruising or bleeding  ++ hand pain, stiffness especially in AM  ADL's: 100% independent  Memory: Good----delayed recall, Mom d. dementia  Mental Health: Good   AD's: + will, living will, and POA  Nutrition: Good  Gait: No falls  Safety: Intact  Urinary incontinence: n/a, nocturia- occ; ++ urinary hesitency  Remainder of review negative except as previously noted.     PAST MEDICAL HISTORY:    Dyslipidemia.  Hypertension  Elevated LFTs,   Impaired fasting glucose/prediabetes  Metabolic syndrome  Fatty liver  Anemia with workup by hem/onc  unremarkable.   DJD, knees and hips  Renal stones,   Sinus and rhinitis.  Colon  polyps   DVT  Pulmonary embolism    PHYSICAL EXAM:   VSS:   GENERAL: Alert and oriented, in no acute distress. Well-developed,   well-nourished, obese gentleman, conversant, and cooperative. Pleasant as always  EYES: Conjunctivae and lids unremarkable. Sclerae anicteric.   Pupils reactive.   NECK: Supple. No thyromegaly or lymphadenopathy.   RESPIRATORY: Efforts unlabored.   LUNGS: Clear to auscultation.   HEART: Regular rate and rhythm. No carotid bruits,   1+ pedal pulse. No edema.   ABDOMEN: Bowel sounds present, soft, nontender.   No hepatosplenomegaly appreciated.   MUSCULOSKELETAL: Gait normal.   Hands w/ DJD signs, decreased ROM/  No clubbing, cyanosis, or edema.  NEURO: BECK. No tremor noted.  SKIN: Warm and dry      IMPRESSION:     . Hypertension, stable,   -continue  amlodipine 2.5mg   -continue irbesartan 300mg,     HLD, stable  -continue  rosuvastatin 40mg   -continue Zetai 10mg qd    Carotid ds, left 40-49% 0-19% right- 2016       CAD, s/p bypass  Ischemic CM    Aortic atherosclerosis    Impaired fasting blood sugar/prediabetes, stable.   -continue diabetic diet    Dysmetabolic syndrome/obesity.     Fatty liver    Long term chronic anticoagulation, s/p DVT     -Pulmonary Embolism, on chronic anticoagulation, on warfarin     Anemia, stable- MVI and dark , green leafy veggies as allowed w/ warfarin use    VERONICA, on CPAP       BPH w/ nocturia    Hand OA, B/L worse in am and when uses hands during the day his stiffness eases up  -voltaren gel  Knee pain, chronic , still plays golf and works through the pain  HM  -reviewed vaccine recs  -RTC 6 mos w/ lab

## 2023-11-24 ENCOUNTER — OFFICE VISIT (OUTPATIENT)
Dept: INTERNAL MEDICINE | Facility: CLINIC | Age: 78
End: 2023-11-24
Payer: MEDICARE

## 2023-11-24 VITALS
OXYGEN SATURATION: 98 % | SYSTOLIC BLOOD PRESSURE: 130 MMHG | HEIGHT: 75 IN | BODY MASS INDEX: 38.78 KG/M2 | HEART RATE: 65 BPM | RESPIRATION RATE: 18 BRPM | WEIGHT: 311.94 LBS | DIASTOLIC BLOOD PRESSURE: 64 MMHG | TEMPERATURE: 98 F

## 2023-11-24 DIAGNOSIS — G47.33 OSA ON CPAP: ICD-10-CM

## 2023-11-24 DIAGNOSIS — I70.0 AORTIC ATHEROSCLEROSIS: ICD-10-CM

## 2023-11-24 DIAGNOSIS — M19.049 HAND ARTHRITIS: ICD-10-CM

## 2023-11-24 DIAGNOSIS — R35.1 BPH ASSOCIATED WITH NOCTURIA: ICD-10-CM

## 2023-11-24 DIAGNOSIS — I65.22 STENOSIS OF LEFT CAROTID ARTERY: ICD-10-CM

## 2023-11-24 DIAGNOSIS — E66.01 SEVERE OBESITY (BMI 35.0-39.9) WITH COMORBIDITY: ICD-10-CM

## 2023-11-24 DIAGNOSIS — E78.2 MIXED HYPERLIPIDEMIA: ICD-10-CM

## 2023-11-24 DIAGNOSIS — R73.01 IMPAIRED FASTING GLUCOSE: ICD-10-CM

## 2023-11-24 DIAGNOSIS — I25.5 ISCHEMIC CARDIOMYOPATHY: ICD-10-CM

## 2023-11-24 DIAGNOSIS — Z79.01 LONG TERM CURRENT USE OF ANTICOAGULANT THERAPY: ICD-10-CM

## 2023-11-24 DIAGNOSIS — K76.0 FATTY LIVER: ICD-10-CM

## 2023-11-24 DIAGNOSIS — Z95.1 S/P CABG X 2: ICD-10-CM

## 2023-11-24 DIAGNOSIS — E88.810 METABOLIC SYNDROME: ICD-10-CM

## 2023-11-24 DIAGNOSIS — I10 ESSENTIAL HYPERTENSION: Primary | ICD-10-CM

## 2023-11-24 DIAGNOSIS — I25.10 CORONARY ARTERY DISEASE INVOLVING NATIVE CORONARY ARTERY OF NATIVE HEART WITHOUT ANGINA PECTORIS: ICD-10-CM

## 2023-11-24 DIAGNOSIS — R73.03 PREDIABETES: ICD-10-CM

## 2023-11-24 DIAGNOSIS — D50.9 IRON DEFICIENCY ANEMIA, UNSPECIFIED IRON DEFICIENCY ANEMIA TYPE: ICD-10-CM

## 2023-11-24 DIAGNOSIS — N40.1 BPH ASSOCIATED WITH NOCTURIA: ICD-10-CM

## 2023-11-24 PROCEDURE — 1125F PR PAIN SEVERITY QUANTIFIED, PAIN PRESENT: ICD-10-PCS | Mod: CPTII,S$GLB,, | Performed by: INTERNAL MEDICINE

## 2023-11-24 PROCEDURE — 3288F PR FALLS RISK ASSESSMENT DOCUMENTED: ICD-10-PCS | Mod: CPTII,S$GLB,, | Performed by: INTERNAL MEDICINE

## 2023-11-24 PROCEDURE — 3288F FALL RISK ASSESSMENT DOCD: CPT | Mod: CPTII,S$GLB,, | Performed by: INTERNAL MEDICINE

## 2023-11-24 PROCEDURE — 3075F PR MOST RECENT SYSTOLIC BLOOD PRESS GE 130-139MM HG: ICD-10-PCS | Mod: CPTII,S$GLB,, | Performed by: INTERNAL MEDICINE

## 2023-11-24 PROCEDURE — 1125F AMNT PAIN NOTED PAIN PRSNT: CPT | Mod: CPTII,S$GLB,, | Performed by: INTERNAL MEDICINE

## 2023-11-24 PROCEDURE — 1101F PT FALLS ASSESS-DOCD LE1/YR: CPT | Mod: CPTII,S$GLB,, | Performed by: INTERNAL MEDICINE

## 2023-11-24 PROCEDURE — 3078F PR MOST RECENT DIASTOLIC BLOOD PRESSURE < 80 MM HG: ICD-10-PCS | Mod: CPTII,S$GLB,, | Performed by: INTERNAL MEDICINE

## 2023-11-24 PROCEDURE — 3078F DIAST BP <80 MM HG: CPT | Mod: CPTII,S$GLB,, | Performed by: INTERNAL MEDICINE

## 2023-11-24 PROCEDURE — 1157F PR ADVANCE CARE PLAN OR EQUIV PRESENT IN MEDICAL RECORD: ICD-10-PCS | Mod: CPTII,S$GLB,, | Performed by: INTERNAL MEDICINE

## 2023-11-24 PROCEDURE — 99999 PR PBB SHADOW E&M-EST. PATIENT-LVL III: ICD-10-PCS | Mod: PBBFAC,,, | Performed by: INTERNAL MEDICINE

## 2023-11-24 PROCEDURE — 3075F SYST BP GE 130 - 139MM HG: CPT | Mod: CPTII,S$GLB,, | Performed by: INTERNAL MEDICINE

## 2023-11-24 PROCEDURE — 1101F PR PT FALLS ASSESS DOC 0-1 FALLS W/OUT INJ PAST YR: ICD-10-PCS | Mod: CPTII,S$GLB,, | Performed by: INTERNAL MEDICINE

## 2023-11-24 PROCEDURE — 99214 OFFICE O/P EST MOD 30 MIN: CPT | Mod: S$GLB,,, | Performed by: INTERNAL MEDICINE

## 2023-11-24 PROCEDURE — 99214 PR OFFICE/OUTPT VISIT, EST, LEVL IV, 30-39 MIN: ICD-10-PCS | Mod: S$GLB,,, | Performed by: INTERNAL MEDICINE

## 2023-11-24 PROCEDURE — 1157F ADVNC CARE PLAN IN RCRD: CPT | Mod: CPTII,S$GLB,, | Performed by: INTERNAL MEDICINE

## 2023-11-24 PROCEDURE — 99999 PR PBB SHADOW E&M-EST. PATIENT-LVL III: CPT | Mod: PBBFAC,,, | Performed by: INTERNAL MEDICINE

## 2023-12-06 ENCOUNTER — ANTI-COAG VISIT (OUTPATIENT)
Dept: CARDIOLOGY | Facility: CLINIC | Age: 78
End: 2023-12-06
Payer: MEDICARE

## 2023-12-06 DIAGNOSIS — I26.99 PULMONARY EMBOLISM, UNSPECIFIED CHRONICITY, UNSPECIFIED PULMONARY EMBOLISM TYPE, UNSPECIFIED WHETHER ACUTE COR PULMONALE PRESENT: ICD-10-CM

## 2023-12-06 DIAGNOSIS — Z79.01 LONG TERM CURRENT USE OF ANTICOAGULANT THERAPY: Primary | ICD-10-CM

## 2023-12-06 LAB — INR PPP: 4.2

## 2023-12-06 PROCEDURE — 93793 ANTICOAG MGMT PT WARFARIN: CPT | Mod: S$GLB,,, | Performed by: PHARMACIST

## 2023-12-06 PROCEDURE — 93793 PR ANTICOAGULANT MGMT FOR PT TAKING WARFARIN: ICD-10-PCS | Mod: S$GLB,,, | Performed by: PHARMACIST

## 2023-12-06 NOTE — PROGRESS NOTES
[facility]    2023 11:58 AM    Assessment/Plan:    Patient presents today with supratherapeutic INR.    Assessment of patient findings and chart review: INR not at goal. Medications, chart, and patient findings reviewed. See calendar for adjustments to dose and follow up plan.      Recommendation for patient's warfarin regimen: Hold dose today then decrease maintenance dose    Recommend repeat INR in 1 week  _________________________________________________________________    Del MARCIN Kika (78 y.o.) is followed by the [facility].    Anticoagulation Summary  As of 2023      INR goal:  2.5-3.5   TTR:  75.1 % (8.6 y)   INR used for dosin.2 (2023)   Warfarin maintenance plan:  12.5 mg (10 mg x 1 and 5 mg x 0.5) every Sun; 10 mg (10 mg x 1) all other days   Weekly warfarin total:  72.5 mg   Plan last modified:  Jennifer Hunt, PharmD (2023)   Next INR check:  2023   Target end date:      Indications    Long term current use of anticoagulant therapy [Z79.01]  Pulmonary embolism [I26.99]                 Anticoagulation Episode Summary       INR check location:  Home Draw    Preferred lab:      Send INR reminders to:  [facility] COUMADIN HOME MONITOR    Comments:  [facility] every other Wednesday          Anticoagulation Care Providers       Provider Role Specialty Phone number    Blanquita Levin MD [facility] Cardiology [phone]            Patient Findings       Positives:  Signs/symptoms of bleeding    Negatives:  Signs/symptoms of thrombosis, Laboratory test error suspected, Change in health, Change in alcohol use, Change in activity, Upcoming invasive procedure, Emergency department visit, Upcoming dental procedure, Missed doses, Extra doses, Change in medications, Change in diet/appetite, Hospital admission, Bruising, Other complaints    Comments:  12.5mg wed/sun and 10mg all other days   Pt  states he bumped both of his legs and they started bleeding and did not bleed for long he stopped it with bandage,

## 2023-12-13 ENCOUNTER — ANTI-COAG VISIT (OUTPATIENT)
Dept: CARDIOLOGY | Facility: CLINIC | Age: 78
End: 2023-12-13
Payer: MEDICARE

## 2023-12-13 DIAGNOSIS — Z79.01 LONG TERM CURRENT USE OF ANTICOAGULANT THERAPY: Primary | ICD-10-CM

## 2023-12-13 DIAGNOSIS — I26.99 PULMONARY EMBOLISM, UNSPECIFIED CHRONICITY, UNSPECIFIED PULMONARY EMBOLISM TYPE, UNSPECIFIED WHETHER ACUTE COR PULMONALE PRESENT: ICD-10-CM

## 2023-12-13 LAB — INR PPP: 2.5

## 2023-12-13 PROCEDURE — 93793 PR ANTICOAGULANT MGMT FOR PT TAKING WARFARIN: ICD-10-PCS | Mod: S$GLB,,, | Performed by: PHARMACIST

## 2023-12-13 PROCEDURE — 93793 ANTICOAG MGMT PT WARFARIN: CPT | Mod: S$GLB,,, | Performed by: PHARMACIST

## 2023-12-27 ENCOUNTER — ANTI-COAG VISIT (OUTPATIENT)
Dept: CARDIOLOGY | Facility: CLINIC | Age: 78
End: 2023-12-27
Payer: MEDICARE

## 2023-12-27 DIAGNOSIS — I26.99 PULMONARY EMBOLISM, UNSPECIFIED CHRONICITY, UNSPECIFIED PULMONARY EMBOLISM TYPE, UNSPECIFIED WHETHER ACUTE COR PULMONALE PRESENT: ICD-10-CM

## 2023-12-27 DIAGNOSIS — Z79.01 LONG TERM CURRENT USE OF ANTICOAGULANT THERAPY: Primary | ICD-10-CM

## 2023-12-27 LAB — INR PPP: 3.9

## 2023-12-27 PROCEDURE — 93793 PR ANTICOAGULANT MGMT FOR PT TAKING WARFARIN: ICD-10-PCS | Mod: S$GLB,,, | Performed by: PHARMACIST

## 2023-12-27 PROCEDURE — 93793 ANTICOAG MGMT PT WARFARIN: CPT | Mod: S$GLB,,, | Performed by: PHARMACIST

## 2023-12-27 NOTE — PROGRESS NOTES
[facility]    12/27/2023 3:14 PM    Assessment/Plan:    Patient presents today with supratherapeutic INR.    Assessment of patient findings and chart review: Patient reports taking a higher than prescribed weekly dose of coumadin.     Recommendation for patient's warfarin regimen: Decrease maintenance dose    Recommend repeat INR in 1 week  _________________________________________________________________    Del Anand (78 y.o.) is followed by the [facility].    Anticoagulation Summary  As of 12/27/2023      INR goal:  2.5-3.5   TTR:  75.0 % (8.7 y)   INR used for dosing:  3.9 (12/27/2023)   Warfarin maintenance plan:  12.5 mg (10 mg x 1 and 5 mg x 0.5) every Sun; 10 mg (10 mg x 1) all other days   Weekly warfarin total:  72.5 mg   Plan last modified:  Jennifer Hunt, PharmD (12/6/2023)   Next INR check:  1/3/2024   Target end date:      Indications    Long term current use of anticoagulant therapy [Z79.01]  Pulmonary embolism [I26.99]                 Anticoagulation Episode Summary       INR check location:  Home Draw    Preferred lab:      Send INR reminders to:  [facility] COUMADIN HOME MONITOR    Comments:  [facility] every other Wednesday          Anticoagulation Care Providers       Provider Role Specialty Phone number    Blanquita Levin MD [facility] Cardiology [phone]            Patient Findings       Negatives:  Signs/symptoms of thrombosis, Signs/symptoms of bleeding, Laboratory test error suspected, Change in health, Change in alcohol use, Change in activity, Upcoming invasive procedure, Emergency department visit, Upcoming dental procedure, Missed doses, Extra doses, Change in medications, Change in diet/appetite, Hospital admission, Bruising, Other complaints    Comments:  12.5mg wed/sun and 10mg all other days

## 2024-01-01 NOTE — PROGRESS NOTES
INR a little low but acceptable. Patient reports less alcohol in the past week. Normally has a few hardballs a day. He has not had any recently while out of town. Additionally, his diet may have been a little different while out of town. No other changes. No signs or symptoms of bleeding. Continue maintenance dose and repeat INR in another 8 weeks. He will be resuming normal diet and alcohol behaviors.   
CCHD Screen [01-12]: Initial  Pre-Ductal SpO2(%): 97  Post-Ductal SpO2(%): 99  SpO2 Difference(Pre MINUS Post): -2  Extremities Used: Right Hand, Right Foot  Result: Passed  Follow up: Normal Screen- (No follow-up needed)

## 2024-01-03 ENCOUNTER — ANTI-COAG VISIT (OUTPATIENT)
Dept: CARDIOLOGY | Facility: CLINIC | Age: 79
End: 2024-01-03
Payer: MEDICARE

## 2024-01-03 DIAGNOSIS — Z79.01 LONG TERM CURRENT USE OF ANTICOAGULANT THERAPY: Primary | ICD-10-CM

## 2024-01-03 DIAGNOSIS — I26.99 PULMONARY EMBOLISM, UNSPECIFIED CHRONICITY, UNSPECIFIED PULMONARY EMBOLISM TYPE, UNSPECIFIED WHETHER ACUTE COR PULMONALE PRESENT: ICD-10-CM

## 2024-01-03 LAB — INR PPP: 2.8

## 2024-01-03 PROCEDURE — 93793 ANTICOAG MGMT PT WARFARIN: CPT | Mod: ,,, | Performed by: PHARMACIST

## 2024-01-09 NOTE — PROGRESS NOTES
[facility]: [facility]  Follow-Up    Patient states that he's only charged his cuff once since he's gotten it which may be an explanation for some of these higher readings as of late. [place] so I will f/u in 4 weeks about his diet and also to allow time for those readings to keep coming down.     The history is provided by the patient.     Follow Up  Follow-up reason(s): reading review and routine education      Readings are trending down due to lifestyle change.    Routine Education Topics: eating patterns and meal planning  Patient and I had discussed portion control and splitting a meal up to help try to lose some weight. Patient stated that he had been doing that but he hasn't seen much of a change.          Diet:       Patient I had talked about portion control and breaking up his calories from 3 meals into 4 meals. Patient states that he has been doing this yet has not seen much change.    Intervention(s): portion control and meal planning    Physical Activity:   When asked if exercising, patient responded: yes    Patient participates in the following activities: walking and and golf    Patient walks his dog a few times a week and plays golf a few times per week. Patient was in the middle of [employer] when I called.     Medication Adherence:       Patient reports no s/s or has any issues taking medications as prescribed    Tobacco and Alcohol:       Patient is not eligible for referral to smoking cessation.        [facility]    Intervention/Plan    There are no preventive care reminders to display for this patient.    Last 5 Patient Entered Readings                                      Current 30 Day Average: 140/78     Recent Readings 9/11/2019 8/28/2019 8/21/2019 8/16/2019 8/16/2019    SBP (mmHg) 140 119 140 161 162    DBP (mmHg) 71 72 79 82 84    Pulse 75 79 78 57 53                 We received [facility] Medical CGM order. It was completed and faxed it over back. We received confirmation.

## 2024-01-10 ENCOUNTER — ANTI-COAG VISIT (OUTPATIENT)
Dept: CARDIOLOGY | Facility: CLINIC | Age: 79
End: 2024-01-10
Payer: MEDICARE

## 2024-01-10 DIAGNOSIS — Z79.01 LONG TERM CURRENT USE OF ANTICOAGULANT THERAPY: Primary | ICD-10-CM

## 2024-01-10 DIAGNOSIS — I26.99 PULMONARY EMBOLISM, UNSPECIFIED CHRONICITY, UNSPECIFIED PULMONARY EMBOLISM TYPE, UNSPECIFIED WHETHER ACUTE COR PULMONALE PRESENT: ICD-10-CM

## 2024-01-10 LAB — INR PPP: 3

## 2024-01-10 PROCEDURE — 93793 ANTICOAG MGMT PT WARFARIN: CPT | Mod: ,,, | Performed by: PHARMACIST

## 2024-01-21 NOTE — PROGRESS NOTES
74-year-old gentleman presents  for Annual PE   Ex- smoker having quit over 20 years ago,  Social alcohol consumer.     SCREENING TESTS:   Cholesterol/lab, reviewed today   US carotids 40-49%  Colonoscopy 08/2019,     . 5 polyps,  rec 3 yrs  PSA 0.12  Eye exam and dental work are up-to-date.    VACCINATIONS:  Zostavax, ~ 5 yrs ago  TDAP, 12/2013  Pneumovax, 10/2012  Prevnar, 2015  Flu vaccine., yearly    MedCard: Reviewed.     REVIEW OF SYSTEMS:   ++ weight , interested in diet- reviewed Weight Watchers  No fever, chill, or night sweats  No dysphagia or early satiety  No change in bowel or bladder function.  Not having increased thirst or urination.  No HA or focal deficits  No increased thirst or urination  No cold or heat intolerance  No unusual bruising or bleeding  B/L OA hands, right > left, s/p trauma when 21, + stiffness, tx Tylenol w/ relief  Left knee + edema , tenderness/ pain when standing and pivoting, tx Tylenol w/ relief  Answers for HPI/ROS submitted by the patient on 10/23/2019   activity change: No  unexpected weight change: No  neck pain: No  hearing loss: No  rhinorrhea: No  trouble swallowing: No  eye discharge: No  visual disturbance: yes, recent eye exams have not improved w/ Rx glasses   chest tightness: No  wheezing: No  chest pain: No  palpitations: No  blood in stool: No  constipation: No  vomiting: No  diarrhea: No  polydipsia: No  polyuria: No  difficulty urinating: No  urgency: No  hematuria: No  joint swelling: No  arthralgias: No  headaches: No  weakness: No  confusion: No  dysphoric mood: No    ADL's: 100% independent  Memory: Good----delayed recall, Mom d. dementia  Mental Health: Good   AD's: + will, living will, and POA  Nutrition: Good  Gait: No falls  Safety: Intact  Urinary incontinence: n/a, nocturia- rare; ++ urinary hesitency  Remainder of review negative except as previously noted.     PAST MEDICAL HISTORY:   Dyslipidemia.  Hypertension  Elevated LFTs,   Impaired  Covid swab collected and running now.    fasting glucose/prediabetes  Metabolic syndrome  Fatty liver  Anemia with workup by hem/onc  unremarkable.   DJD, knees and hips  Renal stones,   Sinus and rhinitis.  Colon  polyps   DVT  Pulmonary embolism    PHYSICAL EXAM:   VSS:   GENERAL: Alert and oriented, in no acute distress. Well-developed,   well-nourished, obese gentleman, conversant, and cooperative. Pleasant as always  EYES: Conjunctivae and lids unremarkable. Sclerae anicteric.   Pupils reactive.   ENT:Canals w/o significant cerumen, TMS- unremarkable   Nasal mucosa, tturbinates, oropharynx injected w/o exudate  Sinuses nontender.   NECK: Supple. No thyromegaly or lymphadenopathy.   RESPIRATORY: Efforts unlabored.   LUNGS: Clear to auscultation.   HEART: Regular rate and rhythm. No carotid bruits,   1+ pedal pulse. No edema.   ABDOMEN: Bowel sounds present, soft, nontender.   No hepatosplenomegaly appreciated.   MUSCULOSKELETAL: Gait normal.   No clubbing, cyanosis, or edema.  NEURO: BECK. No tremor noted.  SKIN: Warm and dry      IMPRESSION:     Annual PE.     Family history of cardiovascular disease.     CAD, s/p bypass    Ischemic cardiomyopathy, asx  . Hypertension, stable.      HLD    Carotid ds, left 20-39% 20-39% right      Impaired fasting blood sugar/prediabetes, stable.     Dysmetabolic syndrome/obesity.     Fatty liver     DVT, right, on chronic anticoagulation    Pulmonary Embolism, on chronic anticoagulation    Anemia, stable    VERONICA, on CPAP    Obesity, BMI 39.05    PLAN:  HD flu vaccine    Reviewed Weight Watchers Program  Continue diet and weight loss  Continue present meds  Diet, reviewed avoid sugar and simple carbs to help w/ triglycerides  Exercise, continue gym and golfing    Call prn  RTC 6 mos

## 2024-01-24 ENCOUNTER — ANTI-COAG VISIT (OUTPATIENT)
Dept: CARDIOLOGY | Facility: CLINIC | Age: 79
End: 2024-01-24
Payer: MEDICARE

## 2024-01-24 DIAGNOSIS — Z79.01 LONG TERM CURRENT USE OF ANTICOAGULANT THERAPY: Primary | ICD-10-CM

## 2024-01-24 DIAGNOSIS — I26.99 PULMONARY EMBOLISM, UNSPECIFIED CHRONICITY, UNSPECIFIED PULMONARY EMBOLISM TYPE, UNSPECIFIED WHETHER ACUTE COR PULMONALE PRESENT: ICD-10-CM

## 2024-01-24 LAB — INR PPP: 4.7

## 2024-01-24 PROCEDURE — 93793 ANTICOAG MGMT PT WARFARIN: CPT | Mod: S$GLB,,, | Performed by: PHARMACIST

## 2024-01-24 NOTE — PROGRESS NOTES
[facility]    2024 4:03 PM    Assessment/Plan:    Patient presents today with supratherapeutic INR.    Assessment of patient findings and chart review: INR not at goal. Medications, chart, and patient findings reviewed. See calendar for adjustments to dose and follow up plan.      Recommendation for patient's warfarin regimen: Hold dose today then decrease maintenance dose    Recommend repeat INR in 1 week  _________________________________________________________________    Del Anand (78 y.o.) is followed by the [facility].    Anticoagulation Summary  As of 2024      INR goal:  2.5-3.5   TTR:  74.9 % (8.8 y)   INR used for dosin.7 (2024)   Warfarin maintenance plan:  10 mg (10 mg x 1) every day   Weekly warfarin total:  70 mg   Plan last modified:  Jennifer Hunt, PharmD (2024)   Next INR check:  2024   Target end date:      Indications    Long term current use of anticoagulant therapy [Z79.01]  Pulmonary embolism [I26.99]                 Anticoagulation Episode Summary       INR check location:  Home Draw    Preferred lab:      Send INR reminders to:  [facility] COUMADIN HOME MONITOR    Comments:  [facility] every other Wednesday          Anticoagulation Care Providers       Provider Role Specialty Phone number    Blanquita Levin MD [facility] Cardiology [phone]            Patient Findings       Positives:  Signs/symptoms of bleeding, Extra doses, Other complaints    Negatives:  Signs/symptoms of thrombosis, Laboratory test error suspected, Change in health, Change in alcohol use, Change in activity, Upcoming invasive procedure, Emergency department visit, Upcoming dental procedure, Missed doses, Change in medications, Change in diet/appetite, Hospital admission, Bruising    Comments:  Pt confirmed he took 12.5mg last , confirmed correct dosing all other days. He did  have some bleeding when he brushed his teeth this morning. He confirmed he fell out of bed last night (they are in a rental and it was a bed he was not used to). He did not hit his head, only his cheek, no bleeding, just some redness to the area. No changes to diet or medications confirmed.     Blood when brushing teeth

## 2024-01-25 ENCOUNTER — PATIENT MESSAGE (OUTPATIENT)
Dept: SLEEP MEDICINE | Facility: CLINIC | Age: 79
End: 2024-01-25
Payer: MEDICARE

## 2024-01-31 DIAGNOSIS — Z79.01 LONG TERM CURRENT USE OF ANTICOAGULANT THERAPY: ICD-10-CM

## 2024-01-31 RX ORDER — WARFARIN 10 MG/1
TABLET ORAL
Qty: 90 TABLET | Refills: 3 | OUTPATIENT
Start: 2024-01-31

## 2024-01-31 RX ORDER — IRBESARTAN 300 MG/1
300 TABLET ORAL NIGHTLY
Qty: 90 TABLET | Refills: 3 | OUTPATIENT
Start: 2024-01-31

## 2024-02-01 ENCOUNTER — ANTI-COAG VISIT (OUTPATIENT)
Dept: CARDIOLOGY | Facility: CLINIC | Age: 79
End: 2024-02-01
Payer: MEDICARE

## 2024-02-01 DIAGNOSIS — Z79.01 LONG TERM CURRENT USE OF ANTICOAGULANT THERAPY: Primary | ICD-10-CM

## 2024-02-01 DIAGNOSIS — I26.99 PULMONARY EMBOLISM, UNSPECIFIED CHRONICITY, UNSPECIFIED PULMONARY EMBOLISM TYPE, UNSPECIFIED WHETHER ACUTE COR PULMONALE PRESENT: ICD-10-CM

## 2024-02-01 LAB — INR PPP: 2

## 2024-02-01 PROCEDURE — 93793 ANTICOAG MGMT PT WARFARIN: CPT | Mod: S$GLB,,, | Performed by: PHARMACIST

## 2024-02-01 RX ORDER — WARFARIN 10 MG/1
TABLET ORAL
Qty: 90 TABLET | Refills: 3 | Status: SHIPPED | OUTPATIENT
Start: 2024-02-01 | End: 2024-03-31 | Stop reason: SDUPTHER

## 2024-02-01 RX ORDER — IRBESARTAN 300 MG/1
300 TABLET ORAL NIGHTLY
Qty: 90 TABLET | Refills: 3 | Status: SHIPPED | OUTPATIENT
Start: 2024-02-01 | End: 2024-03-25 | Stop reason: SDUPTHER

## 2024-02-01 NOTE — PROGRESS NOTES
[facility]    2024 3:11 PM    Assessment/Plan:    Patient presents today with subtherapeutic  INR.    Assessment of patient findings and chart review: INR not at goal. Medications, chart, and patient findings reviewed. See calendar for adjustments to dose and follow up plan.  10mg daily will account for a dose increase since dose decrease was made last week.     Recommendation for patient's warfarin regimen: Increase maintenance dose    Recommend repeat INR in 1 week  _________________________________________________________________    Del Anand (78 y.o.) is followed by the [facility].    Anticoagulation Summary  As of 2024      INR goal:  2.5-3.5   TTR:  74.8 % (8.8 y)   INR used for dosin.0 (2024)   Warfarin maintenance plan:  10 mg (10 mg x 1) every day   Weekly warfarin total:  70 mg   Plan last modified:  Jennifer Hunt, PharmD (2024)   Next INR check:  2024   Target end date:      Indications    Long term current use of anticoagulant therapy [Z79.01]  Pulmonary embolism [I26.99]                 Anticoagulation Episode Summary       INR check location:  Home Draw    Preferred lab:      Send INR reminders to:  [facility] COUMADIN HOME MONITOR    Comments:  [facility] every other Wednesday          Anticoagulation Care Providers       Provider Role Specialty Phone number    Blanquita Levin MD [facility] Cardiology [phone]            Patient Findings       Negatives:  Signs/symptoms of thrombosis, Signs/symptoms of bleeding, Laboratory test error suspected, Change in health, Change in alcohol use, Change in activity, Upcoming invasive procedure, Emergency department visit, Upcoming dental procedure, Missed doses, Extra doses, Change in medications, Change in diet/appetite, Hospital admission, Bruising, Other complaints    Comments:  Pt confirmed taking warfarin dose of 0mg , 5mg on  1/25 then resumed 10mg qhs. Pt denies any changes to medication, health, or diet. He denies any signs of bleeding and was reminded to contact clinic with any changes that may affect warfarin therapy.

## 2024-02-07 ENCOUNTER — ANTI-COAG VISIT (OUTPATIENT)
Dept: CARDIOLOGY | Facility: CLINIC | Age: 79
End: 2024-02-07
Payer: MEDICARE

## 2024-02-07 DIAGNOSIS — Z79.01 LONG TERM CURRENT USE OF ANTICOAGULANT THERAPY: Primary | ICD-10-CM

## 2024-02-07 DIAGNOSIS — I26.99 PULMONARY EMBOLISM, UNSPECIFIED CHRONICITY, UNSPECIFIED PULMONARY EMBOLISM TYPE, UNSPECIFIED WHETHER ACUTE COR PULMONALE PRESENT: ICD-10-CM

## 2024-02-07 LAB — INR PPP: 2.3

## 2024-02-07 PROCEDURE — 93793 ANTICOAG MGMT PT WARFARIN: CPT | Mod: S$GLB,,, | Performed by: PHARMACIST

## 2024-02-14 ENCOUNTER — ANTI-COAG VISIT (OUTPATIENT)
Dept: CARDIOLOGY | Facility: CLINIC | Age: 79
End: 2024-02-14
Payer: MEDICARE

## 2024-02-14 DIAGNOSIS — Z79.01 LONG TERM CURRENT USE OF ANTICOAGULANT THERAPY: Primary | ICD-10-CM

## 2024-02-14 DIAGNOSIS — I26.99 PULMONARY EMBOLISM, UNSPECIFIED CHRONICITY, UNSPECIFIED PULMONARY EMBOLISM TYPE, UNSPECIFIED WHETHER ACUTE COR PULMONALE PRESENT: ICD-10-CM

## 2024-02-14 LAB — INR PPP: 3.1

## 2024-02-14 PROCEDURE — 93793 ANTICOAG MGMT PT WARFARIN: CPT | Mod: S$GLB,,,

## 2024-02-21 ENCOUNTER — ANTI-COAG VISIT (OUTPATIENT)
Dept: CARDIOLOGY | Facility: CLINIC | Age: 79
End: 2024-02-21
Payer: MEDICARE

## 2024-02-21 DIAGNOSIS — Z79.01 LONG TERM CURRENT USE OF ANTICOAGULANT THERAPY: Primary | ICD-10-CM

## 2024-02-21 DIAGNOSIS — I26.99 PULMONARY EMBOLISM, UNSPECIFIED CHRONICITY, UNSPECIFIED PULMONARY EMBOLISM TYPE, UNSPECIFIED WHETHER ACUTE COR PULMONALE PRESENT: ICD-10-CM

## 2024-02-21 LAB — INR PPP: 2.8

## 2024-02-21 PROCEDURE — 93793 ANTICOAG MGMT PT WARFARIN: CPT | Mod: S$GLB,,, | Performed by: PHARMACIST

## 2024-02-28 ENCOUNTER — ANTI-COAG VISIT (OUTPATIENT)
Dept: CARDIOLOGY | Facility: CLINIC | Age: 79
End: 2024-02-28
Payer: MEDICARE

## 2024-02-28 DIAGNOSIS — I26.99 PULMONARY EMBOLISM, UNSPECIFIED CHRONICITY, UNSPECIFIED PULMONARY EMBOLISM TYPE, UNSPECIFIED WHETHER ACUTE COR PULMONALE PRESENT: ICD-10-CM

## 2024-02-28 DIAGNOSIS — Z79.01 LONG TERM CURRENT USE OF ANTICOAGULANT THERAPY: Primary | ICD-10-CM

## 2024-02-28 LAB — INR PPP: 3.4

## 2024-02-28 PROCEDURE — 93793 ANTICOAG MGMT PT WARFARIN: CPT | Mod: S$GLB,,, | Performed by: PHARMACIST

## 2024-02-29 RX ORDER — ROSUVASTATIN CALCIUM 40 MG/1
40 TABLET, COATED ORAL
Qty: 90 TABLET | Refills: 3 | Status: SHIPPED | OUTPATIENT
Start: 2024-02-29 | End: 2024-03-25 | Stop reason: SDUPTHER

## 2024-03-06 LAB — INR PPP: 2.5

## 2024-03-07 ENCOUNTER — ANTI-COAG VISIT (OUTPATIENT)
Dept: CARDIOLOGY | Facility: CLINIC | Age: 79
End: 2024-03-07
Payer: MEDICARE

## 2024-03-07 DIAGNOSIS — I26.99 PULMONARY EMBOLISM, UNSPECIFIED CHRONICITY, UNSPECIFIED PULMONARY EMBOLISM TYPE, UNSPECIFIED WHETHER ACUTE COR PULMONALE PRESENT: ICD-10-CM

## 2024-03-07 DIAGNOSIS — Z79.01 LONG TERM CURRENT USE OF ANTICOAGULANT THERAPY: Primary | ICD-10-CM

## 2024-03-07 PROCEDURE — 93793 ANTICOAG MGMT PT WARFARIN: CPT | Mod: S$GLB,,, | Performed by: PHARMACIST

## 2024-03-20 ENCOUNTER — ANTI-COAG VISIT (OUTPATIENT)
Dept: CARDIOLOGY | Facility: CLINIC | Age: 79
End: 2024-03-20
Payer: MEDICARE

## 2024-03-20 DIAGNOSIS — I26.99 PULMONARY EMBOLISM, UNSPECIFIED CHRONICITY, UNSPECIFIED PULMONARY EMBOLISM TYPE, UNSPECIFIED WHETHER ACUTE COR PULMONALE PRESENT: ICD-10-CM

## 2024-03-20 DIAGNOSIS — Z79.01 LONG TERM CURRENT USE OF ANTICOAGULANT THERAPY: Primary | ICD-10-CM

## 2024-03-20 LAB — INR PPP: 2.9

## 2024-03-20 PROCEDURE — 93793 ANTICOAG MGMT PT WARFARIN: CPT | Mod: S$GLB,,, | Performed by: PHARMACIST

## 2024-03-25 RX ORDER — IRBESARTAN 300 MG/1
300 TABLET ORAL NIGHTLY
Qty: 90 TABLET | Refills: 3 | Status: SHIPPED | OUTPATIENT
Start: 2024-03-25

## 2024-03-25 RX ORDER — ROSUVASTATIN CALCIUM 40 MG/1
40 TABLET, COATED ORAL DAILY
Qty: 90 TABLET | Refills: 3 | Status: SHIPPED | OUTPATIENT
Start: 2024-03-25

## 2024-03-31 DIAGNOSIS — I10 ESSENTIAL HYPERTENSION: ICD-10-CM

## 2024-03-31 DIAGNOSIS — Z79.01 LONG TERM CURRENT USE OF ANTICOAGULANT THERAPY: Primary | ICD-10-CM

## 2024-04-01 RX ORDER — WARFARIN 10 MG/1
TABLET ORAL
Qty: 90 TABLET | Refills: 3 | Status: SHIPPED | OUTPATIENT
Start: 2024-04-01

## 2024-04-02 RX ORDER — AMLODIPINE BESYLATE 2.5 MG/1
2.5 TABLET ORAL DAILY
Qty: 90 TABLET | Refills: 3 | Status: SHIPPED | OUTPATIENT
Start: 2024-04-02

## 2024-04-03 ENCOUNTER — ANTI-COAG VISIT (OUTPATIENT)
Dept: CARDIOLOGY | Facility: CLINIC | Age: 79
End: 2024-04-03
Payer: MEDICARE

## 2024-04-03 DIAGNOSIS — Z79.01 LONG TERM CURRENT USE OF ANTICOAGULANT THERAPY: Primary | ICD-10-CM

## 2024-04-03 DIAGNOSIS — I26.99 PULMONARY EMBOLISM, UNSPECIFIED CHRONICITY, UNSPECIFIED PULMONARY EMBOLISM TYPE, UNSPECIFIED WHETHER ACUTE COR PULMONALE PRESENT: ICD-10-CM

## 2024-04-03 LAB — INR PPP: 2.3

## 2024-04-03 PROCEDURE — 93793 ANTICOAG MGMT PT WARFARIN: CPT | Mod: S$GLB,,, | Performed by: PHARMACIST

## 2024-04-17 ENCOUNTER — ANTI-COAG VISIT (OUTPATIENT)
Dept: CARDIOLOGY | Facility: CLINIC | Age: 79
End: 2024-04-17
Payer: MEDICARE

## 2024-04-17 DIAGNOSIS — Z79.01 LONG TERM CURRENT USE OF ANTICOAGULANT THERAPY: Primary | ICD-10-CM

## 2024-04-17 DIAGNOSIS — I26.99 PULMONARY EMBOLISM, UNSPECIFIED CHRONICITY, UNSPECIFIED PULMONARY EMBOLISM TYPE, UNSPECIFIED WHETHER ACUTE COR PULMONALE PRESENT: ICD-10-CM

## 2024-04-17 LAB — INR PPP: 3.5

## 2024-04-17 PROCEDURE — 93793 ANTICOAG MGMT PT WARFARIN: CPT | Mod: S$GLB,,, | Performed by: PHARMACIST

## 2024-05-01 ENCOUNTER — ANTI-COAG VISIT (OUTPATIENT)
Dept: CARDIOLOGY | Facility: CLINIC | Age: 79
End: 2024-05-01
Payer: MEDICARE

## 2024-05-01 DIAGNOSIS — Z79.01 LONG TERM CURRENT USE OF ANTICOAGULANT THERAPY: Primary | ICD-10-CM

## 2024-05-01 DIAGNOSIS — I26.99 PULMONARY EMBOLISM, UNSPECIFIED CHRONICITY, UNSPECIFIED PULMONARY EMBOLISM TYPE, UNSPECIFIED WHETHER ACUTE COR PULMONALE PRESENT: ICD-10-CM

## 2024-05-01 LAB — INR PPP: 3.8

## 2024-05-01 PROCEDURE — 93793 ANTICOAG MGMT PT WARFARIN: CPT | Mod: S$GLB,,, | Performed by: PHARMACIST

## 2024-05-01 NOTE — PROGRESS NOTES
[facility]    05/01/2024 2:23 PM    Assessment/Plan:    Patient presents today with supratherapeutic INR.    Assessment of patient findings and chart review: INR not at goal. Medications, chart, and patient findings reviewed. See calendar for adjustments to dose and follow up plan.      Recommendation for patient's warfarin regimen: Lower dose today to 5mg then resume current maintenance dose    Recommend repeat INR in 2 weeks  _________________________________________________________________    Del Anand (79 y.o.) is followed by the [facility].    Anticoagulation Summary  As of 5/1/2024      INR goal:  2.5-3.5   TTR:  74.6% (9 y)   INR used for dosing:  3.8 (5/1/2024)   Warfarin maintenance plan:  12.5 mg (10 mg x 1 and 5 mg x 0.5) every Sun; 10 mg (10 mg x 1) all other days   Weekly warfarin total:  72.5 mg   Plan last modified:  Jennifer Hunt, PharmD (2/7/2024)   Next INR check:  5/15/2024   Target end date:      Indications    Long term current use of anticoagulant therapy [Z79.01]  Pulmonary embolism [I26.99]                 Anticoagulation Episode Summary       INR check location:  Home Draw    Preferred lab:      Send INR reminders to:  [facility] COUMADIN HOME MONITOR    Comments:  [facility] every other Wednesday          Anticoagulation Care Providers       Provider Role Specialty Phone number    Blanquita Levin MD [facility] Cardiology [phone]            Patient Findings       Negatives:  Signs/symptoms of thrombosis, Signs/symptoms of bleeding, Laboratory test error suspected, Change in health, Change in alcohol use, Change in activity, Upcoming invasive procedure, Emergency department visit, Upcoming dental procedure, Missed doses, Extra doses, Change in medications, Change in diet/appetite, Hospital admission, Bruising, Other complaints    Comments:  12.5mg sun and 10mg all other days

## 2024-05-09 ENCOUNTER — PATIENT MESSAGE (OUTPATIENT)
Dept: SLEEP MEDICINE | Facility: CLINIC | Age: 79
End: 2024-05-09
Payer: MEDICARE

## 2024-05-12 DIAGNOSIS — E78.2 MIXED HYPERLIPIDEMIA: ICD-10-CM

## 2024-05-14 RX ORDER — EZETIMIBE 10 MG/1
10 TABLET ORAL DAILY
Qty: 90 TABLET | Refills: 3 | Status: SHIPPED | OUTPATIENT
Start: 2024-05-14

## 2024-05-15 ENCOUNTER — ANTI-COAG VISIT (OUTPATIENT)
Dept: CARDIOLOGY | Facility: CLINIC | Age: 79
End: 2024-05-15
Payer: MEDICARE

## 2024-05-15 DIAGNOSIS — Z79.01 LONG TERM CURRENT USE OF ANTICOAGULANT THERAPY: Primary | ICD-10-CM

## 2024-05-15 DIAGNOSIS — I26.99 PULMONARY EMBOLISM, UNSPECIFIED CHRONICITY, UNSPECIFIED PULMONARY EMBOLISM TYPE, UNSPECIFIED WHETHER ACUTE COR PULMONALE PRESENT: ICD-10-CM

## 2024-05-15 LAB — INR PPP: 3.5

## 2024-05-15 PROCEDURE — 93793 ANTICOAG MGMT PT WARFARIN: CPT | Mod: S$GLB,,, | Performed by: PHARMACIST

## 2024-05-17 DIAGNOSIS — G47.33 OSA (OBSTRUCTIVE SLEEP APNEA): Primary | ICD-10-CM

## 2024-05-28 ENCOUNTER — OFFICE VISIT (OUTPATIENT)
Dept: DERMATOLOGY | Facility: CLINIC | Age: 79
End: 2024-05-28
Payer: MEDICARE

## 2024-05-28 DIAGNOSIS — Z85.828 HISTORY OF NONMELANOMA SKIN CANCER: ICD-10-CM

## 2024-05-28 DIAGNOSIS — D22.9 MULTIPLE BENIGN NEVI: ICD-10-CM

## 2024-05-28 DIAGNOSIS — D18.01 CHERRY ANGIOMA: ICD-10-CM

## 2024-05-28 DIAGNOSIS — L57.0 AK (ACTINIC KERATOSIS): ICD-10-CM

## 2024-05-28 DIAGNOSIS — L21.9 SEBORRHEIC DERMATITIS: Primary | ICD-10-CM

## 2024-05-28 DIAGNOSIS — L90.5 SCAR: ICD-10-CM

## 2024-05-28 DIAGNOSIS — L82.1 SEBORRHEIC KERATOSIS: ICD-10-CM

## 2024-05-28 PROCEDURE — 1159F MED LIST DOCD IN RCRD: CPT | Mod: CPTII,S$GLB,, | Performed by: STUDENT IN AN ORGANIZED HEALTH CARE EDUCATION/TRAINING PROGRAM

## 2024-05-28 PROCEDURE — 1157F ADVNC CARE PLAN IN RCRD: CPT | Mod: CPTII,S$GLB,, | Performed by: STUDENT IN AN ORGANIZED HEALTH CARE EDUCATION/TRAINING PROGRAM

## 2024-05-28 PROCEDURE — 99213 OFFICE O/P EST LOW 20 MIN: CPT | Mod: 25,S$GLB,, | Performed by: STUDENT IN AN ORGANIZED HEALTH CARE EDUCATION/TRAINING PROGRAM

## 2024-05-28 PROCEDURE — 1101F PT FALLS ASSESS-DOCD LE1/YR: CPT | Mod: CPTII,S$GLB,, | Performed by: STUDENT IN AN ORGANIZED HEALTH CARE EDUCATION/TRAINING PROGRAM

## 2024-05-28 PROCEDURE — 1126F AMNT PAIN NOTED NONE PRSNT: CPT | Mod: CPTII,S$GLB,, | Performed by: STUDENT IN AN ORGANIZED HEALTH CARE EDUCATION/TRAINING PROGRAM

## 2024-05-28 PROCEDURE — 3288F FALL RISK ASSESSMENT DOCD: CPT | Mod: CPTII,S$GLB,, | Performed by: STUDENT IN AN ORGANIZED HEALTH CARE EDUCATION/TRAINING PROGRAM

## 2024-05-28 PROCEDURE — 17000 DESTRUCT PREMALG LESION: CPT | Mod: S$GLB,,, | Performed by: STUDENT IN AN ORGANIZED HEALTH CARE EDUCATION/TRAINING PROGRAM

## 2024-05-28 PROCEDURE — 1160F RVW MEDS BY RX/DR IN RCRD: CPT | Mod: CPTII,S$GLB,, | Performed by: STUDENT IN AN ORGANIZED HEALTH CARE EDUCATION/TRAINING PROGRAM

## 2024-05-28 PROCEDURE — 17003 DESTRUCT PREMALG LES 2-14: CPT | Mod: S$GLB,,, | Performed by: STUDENT IN AN ORGANIZED HEALTH CARE EDUCATION/TRAINING PROGRAM

## 2024-05-28 RX ORDER — KETOCONAZOLE 20 MG/G
CREAM TOPICAL 2 TIMES DAILY
Qty: 60 G | Refills: 1 | Status: SHIPPED | OUTPATIENT
Start: 2024-05-28

## 2024-05-28 RX ORDER — TRIAMCINOLONE ACETONIDE 0.25 MG/G
CREAM TOPICAL 2 TIMES DAILY
Qty: 80 G | Refills: 1 | Status: SHIPPED | OUTPATIENT
Start: 2024-05-28

## 2024-05-28 NOTE — PROGRESS NOTES
Subjective:      Patient ID:  Del Anand is a 79 y.o. male who presents for   Chief Complaint   Patient presents with    Spot     face     LOV 6/30/23 [place]    Patient here today for skin check UBSE   Complains of spots on face, feel rough.    Derm Hx:  Hx of SCC L lower leg s/p Mohs with Dr. Rosenbaum in 2018 and subsequent wound dehiscence which required wound care for months.        Review of Systems   Constitutional:  Negative for fever, chills and fatigue.   Respiratory:  Negative for cough and shortness of breath.    Gastrointestinal:  Negative for nausea and vomiting.   Skin:  Positive for activity-related sunscreen use and wears hat. Negative for daily sunscreen use.   Hematologic/Lymphatic: Bruises/bleeds easily (warfarin).       Objective:   Physical Exam   Constitutional: He appears well-developed and well-nourished. No distress.   Neurological: He is alert and oriented to person, place, and time. He is not disoriented.   Psychiatric: He has a normal mood and affect.   Skin:   Areas Examined (abnormalities noted in diagram):   Scalp / Hair Palpated and Inspected  Head / Face Inspection Performed  Neck Inspection Performed  Chest / Axilla Inspection Performed  Abdomen Inspection Performed  Back Inspection Performed  RUE Inspected  LUE Inspection Performed  Nails and Digits Inspection Performed                     Diagram Legend     Erythematous scaling macule/papule c/w actinic keratosis       Vascular papule c/w angioma      Pigmented verrucoid papule/plaque c/w seborrheic keratosis      Yellow umbilicated papule c/w sebaceous hyperplasia      Irregularly shaped tan macule c/w lentigo     1-2 mm smooth white papules consistent with Milia      Movable subcutaneous cyst with punctum c/w epidermal inclusion cyst      Subcutaneous movable cyst c/w pilar cyst      Firm pink to brown papule c/w dermatofibroma      Pedunculated fleshy papule(s) c/w skin tag(s)      Evenly pigmented macule c/w junctional  nevus     Mildly variegated pigmented, slightly irregular-bordered macule c/w mildly atypical nevus      Flesh colored to evenly pigmented papule c/w intradermal nevus       Pink pearly papule/plaque c/w basal cell carcinoma      Erythematous hyperkeratotic cursted plaque c/w SCC      Surgical scar with no sign of skin cancer recurrence      Open and closed comedones      Inflammatory papules and pustules      Verrucoid papule consistent consistent with wart     Erythematous eczematous patches and plaques     Dystrophic onycholytic nail with subungual debris c/w onychomycosis     Umbilicated papule    Erythematous-base heme-crusted tan verrucoid plaque consistent with inflamed seborrheic keratosis     Erythematous Silvery Scaling Plaque c/w Psoriasis     See annotation      Assessment / Plan:        Seborrheic dermatitis  -     triamcinolone acetonide 0.025% (KENALOG) 0.025 % cream; Apply topically 2 (two) times daily.  Dispense: 80 g; Refill: 1  -     ketoconazole (NIZORAL) 2 % cream; Apply topically 2 (two) times daily.  Dispense: 60 g; Refill: 1    AK (actinic keratosis)  Cryosurgery Procedure Note    Verbal consent from the patient is obtained and the patient is aware of the precancerous quality and need for treatment of these lesions. Liquid nitrogen cryosurgery is applied to the 4 actinic keratoses, as detailed in the physical exam, to produce a freeze injury. The patient is aware that blisters may form and is instructed on wound care with gentle cleansing and use of vaseline ointment to keep moist until healed. The patient is supplied a handout on cryosurgery and is instructed to call if lesions do not completely resolve.    History of nonmelanoma skin cancer  Scar  Area(s) of previous NMSC evaluated with no signs of recurrence.  Upper body skin examination performed today including at least 9 points as noted in physical examination. No lesions suspicious for malignancy noted.  Patient instructed in  importance in daily broad spectrum sun protection of at least spf 30. Mineral sunscreen ingredients preferred (Zinc +/- Titanium) and can be found OTC.   Patient encouraged to wear hat for all outdoor exposure.   Also discussed sun avoidance and use of protective clothing.    Multiple benign nevi  Careful dermoscopy evaluation of nevi performed with none identified as needing biopsy today  Monitor for new mole or moles that are becoming bigger, darker, irritated, or developing irregular borders.     Seborrheic keratosis  These are benign inherited growths without a malignant potential. Reassurance given to patient. No treatment is necessary.     Cherry angioma  This is a benign vascular lesion. Reassurance given. No treatment required.              No follow-ups on file.

## 2024-05-28 NOTE — PATIENT INSTRUCTIONS

## 2024-06-02 PROBLEM — I82.591 CHRONIC EMBOLISM AND THROMBOSIS OF OTHER SPECIFIED DEEP VEIN OF RIGHT LOWER EXTREMITY: Status: ACTIVE | Noted: 2024-06-02

## 2024-06-03 NOTE — PROGRESS NOTES
79 year-old gentleman presents  for Annual PE   Ex- smoker having quit over 20 years ago,  Social alcohol consumer. ( Spouse +)    SCREENING TESTS:   Cholesterol/lab, reviewed today   US carotids 40-49%, Cards following  Colonoscopy   7/2022,  2 polyps, tubular adenoma  08/2019,     . 5 polyps,  rec 3 yrs  PSA 0.27  Eye exam and dental work are up-to-date.    VACCINATIONS:  Zostavax, ~ 5 yrs ago  TDAP, 12/2013  Pneumovax, 10/2012  Prevnar, 2015  Flu vaccine., yearly  Covid: 2/2, + - rec bivalent booster    MedCard: Reviewed.     REVIEW OF SYSTEMS:     No fever, chill, or night sweats  No dysphagia or early satiety  No change in bowel or bladder function.  Not having increased thirst or urination.  No HA or focal deficits  No increased thirst or urination  No cold or heat intolerance  No unusual bruising or bleeding  ++ hand pain, stiffness especially in AM  ++ joint pain diffuse , takes Tylenol Arthritis as on warfarin and NSAIDS restricted  ADL's: 100% independent  Memory: Good----delayed recall, Mom d. dementia  Mental Health: Good just moved to [employer] and moved into [employer] [place]  AD's: + will, living will, and POA  Nutrition: Good  Gait: No recent falls  Safety: Intact  Urinary incontinence: n/a, nocturia-   Remainder of review negative except as previously noted.     PAST MEDICAL HISTORY:   Dyslipidemia.  Hypertension  Elevated LFTs,   Impaired fasting glucose/prediabetes  Metabolic syndrome  Fatty liver  Anemia with workup by hem/onc  unremarkable.   DJD, knees and hips  Renal stones,   Sinus and rhinitis.  Colon  polyps   DVT  Pulmonary embolism    PHYSICAL EXAM:   VSS:   GENERAL: Alert and oriented, in no acute distress. Well-developed,   well-nourished, obese gentleman, conversant, and cooperative. Pleasant as always  EYES: Conjunctivae and lids unremarkable. Sclerae anicteric.  NECK: Supple. No thyromegaly or lymphadenopathy.   RESPIRATORY: Efforts unlabored.   LUNGS: Clear to  auscultation.   HEART: Regular rate and rhythm. No carotid bruits,   1+ pedal pulse. No edema.   ABDOMEN: Bowel sounds present, soft, nontender.   No hepatosplenomegaly appreciated.   MUSCULOSKELETAL: Gait normal.   Hands w/ DJD signs,  No clubbing, cyanosis, or edema.  NEURO: BECK. No tremor noted.  SKIN: Warm and dry      IMPRESSION/PLAN:     Annual PE.     Family history of cardiovascular disease.     Ischemic cardiomyopathy, stable  . Hypertension, stable,   -continue amlodipine 2.5mg   -continue irbesartan 300mg,   - continue low salt diet     HLD, stable   -continue rosuvastatin 40mg   -continue continue low fat diet  -continue  Zetia 10mg qd- LDL dropped below 70 consistently since Zetia added    Carotid ds, left 40-49% 0-19% right- 2016       CAD, s/p bypass    Aortic atherosclerosis    Impaired fasting blood sugar/prediabetes, stable.   -continue diabetic diet    Dysmetabolic syndrome/obesity.     Fatty liver, stable  -weight loss is the tx of choice  Wt Readings from Last 5 Encounters:   06/19/24 (!) 142 kg (313 lb)   11/24/23 (!) 141.5 kg (311 lb 15.2 oz)   06/14/23 (!) 140.5 kg (309 lb 11.9 oz)   05/30/23 (!) 141.9 kg (312 lb 13.3 oz)   05/16/23 (!) 142.6 kg (314 lb 6 oz)   ]    Chronic DVT, right, on chronic anticoagulation-warfarin     Hx Pulmonary Embolism, on chronic anticoagulation, on warfarin     Anemia, stable- MVI and dark , green leafy veggies as allowed w/ warfarin use    VERONICA, on CPAP       BPH w/ nocturia    Hand OA  BMI, 39.12   HM  -reviewed vaccine recs  -rtc6 mos                   Statement Selected

## 2024-06-06 ENCOUNTER — PATIENT MESSAGE (OUTPATIENT)
Dept: INTERNAL MEDICINE | Facility: CLINIC | Age: 79
End: 2024-06-06
Payer: MEDICARE

## 2024-06-06 DIAGNOSIS — E78.2 MIXED HYPERLIPIDEMIA: Primary | ICD-10-CM

## 2024-06-06 DIAGNOSIS — I10 ESSENTIAL HYPERTENSION: ICD-10-CM

## 2024-06-06 DIAGNOSIS — R73.03 PREDIABETES: ICD-10-CM

## 2024-06-11 ENCOUNTER — LAB VISIT (OUTPATIENT)
Dept: LAB | Facility: HOSPITAL | Age: 79
End: 2024-06-11
Attending: INTERNAL MEDICINE
Payer: MEDICARE

## 2024-06-11 DIAGNOSIS — I10 ESSENTIAL HYPERTENSION: ICD-10-CM

## 2024-06-11 DIAGNOSIS — N40.1 BPH ASSOCIATED WITH NOCTURIA: ICD-10-CM

## 2024-06-11 DIAGNOSIS — E78.2 MIXED HYPERLIPIDEMIA: ICD-10-CM

## 2024-06-11 DIAGNOSIS — R73.03 PREDIABETES: ICD-10-CM

## 2024-06-11 DIAGNOSIS — R35.1 BPH ASSOCIATED WITH NOCTURIA: ICD-10-CM

## 2024-06-11 LAB
ALBUMIN SERPL BCP-MCNC: 3.5 G/DL (ref 3.5–5.2)
ALP SERPL-CCNC: 97 U/L (ref 55–135)
ALT SERPL W/O P-5'-P-CCNC: 25 U/L (ref 10–44)
ANION GAP SERPL CALC-SCNC: 6 MMOL/L (ref 8–16)
AST SERPL-CCNC: 23 U/L (ref 10–40)
BASOPHILS # BLD AUTO: 0.03 K/UL (ref 0–0.2)
BASOPHILS NFR BLD: 0.5 % (ref 0–1.9)
BILIRUB SERPL-MCNC: 0.8 MG/DL (ref 0.1–1)
BUN SERPL-MCNC: 20 MG/DL (ref 8–23)
CALCIUM SERPL-MCNC: 8.6 MG/DL (ref 8.7–10.5)
CHLORIDE SERPL-SCNC: 108 MMOL/L (ref 95–110)
CHOLEST SERPL-MCNC: 108 MG/DL (ref 120–199)
CHOLEST/HDLC SERPL: 2.5 {RATIO} (ref 2–5)
CO2 SERPL-SCNC: 23 MMOL/L (ref 23–29)
COMPLEXED PSA SERPL-MCNC: 0.15 NG/ML (ref 0–4)
CREAT SERPL-MCNC: 0.7 MG/DL (ref 0.5–1.4)
DIFFERENTIAL METHOD BLD: ABNORMAL
EOSINOPHIL # BLD AUTO: 0.1 K/UL (ref 0–0.5)
EOSINOPHIL NFR BLD: 2.3 % (ref 0–8)
ERYTHROCYTE [DISTWIDTH] IN BLOOD BY AUTOMATED COUNT: 12.9 % (ref 11.5–14.5)
EST. GFR  (NO RACE VARIABLE): >60 ML/MIN/1.73 M^2
ESTIMATED AVG GLUCOSE: 123 MG/DL (ref 68–131)
GLUCOSE SERPL-MCNC: 133 MG/DL (ref 70–110)
HBA1C MFR BLD: 5.9 % (ref 4–5.6)
HCT VFR BLD AUTO: 38.5 % (ref 40–54)
HDLC SERPL-MCNC: 44 MG/DL (ref 40–75)
HDLC SERPL: 40.7 % (ref 20–50)
HGB BLD-MCNC: 12.5 G/DL (ref 14–18)
IMM GRANULOCYTES # BLD AUTO: 0.02 K/UL (ref 0–0.04)
IMM GRANULOCYTES NFR BLD AUTO: 0.3 % (ref 0–0.5)
LDLC SERPL CALC-MCNC: 43.2 MG/DL (ref 63–159)
LYMPHOCYTES # BLD AUTO: 1.6 K/UL (ref 1–4.8)
LYMPHOCYTES NFR BLD: 28.3 % (ref 18–48)
MCH RBC QN AUTO: 31.6 PG (ref 27–31)
MCHC RBC AUTO-ENTMCNC: 32.5 G/DL (ref 32–36)
MCV RBC AUTO: 97 FL (ref 82–98)
MONOCYTES # BLD AUTO: 0.5 K/UL (ref 0.3–1)
MONOCYTES NFR BLD: 9.4 % (ref 4–15)
NEUTROPHILS # BLD AUTO: 3.4 K/UL (ref 1.8–7.7)
NEUTROPHILS NFR BLD: 59.2 % (ref 38–73)
NONHDLC SERPL-MCNC: 64 MG/DL
NRBC BLD-RTO: 0 /100 WBC
PLATELET # BLD AUTO: 173 K/UL (ref 150–450)
PMV BLD AUTO: 10.6 FL (ref 9.2–12.9)
POTASSIUM SERPL-SCNC: 4.6 MMOL/L (ref 3.5–5.1)
PROT SERPL-MCNC: 6.5 G/DL (ref 6–8.4)
RBC # BLD AUTO: 3.96 M/UL (ref 4.6–6.2)
SODIUM SERPL-SCNC: 137 MMOL/L (ref 136–145)
TRIGL SERPL-MCNC: 104 MG/DL (ref 30–150)
TSH SERPL DL<=0.005 MIU/L-ACNC: 1.46 UIU/ML (ref 0.4–4)
WBC # BLD AUTO: 5.75 K/UL (ref 3.9–12.7)

## 2024-06-11 PROCEDURE — 84153 ASSAY OF PSA TOTAL: CPT | Performed by: INTERNAL MEDICINE

## 2024-06-11 PROCEDURE — 36415 COLL VENOUS BLD VENIPUNCTURE: CPT | Performed by: INTERNAL MEDICINE

## 2024-06-11 PROCEDURE — 83036 HEMOGLOBIN GLYCOSYLATED A1C: CPT | Performed by: INTERNAL MEDICINE

## 2024-06-11 PROCEDURE — 80061 LIPID PANEL: CPT | Performed by: INTERNAL MEDICINE

## 2024-06-11 PROCEDURE — 80053 COMPREHEN METABOLIC PANEL: CPT | Performed by: INTERNAL MEDICINE

## 2024-06-11 PROCEDURE — 85025 COMPLETE CBC W/AUTO DIFF WBC: CPT | Performed by: INTERNAL MEDICINE

## 2024-06-11 PROCEDURE — 84443 ASSAY THYROID STIM HORMONE: CPT | Performed by: INTERNAL MEDICINE

## 2024-06-12 ENCOUNTER — ANTI-COAG VISIT (OUTPATIENT)
Dept: CARDIOLOGY | Facility: CLINIC | Age: 79
End: 2024-06-12
Payer: MEDICARE

## 2024-06-12 DIAGNOSIS — Z79.01 LONG TERM CURRENT USE OF ANTICOAGULANT THERAPY: Primary | ICD-10-CM

## 2024-06-12 LAB — INR PPP: 3.7

## 2024-06-12 PROCEDURE — 93793 ANTICOAG MGMT PT WARFARIN: CPT | Mod: S$GLB,,, | Performed by: PHARMACIST

## 2024-06-12 NOTE — PROGRESS NOTES
[facility]    06/12/2024 11:28 AM    Assessment/Plan:    Patient presents today with supratherapeutic INR.    Assessment of patient findings and chart review: INR not at goal. Medications, chart, and patient findings reviewed. See calendar for adjustments to dose and follow up plan.      Recommendation for patient's warfarin regimen: Lower dose today to 7.5mg then decrease maintenance dose    Recommend repeat INR in 2 weeks  _________________________________________________________________    Del Anand (79 y.o.) is followed by the [facility].    Anticoagulation Summary  As of 6/12/2024      INR goal:  2.5-3.5   TTR:  73.6% (9.2 y)   INR used for dosing:  3.7 (6/12/2024)   Warfarin maintenance plan:  10 mg (10 mg x 1) every day   Weekly warfarin total:  70 mg   Plan last modified:  Jennifer Hunt, PharmD (6/12/2024)   Next INR check:  6/26/2024   Target end date:      Indications    Long term current use of anticoagulant therapy [Z79.01]  Pulmonary embolism (Resolved) [I26.99]                 Anticoagulation Episode Summary       INR check location:  Home Draw    Preferred lab:      Send INR reminders to:  [facility] COUMADIN HOME MONITOR    Comments:  [facility] every other Wednesday          Anticoagulation Care Providers       Provider Role Specialty Phone number    Blanquita Levin MD [facility] [phone]

## 2024-06-17 ENCOUNTER — TELEPHONE (OUTPATIENT)
Dept: INTERNAL MEDICINE | Facility: CLINIC | Age: 79
End: 2024-06-17
Payer: MEDICARE

## 2024-06-17 NOTE — TELEPHONE ENCOUNTER
----- Message from Amber Huertas MD sent at 6/16/2024  4:32 PM CDT -----  Please review your lab work and we will discuss at your pending office visit.   henry

## 2024-06-19 ENCOUNTER — OFFICE VISIT (OUTPATIENT)
Dept: INTERNAL MEDICINE | Facility: CLINIC | Age: 79
End: 2024-06-19
Payer: MEDICARE

## 2024-06-19 VITALS
BODY MASS INDEX: 38.92 KG/M2 | TEMPERATURE: 97 F | DIASTOLIC BLOOD PRESSURE: 64 MMHG | HEIGHT: 75 IN | SYSTOLIC BLOOD PRESSURE: 124 MMHG | WEIGHT: 313 LBS | RESPIRATION RATE: 16 BRPM | HEART RATE: 68 BPM

## 2024-06-19 DIAGNOSIS — I25.10 CORONARY ARTERY DISEASE INVOLVING NATIVE CORONARY ARTERY OF NATIVE HEART WITHOUT ANGINA PECTORIS: ICD-10-CM

## 2024-06-19 DIAGNOSIS — G47.33 OSA ON CPAP: ICD-10-CM

## 2024-06-19 DIAGNOSIS — I65.22 STENOSIS OF LEFT CAROTID ARTERY: ICD-10-CM

## 2024-06-19 DIAGNOSIS — M19.049 HAND ARTHRITIS: ICD-10-CM

## 2024-06-19 DIAGNOSIS — I10 ESSENTIAL HYPERTENSION: ICD-10-CM

## 2024-06-19 DIAGNOSIS — I25.5 ISCHEMIC CARDIOMYOPATHY: ICD-10-CM

## 2024-06-19 DIAGNOSIS — R73.01 IMPAIRED FASTING GLUCOSE: ICD-10-CM

## 2024-06-19 DIAGNOSIS — I82.591 CHRONIC EMBOLISM AND THROMBOSIS OF OTHER SPECIFIED DEEP VEIN OF RIGHT LOWER EXTREMITY: ICD-10-CM

## 2024-06-19 DIAGNOSIS — D50.9 IRON DEFICIENCY ANEMIA, UNSPECIFIED IRON DEFICIENCY ANEMIA TYPE: ICD-10-CM

## 2024-06-19 DIAGNOSIS — Z95.1 S/P CABG X 2: ICD-10-CM

## 2024-06-19 DIAGNOSIS — R35.1 BPH ASSOCIATED WITH NOCTURIA: ICD-10-CM

## 2024-06-19 DIAGNOSIS — E78.2 MIXED HYPERLIPIDEMIA: ICD-10-CM

## 2024-06-19 DIAGNOSIS — I70.0 AORTIC ATHEROSCLEROSIS: ICD-10-CM

## 2024-06-19 DIAGNOSIS — N40.1 BPH ASSOCIATED WITH NOCTURIA: ICD-10-CM

## 2024-06-19 DIAGNOSIS — Z00.00 ANNUAL PHYSICAL EXAM: Primary | ICD-10-CM

## 2024-06-19 DIAGNOSIS — K76.0 FATTY LIVER: ICD-10-CM

## 2024-06-19 DIAGNOSIS — R73.03 PREDIABETES: ICD-10-CM

## 2024-06-19 DIAGNOSIS — E66.01 SEVERE OBESITY (BMI 35.0-39.9) WITH COMORBIDITY: ICD-10-CM

## 2024-06-19 PROCEDURE — 3074F SYST BP LT 130 MM HG: CPT | Mod: CPTII,S$GLB,, | Performed by: INTERNAL MEDICINE

## 2024-06-19 PROCEDURE — 1126F AMNT PAIN NOTED NONE PRSNT: CPT | Mod: CPTII,S$GLB,, | Performed by: INTERNAL MEDICINE

## 2024-06-19 PROCEDURE — 1157F ADVNC CARE PLAN IN RCRD: CPT | Mod: CPTII,S$GLB,, | Performed by: INTERNAL MEDICINE

## 2024-06-19 PROCEDURE — 1101F PT FALLS ASSESS-DOCD LE1/YR: CPT | Mod: CPTII,S$GLB,, | Performed by: INTERNAL MEDICINE

## 2024-06-19 PROCEDURE — 99397 PER PM REEVAL EST PAT 65+ YR: CPT | Mod: S$GLB,,, | Performed by: INTERNAL MEDICINE

## 2024-06-19 PROCEDURE — 3288F FALL RISK ASSESSMENT DOCD: CPT | Mod: CPTII,S$GLB,, | Performed by: INTERNAL MEDICINE

## 2024-06-19 PROCEDURE — 3078F DIAST BP <80 MM HG: CPT | Mod: CPTII,S$GLB,, | Performed by: INTERNAL MEDICINE

## 2024-06-19 PROCEDURE — 1159F MED LIST DOCD IN RCRD: CPT | Mod: CPTII,S$GLB,, | Performed by: INTERNAL MEDICINE

## 2024-06-19 PROCEDURE — 99999 PR PBB SHADOW E&M-EST. PATIENT-LVL III: CPT | Mod: PBBFAC,,, | Performed by: INTERNAL MEDICINE

## 2024-06-26 ENCOUNTER — ANTI-COAG VISIT (OUTPATIENT)
Dept: CARDIOLOGY | Facility: CLINIC | Age: 79
End: 2024-06-26
Payer: MEDICARE

## 2024-06-26 DIAGNOSIS — Z79.01 LONG TERM CURRENT USE OF ANTICOAGULANT THERAPY: Primary | ICD-10-CM

## 2024-06-26 LAB — INR PPP: 3

## 2024-06-26 PROCEDURE — 93793 ANTICOAG MGMT PT WARFARIN: CPT | Mod: S$GLB,,,

## 2024-07-10 ENCOUNTER — ANTI-COAG VISIT (OUTPATIENT)
Dept: CARDIOLOGY | Facility: CLINIC | Age: 79
End: 2024-07-10
Payer: MEDICARE

## 2024-07-10 DIAGNOSIS — Z79.01 LONG TERM CURRENT USE OF ANTICOAGULANT THERAPY: Primary | ICD-10-CM

## 2024-07-10 LAB — INR PPP: 2.4

## 2024-07-10 PROCEDURE — 93793 ANTICOAG MGMT PT WARFARIN: CPT | Mod: S$GLB,,, | Performed by: PHARMACIST

## 2024-07-10 NOTE — PROGRESS NOTES
[facility]    07/10/2024 2:14 PM    Assessment/Plan:    Patient presents today with subtherapeutic  INR.    Assessment of patient findings and chart review: INR not at goal. Medications, chart, and patient findings reviewed. See calendar for adjustments to dose and follow up plan.      Recommendation for patient's warfarin regimen: Boost dose today to 12.5mg then resume current maintenance dose    Recommend repeat INR in 2 weeks  _________________________________________________________________    Del Anand (79 y.o.) is followed by the [facility].    Anticoagulation Summary  As of 7/10/2024      INR goal:  2.5-3.5   TTR:  73.7% (9.2 y)   INR used for dosin.4 (7/10/2024)   Warfarin maintenance plan:  10 mg (10 mg x 1) every day   Weekly warfarin total:  70 mg   Plan last modified:  Jennifer Hunt, PharmD (2024)   Next INR check:  2024   Target end date:      Indications    Long term current use of anticoagulant therapy [Z79.01]  Pulmonary embolism (Resolved) [I26.99]                 Anticoagulation Episode Summary       INR check location:  Home Draw    Preferred lab:      Send INR reminders to:  [facility] COUMADIN HOME MONITOR    Comments:  [facility] every other Wednesday          Anticoagulation Care Providers       Provider Role Specialty Phone number    Blanquita Levin MD [facility] [phone]

## 2024-07-19 ENCOUNTER — OFFICE VISIT (OUTPATIENT)
Dept: ORTHOPEDICS | Facility: CLINIC | Age: 79
End: 2024-07-19
Payer: MEDICARE

## 2024-07-19 ENCOUNTER — HOSPITAL ENCOUNTER (OUTPATIENT)
Dept: RADIOLOGY | Facility: HOSPITAL | Age: 79
Discharge: HOME | End: 2024-07-19
Attending: NURSE PRACTITIONER
Payer: MEDICARE

## 2024-07-19 VITALS — HEIGHT: 75 IN | BODY MASS INDEX: 38.92 KG/M2 | WEIGHT: 313.06 LBS

## 2024-07-19 DIAGNOSIS — M25.561 ACUTE PAIN OF BOTH KNEES: Primary | ICD-10-CM

## 2024-07-19 DIAGNOSIS — M25.561 ACUTE PAIN OF BOTH KNEES: ICD-10-CM

## 2024-07-19 DIAGNOSIS — M25.562 ACUTE PAIN OF BOTH KNEES: ICD-10-CM

## 2024-07-19 DIAGNOSIS — M17.0 PRIMARY OSTEOARTHRITIS OF BOTH KNEES: Primary | ICD-10-CM

## 2024-07-19 DIAGNOSIS — M25.562 ACUTE PAIN OF BOTH KNEES: Primary | ICD-10-CM

## 2024-07-19 PROCEDURE — 1101F PT FALLS ASSESS-DOCD LE1/YR: CPT | Mod: CPTII,S$GLB,, | Performed by: NURSE PRACTITIONER

## 2024-07-19 PROCEDURE — 99999 PR PBB SHADOW E&M-EST. PATIENT-LVL III: CPT | Mod: PBBFAC,,, | Performed by: NURSE PRACTITIONER

## 2024-07-19 PROCEDURE — 1160F RVW MEDS BY RX/DR IN RCRD: CPT | Mod: CPTII,S$GLB,, | Performed by: NURSE PRACTITIONER

## 2024-07-19 PROCEDURE — 73564 X-RAY EXAM KNEE 4 OR MORE: CPT | Mod: TC,50

## 2024-07-19 PROCEDURE — 73564 X-RAY EXAM KNEE 4 OR MORE: CPT | Mod: 26,50,, | Performed by: RADIOLOGY

## 2024-07-19 PROCEDURE — 20610 DRAIN/INJ JOINT/BURSA W/O US: CPT | Mod: LT,S$GLB,, | Performed by: NURSE PRACTITIONER

## 2024-07-19 PROCEDURE — 1159F MED LIST DOCD IN RCRD: CPT | Mod: CPTII,S$GLB,, | Performed by: NURSE PRACTITIONER

## 2024-07-19 PROCEDURE — 1125F AMNT PAIN NOTED PAIN PRSNT: CPT | Mod: CPTII,S$GLB,, | Performed by: NURSE PRACTITIONER

## 2024-07-19 PROCEDURE — 1157F ADVNC CARE PLAN IN RCRD: CPT | Mod: CPTII,S$GLB,, | Performed by: NURSE PRACTITIONER

## 2024-07-19 PROCEDURE — 99214 OFFICE O/P EST MOD 30 MIN: CPT | Mod: 25,S$GLB,, | Performed by: NURSE PRACTITIONER

## 2024-07-19 PROCEDURE — 3288F FALL RISK ASSESSMENT DOCD: CPT | Mod: CPTII,S$GLB,, | Performed by: NURSE PRACTITIONER

## 2024-07-19 RX ORDER — TRIAMCINOLONE ACETONIDE 40 MG/ML
40 INJECTION, SUSPENSION INTRA-ARTICULAR; INTRAMUSCULAR
Status: DISCONTINUED | OUTPATIENT
Start: 2024-07-19 | End: 2024-07-20 | Stop reason: HOSPADM

## 2024-07-19 RX ORDER — LIDOCAINE HYDROCHLORIDE 10 MG/ML
4 INJECTION INFILTRATION; PERINEURAL
Status: DISCONTINUED | OUTPATIENT
Start: 2024-07-19 | End: 2024-07-20 | Stop reason: HOSPADM

## 2024-07-19 RX ADMIN — TRIAMCINOLONE ACETONIDE 40 MG: 40 INJECTION, SUSPENSION INTRA-ARTICULAR; INTRAMUSCULAR at 02:07

## 2024-07-19 RX ADMIN — LIDOCAINE HYDROCHLORIDE 4 ML: 10 INJECTION INFILTRATION; PERINEURAL at 02:07

## 2024-07-19 NOTE — PROGRESS NOTES
SUBJECTIVE:     Chief Complaint & History of Present Illness:  Del Anand is a New 79 y.o. year old male patient here with a history of constant bilateral knee pain which started years ago.  Left knee is more bothersome.  There is not a history of trauma.  The pain is located in the global aspect of the knee.  The pain is described as achy, 5-6/10.  There is not radiation.  There is catching or locking.  Aggravating factors include pivoting, rising after sitting, and playing golf .  Associated symptoms include crepitus sensation.  There is not numbness or tingling of the lower extremity.  There is not back pain. Previous treatments include acetaminophen and topical creams which have provided minimal relief.  There is a history of previous injury or surgery to the knee.  He reports he had a mensicus repair many years ago to his left knee.  The patient does not use an assistive device.    Review of patient's allergies indicates:  No Known Allergies      Current Outpatient Medications   Medication Sig Dispense Refill    amLODIPine (NORVASC) 2.5 MG tablet Take 1 tablet (2.5 mg total) by mouth once daily. 90 tablet 3    aspirin 81 MG Chew Take 81 mg by mouth once daily.      cyanocobalamin 500 MCG tablet Take 500 mcg by mouth every morning. Every day      ezetimibe (ZETIA) 10 mg tablet Take 1 tablet (10 mg total) by mouth once daily. 90 tablet 3    FOLIC ACID/MULTIVITS-MIN/LUT (CENTRUM SILVER ORAL) Take 1 tablet by mouth once daily.      irbesartan (AVAPRO) 300 MG tablet Take 1 tablet (300 mg total) by mouth every evening. 90 tablet 3    ketoconazole (NIZORAL) 2 % cream Apply topically 2 (two) times daily. 60 g 1    rosuvastatin (CRESTOR) 40 MG Tab Take 1 tablet (40 mg total) by mouth once daily. 90 tablet 3    triamcinolone acetonide 0.025% (KENALOG) 0.025 % cream Apply topically 2 (two) times daily. 80 g 1    warfarin (COUMADIN) 10 MG tablet Take 10mg daily or as directed by Coumadin Clinic.   Also uses warfarin  "5mg tablet strength 90 tablet 3    warfarin (COUMADIN) 5 MG tablet Take 10mg daily except 12.5mg Wednesdays (Patient taking differently: Take 10mg 4 days per week,  12.5mg 3 days per week, followed by Coumadin clinic) 180 tablet 3     No current facility-administered medications for this visit.       Past Medical History:   Diagnosis Date    Benign neoplasm of colon     Cataract     CHF (congestive heart failure) 01/13/2015    Coronary artery disease     Difficult intubation     DVT (deep venous thrombosis)     HLD (hyperlipidemia)     HTN (hypertension)     Impaired fasting glucose     Kidney stone     Metabolic syndrome     Nonspecific abnormal results of liver function study     Nonspecific findings on examination of blood     Nuclear sclerosis - Both Eyes 10/18/2013    Osteoarthrosis, unspecified whether generalized or localized, lower leg     Respiratory distress     PT STATES IT WAS "CRAP", "VERY UNCOMFORTABLE"    Squamous cell carcinoma of skin        Past Surgical History:   Procedure Laterality Date    CARDIAC SURGERY      CATARACT EXTRACTION W/  INTRAOCULAR LENS IMPLANT Right 9/17/2020    Procedure: EXTRACTION, CATARACT, WITH IOL INSERTION;  Surgeon: Chanel Klein MD;  Location: [facility];  Service: Ophthalmology;  Laterality: Right;    CATARACT EXTRACTION W/  INTRAOCULAR LENS IMPLANT Left 10/1/2020    Procedure: EXTRACTION, CATARACT, WITH IOL INSERTION;  Surgeon: Chanel Klein MD;  Location: [facility] OR;  Service: Ophthalmology;  Laterality: Left;    COLONOSCOPY N/A 3/13/2019    Procedure: COLONOSCOPY;  Surgeon: Nikunj Larson MD;  Location: [facility] ([address] [place] [address]);  Service: Endoscopy;  Laterality: N/A;  ok to hold Coumadin x 5 days with a Lovenox bridge per Coumadin clinic  2nd floor - history of difficult intubation-MS    COLONOSCOPY N/A 7/7/2022    Procedure: COLONOSCOPY;  Surgeon: Nikunj Larson MD;  [facility]  Service: Endoscopy;  Laterality: N/A;  ef 45%-5/31-coumadin hold per " "coumadin clinic see coag visist -tb-vacc- clears 4 hrs prior-am prep 2-3-am-inst portal-tb    CORONARY ARTERY BYPASS GRAFT      2014    CYSTOSCOPY      KIDNEY STONE SURGERY      KNEE ARTHROPLASTY      bilateral    renal stone      SKIN BIOPSY         Family History   Problem Relation Name Age of Onset    Stroke Mother incontinence     Dementia Mother incontinence     Heart attack Father bph         d. 85    Urolithiasis Father bph     Heart disease Father bph     Heart failure Father bph     Other Sister Mary         bladder lift, s/p 4 ; estranged    Heart attack Maternal Grandfather          d. 65    Hypertension Paternal Grandmother      Heart attack Paternal Grandfather      Heart failure Paternal Grandfather      No Known Problems Daughter Sabina     No Known Problems Son Doron          Review of Systems:  ROS:  Constitutional: no fever or chills  Eyes: no visual changes  ENT: no nasal congestion or sore throat  Respiratory: no cough or shortness of breath  Cardiovascular: no chest pain or palpitations  Gastrointestinal: no nausea or vomiting, tolerating diet  Genitourinary: no hematuria or dysuria  Integument/Breast: no rash or pruritis  Hematologic/Lymphatic: no easy bruising or lymphadenopathy  Musculoskeletal: no arthralgias or myalgias  Neurological: no seizures or tremors  Behavioral/Psych: no auditory or visual hallucinations  Endocrine: no heat or cold intolerance      OBJECTIVE:     PHYSICAL EXAM:  Vital Signs (Most Recent)  There were no vitals filed for this visit.  Height: 6' 3" (190.5 cm) Weight: (!) 142 kg (313 lb 0.9 oz),   Estimated body mass index is 39.13 kg/m² as calculated from the following:    Height as of this encounter: 6' 3" (1.905 m).    Weight as of this encounter: 142 kg (313 lb 0.9 oz).   General Appearance: Well nourished, well developed, in no acute distress.  HENT: Normal cephalic, oropharynx pink and moist  Eyes: PERRLA bilaterally and EOM x 4  Respiratory: Even and " unlabored  Skin: Warm and Dry.   Psychiatric: AAO x 4, Mood & affect are normal.    left  Knee Exam:  Knee Range of Motion:normal   Effusion:not significant  Condition of skin:intact  Location of tenderness:Medial joint line and Lateral joint line   Strength:normal  Stability:  stable to testing, Lachman: stable, LCL: stable, MCL: stable, and PCL: stable  Varus /Valgus stress:  normal  Oksana:   negative    right  Knee Exam:  Knee Range of Motion:normal   Effusion:not significant  Condition of skin:intact  Location of tenderness:Medial joint line and Lateral joint line   Strength:normal  Stability:  stable to testing, Lachman: stable, LCL: stable, MCL: stable, and PCL: stable  Varus /Valgus stress:  normal  Oksana:   negative      Hip Examination:  normal    RADIOGRAPHS:  Bilateral knee x-rays were obtained, findings show no acute fractures.  He has bilateral DJD involving both knees consistent with osteoarthritis.    All radiographs were personally reviewed by me.    ASSESSMENT/PLAN:       ICD-10-CM ICD-9-CM   1. Primary osteoarthritis of both knees  M17.0 715.16       -Del Anand presents to clinic today with c/c bilateral knee pain for the past several years, left worse than right.  He reports both him and his wife have bad knees and she is scheduled to have her knee replacement surgery in a couple of weeks with Dr. Moyer.  -X-ray as above.    I discussed xray findings with patient.  He is active, plays golf and helps to care for his wife due to her OA of the knees.  He said his wife must get her knee replacement surgery before he even considers it for himself.  Therefore will treat conservatively.  He cannot use NSAIDs as he is on AC.  He is currently using Tylenol PRN and topicals.  He would like to get a CSI and I believe this is reasonable.  He will do HEP.    See procedure report.    Follow up in 3 months PRN, sooner for new or worsening symptoms.

## 2024-07-20 NOTE — PROCEDURES
Large Joint Aspiration/Injection: L knee    Date/Time: 7/19/2024 2:00 PM    Performed by: Niles Gordillo NP  Authorized by: Niles Gordillo NP    Consent Done?:  Yes (Verbal)  Indications:  Arthritis and pain  Site marked: the procedure site was marked    Timeout: prior to procedure the correct patient, procedure, and site was verified    Prep: patient was prepped and draped in usual sterile fashion      Local anesthesia used?: Yes    Local anesthetic:  Lidocaine spray    Details:  Needle Size:  22 G  Ultrasonic Guidance for needle placement?: No    Approach:  Anterolateral  Location:  Knee  Site:  L knee  Medications:  4 mL LIDOcaine HCL 10 mg/ml (1%) 10 mg/mL (1 %); 40 mg triamcinolone acetonide 40 mg/mL  Patient tolerance:  Patient tolerated the procedure well with no immediate complications

## 2024-07-24 ENCOUNTER — ANTI-COAG VISIT (OUTPATIENT)
Dept: CARDIOLOGY | Facility: CLINIC | Age: 79
End: 2024-07-24
Payer: MEDICARE

## 2024-07-24 DIAGNOSIS — Z79.01 LONG TERM CURRENT USE OF ANTICOAGULANT THERAPY: Primary | ICD-10-CM

## 2024-07-24 LAB — INR PPP: 1.8

## 2024-07-24 PROCEDURE — 93793 ANTICOAG MGMT PT WARFARIN: CPT | Mod: S$GLB,,, | Performed by: PHARMACIST

## 2024-08-01 NOTE — PROGRESS NOTES
Patient here for meter follow up. 1 discrepancy noted. Patient tested twice on 1/9. Patient states that he did not realize he tested twice. No other discrepancies noted. Patient demonstrated proper technique and knowledge of coumadin and testing. He requested that he be switched to every other week. Next meter follow up in 6 months. Patient states that he was approved to do so by Dr. Levin. Also approved with KERVIN TIMMONS today. I Called [facility] and spoke with Meri to notify her of this change. INR within therapeutic range today. Patient reports some medication changes: Crestor and Ramipril were discontinued and he started Zetia and Avapril. No DDI. Patient denies any bleeding, bruising or other changes. We will resume his weekly dose and follow up in 2 weeks. Patient was re-educated on situations that would require placing a call to the Coumadin Clinic, including bleeding or unusual bruising issues, changes in health, diet or medications,upcoming procedures that require warfarin interruption, and missed Coumadin dose(s). Patient expressed understanding that avoidance of consistency with these parameters could cause fluctuations in INR, leading to more frequent visits and increase risk of adverse events. Care plan made with KERVIN TIMMONS.     
Pt seen by Yvonne HUTCHISON. I have reviewed her documentation and agree with her assessment and plan.  
Received request for clearance to hold warfarin for upcoming c-scope on 3/13/19. Patient has a history of multiple VTE so will clear to hold warfarin x5 days with enoxaparin bridge. Will send plan to enrolling MD for agreement.  
No

## 2024-08-07 ENCOUNTER — ANTI-COAG VISIT (OUTPATIENT)
Dept: CARDIOLOGY | Facility: CLINIC | Age: 79
End: 2024-08-07
Payer: MEDICARE

## 2024-08-07 DIAGNOSIS — Z79.01 LONG TERM CURRENT USE OF ANTICOAGULANT THERAPY: Primary | ICD-10-CM

## 2024-08-07 LAB — INR PPP: 3

## 2024-08-21 ENCOUNTER — ANTI-COAG VISIT (OUTPATIENT)
Dept: CARDIOLOGY | Facility: CLINIC | Age: 79
End: 2024-08-21
Payer: MEDICARE

## 2024-08-21 DIAGNOSIS — Z79.01 LONG TERM CURRENT USE OF ANTICOAGULANT THERAPY: Primary | ICD-10-CM

## 2024-08-21 LAB — INR PPP: 3.7

## 2024-08-21 PROCEDURE — 93793 ANTICOAG MGMT PT WARFARIN: CPT | Mod: S$GLB,,, | Performed by: PHARMACIST

## 2024-08-21 NOTE — PROGRESS NOTES
[facility]    08/21/2024 1:53 PM    Assessment/Plan:    Patient presents today with supratherapeutic INR.    Assessment of patient findings and chart review: INR not at goal. Medications, chart, and patient findings reviewed. See calendar for adjustments to dose and follow up plan.      Recommendation for patient's warfarin regimen: Lower dose today to 10mg then resume current maintenance dose    Recommend repeat INR in 2 weeks  _________________________________________________________________    Del Anand (79 y.o.) is followed by the [id number].    Anticoagulation Summary  As of 8/21/2024      INR goal:  2.5-3.5   TTR:  73.2% (9.3 y)   INR used for dosing:  3.7 (8/21/2024)   Warfarin maintenance plan:  12.5 mg (10 mg x 1 and 5 mg x 0.5) every Wed; 10 mg (10 mg x 1) all other days   Weekly warfarin total:  72.5 mg   Plan last modified:  Jennifer Hunt, PharmD (7/24/2024)   Next INR check:  9/4/2024   Target end date:      Indications    Long term current use of anticoagulant therapy [Z79.01]  Pulmonary embolism (Resolved) [I26.99]                 Anticoagulation Episode Summary       INR check location:  Home Draw    Preferred lab:      Send INR reminders to:  [facility] COUMADIN HOME MONITOR    Comments:  [facility] every other Wednesday          Anticoagulation Care Providers       Provider Role Specialty Phone number    Blanquita Levin MD [facility] [phone]

## 2024-08-27 ENCOUNTER — TELEPHONE (OUTPATIENT)
Dept: DERMATOLOGY | Facility: CLINIC | Age: 79
End: 2024-08-27
Payer: MEDICARE

## 2024-08-27 NOTE — TELEPHONE ENCOUNTER
----- Message from Sheryl Nunez MA sent at 8/27/2024  3:57 PM CDT -----  Type:  Sooner Appointment Request    Patient is requesting a 3 pm  appointment instead on 9/24 .  Patient declined first available appointment listed as well as another facility and provider .  Patient will not accept being placed on the waitlist and is requesting a message be sent to doctor.    Name of Caller:  Pt   When is the first available appointment?  9/24  Symptoms:  recheck seb derm  Would the patient rather a call back or a response via [facility]?  Callback   Best Call Back Number:  .Telephone Information:  Mobile          [phone]     Additional Information:

## 2024-08-28 ENCOUNTER — ANTI-COAG VISIT (OUTPATIENT)
Dept: CARDIOLOGY | Facility: CLINIC | Age: 79
End: 2024-08-28
Payer: MEDICARE

## 2024-08-28 DIAGNOSIS — Z79.01 LONG TERM CURRENT USE OF ANTICOAGULANT THERAPY: Primary | ICD-10-CM

## 2024-08-28 LAB — INR PPP: 3.9

## 2024-08-28 PROCEDURE — 93793 ANTICOAG MGMT PT WARFARIN: CPT | Mod: S$GLB,,, | Performed by: PHARMACIST

## 2024-08-28 NOTE — PROGRESS NOTES
[facility]    08/28/2024 3:04 PM    Assessment/Plan:    Patient presents today with supratherapeutic INR.    Assessment of patient findings and chart review: Patient denies any changes in diet, medications, or health that would effect warfarin therapy.      Recommendation for patient's warfarin regimen: Lower dose today to 7.5mg then decrease maintenance dose    Recommend repeat INR in 1 week  _________________________________________________________________    Bill MARCIN Anand (79 y.o.) is followed by the [facility].    Anticoagulation Summary  As of 8/28/2024      INR goal:  2.5-3.5   TTR:  73.1% (9.4 y)   INR used for dosing:  3.9 (8/28/2024)   Warfarin maintenance plan:  10 mg (10 mg x 1) every day   Weekly warfarin total:  70 mg   Plan last modified:  Jennifer Hunt, PharmD (8/28/2024)   Next INR check:  9/4/2024   Target end date:      Indications    Long term current use of anticoagulant therapy [Z79.01]  Pulmonary embolism (Resolved) [I26.99]                 Anticoagulation Episode Summary       INR check location:  Home Draw    Preferred lab:      Send INR reminders to:  [facility] COUMADIN HOME MONITOR    Comments:  [facility] every other Wednesday          Anticoagulation Care Providers       Provider Role Specialty Phone number    Blanquita Levin MD [facility] Card[facility] [phone]

## 2024-09-04 ENCOUNTER — ANTI-COAG VISIT (OUTPATIENT)
Dept: CARDIOLOGY | Facility: CLINIC | Age: 79
End: 2024-09-04
Payer: MEDICARE

## 2024-09-04 DIAGNOSIS — Z79.01 LONG TERM CURRENT USE OF ANTICOAGULANT THERAPY: Primary | ICD-10-CM

## 2024-09-04 LAB — INR PPP: 2.8

## 2024-09-04 PROCEDURE — 93793 ANTICOAG MGMT PT WARFARIN: CPT | Mod: S$GLB,,, | Performed by: PHARMACIST

## 2024-09-04 NOTE — PROGRESS NOTES
[facility]    2024 10:27 AM    Assessment/Plan:    Patient presents today with therapeutic INR.    Assessment of patient findings and chart review: INR at goal. Medications and chart reviewed. No changes noted to necessitate adjustment of warfarin or follow-up plan. See calendar.      Recommendation for patient's warfarin regimen: Continue current maintenance dose    Recommend repeat INR in 2 weeks  _________________________________________________________________    Del MARCIN Anand (79 y.o.) is followed by the [facility].    Anticoagulation Summary  As of 2024      INR goal:  2.5-3.5   TTR:  73.1% (9.4 y)   INR used for dosin.8 (2024)   Warfarin maintenance plan:  10 mg (10 mg x 1) every day   Weekly warfarin total:  70 mg   Plan last modified:  Jennifer Hunt, PharmD (2024)   Next INR check:  2024   Target end date:      Indications    Long term current use of anticoagulant therapy [Z79.01]  Pulmonary embolism (Resolved) [I26.99]                 Anticoagulation Episode Summary       INR check location:  Home Draw    Preferred lab:      Send INR reminders to:  [facility] COUMADIN HOME MONITOR    Comments:  [facility] every other Wednesday          Anticoagulation Care Providers       Provider Role Specialty Phone number    Blanquita Levin MD [facility] Cardiology [phone]

## 2024-09-18 ENCOUNTER — ANTI-COAG VISIT (OUTPATIENT)
Dept: CARDIOLOGY | Facility: CLINIC | Age: 79
End: 2024-09-18
Payer: MEDICARE

## 2024-09-18 DIAGNOSIS — Z79.01 LONG TERM CURRENT USE OF ANTICOAGULANT THERAPY: Primary | ICD-10-CM

## 2024-09-18 LAB — INR PPP: 2.3

## 2024-09-18 PROCEDURE — 93793 ANTICOAG MGMT PT WARFARIN: CPT | Mod: S$GLB,,, | Performed by: PHARMACIST

## 2024-09-18 NOTE — PROGRESS NOTES
[facility]    2024 10:56 AM    Assessment/Plan:    Patient presents today with subtherapeutic  INR.    Assessment of patient findings and chart review: INR not at goal. Medications, chart, and patient findings reviewed. See calendar for adjustments to dose and follow up plan.  Patient denies any changes in diet, medications, or health that would effect warfarin therapy.      Recommendation for patient's warfarin regimen: Boost dose today to 12.5mg then resume current maintenance dose    Recommend repeat INR in 2 weeks  _________________________________________________________________    Del Anand (79 y.o.) is followed by the [facility].    Anticoagulation Summary  As of 2024      INR goal:  2.5-3.5   TTR:  73.0% (9.4 y)   INR used for dosin.3 (2024)   Warfarin maintenance plan:  10 mg (10 mg x 1) every day   Weekly warfarin total:  70 mg   Plan last modified:  Jennifer Hunt, PharmD (2024)   Next INR check:  10/2/2024   Target end date:      Indications    Long term current use of anticoagulant therapy [Z79.01]  Pulmonary embolism (Resolved) [I26.99]                 Anticoagulation Episode Summary       INR check location:  Home Draw    Preferred lab:      Send INR reminders to:  [facility] COUMADIN HOME MONITOR    Comments:  [facility] every other Wednesday          Anticoagulation Care Providers       Provider Role Specialty Phone number    Blanquita Levin MD [facility] Cardiology [phone]

## 2024-10-02 ENCOUNTER — ANTI-COAG VISIT (OUTPATIENT)
Dept: CARDIOLOGY | Facility: CLINIC | Age: 79
End: 2024-10-02
Payer: MEDICARE

## 2024-10-02 DIAGNOSIS — Z79.01 LONG TERM CURRENT USE OF ANTICOAGULANT THERAPY: Primary | ICD-10-CM

## 2024-10-02 LAB — INR PPP: 3.2

## 2024-10-02 PROCEDURE — 93793 ANTICOAG MGMT PT WARFARIN: CPT | Mod: S$GLB,,, | Performed by: PHARMACIST

## 2024-10-02 NOTE — PROGRESS NOTES
[facility]    10/02/2024 11:40 AM    Assessment/Plan:    Patient presents today with therapeutic INR.    Assessment of patient findings and chart review: INR at goal. Medications and chart reviewed. No changes noted to necessitate adjustment of warfarin or follow-up plan. See calendar.      Recommendation for patient's warfarin regimen: Continue current maintenance dose    Recommend repeat INR in 2 weeks  _________________________________________________________________    Del MARCIN Anand (79 y.o.) is followed by the [facility].    Anticoagulation Summary  As of 10/2/2024      INR goal:  2.5-3.5   TTR:  73.0% (9.5 y)   INR used for dosing:  3.2 (10/2/2024)   Warfarin maintenance plan:  10 mg (10 mg x 1) every day   Weekly warfarin total:  70 mg   Plan last modified:  Jennifer Hunt, PharmD (8/28/2024)   Next INR check:  --   Target end date:  --    Indications    Long term current use of anticoagulant therapy [Z79.01]  Pulmonary embolism (Resolved) [I26.99]                 Anticoagulation Episode Summary       INR check location:  Home Draw    Preferred lab:  --    Send INR reminders to:  [facility] COUMADIN HOME MONITOR    Comments:  [facility] every other Wednesday          Anticoagulation Care Providers       Provider Role Specialty Phone number    Blanquita Levin MD [facility] Cardiology [phone]

## 2024-10-08 ENCOUNTER — OFFICE VISIT (OUTPATIENT)
Dept: DERMATOLOGY | Facility: CLINIC | Age: 79
End: 2024-10-08
Payer: MEDICARE

## 2024-10-08 DIAGNOSIS — L21.9 SEBORRHEIC DERMATITIS: ICD-10-CM

## 2024-10-08 PROCEDURE — 3288F FALL RISK ASSESSMENT DOCD: CPT | Mod: CPTII,S$GLB,, | Performed by: STUDENT IN AN ORGANIZED HEALTH CARE EDUCATION/TRAINING PROGRAM

## 2024-10-08 PROCEDURE — 1160F RVW MEDS BY RX/DR IN RCRD: CPT | Mod: CPTII,S$GLB,, | Performed by: STUDENT IN AN ORGANIZED HEALTH CARE EDUCATION/TRAINING PROGRAM

## 2024-10-08 PROCEDURE — 1101F PT FALLS ASSESS-DOCD LE1/YR: CPT | Mod: CPTII,S$GLB,, | Performed by: STUDENT IN AN ORGANIZED HEALTH CARE EDUCATION/TRAINING PROGRAM

## 2024-10-08 PROCEDURE — 99213 OFFICE O/P EST LOW 20 MIN: CPT | Mod: S$GLB,,, | Performed by: STUDENT IN AN ORGANIZED HEALTH CARE EDUCATION/TRAINING PROGRAM

## 2024-10-08 PROCEDURE — 1159F MED LIST DOCD IN RCRD: CPT | Mod: CPTII,S$GLB,, | Performed by: STUDENT IN AN ORGANIZED HEALTH CARE EDUCATION/TRAINING PROGRAM

## 2024-10-08 PROCEDURE — G2211 COMPLEX E/M VISIT ADD ON: HCPCS | Mod: S$GLB,,, | Performed by: STUDENT IN AN ORGANIZED HEALTH CARE EDUCATION/TRAINING PROGRAM

## 2024-10-08 PROCEDURE — 1126F AMNT PAIN NOTED NONE PRSNT: CPT | Mod: CPTII,S$GLB,, | Performed by: STUDENT IN AN ORGANIZED HEALTH CARE EDUCATION/TRAINING PROGRAM

## 2024-10-08 PROCEDURE — 1157F ADVNC CARE PLAN IN RCRD: CPT | Mod: CPTII,S$GLB,, | Performed by: STUDENT IN AN ORGANIZED HEALTH CARE EDUCATION/TRAINING PROGRAM

## 2024-10-08 RX ORDER — KETOCONAZOLE 20 MG/G
CREAM TOPICAL 2 TIMES DAILY
Qty: 60 G | Refills: 5 | Status: SHIPPED | OUTPATIENT
Start: 2024-10-08

## 2024-10-08 RX ORDER — TRIAMCINOLONE ACETONIDE 0.25 MG/G
CREAM TOPICAL 2 TIMES DAILY
Qty: 80 G | Refills: 5 | Status: SHIPPED | OUTPATIENT
Start: 2024-10-08

## 2024-10-08 NOTE — PATIENT INSTRUCTIONS
When stable/clear- just use ketoconazole cream on nose and ears nightly before bed    When flaring (red, itching, scaly)-- mix ketoconazole cream with triamcinolone 0.025% cream and apply twice daily x 10-14 days

## 2024-10-08 NOTE — PROGRESS NOTES
Subjective:      Patient ID:  Del Anand is a 79 y.o. male who presents for   Chief Complaint   Patient presents with    Rash     Ears and nose     LOV 05/28/2024    Patient is coming in today for a follow up on the rash on his nose and behind his ears. Patient used one tube of cream of cream unsure which one and when that one ran out he started to use the other tube.     Derm Hx:  Hx of SCC L lower leg s/p Mohs with Dr. Rosenbaum in 2018 and subsequent wound dehiscence which required wound care for months.        Seborrheic dermatitis  -     triamcinolone acetonide 0.025% (KENALOG) 0.025 % cream; Apply topically 2 (two) times daily.  Dispense: 80 g; Refill: 1  -     ketoconazole (NIZORAL) 2 % cream; Apply topically 2 (two) times daily.  Dispense: 60 g; Refill: 1          Review of Systems   Constitutional:  Negative for fever, chills and fatigue.   Respiratory:  Negative for cough and shortness of breath.    Gastrointestinal:  Negative for nausea and vomiting.   Skin:  Positive for activity-related sunscreen use and wears hat. Negative for daily sunscreen use.   Hematologic/Lymphatic: Bruises/bleeds easily (warfarin).       Objective:   Physical Exam   Constitutional: He appears well-developed and well-nourished.   Neurological: He is alert and oriented to person, place, and time.   Psychiatric: He has a normal mood and affect.   Skin:   Areas Examined (abnormalities noted in diagram):   Head / Face Inspection Performed  Neck Inspection Performed            Diagram Legend     Erythematous scaling macule/papule c/w actinic keratosis       Vascular papule c/w angioma      Pigmented verrucoid papule/plaque c/w seborrheic keratosis      Yellow umbilicated papule c/w sebaceous hyperplasia      Irregularly shaped tan macule c/w lentigo     1-2 mm smooth white papules consistent with Milia      Movable subcutaneous cyst with punctum c/w epidermal inclusion cyst      Subcutaneous movable cyst c/w pilar cyst      Firm  pink to brown papule c/w dermatofibroma      Pedunculated fleshy papule(s) c/w skin tag(s)      Evenly pigmented macule c/w junctional nevus     Mildly variegated pigmented, slightly irregular-bordered macule c/w mildly atypical nevus      Flesh colored to evenly pigmented papule c/w intradermal nevus       Pink pearly papule/plaque c/w basal cell carcinoma      Erythematous hyperkeratotic cursted plaque c/w SCC      Surgical scar with no sign of skin cancer recurrence      Open and closed comedones      Inflammatory papules and pustules      Verrucoid papule consistent consistent with wart     Erythematous eczematous patches and plaques     Dystrophic onycholytic nail with subungual debris c/w onychomycosis     Umbilicated papule    Erythematous-base heme-crusted tan verrucoid plaque consistent with inflamed seborrheic keratosis     Erythematous Silvery Scaling Plaque c/w Psoriasis     See annotation      Assessment / Plan:        Seborrheic dermatitis- clear today, stable  -     ketoconazole (NIZORAL) 2 % cream; Apply topically 2 (two) times daily.  Dispense: 60 g; Refill: 5  -     triamcinolone acetonide 0.025% (KENALOG) 0.025 % cream; Apply topically 2 (two) times daily.  Dispense: 80 g; Refill: 5  When stable/clear- just use ketoconazole cream on nose and ears nightly before bed  When flaring (red, itching, scaly)-- mix ketoconazole cream with triamcinolone 0.025% cream and apply twice daily x 10-14 days   - patient counseled to use topical steroids for specified period of time and on locations discussed to prevent side effects. Reviewed side effects of long-term use of topical steroids which include thinning and lightening of skin         May 2025 for UBSE    No follow-ups on file.

## 2024-10-16 ENCOUNTER — ANTI-COAG VISIT (OUTPATIENT)
Dept: CARDIOLOGY | Facility: CLINIC | Age: 79
End: 2024-10-16
Payer: MEDICARE

## 2024-10-16 DIAGNOSIS — Z79.01 LONG TERM CURRENT USE OF ANTICOAGULANT THERAPY: Primary | ICD-10-CM

## 2024-10-16 LAB — INR PPP: 2.8

## 2024-10-16 PROCEDURE — 93793 ANTICOAG MGMT PT WARFARIN: CPT | Mod: S$GLB,,,

## 2024-10-16 NOTE — PROGRESS NOTES
[facility]    10/16/2024 9:42 AM    Assessment/Plan:    Patient presents today with therapeutic INR.    Assessment of patient findings and chart review: reviewed    Recommendation for patient's warfarin regimen: Continue current maintenance dose    Recommend repeat INR in 2 weeks  _________________________________________________________________    Del Anand (79 y.o.) is followed by the [facility].    Anticoagulation Summary  As of 10/16/2024      INR goal:  2.5-3.5   TTR:  73.1% (9.5 y)   INR used for dosin.8 (10/16/2024)   Warfarin maintenance plan:  10 mg (10 mg x 1) every day   Weekly warfarin total:  70 mg   Plan last modified:  Jennifer Hunt, PharmD (2024)   Next INR check:  10/30/2024   Target end date:  --    Indications    Long term current use of anticoagulant therapy [Z79.01]  Pulmonary embolism (Resolved) [I26.99]                 Anticoagulation Episode Summary       INR check location:  Home Draw    Preferred lab:  --    Send INR reminders to:  [facility] COUMADIN HOME MONITOR    Comments:  [place] every other Wednesday          Anticoagulation Care Providers       Provider Role Specialty Phone number    Blanquita Levin MD [facility] Cardiology [phone]

## 2024-10-30 ENCOUNTER — ANTI-COAG VISIT (OUTPATIENT)
Dept: CARDIOLOGY | Facility: CLINIC | Age: 79
End: 2024-10-30
Payer: MEDICARE

## 2024-10-30 DIAGNOSIS — Z79.01 LONG TERM CURRENT USE OF ANTICOAGULANT THERAPY: Primary | ICD-10-CM

## 2024-10-30 LAB — INR PPP: 2.5

## 2024-10-30 PROCEDURE — 93793 ANTICOAG MGMT PT WARFARIN: CPT | Mod: S$GLB,,, | Performed by: PHARMACIST

## 2024-11-05 ENCOUNTER — OFFICE VISIT (OUTPATIENT)
Dept: SLEEP MEDICINE | Facility: CLINIC | Age: 79
End: 2024-11-05
Attending: PSYCHIATRY & NEUROLOGY
Payer: MEDICARE

## 2024-11-05 VITALS
HEART RATE: 88 BPM | HEIGHT: 75 IN | WEIGHT: 315 LBS | SYSTOLIC BLOOD PRESSURE: 129 MMHG | BODY MASS INDEX: 39.17 KG/M2 | DIASTOLIC BLOOD PRESSURE: 66 MMHG

## 2024-11-05 DIAGNOSIS — G47.33 OSA (OBSTRUCTIVE SLEEP APNEA): Primary | ICD-10-CM

## 2024-11-05 PROCEDURE — 99999 PR PBB SHADOW E&M-EST. PATIENT-LVL II: CPT | Mod: PBBFAC,,, | Performed by: PSYCHIATRY & NEUROLOGY

## 2024-11-05 PROCEDURE — 1126F AMNT PAIN NOTED NONE PRSNT: CPT | Mod: CPTII,S$GLB,, | Performed by: PSYCHIATRY & NEUROLOGY

## 2024-11-05 PROCEDURE — 99214 OFFICE O/P EST MOD 30 MIN: CPT | Mod: S$GLB,,, | Performed by: PSYCHIATRY & NEUROLOGY

## 2024-11-05 PROCEDURE — 3288F FALL RISK ASSESSMENT DOCD: CPT | Mod: CPTII,S$GLB,, | Performed by: PSYCHIATRY & NEUROLOGY

## 2024-11-05 PROCEDURE — 1101F PT FALLS ASSESS-DOCD LE1/YR: CPT | Mod: CPTII,S$GLB,, | Performed by: PSYCHIATRY & NEUROLOGY

## 2024-11-05 PROCEDURE — 3074F SYST BP LT 130 MM HG: CPT | Mod: CPTII,S$GLB,, | Performed by: PSYCHIATRY & NEUROLOGY

## 2024-11-05 PROCEDURE — 1157F ADVNC CARE PLAN IN RCRD: CPT | Mod: CPTII,S$GLB,, | Performed by: PSYCHIATRY & NEUROLOGY

## 2024-11-05 PROCEDURE — 3078F DIAST BP <80 MM HG: CPT | Mod: CPTII,S$GLB,, | Performed by: PSYCHIATRY & NEUROLOGY

## 2024-11-05 NOTE — PROGRESS NOTES
10/29/2024     9:28 AM 3/1/2023     9:59 AM 9/27/2022    11:58 AM 9/14/2021     3:33 PM 8/17/2020     8:22 AM 5/5/2020    12:53 PM 4/16/2019     9:14 AM   EPWORTH SLEEPINESS SCALE TOTAL SCORE    Total score 7  4 6 4 8 7 7       Patient-reported       Del Anand is a 79 y.o. male seen today for CPAP  follow up. Last seen on 10/4/2022.    Still likes his DS 2     The patient reports improved sleep continuity and daytime sleepiness on PAP. ESS today is 8/24.  Reports break through snoring.  No  dry mouth.  No aerophagia or air hunger. Denies occasional mask leaks and discomfort. Using a full face  mask     Had ablasion procedure in September.       DME: LARISSA  got machine in DS2 given by "[employer]  DME: LARISSA  got machineDS1, now using DS2 from "[employer]  [facility] [employer] D[facility] now is providing him with CPAP supplies:  [phone], [address], [address], [place], [place]    Patient ID: [id number] EM BALLESTEROS Compliance Summary 10/5/2024 - 11/3/2024 (30 days) Days with Device Usage 30 days Days without Device Usage 0 days Percent Days with Device Usage 100.0% Cumulative Usage 8 days 11 hrs. 27 mins. 56 secs. Maximum Usage (1 Day) 9 hrs. 34 mins. 28 secs. Average Usage (All Days) 6 hrs. 46 mins. 55 secs. Average Usage (Days Used) 6 hrs. 46 mins. 55 secs. Minimum Usage (1 Day) 5 hrs. 25 mins. 51 secs. Percent of Days with Usage >= 4 Hours 100.0% Percent of Days with Usage < 4 Hours 0.0% Date Range Total Blower Time 8 days 11 hrs. 28 mins. 17 secs. Average AHI 2.3 Auto-CPAP Summary Auto-CPAP Mean Pressure 10.8 cmH2O Auto-CPAP Peak Average Pressure 13.5 cmH2O Device Pressure <= 90% of Time 13.5 cmH2O Average Time in Large Leak Per Day 0 secs. Device Settings as of 11/3/2024 AutoCPAP - A-Flex Device Settings Device Mode Parameter Value Min Pressure 10 cmH2O Max Pressure 15 cmH2O A-Flex Setting 3 Auto Off Off Auto On On Ramp+ Time 30 minutes Ramp+ Start Pressure 5.0 cmH2O Mask Resistance Off Tubing Type 15 Opti-Start Off  EZ-Start Disabled Patient Controls Access On Patient Data Access On Tube Temperature 3 Humidifier      PREVIOUS SLEEP STUDIES:       SPLIT NIGHT STUDY on 3/23/15: Significant VERONICA (Obstructive Sleep Apnea) with AHI of 33.1 hour and SaO2 hyun of 61% - Chema 2 below 90% 12% of TST (weight 284 lbs). Effective control of respiratory events was reached at 12 cm H2O CPAP.  CPAP titration on 7/30/15: Effective control of respiratory events was achieved at 15 cm of H2O. Weight 294 lbs.  ---------------------------------------------    INTERVAL HISTORY:    10/4/2022: Del Anand is a 79 y.o. male seen today for CPAP  follow up. Last seen on 10/4/2022.    Moved to [facility] since his last visit. Has a new DME- [facility]     The patient reports improved sleep continuity and daytime sleepiness on PAP. ESS today is 8/24.  Denies break through snoring.  No  dry mouth.  No aerophagia or air hunger. Denies occasional mask leaks and discomfort. Using a FFM mask       DME: LARISSA  got machineDS1, now using DS2 from [employer]  [facility] [employer] [facility] DME now is providing him with CPAP supplies:  [phone], [address], [address], [place], [place]    Still very happy with his DS2 machine  This report is only one of several elements to consider when evaluating therapy effectiveness and is not a substitute for diagnostic data. [email or web address] 11/5/2024 Del Anand Patient ID: [id number]... Compliance Summary 10/7/2024 - 11/5/2024 (30 days) Days with Device Usage 28 days Days without Device Usage 2 days Percent Days with Device Usage 93.3% Cumulative Usage 10 days 9 hrs. 12 mins. 2 secs. Maximum Usage (1 Day) 10 hrs. 51 mins. 5 secs. Average Usage (All Days) 8 hrs. 18 mins. 24 secs. Average Usage (Days Used) 8 hrs. 54 mins. Minimum Usage (1 Day) 4 secs. Percent of Days with Usage >= 4 Hours 90.0% Percent of Days with Usage < 4 Hours 10.0% Date Range Total Blower Time 10 days 9 hrs. 13 mins. 11 secs. CPAP Summary Average Time in Large Leak Per Day 1  "hrs. 2 mins. 32 secs. Average AHI 1.0 CPAP 16.0 cmH2O Device Settings as of 11/5/2024 CPAP - None Device Settings Device Mode Parameter Value Auto-Trial Off CPAP Pressure 16 cmH2O Auto Off Off Auto On Off Ramp+ Time 15 minutes Ramp+ Start Pressure 4.0 cmH2O Mask Resistance Off Tubing Type 15 EZ-Start Disabled Patient Controls Access On Patient Data Access On Tube Temperature 3 Humidifier 5    10/4/2022:  Del Anand is a 79 y.o. male seen today for CPAP  follow up. Last seen on 9/20/2021.    Got Dreamstation 2 several year ago. APAP 16-20    The patient reports improved sleep continuity and daytime sleepiness on PAP. ESS today is 8/24.  Reports occasional break through snoring.  No  dry mouth.  No aerophagia or air hunger. Reports occasional occasional mask leaks and discomfort. Using a FFm Simplus mask       DME: LARISSA got machine in Dreamstation 2 With Simplus Mediu msk  9:22  hrs : mins  Avg Usage (Days Used)  37.1  l/min  Avg total leak  4.4  l/min  Avg unintentional leak  34.0  l/min  Median total leak  2.0  l/min  Median unintentional leak  0.5  Avg AHI  0.0  Avg CA Index  0.2  Avg OA index  17.0  cmH?O  Avg 90% CPAP  Patterns of use    Sleep studies:       The patient reports improved sleep continuity and daytime sleepiness on PAP. ESS today is 4/24.  REPORTS break through snoring.  No dry mouth.   No aerophagia or air hunger. No significant mask leaks and discomfort.  APAP 15-20     APAP 15 to 20; 90% was 16 cm H2O  With Simplus Medium.    He      DME: n/a  REVIEW OF SYSTEMS:   Sleep related symptoms as per HPI    denies weight gain  Denies dyspnea  Denies palpitations  Denies acid reflux   Denies polyuria  Reports occasional  mood diturbance  Denies  anemia  Denies  muscle pain  Denies  Gait imbalance    Otherwise, a balance of 10 systems reviewed is negative.    PHYSICAL EXAM:  /66 (BP Location: Left arm, Patient Position: Sitting)   Pulse 88   Ht 6' 3" (1.905 m)   Wt (!) 145.5 kg (320 lb 12.8 " "oz)   BMI 40.10 kg/m²   GENERAL: Overweight body habitus, well groomed.        Using [facility]: Yes      ASSESSMENT:    1. Severe VERONICA with significant hypoxemia. The patient symptomatically has  snoring, excessive daytime fatigue, gasping for air in sleep and interrupted sleep  with exam findings of "a crowded oral airway and elevated body mass index. The patient has medical co-morbidities of CAD, h/o PE and CHF,hyperlipidemia and hypertension,  which can be worsened by VERONICA. Benefiting from CPAP use in terms of sleep continuity and daytime sleepiness. Compliant.Likes his new APAP machine.  Likes his DS2 machine4 Benefiting from CPAP use in terms of sleep continuity and daytime sleepiness. Remains compliance with CPAP.           PLAN:  Will increase 10-15 to 10-18 due to residual snoring and occasional ceiling leveling effect at 15 cm H2O.  Will reorder supplies    Will reorder supplied to his new DME in [facility]    Periodic supplies replacement was recommended     Education: During our discussion today, we talked about the etiology of obstructive sleep apnea as well as the potential ramifications of untreated sleep apnea, which could include daytime sleepiness, hypertension, heart disease and/or stroke.  We discussed potential treatment options, which could include weight loss, body positioning, continuous positive airway pressure (CPAP), or referral for surgical consideration. The patient preferred CPAP option.    She should avoid ETOH and sedatives at night, as it tends to aggravate VERONICA. Regular replacement of CPAP mask, tubing and filter was recommended.    Precautions: The patient was advised to abstain from driving should he feel sleepy or drowsy.    Follow up: MD-12 months.     Thank you for allowing me the opportunity to participate in the care of your patient.      "

## 2024-11-05 NOTE — Clinical Note
Ankur Blount,  Could you fax this note and CPAP supplies to his new DME?  Thank you!  [facility] D[facility] now is providing him with CPAP supplies: ph [phone], [address], [address], [place], [place]

## 2024-11-06 ENCOUNTER — OFFICE VISIT (OUTPATIENT)
Dept: FAMILY MEDICINE | Facility: CLINIC | Age: 79
End: 2024-11-06
Payer: MEDICARE

## 2024-11-06 ENCOUNTER — PATIENT MESSAGE (OUTPATIENT)
Dept: SLEEP MEDICINE | Facility: CLINIC | Age: 79
End: 2024-11-06
Payer: MEDICARE

## 2024-11-06 VITALS
DIASTOLIC BLOOD PRESSURE: 68 MMHG | BODY MASS INDEX: 39.17 KG/M2 | RESPIRATION RATE: 18 BRPM | SYSTOLIC BLOOD PRESSURE: 144 MMHG | WEIGHT: 315 LBS | OXYGEN SATURATION: 97 % | HEART RATE: 75 BPM | HEIGHT: 75 IN

## 2024-11-06 DIAGNOSIS — M25.572 ACUTE LEFT ANKLE PAIN: ICD-10-CM

## 2024-11-06 DIAGNOSIS — E78.2 MIXED HYPERLIPIDEMIA: ICD-10-CM

## 2024-11-06 DIAGNOSIS — I10 ESSENTIAL HYPERTENSION: ICD-10-CM

## 2024-11-06 DIAGNOSIS — R73.03 PREDIABETES: ICD-10-CM

## 2024-11-06 DIAGNOSIS — M79.672 LEFT FOOT PAIN: Primary | ICD-10-CM

## 2024-11-06 PROCEDURE — 99214 OFFICE O/P EST MOD 30 MIN: CPT | Mod: S$GLB,,, | Performed by: FAMILY MEDICINE

## 2024-11-06 PROCEDURE — 1101F PT FALLS ASSESS-DOCD LE1/YR: CPT | Mod: CPTII,S$GLB,, | Performed by: FAMILY MEDICINE

## 2024-11-06 PROCEDURE — 99999 PR PBB SHADOW E&M-EST. PATIENT-LVL IV: CPT | Mod: PBBFAC,,, | Performed by: FAMILY MEDICINE

## 2024-11-06 PROCEDURE — 3288F FALL RISK ASSESSMENT DOCD: CPT | Mod: CPTII,S$GLB,, | Performed by: FAMILY MEDICINE

## 2024-11-06 PROCEDURE — 3077F SYST BP >= 140 MM HG: CPT | Mod: CPTII,S$GLB,, | Performed by: FAMILY MEDICINE

## 2024-11-06 PROCEDURE — 1160F RVW MEDS BY RX/DR IN RCRD: CPT | Mod: CPTII,S$GLB,, | Performed by: FAMILY MEDICINE

## 2024-11-06 PROCEDURE — 1126F AMNT PAIN NOTED NONE PRSNT: CPT | Mod: CPTII,S$GLB,, | Performed by: FAMILY MEDICINE

## 2024-11-06 PROCEDURE — 3078F DIAST BP <80 MM HG: CPT | Mod: CPTII,S$GLB,, | Performed by: FAMILY MEDICINE

## 2024-11-06 PROCEDURE — 1157F ADVNC CARE PLAN IN RCRD: CPT | Mod: CPTII,S$GLB,, | Performed by: FAMILY MEDICINE

## 2024-11-06 PROCEDURE — 1159F MED LIST DOCD IN RCRD: CPT | Mod: CPTII,S$GLB,, | Performed by: FAMILY MEDICINE

## 2024-11-06 NOTE — PROGRESS NOTES
Subjective:       Patient ID: Del Anand is a 79 y.o. male.    Chief Complaint: Establish Care    Mr. Anand presents today to establish care. He has been having some left ankle pain, He was in the airport and felt a pop in his left foot. He has had some pain since that time. He cannot play golf due to pain when he puts weight on his foot. Started October 22.     Established with coumadin clinic- will be on this long term.     Has been following with cardiology in [place]. Moving all of his care to [place].             .  Patient Active Problem List   Diagnosis    Kidney stone on left side    Low back pain    Fatty liver    Prediabetes    Metabolic syndrome    Nuclear sclerosis - Both Eyes    Coronary artery disease    Hyperglycemia    S/P CABG x 2    Severe obesity (BMI 35.0-39.9) with comorbidity    Long term current use of anticoagulant therapy    Osteoarthrosis, unspecified whether generalized or localized, lower leg    DVT (deep venous thrombosis)    Mixed hyperlipidemia    Essential hypertension    VERONICA on CPAP    Left-sided carotid artery disease    Hematuria, gross    History of squamous cell carcinoma    Colon cancer screening    Adenomatous polyp of ascending colon    Nuclear sclerosis, bilateral    Impaired mobility and activities of daily living    Erectile dysfunction    Joint pain in both hands    Stiffness of joints of both hands    Chronic embolism and thrombosis of 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    Review of Systems   Constitutional:  Negative for activity change, appetite change, fatigue and fever.   Respiratory:  Negative for shortness of breath.    Gastrointestinal:  Negative for abdominal pain.   Integumentary:   Negative for rash.         Health Maintenance Due   Topic Date Due    RSV Vaccine (Age 60+ and Pregnant patients) (1 - 1-dose 75+ series) Never done    TETANUS VACCINE  12/11/2023    COVID-19 Vaccine (8 - 2024-25 season) 11/14/2024      Health Maintenance reviewed and discussed- nothing additional desired today.   Objective:      Physical Exam  Vitals and nursing note reviewed.   Constitutional:       General: He is not in acute distress.     Appearance: He is not ill-appearing.   Cardiovascular:      Rate and Rhythm: Normal rate and regular rhythm.      Heart sounds: No murmur heard.  Pulmonary:      Effort: Pulmonary effort is normal.      Breath sounds: Normal breath sounds. No wheezing.   Skin:     General: Skin is warm and dry.      Findings: No rash.   Neurological:      Mental Status: He is alert.   Psychiatric:         Mood and Affect: Mood normal.         Behavior: Behavior normal.         Assessment:       1. Left foot pain    2. Acute left ankle pain    3. Essential hypertension    4. Mixed hyperlipidemia    5. Prediabetes        Plan:       1. Left foot pain  -     X-Ray Foot Complete Left; Future; Expected date: 11/06/2024  -     X-Ray Ankle Complete Left; Future; Expected date: 11/06/2024    2. Acute left ankle pain  -     X-Ray Foot Complete Left; Future; Expected date: 11/06/2024  -     X-Ray Ankle Complete Left; Future; Expected date: 11/06/2024    3. Essential hypertension  Assessment & Plan:  On digital medicine- controlled at home. Elevated on initial check today.     Orders:  -     CBC Auto Differential; Future; Expected date: 05/06/2025  -     Comprehensive Metabolic Panel; Future; Expected date: 05/06/2025  -     TSH; Future; Expected date: 05/06/2025    4. Mixed hyperlipidemia  Overview:  Untreated total chol 200, LDL 120s, HDL <40 and triglycerides ~ 200 ~mg/dl.    Orders:  -     Lipid Panel; Future; Expected date: 05/06/2025    5. Prediabetes  -     Hemoglobin A1C; Future; Expected date:  05/06/2025

## 2024-11-07 ENCOUNTER — HOSPITAL ENCOUNTER (OUTPATIENT)
Dept: RADIOLOGY | Facility: HOSPITAL | Age: 79
Discharge: HOME | End: 2024-11-07
Attending: FAMILY MEDICINE
Payer: MEDICARE

## 2024-11-07 DIAGNOSIS — M25.572 ACUTE LEFT ANKLE PAIN: ICD-10-CM

## 2024-11-07 DIAGNOSIS — M79.672 LEFT FOOT PAIN: ICD-10-CM

## 2024-11-07 PROCEDURE — 73610 X-RAY EXAM OF ANKLE: CPT | Mod: TC,PO,LT

## 2024-11-07 PROCEDURE — 73610 X-RAY EXAM OF ANKLE: CPT | Mod: 26,LT,, | Performed by: RADIOLOGY

## 2024-11-11 DIAGNOSIS — M25.572 ACUTE LEFT ANKLE PAIN: Primary | ICD-10-CM

## 2024-11-13 ENCOUNTER — ANTI-COAG VISIT (OUTPATIENT)
Dept: CARDIOLOGY | Facility: CLINIC | Age: 79
End: 2024-11-13
Payer: MEDICARE

## 2024-11-13 DIAGNOSIS — Z79.01 LONG TERM CURRENT USE OF ANTICOAGULANT THERAPY: Primary | ICD-10-CM

## 2024-11-13 LAB — INR PPP: 2.7

## 2024-11-13 PROCEDURE — 93793 ANTICOAG MGMT PT WARFARIN: CPT | Mod: S$GLB,,, | Performed by: PHARMACIST

## 2024-11-13 NOTE — PROGRESS NOTES
[facility]    2024 11:09 AM    Assessment/Plan:    Patient presents today with therapeutic INR.    Assessment of patient findings and chart review: INR at goal. Medications and chart reviewed. No changes noted to necessitate adjustment of warfarin or follow-up plan. See calendar.      Recommendation for patient's warfarin regimen: Continue current maintenance dose    Recommend repeat INR in 2 weeks  _________________________________________________________________    Del MARCIN Anand (79 y.o.) is followed by the [facility].    Anticoagulation Summary  As of 2024      INR goal:  2.5-3.5   TTR:  73.4% (9.6 y)   INR used for dosin.7 (2024)   Warfarin maintenance plan:  10 mg (10 mg x 1) every day   Weekly warfarin total:  70 mg   Plan last modified:  Jennifer Hunt, PharmD (2024)   Next INR check:  2024   Target end date:  --    Indications    Long term current use of anticoagulant therapy [Z79.01]  Pulmonary embolism (Resolved) [I26.99]                 Anticoagulation Episode Summary       INR check location:  Home Draw    Preferred lab:  --    Send INR reminders to:  [facility] COUMADIN HOME MONITOR    Comments:  [employer] every other Wednesday          Anticoagulation Care Providers       Provider Role Specialty Phone number    Blanquita Levin MD [facility] Cardiology [phone]

## 2024-11-21 ENCOUNTER — OFFICE VISIT (OUTPATIENT)
Dept: ORTHOPEDICS | Facility: CLINIC | Age: 79
End: 2024-11-21
Payer: MEDICARE

## 2024-11-21 VITALS — HEIGHT: 75 IN | BODY MASS INDEX: 39.17 KG/M2 | WEIGHT: 315 LBS

## 2024-11-21 DIAGNOSIS — M25.572 ACUTE LEFT ANKLE PAIN: ICD-10-CM

## 2024-11-21 PROCEDURE — 99999 PR PBB SHADOW E&M-EST. PATIENT-LVL IV: CPT | Mod: PBBFAC,,, | Performed by: ORTHOPAEDIC SURGERY

## 2024-11-21 NOTE — PROGRESS NOTES
Subjective:      Patient ID: Del Anand is a 79 y.o. male.    Chief Complaint: Pain and Injury of the Left Ankle (10/22/2024- pt reports heard pop in ankle felt like him foot dropped down walking became very painful could only walk pigeon toed, playing golf recently ankle in pain. )    HPI  79-year-old male presents with a one-month history of left ankle pain.  He states that he was waiting in an airport stood up and felt acute pain and a pop in his left ankle.  Reports ankle sprains and sports injuries in the past but no recent issues.  He does have chronic lymphedema in that left leg from other vascular medical comorbidities.  Pain has improved slowly over the last several weeks with relative rest.  He has been able to resume his golfing without much trouble.  ROS      Objective:    Ortho Exam     Constitutional:   Patient is alert  and oriented in no acute distress  HEENT:  normocephalic atraumatic; PERRL EOMI  Neck:  Supple without adenopathy  Cardiovascular:  Normal rate and rhythm  Pulmonary:  Normal respiratory effort normal chest wall expansion  Abdominal:  Nonprotuberant nondistended  Musculoskeletal:  Minimally antalgic gait  Moderate diffuse lower extremity edema  Well-healed surgical incision over the mid anterior tibia from a skin cancer removal  He has diffuse tenderness of the peroneal tendons without any gross instability  Neurological:  No focal defect; cranial nerves 2-12 grossly intact  Psychiatric/behavioral:  Mood and behavior normal      X-Ray Ankle Complete Left  Narrative: EXAMINATION:  XR ANKLE COMPLETE 3 VIEW LEFT    CLINICAL HISTORY:  Pain in left foot    TECHNIQUE:  AP, lateral and oblique views of the left ankle were performed.    COMPARISON:  None    FINDINGS:  No acute fracture or dislocation.  The joint spaces are congruent.  Soft tissue swelling of the ankle.  Impression: Soft tissue swelling of the left ankle.    Electronically signed by: Cris  "Cristian  Date:    11/07/2024  Time:    20:19       My Radiographs Findings:    I have personally reviewed radiographs and concur with above findings    Assessment:       Encounter Diagnosis   Name Primary?    Acute left ankle pain          Plan:       I have discussed medical condition and treatment options with him at length he is content with a conservative course include activity restrictions and physical therapy.  We have discussed general edema control in his left lower extremity.  Follow up in 6 weeks if symptoms fail to improve sooner if any questions or problems.        Past Medical History:   Diagnosis Date    Benign neoplasm of colon     Cataract     CHF (congestive heart failure) 01/13/2015    Coronary artery disease     Difficult intubation     DVT (deep venous thrombosis)     HLD (hyperlipidemia)     HTN (hypertension)     Impaired fasting glucose     Kidney stone     Metabolic syndrome     Nonspecific abnormal results of liver function study     Nonspecific findings on examination of blood     Nuclear sclerosis - Both Eyes 10/18/2013    Osteoarthrosis, unspecified whether generalized or localized, lower leg     Respiratory distress     PT STATES IT WAS "CRAP", "VERY UNCOMFORTABLE"    Squamous cell carcinoma of skin      Past Surgical History:   Procedure Laterality Date    CARDIAC SURGERY      CATARACT EXTRACTION W/  INTRAOCULAR LENS IMPLANT Right 9/17/2020    Procedure: EXTRACTION, CATARACT, WITH IOL INSERTION;  Surgeon: Chanel Klein MD;  Location: [facility];  Service: Ophthalmology;  Laterality: Right;    CATARACT EXTRACTION W/  INTRAOCULAR LENS IMPLANT Left 10/1/2020    Procedure: EXTRACTION, CATARACT, WITH IOL INSERTION;  Surgeon: Chanel Klein MD;  Location: [facility] OR;  Service: Ophthalmology;  Laterality: Left;    COLONOSCOPY N/A 3/13/2019    Procedure: COLONOSCOPY;  Surgeon: Nikunj Larson MD;  Location: [facility] ([address] [place] [address]);  Service: Endoscopy;  Laterality: N/A;  ok to hold Coumadin x 5 days " with a Lovenox bridge per Coumadin clinic  2nd floor - history of difficult intubation-MS    COLONOSCOPY N/A 7/7/2022    Procedure: COLONOSCOPY;  Surgeon: Nikunj Larson MD;  Location: [facility] ([address] [place] [address]);  Service: Endoscopy;  Laterality: N/A;  ef 45%-5/31-coumadin hold per coumadin clinic see coag visist 6/29-tb-vacc- clears 4 hrs prior-am prep 2-3-am-inst portal-tb    CORONARY ARTERY BYPASS GRAFT      2014    CYSTOSCOPY      KIDNEY STONE SURGERY      KNEE ARTHROPLASTY      bilateral    renal stone      SKIN BIOPSY           Current Outpatient Medications:     amLODIPine (NORVASC) 2.5 MG tablet, Take 1 tablet (2.5 mg total) by mouth once daily., Disp: 90 tablet, Rfl: 3    aspirin 81 MG Chew, Take 81 mg by mouth once daily., Disp: , Rfl:     cyanocobalamin 500 MCG tablet, Take 500 mcg by mouth every morning. Every day, Disp: , Rfl:     ezetimibe (ZETIA) 10 mg tablet, Take 1 tablet (10 mg total) by mouth once daily., Disp: 90 tablet, Rfl: 3    FOLIC ACID/MULTIVITS-MIN/LUT (CENTRUM SILVER ORAL), Take 1 tablet by mouth once daily., Disp: , Rfl:     irbesartan (AVAPRO) 300 MG tablet, Take 1 tablet (300 mg total) by mouth every evening., Disp: 90 tablet, Rfl: 3    ketoconazole (NIZORAL) 2 % cream, Apply topically 2 (two) times daily., Disp: 60 g, Rfl: 5    rosuvastatin (CRESTOR) 40 MG Tab, Take 1 tablet (40 mg total) by mouth once daily., Disp: 90 tablet, Rfl: 3    triamcinolone acetonide 0.025% (KENALOG) 0.025 % cream, Apply topically 2 (two) times daily., Disp: 80 g, Rfl: 5    warfarin (COUMADIN) 10 MG tablet, Take 10mg daily or as directed by Coumadin Clinic.   Also uses warfarin 5mg tablet strength, Disp: 90 tablet, Rfl: 3    warfarin (COUMADIN) 5 MG tablet, Take 10mg daily except 12.5mg Wednesdays, Disp: 180 tablet, Rfl: 3    Review of patient's allergies indicates:  No Known Allergies    Family History   Problem Relation Name Age of Onset    Stroke Mother incontinence     Dementia Mother incontinence     Heart  attack Father bph         d. 85    Urolithiasis Father bph     Heart disease Father bph     Heart failure Father bph     Other Sister Mary         bladder lift, s/p 4 ; estranged    Heart attack Maternal Grandfather          d. 65    Hypertension Paternal Grandmother      Heart attack Paternal Grandfather      Heart failure Paternal Grandfather      No Known Problems Daughter Sabina     No Known Problems Son Doron      Social History     Occupational History    Not on file   Tobacco Use    Smoking status: Former     Current packs/day: 0.00     Average packs/day: 1 pack/day for 20.0 years (20.0 ttl pk-yrs)     Types: Cigarettes     Start date: 10/16/1967     Quit date: 10/16/1987     Years since quittin.1     Passive exposure: Past    Smokeless tobacco: Former   Substance and Sexual Activity    Alcohol use: Yes     Alcohol/week: 0.0 standard drinks of alcohol     Types: 1 - 2 Cans of beer, 1 - 2 Standard drinks or equivalent per week     Comment: daily, none today    Drug use: No    Sexual activity: Yes     Partners: Female     Birth control/protection: None

## 2024-11-27 ENCOUNTER — ANTI-COAG VISIT (OUTPATIENT)
Dept: CARDIOLOGY | Facility: CLINIC | Age: 79
End: 2024-11-27
Payer: MEDICARE

## 2024-11-27 DIAGNOSIS — Z79.01 LONG TERM CURRENT USE OF ANTICOAGULANT THERAPY: Primary | ICD-10-CM

## 2024-11-27 LAB — INR PPP: 2.4

## 2024-11-27 PROCEDURE — 93793 ANTICOAG MGMT PT WARFARIN: CPT | Mod: S$GLB,,,

## 2024-11-27 NOTE — PROGRESS NOTES
[facility]    2024 9:24 AM    Assessment/Plan:    Patient presents today with subtherapeutic  INR.    Assessment of patient findings and chart review: INR slightly below goal.     Recommendation for patient's warfarin regimen: Boost dose today to 12.5 mg then resume current maintenance dose    Recommend repeat INR in 2 week  _________________________________________________________________    Del MARCIN Anand (79 y.o.) is followed by the [facility].    Anticoagulation Summary  As of 2024      INR goal:  2.5-3.5   TTR:  73.3% (9.6 y)   INR used for dosin.4 (2024)   Warfarin maintenance plan:  10 mg (10 mg x 1) every day   Weekly warfarin total:  70 mg   Plan last modified:  Jennifer Hunt, PharmD (2024)   Next INR check:  2024   Target end date:  --    Indications    Long term current use of anticoagulant therapy [Z79.01]  Pulmonary embolism (Resolved) [I26.99]                 Anticoagulation Episode Summary       INR check location:  Home Draw    Preferred lab:  --    Send INR reminders to:  [facility] COUMADIN HOME MONITOR    Comments:  [employer] every other Wednesday          Anticoagulation Care Providers       Provider Role Specialty Phone number    Blanquita Levin MD [facility] Cardiology [phone]

## 2024-12-04 ENCOUNTER — CLINICAL SUPPORT (OUTPATIENT)
Dept: REHABILITATION | Facility: HOSPITAL | Age: 79
End: 2024-12-04
Payer: MEDICARE

## 2024-12-04 DIAGNOSIS — M25.572 ACUTE LEFT ANKLE PAIN: ICD-10-CM

## 2024-12-04 DIAGNOSIS — Z74.09 IMPAIRED FUNCTIONAL MOBILITY, BALANCE, GAIT, AND ENDURANCE: Primary | ICD-10-CM

## 2024-12-04 PROCEDURE — 97161 PT EVAL LOW COMPLEX 20 MIN: CPT | Mod: PN

## 2024-12-04 PROCEDURE — 97530 THERAPEUTIC ACTIVITIES: CPT | Mod: PN

## 2024-12-04 NOTE — PLAN OF CARE
[facility]   Physical Therapy Initial Evaluation      Name: Del Anand  [facility] Number: [id number]    Therapy Diagnosis:   Encounter Diagnoses   Name Primary?    Acute left ankle pain     Impaired functional mobility, balance, gait, and endurance Yes        Physician: Bill Negrete,*    Physician Orders: PT Eval and Treat   Medical Diagnosis from Referral: M25.572 (ICD-10-CM) - Acute left ankle pain   Evaluation Date: 12/4/2024  Authorization Period Expiration: 3/4/25  Plan of Care Expiration: 3/4/25  Progress Note Due: 1/20/25  Date of Surgery: none  Visit # / Visits authorized: 1/ 1   FOTO: 1/ 3    Precautions: Standard, Fall, and Weightbearing     Time In: 200  Time Out: 245  Total Billable Time: 45 minutes    Subjective     Date of onset: October 22, 2024    History of current condition - Bill reports: October 22 standing at airport and he heard a pop and it felt his leg compressed.Pt has pain when he pushes off when he is walking. Pt reports he tried to play golf a few days later where he felt the pain was intensitisived. Pt feels he has limited ROM with pain. Pt reports he still is playing golf. Pt reports he had double bipass surgery 10 years ago. Pt reports he has increased swelling on left side due to taking a vein. The swelling is significant.      Falls: no    Imaging: x ray completed    Prior Therapy: yes   Social History:  lives with their spouse  Occupation: retired  Prior Level of Function: I  Current Level of Function: I with occasionally pain    Pain:  Current 0/10, worst 5/10, best 0/10   Location: left ankles   Description: Aching and Throbbing  Aggravating Factors: Standing and Walking  Easing Factors: ice and rest    Patients goals: ' to be able know what to do to heal leg. '      Medical History:   Past Medical History:   Diagnosis Date    Benign neoplasm of colon     Cataract     CHF (congestive heart failure) 01/13/2015    Coronary artery disease      "Difficult intubation     DVT (deep venous thrombosis)     HLD (hyperlipidemia)     HTN (hypertension)     Impaired fasting glucose     Kidney stone     Metabolic syndrome     Nonspecific abnormal results of liver function study     Nonspecific findings on examination of blood     Nuclear sclerosis - Both Eyes 10/18/2013    Osteoarthrosis, unspecified whether generalized or localized, lower leg     Respiratory distress     PT STATES IT WAS "CRAP", "VERY UNCOMFORTABLE"    Squamous cell carcinoma of skin        Surgical History:   Del Anand  has a past surgical history that includes Knee Arthroplasty; renal stone; Cystoscopy; Kidney stone surgery; Cardiac surgery; Skin biopsy; Coronary artery bypass graft; Colonoscopy (N/A, 3/13/2019); Cataract extraction w/  intraocular lens implant (Right, 9/17/2020); Cataract extraction w/  intraocular lens implant (Left, 10/1/2020); and Colonoscopy (N/A, 7/7/2022).    Medications:   Del has a current medication list which includes the following prescription(s): amlodipine, aspirin, cyanocobalamin, ezetimibe, multivit-min/folic acid/lutein, irbesartan, ketoconazole, rosuvastatin, triamcinolone acetonide 0.025%, warfarin, and warfarin.    Allergies:   Review of patient's allergies indicates:  No Known Allergies     Objective        PROM Right Left Comment   DF: 10 degrees 5 degrees    PF: 60 degrees 50 degrees    Eversion: 30 degrees 30 degrees    Inversion: 40 degrees 40 degrees    *pain   Right Left Comment   DF: 4/5 4/5    PF: 4/5 4/5    Eversion: 4-/5 4-/5    Inversion: 4-/5 4-/5        Intake Outcome Measure for FOTO  Survey    Therapist reviewed FOTO scores for Del Anand on 12/4/2024.   FOTO report - see Media section or FOTO account episode details.    Intake Score: na%         Treatment     Total Treatment time (time-based codes) separate from Evaluation: 10 minutes     Del received the treatments listed below:      therapeutic exercises to develop strength, " endurance, and ROM for 10 minutes including:  Towel stretch df / pf, ABC's        Patient Education and Home Exercises     Education provided:   - POC< HEP    Written Home Exercises Provided: Pt instructed to continue prior HEP. Exercises were reviewed and Del was able to demonstrate them prior to the end of the session.  Del demonstrated good  understanding of the education provided. See EMR under Patient Instructions for exercises provided during therapy sessions.    Assessment     Del is a 79 y.o. male referred to outpatient Physical Therapy with a medical diagnosis of M25.572 (ICD-10-CM) - Acute left ankle pain . Patient presents with increased swelling on left side. Pt has increased laxity on lateral side of ankle. Pt has slight decrease in ROM on left ankle. Pt is in need of PT at this time to prevent further decline in functional status.     Patient prognosis is Good.   Patient will benefit from skilled outpatient Physical Therapy to address the deficits stated above and in the chart below, provide patient /family education, and to maximize patientt's level of independence.     Plan of care discussed with patient: Yes  Patient's spiritual, cultural and educational needs considered and patient is agreeable to the plan of care and goals as stated below:     Anticipated Barriers for therapy: na    Medical Necessity is demonstrated by the following  History  Co-morbidities and personal factors that may impact the plan of care [x] LOW: no personal factors / co-morbidities  [] MODERATE: 1-2 personal factors / co-morbidities  [] HIGH: 3+ personal factors / co-morbidities    Moderate / High Support Documentation:   Co-morbidities affecting plan of care: na    Personal Factors:   no deficits     Examination  Body Structures and Functions, activity limitations and participation restrictions that may impact the plan of care [x] LOW: addressing 1-2 elements  [] MODERATE: 3+ elements  [] HIGH: 4+ elements (please  support below)    Moderate / High Support Documentation: na     Clinical Presentation [x] LOW: stable  [] MODERATE: Evolving  [] HIGH: Unstable     Decision Making/ Complexity Score: low         Goals:  Short Term Goals: 3 weeks   Pt will be compliant with HEP.  Pt will improve quad strength by 1/2 grade to allow for strength to go up and down stairs.   Pt will improve sit <>  30 sec to at least 10 allow for increased functional mobility.    Long Term Goals: 6 weeks   Pt will be independent with HEP to allow for increased functional tasks.  Pt will improve FOTO by 10 % to demonstrate improvement in quality of life.  Pt will improve hip flexor strength by 1/2 grade to allow for normalized body mechanics.   Plan     Plan of care Certification: 12/4/2024 to 3/4/25.    Outpatient Physical Therapy 2 times weekly for 6 weeks to include the following interventions: Electrical Stimulation  , Gait Training, Manual Therapy, Moist Heat/ Ice, Neuromuscular Re-ed, Orthotic Management and Training, Patient Education, Therapeutic Activities, Therapeutic Exercise, Ultrasound, and LLLT .     Tika Perez, PT        Physician's Signature: _________________________________________ Date: ________________

## 2024-12-10 ENCOUNTER — CLINICAL SUPPORT (OUTPATIENT)
Dept: REHABILITATION | Facility: HOSPITAL | Age: 79
End: 2024-12-10
Payer: MEDICARE

## 2024-12-10 DIAGNOSIS — Z74.09 IMPAIRED FUNCTIONAL MOBILITY, BALANCE, GAIT, AND ENDURANCE: Primary | ICD-10-CM

## 2024-12-10 PROCEDURE — 97110 THERAPEUTIC EXERCISES: CPT | Mod: PN,CQ

## 2024-12-10 NOTE — PROGRESS NOTES
[facility]   Physical Therapy Treatment Note      Name: Del BURCH Kika  [facility] Number: [id number]    Therapy Diagnosis:   Encounter Diagnosis   Name Primary?    Impaired functional mobility, balance, gait, and endurance Yes     Physician: Bill Negrete,*    Visit Date: 12/10/2024    Physician Orders: PT Eval and Treat   Medical Diagnosis from Referral: M25.572 (ICD-10-CM) - Acute left ankle pain   Evaluation Date: 12/4/2024  Authorization Period Expiration: 3/4/25  Plan of Care Expiration: 3/4/25  Progress Note Due: 1/20/25  Date of Surgery: none  Visit # / Visits authorized: 1/ 12 + eval   FOTO: 1/ 3     Precautions: Standard, Fall, and Weightbearing      Time In: 200  Time Out: 245  Total Billable Time: 45 minutes    PTA Visit #: 1/5       Subjective     Patient reports: no new complaints.  He was compliant with home exercise program.  Response to previous treatment: ok  Functional change: none    Pain: 1/10  Location: left ankles     Objective      Objective Measures updated at progress report unless specified.     Treatment     Del received the treatments listed below:      therapeutic exercises to develop strength, endurance, and ROM for left ankle 40 minutes including:  R bike x 3 min (stop due to left knee complaint)  Towel crunch x 3 min  ABC  Towel slide fwd/backward x 2 min  Towel slide inv/ev x 2 min  4 way ankle with yellow t band x 10 each  Ankle circles x 1 min each direction  Rocker board fw/bk and circles x 1 min each      manual therapy techniques: Manual traction and Soft tissue Mobilization were applied to the: left ankle for 0 minutes, including:      neuromuscular re-education activities to improve: Balance, Coordination, Kinesthetic, Sense, and Proprioception for 0 minutes. The following activities were included:      therapeutic activities to improve functional performance for 0  minutes, including:      Patient Education and Home Exercises       Education  provided:   - therapeutic exercise    Written Home Exercises Provided: Pt instructed to continue prior HEP. Exercises were reviewed and Del was able to demonstrate them prior to the end of the session.  Del demonstrated good  understanding of the education provided. See Electronic Medical Record under Patient Instructions for exercises provided during therapy sessions    Assessment     Del is a 79 y.o. male referred to outpatient Physical Therapy with a medical diagnosis of M25.572 (ICD-10-CM) - Acute left ankle pain . Patient presents with increased swelling on left side. Pt has increased laxity on lateral side of ankle. Pt has slight decrease in ROM on left ankle. Pt is in need of PT at this time to prevent further decline in functional status.     Del Is progressing well towards his goals.   Patient prognosis is Good.     Patient will continue to benefit from skilled outpatient physical therapy to address the deficits listed in the problem list box on initial evaluation, provide pt/family education and to maximize pt's level of independence in the home and community environment.     Patient's spiritual, cultural and educational needs considered and pt agreeable to plan of care and goals.     Anticipated barriers to physical therapy: none    Goals:   Short Term Goals: 3 weeks   Pt will be compliant with HEP.  Pt will improve quad strength by 1/2 grade to allow for strength to go up and down stairs.   Pt will improve sit <>  30 sec to at least 10 allow for increased functional mobility.     Long Term Goals: 6 weeks   Pt will be independent with HEP to allow for increased functional tasks.  Pt will improve FOTO by 10 % to demonstrate improvement in quality of life.  Pt will improve hip flexor strength by 1/2 grade to allow for normalized body mechanics.     Plan     Plan of care Certification: 12/4/2024 to 3/4/25.     Outpatient Physical Therapy 2 times weekly for 6 weeks to include the following  interventions: Electrical Stimulation  , Gait Training, Manual Therapy, Moist Heat/ Ice, Neuromuscular Re-ed, Orthotic Management and Training, Patient Education, Therapeutic Activities, Therapeutic Exercise, Ultrasound, and LLLT .     Jose Ramon Flores, PTA

## 2024-12-11 ENCOUNTER — ANTI-COAG VISIT (OUTPATIENT)
Dept: CARDIOLOGY | Facility: CLINIC | Age: 79
End: 2024-12-11
Payer: MEDICARE

## 2024-12-11 DIAGNOSIS — Z79.01 LONG TERM CURRENT USE OF ANTICOAGULANT THERAPY: Primary | ICD-10-CM

## 2024-12-11 LAB — INR PPP: 1.6

## 2024-12-11 PROCEDURE — 93793 ANTICOAG MGMT PT WARFARIN: CPT | Mod: S$GLB,,, | Performed by: PHARMACIST

## 2024-12-11 NOTE — PROGRESS NOTES
[facility]    2024 2:08 PM    Assessment/Plan:    Patient presents today with subtherapeutic  INR.    Assessment of patient findings and chart review: INR not at goal. Medications, chart, and patient findings reviewed. See calendar for adjustments to dose and follow up plan.  Patient denies any changes in diet, medications, or health that would effect warfarin therapy.      Recommendation for patient's warfarin regimen: Boost dose today to 15mg then increase maintenance dose    Recommend repeat INR in 1 week  _________________________________________________________________    Del Anand (79 y.o.) is followed by the [facility].    Anticoagulation Summary  As of 2024      INR goal:  2.5-3.5   TTR:  73.0% (9.6 y)   INR used for dosin.6 (2024)   Warfarin maintenance plan:  12.5 mg (10 mg x 1 and 5 mg x 0.5) every Sat; 10 mg (10 mg x 1) all other days   Weekly warfarin total:  72.5 mg   Plan last modified:  Jennifer Hunt, PharmD (2024)   Next INR check:  2024   Target end date:  --    Indications    Long term current use of anticoagulant therapy [Z79.01]  Pulmonary embolism (Resolved) [I26.99]                 Anticoagulation Episode Summary       INR check location:  Home Draw    Preferred lab:  --    Send INR reminders to:  [facility] COUMADIN HOME MONITOR    Comments:  [place] every other Wednesday          Anticoagulation Care Providers       Provider Role Specialty Phone number    Blanquita Levin MD [facility] Cardiology [phone]

## 2024-12-13 ENCOUNTER — CLINICAL SUPPORT (OUTPATIENT)
Dept: REHABILITATION | Facility: HOSPITAL | Age: 79
End: 2024-12-13
Payer: MEDICARE

## 2024-12-13 DIAGNOSIS — Z74.09 IMPAIRED FUNCTIONAL MOBILITY, BALANCE, GAIT, AND ENDURANCE: Primary | ICD-10-CM

## 2024-12-13 PROCEDURE — 97110 THERAPEUTIC EXERCISES: CPT | Mod: PN,CQ

## 2024-12-13 NOTE — PROGRESS NOTES
[facility]   Physical Therapy Treatment Note      Name: Del BURCH Kika  [facility] Number: [id number]    Therapy Diagnosis:   Encounter Diagnosis   Name Primary?    Impaired functional mobility, balance, gait, and endurance Yes     Physician: Bill Negrete,*    Visit Date: 12/13/2024    Physician Orders: PT Eval and Treat   Medical Diagnosis from Referral: M25.572 (ICD-10-CM) - Acute left ankle pain   Evaluation Date: 12/4/2024  Authorization Period Expiration: 3/4/25  Plan of Care Expiration: 3/4/25  Progress Note Due: 1/20/25  Date of Surgery: none  Visit # / Visits authorized: 2 / 12 + eval   FOTO: 1/ 3     Precautions: Standard, Fall, and Weightbearing      Time In: 2:00 PM  Time Out: 2:45 PM  Total Billable Time: 40 minutes    PTA Visit #: 2/5       Subjective     Patient reports: Feeling pretty good today.  He was compliant with home exercise program.  Response to previous treatment: ok  Functional change: none    Pain: 0/10  Location: left ankles     Objective      Objective Measures updated at progress report unless specified.     Treatment     Del received the treatments listed below:      therapeutic exercises to develop strength, endurance, and ROM for left ankle 40 minutes including:  Nu-step level 3 x 15 min  Towel crunch x 3 min  ABC x 1  Towel slide fwd/backward x 2 min  Towel slide inv/ev x 2 min  4 way ankle with yellow t band x 20 each  Ankle circles x 1 min each direction  Rocker board fw/bk and circles x 1 min each      manual therapy techniques: Manual traction and Soft tissue Mobilization were applied to the: left ankle for 0 minutes, including:      neuromuscular re-education activities to improve: Balance, Coordination, Kinesthetic, Sense, and Proprioception for 0 minutes. The following activities were included:      therapeutic activities to improve functional performance for 0  minutes, including:      Patient Education and Home Exercises       Education  provided:   - therapeutic exercise    Written Home Exercises Provided: Pt instructed to continue prior HEP. Exercises were reviewed and Del was able to demonstrate them prior to the end of the session.  Del demonstrated good  understanding of the education provided. See Electronic Medical Record under Patient Instructions for exercises provided during therapy sessions    Assessment     Del is a 79 y.o. male referred to outpatient Physical Therapy with a medical diagnosis of M25.572 (ICD-10-CM) - Acute left ankle pain . Patient presents with increased swelling on left side. Pt has increased laxity on lateral side of ankle. Pt has slight decrease in ROM on left ankle. Pt is in need of PT at this time to prevent further decline in functional status.     Del Is progressing well towards his goals.   Patient prognosis is Good.     Patient will continue to benefit from skilled outpatient physical therapy to address the deficits listed in the problem list box on initial evaluation, provide pt/family education and to maximize pt's level of independence in the home and community environment.     Patient's spiritual, cultural and educational needs considered and pt agreeable to plan of care and goals.     Anticipated barriers to physical therapy: none    Goals:   Short Term Goals: 3 weeks   Pt will be compliant with HEP.  Pt will improve quad strength by 1/2 grade to allow for strength to go up and down stairs.   Pt will improve sit <>  30 sec to at least 10 allow for increased functional mobility.     Long Term Goals: 6 weeks   Pt will be independent with HEP to allow for increased functional tasks.  Pt will improve FOTO by 10 % to demonstrate improvement in quality of life.  Pt will improve hip flexor strength by 1/2 grade to allow for normalized body mechanics.     Plan     Plan of care Certification: 12/4/2024 to 3/4/25.     Outpatient Physical Therapy 2 times weekly for 6 weeks to include the following  interventions: Electrical Stimulation  , Gait Training, Manual Therapy, Moist Heat/ Ice, Neuromuscular Re-ed, Orthotic Management and Training, Patient Education, Therapeutic Activities, Therapeutic Exercise, Ultrasound, and LLLT .     Jonathan Favre, PTA

## 2024-12-18 ENCOUNTER — ANTI-COAG VISIT (OUTPATIENT)
Dept: CARDIOLOGY | Facility: CLINIC | Age: 79
End: 2024-12-18
Payer: MEDICARE

## 2024-12-18 DIAGNOSIS — Z79.01 LONG TERM CURRENT USE OF ANTICOAGULANT THERAPY: Primary | ICD-10-CM

## 2024-12-18 LAB — INR PPP: 2.6

## 2024-12-18 NOTE — PROGRESS NOTES
[facility]    2024 9:54 AM    Assessment/Plan:    Patient presents today with therapeutic INR.    Assessment of patient findings and chart review: Patient denies any changes in diet, medications, or health that would effect warfarin therapy.  INR at goal. Medications and chart reviewed. No changes noted to necessitate adjustment of warfarin or follow-up plan. See calendar.      Recommendation for patient's warfarin regimen:  per calendar    Recommend repeat INR in 2 weeks  _________________________________________________________________    Del Anand (79 y.o.) is followed by the [facility].    Anticoagulation Summary  As of 2024      INR goal:  2.5-3.5   TTR:  72.9% (9.7 y)   INR used for dosin.6 (2024)   Warfarin maintenance plan:  12.5 mg (10 mg x 1 and 5 mg x 0.5) every Wed, Sat; 10 mg (10 mg x 1) all other days   Weekly warfarin total:  75 mg   Plan last modified:  Jennifer Hunt, PharmD (2024)   Next INR check:  2025   Target end date:  --    Indications    Long term current use of anticoagulant therapy [Z79.01]  Pulmonary embolism (Resolved) [I26.99]                 Anticoagulation Episode Summary       INR check location:  Home Draw    Preferred lab:  --    Send INR reminders to:  [facility] COUMADIN HOME MONITOR    Comments:  [place] every other Wednesday          Anticoagulation Care Providers       Provider Role Specialty Phone number    Blanquita Levin MD [facility] Cardiology [phone]

## 2025-01-01 ENCOUNTER — TELEPHONE (OUTPATIENT)
Dept: ORTHOPEDICS | Facility: CLINIC | Age: 80
End: 2025-01-01
Payer: MEDICARE

## 2025-01-01 ENCOUNTER — ANESTHESIA (OUTPATIENT)
Dept: SURGERY | Facility: HOSPITAL | Age: 80
End: 2025-01-01
Payer: MEDICARE

## 2025-01-01 ENCOUNTER — TELEPHONE (OUTPATIENT)
Dept: CARDIOLOGY | Facility: CLINIC | Age: 80
End: 2025-01-01
Payer: MEDICARE

## 2025-01-01 ENCOUNTER — HOSPITAL ENCOUNTER (OUTPATIENT)
Facility: HOSPITAL | Age: 80
Discharge: HOME | End: 2025-08-11
Attending: ORTHOPAEDIC SURGERY | Admitting: ORTHOPAEDIC SURGERY
Payer: MEDICARE

## 2025-01-01 ENCOUNTER — ANTI-COAG VISIT (OUTPATIENT)
Dept: CARDIOLOGY | Facility: CLINIC | Age: 80
End: 2025-01-01
Payer: MEDICARE

## 2025-01-01 ENCOUNTER — OFFICE VISIT (OUTPATIENT)
Dept: ORTHOPEDICS | Facility: CLINIC | Age: 80
End: 2025-01-01
Payer: MEDICARE

## 2025-01-01 ENCOUNTER — PATIENT MESSAGE (OUTPATIENT)
Dept: CARDIOLOGY | Facility: CLINIC | Age: 80
End: 2025-01-01

## 2025-01-01 ENCOUNTER — ANESTHESIA EVENT (OUTPATIENT)
Dept: SURGERY | Facility: HOSPITAL | Age: 80
End: 2025-01-01
Payer: MEDICARE

## 2025-01-01 VITALS — BODY MASS INDEX: 37.31 KG/M2 | HEIGHT: 75 IN | WEIGHT: 300.06 LBS

## 2025-01-01 VITALS
DIASTOLIC BLOOD PRESSURE: 73 MMHG | BODY MASS INDEX: 37.3 KG/M2 | HEIGHT: 75 IN | SYSTOLIC BLOOD PRESSURE: 168 MMHG | OXYGEN SATURATION: 97 % | TEMPERATURE: 98 F | HEART RATE: 74 BPM | WEIGHT: 300 LBS | RESPIRATION RATE: 18 BRPM

## 2025-01-01 DIAGNOSIS — Z79.01 LONG TERM CURRENT USE OF ANTICOAGULANT THERAPY: Primary | ICD-10-CM

## 2025-01-01 DIAGNOSIS — Z79.01 CURRENT USE OF ANTICOAGULANT THERAPY: ICD-10-CM

## 2025-01-01 DIAGNOSIS — Z96.651 S/P TOTAL KNEE ARTHROPLASTY, RIGHT: Primary | ICD-10-CM

## 2025-01-01 DIAGNOSIS — Z01.818 PRE-OP TESTING: ICD-10-CM

## 2025-01-01 DIAGNOSIS — M17.11 PRIMARY OSTEOARTHRITIS OF RIGHT KNEE: Primary | ICD-10-CM

## 2025-01-01 DIAGNOSIS — S82.101A: ICD-10-CM

## 2025-01-01 LAB
APTT PPP: 24.5 SECONDS (ref 21–32)
INR PPP: 1 (ref 0.8–1.2)
PROTHROMBIN TIME: 11.5 SECONDS (ref 9–12.5)

## 2025-01-01 PROCEDURE — 63600175 PHARM REV CODE 636 W HCPCS: Mod: JZ,TB | Performed by: ANESTHESIOLOGY

## 2025-01-01 PROCEDURE — 93793 ANTICOAG MGMT PT WARFARIN: CPT | Mod: S$GLB,,, | Performed by: PHARMACIST

## 2025-01-01 PROCEDURE — 27201423 OPTIME MED/SURG SUP & DEVICES STERILE SUPPLY: Performed by: ORTHOPAEDIC SURGERY

## 2025-01-01 PROCEDURE — 85610 PROTHROMBIN TIME: CPT | Performed by: ORTHOPAEDIC SURGERY

## 2025-01-01 PROCEDURE — 94799 UNLISTED PULMONARY SVC/PX: CPT

## 2025-01-01 PROCEDURE — 63600175 PHARM REV CODE 636 W HCPCS: Performed by: NURSE ANESTHETIST, CERTIFIED REGISTERED

## 2025-01-01 PROCEDURE — 63600175 PHARM REV CODE 636 W HCPCS: Mod: JZ,TB | Performed by: ORTHOPAEDIC SURGERY

## 2025-01-01 PROCEDURE — 99900031 HC PATIENT EDUCATION (STAT)

## 2025-01-01 PROCEDURE — 27200688 HC TRAY, SPINAL-HYPER/ ISOBARIC: Performed by: ANESTHESIOLOGY

## 2025-01-01 PROCEDURE — 3288F FALL RISK ASSESSMENT DOCD: CPT | Mod: CPTII,S$GLB,, | Performed by: ORTHOPAEDIC SURGERY

## 2025-01-01 PROCEDURE — 27447 TOTAL KNEE ARTHROPLASTY: CPT | Mod: RT,,, | Performed by: ORTHOPAEDIC SURGERY

## 2025-01-01 PROCEDURE — C1776 JOINT DEVICE (IMPLANTABLE): HCPCS | Performed by: ORTHOPAEDIC SURGERY

## 2025-01-01 PROCEDURE — 25000003 PHARM REV CODE 250: Performed by: ANESTHESIOLOGY

## 2025-01-01 PROCEDURE — 27200651 HC AIRWAY, LMA: Performed by: ANESTHESIOLOGY

## 2025-01-01 PROCEDURE — 71000039 HC RECOVERY, EACH ADD'L HOUR: Performed by: ORTHOPAEDIC SURGERY

## 2025-01-01 PROCEDURE — 1157F ADVNC CARE PLAN IN RCRD: CPT | Mod: CPTII,S$GLB,, | Performed by: ORTHOPAEDIC SURGERY

## 2025-01-01 PROCEDURE — 64447 NJX AA&/STRD FEMORAL NRV IMG: CPT | Performed by: ANESTHESIOLOGY

## 2025-01-01 PROCEDURE — 37000009 HC ANESTHESIA EA ADD 15 MINS: Performed by: ORTHOPAEDIC SURGERY

## 2025-01-01 PROCEDURE — 1159F MED LIST DOCD IN RCRD: CPT | Mod: CPTII,S$GLB,, | Performed by: ORTHOPAEDIC SURGERY

## 2025-01-01 PROCEDURE — 99999 PR PBB SHADOW E&M-EST. PATIENT-LVL III: CPT | Mod: PBBFAC,,, | Performed by: ORTHOPAEDIC SURGERY

## 2025-01-01 PROCEDURE — 63600175 PHARM REV CODE 636 W HCPCS: Performed by: ORTHOPAEDIC SURGERY

## 2025-01-01 PROCEDURE — 25000003 PHARM REV CODE 250: Performed by: NURSE ANESTHETIST, CERTIFIED REGISTERED

## 2025-01-01 PROCEDURE — 71000015 HC POSTOP RECOV 1ST HR: Performed by: ORTHOPAEDIC SURGERY

## 2025-01-01 PROCEDURE — 37000008 HC ANESTHESIA 1ST 15 MINUTES: Performed by: ORTHOPAEDIC SURGERY

## 2025-01-01 PROCEDURE — 1160F RVW MEDS BY RX/DR IN RCRD: CPT | Mod: CPTII,S$GLB,, | Performed by: ORTHOPAEDIC SURGERY

## 2025-01-01 PROCEDURE — 85730 THROMBOPLASTIN TIME PARTIAL: CPT | Performed by: ORTHOPAEDIC SURGERY

## 2025-01-01 PROCEDURE — 97116 GAIT TRAINING THERAPY: CPT

## 2025-01-01 PROCEDURE — 63600175 PHARM REV CODE 636 W HCPCS: Performed by: ANESTHESIOLOGY

## 2025-01-01 PROCEDURE — 36000713 HC OR TIME LEV V EA ADD 15 MIN: Performed by: ORTHOPAEDIC SURGERY

## 2025-01-01 PROCEDURE — 97161 PT EVAL LOW COMPLEX 20 MIN: CPT

## 2025-01-01 PROCEDURE — 71000016 HC POSTOP RECOV ADDL HR: Performed by: ORTHOPAEDIC SURGERY

## 2025-01-01 PROCEDURE — C1769 GUIDE WIRE: HCPCS | Performed by: ORTHOPAEDIC SURGERY

## 2025-01-01 PROCEDURE — 99024 POSTOP FOLLOW-UP VISIT: CPT | Mod: S$GLB,,, | Performed by: ORTHOPAEDIC SURGERY

## 2025-01-01 PROCEDURE — 1100F PTFALLS ASSESS-DOCD GE2>/YR: CPT | Mod: CPTII,S$GLB,, | Performed by: ORTHOPAEDIC SURGERY

## 2025-01-01 PROCEDURE — C1713 ANCHOR/SCREW BN/BN,TIS/BN: HCPCS | Performed by: ORTHOPAEDIC SURGERY

## 2025-01-01 PROCEDURE — 1125F AMNT PAIN NOTED PAIN PRSNT: CPT | Mod: CPTII,S$GLB,, | Performed by: ORTHOPAEDIC SURGERY

## 2025-01-01 PROCEDURE — 0055T BONE SRGRY CMPTR CT/MRI IMAG: CPT | Mod: ,,, | Performed by: ORTHOPAEDIC SURGERY

## 2025-01-01 PROCEDURE — 36000712 HC OR TIME LEV V 1ST 15 MIN: Performed by: ORTHOPAEDIC SURGERY

## 2025-01-01 PROCEDURE — 27200750 HC INSULATED NEEDLE/ STIMUPLEX: Performed by: ANESTHESIOLOGY

## 2025-01-01 PROCEDURE — 63600175 PHARM REV CODE 636 W HCPCS

## 2025-01-01 PROCEDURE — 97110 THERAPEUTIC EXERCISES: CPT

## 2025-01-01 PROCEDURE — 71000033 HC RECOVERY, INTIAL HOUR: Performed by: ORTHOPAEDIC SURGERY

## 2025-01-01 PROCEDURE — 25000003 PHARM REV CODE 250: Performed by: ORTHOPAEDIC SURGERY

## 2025-01-01 DEVICE — IMPLANTABLE DEVICE: Type: IMPLANTABLE DEVICE | Site: KNEE | Status: FUNCTIONAL

## 2025-01-01 DEVICE — CEMENT BONE SIMPLEX HV RADPQ: Type: IMPLANTABLE DEVICE | Site: KNEE | Status: FUNCTIONAL

## 2025-01-01 DEVICE — PATELLA TRI 33X9 X3 POLYETHYLE: Type: IMPLANTABLE DEVICE | Site: KNEE | Status: FUNCTIONAL

## 2025-01-01 DEVICE — INSERT TIBIAL SZ 7 9MMX3: Type: IMPLANTABLE DEVICE | Site: KNEE | Status: FUNCTIONAL

## 2025-01-01 DEVICE — BASEPLATE TIBIAL TOTAL STAB SZ: Type: IMPLANTABLE DEVICE | Site: KNEE | Status: FUNCTIONAL

## 2025-01-01 DEVICE — FEMORAL CRUC RTN CEM SZ 7 RT: Type: IMPLANTABLE DEVICE | Site: KNEE | Status: FUNCTIONAL

## 2025-01-01 RX ORDER — CELECOXIB 100 MG/1
400 CAPSULE ORAL
Status: COMPLETED | OUTPATIENT
Start: 2025-01-01 | End: 2025-01-01

## 2025-01-01 RX ORDER — ONDANSETRON HYDROCHLORIDE 2 MG/ML
4 INJECTION, SOLUTION INTRAVENOUS ONCE AS NEEDED
Status: DISCONTINUED | OUTPATIENT
Start: 2025-01-01 | End: 2025-01-01 | Stop reason: HOSPADM

## 2025-01-01 RX ORDER — CELECOXIB 100 MG/1
200 CAPSULE ORAL 2 TIMES DAILY
Status: CANCELLED | OUTPATIENT
Start: 2025-01-01

## 2025-01-01 RX ORDER — DIPHENHYDRAMINE HYDROCHLORIDE 50 MG/ML
12.5 INJECTION, SOLUTION INTRAMUSCULAR; INTRAVENOUS EVERY 6 HOURS PRN
Status: DISCONTINUED | OUTPATIENT
Start: 2025-01-01 | End: 2025-01-01 | Stop reason: HOSPADM

## 2025-01-01 RX ORDER — SODIUM CHLORIDE 0.9 % (FLUSH) 0.9 %
5 SYRINGE (ML) INJECTION
Status: DISCONTINUED | OUTPATIENT
Start: 2025-01-01 | End: 2025-01-01 | Stop reason: HOSPADM

## 2025-01-01 RX ORDER — GLYCOPYRROLATE 0.2 MG/ML
INJECTION INTRAMUSCULAR; INTRAVENOUS
Status: DISCONTINUED | OUTPATIENT
Start: 2025-01-01 | End: 2025-01-01

## 2025-01-01 RX ORDER — HYDROMORPHONE HYDROCHLORIDE 2 MG/ML
0.2 INJECTION, SOLUTION INTRAMUSCULAR; INTRAVENOUS; SUBCUTANEOUS EVERY 5 MIN PRN
Status: COMPLETED | OUTPATIENT
Start: 2025-01-01 | End: 2025-01-01

## 2025-01-01 RX ORDER — CEFAZOLIN 2 G/1
2 INJECTION, POWDER, FOR SOLUTION INTRAMUSCULAR; INTRAVENOUS
Status: CANCELLED | OUTPATIENT
Start: 2025-01-01 | End: 2025-01-01

## 2025-01-01 RX ORDER — CEFAZOLIN 2 G/1
2 INJECTION, POWDER, FOR SOLUTION INTRAMUSCULAR; INTRAVENOUS
Status: COMPLETED | OUTPATIENT
Start: 2025-01-01 | End: 2025-01-01

## 2025-01-01 RX ORDER — KETAMINE HYDROCHLORIDE 100 MG/ML
INJECTION, SOLUTION INTRAMUSCULAR; INTRAVENOUS
Status: DISCONTINUED | OUTPATIENT
Start: 2025-01-01 | End: 2025-01-01

## 2025-01-01 RX ORDER — DEXAMETHASONE SODIUM PHOSPHATE 4 MG/ML
INJECTION, SOLUTION INTRA-ARTICULAR; INTRALESIONAL; INTRAMUSCULAR; INTRAVENOUS; SOFT TISSUE
Status: DISCONTINUED | OUTPATIENT
Start: 2025-01-01 | End: 2025-01-01

## 2025-01-01 RX ORDER — OXYCODONE HYDROCHLORIDE 10 MG/1
10 TABLET ORAL EVERY 4 HOURS PRN
Refills: 0 | Status: CANCELLED | OUTPATIENT
Start: 2025-01-01

## 2025-01-01 RX ORDER — ONDANSETRON 4 MG/1
8 TABLET, ORALLY DISINTEGRATING ORAL EVERY 8 HOURS PRN
Status: CANCELLED | OUTPATIENT
Start: 2025-01-01

## 2025-01-01 RX ORDER — PROPOFOL 10 MG/ML
VIAL (ML) INTRAVENOUS
Status: DISCONTINUED | OUTPATIENT
Start: 2025-01-01 | End: 2025-01-01

## 2025-01-01 RX ORDER — TRANEXAMIC ACID 1 G/10ML
INJECTION, SOLUTION INTRAVENOUS
Status: DISCONTINUED | OUTPATIENT
Start: 2025-01-01 | End: 2025-01-01

## 2025-01-01 RX ORDER — POLYETHYLENE GLYCOL 3350 17 G/17G
17 POWDER, FOR SOLUTION ORAL DAILY
Status: CANCELLED | OUTPATIENT
Start: 2025-01-01

## 2025-01-01 RX ORDER — ONDANSETRON HYDROCHLORIDE 2 MG/ML
INJECTION, SOLUTION INTRAVENOUS
Status: DISCONTINUED | OUTPATIENT
Start: 2025-01-01 | End: 2025-01-01

## 2025-01-01 RX ORDER — MIDAZOLAM HYDROCHLORIDE 2 MG/2ML
INJECTION, SOLUTION INTRAMUSCULAR; INTRAVENOUS
Status: DISCONTINUED | OUTPATIENT
Start: 2025-01-01 | End: 2025-01-01

## 2025-01-01 RX ORDER — OXYCODONE HCL 10 MG/1
10 TABLET, FILM COATED, EXTENDED RELEASE ORAL
Status: COMPLETED | OUTPATIENT
Start: 2025-01-01 | End: 2025-01-01

## 2025-01-01 RX ORDER — METOCLOPRAMIDE HYDROCHLORIDE 5 MG/ML
10 INJECTION INTRAMUSCULAR; INTRAVENOUS EVERY 10 MIN PRN
Status: DISCONTINUED | OUTPATIENT
Start: 2025-01-01 | End: 2025-01-01 | Stop reason: HOSPADM

## 2025-01-01 RX ORDER — ZOLPIDEM TARTRATE 5 MG/1
5 TABLET ORAL NIGHTLY PRN
Status: CANCELLED | OUTPATIENT
Start: 2025-01-01

## 2025-01-01 RX ORDER — SODIUM CHLORIDE, SODIUM LACTATE, POTASSIUM CHLORIDE, CALCIUM CHLORIDE 600; 310; 30; 20 MG/100ML; MG/100ML; MG/100ML; MG/100ML
INJECTION, SOLUTION INTRAVENOUS CONTINUOUS
Status: DISCONTINUED | OUTPATIENT
Start: 2025-01-01 | End: 2025-01-01 | Stop reason: HOSPADM

## 2025-01-01 RX ORDER — OXYCODONE HYDROCHLORIDE 5 MG/1
5 TABLET ORAL ONCE AS NEEDED
Status: COMPLETED | OUTPATIENT
Start: 2025-01-01 | End: 2025-01-01

## 2025-01-01 RX ORDER — BISACODYL 10 MG/1
10 SUPPOSITORY RECTAL DAILY
Status: CANCELLED | OUTPATIENT
Start: 2025-01-01

## 2025-01-01 RX ORDER — FENTANYL CITRATE 50 UG/ML
25 INJECTION, SOLUTION INTRAMUSCULAR; INTRAVENOUS EVERY 5 MIN PRN
Status: DISCONTINUED | OUTPATIENT
Start: 2025-01-01 | End: 2025-01-01 | Stop reason: HOSPADM

## 2025-01-01 RX ORDER — FAMOTIDINE 20 MG/1
20 TABLET, FILM COATED ORAL 2 TIMES DAILY
Status: CANCELLED | OUTPATIENT
Start: 2025-01-01

## 2025-01-01 RX ORDER — MORPHINE SULFATE 2 MG/ML
2 INJECTION, SOLUTION INTRAMUSCULAR; INTRAVENOUS EVERY 4 HOURS PRN
Refills: 0 | Status: CANCELLED | OUTPATIENT
Start: 2025-01-01

## 2025-01-01 RX ORDER — MIDAZOLAM HYDROCHLORIDE 1 MG/ML
2 INJECTION, SOLUTION INTRAMUSCULAR; INTRAVENOUS
Status: DISCONTINUED | OUTPATIENT
Start: 2025-01-01 | End: 2025-01-01 | Stop reason: HOSPADM

## 2025-01-01 RX ORDER — LIDOCAINE HYDROCHLORIDE 20 MG/ML
INJECTION INTRAVENOUS
Status: DISCONTINUED | OUTPATIENT
Start: 2025-01-01 | End: 2025-01-01

## 2025-01-01 RX ORDER — KETOROLAC TROMETHAMINE 30 MG/ML
30 INJECTION, SOLUTION INTRAMUSCULAR; INTRAVENOUS ONCE
Status: COMPLETED | OUTPATIENT
Start: 2025-01-01 | End: 2025-01-01

## 2025-01-01 RX ORDER — GLUCAGON 1 MG
1 KIT INJECTION
Status: DISCONTINUED | OUTPATIENT
Start: 2025-01-01 | End: 2025-01-01 | Stop reason: HOSPADM

## 2025-01-01 RX ORDER — LIDOCAINE HYDROCHLORIDE 10 MG/ML
1 INJECTION, SOLUTION EPIDURAL; INFILTRATION; INTRACAUDAL; PERINEURAL ONCE
Status: DISCONTINUED | OUTPATIENT
Start: 2025-01-01 | End: 2025-01-01 | Stop reason: HOSPADM

## 2025-01-01 RX ORDER — ACETAMINOPHEN 500 MG
1000 TABLET ORAL
Status: COMPLETED | OUTPATIENT
Start: 2025-01-01 | End: 2025-01-01

## 2025-01-01 RX ORDER — OXYCODONE HYDROCHLORIDE 5 MG/1
5 TABLET ORAL
Status: DISCONTINUED | OUTPATIENT
Start: 2025-01-01 | End: 2025-01-01 | Stop reason: HOSPADM

## 2025-01-01 RX ORDER — FENTANYL CITRATE 50 UG/ML
25 INJECTION, SOLUTION INTRAMUSCULAR; INTRAVENOUS EVERY 5 MIN PRN
Status: COMPLETED | OUTPATIENT
Start: 2025-01-01 | End: 2025-01-01

## 2025-01-01 RX ORDER — FENTANYL CITRATE 50 UG/ML
100 INJECTION, SOLUTION INTRAMUSCULAR; INTRAVENOUS SEE ADMIN INSTRUCTIONS
Status: DISCONTINUED | OUTPATIENT
Start: 2025-01-01 | End: 2025-01-01 | Stop reason: HOSPADM

## 2025-01-01 RX ADMIN — FENTANYL CITRATE 50 MCG: 50 INJECTION, SOLUTION INTRAMUSCULAR; INTRAVENOUS at 11:08

## 2025-01-01 RX ADMIN — HYDROMORPHONE HYDROCHLORIDE 0.2 MG: 2 INJECTION INTRAMUSCULAR; INTRAVENOUS; SUBCUTANEOUS at 03:08

## 2025-01-01 RX ADMIN — FENTANYL CITRATE 25 MCG: 50 INJECTION INTRAMUSCULAR; INTRAVENOUS at 02:08

## 2025-01-01 RX ADMIN — LIDOCAINE HYDROCHLORIDE 50 MG: 20 INJECTION, SOLUTION INTRAVENOUS at 12:08

## 2025-01-01 RX ADMIN — PROPOFOL 150 MG: 10 INJECTION, EMULSION INTRAVENOUS at 12:08

## 2025-01-01 RX ADMIN — MIDAZOLAM HYDROCHLORIDE 1 MG: 1 INJECTION, SOLUTION INTRAMUSCULAR; INTRAVENOUS at 11:08

## 2025-01-01 RX ADMIN — CELECOXIB 400 MG: 100 CAPSULE ORAL at 10:08

## 2025-01-01 RX ADMIN — TRANEXAMIC ACID 1000 MG: 100 INJECTION, SOLUTION INTRAVENOUS at 12:08

## 2025-01-01 RX ADMIN — SODIUM CHLORIDE, SODIUM GLUCONATE, SODIUM ACETATE, POTASSIUM CHLORIDE AND MAGNESIUM CHLORIDE: 526; 502; 368; 37; 30 INJECTION, SOLUTION INTRAVENOUS at 10:08

## 2025-01-01 RX ADMIN — PROPOFOL 125 MCG/KG/MIN: 10 INJECTION, EMULSION INTRAVENOUS at 12:08

## 2025-01-01 RX ADMIN — FENTANYL CITRATE 25 MCG: 50 INJECTION, SOLUTION INTRAMUSCULAR; INTRAVENOUS at 12:08

## 2025-01-01 RX ADMIN — HYDROMORPHONE HYDROCHLORIDE 0.2 MG: 2 INJECTION INTRAMUSCULAR; INTRAVENOUS; SUBCUTANEOUS at 02:08

## 2025-01-01 RX ADMIN — DEXAMETHASONE SODIUM PHOSPHATE 8 MG: 4 INJECTION, SOLUTION INTRA-ARTICULAR; INTRALESIONAL; INTRAMUSCULAR; INTRAVENOUS; SOFT TISSUE at 12:08

## 2025-01-01 RX ADMIN — FENTANYL CITRATE 25 MCG: 50 INJECTION, SOLUTION INTRAMUSCULAR; INTRAVENOUS at 01:08

## 2025-01-01 RX ADMIN — KETAMINE HYDROCHLORIDE 30 MG: 100 INJECTION, SOLUTION, CONCENTRATE INTRAMUSCULAR; INTRAVENOUS at 12:08

## 2025-01-01 RX ADMIN — KETOROLAC TROMETHAMINE 30 MG: 30 INJECTION, SOLUTION INTRAMUSCULAR at 03:08

## 2025-01-01 RX ADMIN — GLYCOPYRROLATE 0.2 MG: 0.2 INJECTION INTRAMUSCULAR; INTRAVENOUS at 12:08

## 2025-01-01 RX ADMIN — ONDANSETRON 4 MG: 2 INJECTION INTRAMUSCULAR; INTRAVENOUS at 12:08

## 2025-01-01 RX ADMIN — KETAMINE HYDROCHLORIDE 20 MG: 100 INJECTION, SOLUTION, CONCENTRATE INTRAMUSCULAR; INTRAVENOUS at 12:08

## 2025-01-01 RX ADMIN — ROPIVACAINE HYDROCHLORIDE: 5 INJECTION EPIDURAL; INFILTRATION; PERINEURAL at 12:08

## 2025-01-01 RX ADMIN — SODIUM CHLORIDE, SODIUM GLUCONATE, SODIUM ACETATE, POTASSIUM CHLORIDE AND MAGNESIUM CHLORIDE: 526; 502; 368; 37; 30 INJECTION, SOLUTION INTRAVENOUS at 12:08

## 2025-01-01 RX ADMIN — OXYCODONE HYDROCHLORIDE 10 MG: 10 TABLET, FILM COATED, EXTENDED RELEASE ORAL at 10:08

## 2025-01-01 RX ADMIN — OXYCODONE HYDROCHLORIDE 5 MG: 5 TABLET ORAL at 02:08

## 2025-01-01 RX ADMIN — MIDAZOLAM HYDROCHLORIDE 1 MG: 1 INJECTION, SOLUTION INTRAMUSCULAR; INTRAVENOUS at 12:08

## 2025-01-01 RX ADMIN — ACETAMINOPHEN 1000 MG: 500 TABLET ORAL at 10:08

## 2025-01-01 RX ADMIN — FENTANYL CITRATE 50 MCG: 50 INJECTION, SOLUTION INTRAMUSCULAR; INTRAVENOUS at 12:08

## 2025-01-01 RX ADMIN — CEFAZOLIN 2 G: 2 INJECTION, POWDER, FOR SOLUTION INTRAMUSCULAR; INTRAVENOUS at 12:08

## 2025-01-01 RX ADMIN — FENTANYL CITRATE 50 MCG: 50 INJECTION, SOLUTION INTRAMUSCULAR; INTRAVENOUS at 01:08

## 2025-01-02 ENCOUNTER — ANTI-COAG VISIT (OUTPATIENT)
Dept: CARDIOLOGY | Facility: CLINIC | Age: 80
End: 2025-01-02
Payer: MEDICARE

## 2025-01-02 DIAGNOSIS — Z79.01 LONG TERM CURRENT USE OF ANTICOAGULANT THERAPY: Primary | ICD-10-CM

## 2025-01-02 LAB — INR PPP: 3.7

## 2025-01-02 PROCEDURE — 93793 ANTICOAG MGMT PT WARFARIN: CPT | Mod: S$GLB,,, | Performed by: PHARMACIST

## 2025-01-02 NOTE — PROGRESS NOTES
INR not at goal. Medications, chart, and patient findings reviewed. See calendar for adjustments to dose and follow up plan.       (4) rarely moist

## 2025-01-08 ENCOUNTER — OFFICE VISIT (OUTPATIENT)
Dept: CARDIOLOGY | Facility: CLINIC | Age: 80
End: 2025-01-08
Payer: MEDICARE

## 2025-01-08 VITALS
WEIGHT: 315 LBS | SYSTOLIC BLOOD PRESSURE: 141 MMHG | DIASTOLIC BLOOD PRESSURE: 71 MMHG | HEIGHT: 75 IN | HEART RATE: 66 BPM | BODY MASS INDEX: 39.17 KG/M2

## 2025-01-08 DIAGNOSIS — Z79.01 LONG TERM CURRENT USE OF ANTICOAGULANT THERAPY: ICD-10-CM

## 2025-01-08 DIAGNOSIS — I10 ESSENTIAL HYPERTENSION: ICD-10-CM

## 2025-01-08 DIAGNOSIS — E66.01 SEVERE OBESITY (BMI 35.0-39.9) WITH COMORBIDITY: ICD-10-CM

## 2025-01-08 DIAGNOSIS — E78.2 MIXED HYPERLIPIDEMIA: ICD-10-CM

## 2025-01-08 DIAGNOSIS — Z01.810 PREOP CARDIOVASCULAR EXAM: Primary | ICD-10-CM

## 2025-01-08 PROBLEM — I82.591 CHRONIC EMBOLISM AND THROMBOSIS OF OTHER SPECIFIED DEEP VEIN OF RIGHT LOWER EXTREMITY: Status: RESOLVED | Noted: 2024-06-02 | Resolved: 2025-01-08

## 2025-01-08 PROCEDURE — 3288F FALL RISK ASSESSMENT DOCD: CPT | Mod: CPTII,S$GLB,, | Performed by: INTERNAL MEDICINE

## 2025-01-08 PROCEDURE — 1157F ADVNC CARE PLAN IN RCRD: CPT | Mod: CPTII,S$GLB,, | Performed by: INTERNAL MEDICINE

## 2025-01-08 PROCEDURE — 3077F SYST BP >= 140 MM HG: CPT | Mod: CPTII,S$GLB,, | Performed by: INTERNAL MEDICINE

## 2025-01-08 PROCEDURE — 1101F PT FALLS ASSESS-DOCD LE1/YR: CPT | Mod: CPTII,S$GLB,, | Performed by: INTERNAL MEDICINE

## 2025-01-08 PROCEDURE — 99999 PR PBB SHADOW E&M-EST. PATIENT-LVL III: CPT | Mod: PBBFAC,,, | Performed by: INTERNAL MEDICINE

## 2025-01-08 PROCEDURE — 1159F MED LIST DOCD IN RCRD: CPT | Mod: CPTII,S$GLB,, | Performed by: INTERNAL MEDICINE

## 2025-01-08 PROCEDURE — 1126F AMNT PAIN NOTED NONE PRSNT: CPT | Mod: CPTII,S$GLB,, | Performed by: INTERNAL MEDICINE

## 2025-01-08 PROCEDURE — 99215 OFFICE O/P EST HI 40 MIN: CPT | Mod: S$GLB,,, | Performed by: INTERNAL MEDICINE

## 2025-01-08 PROCEDURE — 3078F DIAST BP <80 MM HG: CPT | Mod: CPTII,S$GLB,, | Performed by: INTERNAL MEDICINE

## 2025-01-08 RX ORDER — EZETIMIBE 10 MG/1
10 TABLET ORAL DAILY
Qty: 90 TABLET | Refills: 1 | Status: SHIPPED | OUTPATIENT
Start: 2025-01-08

## 2025-01-08 RX ORDER — ROSUVASTATIN CALCIUM 40 MG/1
40 TABLET, COATED ORAL DAILY
Qty: 90 TABLET | Refills: 1 | Status: SHIPPED | OUTPATIENT
Start: 2025-01-08

## 2025-01-08 RX ORDER — WARFARIN 10 MG/1
TABLET ORAL
Qty: 90 TABLET | Refills: 3 | Status: CANCELLED | OUTPATIENT
Start: 2025-01-08

## 2025-01-08 RX ORDER — AMLODIPINE BESYLATE 2.5 MG/1
2.5 TABLET ORAL DAILY
Qty: 90 TABLET | Refills: 1 | Status: SHIPPED | OUTPATIENT
Start: 2025-01-08

## 2025-01-08 RX ORDER — WARFARIN SODIUM 5 MG/1
TABLET ORAL
Qty: 180 TABLET | Refills: 3 | Status: CANCELLED | OUTPATIENT
Start: 2025-01-08

## 2025-01-08 RX ORDER — IRBESARTAN 300 MG/1
300 TABLET ORAL NIGHTLY
Qty: 90 TABLET | Refills: 1 | Status: SHIPPED | OUTPATIENT
Start: 2025-01-08

## 2025-01-08 NOTE — PROGRESS NOTES
"Subjective:    Patient ID:  Del Anand is a 79 y.o. male patient here for evaluation Hypertension, Hyperlipidemia, Deep Vein Thrombosis, and Coronary Artery Disease      History of Present Illness:     79-year-old male here for follow up.  New patient to me.  He was following up with Dr. Levin in the past.  He wants to establish care with cardiology in [place] from now on.  Denies any exertional anginal symptoms however his physical capacity is significantly limited due to unstable left knee with the pain for which he would like to get a knee replacement in the next 6 months.  He is scheduling an appointment with the orthopedic dr. Kiara bautista.  On Coumadin chronically and follows up with Coumadin Clinic.  Blood pressure controlled.  Has chronic left lower extremity edema and uses compression stockings        Review of patient's allergies indicates:  No Known Allergies    Past Medical History:   Diagnosis Date    Benign neoplasm of colon     Cataract     CHF (congestive heart failure) 01/13/2015    Coronary artery disease     Difficult intubation     DVT (deep venous thrombosis)     HLD (hyperlipidemia)     HTN (hypertension)     Impaired fasting glucose     Kidney stone     Metabolic syndrome     Nonspecific abnormal results of liver function study     Nonspecific findings on examination of blood     Nuclear sclerosis - Both Eyes 10/18/2013    Osteoarthrosis, unspecified whether generalized or localized, lower leg     Respiratory distress     PT STATES IT WAS "CRAP", "VERY UNCOMFORTABLE"    Squamous cell carcinoma of skin      Past Surgical History:   Procedure Laterality Date    CARDIAC SURGERY      CATARACT EXTRACTION W/  INTRAOCULAR LENS IMPLANT Right 9/17/2020    Procedure: EXTRACTION, CATARACT, WITH IOL INSERTION;  Surgeon: Chanel Klein MD;  Location: [facility];  Service: Ophthalmology;  Laterality: Right;    CATARACT EXTRACTION W/  INTRAOCULAR LENS IMPLANT Left 10/1/2020    Procedure: EXTRACTION, " CATARACT, WITH IOL INSERTION;  Surgeon: Chanel Klein MD;  Location: [facility] [place], operated by [facility] OR;  Service: Ophthalmology;  Laterality: Left;    COLONOSCOPY N/A 3/13/2019    Procedure: COLONOSCOPY;  Surgeon: Nikunj Larson MD;  Location: [facility] (2ND FLR);  Service: Endoscopy;  Laterality: N/A;  ok to hold Coumadin x 5 days with a Lovenox bridge per Coumadin clinic  2nd floor - history of difficult intubation-MS    COLONOSCOPY N/A 2022    Procedure: COLONOSCOPY;  Surgeon: Nikunj Larson MD;  Location: [facility] (2ND FLR);  Service: Endoscopy;  Laterality: N/A;  ef 45%--coumadin hold per coumadin clinic see coag visist -tb-vacc- clears 4 hrs prior-am prep 2-3-am-inst portal-tb    CORONARY ARTERY BYPASS GRAFT          CYSTOSCOPY      KIDNEY STONE SURGERY      KNEE ARTHROPLASTY      bilateral    renal stone      SKIN BIOPSY       Social History     Tobacco Use    Smoking status: Former     Current packs/day: 0.00     Average packs/day: 1 pack/day for 20.0 years (20.0 ttl pk-yrs)     Types: Cigarettes     Start date: 10/16/1967     Quit date: 10/16/1987     Years since quittin.2     Passive exposure: Past    Smokeless tobacco: Former   Substance Use Topics    Alcohol use: Yes     Alcohol/week: 0.0 standard drinks of alcohol     Types: 1 - 2 Cans of beer, 1 - 2 Standard drinks or equivalent per week     Comment: daily, none today    Drug use: No        Review of Systems   Negative except as mentioned in HPI         Objective        Vitals:    25 1424   BP: (!) 141/71   Pulse: 66       LIPIDS - LAST 2   Lab Results   Component Value Date    CHOL 108 (L) 2024    CHOL 109 (L) 2023    HDL 44 2024    HDL 41 2023    LDLCALC 43.2 (L) 2024    LDLCALC 47.8 (L) 2023    TRIG 104 2024    TRIG 101 2023    CHOLHDL 40.7 2024    CHOLHDL 37.6 2023       CBC - LAST 2  Lab Results   Component Value Date    WBC 5.75 2024    WBC 5.82 2023    RBC 3.96 (L)  06/11/2024    RBC 4.28 (L) 11/21/2023    HGB 12.5 (L) 06/11/2024    HGB 13.8 (L) 11/21/2023    HCT 38.5 (L) 06/11/2024    HCT 41.3 11/21/2023    MCV 97 06/11/2024    MCV 97 11/21/2023    MCH 31.6 (H) 06/11/2024    MCH 32.2 (H) 11/21/2023    MCHC 32.5 06/11/2024    MCHC 33.4 11/21/2023    RDW 12.9 06/11/2024    RDW 13.1 11/21/2023     06/11/2024     11/21/2023    MPV 10.6 06/11/2024    MPV 11.2 11/21/2023    GRAN 3.4 06/11/2024    GRAN 59.2 06/11/2024    LYMPH 1.6 06/11/2024    LYMPH 28.3 06/11/2024    MONO 0.5 06/11/2024    MONO 9.4 06/11/2024    BASO 0.03 06/11/2024    BASO 0.04 11/21/2023    NRBC 0 06/11/2024    NRBC 0 11/21/2023       CHEMISTRY & LIVER FUNCTION - LAST 2  Lab Results   Component Value Date     06/11/2024     11/21/2023    K 4.6 06/11/2024    K 4.9 11/21/2023     06/11/2024     11/21/2023    CO2 23 06/11/2024    CO2 26 11/21/2023    ANIONGAP 6 (L) 06/11/2024    ANIONGAP 8 11/21/2023    BUN 20 06/11/2024    BUN 17 11/21/2023    CREATININE 0.7 06/11/2024    CREATININE 0.8 11/21/2023     (H) 06/11/2024     (H) 11/21/2023    CALCIUM 8.6 (L) 06/11/2024    CALCIUM 9.4 11/21/2023    PH 7.360 12/03/2014    PH 7.369 12/02/2014    MG 2.0 04/11/2015    MG 1.8 04/10/2015    ALBUMIN 3.5 06/11/2024    ALBUMIN 3.9 11/21/2023    PROT 6.5 06/11/2024    PROT 6.9 11/21/2023    ALKPHOS 97 06/11/2024    ALKPHOS 92 11/21/2023    ALT 25 06/11/2024    ALT 21 11/21/2023    AST 23 06/11/2024    AST 22 11/21/2023    BILITOT 0.8 06/11/2024    BILITOT 0.9 11/21/2023        CARDIAC PROFILE - LAST 2  Lab Results   Component Value Date     (H) 01/13/2015     (H) 01/12/2015    CPK 51 04/10/2015    CPK 41 01/13/2015    CPKMB 0.6 01/13/2015     11/02/2009    TROPONINI 0.014 01/14/2015    TROPONINI 0.019 01/14/2015        COAGULATION - LAST 2  Lab Results   Component Value Date    INR 3.7 01/02/2025    INR 2.6 12/18/2024    APTT 24.9 04/10/2015    APTT 25.4  04/10/2015       ENDOCRINE & PSA - LAST 2  Lab Results   Component Value Date    HGBA1C 5.9 (H) 06/11/2024    HGBA1C 5.7 (H) 11/21/2023    TSH 1.455 06/11/2024    TSH 1.637 11/21/2023    PROCAL 0.02 08/21/2019    PSA 0.14 11/21/2023    PSA 0.16 12/04/2013        ECHOCARDIOGRAM RESULTS  No results found for this or any previous visit.      CURRENT/PREVIOUS VISIT EKG  Results for orders placed or performed in visit on 05/16/23   EKG 12-lead    Collection Time: 05/16/23  3:42 PM    Narrative    Test Reason : I25.10,    Vent. Rate : 070 BPM     Atrial Rate : 070 BPM     P-R Int : 202 ms          QRS Dur : 096 ms      QT Int : 398 ms       P-R-T Axes : 067 -53 024 degrees     QTc Int : 429 ms    Normal sinus rhythm  Left axis deviation  Cannot rule out Anterior infarct ,age undetermined  Abnormal ECG  When compared with ECG of 19-MAY-2022 11:13,  The axis Shifted left  Confirmed by Corrina Ellsworth MD ([id number]) on 5/17/2023 2:25:26 PM    Referred By: PHILLIP HILL           Confirmed By:Corrina Ellsworth MD     No valid procedures specified.   No results found for this or any previous visit.    No valid procedures specified.          PREVIOUS STRESS TEST              PREVIOUS ANGIOGRAM        PHYSICAL EXAM    CONSTITUTIONAL: Well built, well nourished in no apparent distress  HEENT: No pallor  NECK: no JVD  LUNGS: CTA b/l  HEART: regular rate and rhythm, S1, S2 normal, no murmur   ABDOMEN:  Nondistended  EXTREMITIES:  Left leg compression stocking  NEURO: AAO X 3   Psych:  Normal affect    I HAVE REVIEWED :    The vital signs, nurses notes, and all the pertinent radiology and labs.        Current Outpatient Medications   Medication Instructions    amLODIPine (NORVASC) 2.5 mg, Oral, Daily    aspirin 81 mg, Daily    cyanocobalamin 500 mcg, Every morning    ezetimibe (ZETIA) 10 mg, Oral, Daily    FOLIC ACID/MULTIVITS-MIN/LUT (CENTRUM SILVER ORAL) 1 tablet, Daily    irbesartan (AVAPRO) 300 mg, Oral, Nightly    ketoconazole  (NIZORAL) 2 % cream Topical (Top), 2 times daily    rosuvastatin (CRESTOR) 40 mg, Oral, Daily    triamcinolone acetonide 0.025% (KENALOG) 0.025 % cream Topical (Top), 2 times daily    warfarin (COUMADIN) 10 MG tablet Take 10mg daily or as directed by Coumadin Clinic.   Also uses warfarin 5mg tablet strength    warfarin (COUMADIN) 5 MG tablet Take 10mg daily except 12.5mg Wednesdays        ECG reviewed by me:  Normal sinus rhythm, left anterior fascicular block, first-degree AV block  Assessment & Plan     History of CAD status post CABG-denies angina  History of PE/DVT on Coumadin long-term  Hypertension-controlled at home  Dyslipidemia  Preop for possible left knee surgery in the next 6 months  Obesity    Continue Coumadin aspirin, rosuvastatin 40 mg, irbesartan 300 mg, Zetia, amlodipine.   Meds refilled   Follow up with the Coumadin Clinic   Home BP monitoring   Weight loss  Given the baseline poor functional status, will need preop echo and pharmacological stress test left knee surgery is planned.  Advised patient to notify the office when surgery scheduled  Follow up in about 6 months (around 7/8/2025).       Total time of 40 minutes was spent on this encounter including chart review, history and physical examination, assessment ,plan

## 2025-01-09 LAB
OHS QRS DURATION: 114 MS
OHS QTC CALCULATION: 456 MS

## 2025-01-10 ENCOUNTER — TELEPHONE (OUTPATIENT)
Dept: CARDIOLOGY | Facility: CLINIC | Age: 80
End: 2025-01-10
Payer: MEDICARE

## 2025-01-10 ENCOUNTER — TELEPHONE (OUTPATIENT)
Dept: FAMILY MEDICINE | Facility: CLINIC | Age: 80
End: 2025-01-10
Payer: MEDICARE

## 2025-01-10 DIAGNOSIS — M25.562 CHRONIC PAIN OF LEFT KNEE: Primary | ICD-10-CM

## 2025-01-10 DIAGNOSIS — G89.29 CHRONIC PAIN OF LEFT KNEE: Primary | ICD-10-CM

## 2025-01-10 NOTE — TELEPHONE ENCOUNTER
----- Message from Unique sent at 1/10/2025  4:04 PM CST -----  Contact: PAtient  Type:  Needs Medical Advice    Who Called: Patient      Would the patient rather a call back or a response via [facility]? Call    Best Call Back Number: [phone]    Additional Information: Patient is requesting a referral be sent to Dr Carl Craig for the patient's left knee. Please call to advise

## 2025-01-10 NOTE — TELEPHONE ENCOUNTER
Pt requesting orthopedic referral, Dr. Craig.   Pt c/o Lt knee pain.   Several injuries in past, instability.   Referral pended to MD

## 2025-01-10 NOTE — TELEPHONE ENCOUNTER
----- Message from Jaquelin sent at 1/10/2025 10:40 AM CST -----  Type:  Needs Medical Advice    Who Called: pt    Symptoms (please be specific): right knee pain     How long has patient had these symptoms:  years    Would the patient rather a call back or a response via [facility]? call    Best Call Back Number: [phone]    Additional Information: patient needs referral to get into see Dr. Craig  sooner than available Please call back to advise. Thanks!

## 2025-01-12 ENCOUNTER — TELEPHONE (OUTPATIENT)
Dept: FAMILY MEDICINE | Facility: CLINIC | Age: 80
End: 2025-01-12
Payer: MEDICARE

## 2025-01-13 NOTE — TELEPHONE ENCOUNTER
Patient sent an appointment request message that he needed a referral to Dr. Craig. I told he would need to be seen, and he replied back that he talked to you on Friday. Do you need to see him or do you want to just enter the referral?

## 2025-01-16 ENCOUNTER — ANTI-COAG VISIT (OUTPATIENT)
Dept: CARDIOLOGY | Facility: CLINIC | Age: 80
End: 2025-01-16
Payer: MEDICARE

## 2025-01-16 DIAGNOSIS — Z79.01 LONG TERM CURRENT USE OF ANTICOAGULANT THERAPY: Primary | ICD-10-CM

## 2025-01-16 LAB — INR PPP: 2.7

## 2025-01-16 PROCEDURE — 93793 ANTICOAG MGMT PT WARFARIN: CPT | Mod: S$GLB,,, | Performed by: PHARMACIST

## 2025-01-16 NOTE — PROGRESS NOTES
[facility]    2025 10:35 AM    Assessment/Plan:    Patient presents today with therapeutic INR.    Assessment of patient findings and chart review: INR at goal. Medications and chart reviewed. No changes noted to necessitate adjustment of warfarin or follow-up plan. See calendar.      Recommendation for patient's warfarin regimen: Continue current maintenance dose    Recommend repeat INR in 2 weeks  _________________________________________________________________    Del Anand (79 y.o.) is followed by the [facility].    Anticoagulation Summary  As of 2025      INR goal:  2.5-3.5   TTR:  73.0% (9.7 y)   INR used for dosin.7 (2025)   Warfarin maintenance plan:  12.5 mg (10 mg x 1 and 5 mg x 0.5) every Wed; 10 mg (10 mg x 1) all other days   Weekly warfarin total:  72.5 mg   Plan last modified:  Jennifer Hunt, PharmD (2025)   Next INR check:  2025   Target end date:  --    Indications    Long term current use of anticoagulant therapy [Z79.01]  Pulmonary embolism (Resolved) [I26.99]                 Anticoagulation Episode Summary       INR check location:  Home Draw    Preferred lab:  --    Send INR reminders [facility]:  [facility]    Comments:  [place] every other Wednesday          Anticoagulation Care Providers       Provider Role Specialty Phone number    Blanquita Levin MD [facility] [phone]

## 2025-01-17 ENCOUNTER — TELEPHONE (OUTPATIENT)
Dept: CARDIOLOGY | Facility: CLINIC | Age: 80
End: 2025-01-17
Payer: MEDICARE

## 2025-01-17 ENCOUNTER — OFFICE VISIT (OUTPATIENT)
Dept: ORTHOPEDICS | Facility: CLINIC | Age: 80
End: 2025-01-17
Payer: MEDICARE

## 2025-01-17 ENCOUNTER — PATIENT MESSAGE (OUTPATIENT)
Dept: ORTHOPEDICS | Facility: CLINIC | Age: 80
End: 2025-01-17
Payer: MEDICARE

## 2025-01-17 VITALS — HEIGHT: 75 IN | BODY MASS INDEX: 39.17 KG/M2 | WEIGHT: 315 LBS

## 2025-01-17 DIAGNOSIS — M17.12 PRIMARY OSTEOARTHRITIS OF LEFT KNEE: Primary | ICD-10-CM

## 2025-01-17 DIAGNOSIS — Z79.01 CURRENT USE OF ANTICOAGULANT THERAPY: ICD-10-CM

## 2025-01-17 DIAGNOSIS — M17.11 PRIMARY OSTEOARTHRITIS OF RIGHT KNEE: ICD-10-CM

## 2025-01-17 DIAGNOSIS — Z01.818 PRE-OP TESTING: ICD-10-CM

## 2025-01-17 PROCEDURE — 1159F MED LIST DOCD IN RCRD: CPT | Mod: CPTII,S$GLB,, | Performed by: PHYSICIAN ASSISTANT

## 2025-01-17 PROCEDURE — 99213 OFFICE O/P EST LOW 20 MIN: CPT | Mod: S$GLB,,, | Performed by: PHYSICIAN ASSISTANT

## 2025-01-17 PROCEDURE — 1125F AMNT PAIN NOTED PAIN PRSNT: CPT | Mod: CPTII,S$GLB,, | Performed by: PHYSICIAN ASSISTANT

## 2025-01-17 PROCEDURE — 1160F RVW MEDS BY RX/DR IN RCRD: CPT | Mod: CPTII,S$GLB,, | Performed by: PHYSICIAN ASSISTANT

## 2025-01-17 PROCEDURE — 1157F ADVNC CARE PLAN IN RCRD: CPT | Mod: CPTII,S$GLB,, | Performed by: PHYSICIAN ASSISTANT

## 2025-01-17 PROCEDURE — 99999 PR PBB SHADOW E&M-EST. PATIENT-LVL III: CPT | Mod: PBBFAC,,, | Performed by: PHYSICIAN ASSISTANT

## 2025-01-17 RX ORDER — CEFAZOLIN SODIUM 2 G/50ML
2 SOLUTION INTRAVENOUS
OUTPATIENT
Start: 2025-01-17

## 2025-01-17 NOTE — PROGRESS NOTES
"  [facility]  [address]  [place] [address]  Phone: [phone]   [phone]    Patient's PCP: Tika Valle MD  Referring Provider: Aaareferral Self    Subjective:      Chief Complaint:   Chief Complaint   Patient presents with    Left Knee - Pain     Patient is here for left knee pain for many years and over the last couple of months has progressively gotten worse. Has had multiple injections as well as has martha drained. Has feeling of wanting to give way as well as burning and shooting pain        Past Medical History:   Diagnosis Date    Benign neoplasm of colon     Cataract     CHF (congestive heart failure) 01/13/2015    Coronary artery disease     Difficult intubation     DVT (deep venous thrombosis)     HLD (hyperlipidemia)     HTN (hypertension)     Impaired fasting glucose     Kidney stone     Metabolic syndrome     Nonspecific abnormal results of liver function study     Nonspecific findings on examination of blood     Nuclear sclerosis - Both Eyes 10/18/2013    Osteoarthrosis, unspecified whether generalized or localized, lower leg     Respiratory distress     PT STATES IT WAS "CRAP", "VERY UNCOMFORTABLE"    Squamous cell carcinoma of skin        Past Surgical History:   Procedure Laterality Date    CARDIAC SURGERY      CATARACT EXTRACTION W/  INTRAOCULAR LENS IMPLANT Right 9/17/2020    Procedure: EXTRACTION, CATARACT, WITH IOL INSERTION;  Surgeon: Chanel Klein MD;  Location: [facility];  Service: Ophthalmology;  Laterality: Right;    CATARACT EXTRACTION W/  INTRAOCULAR LENS IMPLANT Left 10/1/2020    Procedure: EXTRACTION, CATARACT, WITH IOL INSERTION;  Surgeon: Chanel Klein MD;  Location: [facility] OR;  Service: Ophthalmology;  Laterality: Left;    COLONOSCOPY N/A 3/13/2019    Procedure: COLONOSCOPY;  Surgeon: Nikunj Larson MD;  Location: [facility] ENDO [address]);  Service: Endoscopy;  Laterality: N/A;  ok to hold Coumadin x 5 days with a Lovenox bridge per Coumadin " clinic  2nd floor - history of difficult intubation-MS    COLONOSCOPY N/A 2022    Procedure: COLONOSCOPY;  Surgeon: Nikunj Larson MD;  Location: [facility] ([address] [place] [address]);  Service: Endoscopy;  Laterality: N/A;  ef 45%--coumadin hold per coumadin clinic see coag visist -tb-vacc- clears 4 hrs prior-am prep 2-3-am-inst portal-tb    CORONARY ARTERY BYPASS GRAFT          CYSTOSCOPY      KIDNEY STONE SURGERY      KNEE ARTHROPLASTY      bilateral    renal stone      SKIN BIOPSY         Current Outpatient Medications   Medication Sig    amLODIPine (NORVASC) 2.5 MG tablet Take 1 tablet (2.5 mg total) by mouth once daily.    aspirin 81 MG Chew Take 81 mg by mouth once daily.    cyanocobalamin 500 MCG tablet Take 500 mcg by mouth every morning. Every day    ezetimibe (ZETIA) 10 mg tablet Take 1 tablet (10 mg total) by mouth once daily.    FOLIC ACID/MULTIVITS-MIN/LUT (CENTRUM SILVER ORAL) Take 1 tablet by mouth once daily.    irbesartan (AVAPRO) 300 MG tablet Take 1 tablet (300 mg total) by mouth every evening.    ketoconazole (NIZORAL) 2 % cream Apply topically 2 (two) times daily.    rosuvastatin (CRESTOR) 40 MG Tab Take 1 tablet (40 mg total) by mouth once daily.    triamcinolone acetonide 0.025% (KENALOG) 0.025 % cream Apply topically 2 (two) times daily.    warfarin (COUMADIN) 10 MG tablet Take 10mg daily or as directed by Coumadin Clinic.   Also uses warfarin 5mg tablet strength    warfarin (COUMADIN) 5 MG tablet Take 10mg daily except 12.5mg      No current facility-administered medications for this visit.       Review of patient's allergies indicates:  No Known Allergies    Family History   Problem Relation Name Age of Onset    Stroke Mother incontinence     Dementia Mother incontinence     Heart attack Father bph         d. 85    Urolithiasis Father bph     Heart disease Father bph     Heart failure Father bph     Other Sister Mary         bladder lift, s/p 4 ; estranged    Heart attack  Maternal Grandfather          d. 65    Hypertension Paternal Grandmother      Heart attack Paternal Grandfather      Heart failure Paternal Grandfather      No Known Problems Daughter Sabina     No Known Problems Son Doron        Social History     Socioeconomic History    Marital status:    Tobacco Use    Smoking status: Former     Current packs/day: 0.00     Average packs/day: 1 pack/day for 20.0 years (20.0 ttl pk-yrs)     Types: Cigarettes     Start date: 10/16/1967     Quit date: 10/16/1987     Years since quittin.2     Passive exposure: Past    Smokeless tobacco: Former   Substance and Sexual Activity    Alcohol use: Yes     Alcohol/week: 0.0 standard drinks of alcohol     Types: 1 - 2 Cans of beer, 1 - 2 Standard drinks or equivalent per week     Comment: daily, none today    Drug use: No    Sexual activity: Yes     Partners: Female     Birth control/protection: None   Social History Narrative    SOCIAL HISTORY:     .     Retired  for [employer].     Son in [place]    Daughter lives in [place].     +/- on exercise with the joint problems.      Plays golf, now going to gym        FAMILY HISTORY:     Mom d. 2006 with dementia secondary to subarachnoid     hemorrhage and stroke.     Dad d. 85, sudden MI.     One sister living in the [facility] doing well.      Social Drivers of Health     Financial Resource Strain: Patient Declined (2024)    Overall Financial Resource Strain (CARDIA)     Difficulty of Paying Living Expenses: Patient declined   Food Insecurity: Patient Declined (2024)    Hunger Vital Sign     Worried About Running Out of Food in the Last Year: Patient declined     Ran Out of Food in the Last Year: Patient declined   Transportation Needs: No Transportation Needs (2024)    PRAPARE - Transportation     Lack of Transportation (Medical): No     Lack of Transportation (Non-Medical): No   Physical Activity: Unknown (2024)    Exercise Vital Sign      Days of Exercise per Week: Patient declined   Stress: Patient Declined (1/21/2024)    [employer] [place] [employer] - Occupational Stress Questionnaire     Feeling of Stress : Patient declined   Housing Stability: Low Risk  (1/21/2024)    Housing Stability Vital Sign     Unable to Pay for Housing in the Last Year: No     Number of Places Lived in the Last Year: 2     Unstable Housing in the Last Year: No       Prior to meeting with the patient I reviewed the medical chart in [facility]. This included reviewing the previous progress notes from our office, review of the patient's last appointment with their primary care provider, review of any visits to the emergency room, and review of any pain management appointments or procedures.    History of present illness: 79 y.o. male,  presents to clinic today for evaluation of complaints of chronic knee pain.  He recently removed to [facility] and he has been moving all of his medical providers to a more local area than [place].  He has known osteoarthritis bilateral knees.  Been recommended for knee replacement surgery.  His left knee is the worse.  He is interested in proceeding with surgery today.       Review of Systems:    Constitutional: Negative for chills, fever and weight loss.   HENT: Negative for congestion.    Eyes: Negative for discharge and redness.   Respiratory: Negative for cough and shortness of breath.    Cardiovascular: Negative for chest pain.   Gastrointestinal: Negative for nausea and vomiting.   Musculoskeletal: See HPI.   Skin: Negative for rash.   Neurological: Negative for headaches.   Endo/Heme/Allergies: Does not bruise/bleed easily.   Psychiatric/Behavioral: The patient is not nervous/anxious.    All other systems reviewed and are negative.       Objective:      Physical Examination:    Vital Signs:  There were no vitals filed for this visit.    Body mass index is 40.23 kg/m².    This a well-developed, well nourished  patient in no acute distress.  They are alert and oriented and cooperative to examination.     Examination of the bilateral knees, skin is dry and intact, no erythema or ecchymosis, no signs symptoms of infection.  Range of motion 0-100 degrees.  Homans signs are negative.  Straight leg raises are negative.  Distally neurovascularly intact.      Pertinent New Results:          XRAY Report / Interpretation:   Reviewed x-rays from July 20, 2024 which show advanced tricompartmental arthrosis of the bilateral knees.          Assessment:       1. Primary osteoarthritis of left knee    2. Primary osteoarthritis of right knee      Plan:     Primary osteoarthritis of left knee  -     Ambulatory referral/consult to Orthopedics  -     CT Knee w/o Contrast Left w/Baldo Protocol; Future; Expected date: 01/17/2025    Primary osteoarthritis of right knee        Follow up in about 11 days (around 1/28/2025) for Discuss left TKA w/ Dr Craig.    Osteoarthritis bilateral knees, we have signed the patient up today for robotic total knee arthroplasty.  He is going to return in a proximally 2 weeks for a face-to-face consultation with Dr. Craig.  He is going to require clearances he does take Coumadin for history of pulmonary embolus and deep vein thrombus.    Patient was consented for surgery. Risks and benefits of the procedure were discussed in great detail to include the expected preoperative, intraoperative and postoperative courses. Complications may include but are not limited to bleeding, infection, damage to nerves, arteries, blood vessels, bones, tendons, ligaments, stiffness, instability, deep vein thrombus (DVT), pulmonary embolus (PE), altered sensation, continued pain, RSD, loss of motion or a need for additional surgery. As well as general anesthetic complications including seizure, stroke, heart attack and even death.    The mobility limitation cannot be sufficiently resolved by the use of a cane. Patient's functional  mobility deficit can be sufficiently resolved with the use of a (Rolling Walker or Walker). Patient's mobility limitation significantly impairs their ability to participate in one of more activities of daily living. The use of a (RW or Walker) will significantly improve the patient's ability to participate in MRADLS and the patient will use it on regular basis in the home.      The patient and I had a thorough discussion today.  We discussed the working diagnosis as well as several other potential alternative diagnoses.  We discussed treatment options, both conservative and surgical.  Conservative treatment options would include things such as activity modifications, workplace modifications, a period of rest, oral versus topical over the counter and oral versus topical prescription anti-inflammatory medications, physical therapy / occupational therapy, splinting / bracing, immobilization, corticosteroid injections, and others.      [unfilled]  JOSH Wood PA-C        EMR Statement:  Please note that portions of this patient encounter record were imported from the patients electronic medical record and that others were dictated using voice recognition software. For these reasons grammatical errors, nonsensical language, and apparently contradictory statements may be present.  These should be disregarded or interpreted with respect to the context of the document.

## 2025-01-17 NOTE — TELEPHONE ENCOUNTER
----- Message from Laquita sent at 1/17/2025  3:54 PM CST -----  Regarding: advice  Type:  Needs Medical Advice    Who Called: pt    Best Call Back Number: [phone]        Additional Information: pt is ready to be schedule for his echo in stress.  please call to discuss.

## 2025-01-23 ENCOUNTER — TELEPHONE (OUTPATIENT)
Dept: CARDIOLOGY | Facility: CLINIC | Age: 80
End: 2025-01-23
Payer: MEDICARE

## 2025-01-23 NOTE — TELEPHONE ENCOUNTER
----- Message from Danisha sent at 1/21/2025 12:29 PM CST -----  Regarding: appointment  Contact: patient  Type:  Sooner Appointment Request    Caller is requesting a sooner appointment.  Caller declined first available appointment listed below.  Caller will not accept being placed on the waitlist and is requesting a message be sent to doctor.    Name of Caller:  patient  When is the first available appointment?    Symptoms:  stress test  Would the patient rather a call back or a response via [facility]? call  Best Call Back Number:  [phone] (home)     Additional Information:  Patient states his procedure is scheduled for 02/10/25.  Please call patient to advise.  Thanks!

## 2025-01-28 ENCOUNTER — OFFICE VISIT (OUTPATIENT)
Dept: ORTHOPEDICS | Facility: CLINIC | Age: 80
End: 2025-01-28
Payer: MEDICARE

## 2025-01-28 VITALS — WEIGHT: 315 LBS | HEIGHT: 75 IN | BODY MASS INDEX: 39.17 KG/M2

## 2025-01-28 DIAGNOSIS — M17.12 PRIMARY OSTEOARTHRITIS OF LEFT KNEE: Primary | ICD-10-CM

## 2025-01-28 DIAGNOSIS — M17.11 PRIMARY OSTEOARTHRITIS OF RIGHT KNEE: ICD-10-CM

## 2025-01-28 PROCEDURE — 1157F ADVNC CARE PLAN IN RCRD: CPT | Mod: CPTII,S$GLB,, | Performed by: ORTHOPAEDIC SURGERY

## 2025-01-28 PROCEDURE — 99999 PR PBB SHADOW E&M-EST. PATIENT-LVL II: CPT | Mod: PBBFAC,,, | Performed by: ORTHOPAEDIC SURGERY

## 2025-01-28 PROCEDURE — 1160F RVW MEDS BY RX/DR IN RCRD: CPT | Mod: CPTII,S$GLB,, | Performed by: ORTHOPAEDIC SURGERY

## 2025-01-28 PROCEDURE — 3288F FALL RISK ASSESSMENT DOCD: CPT | Mod: CPTII,S$GLB,, | Performed by: ORTHOPAEDIC SURGERY

## 2025-01-28 PROCEDURE — 99214 OFFICE O/P EST MOD 30 MIN: CPT | Mod: S$GLB,,, | Performed by: ORTHOPAEDIC SURGERY

## 2025-01-28 PROCEDURE — 1101F PT FALLS ASSESS-DOCD LE1/YR: CPT | Mod: CPTII,S$GLB,, | Performed by: ORTHOPAEDIC SURGERY

## 2025-01-28 PROCEDURE — 1125F AMNT PAIN NOTED PAIN PRSNT: CPT | Mod: CPTII,S$GLB,, | Performed by: ORTHOPAEDIC SURGERY

## 2025-01-28 PROCEDURE — 1159F MED LIST DOCD IN RCRD: CPT | Mod: CPTII,S$GLB,, | Performed by: ORTHOPAEDIC SURGERY

## 2025-01-28 NOTE — H&P (VIEW-ONLY)
"[facility]    Subjective:     Chief Complaint:   Chief Complaint   Patient presents with    Left Knee - Pain     Patient is here for a f/up on Left knee pain, here to discuss TKA, states his pain is the same as his last       Past Medical History:   Diagnosis Date    Benign neoplasm of colon     Cataract     CHF (congestive heart failure) 01/13/2015    Coronary artery disease     Difficult intubation     DVT (deep venous thrombosis)     HLD (hyperlipidemia)     HTN (hypertension)     Impaired fasting glucose     Kidney stone     Metabolic syndrome     Nonspecific abnormal results of liver function study     Nonspecific findings on examination of blood     Nuclear sclerosis - Both Eyes 10/18/2013    Osteoarthrosis, unspecified whether generalized or localized, lower leg     Respiratory distress     PT STATES IT WAS "CRAP", "VERY UNCOMFORTABLE"    Squamous cell carcinoma of skin        Past Surgical History:   Procedure Laterality Date    CARDIAC SURGERY      CATARACT EXTRACTION W/  INTRAOCULAR LENS IMPLANT Right 9/17/2020    Procedure: EXTRACTION, CATARACT, WITH IOL INSERTION;  Surgeon: Chanel Klein MD;  Location: [facility] OR;  Service: Ophthalmology;  Laterality: Right;    CATARACT EXTRACTION W/  INTRAOCULAR LENS IMPLANT Left 10/1/2020    Procedure: EXTRACTION, CATARACT, WITH IOL INSERTION;  Surgeon: Chanel Klein MD;  Location: [facility] OR;  Service: Ophthalmology;  Laterality: Left;    COLONOSCOPY N/A 3/13/2019    Procedure: COLONOSCOPY;  Surgeon: Nikunj Larson MD;  Location: [facility] ([address] [place] [address]);  Service: Endoscopy;  Laterality: N/A;  ok to hold Coumadin x 5 days with a Lovenox bridge per Coumadin clinic  2nd floor - history of difficult intubation-MS    COLONOSCOPY N/A 7/7/2022    Procedure: COLONOSCOPY;  Surgeon: Nikunj Larson MD;  [facility]  Service: Endoscopy;  Laterality: N/A;  ef 45%-5/31-coumadin hold per coumadin clinic see coag visist 6/29-tb-vacc- clears 4 hrs prior-am " prep 2-3-am-inst portal-tb    CORONARY ARTERY BYPASS GRAFT          CYSTOSCOPY      KIDNEY STONE SURGERY      KNEE ARTHROPLASTY      bilateral    renal stone      SKIN BIOPSY         Current Outpatient Medications   Medication Sig    amLODIPine (NORVASC) 2.5 MG tablet Take 1 tablet (2.5 mg total) by mouth once daily.    aspirin 81 MG Chew Take 81 mg by mouth once daily.    cyanocobalamin 500 MCG tablet Take 500 mcg by mouth every morning. Every day    ezetimibe (ZETIA) 10 mg tablet Take 1 tablet (10 mg total) by mouth once daily.    FOLIC ACID/MULTIVITS-MIN/LUT (CENTRUM SILVER ORAL) Take 1 tablet by mouth once daily.    irbesartan (AVAPRO) 300 MG tablet Take 1 tablet (300 mg total) by mouth every evening.    ketoconazole (NIZORAL) 2 % cream Apply topically 2 (two) times daily.    rosuvastatin (CRESTOR) 40 MG Tab Take 1 tablet (40 mg total) by mouth once daily.    triamcinolone acetonide 0.025% (KENALOG) 0.025 % cream Apply topically 2 (two) times daily.    warfarin (COUMADIN) 10 MG tablet Take 10mg daily or as directed by Coumadin Clinic.   Also uses warfarin 5mg tablet strength    warfarin (COUMADIN) 5 MG tablet Take 10mg daily except 12.5mg      No current facility-administered medications for this visit.       Review of patient's allergies indicates:  No Known Allergies    Family History   Problem Relation Name Age of Onset    Stroke Mother incontinence     Dementia Mother incontinence     Heart attack Father bph         d. 85    Urolithiasis Father bph     Heart disease Father bph     Heart failure Father bph     Other Sister Mary         bladder lift, s/p 4 ; estranged    Heart attack Maternal Grandfather          d. 65    Hypertension Paternal Grandmother      Heart attack Paternal Grandfather      Heart failure Paternal Grandfather      No Known Problems Daughter Sabina     No Known Problems Son Doron        Social History     Socioeconomic History    Marital status:    Tobacco Use     Smoking status: Former     Current packs/day: 0.00     Average packs/day: 1 pack/day for 20.0 years (20.0 ttl pk-yrs)     Types: Cigarettes     Start date: 10/16/1967     Quit date: 10/16/1987     Years since quittin.3     Passive exposure: Past    Smokeless tobacco: Former   Substance and Sexual Activity    Alcohol use: Yes     Alcohol/week: 0.0 standard drinks of alcohol     Types: 1 - 2 Cans of beer, 1 - 2 Standard drinks or equivalent per week     Comment: daily, none today    Drug use: No    Sexual activity: Yes     Partners: Female     Birth control/protection: None   Social History Narrative    SOCIAL HISTORY:     .     Retired  for [employer].     Son in [place]    Daughter lives in [place].     +/- on exercise with the joint problems.      Plays golf, now going to gym        FAMILY HISTORY:     Mom d. 2006 with dementia secondary to subarachnoid     hemorrhage and stroke.     Dad d. 85, sudden MI.     [place]      Social Drivers of Health     Financial Resource Strain: Patient Declined (2024)    Overall Financial Resource Strain (CARDIA)     Difficulty of Paying Living Expenses: Patient declined   Food Insecurity: Patient Declined (2024)    Hunger Vital Sign     Worried About Running Out of Food in the Last Year: Patient declined     Ran Out of Food in the Last Year: Patient declined   Transportation Needs: No Transportation Needs (2024)    PRAPARE - Transportation     Lack of Transportation (Medical): No     Lack of Transportation (Non-Medical): No   Physical Activity: Unknown (2024)    Exercise Vital Sign     Days of Exercise per Week: Patient declined   Stress: Patient Declined (2024)    [employer] [employer] - Occupational Stress Questionnaire     Feeling of Stress : Patient declined   Housing Stability: Low Risk  (2024)    Housing Stability Vital Sign     Unable to Pay for Housing in the  Last Year: No     Number of Places Lived in the Last Year: 2     Unstable Housing in the Last Year: No       History of present illness:  Patient comes in today to discuss his knee replacement.  He has longstanding issues with his knee.  Really since he was a child.  He has had multiple injections and arthroscopy.  He is scheduled for total knee an wants to go over the procedure discussed the pre and postoperative care      Review of Systems:    Constitution: Negative for chills, fever, and sweats.  Negative for unexplained weight loss.    HENT:  Negative for headaches and blurry vision.    Cardiovascular:Negative for chest pain or irregular heart beat. Negative for hypertension.    Respiratory:  Negative for cough and shortness of breath.    Gastrointestinal: Negative for abdominal pain, heartburn, melena, nausea, and vomitting.    Genitourinary:  Negative bladder incontinence and dysuria.    Musculoskeletal:  See HPI for details.     Neurological: Negative for numbness.    Psychiatric/Behavioral: Negative for depression.  The patient is not nervous/anxious.      Endocrine: Negative for polyuria    Hematologic/Lymphatic: Negative for bleeding problem.  Does not bruise/bleed easily.    Skin: Negative for poor would healing and rash    Objective:      Physical Examination:    Vital Signs:  There were no vitals filed for this visit.    Body mass index is 40.23 kg/m².    This a well-developed, well nourished patient in no acute distress.  They are alert and oriented and cooperative to examination.        Patient appears to be stated age.  Range of motion 0-100 degrees.  The knee is stable in varus and valgus stresses.  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  He has failed years of therapy.  The risks and benefits of replacement discussed with him in great detail including potential  complications.  He is awaiting cardiac clearance.  His surgery scheduled as soon as clearance is obtained      This note was created using Dragon voice recognition software that occasionally misinterpreted phrases or words.

## 2025-01-28 NOTE — PROGRESS NOTES
"[facility]    Subjective:     Chief Complaint:   Chief Complaint   Patient presents with    Left Knee - Pain     Patient is here for a f/up on Left knee pain, here to discuss TKA, states his pain is the same as his last       Past Medical History:   Diagnosis Date    Benign neoplasm of colon     Cataract     CHF (congestive heart failure) 01/13/2015    Coronary artery disease     Difficult intubation     DVT (deep venous thrombosis)     HLD (hyperlipidemia)     HTN (hypertension)     Impaired fasting glucose     Kidney stone     Metabolic syndrome     Nonspecific abnormal results of liver function study     Nonspecific findings on examination of blood     Nuclear sclerosis - Both Eyes 10/18/2013    Osteoarthrosis, unspecified whether generalized or localized, lower leg     Respiratory distress     PT STATES IT WAS "CRAP", "VERY UNCOMFORTABLE"    Squamous cell carcinoma of skin        Past Surgical History:   Procedure Laterality Date    CARDIAC SURGERY      CATARACT EXTRACTION W/  INTRAOCULAR LENS IMPLANT Right 9/17/2020    Procedure: EXTRACTION, CATARACT, WITH IOL INSERTION;  Surgeon: Chanel Klein MD;  Location: [facility] OR;  Service: Ophthalmology;  Laterality: Right;    CATARACT EXTRACTION W/  INTRAOCULAR LENS IMPLANT Left 10/1/2020    Procedure: EXTRACTION, CATARACT, WITH IOL INSERTION;  Surgeon: Chanel Klein MD;  Location: [facility] OR;  Service: Ophthalmology;  Laterality: Left;    COLONOSCOPY N/A 3/13/2019    Procedure: COLONOSCOPY;  Surgeon: Nikunj Larson MD;  Location: [facility] ([address] [place] [address]);  Service: Endoscopy;  Laterality: N/A;  ok to hold Coumadin x 5 days with a Lovenox bridge per Coumadin clinic  2nd floor - history of difficult intubation-[place]    COLONOSCOPY N/A 7/7/2022    Procedure: COLONOSCOPY;  Surgeon: Nikunj Larson MD;  [facility]  Service: Endoscopy;  Laterality: N/A;  ef 45%-5/31-coumadin hold per coumadin clinic see coag visist 6/29-tb-vacc- clears 4 hrs prior-am " prep 2-3-am-inst portal-tb    CORONARY ARTERY BYPASS GRAFT          CYSTOSCOPY      KIDNEY STONE SURGERY      KNEE ARTHROPLASTY      bilateral    renal stone      SKIN BIOPSY         Current Outpatient Medications   Medication Sig    amLODIPine (NORVASC) 2.5 MG tablet Take 1 tablet (2.5 mg total) by mouth once daily.    aspirin 81 MG Chew Take 81 mg by mouth once daily.    cyanocobalamin 500 MCG tablet Take 500 mcg by mouth every morning. Every day    ezetimibe (ZETIA) 10 mg tablet Take 1 tablet (10 mg total) by mouth once daily.    FOLIC ACID/MULTIVITS-MIN/LUT (CENTRUM SILVER ORAL) Take 1 tablet by mouth once daily.    irbesartan (AVAPRO) 300 MG tablet Take 1 tablet (300 mg total) by mouth every evening.    ketoconazole (NIZORAL) 2 % cream Apply topically 2 (two) times daily.    rosuvastatin (CRESTOR) 40 MG Tab Take 1 tablet (40 mg total) by mouth once daily.    triamcinolone acetonide 0.025% (KENALOG) 0.025 % cream Apply topically 2 (two) times daily.    warfarin (COUMADIN) 10 MG tablet Take 10mg daily or as directed by Coumadin Clinic.   Also uses warfarin 5mg tablet strength    warfarin (COUMADIN) 5 MG tablet Take 10mg daily except 12.5mg      No current facility-administered medications for this visit.       Review of patient's allergies indicates:  No Known Allergies    Family History   Problem Relation Name Age of Onset    Stroke Mother incontinence     Dementia Mother incontinence     Heart attack Father bph         d. 85    Urolithiasis Father bph     Heart disease Father bph     Heart failure Father bph     Other Sister Mary         bladder lift, s/p 4 ; estranged    Heart attack Maternal Grandfather          d. 65    Hypertension Paternal Grandmother      Heart attack Paternal Grandfather      Heart failure Paternal Grandfather      No Known Problems Daughter Sabina     No Known Problems Son Doron        Social History     Socioeconomic History    Marital status:    Tobacco Use     Smoking status: Former     Current packs/day: 0.00     Average packs/day: 1 pack/day for 20.0 years (20.0 ttl pk-yrs)     Types: Cigarettes     Start date: 10/16/1967     Quit date: 10/16/1987     Years since quittin.3     Passive exposure: Past    Smokeless tobacco: Former   Substance and Sexual Activity    Alcohol use: Yes     Alcohol/week: 0.0 standard drinks of alcohol     Types: 1 - 2 Cans of beer, 1 - 2 Standard drinks or equivalent per week     Comment: daily, none today    Drug use: No    Sexual activity: Yes     Partners: Female     Birth control/protection: None   Social History Narrative    SOCIAL HISTORY:     .     Retired  for [employer].     Son in [place]    Daughter lives in [place].     +/- on exercise with the joint problems.      Plays golf, now going to gym        FAMILY HISTORY:     Mom d. 2006 with dementia secondary to subarachnoid     hemorrhage and stroke.     Dad d. 85, sudden MI.     [place].      Social Drivers of Health     Financial Resource Strain: Patient Declined (2024)    Overall Financial Resource Strain (CARDIA)     Difficulty of Paying Living Expenses: Patient declined   Food Insecurity: Patient Declined (2024)    Hunger Vital Sign     Worried About Running Out of Food in the Last Year: Patient declined     Ran Out of Food in the Last Year: Patient declined   Transportation Needs: No Transportation Needs (2024)    PRAPARE - Transportation     Lack of Transportation (Medical): No     Lack of Transportation (Non-Medical): No   Physical Activity: Unknown (2024)    Exercise Vital Sign     Days of Exercise per Week: Patient declined   Stress: Patient Declined (2024)    [employer] - Occupational Stress Questionnaire     Feeling of Stress : Patient declined   Housing Stability: Low Risk  (2024)    Housing Stability Vital Sign     Unable to Pay for Housing in the  Last Year: No     Number of Places Lived in the Last Year: 2     Unstable Housing in the Last Year: No       History of present illness:  Patient comes in today to discuss his knee replacement.  He has longstanding issues with his knee.  Really since he was a child.  He has had multiple injections and arthroscopy.  He is scheduled for total knee an wants to go over the procedure discussed the pre and postoperative care      Review of Systems:    Constitution: Negative for chills, fever, and sweats.  Negative for unexplained weight loss.    HENT:  Negative for headaches and blurry vision.    Cardiovascular:Negative for chest pain or irregular heart beat. Negative for hypertension.    Respiratory:  Negative for cough and shortness of breath.    Gastrointestinal: Negative for abdominal pain, heartburn, melena, nausea, and vomitting.    Genitourinary:  Negative bladder incontinence and dysuria.    Musculoskeletal:  See HPI for details.     Neurological: Negative for numbness.    Psychiatric/Behavioral: Negative for depression.  The patient is not nervous/anxious.      Endocrine: Negative for polyuria    Hematologic/Lymphatic: Negative for bleeding problem.  Does not bruise/bleed easily.    Skin: Negative for poor would healing and rash    Objective:      Physical Examination:    Vital Signs:  There were no vitals filed for this visit.    Body mass index is 40.23 kg/m².    This a well-developed, well nourished patient in no acute distress.  They are alert and oriented and cooperative to examination.        Patient appears to be stated age.  Range of motion 0-100 degrees.  The knee is stable in varus and valgus stresses.  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  He has failed years of therapy.  The risks and benefits of replacement discussed with him in great detail including potential  complications.  He is awaiting cardiac clearance.  His surgery scheduled as soon as clearance is obtained      This note was created using Dragon voice recognition software that occasionally misinterpreted phrases or words.

## 2025-01-29 ENCOUNTER — ANTI-COAG VISIT (OUTPATIENT)
Dept: CARDIOLOGY | Facility: CLINIC | Age: 80
End: 2025-01-29
Payer: MEDICARE

## 2025-01-29 ENCOUNTER — TELEPHONE (OUTPATIENT)
Dept: CARDIOLOGY | Facility: HOSPITAL | Age: 80
End: 2025-01-29

## 2025-01-29 ENCOUNTER — HOSPITAL ENCOUNTER (OUTPATIENT)
Dept: RADIOLOGY | Facility: HOSPITAL | Age: 80
Discharge: HOME | End: 2025-01-29
Attending: PHYSICIAN ASSISTANT
Payer: MEDICARE

## 2025-01-29 ENCOUNTER — HOSPITAL ENCOUNTER (OUTPATIENT)
Dept: PREADMISSION TESTING | Facility: HOSPITAL | Age: 80
Discharge: HOME | End: 2025-01-29
Attending: PHYSICIAN ASSISTANT
Payer: MEDICARE

## 2025-01-29 ENCOUNTER — TELEPHONE (OUTPATIENT)
Dept: CARDIOLOGY | Facility: CLINIC | Age: 80
End: 2025-01-29
Payer: MEDICARE

## 2025-01-29 DIAGNOSIS — Z79.01 CURRENT USE OF ANTICOAGULANT THERAPY: ICD-10-CM

## 2025-01-29 DIAGNOSIS — M17.12 PRIMARY OSTEOARTHRITIS OF LEFT KNEE: ICD-10-CM

## 2025-01-29 DIAGNOSIS — Z01.818 PRE-OP TESTING: ICD-10-CM

## 2025-01-29 DIAGNOSIS — Z01.818 PREOP TESTING: Primary | ICD-10-CM

## 2025-01-29 DIAGNOSIS — Z79.01 LONG TERM CURRENT USE OF ANTICOAGULANT THERAPY: Primary | ICD-10-CM

## 2025-01-29 DIAGNOSIS — M17.12 PRIMARY OSTEOARTHRITIS OF LEFT KNEE: Primary | ICD-10-CM

## 2025-01-29 LAB
ALBUMIN SERPL BCP-MCNC: 4 G/DL (ref 3.5–5.2)
ALP SERPL-CCNC: 96 U/L (ref 40–150)
ALT SERPL W/O P-5'-P-CCNC: 30 U/L (ref 10–44)
ANION GAP SERPL CALC-SCNC: 9 MMOL/L (ref 8–16)
AST SERPL-CCNC: 27 U/L (ref 10–40)
BASOPHILS # BLD AUTO: 0.04 K/UL (ref 0–0.2)
BASOPHILS NFR BLD: 0.7 % (ref 0–1.9)
BILIRUB SERPL-MCNC: 0.9 MG/DL (ref 0.1–1)
BUN SERPL-MCNC: 20 MG/DL (ref 8–23)
CALCIUM SERPL-MCNC: 9 MG/DL (ref 8.7–10.5)
CHLORIDE SERPL-SCNC: 104 MMOL/L (ref 95–110)
CO2 SERPL-SCNC: 26 MMOL/L (ref 23–29)
CREAT SERPL-MCNC: 0.8 MG/DL (ref 0.5–1.4)
DIFFERENTIAL METHOD BLD: ABNORMAL
EOSINOPHIL # BLD AUTO: 0.1 K/UL (ref 0–0.5)
EOSINOPHIL NFR BLD: 1.7 % (ref 0–8)
ERYTHROCYTE [DISTWIDTH] IN BLOOD BY AUTOMATED COUNT: 13.1 % (ref 11.5–14.5)
EST. GFR  (NO RACE VARIABLE): >60 ML/MIN/1.73 M^2
GLUCOSE SERPL-MCNC: 111 MG/DL (ref 70–110)
HCT VFR BLD AUTO: 39.9 % (ref 40–54)
HGB BLD-MCNC: 13.3 G/DL (ref 14–18)
IMM GRANULOCYTES # BLD AUTO: 0.01 K/UL (ref 0–0.04)
IMM GRANULOCYTES NFR BLD AUTO: 0.2 % (ref 0–0.5)
INR PPP: 2.8
LYMPHOCYTES # BLD AUTO: 1.9 K/UL (ref 1–4.8)
LYMPHOCYTES NFR BLD: 31.5 % (ref 18–48)
MCH RBC QN AUTO: 32.8 PG (ref 27–31)
MCHC RBC AUTO-ENTMCNC: 33.3 G/DL (ref 32–36)
MCV RBC AUTO: 98 FL (ref 82–98)
MONOCYTES # BLD AUTO: 0.7 K/UL (ref 0.3–1)
MONOCYTES NFR BLD: 11.5 % (ref 4–15)
NEUTROPHILS # BLD AUTO: 3.2 K/UL (ref 1.8–7.7)
NEUTROPHILS NFR BLD: 54.4 % (ref 38–73)
NRBC BLD-RTO: 0 /100 WBC
PLATELET # BLD AUTO: 186 K/UL (ref 150–450)
PMV BLD AUTO: 10.8 FL (ref 9.2–12.9)
POTASSIUM SERPL-SCNC: 4.6 MMOL/L (ref 3.5–5.1)
PROT SERPL-MCNC: 7.4 G/DL (ref 6–8.4)
RBC # BLD AUTO: 4.06 M/UL (ref 4.6–6.2)
SODIUM SERPL-SCNC: 139 MMOL/L (ref 136–145)
WBC # BLD AUTO: 5.91 K/UL (ref 3.9–12.7)

## 2025-01-29 PROCEDURE — 80053 COMPREHEN METABOLIC PANEL: CPT | Performed by: ORTHOPAEDIC SURGERY

## 2025-01-29 PROCEDURE — 36415 COLL VENOUS BLD VENIPUNCTURE: CPT | Performed by: ORTHOPAEDIC SURGERY

## 2025-01-29 PROCEDURE — 73700 CT LOWER EXTREMITY W/O DYE: CPT | Mod: 26,LT,, | Performed by: RADIOLOGY

## 2025-01-29 PROCEDURE — 73700 CT LOWER EXTREMITY W/O DYE: CPT | Mod: TC,LT

## 2025-01-29 PROCEDURE — 85025 COMPLETE CBC W/AUTO DIFF WBC: CPT | Performed by: ORTHOPAEDIC SURGERY

## 2025-01-29 PROCEDURE — 71046 X-RAY EXAM CHEST 2 VIEWS: CPT | Mod: TC

## 2025-01-29 PROCEDURE — 71046 X-RAY EXAM CHEST 2 VIEWS: CPT | Mod: 26,,, | Performed by: RADIOLOGY

## 2025-01-29 NOTE — PROGRESS NOTES
1/29- Pt reported he is now being seen by  in [place], and he is scheduled to have a total replacement of his left knee on 2/10/25. He would like to know what instructions he needs to follow.

## 2025-01-29 NOTE — TELEPHONE ENCOUNTER
Patient advised, test will be at [facility] ([address] [facility]).  Will need to register on the first floor at the main entrance.  Patient advised that arrival time is 09:05.  Patient advised that they may be here about 3.5-4 hours, and may want to bring something to occupy their time, as there will be periods of waiting.    Patient advised, may take his medications prior to testing if you need to. Advised if food is needed to take medications, please keep it light, like toast and juice.    Patient advised to avoid all caffeine 12 hours prior to testing.  This includes chocolate, tea and decaf coffee.    Will provide peanut butter crackers for a snack after stress test.  If patient would prefer something else, please bring a snack from home.    Wear comfortable clothing.  No lotions, oils, or powders to the upper chest area. May wear deodorant.    No metal jewelry, buttons, or zippers to the upper body.  Patient verbalizes understanding of instructions.

## 2025-01-29 NOTE — TELEPHONE ENCOUNTER
----- Message from Unique sent at 1/29/2025  2:48 PM CST -----  Contact: Patient  Type:  Needs Medical Advice    Who Called: Patient      Pharmacy name and phone #:    [facility] - [place], [place] - [address]  [address] [place] [place] [address]  Phone: [phone] Fax: [phone]      Would the patient rather a call back or a response via [facility]? Call    Best Call Back Number: [phone]    Additional Information: Patient needs to know coumadin clinic he reports to. Please advise

## 2025-01-29 NOTE — PRE ADMISSION SCREENING
JOINT CAMP ASSESSMENT    Name Del Anand   MRN [id number]    Age/Sex 79 y.o. male    Surgeon Dr. Henry Finger   Joint Camp Date 1/29/2025   Surgery Date 2/10/2025   Procedure Left Knee Arthroplasty   Insurance Payor: HUMANA MANAGED MEDICARE / Plan: HUMANA MEDICARE PPO / Product Type: Medicare Advantage /    Care Team Patient Care Team:  Tiak Valle MD as PCP - General (Family Medicine)  Amber Huertas MD as Hypertension Digital Medicine Responsible Provider (Internal Medicine)  Valeri George, PharmD as Hypertension Digital Medicine Clinician (Pharmacist)  Jennifer Hunt, PharmD as Pharmacist (Pharmacist)  Medicare, [employer] Gold Managed as Hypertension Digital Medicine Contract    Pharmacy   [facility] - [place], [place] - [address]  [address] [facility] [address]  Phone: [phone] Fax: [phone]    [facility] - [place] - [address]  [address] [place] [address]  Phone: [phone] Fax: [phone]     AM-PAC Score   24   Risk Assessment Score 25     Past Medical History:   Diagnosis Date    Benign neoplasm of colon     Cataract     CHF (congestive heart failure) 01/13/2015    Coronary artery disease     Difficult intubation     DVT (deep venous thrombosis)     HLD (hyperlipidemia)     HTN (hypertension)     Impaired fasting glucose     Kidney stone     Metabolic syndrome     Nonspecific abnormal results of liver function study     Nonspecific findings on examination of blood     Nuclear sclerosis - Both Eyes 10/18/2013    Osteoarthrosis, unspecified whether generalized or localized, lower leg     Respiratory distress     AFTER OPEN HEART SURGERY STATES ON VENTILATOR    Squamous cell carcinoma of skin        Past Surgical History:   Procedure Laterality Date    CARDIAC SURGERY      CATARACT EXTRACTION W/  INTRAOCULAR LENS IMPLANT Right 09/17/2020    Procedure: EXTRACTION, CATARACT, WITH IOL INSERTION;  Surgeon:  Chanel Klein MD;  Location: [facility] [place];  Service: Ophthalmology;  Laterality: Right;    CATARACT EXTRACTION W/  INTRAOCULAR LENS IMPLANT Left 10/01/2020    Procedure: EXTRACTION, CATARACT, WITH IOL INSERTION;  Surgeon: Chanel Klein MD;  Location: [facility] [place];  Service: Ophthalmology;  Laterality: Left;    COLONOSCOPY N/A 03/13/2019    Procedure: COLONOSCOPY;  Surgeon: Nikunj Larson MD;  Location: [facility] (2ND FLR);  Service: Endoscopy;  Laterality: N/A;  ok to hold Coumadin x 5 days with a Lovenox bridge per Coumadin clinic  2nd floor - history of difficult intubation-[place]    COLONOSCOPY N/A 07/07/2022    Procedure: COLONOSCOPY;  Surgeon: Nikunj Larson MD;  Location: [facility] (2ND FLR);  Service: Endoscopy;  Laterality: N/A;  ef 45%-5/31-coumadin hold per coumadin clinic see coag visist 6/29-tb-vacc- clears 4 hrs prior-am prep 2-3-am-inst portal-tb    CORONARY ARTERY BYPASS GRAFT      2014    CYSTOSCOPY      KIDNEY STONE SURGERY      KNEE ARTHROPLASTY      bilateral    renal stone      SKIN BIOPSY           Home Enviroment     Living Arrangement: Lives with spouse  Home Environment: 1-story house/ trailer, number of outside stairs: 1, walk-in shower  Home Safety Concerns: unremarkable    DISCHARGE CAREGIVER/SUPPORT SYSTEM     Identified post-op caregiver: Patient has spouse / significant other.  Patient's caregiver(s) will be able to provide physical assistance. Patient will have someone to assist overnight.      Caregiver present at pre-op interview:  No      PRE-OPERATIVE FUNCTIONAL STATUS     Employment: Retired    Pre-op Functional Status: Patient is independent with mobility/ambulation, transfers, ADL's, IADL's.    Use of assistive device for ambulation: none  ADL: self care  ADL Limitations: difficulty with walking  Medical Restrictions: Unstable ambulation and Decreased range of motions in extremities    POTENTIAL BARRIERS TO DISCHARGE/POTENTIAL POST-OP COMPLICATIONS     Patient with hx of CHF (2015),  coronary artery disease with long-term anticoagulation use (Coumadin), difficult intubation, DVT, HTN, respiratory distress. POSSIBLE SAME DAY DISCHARGE.    DISCHARGE PLAN     Expected LOS of 1 days or less for joint replacement discussed with patient. We also discussed a discharge path of HH for approximately two weeks with a transition to outpatient PT on the third week given no post-op complications.      Patient in agreement with discharge plan: Yes    Discharge to: Discharge home with home health (PT/OT) x2 weeks with transition to outpatient PT     HH:  [facility] ([place], [place]). Patient disclosure form completed and sent to case management for upload to the medical record.      OP PT: [facility] ([place], [place]).     Home DME: none    Needed DME at D/C: rolling walker and bedside commode. Patient to purchase BSC from retail prior to surgery.     Rx: Per Dr. Craig at discharge     Meds to Beds: Yes  Patient expected to discharge on current regimen of Coumadin (Warfarin) for DVT prophylaxis.

## 2025-01-29 NOTE — PROGRESS NOTES
Please advise them to keep appt. Thank you Received pt message that he is having Mohs on 8/15. Dr. Levin ok'd for pt to hold a few days if needed. Our MD here at [facility] only requests INR to be 2.5 or less for the procedure. I have asked him if his doctor advised him to hold specifically. We will plan to target INR of 2.5 or less unless procedural MD instructed otherwise. He has a home meter so we can watch closely and adjust dose based on INR closer to procedure.

## 2025-01-29 NOTE — PRE ADMISSION SCREENING
"               [facility] Risk Assessment Scale    Patient Name: Del Anand  YOB: 1945  MRN: [id number]            RIsk Factor Measure Recommendation Patient Data Scale/Score   BMI >40 Reconsider surgery, weight loss   Estimated body mass index is 40.23 kg/m² as calculated from the following:    Height as of 1/28/25: 6' 3" (1.905 m).    Weight as of 1/28/25: 146 kg (321 lb 14 oz).   [] 0 = 1 - 24.9  [] 1 = 25-29.9  [] 2 = 30-34.9  [] 3 = 35-39.9  [x] 4 = 40-44.9  [] 5 = 45-99.9   Hemoglobin AIC (if applicable) >9 Delay surgery until DM under control  Refer for:  Nutrition Therapy  Exercise   Medication    Lab Results   Component Value Date    HGBA1C 5.9 (H) 06/11/2024       Lab Results   Component Value Date     (H) 01/29/2025      [] 0 = 4.0-5.6  [x] 1 = 5.7-6.4  [] 2 = 6.5-6.9  [] 3 = 7.0-7.9  [] 4 = 8.0-8.9  [] 5 = 9.0-12   Hemoglobin (Anemia) <9 Delay surgery   Correct anemia Lab Results   Component Value Date    HGB 13.3 (L) 01/29/2025    [] 20 - <9.0                    Albumin <3 Delay surgery &Workup Lab Results   Component Value Date    ALBUMIN 4.0 01/29/2025    [] 20 - <3.0   Smoking Cessation >4 Weeks Delay Surgery  Refer to OP Cessation Class    Former Smoker  Quit: 1967 [] 20 - current smoker                                _____ PPD                    Hx of MI, PE, Arrhythmia, CVA, DVT <30 Days Delay Surgery    Hx of DVT [x] 20      Infection Variable Delay surgery and re-evaluate   N/A [] 20 - recent/current infection     Depression (PHQ) >10 out of 27 Delay Surgery and re-evaluate  Medication  Counseling              [x] 0     []1     []2     []3      []4      [] 5                    (1-4)      (5-9)  (10-14)  (15-19)   (20-27)     Memory Impairment & Memory loss (Mini-Cog Screening Tool) Advanced dementia and/or Parkinson's Reconsider surgery     [x] 0     []1     []2     []3     []4     [] 5     Physical Conditioning (Modified AM-PAC Per Physical Therapy at Joint Camp) Unable to " ambulate on day of surgery Delay surgery and re-evaluate  Pre-Rehabilitation   (PT evaluation)       [x]  0   []4       []8     []12        []16     []20       (<20%)   (<40%)   (<60%)   (<80% )    (>80%)     Home Environment/Caregiver support  (Per /Navigator Interview)    Availability of basic services and/or approprate assistance during post-operative period Delay surgery and re-evaluate  Safe home environment  Health   1 week post-surgery  Transportation  availability  Ability to obtain DME/Medications post-op    [x] 0     []1     []2     []3     []4     [] 5  [x] 0     []1     []2     []3     []4     [] 5  [x] 0     []1     []2     []3     []4     [] 5  [x] 0     []1     []2     []3     []4     [] 5         MD Contact: Dr. Craig Comments:  Total Score:  25

## 2025-01-29 NOTE — PRE ADMISSION SCREENING
Patient Name: Del Anand  YOB: 1945   MRN: [id number]     [facility]   Basic Mobility Inpatient Short Form 6 Clicks         How much difficulty does the patient currently have  Unable  A Lot  A Little  None      1. Turning over in bed (including adjusting bedclothes, sheets and blankets)?     1 []    2 []    3 []    4 [x]        2. Sitting down on and standing up from a chair with arms (e.g., wheelchair, bedside commode, etc.)     1 []  2 []  3 []     4 [x]      3. Moving from lying on back to sitting on the side of the bed?     1 []  2 []  3 []    4 [x]    How much help from another person does the patient currently need  Total  A Lot  A Little  None      4. Moving to and from a bed to a chair (including a wheelchair)?    1 []  2 []  3 []    4 [x]      5. Need to walk in hospital room?    1 []  2 []  3 []    4 [x]      6. Climbing 3-5 steps with a railing?    1 []  2 []  3 []    4 [x]       Raw Score:      24            CMS 0-100% Score:     0       %   Standardized Score:    61.14           CMS Modifier:      [facility] AM-PAC   Basic Mobility Inpatient Short Form 6 Clicks Score Conversion Table*         *Use this form to convert -PAC Basic Mobility Inpatient Raw Scores.   [facility] Inpatient Basic Mobility Short Form Scoring Example   1. Add the number values associated with the response to each item. For example, items totals yield a Raw Score of 21.   2. Match the raw score to the t-Scale scores (t-Scale score = 50.25, SE = 4.69).   3. Find the associated CMS % (CMS % = 28.97%).   4. Locate the correct CMS Functional Modifier Code, or G Code (G code = CJ)     NOTE: Each -PAC Short Form has a separate conversion table. Make sure that you use the correct conversion table.       Instruction Manual - page 45 contains conversion table

## 2025-01-30 ENCOUNTER — HOSPITAL ENCOUNTER (OUTPATIENT)
Dept: CARDIOLOGY | Facility: HOSPITAL | Age: 80
Discharge: HOME | End: 2025-01-30
Attending: INTERNAL MEDICINE
Payer: MEDICARE

## 2025-01-30 ENCOUNTER — HOSPITAL ENCOUNTER (OUTPATIENT)
Dept: RADIOLOGY | Facility: HOSPITAL | Age: 80
Discharge: HOME | End: 2025-01-30
Attending: INTERNAL MEDICINE
Payer: MEDICARE

## 2025-01-30 ENCOUNTER — PATIENT MESSAGE (OUTPATIENT)
Dept: CARDIOLOGY | Facility: CLINIC | Age: 80
End: 2025-01-30
Payer: MEDICARE

## 2025-01-30 VITALS — WEIGHT: 315 LBS | HEIGHT: 75 IN | BODY MASS INDEX: 39.17 KG/M2

## 2025-01-30 DIAGNOSIS — Z01.810 PREOP CARDIOVASCULAR EXAM: ICD-10-CM

## 2025-01-30 LAB
AORTIC ROOT ANNULUS: 3.3 CM
AORTIC VALVE CUSP SEPERATION: 2.2 CM
APICAL FOUR CHAMBER EJECTION FRACTION: 61 %
APICAL TWO CHAMBER EJECTION FRACTION: 45 %
AV INDEX (PROSTH): 0.97
AV MEAN GRADIENT: 3 MMHG
AV PEAK GRADIENT: 6 MMHG
AV VALVE AREA BY VELOCITY RATIO: 3.5 CM²
AV VALVE AREA: 4 CM²
AV VELOCITY RATIO: 0.83
BSA FOR ECHO PROCEDURE: 2.78 M2
CV ECHO LV RWT: 0.51 CM
DOP CALC AO PEAK VEL: 1.2 M/S
DOP CALC AO VTI: 27 CM
DOP CALC LVOT AREA: 4.2 CM2
DOP CALC LVOT DIAMETER: 2.3 CM
DOP CALC LVOT PEAK VEL: 1 M/S
DOP CALC LVOT STROKE VOLUME: 108.4 CM3
DOP CALCLVOT PEAK VEL VTI: 26.1 CM
E WAVE DECELERATION TIME: 236 MSEC
E/A RATIO: 1.01
E/E' RATIO: 9 M/S
ECHO LV POSTERIOR WALL: 1.2 CM (ref 0.6–1.1)
FRACTIONAL SHORTENING: 34 % (ref 28–44)
INTERVENTRICULAR SEPTUM: 1.2 CM (ref 0.6–1.1)
IVRT: 148 MSEC
LEFT ATRIUM AREA SYSTOLIC (APICAL 2 CHAMBER): 26.2 CM2
LEFT ATRIUM AREA SYSTOLIC (APICAL 4 CHAMBER): 23.1 CM2
LEFT ATRIUM VOLUME INDEX MOD: 28 ML/M2
LEFT ATRIUM VOLUME MOD: 76 ML
LEFT INTERNAL DIMENSION IN SYSTOLE: 3.1 CM (ref 2.1–4)
LEFT VENTRICLE DIASTOLIC VOLUME INDEX: 38.06 ML/M2
LEFT VENTRICLE DIASTOLIC VOLUME: 102 ML
LEFT VENTRICLE END DIASTOLIC VOLUME APICAL 2 CHAMBER: 30 ML
LEFT VENTRICLE END DIASTOLIC VOLUME APICAL 4 CHAMBER: 118 ML
LEFT VENTRICLE END SYSTOLIC VOLUME APICAL 2 CHAMBER: 87.1 ML
LEFT VENTRICLE END SYSTOLIC VOLUME APICAL 4 CHAMBER: 62.2 ML
LEFT VENTRICLE MASS INDEX: 79.1 G/M2
LEFT VENTRICLE SYSTOLIC VOLUME INDEX: 14.1 ML/M2
LEFT VENTRICLE SYSTOLIC VOLUME: 37.9 ML
LEFT VENTRICULAR INTERNAL DIMENSION IN DIASTOLE: 4.7 CM (ref 3.5–6)
LEFT VENTRICULAR MASS: 212 G
LV LATERAL E/E' RATIO: 7.9 M/S
LV SEPTAL E/E' RATIO: 10.1 M/S
LVED V (TEICH): 102 ML
LVES V (TEICH): 37.9 ML
LVOT MG: 2 MMHG
LVOT MV: 0.7 CM/S
MV PEAK A VEL: 0.7 M/S
MV PEAK E VEL: 0.71 M/S
MV STENOSIS PRESSURE HALF TIME: 122 MS
MV VALVE AREA P 1/2 METHOD: 1.8 CM2
OHS CV RV/LV RATIO: 0.6 CM
OHS LV EJECTION FRACTION SIMPSONS BIPLANE MOD: 52 %
PISA TR MAX VEL: 2 M/S
RIGHT VENTRICLE DIASTOLIC BASEL DIMENSION: 2.8 CM
RIGHT VENTRICULAR END-DIASTOLIC DIMENSION: 2.8 CM
TDI LATERAL: 0.09 M/S
TDI SEPTAL: 0.07 M/S
TDI: 0.08 M/S
TR MAX PG: 16 MMHG
Z-SCORE OF LEFT VENTRICULAR DIMENSION IN END DIASTOLE: -16.25
Z-SCORE OF LEFT VENTRICULAR DIMENSION IN END SYSTOLE: -11.85

## 2025-01-30 PROCEDURE — A9502 TC99M TETROFOSMIN: HCPCS | Performed by: INTERNAL MEDICINE

## 2025-01-30 PROCEDURE — 93018 CV STRESS TEST I&R ONLY: CPT | Mod: ,,, | Performed by: INTERNAL MEDICINE

## 2025-01-30 PROCEDURE — 93016 CV STRESS TEST SUPVJ ONLY: CPT | Mod: ,,, | Performed by: NURSE PRACTITIONER

## 2025-01-30 PROCEDURE — 93306 TTE W/DOPPLER COMPLETE: CPT | Mod: 26,,, | Performed by: INTERNAL MEDICINE

## 2025-01-30 PROCEDURE — 78452 HT MUSCLE IMAGE SPECT MULT: CPT

## 2025-01-30 PROCEDURE — 78452 HT MUSCLE IMAGE SPECT MULT: CPT | Mod: 26,,, | Performed by: INTERNAL MEDICINE

## 2025-01-30 PROCEDURE — 93306 TTE W/DOPPLER COMPLETE: CPT

## 2025-01-30 PROCEDURE — 63600175 PHARM REV CODE 636 W HCPCS: Performed by: INTERNAL MEDICINE

## 2025-01-30 RX ORDER — REGADENOSON 0.08 MG/ML
0.4 INJECTION, SOLUTION INTRAVENOUS ONCE
Status: COMPLETED | OUTPATIENT
Start: 2025-01-30 | End: 2025-01-30

## 2025-01-30 RX ADMIN — REGADENOSON 0.4 MG: 0.08 INJECTION, SOLUTION INTRAVENOUS at 10:01

## 2025-01-30 RX ADMIN — TETROFOSMIN 27.5 MILLICURIE: 1.38 INJECTION, POWDER, LYOPHILIZED, FOR SOLUTION INTRAVENOUS at 10:01

## 2025-01-30 NOTE — PROGRESS NOTES
Patient is scheduled for TKA 2/10/25. We have sent the following message to Dr Jones(cardiology) and Dr Craig (orthopedics):    We have cleared this patient to hold coumadin 5-7 days for Orthopedic surgery on 2/10/25.   We are planning to bridge with lovenox due to his history of DVT & PE.    Of note, We plan to stop lovenox PRE-procedure 48 hours prior to procedure and restart the lovenox 24 hours after the procedure (If ok'd by performing MD). We plan to restart coumadin the evening of the procedure (if ok'd by performing MD).   Please let us know if you have any questions or concerns otherwise we will proceed as planned.               1/31:Pt want his lovenox sent to [facility] pharmacy in [place]  (I am still awaiting feedback from MD; patient having stress test/ECHO and once these result MD will provide clearance)- For now, I will send in Lovneox rx so that it can be processed. I will check status on 2/3 AM and advise patient on if he needs to  the lovenox or not. I am sending in lovenox rx to pharmacy today in case it is out of tock and needs to be ordered; if Dr Jones advises he does not require lovenox then we will cancel rx.       Height    6'3            Weight   146kg           SCr    0.8            CrCl    >30             Pre-Procedure Instructions:    Enoxaparin dose is:          150mg              Every 12 hours (Twice Daily)--Sent to [facility]; further instructions to follow on MOnday 2/3

## 2025-01-31 ENCOUNTER — HOSPITAL ENCOUNTER (OUTPATIENT)
Dept: RADIOLOGY | Facility: HOSPITAL | Age: 80
Discharge: HOME | End: 2025-01-31
Attending: INTERNAL MEDICINE
Payer: MEDICARE

## 2025-01-31 ENCOUNTER — TELEPHONE (OUTPATIENT)
Dept: CARDIOLOGY | Facility: CLINIC | Age: 80
End: 2025-01-31
Payer: MEDICARE

## 2025-01-31 LAB
CV PHARM DOSE: 0.4 MG
CV STRESS BASE HR: 62 BPM
DIASTOLIC BLOOD PRESSURE: 75 MMHG
EJECTION FRACTION- HIGH: 65 %
END DIASTOLIC INDEX-HIGH: 153 ML/M2
END DIASTOLIC INDEX-LOW: 93 ML/M2
END SYSTOLIC INDEX-HIGH: 71 ML/M2
END SYSTOLIC INDEX-LOW: 31 ML/M2
NUC REST DIASTOLIC VOLUME INDEX: 178
NUC REST EJECTION FRACTION: 57
NUC REST SYSTOLIC VOLUME INDEX: 77
NUC STRESS DIASTOLIC VOLUME INDEX: 174
NUC STRESS EJECTION FRACTION: 61 %
NUC STRESS SYSTOLIC VOLUME INDEX: 67
OHS CV CPX 1 MINUTE RECOVERY HEART RATE: 81 BPM
OHS CV CPX 85 PERCENT MAX PREDICTED HEART RATE MALE: 122
OHS CV CPX MAX PREDICTED HEART RATE: 143
OHS CV CPX PATIENT IS FEMALE: 0
OHS CV CPX PATIENT IS MALE: 1
OHS CV CPX PEAK DIASTOLIC BLOOD PRESSURE: 58 MMHG
OHS CV CPX PEAK HEAR RATE: 81 BPM
OHS CV CPX PEAK RATE PRESSURE PRODUCT: NORMAL
OHS CV CPX PEAK SYSTOLIC BLOOD PRESSURE: 143 MMHG
OHS CV CPX PERCENT MAX PREDICTED HEART RATE ACHIEVED: 57
OHS CV CPX RATE PRESSURE PRODUCT PRESENTING: 8184
OHS CV INITIAL DOSE: 24.3 MCG/KG/MIN
OHS CV PEAK DOSE: 27.5 MCG/KG/MIN
RETIRED EF AND QEF - SEE NOTES: 53 %
SYSTOLIC BLOOD PRESSURE: 132 MMHG

## 2025-01-31 PROCEDURE — A9502 TC99M TETROFOSMIN: HCPCS | Performed by: INTERNAL MEDICINE

## 2025-01-31 RX ORDER — ENOXAPARIN SODIUM 150 MG/ML
150 INJECTION SUBCUTANEOUS EVERY 12 HOURS
Qty: 14 EACH | Refills: 1 | Status: SHIPPED | OUTPATIENT
Start: 2025-01-31

## 2025-01-31 RX ADMIN — TETROFOSMIN 24.3 MILLICURIE: 1.38 INJECTION, POWDER, LYOPHILIZED, FOR SOLUTION INTRAVENOUS at 08:01

## 2025-01-31 NOTE — TELEPHONE ENCOUNTER
----- Message from Pharmacist Jennifer sent at 1/31/2025  9:05 AM CST -----    ----- Message -----  From: Jennifer Hunt PharmD  Sent: 1/30/2025   1:54 PM CST  To: Carl Craig MD; Beau Jones MD    Good Afternoon,   We have cleared this patient to hold coumadin 5-7 days for Orthopedic surgery on 2/10/25.   We are planning to bridge with lovenox due to his history of DVT & PE.    Of note, We plan to stop lovenox PRE-procedure 48 hours prior to procedure and restart the lovenox 24 hours after the procedure (If ok'd by performing MD). We plan to restart coumadin the evening of the procedure (if ok'd by performing MD).   Please let us know if you have any questions or concerns otherwise we will proceed as planned.     Respectfully,   Coumadin Clinic  Ph [phone]

## 2025-02-03 ENCOUNTER — PATIENT MESSAGE (OUTPATIENT)
Dept: CARDIOLOGY | Facility: CLINIC | Age: 80
End: 2025-02-03
Payer: MEDICARE

## 2025-02-03 ENCOUNTER — OFFICE VISIT (OUTPATIENT)
Dept: CARDIOLOGY | Facility: CLINIC | Age: 80
End: 2025-02-03
Payer: MEDICARE

## 2025-02-03 VITALS
HEIGHT: 72 IN | BODY MASS INDEX: 42.66 KG/M2 | WEIGHT: 315 LBS | DIASTOLIC BLOOD PRESSURE: 76 MMHG | HEART RATE: 83 BPM | OXYGEN SATURATION: 99 % | SYSTOLIC BLOOD PRESSURE: 152 MMHG

## 2025-02-03 DIAGNOSIS — R94.39 ABNORMAL NUCLEAR STRESS TEST: Primary | ICD-10-CM

## 2025-02-03 DIAGNOSIS — R73.09 OTHER ABNORMAL GLUCOSE: ICD-10-CM

## 2025-02-03 DIAGNOSIS — G47.33 OSA ON CPAP: ICD-10-CM

## 2025-02-03 DIAGNOSIS — I82.591 CHRONIC DEEP VEIN THROMBOSIS (DVT) OF OTHER VEIN OF RIGHT LOWER EXTREMITY: ICD-10-CM

## 2025-02-03 DIAGNOSIS — E78.2 MIXED HYPERLIPIDEMIA: ICD-10-CM

## 2025-02-03 DIAGNOSIS — R79.1 ABNORMAL COAGULATION PROFILE: ICD-10-CM

## 2025-02-03 DIAGNOSIS — I25.10 CORONARY ARTERY DISEASE INVOLVING NATIVE CORONARY ARTERY OF NATIVE HEART WITHOUT ANGINA PECTORIS: ICD-10-CM

## 2025-02-03 DIAGNOSIS — Z95.1 S/P CABG X 2: ICD-10-CM

## 2025-02-03 DIAGNOSIS — I10 ESSENTIAL HYPERTENSION: ICD-10-CM

## 2025-02-03 PROCEDURE — 1159F MED LIST DOCD IN RCRD: CPT | Mod: CPTII,S$GLB,,

## 2025-02-03 PROCEDURE — 3077F SYST BP >= 140 MM HG: CPT | Mod: CPTII,S$GLB,,

## 2025-02-03 PROCEDURE — 1157F ADVNC CARE PLAN IN RCRD: CPT | Mod: CPTII,S$GLB,,

## 2025-02-03 PROCEDURE — 99214 OFFICE O/P EST MOD 30 MIN: CPT | Mod: S$GLB,,,

## 2025-02-03 PROCEDURE — 1160F RVW MEDS BY RX/DR IN RCRD: CPT | Mod: CPTII,S$GLB,,

## 2025-02-03 PROCEDURE — 1101F PT FALLS ASSESS-DOCD LE1/YR: CPT | Mod: CPTII,S$GLB,,

## 2025-02-03 PROCEDURE — 3288F FALL RISK ASSESSMENT DOCD: CPT | Mod: CPTII,S$GLB,,

## 2025-02-03 PROCEDURE — 1126F AMNT PAIN NOTED NONE PRSNT: CPT | Mod: CPTII,S$GLB,,

## 2025-02-03 PROCEDURE — 99999 PR PBB SHADOW E&M-EST. PATIENT-LVL III: CPT | Mod: PBBFAC,,,

## 2025-02-03 PROCEDURE — 3078F DIAST BP <80 MM HG: CPT | Mod: CPTII,S$GLB,,

## 2025-02-03 RX ORDER — DIPHENHYDRAMINE HCL 50 MG
50 CAPSULE ORAL
Status: CANCELLED | OUTPATIENT
Start: 2025-02-03

## 2025-02-03 RX ORDER — SODIUM CHLORIDE 0.9 % (FLUSH) 0.9 %
2 SYRINGE (ML) INJECTION
Status: SHIPPED | OUTPATIENT
Start: 2025-02-03

## 2025-02-03 RX ORDER — SODIUM CHLORIDE 9 MG/ML
INJECTION, SOLUTION INTRAVENOUS ONCE
Status: CANCELLED | OUTPATIENT
Start: 2025-02-03 | End: 2025-02-03

## 2025-02-03 NOTE — ASSESSMENT & PLAN NOTE
Continue aspirin and statin therapy. Continue zetia.  Continue current antihypertensive regimen. BP elevated today in office d/t stress from abnormal stress test

## 2025-02-03 NOTE — ASSESSMENT & PLAN NOTE
Hx cabg x 2  Denies anginal equivalent symptoms  Nuclear stress test is positive for reversible ischemia  Cardiac cath for further eval  Discussed with patient the risks and benefits of the procedure including, but not limited to, the following 1:1000 risk of Heart attack, stroke and death with 3-5% Risk of bleeding, vessel damage, and the need for emergent CABG Surgery.  All questions have been answered to patient's satisfaction.  Informed consent obtained

## 2025-02-03 NOTE — H&P (VIEW-ONLY)
Subjective:    Patient ID:  Del Anand is a 79 y.o. male patient here for evaluation Results (Abn results discussion no issues stated )      History of Present Illness:       Here today for abnormal stress test results.   Needs cardiac clearance for L knee replacement.   Denies anginal equivalent symptoms  Hx CABG 2014  On Coumadin for hx DVT/PE      Review of patient's allergies indicates:  No Known Allergies    Past Medical History:   Diagnosis Date    Benign neoplasm of colon     Cataract     CHF (congestive heart failure) 01/13/2015    Coronary artery disease     Difficult intubation     DVT (deep venous thrombosis)     HLD (hyperlipidemia)     HTN (hypertension)     Impaired fasting glucose     Kidney stone     Metabolic syndrome     Nonspecific abnormal results of liver function study     Nonspecific findings on examination of blood     Nuclear sclerosis - Both Eyes 10/18/2013    Osteoarthrosis, unspecified whether generalized or localized, lower leg     Respiratory distress     AFTER OPEN HEART SURGERY STATES ON VENTILATOR    Squamous cell carcinoma of skin      Past Surgical History:   Procedure Laterality Date    CARDIAC SURGERY      CATARACT EXTRACTION W/  INTRAOCULAR LENS IMPLANT Right 09/17/2020    Procedure: EXTRACTION, CATARACT, WITH IOL INSERTION;  Surgeon: Chanel Klein MD;  Location: [facility];  Service: Ophthalmology;  Laterality: Right;    CATARACT EXTRACTION W/  INTRAOCULAR LENS IMPLANT Left 10/01/2020    Procedure: EXTRACTION, CATARACT, WITH IOL INSERTION;  Surgeon: Chanel Klein MD;  Location: [facility];  Service: Ophthalmology;  Laterality: Left;    COLONOSCOPY N/A 03/13/2019    Procedure: COLONOSCOPY;  Surgeon: Nikunj Larson MD;  Location: [address]);  Service: Endoscopy;  Laterality: N/A;  ok to hold Coumadin x 5 days with a Lovenox bridge per Coumadin clinic  2nd floor - history of difficult intubation-MS    COLONOSCOPY N/A 07/07/2022    Procedure: COLONOSCOPY;  Surgeon:  Nikunj Larson MD;  Location: [facility] ([address] [place] [address]);  Service: Endoscopy;  Laterality: N/A;  ef 45%--coumadin hold per coumadin clinic see coag visist -tb-vacc- clears 4 hrs prior-am prep 2-3-am-inst portal-tb    CORONARY ARTERY BYPASS GRAFT      2014    CYSTOSCOPY      KIDNEY STONE SURGERY      KNEE ARTHROPLASTY      bilateral    renal stone      SKIN BIOPSY       Social History     Tobacco Use    Smoking status: Former     Current packs/day: 0.00     Average packs/day: 1 pack/day for 20.0 years (20.0 ttl pk-yrs)     Types: Cigarettes     Start date: 10/16/1967     Quit date: 10/16/1987     Years since quittin.3     Passive exposure: Past    Smokeless tobacco: Former   Substance Use Topics    Alcohol use: Yes     Types: 1 - 2 Cans of beer, 1 - 2 Standard drinks or equivalent per week     Comment: BEER OR WHISKEY DAILY 1-2    Drug use: No        Review of Systems:    As noted in HPI in addition      REVIEW OF SYSTEMS  CARDIOVASCULAR: No recent chest pain, palpitations, arm, neck, or jaw pain  RESPIRATORY: No recent fever, cough chills, SOB or congestion  : No blood in the urine  GI: No Nausea, vomiting, constipation, diarrhea, blood, or reflux.  MUSCULOSKELETAL: No myalgias  NEURO: No lightheadedness or dizziness  EYES: No Double vision, blurry, vision or headache              Objective        Vitals:    25 1615   BP: (!) 152/76   Pulse: 83       LIPIDS - LAST 2   Lab Results   Component Value Date    CHOL 108 (L) 2024    CHOL 109 (L) 2023    HDL 44 2024    HDL 41 2023    LDLCALC 43.2 (L) 2024    LDLCALC 47.8 (L) 2023    TRIG 104 2024    TRIG 101 2023    CHOLHDL 40.7 2024    CHOLHDL 37.6 2023       CBC - LAST 2  Lab Results   Component Value Date    WBC 5.91 2025    WBC 5.75 2024    RBC 4.06 (L) 2025    RBC 3.96 (L) 2024    HGB 13.3 (L) 2025    HGB 12.5 (L) 2024    HCT 39.9 (L) 2025    HCT 38.5  (L) 06/11/2024    MCV 98 01/29/2025    MCV 97 06/11/2024    MCH 32.8 (H) 01/29/2025    MCH 31.6 (H) 06/11/2024    MCHC 33.3 01/29/2025    MCHC 32.5 06/11/2024    RDW 13.1 01/29/2025    RDW 12.9 06/11/2024     01/29/2025     06/11/2024    MPV 10.8 01/29/2025    MPV 10.6 06/11/2024    GRAN 3.2 01/29/2025    GRAN 54.4 01/29/2025    LYMPH 1.9 01/29/2025    LYMPH 31.5 01/29/2025    MONO 0.7 01/29/2025    MONO 11.5 01/29/2025    BASO 0.04 01/29/2025    BASO 0.03 06/11/2024    NRBC 0 01/29/2025    NRBC 0 06/11/2024       CHEMISTRY & LIVER FUNCTION - LAST 2  Lab Results   Component Value Date     01/29/2025     06/11/2024    K 4.6 01/29/2025    K 4.6 06/11/2024     01/29/2025     06/11/2024    CO2 26 01/29/2025    CO2 23 06/11/2024    ANIONGAP 9 01/29/2025    ANIONGAP 6 (L) 06/11/2024    BUN 20 01/29/2025    BUN 20 06/11/2024    CREATININE 0.8 01/29/2025    CREATININE 0.7 06/11/2024     (H) 01/29/2025     (H) 06/11/2024    CALCIUM 9.0 01/29/2025    CALCIUM 8.6 (L) 06/11/2024    PH 7.360 12/03/2014    PH 7.369 12/02/2014    MG 2.0 04/11/2015    MG 1.8 04/10/2015    ALBUMIN 4.0 01/29/2025    ALBUMIN 3.5 06/11/2024    PROT 7.4 01/29/2025    PROT 6.5 06/11/2024    ALKPHOS 96 01/29/2025    ALKPHOS 97 06/11/2024    ALT 30 01/29/2025    ALT 25 06/11/2024    AST 27 01/29/2025    AST 23 06/11/2024    BILITOT 0.9 01/29/2025    BILITOT 0.8 06/11/2024        CARDIAC PROFILE - LAST 2  Lab Results   Component Value Date     (H) 01/13/2015     (H) 01/12/2015    CPK 51 04/10/2015    CPK 41 01/13/2015    CPKMB 0.6 01/13/2015     11/02/2009    TROPONINI 0.014 01/14/2015    TROPONINI 0.019 01/14/2015        COAGULATION - LAST 2  Lab Results   Component Value Date    INR 2.8 01/29/2025    INR 2.7 01/16/2025    APTT 24.9 04/10/2015    APTT 25.4 04/10/2015       ENDOCRINE & PSA - LAST 2  Lab Results   Component Value Date    HGBA1C 5.9 (H) 06/11/2024    HGBA1C 5.7 (H)  11/21/2023    TSH 1.455 06/11/2024    TSH 1.637 11/21/2023    PROCAL 0.02 08/21/2019    PSA 0.14 11/21/2023    PSA 0.16 12/04/2013        ECHOCARDIOGRAM RESULTS  No results found for this or any previous visit.      CURRENT/PREVIOUS VISIT EKG  Results for orders placed or performed in visit on 01/08/25   IN OFFICE EKG 12-LEAD (to [place])    Collection Time: 01/08/25  2:25 PM   Result Value Ref Range    QRS Duration 114 ms    OHS QTC Calculation 456 ms    Narrative    Test Reason : Z01.810,    Vent. Rate :  69 BPM     Atrial Rate :  69 BPM     P-R Int : 204 ms          QRS Dur : 114 ms      QT Int : 426 ms       P-R-T Axes :  59 -66  36 degrees    QTcB Int : 456 ms    Normal sinus rhythm  Left axis deviation  Incomplete left bundle branch block  Abnormal ECG  When compared with ECG of 16-May-2023 15:42,  Incomplete left bundle branch block is now Present  Minimal criteria for Anterior infarct are no longer Present    Referred By: AAAREFERRAL SELF           Confirmed By:      No valid procedures specified.   Results for orders placed during the hospital encounter of 01/30/25    Nuclear Stress - Cardiology Interpreted    Interpretation Summary    Abnormal myocardial perfusion scan.    There is a moderate intensity, medium sized, reversible perfusion abnormality that is consistent with ischemia in the lateral wall(s).    There are no other significant perfusion abnormalities.    The gated perfusion images showed an ejection fraction of 57% at rest. The gated perfusion images showed an ejection fraction of 61% post stress. Normal ejection fraction is greater than 53%.    The ECG portion of the study is negative for ischemia.    The patient reported no chest pain during the stress test.    During stress, rare PACs are noted.    No valid procedures specified.    PHYSICAL EXAM  CONSTITUTIONAL: Well built, well nourished in no apparent distress  NECK: no carotid bruit, no JVD  LUNGS: CTA  CHEST WALL: no tenderness  HEART:  regular rate and rhythm, S1, S2 normal, no murmur, click, rub or gallop   ABDOMEN: soft, non-tender; bowel sounds normal  EXTREMITIES: Extremities normal, no edema  NEURO: AAO X 3    I HAVE REVIEWED :    The vital signs, nurses notes, and all the pertinent radiology and labs.        Current Outpatient Medications   Medication Instructions    amLODIPine (NORVASC) 2.5 mg, Oral, Daily    aspirin 81 mg, Daily    cyanocobalamin 500 mcg, Every morning    enoxaparin (LOVENOX) 150 mg, Subcutaneous, Every 12 hours, PER SPECIFIC INSTRUCTIONS FROM COUMADIN CLINIC    ezetimibe (ZETIA) 10 mg, Oral, Daily    FOLIC ACID/MULTIVITS-MIN/LUT (CENTRUM SILVER ORAL) 1 tablet, Daily    irbesartan (AVAPRO) 300 mg, Oral, Nightly    ketoconazole (NIZORAL) 2 % cream Topical (Top), 2 times daily    rosuvastatin (CRESTOR) 40 mg, Oral, Daily    triamcinolone acetonide 0.025% (KENALOG) 0.025 % cream Topical (Top), 2 times daily    warfarin (COUMADIN) 10 MG tablet Take 10mg daily or as directed by Coumadin Clinic.   Also uses warfarin 5mg tablet strength    warfarin (COUMADIN) 5 MG tablet Take 10mg daily except 12.5mg Wednesdays          Assessment & Plan     Essential hypertension  Hypertensive today in office.   Check BP daily for next week.   If SBP >130, notify office and will further adjust antihypertensives    Coronary artery disease  Hx cabg x 2  Denies anginal equivalent symptoms  Nuclear stress test is positive for reversible ischemia  Cardiac cath for further eval  Discussed with patient the risks and benefits of the procedure including, but not limited to, the following 1:1000 risk of Heart attack, stroke and death with 3-5% Risk of bleeding, vessel damage, and the need for emergent CABG Surgery.  All questions have been answered to patient's satisfaction.  Informed consent obtained      S/P CABG x 2  Continue aspirin and statin therapy. Continue zetia.  Continue current antihypertensive regimen. BP elevated today in office d/t stress  from abnormal stress test      Mixed hyperlipidemia  Continue zetia and statin  Weight loss  Low sodium heart healthy diet    DVT (deep venous thrombosis)  Bridge with lovenox for cardiac cath    VERONICA on CPAP  Continue CPAP compliance          No follow-ups on file.

## 2025-02-03 NOTE — ASSESSMENT & PLAN NOTE
Hypertensive today in office.   Check BP daily for next week.   If SBP >130, notify office and will further adjust antihypertensives

## 2025-02-03 NOTE — PROGRESS NOTES
Subjective:    Patient ID:  Del Anand is a 79 y.o. male patient here for evaluation Results (Abn results discussion no issues stated )      History of Present Illness:       Here today for abnormal stress test results.   Needs cardiac clearance for L knee replacement.   Denies anginal equivalent symptoms  Hx CABG 2014  On Coumadin for hx DVT/PE      Review of patient's allergies indicates:  No Known Allergies    Past Medical History:   Diagnosis Date    Benign neoplasm of colon     Cataract     CHF (congestive heart failure) 01/13/2015    Coronary artery disease     Difficult intubation     DVT (deep venous thrombosis)     HLD (hyperlipidemia)     HTN (hypertension)     Impaired fasting glucose     Kidney stone     Metabolic syndrome     Nonspecific abnormal results of liver function study     Nonspecific findings on examination of blood     Nuclear sclerosis - Both Eyes 10/18/2013    Osteoarthrosis, unspecified whether generalized or localized, lower leg     Respiratory distress     AFTER OPEN HEART SURGERY STATES ON VENTILATOR    Squamous cell carcinoma of skin      Past Surgical History:   Procedure Laterality Date    CARDIAC SURGERY      CATARACT EXTRACTION W/  INTRAOCULAR LENS IMPLANT Right 09/17/2020    Procedure: EXTRACTION, CATARACT, WITH IOL INSERTION;  Surgeon: Chanel Klein MD;  Location: [facility];  Service: Ophthalmology;  Laterality: Right;    CATARACT EXTRACTION W/  INTRAOCULAR LENS IMPLANT Left 10/01/2020    Procedure: EXTRACTION, CATARACT, WITH IOL INSERTION;  Surgeon: Chanel Klein MD;  Location: [facility];  Service: Ophthalmology;  Laterality: Left;    COLONOSCOPY N/A 03/13/2019    Procedure: COLONOSCOPY;  Surgeon: Nikunj Larson MD;  Location: [address]);  Service: Endoscopy;  Laterality: N/A;  ok to hold Coumadin x 5 days with a Lovenox bridge per Coumadin clinic  2nd floor - history of difficult intubation-MS    COLONOSCOPY N/A 07/07/2022    Procedure: COLONOSCOPY;  Surgeon:  Nikunj Larson MD;  Location: [facility] ([address] [place] [address]);  Service: Endoscopy;  Laterality: N/A;  ef 45%--coumadin hold per coumadin clinic see coag visist -tb-vacc- clears 4 hrs prior-am prep 2-3-am-inst portal-tb    CORONARY ARTERY BYPASS GRAFT      2014    CYSTOSCOPY      KIDNEY STONE SURGERY      KNEE ARTHROPLASTY      bilateral    renal stone      SKIN BIOPSY       Social History     Tobacco Use    Smoking status: Former     Current packs/day: 0.00     Average packs/day: 1 pack/day for 20.0 years (20.0 ttl pk-yrs)     Types: Cigarettes     Start date: 10/16/1967     Quit date: 10/16/1987     Years since quittin.3     Passive exposure: Past    Smokeless tobacco: Former   Substance Use Topics    Alcohol use: Yes     Types: 1 - 2 Cans of beer, 1 - 2 Standard drinks or equivalent per week     Comment: BEER OR WHISKEY DAILY 1-2    Drug use: No        Review of Systems:    As noted in HPI in addition      REVIEW OF SYSTEMS  CARDIOVASCULAR: No recent chest pain, palpitations, arm, neck, or jaw pain  RESPIRATORY: No recent fever, cough chills, SOB or congestion  : No blood in the urine  GI: No Nausea, vomiting, constipation, diarrhea, blood, or reflux.  MUSCULOSKELETAL: No myalgias  NEURO: No lightheadedness or dizziness  EYES: No Double vision, blurry, vision or headache              Objective        Vitals:    25 1615   BP: (!) 152/76   Pulse: 83       LIPIDS - LAST 2   Lab Results   Component Value Date    CHOL 108 (L) 2024    CHOL 109 (L) 2023    HDL 44 2024    HDL 41 2023    LDLCALC 43.2 (L) 2024    LDLCALC 47.8 (L) 2023    TRIG 104 2024    TRIG 101 2023    CHOLHDL 40.7 2024    CHOLHDL 37.6 2023       CBC - LAST 2  Lab Results   Component Value Date    WBC 5.91 2025    WBC 5.75 2024    RBC 4.06 (L) 2025    RBC 3.96 (L) 2024    HGB 13.3 (L) 2025    HGB 12.5 (L) 2024    HCT 39.9 (L) 2025    HCT 38.5  (L) 06/11/2024    MCV 98 01/29/2025    MCV 97 06/11/2024    MCH 32.8 (H) 01/29/2025    MCH 31.6 (H) 06/11/2024    MCHC 33.3 01/29/2025    MCHC 32.5 06/11/2024    RDW 13.1 01/29/2025    RDW 12.9 06/11/2024     01/29/2025     06/11/2024    MPV 10.8 01/29/2025    MPV 10.6 06/11/2024    GRAN 3.2 01/29/2025    GRAN 54.4 01/29/2025    LYMPH 1.9 01/29/2025    LYMPH 31.5 01/29/2025    MONO 0.7 01/29/2025    MONO 11.5 01/29/2025    BASO 0.04 01/29/2025    BASO 0.03 06/11/2024    NRBC 0 01/29/2025    NRBC 0 06/11/2024       CHEMISTRY & LIVER FUNCTION - LAST 2  Lab Results   Component Value Date     01/29/2025     06/11/2024    K 4.6 01/29/2025    K 4.6 06/11/2024     01/29/2025     06/11/2024    CO2 26 01/29/2025    CO2 23 06/11/2024    ANIONGAP 9 01/29/2025    ANIONGAP 6 (L) 06/11/2024    BUN 20 01/29/2025    BUN 20 06/11/2024    CREATININE 0.8 01/29/2025    CREATININE 0.7 06/11/2024     (H) 01/29/2025     (H) 06/11/2024    CALCIUM 9.0 01/29/2025    CALCIUM 8.6 (L) 06/11/2024    PH 7.360 12/03/2014    PH 7.369 12/02/2014    MG 2.0 04/11/2015    MG 1.8 04/10/2015    ALBUMIN 4.0 01/29/2025    ALBUMIN 3.5 06/11/2024    PROT 7.4 01/29/2025    PROT 6.5 06/11/2024    ALKPHOS 96 01/29/2025    ALKPHOS 97 06/11/2024    ALT 30 01/29/2025    ALT 25 06/11/2024    AST 27 01/29/2025    AST 23 06/11/2024    BILITOT 0.9 01/29/2025    BILITOT 0.8 06/11/2024        CARDIAC PROFILE - LAST 2  Lab Results   Component Value Date     (H) 01/13/2015     (H) 01/12/2015    CPK 51 04/10/2015    CPK 41 01/13/2015    CPKMB 0.6 01/13/2015     11/02/2009    TROPONINI 0.014 01/14/2015    TROPONINI 0.019 01/14/2015        COAGULATION - LAST 2  Lab Results   Component Value Date    INR 2.8 01/29/2025    INR 2.7 01/16/2025    APTT 24.9 04/10/2015    APTT 25.4 04/10/2015       ENDOCRINE & PSA - LAST 2  Lab Results   Component Value Date    HGBA1C 5.9 (H) 06/11/2024    HGBA1C 5.7 (H)  11/21/2023    TSH 1.455 06/11/2024    TSH 1.637 11/21/2023    PROCAL 0.02 08/21/2019    PSA 0.14 11/21/2023    PSA 0.16 12/04/2013        ECHOCARDIOGRAM RESULTS  No results found for this or any previous visit.      CURRENT/PREVIOUS VISIT EKG  Results for orders placed or performed in visit on 01/08/25   IN OFFICE EKG 12-LEAD (to [place])    Collection Time: 01/08/25  2:25 PM   Result Value Ref Range    QRS Duration 114 ms    OHS QTC Calculation 456 ms    Narrative    Test Reason : Z01.810,    Vent. Rate :  69 BPM     Atrial Rate :  69 BPM     P-R Int : 204 ms          QRS Dur : 114 ms      QT Int : 426 ms       P-R-T Axes :  59 -66  36 degrees    QTcB Int : 456 ms    Normal sinus rhythm  Left axis deviation  Incomplete left bundle branch block  Abnormal ECG  When compared with ECG of 16-May-2023 15:42,  Incomplete left bundle branch block is now Present  Minimal criteria for Anterior infarct are no longer Present    Referred By: AAAREFERRAL SELF           Confirmed By:      No valid procedures specified.   Results for orders placed during the hospital encounter of 01/30/25    Nuclear Stress - Cardiology Interpreted    Interpretation Summary    Abnormal myocardial perfusion scan.    There is a moderate intensity, medium sized, reversible perfusion abnormality that is consistent with ischemia in the lateral wall(s).    There are no other significant perfusion abnormalities.    The gated perfusion images showed an ejection fraction of 57% at rest. The gated perfusion images showed an ejection fraction of 61% post stress. Normal ejection fraction is greater than 53%.    The ECG portion of the study is negative for ischemia.    The patient reported no chest pain during the stress test.    During stress, rare PACs are noted.    No valid procedures specified.    PHYSICAL EXAM  CONSTITUTIONAL: Well built, well nourished in no apparent distress  NECK: no carotid bruit, no JVD  LUNGS: CTA  CHEST WALL: no tenderness  HEART:  regular rate and rhythm, S1, S2 normal, no murmur, click, rub or gallop   ABDOMEN: soft, non-tender; bowel sounds normal  EXTREMITIES: Extremities normal, no edema  NEURO: AAO X 3    I HAVE REVIEWED :    The vital signs, nurses notes, and all the pertinent radiology and labs.        Current Outpatient Medications   Medication Instructions    amLODIPine (NORVASC) 2.5 mg, Oral, Daily    aspirin 81 mg, Daily    cyanocobalamin 500 mcg, Every morning    enoxaparin (LOVENOX) 150 mg, Subcutaneous, Every 12 hours, PER SPECIFIC INSTRUCTIONS FROM COUMADIN CLINIC    ezetimibe (ZETIA) 10 mg, Oral, Daily    FOLIC ACID/MULTIVITS-MIN/LUT (CENTRUM SILVER ORAL) 1 tablet, Daily    irbesartan (AVAPRO) 300 mg, Oral, Nightly    ketoconazole (NIZORAL) 2 % cream Topical (Top), 2 times daily    rosuvastatin (CRESTOR) 40 mg, Oral, Daily    triamcinolone acetonide 0.025% (KENALOG) 0.025 % cream Topical (Top), 2 times daily    warfarin (COUMADIN) 10 MG tablet Take 10mg daily or as directed by Coumadin Clinic.   Also uses warfarin 5mg tablet strength    warfarin (COUMADIN) 5 MG tablet Take 10mg daily except 12.5mg Wednesdays          Assessment & Plan     Essential hypertension  Hypertensive today in office.   Check BP daily for next week.   If SBP >130, notify office and will further adjust antihypertensives    Coronary artery disease  Hx cabg x 2  Denies anginal equivalent symptoms  Nuclear stress test is positive for reversible ischemia  Cardiac cath for further eval  Discussed with patient the risks and benefits of the procedure including, but not limited to, the following 1:1000 risk of Heart attack, stroke and death with 3-5% Risk of bleeding, vessel damage, and the need for emergent CABG Surgery.  All questions have been answered to patient's satisfaction.  Informed consent obtained      S/P CABG x 2  Continue aspirin and statin therapy. Continue zetia.  Continue current antihypertensive regimen. BP elevated today in office d/t stress  from abnormal stress test      Mixed hyperlipidemia  Continue zetia and statin  Weight loss  Low sodium heart healthy diet    DVT (deep venous thrombosis)  Bridge with lovenox for cardiac cath    VERONICA on CPAP  Continue CPAP compliance          No follow-ups on file.

## 2025-02-04 NOTE — PROGRESS NOTES
Due to abnormal stress test, patient's TKA will be cancelled/postponed at this time. He will undergo angiogram/LHC 2/11/25. He will hold coumadin x 5 days prior and will be bridged with lovenox. INR 2/5/25 to devise specific plan.

## 2025-02-05 ENCOUNTER — ANTI-COAG VISIT (OUTPATIENT)
Dept: CARDIOLOGY | Facility: CLINIC | Age: 80
End: 2025-02-05
Payer: MEDICARE

## 2025-02-05 ENCOUNTER — PATIENT MESSAGE (OUTPATIENT)
Dept: CARDIOLOGY | Facility: CLINIC | Age: 80
End: 2025-02-05

## 2025-02-05 DIAGNOSIS — Z79.01 LONG TERM CURRENT USE OF ANTICOAGULANT THERAPY: Primary | ICD-10-CM

## 2025-02-05 LAB — INR PPP: 2.8

## 2025-02-05 PROCEDURE — 93793 ANTICOAG MGMT PT WARFARIN: CPT | Mod: S$GLB,,, | Performed by: PHARMACIST

## 2025-02-05 NOTE — PROGRESS NOTES
[facility]    2025 10:36 AM    Assessment/Plan:    Patient presents today with therapeutic INR.    Assessment of patient findings and chart review: Due to abnormal stress test, patient's TKA will be cancelled/postponed at this time. He will undergo angiogram/LHC 25. He will hold coumadin x 5 days prior and will be bridged with lovenox.   Patient is here for lovenox bridging instructions. Of note, lovenox 150mg syringes were called in to his [facility]  last week.     Pre-Procedure Instructions:  Take last dose of warfarin on :          25                       Start enoxaparin injections the morning of:      25                           Enoxaparin dose is:       150mg                 Every 12 hours (Twice Daily)  Last dose of enoxaparin will be the morning of:     2/10/25 **AT LEAST 24 HOURS PRIOR TO PROCEDURE                            Post-Procedure Instructions:  Restart warfarin on   25 PM                         At a dose of     PER CALENDAR                       Unless directed otherwise by your physician  Restart lovenox injections on the morning of         25                  Unless directed otherwise by your physician  Call the coumadin clinic if  your physician has different recommendations post procedure  INR 25 AM STAT    Recommendation for patient's warfarin regimen:  per calendar    Recommend repeat INR in 1.5 weeks  _________________________________________________________________    Del Anand (79 y.o.) is followed by the [facility].    Anticoagulation Summary  As of 2025      INR goal:  2.5-3.5   TTR:  73.1% (9.8 y)   INR used for dosin.8 (2025)   Warfarin maintenance plan:  12.5 mg (10 mg x 1 and 5 mg x 0.5) every Wed; 10 mg (10 mg x 1) all other days   Weekly warfarin total:  72.5 mg   Plan last modified:  Jennifer Hunt, PharmD (2025)   Next  INR check:  --   Target end date:  --    Indications    Long term current use of anticoagulant therapy [Z79.01]  Pulmonary embolism (Resolved) [I26.99]                 Anticoagulation Episode Summary       INR check location:  Home Draw    Preferred lab:  --    Send INR reminders to:  [facility] COUMADIN HOME MONITOR    Comments:  [employer] every other Wednesday          Anticoagulation Care Providers       Provider Role Specialty Phone number    Beau Jones MD [facility] Cardiology [phone]

## 2025-02-06 ENCOUNTER — HOSPITAL ENCOUNTER (OUTPATIENT)
Dept: PREADMISSION TESTING | Facility: HOSPITAL | Age: 80
Discharge: HOME | End: 2025-02-06
Attending: INTERNAL MEDICINE
Payer: MEDICARE

## 2025-02-06 DIAGNOSIS — R79.1 ABNORMAL COAGULATION PROFILE: ICD-10-CM

## 2025-02-06 DIAGNOSIS — R94.39 ABNORMAL NUCLEAR STRESS TEST: ICD-10-CM

## 2025-02-06 LAB
AORTIC ROOT ANNULUS: 3.3 CM
AORTIC VALVE CUSP SEPERATION: 2.2 CM
APICAL FOUR CHAMBER EJECTION FRACTION: 61 %
APICAL TWO CHAMBER EJECTION FRACTION: 45 %
APTT PPP: 31.6 SEC (ref 21–32)
AV INDEX (PROSTH): 0.97
AV MEAN GRADIENT: 3 MMHG
AV PEAK GRADIENT: 6 MMHG
AV VALVE AREA BY VELOCITY RATIO: 3.5 CM²
AV VALVE AREA: 4 CM²
AV VELOCITY RATIO: 0.83
BASOPHILS # BLD AUTO: 0.03 K/UL (ref 0–0.2)
BASOPHILS NFR BLD: 0.6 % (ref 0–1.9)
BSA FOR ECHO PROCEDURE: 2.78 M2
CV ECHO LV RWT: 0.51 CM
DIFFERENTIAL METHOD BLD: ABNORMAL
DOP CALC AO PEAK VEL: 1.2 M/S
DOP CALC AO VTI: 27 CM
DOP CALC LVOT AREA: 4.2 CM2
DOP CALC LVOT DIAMETER: 2.3 CM
DOP CALC LVOT PEAK VEL: 1 M/S
DOP CALC LVOT STROKE VOLUME: 108.4 CM3
DOP CALCLVOT PEAK VEL VTI: 26.1 CM
E WAVE DECELERATION TIME: 236 MSEC
E/A RATIO: 1.01
E/E' RATIO: 9 M/S
ECHO LV POSTERIOR WALL: 1.2 CM (ref 0.6–1.1)
EOSINOPHIL # BLD AUTO: 0.1 K/UL (ref 0–0.5)
EOSINOPHIL NFR BLD: 2 % (ref 0–8)
ERYTHROCYTE [DISTWIDTH] IN BLOOD BY AUTOMATED COUNT: 13.4 % (ref 11.5–14.5)
FRACTIONAL SHORTENING: 34 % (ref 28–44)
HCT VFR BLD AUTO: 39 % (ref 40–54)
HGB BLD-MCNC: 12.5 G/DL (ref 14–18)
IMM GRANULOCYTES # BLD AUTO: 0.01 K/UL (ref 0–0.04)
IMM GRANULOCYTES NFR BLD AUTO: 0.2 % (ref 0–0.5)
INR PPP: 2.1 (ref 0.8–1.2)
INTERVENTRICULAR SEPTUM: 1.2 CM (ref 0.6–1.1)
IVRT: 148 MSEC
LEFT ATRIUM AREA SYSTOLIC (APICAL 2 CHAMBER): 26.2 CM2
LEFT ATRIUM AREA SYSTOLIC (APICAL 4 CHAMBER): 23.1 CM2
LEFT ATRIUM VOLUME INDEX MOD: 28 ML/M2
LEFT ATRIUM VOLUME MOD: 76 ML
LEFT INTERNAL DIMENSION IN SYSTOLE: 3.1 CM (ref 2.1–4)
LEFT VENTRICLE DIASTOLIC VOLUME INDEX: 38.06 ML/M2
LEFT VENTRICLE DIASTOLIC VOLUME: 102 ML
LEFT VENTRICLE END DIASTOLIC VOLUME APICAL 2 CHAMBER: 30 ML
LEFT VENTRICLE END DIASTOLIC VOLUME APICAL 4 CHAMBER: 118 ML
LEFT VENTRICLE END SYSTOLIC VOLUME APICAL 2 CHAMBER: 87.1 ML
LEFT VENTRICLE END SYSTOLIC VOLUME APICAL 4 CHAMBER: 62.2 ML
LEFT VENTRICLE MASS INDEX: 79.1 G/M2
LEFT VENTRICLE SYSTOLIC VOLUME INDEX: 14.1 ML/M2
LEFT VENTRICLE SYSTOLIC VOLUME: 37.9 ML
LEFT VENTRICULAR INTERNAL DIMENSION IN DIASTOLE: 4.7 CM (ref 3.5–6)
LEFT VENTRICULAR MASS: 212 G
LV LATERAL E/E' RATIO: 7.9 M/S
LV SEPTAL E/E' RATIO: 10.1 M/S
LVED V (TEICH): 102 ML
LVES V (TEICH): 37.9 ML
LVOT MG: 2 MMHG
LVOT MV: 0.7 CM/S
LYMPHOCYTES # BLD AUTO: 1.8 K/UL (ref 1–4.8)
LYMPHOCYTES NFR BLD: 32.3 % (ref 18–48)
MCH RBC QN AUTO: 31.9 PG (ref 27–31)
MCHC RBC AUTO-ENTMCNC: 32.1 G/DL (ref 32–36)
MCV RBC AUTO: 100 FL (ref 82–98)
MONOCYTES # BLD AUTO: 0.5 K/UL (ref 0.3–1)
MONOCYTES NFR BLD: 9.8 % (ref 4–15)
MV PEAK A VEL: 0.7 M/S
MV PEAK E VEL: 0.71 M/S
MV STENOSIS PRESSURE HALF TIME: 122 MS
MV VALVE AREA P 1/2 METHOD: 1.8 CM2
NEUTROPHILS # BLD AUTO: 3 K/UL (ref 1.8–7.7)
NEUTROPHILS NFR BLD: 55.1 % (ref 38–73)
NRBC BLD-RTO: 0 /100 WBC
OHS CV RV/LV RATIO: 0.6 CM
OHS LV EJECTION FRACTION SIMPSONS BIPLANE MOD: 52 %
PISA TR MAX VEL: 2 M/S
PLATELET # BLD AUTO: 190 K/UL (ref 150–450)
PMV BLD AUTO: 10.4 FL (ref 9.2–12.9)
PROTHROMBIN TIME: 22.4 SEC (ref 9–12.5)
RBC # BLD AUTO: 3.92 M/UL (ref 4.6–6.2)
RIGHT VENTRICLE DIASTOLIC BASEL DIMENSION: 2.8 CM
RIGHT VENTRICULAR END-DIASTOLIC DIMENSION: 2.8 CM
TDI LATERAL: 0.09 M/S
TDI SEPTAL: 0.07 M/S
TDI: 0.08 M/S
TR MAX PG: 16 MMHG
WBC # BLD AUTO: 5.41 K/UL (ref 3.9–12.7)
Z-SCORE OF LEFT VENTRICULAR DIMENSION IN END DIASTOLE: -16.25
Z-SCORE OF LEFT VENTRICULAR DIMENSION IN END SYSTOLE: -11.85

## 2025-02-06 PROCEDURE — 93005 ELECTROCARDIOGRAM TRACING: CPT | Performed by: INTERNAL MEDICINE

## 2025-02-06 PROCEDURE — 93010 ELECTROCARDIOGRAM REPORT: CPT | Mod: ,,, | Performed by: INTERNAL MEDICINE

## 2025-02-06 PROCEDURE — 85610 PROTHROMBIN TIME: CPT

## 2025-02-06 PROCEDURE — 85730 THROMBOPLASTIN TIME PARTIAL: CPT

## 2025-02-06 PROCEDURE — 85025 COMPLETE CBC W/AUTO DIFF WBC: CPT

## 2025-02-06 NOTE — DISCHARGE INSTRUCTIONS
Your procedure is scheduled for:          [facility] thru [facility] at:2/11/25@0645am    Nothing to eat  after midnight the night before your procedure. You may have clear liquids up to 2 hours before coming in for procedure. This includes water, Gatorade, clear juice  Do not take any medications the morning of your procedure  Bring all your medications with you in the original pill bottles from pharmacy. Please bring your ASPIRIN   If you take blood thinners, ask your doctor if you should stop taking them.  Do not take metformin 24 hours prior to your procedure.  Adjust your insulin or other diabetes medications if needed.   Do your chlorhexidine wash the night before and morning of your procedure.  If you use a CPAP or BiPAP at home, please bring it with you the day of your procedure.  Make arrangements for someone you know to drive you home after your procedure. Taxi and Uber are not acceptable.  Come dressed comfortably          Any questions call the [facility] at [phone]

## 2025-02-08 LAB
OHS QRS DURATION: 108 MS
OHS QTC CALCULATION: 457 MS

## 2025-02-11 ENCOUNTER — PATIENT MESSAGE (OUTPATIENT)
Dept: ORTHOPEDICS | Facility: CLINIC | Age: 80
End: 2025-02-11
Payer: MEDICARE

## 2025-02-11 ENCOUNTER — HOSPITAL ENCOUNTER (OUTPATIENT)
Facility: HOSPITAL | Age: 80
Discharge: HOME | End: 2025-02-11
Attending: INTERNAL MEDICINE | Admitting: INTERNAL MEDICINE
Payer: MEDICARE

## 2025-02-11 VITALS
HEART RATE: 60 BPM | SYSTOLIC BLOOD PRESSURE: 138 MMHG | OXYGEN SATURATION: 97 % | DIASTOLIC BLOOD PRESSURE: 70 MMHG | RESPIRATION RATE: 16 BRPM

## 2025-02-11 DIAGNOSIS — R73.09 OTHER ABNORMAL GLUCOSE: ICD-10-CM

## 2025-02-11 DIAGNOSIS — R94.39 ABNORMAL NUCLEAR STRESS TEST: ICD-10-CM

## 2025-02-11 LAB
INR PPP: 1 (ref 0.8–1.2)
POC ACTIVATED CLOTTING TIME K: 216 SEC (ref 74–137)
PROTHROMBIN TIME: 11 SEC (ref 9–12.5)
SAMPLE: ABNORMAL

## 2025-02-11 PROCEDURE — 99152 MOD SED SAME PHYS/QHP 5/>YRS: CPT | Performed by: INTERNAL MEDICINE

## 2025-02-11 PROCEDURE — 63600175 PHARM REV CODE 636 W HCPCS: Performed by: INTERNAL MEDICINE

## 2025-02-11 PROCEDURE — 85610 PROTHROMBIN TIME: CPT | Performed by: INTERNAL MEDICINE

## 2025-02-11 PROCEDURE — 25000003 PHARM REV CODE 250

## 2025-02-11 PROCEDURE — C1894 INTRO/SHEATH, NON-LASER: HCPCS | Performed by: INTERNAL MEDICINE

## 2025-02-11 PROCEDURE — C1887 CATHETER, GUIDING: HCPCS | Performed by: INTERNAL MEDICINE

## 2025-02-11 PROCEDURE — C1769 GUIDE WIRE: HCPCS | Performed by: INTERNAL MEDICINE

## 2025-02-11 PROCEDURE — 93459 L HRT ART/GRFT ANGIO: CPT | Performed by: INTERNAL MEDICINE

## 2025-02-11 PROCEDURE — 93799 UNLISTED CV SVC/PROCEDURE: CPT | Performed by: INTERNAL MEDICINE

## 2025-02-11 PROCEDURE — 25500020 PHARM REV CODE 255: Performed by: INTERNAL MEDICINE

## 2025-02-11 PROCEDURE — 99153 MOD SED SAME PHYS/QHP EA: CPT | Performed by: INTERNAL MEDICINE

## 2025-02-11 PROCEDURE — 25000003 PHARM REV CODE 250: Performed by: INTERNAL MEDICINE

## 2025-02-11 RX ORDER — DIPHENHYDRAMINE HCL 25 MG
50 CAPSULE ORAL
Status: DISCONTINUED | OUTPATIENT
Start: 2025-02-11 | End: 2025-02-11 | Stop reason: HOSPADM

## 2025-02-11 RX ORDER — DIPHENHYDRAMINE HCL 25 MG
CAPSULE ORAL
Status: COMPLETED
Start: 2025-02-11 | End: 2025-02-11

## 2025-02-11 RX ORDER — ONDANSETRON 4 MG/1
8 TABLET, ORALLY DISINTEGRATING ORAL EVERY 8 HOURS PRN
Status: DISCONTINUED | OUTPATIENT
Start: 2025-02-11 | End: 2025-02-11 | Stop reason: HOSPADM

## 2025-02-11 RX ORDER — LIDOCAINE HYDROCHLORIDE 10 MG/ML
INJECTION, SOLUTION INFILTRATION; PERINEURAL
Status: DISCONTINUED | OUTPATIENT
Start: 2025-02-11 | End: 2025-02-11 | Stop reason: HOSPADM

## 2025-02-11 RX ORDER — SODIUM CHLORIDE 9 MG/ML
INJECTION, SOLUTION INTRAVENOUS CONTINUOUS
Status: DISCONTINUED | OUTPATIENT
Start: 2025-02-11 | End: 2025-02-11 | Stop reason: HOSPADM

## 2025-02-11 RX ORDER — ACETAMINOPHEN 325 MG/1
TABLET ORAL
Status: DISCONTINUED
Start: 2025-02-11 | End: 2025-02-11 | Stop reason: HOSPADM

## 2025-02-11 RX ORDER — SODIUM CHLORIDE 9 MG/ML
INJECTION, SOLUTION INTRAVENOUS ONCE
Status: COMPLETED | OUTPATIENT
Start: 2025-02-11 | End: 2025-02-11

## 2025-02-11 RX ORDER — HEPARIN SODIUM 10000 [USP'U]/ML
INJECTION, SOLUTION INTRAVENOUS; SUBCUTANEOUS
Status: DISCONTINUED | OUTPATIENT
Start: 2025-02-11 | End: 2025-02-11 | Stop reason: HOSPADM

## 2025-02-11 RX ORDER — FENTANYL CITRATE 50 UG/ML
INJECTION, SOLUTION INTRAMUSCULAR; INTRAVENOUS
Status: DISCONTINUED | OUTPATIENT
Start: 2025-02-11 | End: 2025-02-11 | Stop reason: HOSPADM

## 2025-02-11 RX ORDER — ACETAMINOPHEN 325 MG/1
650 TABLET ORAL EVERY 4 HOURS PRN
Status: DISCONTINUED | OUTPATIENT
Start: 2025-02-11 | End: 2025-02-11 | Stop reason: HOSPADM

## 2025-02-11 RX ORDER — MIDAZOLAM HYDROCHLORIDE 1 MG/ML
INJECTION INTRAMUSCULAR; INTRAVENOUS
Status: DISCONTINUED | OUTPATIENT
Start: 2025-02-11 | End: 2025-02-11 | Stop reason: HOSPADM

## 2025-02-11 RX ORDER — NITROGLYCERIN 5 MG/ML
INJECTION, SOLUTION INTRAVENOUS
Status: DISCONTINUED | OUTPATIENT
Start: 2025-02-11 | End: 2025-02-11 | Stop reason: HOSPADM

## 2025-02-11 RX ADMIN — SODIUM CHLORIDE: 9 INJECTION, SOLUTION INTRAVENOUS at 07:02

## 2025-02-11 RX ADMIN — ACETAMINOPHEN 650 MG: 325 TABLET ORAL at 01:02

## 2025-02-11 RX ADMIN — Medication 50 MG: at 07:02

## 2025-02-11 RX ADMIN — DIPHENHYDRAMINE HYDROCHLORIDE 50 MG: 25 CAPSULE ORAL at 07:02

## 2025-02-11 NOTE — PROCEDURES
Angiogram completed via right radial access.  Tolerated the procedure well  LVEDP 17      Significant left main disease.  Patent bypass grafts.    Distal left main 70% stenosis.  Discrete 50% stenosis in mid LAD.  Mild disease in remainder of the vessels.  Patent SVG to LAD and SVG to ramus  No significant RCA and circumflex disease  Circumflex was not bypassed.  IFR performed left main into circumflex and IFR was not hemodynamically significant (IFR 1.0).      Plan:  Continue medical management  Risk factor reduction  Can resume Coumadin tonight along with Lovenox bridging  Follow up with Cardiology Clinic in 1-2 weeks  To

## 2025-02-11 NOTE — Clinical Note
A pulse oximeter was placed on the patient's left index finger and left index finger. Tranexamic Acid Counseling:  Patient advised of the small risk of bleeding problems with tranexamic acid. They were also instructed to call if they developed any nausea, vomiting or diarrhea. All of the patient's questions and concerns were addressed.

## 2025-02-11 NOTE — Clinical Note
The catheter was repositioned into the ostial SVG graft. An angiography was performed of the graft. Multiple views were taken. The angiography was performed via power injection. The injected amount was 6 mL contrast at 3 mL/s. The PSI from the power injection was 300. SVG to LAD

## 2025-02-11 NOTE — Clinical Note
The catheter was repositioned into the ostial SVG graft. An angiography was performed of the graft. Multiple views were taken. The angiography was performed via power injection. The injected amount was 6 mL contrast at 3 mL/s. The PSI from the power injection was 300. SVG to Ramus

## 2025-02-11 NOTE — Clinical Note
The site was marked. The right groin and right radial was prepped. The site was prepped with ChloraPrep and Betadine. The site was clipped. The patient was draped.

## 2025-02-12 ENCOUNTER — TELEPHONE (OUTPATIENT)
Dept: CARDIOLOGY | Facility: CLINIC | Age: 80
End: 2025-02-12

## 2025-02-12 ENCOUNTER — TELEPHONE (OUTPATIENT)
Dept: ORTHOPEDICS | Facility: CLINIC | Age: 80
End: 2025-02-12
Payer: MEDICARE

## 2025-02-12 NOTE — TELEPHONE ENCOUNTER
----- Message from Danisha sent at 2/12/2025 10:06 AM CST -----  Regarding: advice  Contact: patient  Type: Needs Medical Advice  Who Called:  patient  Symptoms (please be specific):    How long has patient had these symptoms:    Pharmacy name and phone #:    Best Call Back Number: [phone] (home)     Additional Information: Patient states he had angio gram yesterday and Dr. Jones cleared patient for knee replacement.  Please call patient to advise.  Thanks!

## 2025-02-13 ENCOUNTER — PATIENT MESSAGE (OUTPATIENT)
Dept: CARDIOLOGY | Facility: CLINIC | Age: 80
End: 2025-02-13
Payer: MEDICARE

## 2025-02-14 ENCOUNTER — ANTI-COAG VISIT (OUTPATIENT)
Dept: CARDIOLOGY | Facility: CLINIC | Age: 80
End: 2025-02-14
Payer: MEDICARE

## 2025-02-14 DIAGNOSIS — Z79.01 LONG TERM CURRENT USE OF ANTICOAGULANT THERAPY: Primary | ICD-10-CM

## 2025-02-14 LAB — INR PPP: 1.1

## 2025-02-14 NOTE — PROGRESS NOTES
Pt is saying that they are trying r/s knee procedure for 2/17, INR today 2/14 1.1  Aspirin stopped before angiogram took aspirin tuesaay-today.   Lovenoxsince last Friday q12  Tesday wed 15  Yester 10     17th   24th

## 2025-02-14 NOTE — PROGRESS NOTES
[facility]    2025 10:35 AM    Assessment/Plan:    Patient presents today with subtherapeutic  INR.    Assessment of patient findings and chart review:  Patient recently held coumadin with lovenox bridge for an angiogram.   Patient states that they are trying to reschedule his knee procedure for 25, he unsure if this is possible. He stopped aspirin prior to his angiogram and took aspirin -  (Tuesday-Today).  He states he took Coumadin 15mg Tuesday and Wednesday and Coumadin 10mg Thursday + Lovenox q12 since.  If he will NOT have the surgery on 25, we will continue with the coumadin/lovenox bridge and recheck INR Monday.   Patient will be advised to inform us of new surgery date as soon as scheduled so we can plan accordingly.      Recommendation for patient's warfarin regimen:  per calendar    Recommend repeat INR in 3 days  _________________________________________________________________    Del Anand (79 y.o.) is followed by the [facility].    Anticoagulation Summary  As of 2025      INR goal:  2.5-3.5   TTR:  73.0% (9.8 y)   INR used for dosin.1 (2025)   Warfarin maintenance plan:  12.5 mg (10 mg x 1 and 5 mg x 0.5) every Wed; 10 mg (10 mg x 1) all other days   Weekly warfarin total:  72.5 mg   Plan last modified:  Jennifer Hunt, PharmD (2025)   Next INR check:  --   Target end date:  --    Indications    Long term current use of anticoagulant therapy [Z79.01]  Pulmonary embolism (Resolved) [I26.99]                 Anticoagulation Episode Summary       INR check location:  Home Draw    Preferred lab:  --    Send INR reminders to:  [facility]IT[facility]    Comments:  [facility] every other Wednesday          Anticoagulation Care Providers       Provider Role Specialty Phone number    Beau Jones MD [facility] Cardiology [phone]

## 2025-02-14 NOTE — PROGRESS NOTES
Pt called in a verbal INR result of 1.1 dated 2/14/25    Update: pt sent message via portal stating Dr Miranda nurse is looking to schedule the total knee replacement no sooner than Feb 24 and no later than March 24.It will not be scheduled on Feb 17.  When he has a scheduled date, he will reach out to let us know.

## 2025-02-17 ENCOUNTER — ANTI-COAG VISIT (OUTPATIENT)
Dept: CARDIOLOGY | Facility: CLINIC | Age: 80
End: 2025-02-17
Payer: MEDICARE

## 2025-02-17 DIAGNOSIS — Z79.01 LONG TERM CURRENT USE OF ANTICOAGULANT THERAPY: Primary | ICD-10-CM

## 2025-02-17 LAB — INR PPP: 2

## 2025-02-17 NOTE — PROGRESS NOTES
[facility]    2025 1:56 PM    Assessment/Plan:    Patient presents today with subtherapeutic  INR.    Assessment of patient findings and chart review: INR not at goal. Medications, chart, and patient findings reviewed. See calendar for adjustments to dose and follow up plan.  INR is improving since patient held doses recently for a procedure. Close watch until INR is therapeutic.     Recommendation for patient's warfarin regimen:  dose per calendar and resume lovenox (stop after 218 am dose)    Recommend repeat INR in 3 days  _________________________________________________________________    Del Anand (79 y.o.) is followed by the [facility].    Anticoagulation Summary  As of 2025      INR goal:  2.5-3.5   TTR:  72.9% (9.8 y)   INR used for dosin.0 (2025)   Warfarin maintenance plan:  12.5 mg (10 mg x 1 and 5 mg x 0.5) every Mon, Wed; 10 mg (10 mg x 1) all other days   Weekly warfarin total:  75 mg   Plan last modified:  Jennifer Hunt, PharmD (2025)   Next INR check:  2025   Target end date:  --    Indications    Long term current use of anticoagulant therapy [Z79.01]  Pulmonary embolism (Resolved) [I26.99]                 Anticoagulation Episode Summary       INR check location:  Home Draw    Preferred lab:  --    Send INR reminders to:  [facility] MONITOR    Comments:  [facility] every other Wednesday          Anticoagulation Care Providers       Provider Role Specialty Phone number    Beau Jones MD [facility] Cardiology [phone]

## 2025-02-19 ENCOUNTER — ANTI-COAG VISIT (OUTPATIENT)
Dept: CARDIOLOGY | Facility: CLINIC | Age: 80
End: 2025-02-19
Payer: MEDICARE

## 2025-02-19 ENCOUNTER — OFFICE VISIT (OUTPATIENT)
Dept: CARDIOLOGY | Facility: CLINIC | Age: 80
End: 2025-02-19
Payer: MEDICARE

## 2025-02-19 VITALS
DIASTOLIC BLOOD PRESSURE: 72 MMHG | SYSTOLIC BLOOD PRESSURE: 140 MMHG | HEART RATE: 65 BPM | HEIGHT: 72 IN | BODY MASS INDEX: 42.66 KG/M2 | OXYGEN SATURATION: 97 % | RESPIRATION RATE: 17 BRPM | WEIGHT: 315 LBS

## 2025-02-19 DIAGNOSIS — I10 ESSENTIAL HYPERTENSION: ICD-10-CM

## 2025-02-19 DIAGNOSIS — Z79.01 LONG TERM CURRENT USE OF ANTICOAGULANT THERAPY: ICD-10-CM

## 2025-02-19 DIAGNOSIS — Z01.818 PRE-OP TESTING: ICD-10-CM

## 2025-02-19 DIAGNOSIS — Z79.01 LONG TERM CURRENT USE OF ANTICOAGULANT THERAPY: Primary | ICD-10-CM

## 2025-02-19 DIAGNOSIS — I25.10 CORONARY ARTERY DISEASE INVOLVING NATIVE CORONARY ARTERY OF NATIVE HEART WITHOUT ANGINA PECTORIS: Primary | ICD-10-CM

## 2025-02-19 DIAGNOSIS — I65.22 STENOSIS OF LEFT CAROTID ARTERY: ICD-10-CM

## 2025-02-19 DIAGNOSIS — Z95.1 S/P CABG X 2: ICD-10-CM

## 2025-02-19 DIAGNOSIS — E78.2 MIXED HYPERLIPIDEMIA: ICD-10-CM

## 2025-02-19 DIAGNOSIS — M17.12 PRIMARY OSTEOARTHRITIS OF LEFT KNEE: Primary | ICD-10-CM

## 2025-02-19 DIAGNOSIS — Z79.01 CURRENT USE OF ANTICOAGULANT THERAPY: ICD-10-CM

## 2025-02-19 DIAGNOSIS — R60.0 EDEMA OF LEFT LOWER EXTREMITY: ICD-10-CM

## 2025-02-19 LAB — INR PPP: 2

## 2025-02-19 RX ORDER — CEFAZOLIN SODIUM 2 G/50ML
2 SOLUTION INTRAVENOUS
OUTPATIENT
Start: 2025-02-19

## 2025-02-19 RX ORDER — WARFARIN 10 MG/1
TABLET ORAL
Qty: 90 TABLET | Refills: 3 | Status: SHIPPED | OUTPATIENT
Start: 2025-02-19

## 2025-02-19 RX ORDER — WARFARIN SODIUM 5 MG/1
TABLET ORAL
Qty: 180 TABLET | Refills: 3 | Status: SHIPPED | OUTPATIENT
Start: 2025-02-19

## 2025-02-19 NOTE — ASSESSMENT & PLAN NOTE
Patent grafts on February 2025 [facility], 70% left main disease, 50% stenosis in mid LAD, mild disease in remainder of the vessels.  Denies anginal equivalent symptoms.  Continue aspirin, statin, Zetia, and amlodipine

## 2025-02-19 NOTE — PROGRESS NOTES
Pt advised to start holding warfrain tonight (2/19) + start lovenox this evening at usual dose of 150mg every 12 hours.  He has not been advised on what to do with aspirin pre procedure.

## 2025-02-19 NOTE — PROGRESS NOTES
Subjective:    Patient ID:  Del Anand is a 79 y.o. male who presents for hospital follow-up of [facility].      HPI:  Del Anand is here for follow-up visit.     Ongoing LLE edema for the last few years-- hx of graft sites for CABG from left leg.    He is about to start a Lovenox bridge for knee surgery, managed by Coumadin clinic.    Denies chest pain or shortness of breath. Denies recent fever cough chills or congestion. Denies blood in the urine or blood in the stool.  Denies myalgias. Denies orthopnea. Denies nausea vomiting or dyspepsia. No recent arm neck or jaw pain. No lightheadedness or dizziness.       [facility] with Dr. Jones 02/11/2025:  Significant left main disease.  Patent bypass grafts.    Distal left main 70% stenosis.  Discrete 50% stenosis in mid LAD.  Mild disease in remainder of the vessels.  Patent SVG to LAD and SVG to ramus  No significant RCA and circumflex disease  Circumflex was not bypassed.  IFR performed left main into circumflex and IFR was not hemodynamically significant (IFR 1.0).    Plan:  Continue medical management  Risk factor reduction  Can resume Coumadin tonight along with Lovenox bridging      Review of patient's allergies indicates:  No Known Allergies    Past Medical History:   Diagnosis Date    Benign neoplasm of colon     Cataract     CHF (congestive heart failure) 01/13/2015    Coronary artery disease     Difficult intubation     DVT (deep venous thrombosis)     HLD (hyperlipidemia)     HTN (hypertension)     Impaired fasting glucose     Kidney stone     Metabolic syndrome     Nonspecific abnormal results of liver function study     Nonspecific findings on examination of blood     Nuclear sclerosis - Both Eyes 10/18/2013    Osteoarthrosis, unspecified whether generalized or localized, lower leg     Respiratory distress     AFTER OPEN HEART SURGERY STATES ON VENTILATOR    Squamous cell carcinoma of skin      Past Surgical History:   Procedure Laterality Date    CARDIAC SURGERY       CATARACT EXTRACTION W/  INTRAOCULAR LENS IMPLANT Right 09/17/2020    Procedure: EXTRACTION, CATARACT, WITH IOL INSERTION;  Surgeon: Chanel Klein MD;  Location: [facility] OR;  Service: Ophthalmology;  Laterality: Right;    CATARACT EXTRACTION W/  INTRAOCULAR LENS IMPLANT Left 10/01/2020    Procedure: EXTRACTION, CATARACT, WITH IOL INSERTION;  Surgeon: Chanel Klein MD;  Location: [facility] OR;  Service: Ophthalmology;  Laterality: Left;    COLONOSCOPY N/A 03/13/2019    Procedure: COLONOSCOPY;  Surgeon: Nikunj Larson MD;  Location: [facility] (2ND FLR);  Service: Endoscopy;  Laterality: N/A;  ok to hold Coumadin x 5 days with a Lovenox bridge per Coumadin clinic  2nd floor - history of difficult intubation-MS    COLONOSCOPY N/A 07/07/2022    Procedure: COLONOSCOPY;  Surgeon: Nikunj Larson MD;  Location: [facility] ENDO (2ND FLR);  Service: Endoscopy;  Laterality: N/A;  ef 45%-5/31-coumadin hold per coumadin clinic see coag visist 6/29-tb-vacc- clears 4 hrs prior-am prep 2-3-am-inst portal-tb    CORONARY ANGIOGRAPHY INCLUDING BYPASS GRAFTS WITH CATHETERIZATION OF LEFT HEART N/A 2/11/2025    Procedure: ANGIOGRAM, CORONARY, INCLUDING BYPASS GRAFT, WITH LEFT HEART CATHETERIZATION;  Surgeon: Beau Jones MD;  Location: [facility] CATH/EP LAB;  Service: Cardiology;  Laterality: N/A;    CORONARY ARTERY BYPASS GRAFT      2014    CYSTOSCOPY      INSTANTANEOUS WAVE-FREE RATIO (IFR) N/A 2/11/2025    Procedure: Instantaneous Wave-Free Ratio (IFR);  Surgeon: Beau Jones MD;  Location: [facility] CATH/EP LAB;  Service: Cardiology;  Laterality: N/A;    KIDNEY STONE SURGERY      KNEE ARTHROPLASTY      bilateral    renal stone      SKIN BIOPSY       Social History[1]  Family History   Problem Relation Name Age of Onset    Stroke Mother incontinence     Dementia Mother incontinence     Heart attack Father bph         d. 85    Urolithiasis Father bph     Heart disease Father bph     Heart failure Father bph     Other Sister Mary          bladder lift, s/p 4 ; estranged    Heart attack Maternal Grandfather          d. 65    Hypertension Paternal Grandmother      Heart attack Paternal Grandfather      Heart failure Paternal Grandfather      No Known Problems Daughter Sabina     No Known Problems Son Doron         Review of Systems:   See HPI       Objective:        Vitals:    25 1406   BP: (!) 140/72   Pulse: 65   Resp: 17       Lab Results   Component Value Date    WBC 5.41 2025    HGB 12.5 (L) 2025    HCT 39.0 (L) 2025     2025    CHOL 108 (L) 2024    TRIG 104 2024    HDL 44 2024    ALT 30 2025    AST 27 2025     2025    K 4.6 2025     2025    CREATININE 0.8 2025    BUN 20 2025    CO2 26 2025    TSH 1.455 2024    PSA 0.14 2023    INR 2.0 2025    GLUF 113 (H) 2015    HGBA1C 5.9 (H) 2024        ECHOCARDIOGRAM RESULTS  Results for orders placed during the hospital encounter of 25    Echo Saline Bubble? No; Ultrasound enhancing contrast? No    Interpretation Summary    Left Ventricle: The left ventricle is normal in size. There is concentric remodeling. There is low normal systolic function. Quantitated ejection fraction is 52%.    Right Ventricle: Normal right ventricular cavity size.    Left Atrium: Left atrium is mildly dilated.    Overall the study quality was technically difficult.        CURRENT/PREVIOUS VISIT EKG  Results for orders placed or performed during the hospital encounter of 25   EKG 12-lead    Collection Time: 25  1:08 PM   Result Value Ref Range    QRS Duration 108 ms    [facility] QTC Calculation 457 ms    Narrative    Test Reason : R94.39,    Vent. Rate :  66 BPM     Atrial Rate :  66 BPM     P-R Int : 200 ms          QRS Dur : 108 ms      QT Int : 436 ms       P-R-T Axes :  56 -69  42 degrees    QTcB Int : 457 ms    Normal sinus rhythm  Left anterior fascicular  block  Cannot rule out Anterior infarct ,age undetermined  Abnormal ECG  When compared with ECG of 08-Jan-2025 14:25,  No significant change was found  Confirmed by Keshav Cueva ([id number]) on 2/8/2025 6:11:30 PM    Referred By:            Confirmed By: Keshav Cueva     No valid procedures specified.   Results for orders placed during the hospital encounter of 01/30/25    Nuclear Stress - Cardiology Interpreted    Interpretation Summary    Abnormal myocardial perfusion scan.    There is a moderate intensity, medium sized, reversible perfusion abnormality that is consistent with ischemia in the lateral wall(s).    There are no other significant perfusion abnormalities.    The gated perfusion images showed an ejection fraction of 57% at rest. The gated perfusion images showed an ejection fraction of 61% post stress. Normal ejection fraction is greater than 53%.    The ECG portion of the study is negative for ischemia.    The patient reported no chest pain during the stress test.    During stress, rare PACs are noted.      Physical Exam:  CONSTITUTIONAL: No fever, no chills, obese  HEENT: Normocephalic, atraumatic, pupils reactive to light                 NECK:  No JVD, no carotid bruit  CVS: S1S2+, RRR, no murmurs   LUNGS: Clear  ABDOMEN: Soft, NT, BS+. No bruit  EXTREMITIES: No cyanosis, +1 LLE edema. Pulses intact  : No stewart catheter  NEURO: AAO X 3  PSY: Normal affect      Medication List with Changes/Refills   Current Medications    AMLODIPINE (NORVASC) 2.5 MG TABLET    Take 1 tablet (2.5 mg total) by mouth once daily.    ASPIRIN 81 MG CHEW    Take 81 mg by mouth once daily.    CYANOCOBALAMIN 500 MCG TABLET    Take 500 mcg by mouth every morning. Every day    ENOXAPARIN (LOVENOX) 150 MG/ML SYRG    Inject 1 mL (150 mg total) into the skin every 12 (twelve) hours. PER SPECIFIC INSTRUCTIONS FROM COUMADIN CLINIC    EZETIMIBE (ZETIA) 10 MG TABLET    Take 1 tablet (10 mg total) by mouth once daily.    FOLIC  ACID/MULTIVITS-MIN/LUT (CENTRUM SILVER ORAL)    Take 1 tablet by mouth once daily.    IRBESARTAN (AVAPRO) 300 MG TABLET    Take 1 tablet (300 mg total) by mouth every evening.    KETOCONAZOLE (NIZORAL) 2 % CREAM    Apply topically 2 (two) times daily.    ROSUVASTATIN (CRESTOR) 40 MG TAB    Take 1 tablet (40 mg total) by mouth once daily.    TRIAMCINOLONE ACETONIDE 0.025% (KENALOG) 0.025 % CREAM    Apply topically 2 (two) times daily.   Changed and/or Refilled Medications    Modified Medication Previous Medication    WARFARIN (COUMADIN) 10 MG TABLET warfarin (COUMADIN) 10 MG tablet       Take 10mg daily or as directed by Coumadin Clinic.   Also uses warfarin 5mg tablet strength    Take 10mg daily or as directed by Coumadin Clinic.   Also uses warfarin 5mg tablet strength    WARFARIN (COUMADIN) 5 MG TABLET warfarin (COUMADIN) 5 MG tablet       Take 10mg daily except 12.5mg Wednesdays    Take 10mg daily except 12.5mg Wednesdays             Assessment:       1. Coronary artery disease involving native coronary artery of native heart without angina pectoris    2. Essential hypertension    3. Long term current use of anticoagulant therapy    4. S/P CABG x 2    5. Mixed hyperlipidemia    6. Stenosis of left carotid artery    7. Edema of left lower extremity         Plan:     Problem List Items Addressed This Visit          Cardiac/Vascular    Coronary artery disease - Primary    Overview   Cath 11/2014: 60% distal LM MLA 3.5 mm² (16.7 mm²)  and 50% Ramus. Luminal irregularities   CABG x 2 - 12/2/14: SVG-LAD, SVG-ramus   PET stress  1/2020:  No significant regional testing stress-induced perfusion defects.  Myocardial perfusion was reduced at rest and severely reduced at stress. CFR was mildly reduced.  The stress flows were diffusely reduced reaching borderline ischemic thresholds.          S/P CABG x 2    Overview   SVG to LAD, SVG to ramus.  RAFA could not be used.         Current Assessment & Plan   Patent grafts on  2025 [facility], 70% left main disease, 50% stenosis in mid LAD, mild disease in remainder of the vessels.  Denies anginal equivalent symptoms.  Continue aspirin, statin, Zetia, and amlodipine         Mixed hyperlipidemia    Overview   Untreated total chol 200, LDL 120s, HDL <40 and triglycerides ~ 200 ~mg/dl.         Current Assessment & Plan   Continue current therapy         Essential hypertension    Current Assessment & Plan   BP stable.  Continue amlodipine 2.5 mg daily and irbesartan 300 mg nightly.  Low sodium diet.         Relevant Medications    warfarin (COUMADIN) 10 MG tablet    Left-sided carotid artery disease    Overview   Distal left main 70% stenosis on 2025 [facility], same as 2015 [facility].         Current Assessment & Plan   Continue lifestyle modifications, statin therapy, and Zetia.            Hematology    Long term current use of anticoagulant therapy    Current Assessment & Plan   On Coumadin.  Follows with Coumadin clinic who is planning Lovenox bridge for his upcoming knee surgery.         Relevant Medications    warfarin (COUMADIN) 10 MG tablet    warfarin (COUMADIN) 5 MG tablet       Other    Edema of left lower extremity    Current Assessment & Plan   Chronic and reports as worse in the last year or so, likely from hx of graft sites from CABG and worsening lower extremity vasculature/ valves with age.  Continue home compression socks.  Recommend leg exercises and keeping legs raised while at rest.            Follow up in about 6 months (around 2025).      Sergio Hagan, [facility] Cardiology  2025          [1]   Social History  Tobacco Use    Smoking status: Former     Current packs/day: 0.00     Average packs/day: 1 pack/day for 20.0 years (20.0 ttl pk-yrs)     Types: Cigarettes     Start date: 10/16/1967     Quit date: 10/16/1987     Years since quittin.3     Passive exposure: Past    Smokeless tobacco: Former   Substance Use Topics    Alcohol use: Yes      Types: 1 - 2 Cans of beer, 1 - 2 Standard drinks or equivalent per week     Comment: BEER OR WHISKEY DAILY 1-2    Drug use: No

## 2025-02-19 NOTE — ASSESSMENT & PLAN NOTE
Chronic and reports as worse in the last year or so, likely from hx of graft sites from CABG and worsening lower extremity vasculature/ valves with age.  Continue home compression socks.  Recommend leg exercises and keeping legs raised while at rest.

## 2025-02-19 NOTE — ASSESSMENT & PLAN NOTE
On Coumadin.  Follows with Coumadin clinic who is planning Lovenox bridge for his upcoming knee surgery.

## 2025-02-20 ENCOUNTER — TELEPHONE (OUTPATIENT)
Dept: CARDIOLOGY | Facility: CLINIC | Age: 80
End: 2025-02-20
Payer: MEDICARE

## 2025-02-20 ENCOUNTER — PATIENT MESSAGE (OUTPATIENT)
Dept: CARDIOLOGY | Facility: CLINIC | Age: 80
End: 2025-02-20
Payer: MEDICARE

## 2025-02-20 NOTE — TELEPHONE ENCOUNTER
----- Message from Laquita sent at 2/20/2025 12:24 PM CST -----  Regarding: advice  Type:  Needs Medical AdviceWho Called: boni henry hernández Best Call Back Number: [phone] opt 2 fax: [phone] Additional Information: boni wants an update on the pw they fax over for pt.  She st they faxed it over on 01/29/25, 02/03/25, 02/12/25, and 02/19/25 with no answer or response from clinic. please call to discuss.

## 2025-02-20 NOTE — PROGRESS NOTES
Patient is here for couamdin/lovenox bridging instructions for upcoming procedure.  (of note,patient to be reminded to get pre-procedure aspirin instructions from his MD)    Height    6'            Weight    148kg          SCr     0.8           CrCl     >30            Pre-Procedure Instructions:    Take last dose of warfarin on :       2/18/25                          Start enoxaparin injections the evening of:       2/19/25                          Enoxaparin dose is:               150mg         Every 12 hours (Twice Daily)  Last dose of enoxaparin will be the morning of:    2/22/25    **48 hours prior to procedure                          Post-Procedure Instructions:  TBD by inpatient staff;   Contact Coumadin Clinic upon discharge

## 2025-02-21 ENCOUNTER — ANESTHESIA EVENT (OUTPATIENT)
Dept: SURGERY | Facility: HOSPITAL | Age: 80
End: 2025-02-21
Payer: MEDICARE

## 2025-02-21 DIAGNOSIS — M17.12 PRIMARY OSTEOARTHRITIS OF LEFT KNEE: Primary | ICD-10-CM

## 2025-02-21 RX ORDER — ONDANSETRON 4 MG/1
4 TABLET, FILM COATED ORAL EVERY 6 HOURS PRN
Qty: 10 TABLET | Refills: 0 | Status: SHIPPED | OUTPATIENT
Start: 2025-02-21

## 2025-02-21 RX ORDER — CELECOXIB 200 MG/1
200 CAPSULE ORAL 2 TIMES DAILY
Qty: 60 CAPSULE | Refills: 0 | Status: SHIPPED | OUTPATIENT
Start: 2025-02-21 | End: 2025-03-23

## 2025-02-21 RX ORDER — OXYCODONE AND ACETAMINOPHEN 7.5; 325 MG/1; MG/1
1 TABLET ORAL EVERY 6 HOURS PRN
Qty: 28 TABLET | Refills: 0 | Status: SHIPPED | OUTPATIENT
Start: 2025-02-21

## 2025-02-21 RX ORDER — GABAPENTIN 300 MG/1
300 CAPSULE ORAL 2 TIMES DAILY
Qty: 60 CAPSULE | Refills: 0 | Status: SHIPPED | OUTPATIENT
Start: 2025-02-21 | End: 2025-03-23

## 2025-02-21 RX ORDER — DOCUSATE SODIUM 100 MG/1
100 CAPSULE, LIQUID FILLED ORAL 2 TIMES DAILY
Qty: 60 CAPSULE | Refills: 0 | Status: SHIPPED | OUTPATIENT
Start: 2025-02-21 | End: 2025-03-23

## 2025-02-23 ENCOUNTER — PATIENT MESSAGE (OUTPATIENT)
Dept: CARDIOLOGY | Facility: CLINIC | Age: 80
End: 2025-02-23
Payer: MEDICARE

## 2025-02-24 ENCOUNTER — ANESTHESIA (OUTPATIENT)
Dept: SURGERY | Facility: HOSPITAL | Age: 80
End: 2025-02-24
Payer: MEDICARE

## 2025-02-24 ENCOUNTER — HOSPITAL ENCOUNTER (OUTPATIENT)
Facility: HOSPITAL | Age: 80
Discharge: HOME | End: 2025-02-24
Attending: ORTHOPAEDIC SURGERY | Admitting: ORTHOPAEDIC SURGERY
Payer: MEDICARE

## 2025-02-24 DIAGNOSIS — M17.12 PRIMARY OSTEOARTHRITIS OF LEFT KNEE: Primary | ICD-10-CM

## 2025-02-24 DIAGNOSIS — Z01.818 PRE-OP TESTING: ICD-10-CM

## 2025-02-24 DIAGNOSIS — Z79.01 CURRENT USE OF ANTICOAGULANT THERAPY: ICD-10-CM

## 2025-02-24 LAB
APTT PPP: 25 SEC (ref 21–32)
INR PPP: 1 (ref 0.8–1.2)
PROTHROMBIN TIME: 11.4 SEC (ref 9–12.5)

## 2025-02-24 PROCEDURE — 97116 GAIT TRAINING THERAPY: CPT

## 2025-02-24 PROCEDURE — 71000015 HC POSTOP RECOV 1ST HR: Performed by: ORTHOPAEDIC SURGERY

## 2025-02-24 PROCEDURE — 63600175 PHARM REV CODE 636 W HCPCS

## 2025-02-24 PROCEDURE — 25000003 PHARM REV CODE 250: Performed by: ANESTHESIOLOGY

## 2025-02-24 PROCEDURE — 71000033 HC RECOVERY, INTIAL HOUR: Performed by: ORTHOPAEDIC SURGERY

## 2025-02-24 PROCEDURE — 63600175 PHARM REV CODE 636 W HCPCS: Performed by: ANESTHESIOLOGY

## 2025-02-24 PROCEDURE — C1769 GUIDE WIRE: HCPCS | Performed by: ORTHOPAEDIC SURGERY

## 2025-02-24 PROCEDURE — 37000009 HC ANESTHESIA EA ADD 15 MINS: Performed by: ORTHOPAEDIC SURGERY

## 2025-02-24 PROCEDURE — 63600175 PHARM REV CODE 636 W HCPCS: Performed by: NURSE ANESTHETIST, CERTIFIED REGISTERED

## 2025-02-24 PROCEDURE — 97110 THERAPEUTIC EXERCISES: CPT

## 2025-02-24 PROCEDURE — 71000039 HC RECOVERY, EACH ADD'L HOUR: Performed by: ORTHOPAEDIC SURGERY

## 2025-02-24 PROCEDURE — 63600175 PHARM REV CODE 636 W HCPCS: Performed by: ORTHOPAEDIC SURGERY

## 2025-02-24 PROCEDURE — 27447 TOTAL KNEE ARTHROPLASTY: CPT | Mod: LT,,, | Performed by: ORTHOPAEDIC SURGERY

## 2025-02-24 PROCEDURE — 36415 COLL VENOUS BLD VENIPUNCTURE: CPT | Performed by: ORTHOPAEDIC SURGERY

## 2025-02-24 PROCEDURE — 27200688 HC TRAY, SPINAL-HYPER/ ISOBARIC: Performed by: ANESTHESIOLOGY

## 2025-02-24 PROCEDURE — 85610 PROTHROMBIN TIME: CPT | Performed by: ORTHOPAEDIC SURGERY

## 2025-02-24 PROCEDURE — 36000712 HC OR TIME LEV V 1ST 15 MIN: Performed by: ORTHOPAEDIC SURGERY

## 2025-02-24 PROCEDURE — 94799 UNLISTED PULMONARY SVC/PX: CPT

## 2025-02-24 PROCEDURE — 0055T BONE SRGRY CMPTR CT/MRI IMAG: CPT | Mod: ,,, | Performed by: ORTHOPAEDIC SURGERY

## 2025-02-24 PROCEDURE — 36000713 HC OR TIME LEV V EA ADD 15 MIN: Performed by: ORTHOPAEDIC SURGERY

## 2025-02-24 PROCEDURE — 37000008 HC ANESTHESIA 1ST 15 MINUTES: Performed by: ORTHOPAEDIC SURGERY

## 2025-02-24 PROCEDURE — C1776 JOINT DEVICE (IMPLANTABLE): HCPCS | Performed by: ORTHOPAEDIC SURGERY

## 2025-02-24 PROCEDURE — C1713 ANCHOR/SCREW BN/BN,TIS/BN: HCPCS | Performed by: ORTHOPAEDIC SURGERY

## 2025-02-24 PROCEDURE — 97161 PT EVAL LOW COMPLEX 20 MIN: CPT

## 2025-02-24 PROCEDURE — 25000003 PHARM REV CODE 250: Performed by: ORTHOPAEDIC SURGERY

## 2025-02-24 PROCEDURE — 27200665 HC NERVE BLOCK NEEDLE/ CATHETER: Performed by: ANESTHESIOLOGY

## 2025-02-24 PROCEDURE — 85730 THROMBOPLASTIN TIME PARTIAL: CPT | Performed by: ORTHOPAEDIC SURGERY

## 2025-02-24 PROCEDURE — 27201423 OPTIME MED/SURG SUP & DEVICES STERILE SUPPLY: Performed by: ORTHOPAEDIC SURGERY

## 2025-02-24 PROCEDURE — 71000016 HC POSTOP RECOV ADDL HR: Performed by: ORTHOPAEDIC SURGERY

## 2025-02-24 PROCEDURE — 25000003 PHARM REV CODE 250: Performed by: NURSE ANESTHETIST, CERTIFIED REGISTERED

## 2025-02-24 PROCEDURE — 64447 NJX AA&/STRD FEMORAL NRV IMG: CPT | Performed by: ANESTHESIOLOGY

## 2025-02-24 PROCEDURE — 63600175 PHARM REV CODE 636 W HCPCS: Mod: JZ,TB | Performed by: ANESTHESIOLOGY

## 2025-02-24 DEVICE — CRUCIATE RETAINING FEMORAL
Type: IMPLANTABLE DEVICE | Site: KNEE | Status: FUNCTIONAL
Brand: TRIATHLON

## 2025-02-24 DEVICE — CEMENT BONE SIMPLEX HV RADPQ: Type: IMPLANTABLE DEVICE | Site: KNEE | Status: FUNCTIONAL

## 2025-02-24 DEVICE — SYMMETRIC PATELLA
Type: IMPLANTABLE DEVICE | Site: KNEE | Status: FUNCTIONAL
Brand: TRIATHLON

## 2025-02-24 DEVICE — TIBIAL BEARING INSERT - CS
Type: IMPLANTABLE DEVICE | Site: KNEE | Status: FUNCTIONAL
Brand: TRIATHLON

## 2025-02-24 DEVICE — PRIMARY TIBIAL BASEPLATE
Type: IMPLANTABLE DEVICE | Site: KNEE | Status: FUNCTIONAL
Brand: TRIATHLON

## 2025-02-24 RX ORDER — POLYETHYLENE GLYCOL 3350 17 G/17G
17 POWDER, FOR SOLUTION ORAL DAILY
Status: CANCELLED | OUTPATIENT
Start: 2025-02-24

## 2025-02-24 RX ORDER — BUPIVACAINE 13.3 MG/ML
INJECTION, SUSPENSION, LIPOSOMAL INFILTRATION
Status: COMPLETED | OUTPATIENT
Start: 2025-02-24 | End: 2025-02-24

## 2025-02-24 RX ORDER — MIDAZOLAM HYDROCHLORIDE 1 MG/ML
2 INJECTION, SOLUTION INTRAMUSCULAR; INTRAVENOUS
Status: DISCONTINUED | OUTPATIENT
Start: 2025-02-24 | End: 2025-02-24 | Stop reason: HOSPADM

## 2025-02-24 RX ORDER — CELECOXIB 100 MG/1
200 CAPSULE ORAL 2 TIMES DAILY
Status: CANCELLED | OUTPATIENT
Start: 2025-02-24

## 2025-02-24 RX ORDER — OXYCODONE HYDROCHLORIDE 10 MG/1
10 TABLET ORAL EVERY 4 HOURS PRN
Refills: 0 | Status: CANCELLED | OUTPATIENT
Start: 2025-02-24

## 2025-02-24 RX ORDER — LIDOCAINE HYDROCHLORIDE 10 MG/ML
1 INJECTION, SOLUTION EPIDURAL; INFILTRATION; INTRACAUDAL; PERINEURAL ONCE
Status: DISCONTINUED | OUTPATIENT
Start: 2025-02-24 | End: 2025-02-24 | Stop reason: HOSPADM

## 2025-02-24 RX ORDER — PHENYLEPHRINE HYDROCHLORIDE 10 MG/ML
INJECTION INTRAVENOUS
Status: DISCONTINUED | OUTPATIENT
Start: 2025-02-24 | End: 2025-02-24

## 2025-02-24 RX ORDER — OXYCODONE HYDROCHLORIDE 5 MG/1
5 TABLET ORAL ONCE AS NEEDED
Refills: 0 | Status: COMPLETED | OUTPATIENT
Start: 2025-02-24 | End: 2025-02-24

## 2025-02-24 RX ORDER — FENTANYL CITRATE 50 UG/ML
25-200 INJECTION, SOLUTION INTRAMUSCULAR; INTRAVENOUS
Status: DISCONTINUED | OUTPATIENT
Start: 2025-02-24 | End: 2025-02-24 | Stop reason: HOSPADM

## 2025-02-24 RX ORDER — ONDANSETRON 4 MG/1
8 TABLET, ORALLY DISINTEGRATING ORAL EVERY 8 HOURS PRN
Status: CANCELLED | OUTPATIENT
Start: 2025-02-24

## 2025-02-24 RX ORDER — OXYCODONE HYDROCHLORIDE 5 MG/1
5 TABLET ORAL ONCE AS NEEDED
Status: COMPLETED | OUTPATIENT
Start: 2025-02-24 | End: 2025-02-24

## 2025-02-24 RX ORDER — DEXTROSE MONOHYDRATE AND SODIUM CHLORIDE 5; .45 G/100ML; G/100ML
INJECTION, SOLUTION INTRAVENOUS CONTINUOUS
Status: CANCELLED | OUTPATIENT
Start: 2025-02-24

## 2025-02-24 RX ORDER — MIDAZOLAM HYDROCHLORIDE 1 MG/ML
INJECTION INTRAMUSCULAR; INTRAVENOUS
Status: DISCONTINUED | OUTPATIENT
Start: 2025-02-24 | End: 2025-02-24

## 2025-02-24 RX ORDER — MORPHINE SULFATE 2 MG/ML
2 INJECTION, SOLUTION INTRAMUSCULAR; INTRAVENOUS EVERY 4 HOURS PRN
Refills: 0 | Status: CANCELLED | OUTPATIENT
Start: 2025-02-24

## 2025-02-24 RX ORDER — CELECOXIB 100 MG/1
200 CAPSULE ORAL
Status: DISCONTINUED | OUTPATIENT
Start: 2025-02-24 | End: 2025-02-24 | Stop reason: HOSPADM

## 2025-02-24 RX ORDER — CEFAZOLIN 2 G/1
2 INJECTION, POWDER, FOR SOLUTION INTRAMUSCULAR; INTRAVENOUS
Status: CANCELLED | OUTPATIENT
Start: 2025-02-24 | End: 2025-02-25

## 2025-02-24 RX ORDER — TRANEXAMIC ACID 100 MG/ML
INJECTION, SOLUTION INTRAVENOUS
Status: DISCONTINUED | OUTPATIENT
Start: 2025-02-24 | End: 2025-02-24

## 2025-02-24 RX ORDER — ONDANSETRON HYDROCHLORIDE 2 MG/ML
4 INJECTION, SOLUTION INTRAVENOUS ONCE AS NEEDED
Status: DISCONTINUED | OUTPATIENT
Start: 2025-02-24 | End: 2025-02-24 | Stop reason: HOSPADM

## 2025-02-24 RX ORDER — OXYCODONE HCL 10 MG/1
10 TABLET, FILM COATED, EXTENDED RELEASE ORAL ONCE
Status: COMPLETED | OUTPATIENT
Start: 2025-02-24 | End: 2025-02-24

## 2025-02-24 RX ORDER — BUPIVACAINE HYDROCHLORIDE 7.5 MG/ML
INJECTION, SOLUTION EPIDURAL; RETROBULBAR
Status: COMPLETED | OUTPATIENT
Start: 2025-02-24 | End: 2025-02-24

## 2025-02-24 RX ORDER — LIDOCAINE HYDROCHLORIDE 20 MG/ML
INJECTION INTRAVENOUS
Status: DISCONTINUED | OUTPATIENT
Start: 2025-02-24 | End: 2025-02-24

## 2025-02-24 RX ORDER — SODIUM CHLORIDE 0.9 % (FLUSH) 0.9 %
5 SYRINGE (ML) INJECTION
Status: DISCONTINUED | OUTPATIENT
Start: 2025-02-24 | End: 2025-02-24 | Stop reason: HOSPADM

## 2025-02-24 RX ORDER — CEFAZOLIN 2 G/1
2 INJECTION, POWDER, FOR SOLUTION INTRAMUSCULAR; INTRAVENOUS
Status: COMPLETED | OUTPATIENT
Start: 2025-02-24 | End: 2025-02-24

## 2025-02-24 RX ORDER — FAMOTIDINE 20 MG/1
20 TABLET, FILM COATED ORAL 2 TIMES DAILY
Status: CANCELLED | OUTPATIENT
Start: 2025-02-24

## 2025-02-24 RX ORDER — PROPOFOL 10 MG/ML
VIAL (ML) INTRAVENOUS CONTINUOUS PRN
Status: DISCONTINUED | OUTPATIENT
Start: 2025-02-24 | End: 2025-02-24

## 2025-02-24 RX ORDER — ONDANSETRON HYDROCHLORIDE 2 MG/ML
INJECTION, SOLUTION INTRAVENOUS
Status: DISCONTINUED | OUTPATIENT
Start: 2025-02-24 | End: 2025-02-24

## 2025-02-24 RX ORDER — KETAMINE HCL IN 0.9 % NACL 50 MG/5 ML
SYRINGE (ML) INTRAVENOUS
Status: DISCONTINUED | OUTPATIENT
Start: 2025-02-24 | End: 2025-02-24

## 2025-02-24 RX ORDER — ZOLPIDEM TARTRATE 5 MG/1
5 TABLET ORAL NIGHTLY PRN
Status: CANCELLED | OUTPATIENT
Start: 2025-02-24

## 2025-02-24 RX ORDER — BISACODYL 10 MG/1
10 SUPPOSITORY RECTAL DAILY
Status: CANCELLED | OUTPATIENT
Start: 2025-02-24

## 2025-02-24 RX ORDER — CELECOXIB 100 MG/1
400 CAPSULE ORAL
Status: COMPLETED | OUTPATIENT
Start: 2025-02-24 | End: 2025-02-24

## 2025-02-24 RX ORDER — ACETAMINOPHEN 500 MG
1000 TABLET ORAL
Status: COMPLETED | OUTPATIENT
Start: 2025-02-24 | End: 2025-02-24

## 2025-02-24 RX ORDER — BUPIVACAINE HYDROCHLORIDE 5 MG/ML
INJECTION, SOLUTION EPIDURAL; INTRACAUDAL
Status: COMPLETED | OUTPATIENT
Start: 2025-02-24 | End: 2025-02-24

## 2025-02-24 RX ORDER — DEXAMETHASONE SODIUM PHOSPHATE 4 MG/ML
INJECTION, SOLUTION INTRA-ARTICULAR; INTRALESIONAL; INTRAMUSCULAR; INTRAVENOUS; SOFT TISSUE
Status: DISCONTINUED | OUTPATIENT
Start: 2025-02-24 | End: 2025-02-24

## 2025-02-24 RX ORDER — FENTANYL CITRATE 50 UG/ML
25 INJECTION, SOLUTION INTRAMUSCULAR; INTRAVENOUS EVERY 5 MIN PRN
Status: DISCONTINUED | OUTPATIENT
Start: 2025-02-24 | End: 2025-02-24 | Stop reason: HOSPADM

## 2025-02-24 RX ADMIN — TRANEXAMIC ACID 1000 MG: 100 INJECTION, SOLUTION INTRAVENOUS at 11:02

## 2025-02-24 RX ADMIN — SODIUM CHLORIDE, SODIUM GLUCONATE, SODIUM ACETATE, POTASSIUM CHLORIDE AND MAGNESIUM CHLORIDE: 526; 502; 368; 37; 30 INJECTION, SOLUTION INTRAVENOUS at 09:02

## 2025-02-24 RX ADMIN — DEXAMETHASONE SODIUM PHOSPHATE 8 MG: 4 INJECTION, SOLUTION INTRA-ARTICULAR; INTRALESIONAL; INTRAMUSCULAR; INTRAVENOUS; SOFT TISSUE at 11:02

## 2025-02-24 RX ADMIN — FENTANYL CITRATE 50 MCG: 50 INJECTION, SOLUTION INTRAMUSCULAR; INTRAVENOUS at 11:02

## 2025-02-24 RX ADMIN — Medication 10 MG: at 11:02

## 2025-02-24 RX ADMIN — BUPIVACAINE HYDROCHLORIDE 10 ML: 5 INJECTION, SOLUTION EPIDURAL; INTRACAUDAL; PERINEURAL at 10:02

## 2025-02-24 RX ADMIN — OXYCODONE HYDROCHLORIDE 5 MG: 5 TABLET ORAL at 02:02

## 2025-02-24 RX ADMIN — FENTANYL CITRATE 50 MCG: 50 INJECTION, SOLUTION INTRAMUSCULAR; INTRAVENOUS at 10:02

## 2025-02-24 RX ADMIN — LIDOCAINE HYDROCHLORIDE 20 MG: 20 INJECTION, SOLUTION INTRAVENOUS at 11:02

## 2025-02-24 RX ADMIN — PHENYLEPHRINE HYDROCHLORIDE 100 MCG: 10 INJECTION INTRAVENOUS at 11:02

## 2025-02-24 RX ADMIN — ROPIVACAINE HYDROCHLORIDE: 5 INJECTION EPIDURAL; INFILTRATION; PERINEURAL at 11:02

## 2025-02-24 RX ADMIN — CELECOXIB 200 MG: 100 CAPSULE ORAL at 09:02

## 2025-02-24 RX ADMIN — BUPIVACAINE 20 ML: 13.3 INJECTION, SUSPENSION, LIPOSOMAL INFILTRATION at 10:02

## 2025-02-24 RX ADMIN — PROPOFOL 50 MCG/KG/MIN: 10 INJECTION, EMULSION INTRAVENOUS at 11:02

## 2025-02-24 RX ADMIN — ONDANSETRON 4 MG: 2 INJECTION INTRAMUSCULAR; INTRAVENOUS at 11:02

## 2025-02-24 RX ADMIN — SODIUM CHLORIDE, SODIUM GLUCONATE, SODIUM ACETATE, POTASSIUM CHLORIDE AND MAGNESIUM CHLORIDE: 526; 502; 368; 37; 30 INJECTION, SOLUTION INTRAVENOUS at 11:02

## 2025-02-24 RX ADMIN — CEFAZOLIN 2 G: 2 INJECTION, POWDER, FOR SOLUTION INTRAMUSCULAR; INTRAVENOUS at 11:02

## 2025-02-24 RX ADMIN — OXYCODONE HYDROCHLORIDE 10 MG: 10 TABLET, FILM COATED, EXTENDED RELEASE ORAL at 09:02

## 2025-02-24 RX ADMIN — MIDAZOLAM HYDROCHLORIDE 2 MG: 1 INJECTION, SOLUTION INTRAMUSCULAR; INTRAVENOUS at 10:02

## 2025-02-24 RX ADMIN — GLYCOPYRROLATE 0.2 MG: 0.2 INJECTION, SOLUTION INTRAMUSCULAR; INTRAVITREAL at 11:02

## 2025-02-24 RX ADMIN — ACETAMINOPHEN 1000 MG: 500 TABLET ORAL at 09:02

## 2025-02-24 RX ADMIN — Medication 10 MG: at 12:02

## 2025-02-24 RX ADMIN — FENTANYL CITRATE 25 MCG: 50 INJECTION, SOLUTION INTRAMUSCULAR; INTRAVENOUS at 12:02

## 2025-02-24 RX ADMIN — OXYCODONE 5 MG: 5 TABLET ORAL at 01:02

## 2025-02-24 RX ADMIN — BUPIVACAINE HYDROCHLORIDE 1.4 ML: 7.5 INJECTION, SOLUTION EPIDURAL; RETROBULBAR at 11:02

## 2025-02-24 NOTE — DISCHARGE INSTRUCTIONS
"Discharge Instructions: After Your Surgery/Procedure  Youve just had surgery. During surgery you were given medicine called anesthesia to keep you relaxed and free of pain. After surgery you may have some pain or nausea. This is common. Here are some tips for feeling better and getting well after surgery.     Stay on schedule with your medication.   Going home  Your doctor or nurse will show you how to take care of yourself when you go home. He or she will also answer your questions. Have an adult family member or friend drive you home.      For your safety we recommend these precaution for the first 24 hours after your procedure:  Do not drive or use heavy equipment.  Do not make important decisions or sign legal papers.  Do not drink alcohol.  Have someone stay with you, if needed. He or she can watch for problems and help keep you safe.  Your concentration, balance, coordination, and judgement may be impaired for many hours after anesthesia.  Use caution when ambulating or standing up.     You may feel weak and "washed out" after anesthesia and surgery.      Subtle residual effects of general anesthesia or sedation with regional / local anesthesia can last more than 24 hours.  Rest for the remainder of the day or longer if your Doctor/Surgeon has advised you to do so.  Although you may feel normal within the first 24 hours, your reflexes and mental ability may be impaired without you realizing it.  You may feel dizzy, lightheaded or sleepy for 24 hours or longer.      Be sure to go to all follow-up visits with your doctor. And rest after your surgery for as long as your doctor tells you to.  Coping with pain  If you have pain after surgery, pain medicine will help you feel better. Take it as told, before pain becomes severe. Also, ask your doctor or pharmacist about other ways to control pain. This might be with heat, ice, or relaxation. And follow any other instructions your surgeon or nurse gives you.  Tips " for taking pain medicine  To get the best relief possible, remember these points:  Pain medicines can upset your stomach. Taking them with a little food may help.  Most pain relievers taken by mouth need at least 20 to 30 minutes to start to work.  Taking medicine on a schedule can help you remember to take it. Try to time your medicine so that you can take it before starting an activity. This might be before you get dressed, go for a walk, or sit down for dinner.  Constipation is a common side effect of pain medicines. Call your doctor before taking any medicines such as laxatives or stool softeners to help ease constipation. Also ask if you should skip any foods. Drinking lots of fluids and eating foods such as fruits and vegetables that are high in fiber can also help. Remember, do not take laxatives unless your surgeon has prescribed them.  Drinking alcohol and taking pain medicine can cause dizziness and slow your breathing. It can even be deadly. Do not drink alcohol while taking pain medicine.  Pain medicine can make you react more slowly to things. Do not drive or run machinery while taking pain medicine.  Your health care provider may tell you to take acetaminophen to help ease your pain. Ask him or her how much you are supposed to take each day. Acetaminophen or other pain relievers may interact with your prescription medicines or other over-the-counter (OTC) drugs. Some prescription medicines have acetaminophen and other ingredients. Using both prescription and OTC acetaminophen for pain can cause you to overdose. Read the labels on your OTC medicines with care. This will help you to clearly know the list of ingredients, how much to take, and any warnings. It may also help you not take too much acetaminophen. If you have questions or do not understand the information, ask your pharmacist or health care provider to explain it to you before you take the OTC medicine.  Managing nausea  Some people have an  upset stomach after surgery. This is often because of anesthesia, pain, or pain medicine, or the stress of surgery. These tips will help you handle nausea and eat healthy foods as you get better. If you were on a special food plan before surgery, ask your doctor if you should follow it while you get better. These tips may help:  Do not push yourself to eat. Your body will tell you when to eat and how much.  Start off with clear liquids and soup. They are easier to digest.  Next try semi-solid foods, such as mashed potatoes, applesauce, and gelatin, as you feel ready.  Slowly move to solid foods. Dont eat fatty, rich, or spicy foods at first.  Do not force yourself to have 3 large meals a day. Instead eat smaller amounts more often.  Take pain medicines with a small amount of solid food, such as crackers or toast, to avoid nausea.     Call your surgeon if  You still have pain an hour after taking medicine. The medicine may not be strong enough.  You feel too sleepy, dizzy, or groggy. The medicine may be too strong.  You have side effects like nausea, vomiting, or skin changes, such as rash, itching, or hives.       If you have obstructive sleep apnea  You were given anesthesia medicine during surgery to keep you comfortable and free of pain. After surgery, you may have more apnea spells because of this medicine and other medicines you were given. The spells may last longer than usual.   At home:  Keep using the continuous positive airway pressure (CPAP) device when you sleep. Unless your health care provider tells you not to, use it when you sleep, day or night. CPAP is a common device used to treat obstructive sleep apnea.  Talk with your provider before taking any pain medicine, muscle relaxants, or sedatives. Your provider will tell you about the possible dangers of taking these medicines.  © 3592-1446 [employer]. [address] [place] [address], [place] [address]. All rights reserved. This information is  not intended as a substitute for professional medical care. Always follow your healthcare professional's instructions.          Using an Incentive Spirometer    An incentive spirometer is a device that helps you do deep breathing exercises. These exercises expand your lungs, aid in circulation, and help prevent pneumonia. Deep breathing exercises also help you breathe better and improve the function of your lungs by:  Keeping your lungs clear  Strengthening your breathing muscles  Helping prevent respiratory complications or problems  The incentive spirometer gives you a way to take an active part in recover. A nurse or therapist will teach you breathing exercises. To do these exercises, you will breathe in through your mouth and not your nose. The incentive spirometer only works correctly if you breathe in through your mouth.  Steps to clear lungs  Step 1. Exhale normally. Then, inhale normally.  Relax and breathe out.  Step 2. Place your lips tightly around the mouthpiece.  Make sure the device is upright and not tilted.  Step 3. Inhale as much air as you can through the mouthpiece (don't breath through your nose).  Inhale slowly and deeply.  Hold your breath long enough to keep the balls or disk raised for at least 3 to 5 seconds, or as instructed by your healthcare provider.  Some spirometers have an indicator to let you know that you are breathing in too fast. If the indicator goes off, breathe in more slowly.  Step 4. Repeat the exercise regularly.  Do this exercise every hour while you're awake, or as instructed by your healthcare provider.  If you were taught deep breathing and coughing exercises, do them regularly as instructed by your healthcare provider.           Post op instructions for prevention of DVT  What is deep vein thrombosis?  Deep vein thrombosis (DVT) is the medical term for blood clots in the deep veins of the leg.  These blood clots can be dangerous.  A DVT can block a blood vessel and keep  blood from getting where it needs to go.  Another problem is that the clot can travel to other parts of the body such as the lungs.  A clot that travels to the lungs is called a pulmonary embolus (PE) and can cause serious problems with breathing which can lead to death.  Am I at risk for DVT/PE?  If you are not very active, you are at risk of DVT.  Anyone confined to bed, sitting for long periods of time, recovering from surgery, etc. increases the risk of DVT.  Other risk factors are cancer diagnosis, certain medications, estrogen replacement in any form,older age, obesity, pregnancy, smoking, history of clotting disorders, and dehydration.  How will I know if I have a DVT?  Swelling in the lower leg  Pain  Warmth, redness, hardness or bulging of the vein  If you have any of these symptoms, call your doctors office right away.  Some people will not have any symptoms until the clot moves to the lungs.  What are the symptoms of a PE?  Panting, shortness of breath, or trouble breathing  Sharp, knife-like chest pain when you breathe  Coughing or coughing up blood  Rapid heartbeat  If you have any of these symptoms or get worse quickly, call 911 for emergency treatment.  How can I prevent a DVT?  Avoid long periods of inactivity and dont cross your legs--get up and walk around every hour or so.  Stay active--walking after surgery is highly encouraged.  This means you should get out of the house and walk in the neighborhood.  Going up and down stairs will not impair healing (unless advised against such activity by your doctor).    Drink plenty of noncaffeinated, nonalcoholic fluids each day to prevent dehydration.  Wear special support stockings, if they have been advised by your doctor.  If you travel, stop at least once an hour and walk around.  Avoid smoking (assistance with stopping is available through your healthcare provider)  Always notify your doctor if you are not able to follow the post operative  instructions that are given to you at the time of discharge.  It may be necessary to prescribe one of the medications available to prevent DVT.          Exparel(bupivacaine) has been injected to provide approximately 72 hours of reduced pain after your surgery.  Do not remove the bracelet for five days.  Report to your doctor as soon as possible if you experience any of the following:   Restlessness   Anxiety   Speech problems    Lightheadedness   Numbness and tingling of the mouth and lips   Seizures    Metallic taste   Blurred vision   Tremors    Twitching   Depression   Extreme drowsiness  Avoid additional use of local anesthetics (such as dental procedures) for five days (96 hours).          We hope your stay was comfortable as you heal now, mend and rest.    For we have enjoyed taking care of you by giving your our best.    And as you get better, by regaining your health and strength;   We count it as a privilege to have served you and hope your time at [facility] was well spent.      Thank  You!!!

## 2025-02-24 NOTE — ANESTHESIA PROCEDURE NOTES
Spinal    Diagnosis: knee pain left  Patient location during procedure: OR  Start time: 2/24/2025 11:05 AM  Timeout: 2/24/2025 11:03 AM  End time: 2/24/2025 11:16 AM    Staffing  Authorizing Provider: Rafa Bowen MD  Performing Provider: Rafa Bowen MD    Staffing  Performed by: Rafa Bowen MD  Authorized by: Rafa Bowen MD    Preanesthetic Checklist  Completed: patient identified, IV checked, site marked, risks and benefits discussed, surgical consent, monitors and equipment checked, pre-op evaluation and timeout performed  Spinal Block  Patient position: sitting  Prep: ChloraPrep  Patient monitoring: cardiac monitor, continuous pulse ox and continuous capnometry  Approach: right paramedian  Location: L3-4  Injection technique: single shot  CSF Fluid: clear free-flowing CSF  Needle  Needle type: pencil-tip   Needle gauge: 22 G  Needle length: 3.5 in  Needle localization: anatomical landmarks  Medications:    Medications: bupivacaine (pf) (MARCAINE) injection 0.75% - Intraspinal   1.4 mL - 2/24/2025 11:10:00 AM

## 2025-02-24 NOTE — PT/OT/SLP EVAL
Physical Therapy Evaluation and Discharge Note    Patient Name:  Del Anand   MRN:  [id number]    Recommendations:     Discharge Recommendations: Low Intensity Therapy  Discharge Equipment Recommendations: walker, rolling   Barriers to discharge: None    Assessment:     Del Anand is a 79 y.o. male admitted with a medical diagnosis of left TKA.  At this time, patient is scheduled for discharge home today and appears will be safe with mobility in the home.  Patient would benefit though from continued PT to work on ROM and strengthening to further increase safety with mobility in the home.  Patient would benefit from a RW for home mobility.  The mobility limitation cannot be sufficiently resolved by the use of a cane. The patient's functional mobility deficit can be sufficiently resolved with the use of a rolling walker. The patient's mobility limitation significantly impairs their ability to participate in one or more activities of daily living. The use of a rolling walker will significantly improve the patient's ability to participate in mobility-related activities of daily living and the patient will use it on a regular basis in the home.       Recent Surgery: Procedure(s) (LRB):  ROBOTIC ARTHROPLASTY, KNEE, TOTAL (Left) * Day of Surgery *    Plan:     During this hospitalization, patient does not require further acute PT services.  Please re-consult if situation changes.      Subjective     Chief Complaint: weakness  Patient/Family Comments/goals: get stronger  Pain/Comfort:  Pain Rating 1: 2/10  Location - Side 1: Left  Location - Orientation 1: lower  Location 1: knee  Pain Rating Post-Intervention 1: 5/10    Patients cultural, spiritual, Zoroastrian conflicts given the current situation:      Living Environment:  Currently lives with spouse in [address] [place] home with 1 step entry.  Prior to admission, patients level of function was independent.  Equipment used at home: none.  DME owned (not currently used): none.   "Upon discharge, patient will have assistance from family.    Objective:     Communicated with nurse prior to session.  Patient found supine with cryotherapy upon PT entry to room.    General Precautions: Standard, fall    Orthopedic Precautions:LLE weight bearing as tolerated   Braces:    Respiratory Status: Room air    Exams:  RLE ROM: WFL  RLE Strength: WNL  LLE ROM: Deficits: knee extension -5 degrees, flexion 95 degrees both taken in sitting  LLE Strength: Deficits: 3-/5 overall    Functional Mobility:  Bed Mobility:     Supine to Sit: stand by assistance  Transfers:     Sit to Stand:  contact guard assistance with rolling walker  Gait: x 150 feet rw CGA  Stairs:  Pt ascended/descended 4" curb step with Rolling Walker with no handrails with Contact Guard Assistance.     AM-PAC 6 CLICK MOBILITY  Total Score:20       Treatment and Education:  Exercise to include ankle pumps, quad sets, hip abd, heel slides and LAQ.  All done left LE x 10 reps  Gait training x 150 feet, x 200 feet rw cGA,x 150 feet rw sba, up/down 4 inch step rw cga    AM-PAC 6 CLICK MOBILITY  Total Score:20     Patient left up in chair with call button in reach, nurse notified, and spouse present.    GOALS:   Multidisciplinary Problems       Physical Therapy Goals       Not on file                    DME Justifications:   Del's mobility limitation cannot be sufficiently resolved by the use of a cane. His functional mobility deficit can be sufficiently resolved with the use of a Rolling Walker. Patient's mobility limitation significantly impairs their ability to participate in one of more activities of daily living.  The use of a RW will significantly improve the patient's ability to participate in MRADLS and the patient will use it on regular basis in the home.    History:     Past Medical History:   Diagnosis Date    Benign neoplasm of colon     Cataract     CHF (congestive heart failure) 01/13/2015    Coronary artery disease     Difficult " intubation     DVT (deep venous thrombosis)     HLD (hyperlipidemia)     HTN (hypertension)     Impaired fasting glucose     Kidney stone     Metabolic syndrome     Nonspecific abnormal results of liver function study     Nonspecific findings on examination of blood     Nuclear sclerosis - Both Eyes 10/18/2013    Osteoarthrosis, unspecified whether generalized or localized, lower leg     Respiratory distress     AFTER OPEN HEART SURGERY STATES ON VENTILATOR    Squamous cell carcinoma of skin        Past Surgical History:   Procedure Laterality Date    CARDIAC SURGERY      CATARACT EXTRACTION W/  INTRAOCULAR LENS IMPLANT Right 09/17/2020    Procedure: EXTRACTION, CATARACT, WITH IOL INSERTION;  Surgeon: Chanel Klein MD;  Location: [facility] OR;  Service: Ophthalmology;  Laterality: Right;    CATARACT EXTRACTION W/  INTRAOCULAR LENS IMPLANT Left 10/01/2020    Procedure: EXTRACTION, CATARACT, WITH IOL INSERTION;  Surgeon: Chanel Klein MD;  Location: [facility] OR;  Service: Ophthalmology;  Laterality: Left;    COLONOSCOPY N/A 03/13/2019    Procedure: COLONOSCOPY;  Surgeon: Nikunj Larson MD;  [facility]  Service: Endoscopy;  Laterality: N/A;  ok to hold Coumadin x 5 days with a Lovenox bridge per Coumadin clinic  2nd floor - history of difficult intubation-MS    COLONOSCOPY N/A 07/07/2022    Procedure: COLONOSCOPY;  Surgeon: Nikunj Larson MD;  Location: [facility] (2ND FLR);  Service: Endoscopy;  Laterality: N/A;  ef 45%-5/31-coumadin hold per coumadin clinic see coag visist 6/29-tb-vacc- clears 4 hrs prior-am prep 2-3-am-inst portal-tb    CORONARY ANGIOGRAPHY INCLUDING BYPASS GRAFTS WITH CATHETERIZATION OF LEFT HEART N/A 2/11/2025    Procedure: ANGIOGRAM, CORONARY, INCLUDING BYPASS GRAFT, WITH LEFT HEART CATHETERIZATION;  Surgeon: Beau Jones MD;  Location: [facility] CATH/EP LAB;  Service: Cardiology;  Laterality: N/A;    CORONARY ARTERY BYPASS GRAFT      2014    CYSTOSCOPY      INSTANTANEOUS  WAVE-FREE RATIO (IFR) N/A 2/11/2025    Procedure: Instantaneous Wave-Free Ratio (IFR);  Surgeon: Beau Jones MD;  Location: [facility] CATH/EP LAB;  Service: Cardiology;  Laterality: N/A;    KIDNEY STONE SURGERY      KNEE ARTHROPLASTY      bilateral    renal stone      SKIN BIOPSY         Time Tracking:     PT Received On: 02/24/25  PT Start Time: 1449     PT Stop Time: 1531  PT Total Time (min): 42 min     Billable Minutes: Evaluation 20, Gait Training 12, and Therapeutic Exercise 10      02/24/2025

## 2025-02-24 NOTE — CARE UPDATE
02/24/25 0902   Patient Assessment/Suction   Level of Consciousness (AVPU) alert   PRE-TX-O2   Resp 20   Temp 97.7 °F (36.5 °C)   Incentive Spirometer   $ Incentive Spirometer Charges done with encouragement   Incentive Spirometer Predicted Level (mL) 2160   Administration (IS) proper technique demonstrated   Number of Repetitions (IS) 5   Level Incentive Spirometer (mL) 3500   Patient Tolerance (IS) good

## 2025-02-24 NOTE — OP NOTE
[facility]  Surgery Department  Operative Note    SUMMARY     Date of Procedure: 2/24/2025     Procedure:  Left total knee arthroplasty robotic    Surgeons and Role:     * Carl Craig MD - Primary    Assisting Surgeon:  Mariano    Pre-Operative Diagnosis: Primary osteoarthritis of left knee [M17.12]  Pre-op testing [Z01.818]  Current use of anticoagulant therapy [Z79.01]    Post-Operative Diagnosis: Post-Op Diagnosis Codes:     * Primary osteoarthritis of left knee [M17.12]     * Pre-op testing [Z01.818]     * Current use of anticoagulant therapy [Z79.01]    Anesthesia: Spinal    Intraoperative Findings:  Severe 3 compartment arthritis with 10 degree flexion contracture and varus deformity    Description of the Findings of the Procedure: Patient was placed on the operative table in the supine position.  The  knee was prepped and draped in the usual sterile manner for surgery.  An incision was made over the anterior aspect of the knee.  It was carried down sharply through the skin.  The medial parapatellar tissues were divided and the patella was taken laterally.  The effusion was aspirated.  The medial tibial plateau was taken down.  Two pins were placed just medial to the tibial tubercle for the proximal tibial array.  The tibial array was assembled.  We now placed the tibial checkpoint just medial to the tibial tubercle.  The knee was flexed to 90°.  Two pins were placed into the distal femur for the femoral array and the femoral array was assembled.  A femoral checkpoint was placed.  We now connected to the robotic system and determined the center of rotation of the hip.  The medial and lateral malleoli were identified.  The tibial and femoral check points were verified.  We now verified the femur and the tibia.  When this was completed the knee was brought into extension and the extension gap was determined and captured.  Similarly the flexion gap was determined and captured.  A  robotic plan was created to balance the flexion-extension gap.  The plan was accepted.  When this was completed the robotic arm was brought into position and the femur and the blade were both verified.  The cutting device was utilized and the femoral cuts were made.  Similarly the tibia was verified as was the tibial cutting blade and the tibial cut was made.  We now placed a femoral trial and the tibial trial.  We had full extension full flexion and completely balanced flexion-extension gaps.  This was determined both clinically and using the robotic measurements.  We now broached the tibia.  The patella was calibrated and cut and a button was placed.  The construct trialed perfectly with no lift-off.  We pulsed and irrigated.  We mixed bone cement and cemented 1st the tibia, next the femur and finally the patella.  All excess cement was removed at the time that we cemented and the construct was held in full extension until all the cement completely hardened.  We copiously irrigated again.  We closed the extensor mechanism with a combination of 2. FiberWire and a running Quill stitch.  We irrigated again and closed the subcu with 2-0 Stratafix.  We closed the skin with 4-0 Monocryl.  Sterile dressings were applied and the patient was noted to be in stable condition pending an x-ray and a neurovascular check.    Complications: No    Estimated Blood Loss (EBL): * No values recorded between 2/24/2025 11:48 AM and 2/24/2025 12:25 PM *           Implants:   Implant Name Type Inv. Item Serial No.  Lot No. LRB No. Used Action   COMPONENT CR FEM CEMENTED 7 L - [id number]  COMPONENT CR FEM CEMENTED 7 L  Nept[employer] BIJAN. [id number] Left 1 Implanted   BASEPLATE TIB RADHA PRIM SZ 7 - [id number]  BASEPLATE TIB RADHA PRIM SZ 7  [employer] BIJAN. [id number] Left 1 Implanted   PATELLA TRI 36X10 X3 POLYETHYL - [id number]  PATELLA TRI 36X10 X3 POLYETHYL  [employer] BIJAN. [id number] Left 1 Implanted   INSERT TIBIAL SZ 7 9MMX3 -  [id number]  INSERT TIBIAL SZ 7 9MMX3  Appear. [id number] Left 1 Implanted       Specimens:   Specimen (24h ago, onward)      None                    Condition: Good    Disposition: PACU - hemodynamically stable.              Discharge Note    SUMMARY     Admit Date: 2/24/2025    Discharge Date and Time:  02/24/2025 12:25 PM    Hospital Course (synopsis of major diagnoses, care, treatment, and services provided during the course of the hospital stay): Uneventful      Final Diagnosis: Post-Op Diagnosis Codes:     * Primary osteoarthritis of left knee [M17.12]     * Pre-op testing [Z01.818]     * Current use of anticoagulant therapy [Z79.01]    Disposition: Home or Self Care    Follow Up/Patient Instructions:     Medications:  Reconciled Home Medications:   Current Discharge Medication List        CONTINUE these medications which have NOT CHANGED    Details   amLODIPine (NORVASC) 2.5 MG tablet Take 1 tablet (2.5 mg total) by mouth once daily.  Qty: 90 tablet, Refills: 1    Associated Diagnoses: Essential hypertension      cyanocobalamin 500 MCG tablet Take 500 mcg by mouth every morning. Every day      ezetimibe (ZETIA) 10 mg tablet Take 1 tablet (10 mg total) by mouth once daily.  Qty: 90 tablet, Refills: 1    Associated Diagnoses: Mixed hyperlipidemia      FOLIC ACID/MULTIVITS-MIN/LUT (CENTRUM SILVER ORAL) Take 1 tablet by mouth once daily.      irbesartan (AVAPRO) 300 MG tablet Take 1 tablet (300 mg total) by mouth every evening.  Qty: 90 tablet, Refills: 1    Comments: .      ketoconazole (NIZORAL) 2 % cream Apply topically 2 (two) times daily.  Qty: 60 g, Refills: 5    Associated Diagnoses: Seborrheic dermatitis      rosuvastatin (CRESTOR) 40 MG Tab Take 1 tablet (40 mg total) by mouth once daily.  Qty: 90 tablet, Refills: 1      triamcinolone acetonide 0.025% (KENALOG) 0.025 % cream Apply topically 2 (two) times daily.  Qty: 80 g, Refills: 5    Associated Diagnoses: Seborrheic dermatitis      aspirin 81  MG Chew Take 81 mg by mouth once daily.      celecoxib (CELEBREX) 200 MG capsule Take 1 capsule (200 mg total) by mouth 2 (two) times daily.  Qty: 60 capsule, Refills: 0    Associated Diagnoses: Primary osteoarthritis of left knee      docusate sodium (COLACE) 100 MG capsule Take 1 capsule (100 mg total) by mouth 2 (two) times daily.  Qty: 60 capsule, Refills: 0    Associated Diagnoses: Primary osteoarthritis of left knee      enoxaparin (LOVENOX) 150 mg/mL Syrg Inject 1 mL (150 mg total) into the skin every 12 (twelve) hours. PER SPECIFIC INSTRUCTIONS FROM COUMADIN CLINIC  Qty: 14 each, Refills: 1    Associated Diagnoses: Long term current use of anticoagulant therapy      gabapentin (NEURONTIN) 300 MG capsule Take 1 capsule (300 mg total) by mouth 2 (two) times daily.  Qty: 60 capsule, Refills: 0    Associated Diagnoses: Primary osteoarthritis of left knee      ondansetron (ZOFRAN) 4 MG tablet Take 1 tablet (4 mg total) by mouth every 6 (six) hours as needed for Nausea.  Qty: 10 tablet, Refills: 0    Associated Diagnoses: Primary osteoarthritis of left knee      oxyCODONE-acetaminophen (PERCOCET) 7.5-325 mg per tablet Take 1 tablet by mouth every 6 (six) hours as needed for Pain.  Qty: 28 tablet, Refills: 0    Comments: This prescription and the attached prescriptions are for postoperative use for the patient's scheduled total joint arthroplasty on Monday February 24th 2025.  This is for the meds to bed program.  Associated Diagnoses: Primary osteoarthritis of left knee      !! warfarin (COUMADIN) 10 MG tablet Take 10mg daily or as directed by Coumadin Clinic.   Also uses warfarin 5mg tablet strength  Qty: 90 tablet, Refills: 3    Associated Diagnoses: Essential hypertension; Long term current use of anticoagulant therapy      !! warfarin (COUMADIN) 5 MG tablet Take 10mg daily except 12.5mg Wednesdays  Qty: 180 tablet, Refills: 3    Associated Diagnoses: Long term current use of anticoagulant therapy       !! -  "Potential duplicate medications found. Please discuss with provider.        Discharge Procedure Orders   WALKER FOR HOME USE     Order Specific Question Answer Comments   Type of Walker: Heavy duty (300+ lbs)    With wheels? Yes    Height: 6' 3" (1.905 m)    Weight: 136.1 kg (300 lb)    Length of need (1-99 months): 99    Please check all that apply: Patient's condition impairs ambulation.    Please check all that apply: Patient needs help to get in and out of chair.    Please check all that apply: Walker will be used for gait training.    Please check all that apply: Patient is unable to safely ambulate without equipment.      Diet general     Call MD for:  temperature >100.4     Call MD for:  persistent nausea and vomiting     Call MD for:  severe uncontrolled pain     Call MD for:  difficulty breathing, headache or visual disturbances     Call MD for:  redness, tenderness, or signs of infection (pain, swelling, redness, odor or green/yellow discharge around incision site)     Call MD for:  hives     Call MD for:  persistent dizziness or light-headedness     Call MD for:  extreme fatigue      Follow-up Information       [facility]. Call in 1 day(s).    Why: As needed  Contact information:  [address] [place] [id number]  [phone]                         "

## 2025-02-24 NOTE — TRANSFER OF CARE
"Anesthesia Transfer of Care Note    Patient: Del Anand    Procedure(s) Performed: Procedure(s) (LRB):  ROBOTIC ARTHROPLASTY, KNEE, TOTAL, LEFT (Left)    Patient location: PACU    Anesthesia Type: spinal    Transport from OR: Transported from OR on 2-3 L/min O2 by NC with adequate spontaneous ventilation    Post pain: adequate analgesia    Post assessment: no apparent anesthetic complications and tolerated procedure well    Post vital signs: stable    Level of consciousness: awake, alert and oriented    Nausea/Vomiting: no nausea/vomiting    Complications: none    Transfer of care protocol was followed      Last vitals: Visit Vitals  BP (!) 140/72 (BP Location: Left arm, Patient Position: Lying)   Pulse (!) 56   Temp 36.5 °C (97.7 °F)   Resp 20   Ht 6' 3" (1.905 m)   Wt 136.1 kg (300 lb)   SpO2 100%   BMI 37.50 kg/m²     "

## 2025-02-24 NOTE — ANESTHESIA PROCEDURE NOTES
Peripheral Block    Patient location during procedure: pre-op   Block not for primary anesthetic.  Reason for block: at surgeon's request and post-op pain management   Post-op Pain Location: Left knee   Start time: 2/24/2025 10:05 AM  Timeout: 2/24/2025 10:04 AM   End time: 2/24/2025 10:10 AM    Staffing  Authorizing Provider: Rafa Bowen MD  Performing Provider: Rafa Bowen MD    Staffing  Performed by: Rafa Bowen MD  Authorized by: Rafa Bowen MD    Preanesthetic Checklist  Completed: patient identified, IV checked, site marked, risks and benefits discussed, surgical consent, monitors and equipment checked, pre-op evaluation and timeout performed  Peripheral Block  Patient position: supine  Prep: ChloraPrep  Patient monitoring: heart rate, cardiac monitor, continuous pulse ox, continuous capnometry and frequent blood pressure checks  Block type: adductor canal  Laterality: left  Injection technique: single shot  Needle  Needle type: Stimuplex   Needle gauge: 21 G  Needle length: 4 in  Needle localization: anatomical landmarks and ultrasound guidance   -ultrasound image captured on disc.  Assessment  Injection assessment: negative aspiration, negative parasthesia and local visualized surrounding nerve  Paresthesia pain: none  Heart rate change: no  Slow fractionated injection: yes    Medications:    Medications: BUPivacaine liposome (PF) 1.3 % (13.3 mg/mL) suspension - Injection   20 mL - 2/24/2025 10:05:00 AM  bupivacaine (pf) (MARCAINE) injection 0.5% - Perineural   10 mL - 2/24/2025 10:05:00 AM    Additional Notes  VSS.  [facility] RN monitoring vitals throughout procedure.  Patient tolerated procedure well.

## 2025-02-24 NOTE — PLAN OF CARE
Patient received from recovery at this time.  AAOX3.  NAD noted.  Dressing to left knee remains clean, dry and intact. Tolerating po intake well with no complaints of nausea/vomiting.  Patient able to move BLE with no problems noted.  Due to void.  Medications delivered to spouse in the waiting room.

## 2025-02-24 NOTE — ANESTHESIA PREPROCEDURE EVALUATION
02/24/2025  Del Anand is a 79 y.o., male.      Pre-op Assessment    I have reviewed the Patient Summary Reports.     I have reviewed the Nursing Notes. I have reviewed the NPO Status.   I have reviewed the Medications.     Review of Systems  Anesthesia Hx:  No problems with previous Anesthesia  States did not have difficult intubation, but hadd ETT for a while after a surgery              Social:  Former Smoker       Cardiovascular:     Hypertension   CAD   CABG/stent    CHF   PVD hyperlipidemia    EF - 52%    Hx DVTs - was on Coumadin     Lovenox bridge     Coronary Artery Disease:               Congestive Heart Failure (CHF)                Hypertension         Pulmonary:        Sleep Apnea     Obstructive Sleep Apnea (VERONICA).           Renal/:  Chronic Renal Disease renal calculi       Kidney Function/Disease             Hepatic/GI:      Liver Disease,            Liver Disease        Musculoskeletal:  Arthritis        Arthritis          Neurological:  Neurology Normal              Arthritis                           Endocrine:        Obesity / BMI > 30      Physical Exam  General: Well nourished    Airway:  Mallampati: III   Mouth Opening: Normal  TM Distance: Normal  Neck ROM: Normal ROM        Anesthesia Plan  Type of Anesthesia, risks & benefits discussed:    Anesthesia Type: Spinal  Intra-op Monitoring Plan: Standard ASA Monitors  Post Op Pain Control Plan: multimodal analgesia and peripheral nerve block  Informed Consent: Informed consent signed with the Patient and all parties understand the risks and agree with anesthesia plan.  All questions answered.   ASA Score: 3    Ready For Surgery From Anesthesia Perspective.     .

## 2025-02-24 NOTE — PLAN OF CARE
Patient cleared by PT to discharge to home.  Patient and spouse voiced readiness to discharge to home.  Tolerating po intake well with no complaints of nausea/vomiting.  Pain to left knee 1/10.  Patient able to void with no problems noted.  Dressing to left knee remains clean dry and intact.  Medications delivered to bedside and reviewed with patient and spouse.  Discharge instructions given to pt and family/friend, verbalized understanding and questions answered. Handouts provided. Rolling walker delivered to bedside and given to patient at discharge.  Belongings given back to pt. IV removed- catheter intact. Discharge via wheelchair.

## 2025-02-24 NOTE — PLAN OF CARE
Release per anesthesia vital signs stable instructed on IS no n&v  encouraged deep breaths has all belongings. Ice to left knee

## 2025-02-25 ENCOUNTER — OFFICE VISIT (OUTPATIENT)
Dept: ORTHOPEDICS | Facility: CLINIC | Age: 80
End: 2025-02-25
Payer: MEDICARE

## 2025-02-25 ENCOUNTER — TELEPHONE (OUTPATIENT)
Dept: ORTHOPEDICS | Facility: CLINIC | Age: 80
End: 2025-02-25
Payer: MEDICARE

## 2025-02-25 ENCOUNTER — TELEPHONE (OUTPATIENT)
Facility: CLINIC | Age: 80
End: 2025-02-25
Payer: MEDICARE

## 2025-02-25 VITALS
HEART RATE: 76 BPM | BODY MASS INDEX: 37.3 KG/M2 | WEIGHT: 300 LBS | TEMPERATURE: 98 F | SYSTOLIC BLOOD PRESSURE: 132 MMHG | OXYGEN SATURATION: 95 % | RESPIRATION RATE: 16 BRPM | DIASTOLIC BLOOD PRESSURE: 89 MMHG | HEIGHT: 75 IN

## 2025-02-25 VITALS — WEIGHT: 300 LBS | BODY MASS INDEX: 37.3 KG/M2 | HEIGHT: 75 IN

## 2025-02-25 DIAGNOSIS — M79.605 LEFT LEG PAIN: ICD-10-CM

## 2025-02-25 DIAGNOSIS — Z86.718 HISTORY OF DVT (DEEP VEIN THROMBOSIS): ICD-10-CM

## 2025-02-25 DIAGNOSIS — Z79.01 LONG TERM CURRENT USE OF ANTICOAGULANT: ICD-10-CM

## 2025-02-25 DIAGNOSIS — Z96.652 STATUS POST TOTAL LEFT KNEE REPLACEMENT: Primary | ICD-10-CM

## 2025-02-25 PROCEDURE — 1160F RVW MEDS BY RX/DR IN RCRD: CPT | Mod: CPTII,S$GLB,, | Performed by: ORTHOPAEDIC SURGERY

## 2025-02-25 PROCEDURE — 99024 POSTOP FOLLOW-UP VISIT: CPT | Mod: S$GLB,,, | Performed by: ORTHOPAEDIC SURGERY

## 2025-02-25 PROCEDURE — 1157F ADVNC CARE PLAN IN RCRD: CPT | Mod: CPTII,S$GLB,, | Performed by: ORTHOPAEDIC SURGERY

## 2025-02-25 PROCEDURE — 1101F PT FALLS ASSESS-DOCD LE1/YR: CPT | Mod: CPTII,S$GLB,, | Performed by: ORTHOPAEDIC SURGERY

## 2025-02-25 PROCEDURE — 1159F MED LIST DOCD IN RCRD: CPT | Mod: CPTII,S$GLB,, | Performed by: ORTHOPAEDIC SURGERY

## 2025-02-25 PROCEDURE — 3288F FALL RISK ASSESSMENT DOCD: CPT | Mod: CPTII,S$GLB,, | Performed by: ORTHOPAEDIC SURGERY

## 2025-02-25 PROCEDURE — 99999 PR PBB SHADOW E&M-EST. PATIENT-LVL IV: CPT | Mod: PBBFAC,,, | Performed by: ORTHOPAEDIC SURGERY

## 2025-02-25 PROCEDURE — 1125F AMNT PAIN NOTED PAIN PRSNT: CPT | Mod: CPTII,S$GLB,, | Performed by: ORTHOPAEDIC SURGERY

## 2025-02-25 NOTE — TELEPHONE ENCOUNTER
This RN Nurse Navigator called the pt to schedule a new patient Hematology/Oncology clinic appointment as requested from the providers referral. A voicemail message was left with a direct number [phone] for the patient to call back to schedule the appointment

## 2025-02-25 NOTE — PROGRESS NOTES
[facility] POST-OP NOTE    Subjective:           Chief Complaint:   Chief Complaint   Patient presents with    Left Knee - Post-op Evaluation     POD1, s/p Left TKA 2/24/25, has dull throbbing pain, he is bleeding through his bandages which started about 9:00 last night, says he did not get Celebrex in his medication bad from the hospital       Past Medical History:   Diagnosis Date    Benign neoplasm of colon     Cataract     CHF (congestive heart failure) 01/13/2015    Coronary artery disease     Difficult intubation     DVT (deep venous thrombosis)     HLD (hyperlipidemia)     HTN (hypertension)     Impaired fasting glucose     Kidney stone     Metabolic syndrome     Nonspecific abnormal results of liver function study     Nonspecific findings on examination of blood     Nuclear sclerosis - Both Eyes 10/18/2013    Osteoarthrosis, unspecified whether generalized or localized, lower leg     Respiratory distress     AFTER OPEN HEART SURGERY STATES ON VENTILATOR    Squamous cell carcinoma of skin        Past Surgical History:   Procedure Laterality Date    CARDIAC SURGERY      CATARACT EXTRACTION W/  INTRAOCULAR LENS IMPLANT Right 09/17/2020    Procedure: EXTRACTION, CATARACT, WITH IOL INSERTION;  Surgeon: Chanel Klein MD;  Location: [facility];  Service: Ophthalmology;  Laterality: Right;    CATARACT EXTRACTION W/  INTRAOCULAR LENS IMPLANT Left 10/01/2020    Procedure: EXTRACTION, CATARACT, WITH IOL INSERTION;  Surgeon: Chanel Klein MD;  Location: [facility] OR;  Service: Ophthalmology;  Laterality: Left;    COLONOSCOPY N/A 03/13/2019    Procedure: COLONOSCOPY;  Surgeon: Nikunj Larson MD;  Location: [facility] ([address] [place] [address]);  Service: Endoscopy;  Laterality: N/A;  ok to hold Coumadin x 5 days with a Lovenox bridge per Coumadin clinic  2nd floor - history of difficult intubation-MS    COLONOSCOPY N/A 07/07/2022    Procedure: COLONOSCOPY;  Surgeon: Nikunj Larson MD;  [facility]  Service:  Endoscopy;  Laterality: N/A;  ef 45%-5/31-coumadin hold per coumadin clinic see coag visist 6/29-tb-vacc- clears 4 hrs prior-am prep 2-3-am-inst portal-tb    CORONARY ANGIOGRAPHY INCLUDING BYPASS GRAFTS WITH CATHETERIZATION OF LEFT HEART N/A 2/11/2025    Procedure: ANGIOGRAM, CORONARY, INCLUDING BYPASS GRAFT, WITH LEFT HEART CATHETERIZATION;  Surgeon: Beau Jones MD;  Location: [facility] CATH/EP LAB;  Service: Cardiology;  Laterality: N/A;    CORONARY ARTERY BYPASS GRAFT      2014    CYSTOSCOPY      INSTANTANEOUS WAVE-FREE RATIO (IFR) N/A 2/11/2025    Procedure: Instantaneous Wave-Free Ratio (IFR);  Surgeon: Beau Jones MD;  Location: [facility] CATH/EP LAB;  Service: Cardiology;  Laterality: N/A;    KIDNEY STONE SURGERY      KNEE ARTHROPLASTY      bilateral    renal stone      SKIN BIOPSY         Current Outpatient Medications   Medication Sig    amLODIPine (NORVASC) 2.5 MG tablet Take 1 tablet (2.5 mg total) by mouth once daily.    aspirin 81 MG Chew Take 81 mg by mouth once daily.    celecoxib (CELEBREX) 200 MG capsule Take 1 capsule (200 mg total) by mouth 2 (two) times daily.    cyanocobalamin 500 MCG tablet Take 500 mcg by mouth every morning. Every day    docusate sodium (COLACE) 100 MG capsule Take 1 capsule (100 mg total) by mouth 2 (two) times daily.    enoxaparin (LOVENOX) 150 mg/mL Syrg Inject 1 mL (150 mg total) into the skin every 12 (twelve) hours. PER SPECIFIC INSTRUCTIONS FROM COUMADIN CLINIC    ezetimibe (ZETIA) 10 mg tablet Take 1 tablet (10 mg total) by mouth once daily.    FOLIC ACID/MULTIVITS-MIN/LUT (CENTRUM SILVER ORAL) Take 1 tablet by mouth once daily.    gabapentin (NEURONTIN) 300 MG capsule Take 1 capsule (300 mg total) by mouth 2 (two) times daily.    irbesartan (AVAPRO) 300 MG tablet Take 1 tablet (300 mg total) by mouth every evening.    ketoconazole (NIZORAL) 2 % cream Apply topically 2 (two) times daily.    ondansetron (ZOFRAN) 4 MG tablet Take 1 tablet (4 mg total) by  mouth every 6 (six) hours as needed for Nausea.    oxyCODONE-acetaminophen (PERCOCET) 7.5-325 mg per tablet Take 1 tablet by mouth every 6 (six) hours as needed for Pain.    rosuvastatin (CRESTOR) 40 MG Tab Take 1 tablet (40 mg total) by mouth once daily.    triamcinolone acetonide 0.025% (KENALOG) 0.025 % cream Apply topically 2 (two) times daily.    warfarin (COUMADIN) 10 MG tablet Take 10mg daily or as directed by Coumadin Clinic.   Also uses warfarin 5mg tablet strength    warfarin (COUMADIN) 5 MG tablet Take 10mg daily except 12.5mg      Current Facility-Administered Medications   Medication    sodium chloride 0.9% flush 2 mL       Review of patient's allergies indicates:  No Known Allergies    Family History   Problem Relation Name Age of Onset    Stroke Mother incontinence     Dementia Mother incontinence     Heart attack Father bph         d. 85    Urolithiasis Father bph     Heart disease Father bph     Heart failure Father bph     Other Sister Mary         bladder lift, s/p 4 ; estranged    Heart attack Maternal Grandfather          d. 65    Hypertension Paternal Grandmother      Heart attack Paternal Grandfather      Heart failure Paternal Grandfather      No Known Problems Daughter Sabina     No Known Problems Son Doron        Social History[1]    History of present illness:  Patient returns today status post total knee arthroplasty.  Denies any chest pain or shortness of breath.  Denies any calf pain.  Pain is well controlled.      Review of Systems:    Musculoskeletal:  See HPI      Objective:        Physical Examination:    Vital Signs:  There were no vitals filed for this visit.    Body mass index is 37.5 kg/m².    This a well-developed, well nourished patient in no acute distress.  They are alert and oriented and cooperative to examination.      Wounds are clean he does have some postoperative bleeding.  He is on Coumadin.  Calf is soft.  Sensation is preserved.  Quad is  "active.          Assessment/Plan:      Stable postop day 1.  I went over the postoperative care with the patient in great detail.  We spoke about anticoagulation, pain control and a bowel program.  Patient clearly understands.  We will follow-up in approximately 10 days for suture removal or sooner if they develop increasing pain drainage calf pain or significant constipation.    Patient is on Coumadin, he took 15 mg of Coumadin last night and was to resume the Lovenox Coumadin bridge today.  He did have some postoperative bleeding.  Were going to hold the Lovenox he can continue the Coumadin have his INR checked per protocol.  We are going to order a ultrasound of his left lower extremity to evaluate for clot propagation in a proximally 1 week as an outpatient.  We are going to send him to Hematology for a formal hypercoagulable workup.  He will follow up with us in 2 weeks for follow-up.    Juan David Mckenzie, Physician Assistant, served in the capacity as a "scribe" for this patient encounter.  A "face-to-face" encounter occurred with Dr. Carl Craig on this date.  The treatment plan and medical decision-making is outlined above. Patient was seen and examined with a chaperone.     This note was created using Dragon voice recognition software that occasionally misinterpreted phrases or words.             [1]   Social History  Socioeconomic History    Marital status:    Tobacco Use    Smoking status: Former     Current packs/day: 0.00     Average packs/day: 1 pack/day for 20.0 years (20.0 ttl pk-yrs)     Types: Cigarettes     Start date: 10/16/1967     Quit date: 10/16/1987     Years since quittin.3     Passive exposure: Past    Smokeless tobacco: Former   Substance and Sexual Activity    Alcohol use: Yes     Types: 1 - 2 Cans of beer, 1 - 2 Standard drinks or equivalent per week     Comment: BEER OR WHISKEY DAILY 1-2    Drug use: No    Sexual activity: Yes     Partners: Female     Birth " control/protection: None   Social History Narrative    SOCIAL HISTORY:     .     Retired  for [employer].     Son in [place]    Daughter lives in [place].     +/- on exercise with the joint problems.      Plays golf, now going to gym        FAMILY HISTORY:     Mom d. 2006 with dementia secondary to subarachnoid     hemorrhage and stroke.     Dad d. 85, sudden MI.     One [place].      Social Drivers of Health     Financial Resource Strain: Low Risk  (2/12/2025)    Overall Financial Resource Strain (CARDIA)     Difficulty of Paying Living Expenses: Not hard at all   Food Insecurity: No Food Insecurity (2/12/2025)    Hunger Vital Sign     Worried About Running Out of Food in the Last Year: Never true     Ran Out of Food in the Last Year: Never true   Transportation Needs: No Transportation Needs (2/12/2025)    PRAPARE - Transportation     Lack of Transportation (Medical): No     Lack of Transportation (Non-Medical): No   Physical Activity: Inactive (2/12/2025)    Exercise Vital Sign     Days of Exercise per Week: 0 days     Minutes of Exercise per Session: 0 min   Stress: No Stress Concern Present (2/12/2025)    [employer] - Occupational Stress Questionnaire     Feeling of Stress : Only a little   Housing Stability: Low Risk  (2/12/2025)    Housing Stability Vital Sign     Unable to Pay for Housing in the Last Year: No     Number of Times Moved in the Last Year: 1     Homeless in the Last Year: No

## 2025-02-25 NOTE — TELEPHONE ENCOUNTER
----- Message from MARGARET Wood sent at 2/25/2025  9:37 AM CST -----  Regarding: Coumadin  Patient after restarting Coumadin last night did have some postoperative bleeding this morning.  He has saturated his operative dressings.  Wounds were cleaned today and dressings were changed.Dr. Craig does not want the Lovenox bridge, only the Coumadin.  Recheck the INR per protocol.  We are also going to order an outpatient imaging study for a proximally 1 week from today.Respectfully,  ----- Message -----  From: Abadie, Lauren, LPN  Sent: 2/25/2025   9:15 AM CST  To: Sundar Milton PA-C; Juan David Mac#    Seen POD 1 today in office.  ----- Message -----  From: Jennifer Hunt PharmD  Sent: 2/25/2025   9:07 AM CST  To: Carl Craig MD; Kiara Henry Staff    Good morning, We restarted Mr Anand's warfarin last night. I am inquiring (from a post procedure bleed risk perspective) is it ok for us to restart his lovenox today for his coumadin/lovenox bridge?Thanks in advance for your inputRadha Fraire

## 2025-02-25 NOTE — ANESTHESIA POSTPROCEDURE EVALUATION
Anesthesia Post Evaluation    Patient: Del Anand    Procedure(s) Performed: Procedure(s) (LRB):  ROBOTIC ARTHROPLASTY, KNEE, TOTAL (Left)    Final Anesthesia Type: spinal      Patient location during evaluation: PACU  Patient participation: Yes- Able to Participate  Level of consciousness: awake and alert  Post-procedure vital signs: reviewed and stable  Pain management: adequate  Airway patency: patent    PONV status at discharge: No PONV  Anesthetic complications: no      Cardiovascular status: blood pressure returned to baseline  Respiratory status: unassisted  Hydration status: euvolemic  Follow-up not needed.              Vitals Value Taken Time   /89 02/24/25 14:50   Temp 36.4 °C (97.6 °F) 02/24/25 13:08   Pulse 76 02/24/25 14:50   Resp 16 02/24/25 14:50   SpO2 95 % 02/24/25 14:50         Event Time   Out of Recovery 14:30:00         Pain/Brigitte Score: Pain Rating Prior to Med Admin: 5 (2/24/2025  2:49 PM)  Pain Rating Post Med Admin: 1 (2/24/2025  3:30 PM)  Brigitte Score: 10 (2/24/2025  2:15 PM)  Modified Brigitte Score: 18 (2/24/2025  3:30 PM)

## 2025-02-26 ENCOUNTER — HOSPITAL ENCOUNTER (OUTPATIENT)
Dept: RADIOLOGY | Facility: HOSPITAL | Age: 80
Discharge: HOME | End: 2025-02-26
Attending: ORTHOPAEDIC SURGERY
Payer: MEDICARE

## 2025-02-26 ENCOUNTER — ANTI-COAG VISIT (OUTPATIENT)
Dept: CARDIOLOGY | Facility: CLINIC | Age: 80
End: 2025-02-26
Payer: MEDICARE

## 2025-02-26 DIAGNOSIS — Z86.718 HISTORY OF DVT (DEEP VEIN THROMBOSIS): ICD-10-CM

## 2025-02-26 DIAGNOSIS — Z96.652 STATUS POST TOTAL LEFT KNEE REPLACEMENT: ICD-10-CM

## 2025-02-26 DIAGNOSIS — Z79.01 LONG TERM CURRENT USE OF ANTICOAGULANT THERAPY: Primary | ICD-10-CM

## 2025-02-26 DIAGNOSIS — M79.605 LEFT LEG PAIN: ICD-10-CM

## 2025-02-26 LAB — INR PPP: 1.2

## 2025-02-26 PROCEDURE — 93971 EXTREMITY STUDY: CPT | Mod: 26,LT,, | Performed by: RADIOLOGY

## 2025-02-26 PROCEDURE — 93971 EXTREMITY STUDY: CPT | Mod: TC,LT

## 2025-02-26 PROCEDURE — 93793 ANTICOAG MGMT PT WARFARIN: CPT | Mod: S$GLB,,, | Performed by: PHARMACIST

## 2025-02-26 NOTE — PROGRESS NOTES
[facility]    2025 3:29 PM    Assessment/Plan:    Patient presents today with subtherapeutic  INR.    Assessment of patient findings and chart review: Patient is  taking oxycodone every 6 hour and pills for his nerves, 4-5 alcohol drinks (will caution patient about the dangers of increased bleed risk , especially s/p recent surgery), pt had a knee replacement on  and took warfrain  (15mg) and (12.5mg)    And pt last dos of lovenox was  am  (NO post procedure lovenox per Dr Finger due to bleeding issues day after procedure.   Patient states at this time the bleeding got better and the new bandages have no signs of blood.  We will continue with close watch to balance risk of bleeding verses clotting as we try to achieve a therapeutic INR.     Recommendation for patient's warfarin regimen:  coumadin per calendar, no lovenox    Recommend repeat INR in 2 days   **notify coumadin clinic if bleeding issues arise**  _________________________________________________________________    Del Anand (79 y.o.) is followed by the [facility].    Anticoagulation Summary  As of 2025      INR goal:  2.5-3.5   TTR:  72.7% (9.9 y)   INR used for dosin.2 (2025)   Warfarin maintenance plan:  12.5 mg (10 mg x 1 and 5 mg x 0.5) every Mon, Wed; 10 mg (10 mg x 1) all other days   Weekly warfarin total:  75 mg   Plan last modified:  Camryn Powell MA (2025)   Next INR check:  2025   Target end date:  --    Indications    Long term current use of anticoagulant therapy [Z79.01]  Pulmonary embolism (Resolved) [I26.99]                 Anticoagulation Episode Summary       INR check location:  Home Draw    Preferred lab:  --    Send INR reminders to:  [facility] COUMADIN HOME MONITOR    Comments:  [place] every other Wednesday          Anticoagulation Care Providers       Provider Role Specialty Phone number     Beau Jones MD [facility] Cardiology [phone]

## 2025-02-28 ENCOUNTER — ANTI-COAG VISIT (OUTPATIENT)
Dept: CARDIOLOGY | Facility: CLINIC | Age: 80
End: 2025-02-28
Payer: MEDICARE

## 2025-02-28 DIAGNOSIS — Z79.01 LONG TERM CURRENT USE OF ANTICOAGULANT THERAPY: Primary | ICD-10-CM

## 2025-02-28 LAB — INR PPP: 1.3

## 2025-02-28 NOTE — PROGRESS NOTES
INR not at goal. Medications, chart, and patient findings reviewed. See calendar for adjustments to dose and follow up plan.  We will continue to increase warfarin with close watch. No lovenox per MD orders. Close watch. Patient was re-educated on situations that would require placing a call to the coumadin clinic, including bleeding or unusual bruising issues, changes in health, diet or medications, upcoming procedures that require warfarin interruption, and missed coumadin dose(s). Patient expressed understanding that avoidance of consistency with these parameters could cause fluctuations in INR, leading to more frequent visits and increase risk of adverse events.

## 2025-03-03 ENCOUNTER — OFFICE VISIT (OUTPATIENT)
Dept: HEMATOLOGY/ONCOLOGY | Facility: CLINIC | Age: 80
End: 2025-03-03
Payer: MEDICARE

## 2025-03-03 ENCOUNTER — LAB VISIT (OUTPATIENT)
Dept: LAB | Facility: HOSPITAL | Age: 80
End: 2025-03-03
Attending: NURSE PRACTITIONER
Payer: MEDICARE

## 2025-03-03 ENCOUNTER — ANTI-COAG VISIT (OUTPATIENT)
Dept: CARDIOLOGY | Facility: CLINIC | Age: 80
End: 2025-03-03
Payer: MEDICARE

## 2025-03-03 VITALS
SYSTOLIC BLOOD PRESSURE: 145 MMHG | OXYGEN SATURATION: 95 % | HEART RATE: 95 BPM | RESPIRATION RATE: 18 BRPM | DIASTOLIC BLOOD PRESSURE: 66 MMHG

## 2025-03-03 DIAGNOSIS — Z79.01 LONG TERM CURRENT USE OF ANTICOAGULANT: ICD-10-CM

## 2025-03-03 DIAGNOSIS — Z86.718 HISTORY OF DVT (DEEP VEIN THROMBOSIS): ICD-10-CM

## 2025-03-03 DIAGNOSIS — R79.9 ABNORMAL FINDING OF BLOOD CHEMISTRY, UNSPECIFIED: ICD-10-CM

## 2025-03-03 DIAGNOSIS — Z79.01 LONG TERM CURRENT USE OF ANTICOAGULANT THERAPY: Primary | ICD-10-CM

## 2025-03-03 LAB
ALBUMIN SERPL BCP-MCNC: 3.1 G/DL (ref 3.5–5.2)
ALP SERPL-CCNC: 106 U/L (ref 40–150)
ALT SERPL W/O P-5'-P-CCNC: 23 U/L (ref 10–44)
ANION GAP SERPL CALC-SCNC: 14 MMOL/L (ref 8–16)
AST SERPL-CCNC: 23 U/L (ref 10–40)
BASOPHILS # BLD AUTO: 0.05 K/UL (ref 0–0.2)
BASOPHILS NFR BLD: 0.5 % (ref 0–1.9)
BILIRUB SERPL-MCNC: 1.8 MG/DL (ref 0.1–1)
BUN SERPL-MCNC: 25 MG/DL (ref 8–23)
CALCIUM SERPL-MCNC: 8.9 MG/DL (ref 8.7–10.5)
CHLORIDE SERPL-SCNC: 103 MMOL/L (ref 95–110)
CO2 SERPL-SCNC: 21 MMOL/L (ref 23–29)
CREAT SERPL-MCNC: 0.8 MG/DL (ref 0.5–1.4)
DIFFERENTIAL METHOD BLD: ABNORMAL
EOSINOPHIL # BLD AUTO: 0.1 K/UL (ref 0–0.5)
EOSINOPHIL NFR BLD: 0.8 % (ref 0–8)
ERYTHROCYTE [DISTWIDTH] IN BLOOD BY AUTOMATED COUNT: 13.4 % (ref 11.5–14.5)
EST. GFR  (NO RACE VARIABLE): >60 ML/MIN/1.73 M^2
FERRITIN SERPL-MCNC: 285 NG/ML (ref 20–300)
GLUCOSE SERPL-MCNC: 142 MG/DL (ref 70–110)
HCT VFR BLD AUTO: 28.9 % (ref 40–54)
HGB BLD-MCNC: 9.2 G/DL (ref 14–18)
IMM GRANULOCYTES # BLD AUTO: 0.06 K/UL (ref 0–0.04)
IMM GRANULOCYTES NFR BLD AUTO: 0.6 % (ref 0–0.5)
INR PPP: 2.2
IRON SERPL-MCNC: 83 UG/DL (ref 45–160)
LYMPHOCYTES # BLD AUTO: 1.6 K/UL (ref 1–4.8)
LYMPHOCYTES NFR BLD: 16.1 % (ref 18–48)
MCH RBC QN AUTO: 32.1 PG (ref 27–31)
MCHC RBC AUTO-ENTMCNC: 31.8 G/DL (ref 32–36)
MCV RBC AUTO: 101 FL (ref 82–98)
MONOCYTES # BLD AUTO: 1.2 K/UL (ref 0.3–1)
MONOCYTES NFR BLD: 11.6 % (ref 4–15)
NEUTROPHILS # BLD AUTO: 7.2 K/UL (ref 1.8–7.7)
NEUTROPHILS NFR BLD: 70.4 % (ref 38–73)
NRBC BLD-RTO: 0 /100 WBC
PLATELET # BLD AUTO: 272 K/UL (ref 150–450)
PMV BLD AUTO: 9.2 FL (ref 9.2–12.9)
POTASSIUM SERPL-SCNC: 4.8 MMOL/L (ref 3.5–5.1)
PROT SERPL-MCNC: 6.7 G/DL (ref 6–8.4)
RBC # BLD AUTO: 2.87 M/UL (ref 4.6–6.2)
SATURATED IRON: 26 % (ref 20–50)
SODIUM SERPL-SCNC: 138 MMOL/L (ref 136–145)
TOTAL IRON BINDING CAPACITY: 323 UG/DL (ref 250–450)
TRANSFERRIN SERPL-MCNC: 218 MG/DL (ref 200–375)
WBC # BLD AUTO: 10.15 K/UL (ref 3.9–12.7)

## 2025-03-03 PROCEDURE — 84466 ASSAY OF TRANSFERRIN: CPT | Performed by: NURSE PRACTITIONER

## 2025-03-03 PROCEDURE — 99999 PR PBB SHADOW E&M-EST. PATIENT-LVL V: CPT | Mod: PBBFAC,,, | Performed by: NURSE PRACTITIONER

## 2025-03-03 PROCEDURE — 85025 COMPLETE CBC W/AUTO DIFF WBC: CPT | Performed by: NURSE PRACTITIONER

## 2025-03-03 PROCEDURE — 82728 ASSAY OF FERRITIN: CPT | Performed by: NURSE PRACTITIONER

## 2025-03-03 PROCEDURE — 80053 COMPREHEN METABOLIC PANEL: CPT | Performed by: NURSE PRACTITIONER

## 2025-03-03 PROCEDURE — 36415 COLL VENOUS BLD VENIPUNCTURE: CPT | Performed by: NURSE PRACTITIONER

## 2025-03-03 NOTE — PROGRESS NOTES
[facility]    2025 2:31 PM    Assessment/Plan:    Patient presents today with subtherapeutic  INR.    Assessment of patient findings and chart review: INR subtherapeutic but increased and improved from 1.3 to 2.2 in less than a week. Patient s/p knee surgery. He reports slight bleeding but has since stopped. He will be advised to contact MD if this continues/worsens. Now that INR is moving up with boosted doses and patient s/p knee surgery, we will place patient back on his previous weekly dose of warfarin with continued close watch.     Recommendation for patient's warfarin regimen:  per calendar    Recommend repeat INR in 4 days  _________________________________________________________________    Del Anand (79 y.o.) is followed by the [facility].    Anticoagulation Summary  As of 3/3/2025      INR goal:  2.5-3.5   TTR:  72.6% (9.9 y)   INR used for dosin.2 (3/3/2025)   Warfarin maintenance plan:  12.5 mg (10 mg x 1 and 5 mg x 0.5) every Mon, Thu; 10 mg (10 mg x 1) all other days   Weekly warfarin total:  75 mg   Plan last modified:  Jennifer Hunt, PharmD (3/3/2025)   Next INR check:  3/6/2025   Target end date:  --    Indications    Long term current use of anticoagulant therapy [Z79.01]  Pulmonary embolism (Resolved) [I26.99]                 Anticoagulation Episode Summary       INR check location:  Home Draw    Preferred lab:  --    Send INR reminders to:  [facility] COUMADIN HOME MONITOR    Comments:  [place] every other Wednesday          Anticoagulation Care Providers       Provider Role Specialty Phone number    Beau Jones MD [facility] Cardiology [phone]

## 2025-03-03 NOTE — PROGRESS NOTES
Intial Visit New Patient: Del is a 79 y.o. male is referred to us here in the Oncology Clinic for further evaluation.  He has been referred for long term anticoagulation recommendations.  Patient reports he had CABG in 2015 after which time he developed pulmonary embolism.  He was placed on Xarelto and later developed DVT while on Xarelto.  He was then placed on Lovenox and Coumadin and referred to Coumadin Clinic for management.  He was previously seen by Hematology in 2015 and in 2023.  Hematology note as follows: HPI  Mr. Anand is here for follow up of clotting problems 6 months into therapy with coumadin. His issues had started in January after 10 days of rest following three weeks of PT at which time he developed a pulmonary embolism 1/2015. He was begun on heparin and 1/29/15 switched to xaralto maybe missed three doses over the period since that time. Unfortunately, on 4/8/15 he developed new right leg symptoms and 4/9 was seen by his primary physician who referred him to [facility] where doppler showed DVT in the popliteal vein. He was started on IV heparin and transferred to [facility] main campus. There he underwent a venogram the following day and the popliteal DVT had cleared and the only thing left was a small infrapopliteal DVT. He was discharged to home on Lovenox and warfarin which he continues on now. He has been active throughout this period.      On 4/10/15 (one day after he developed his new clot) he underwent the following testing: Normal factor V leiden, normal prothrombin gene, protein S antigen/activity and protein C activity normal (protein C antigen slightly low at 67%), ATIII 87%, and APA IgG/IgM not elevated.   Both hematologists recommended lifelong anticoagulation.  He presents today for consultation.  He is status post 7 days after having a left knee replacement      Review of Systems   Constitutional:  Positive for malaise/fatigue.   HENT: Negative.     Eyes: Negative.     Respiratory: Negative.     Cardiovascular: Negative.    Gastrointestinal: Negative.    Genitourinary: Negative.    Musculoskeletal:         + left knee pain r/t surgery    Skin: Negative.    Neurological: Negative.    Endo/Heme/Allergies: Negative.    Psychiatric/Behavioral: Negative.      Past Medical History:   Diagnosis Date    Benign neoplasm of colon     Cataract     CHF (congestive heart failure) 01/13/2015    Coronary artery disease     Difficult intubation     DVT (deep venous thrombosis)     HLD (hyperlipidemia)     HTN (hypertension)     Impaired fasting glucose     Kidney stone     Metabolic syndrome     Nonspecific abnormal results of liver function study     Nonspecific findings on examination of blood     Nuclear sclerosis - Both Eyes 10/18/2013    Osteoarthrosis, unspecified whether generalized or localized, lower leg     Respiratory distress     AFTER OPEN HEART SURGERY STATES ON VENTILATOR    Squamous cell carcinoma of skin     Problem List[1]   Past Surgical History:   Procedure Laterality Date    CARDIAC SURGERY      CATARACT EXTRACTION W/  INTRAOCULAR LENS IMPLANT Right 09/17/2020    Procedure: EXTRACTION, CATARACT, WITH IOL INSERTION;  Surgeon: Chanel Klein MD;  Location: [facility] OR;  Service: Ophthalmology;  Laterality: Right;    CATARACT EXTRACTION W/  INTRAOCULAR LENS IMPLANT Left 10/01/2020    Procedure: EXTRACTION, CATARACT, WITH IOL INSERTION;  Surgeon: Chanel Klein MD;  Location: [facility] OR;  Service: Ophthalmology;  Laterality: Left;    COLONOSCOPY N/A 03/13/2019    Procedure: COLONOSCOPY;  Surgeon: Nikunj Larson MD;  Location: [facility] ([address] [place] [address]);  Service: Endoscopy;  Laterality: N/A;  ok to hold Coumadin x 5 days with a Lovenox bridge per Coumadin clinic  2nd floor - history of difficult intubation-MS    COLONOSCOPY N/A 07/07/2022    Procedure: COLONOSCOPY;  Surgeon: Nikunj Larson MD;  Location: [facility] ([address] [place] [address]);  Service: Endoscopy;  Laterality: N/A;  ef 45%-5/31-coumadin  hold per coumadin clinic see coag visist -tb-vacc- clears 4 hrs prior-am prep 2-3-am-inst portal-tb    CORONARY ANGIOGRAPHY INCLUDING BYPASS GRAFTS WITH CATHETERIZATION OF LEFT HEART N/A 2025    Procedure: ANGIOGRAM, CORONARY, INCLUDING BYPASS GRAFT, WITH LEFT HEART CATHETERIZATION;  Surgeon: Beau Jones MD;  Location: [facility] CATH/EP LAB;  Service: Cardiology;  Laterality: N/A;    CORONARY ARTERY BYPASS GRAFT          CYSTOSCOPY      INSTANTANEOUS WAVE-FREE RATIO (IFR) N/A 2025    Procedure: Instantaneous Wave-Free Ratio (IFR);  Surgeon: Beau Jones MD;  Location: [facility] CATH/EP LAB;  Service: Cardiology;  Laterality: N/A;    KIDNEY STONE SURGERY      KNEE ARTHROPLASTY      bilateral    renal stone      ROBOTIC ARTHROPLASTY, KNEE Left 2025    Procedure: ROBOTIC ARTHROPLASTY, KNEE, TOTAL;  Surgeon: Carl Craig MD;  Location: [facility] OR;  Service: Orthopedics;  Laterality: Left;  RN NEEDS TO FINISH IMPLANTS    SKIN BIOPSY      Current Medications[2] Review of patient's allergies indicates:  No Known Allergies   Family History   Problem Relation Name Age of Onset    Stroke Mother incontinence     Dementia Mother incontinence     Heart attack Father bph         d. 85    Urolithiasis Father bph     Heart disease Father bph     Heart failure Father bph     Other Sister Mary         bladder lift, s/p 4 ; estranged    Heart attack Maternal Grandfather          d. 65    Hypertension Paternal Grandmother      Heart attack Paternal Grandfather      Heart failure Paternal Grandfather      No Known Problems Daughter Sabina     No Known Problems Son Doron      PHYSICAL EXAM:     There were no vitals filed for this visit.    GENERAL: Comfortable looking patient. Patient is in no distress.  Awake, alert and oriented to time, person and place.  No anxiety, or agitation.      HEENT: Normal conjunctivae and eyelids. WNL.  PERRLA 3 to 4 mm. No icterus, no pallor, no congestion, and no  discharge noted.     NECK:  Supple. Trachea is central.  No crepitus.  No JVD or masses.    RESPIRATORY:  No intercostal retractions.  No dullness to percussion.  Chest is clear to auscultation.  No rales, rhonchi or wheezes.  No crepitus.  Good air entry bilaterally.    CARDIOVASCULAR:  S1 and S2 are normally heard without murmurs or gallops.  All peripheral pulses are present.    ABDOMEN:  Normal abdomen.  No hepatosplenomegaly.  No free fluid.  Bowel sounds are present.  No hernia noted. No masses.  No rebound or tenderness.  No guarding or rigidity.  Umbilicus is midline.    LYMPHATICS:  No axillary, cervical, supraclavicular, submental, or inguinal lymphadenopathy.    SKIN/MUSCULOSKELETAL:  Extensive bruising noted to left lower extremity.  Surgical dressing is clean dry and intact    NEUROLOGIC:  Higher functions are appropriate.  No cranial nerve deficits.  Normal danny.  Normal strength.  Motor and sensory functions are normal.  Deep tendon reflexes are normal.    GENITAL/RECTAL:  Exams are deferred.       Assessment/plan:    Pulmonary embolism followed by DVT while on Xarelto:  -recommend continuing Coumadin for life  -patient can see our Hematology Clinic for bridging recommendations for future surgery's  -we will check labs today including iron stores  -return to clinic in 6 months with labs           [1]   Patient Active Problem List  Diagnosis    Kidney stone on left side    Low back pain    Fatty liver    Prediabetes    Metabolic syndrome    Nuclear sclerosis - Both Eyes    Coronary artery disease    Hyperglycemia    S/P CABG x 2    Severe obesity (BMI 35.0-39.9) with comorbidity    Long term current use of anticoagulant therapy    Osteoarthrosis, unspecified whether generalized or localized, lower leg    DVT (deep venous thrombosis)    Mixed hyperlipidemia    Essential hypertension    VERONICA on CPAP    Left-sided carotid artery disease    Hematuria, gross    History of squamous cell carcinoma    Colon  cancer screening    Adenomatous polyp of ascending colon    Nuclear sclerosis, bilateral    Impaired functional mobility, balance, gait, and endurance    Erectile dysfunction    Joint pain in both hands    Stiffness of joints of both hands    Edema of left lower extremity   [2]   Current Outpatient Medications   Medication Sig Dispense Refill    amLODIPine (NORVASC) 2.5 MG tablet Take 1 tablet (2.5 mg total) by mouth once daily. 90 tablet 1    aspirin 81 MG Chew Take 81 mg by mouth once daily.      celecoxib (CELEBREX) 200 MG capsule Take 1 capsule (200 mg total) by mouth 2 (two) times daily. 60 capsule 0    cyanocobalamin 500 MCG tablet Take 500 mcg by mouth every morning. Every day      docusate sodium (COLACE) 100 MG capsule Take 1 capsule (100 mg total) by mouth 2 (two) times daily. 60 capsule 0    enoxaparin (LOVENOX) 150 mg/mL Syrg Inject 1 mL (150 mg total) into the skin every 12 (twelve) hours. PER SPECIFIC INSTRUCTIONS FROM COUMADIN CLINIC 14 each 1    ezetimibe (ZETIA) 10 mg tablet Take 1 tablet (10 mg total) by mouth once daily. 90 tablet 1    FOLIC ACID/MULTIVITS-MIN/LUT (CENTRUM SILVER ORAL) Take 1 tablet by mouth once daily.      gabapentin (NEURONTIN) 300 MG capsule Take 1 capsule (300 mg total) by mouth 2 (two) times daily. 60 capsule 0    irbesartan (AVAPRO) 300 MG tablet Take 1 tablet (300 mg total) by mouth every evening. 90 tablet 1    ketoconazole (NIZORAL) 2 % cream Apply topically 2 (two) times daily. 60 g 5    ondansetron (ZOFRAN) 4 MG tablet Take 1 tablet (4 mg total) by mouth every 6 (six) hours as needed for Nausea. 10 tablet 0    oxyCODONE-acetaminophen (PERCOCET) 7.5-325 mg per tablet Take 1 tablet by mouth every 6 (six) hours as needed for Pain. 28 tablet 0    rosuvastatin (CRESTOR) 40 MG Tab Take 1 tablet (40 mg total) by mouth once daily. 90 tablet 1    triamcinolone acetonide 0.025% (KENALOG) 0.025 % cream Apply topically 2 (two) times daily. 80 g 5    warfarin (COUMADIN) 10 MG  tablet Take 10mg daily or as directed by Coumadin Clinic.   Also uses warfarin 5mg tablet strength 90 tablet 3    warfarin (COUMADIN) 5 MG tablet Take 10mg daily except 12.5mg Wednesdays 180 tablet 3     Current Facility-Administered Medications   Medication Dose Route Frequency Provider Last Rate Last Admin    sodium chloride 0.9% flush 2 mL  2 mL Intravenous PRN Joellen Colmenares, NP

## 2025-03-05 ENCOUNTER — PATIENT MESSAGE (OUTPATIENT)
Dept: HEMATOLOGY/ONCOLOGY | Facility: CLINIC | Age: 80
End: 2025-03-05
Payer: MEDICARE

## 2025-03-05 ENCOUNTER — TELEPHONE (OUTPATIENT)
Dept: HEMATOLOGY/ONCOLOGY | Facility: CLINIC | Age: 80
End: 2025-03-05
Payer: MEDICARE

## 2025-03-05 ENCOUNTER — LAB VISIT (OUTPATIENT)
Dept: LAB | Facility: HOSPITAL | Age: 80
End: 2025-03-05
Attending: NURSE PRACTITIONER
Payer: MEDICARE

## 2025-03-05 DIAGNOSIS — E53.8 B12 DEFICIENCY: ICD-10-CM

## 2025-03-05 DIAGNOSIS — D50.0 ANEMIA DUE TO CHRONIC BLOOD LOSS: Primary | ICD-10-CM

## 2025-03-05 DIAGNOSIS — R53.83 OTHER FATIGUE: ICD-10-CM

## 2025-03-05 DIAGNOSIS — D50.0 ANEMIA DUE TO CHRONIC BLOOD LOSS: ICD-10-CM

## 2025-03-05 LAB
LDH SERPL L TO P-CCNC: 480 U/L (ref 110–260)
T4 FREE SERPL-MCNC: 1.04 NG/DL (ref 0.71–1.51)
TSH SERPL DL<=0.005 MIU/L-ACNC: 1.83 UIU/ML (ref 0.4–4)

## 2025-03-05 PROCEDURE — 82746 ASSAY OF FOLIC ACID SERUM: CPT | Performed by: NURSE PRACTITIONER

## 2025-03-05 PROCEDURE — 82607 VITAMIN B-12: CPT | Performed by: NURSE PRACTITIONER

## 2025-03-05 PROCEDURE — 86334 IMMUNOFIX E-PHORESIS SERUM: CPT | Performed by: NURSE PRACTITIONER

## 2025-03-05 PROCEDURE — 83521 IG LIGHT CHAINS FREE EACH: CPT | Mod: 59 | Performed by: NURSE PRACTITIONER

## 2025-03-05 PROCEDURE — 36415 COLL VENOUS BLD VENIPUNCTURE: CPT | Performed by: NURSE PRACTITIONER

## 2025-03-05 PROCEDURE — 84439 ASSAY OF FREE THYROXINE: CPT | Performed by: NURSE PRACTITIONER

## 2025-03-05 PROCEDURE — 84165 PROTEIN E-PHORESIS SERUM: CPT | Performed by: NURSE PRACTITIONER

## 2025-03-05 PROCEDURE — 86334 IMMUNOFIX E-PHORESIS SERUM: CPT | Mod: 26,,, | Performed by: PATHOLOGY

## 2025-03-05 PROCEDURE — 84165 PROTEIN E-PHORESIS SERUM: CPT | Mod: 26,,, | Performed by: PATHOLOGY

## 2025-03-05 PROCEDURE — 83615 LACTATE (LD) (LDH) ENZYME: CPT | Performed by: NURSE PRACTITIONER

## 2025-03-05 PROCEDURE — 84443 ASSAY THYROID STIM HORMONE: CPT | Performed by: NURSE PRACTITIONER

## 2025-03-05 NOTE — TELEPHONE ENCOUNTER
----- Message from Bessy sent at 3/5/2025 12:08 PM CST -----  Type: Needs Medical AdviceWho Called:  [facility]: [phone] Additional Information: requesting a call back for a later time on 3/12. Time needs to be after 1pm

## 2025-03-06 ENCOUNTER — ANTI-COAG VISIT (OUTPATIENT)
Dept: CARDIOLOGY | Facility: CLINIC | Age: 80
End: 2025-03-06
Payer: MEDICARE

## 2025-03-06 DIAGNOSIS — Z79.01 LONG TERM CURRENT USE OF ANTICOAGULANT THERAPY: Primary | ICD-10-CM

## 2025-03-06 LAB
ALBUMIN SERPL ELPH-MCNC: 3.04 G/DL (ref 3.35–5.55)
ALPHA1 GLOB SERPL ELPH-MCNC: 0.43 G/DL (ref 0.17–0.41)
ALPHA2 GLOB SERPL ELPH-MCNC: 0.95 G/DL (ref 0.43–0.99)
B-GLOBULIN SERPL ELPH-MCNC: 0.95 G/DL (ref 0.5–1.1)
FOLATE SERPL-MCNC: 17.4 NG/ML (ref 4–24)
GAMMA GLOB SERPL ELPH-MCNC: 0.73 G/DL (ref 0.67–1.58)
INR PPP: 2.9
INTERPRETATION SERPL IFE-IMP: NORMAL
KAPPA LC SER QL IA: 2.76 MG/DL (ref 0.33–1.94)
KAPPA LC/LAMBDA SER IA: 1.18 (ref 0.26–1.65)
LAMBDA LC SER QL IA: 2.34 MG/DL (ref 0.57–2.63)
PROT SERPL-MCNC: 6.1 G/DL (ref 6–8.4)
VIT B12 SERPL-MCNC: 710 PG/ML (ref 210–950)

## 2025-03-06 NOTE — PROGRESS NOTES
The following message was sent to Dr Craig:  Jennifer Hunt PharmD [facility] Staff; Carl Craig MD  Good afternoon,  Patient's INR is now within his therapeutic range (2.5-3.5)  at 2.9 today. Patient reports oozing of blood from surgery site and swelling in knee. I am reaching out to inquire if this is something that should warrant holding the coumadin? Or is this to be expected post surgery in patient on blood thinners?  Thanks in advance for your input,  Radha Fraire    Response received:   From: Sundar Milton PA-C   Sent: 3/6/2025   3:30 PM CST                                             No, this is not normal. I would prefer him to hold his anticoagulant to allow that using/bleeding to stop. Of course, that would need to be authorized/approved by whoever the Coumadin prescriber is.     I sent this correspondence to cardiology and hematology team:  Jennifer Hunt PharmD Honeycutt, Stephen R, MA; Beau Jones MD; [facility] Staff; Jamee Qureshi NP; Carl Craig MD  Thank you for the response Sundar. I will hold his coumadin today as this bleeding from TKA site is not normal. Moving forward, I am copying his cardiology and hematology team for guidance since (in addition to bleed risk) he is also a clot risk if kept off of coumadin for extended time.  (In Summary for those just joining, patient underwent TKA 2/24/25 after holding coumadin 5 days prior. MD asked that we resume coumadin post procedure but NO lovenox due to post op bleeding day after surgery) We have been able to achieve a therapeutic INR today, but patient reports oozing from surgery site and swelling. We will hold coumadin today but wanting guidance moving forward on when to restart in trying to balance bleed risk verses clot risk).  Thank you in advance for all input provided!  Radha Fraire    Awaiting further guidance

## 2025-03-06 NOTE — PROGRESS NOTES
Patient called in a verbal result as: INR 2.9 dated today 3/06/25     Body Location Override (Optional - Billing Will Still Be Based On Selected Body Map Location If Applicable): mid dorsal forearm

## 2025-03-07 NOTE — PROGRESS NOTES
3/07/25  Patient called, had spoken with Radha Waters earlier today regarding holding warfarin since he was oozing blood from surgery site, had agreed to hold 3/06 and today 3/07 but now inquiring if he should take something on the weekend, would like to speak with a PharmD, call back # [phone]

## 2025-03-07 NOTE — PROGRESS NOTES
Discussed plan with pt that was in calendar per DAVE's notes. He has not noticed bleeding through bandage today. He is scheduled to hold another dose and lowered dose through the weekend balancing bleeding from site and clot risk. He will repeat INR on Monday. Pt was not sure if something comes up over the weekend on what to do. Pt advised if bleeding through bandage returns, hold warfarin and seek UC/ER for assessment of wound. Otherwise, resume at lowered dose. INR on Monday. He will see ortho on Tuesday.

## 2025-03-08 LAB
PATHOLOGIST INTERPRETATION IFE: NORMAL
PATHOLOGIST INTERPRETATION SPE: NORMAL

## 2025-03-10 ENCOUNTER — ANTI-COAG VISIT (OUTPATIENT)
Dept: CARDIOLOGY | Facility: CLINIC | Age: 80
End: 2025-03-10
Payer: MEDICARE

## 2025-03-10 DIAGNOSIS — Z79.01 LONG TERM CURRENT USE OF ANTICOAGULANT THERAPY: Primary | ICD-10-CM

## 2025-03-10 LAB — INR PPP: 1.3

## 2025-03-10 PROCEDURE — 93793 ANTICOAG MGMT PT WARFARIN: CPT | Mod: S$GLB,,, | Performed by: PHARMACIST

## 2025-03-11 ENCOUNTER — OFFICE VISIT (OUTPATIENT)
Dept: ORTHOPEDICS | Facility: CLINIC | Age: 80
End: 2025-03-11
Payer: MEDICARE

## 2025-03-11 VITALS — HEIGHT: 75 IN | BODY MASS INDEX: 37.31 KG/M2 | WEIGHT: 300.06 LBS

## 2025-03-11 DIAGNOSIS — T81.31XD POSTOPERATIVE WOUND DEHISCENCE, SUBSEQUENT ENCOUNTER: Primary | ICD-10-CM

## 2025-03-11 DIAGNOSIS — Z96.652 STATUS POST TOTAL LEFT KNEE REPLACEMENT: ICD-10-CM

## 2025-03-11 DIAGNOSIS — M17.12 PRIMARY OSTEOARTHRITIS OF LEFT KNEE: ICD-10-CM

## 2025-03-11 PROCEDURE — 3288F FALL RISK ASSESSMENT DOCD: CPT | Mod: CPTII,S$GLB,, | Performed by: ORTHOPAEDIC SURGERY

## 2025-03-11 PROCEDURE — 1160F RVW MEDS BY RX/DR IN RCRD: CPT | Mod: CPTII,S$GLB,, | Performed by: ORTHOPAEDIC SURGERY

## 2025-03-11 PROCEDURE — 99999 PR PBB SHADOW E&M-EST. PATIENT-LVL IV: CPT | Mod: PBBFAC,,, | Performed by: ORTHOPAEDIC SURGERY

## 2025-03-11 PROCEDURE — 1101F PT FALLS ASSESS-DOCD LE1/YR: CPT | Mod: CPTII,S$GLB,, | Performed by: ORTHOPAEDIC SURGERY

## 2025-03-11 PROCEDURE — 1126F AMNT PAIN NOTED NONE PRSNT: CPT | Mod: CPTII,S$GLB,, | Performed by: ORTHOPAEDIC SURGERY

## 2025-03-11 PROCEDURE — 99024 POSTOP FOLLOW-UP VISIT: CPT | Mod: S$GLB,,, | Performed by: ORTHOPAEDIC SURGERY

## 2025-03-11 PROCEDURE — 1157F ADVNC CARE PLAN IN RCRD: CPT | Mod: CPTII,S$GLB,, | Performed by: ORTHOPAEDIC SURGERY

## 2025-03-11 PROCEDURE — 1159F MED LIST DOCD IN RCRD: CPT | Mod: CPTII,S$GLB,, | Performed by: ORTHOPAEDIC SURGERY

## 2025-03-11 RX ORDER — CEFAZOLIN SODIUM 2 G/50ML
2 SOLUTION INTRAVENOUS
Status: CANCELLED | OUTPATIENT
Start: 2025-03-11

## 2025-03-11 RX ORDER — OXYCODONE AND ACETAMINOPHEN 7.5; 325 MG/1; MG/1
1 TABLET ORAL EVERY 6 HOURS PRN
Qty: 28 TABLET | Refills: 0 | Status: ON HOLD | OUTPATIENT
Start: 2025-03-11

## 2025-03-11 NOTE — PROGRESS NOTES
Pt reported he needs to have another procedure done on L knee on Thursday due to blood and fluid collecting under incision. Aspirin taken this morning, but he plans to not take anymore.

## 2025-03-11 NOTE — PROGRESS NOTES
[facility]    Subjective:     Chief Complaint:   Chief Complaint   Patient presents with    Left Knee - Post-op Evaluation     Patient is here for PO follow up left TKA, states pain has improved since last visit. Has sharp stabbing pain with certain movements, also is still continuing to drain fluid from one area on the knee        Past Medical History:   Diagnosis Date    Benign neoplasm of colon     Cataract     CHF (congestive heart failure) 01/13/2015    Coronary artery disease     Difficult intubation     DVT (deep venous thrombosis)     HLD (hyperlipidemia)     HTN (hypertension)     Impaired fasting glucose     Kidney stone     Metabolic syndrome     Nonspecific abnormal results of liver function study     Nonspecific findings on examination of blood     Nuclear sclerosis - Both Eyes 10/18/2013    Osteoarthrosis, unspecified whether generalized or localized, lower leg     Respiratory distress     AFTER OPEN HEART SURGERY STATES ON VENTILATOR    Squamous cell carcinoma of skin        Past Surgical History:   Procedure Laterality Date    CARDIAC SURGERY      CATARACT EXTRACTION W/  INTRAOCULAR LENS IMPLANT Right 09/17/2020    Procedure: EXTRACTION, CATARACT, WITH IOL INSERTION;  Surgeon: Chanel Klein MD;  Location: [facility], [place], operated by [facility] OR;  Service: Ophthalmology;  Laterality: Right;    CATARACT EXTRACTION W/  INTRAOCULAR LENS IMPLANT Left 10/01/2020    Procedure: EXTRACTION, CATARACT, WITH IOL INSERTION;  Surgeon: Chanel Klein MD;  Location: [facility], [place], operated by [facility] OR;  Service: Ophthalmology;  Laterality: Left;    COLONOSCOPY N/A 03/13/2019    Procedure: COLONOSCOPY;  Surgeon: Nikunj Larson MD;  Location: [facility] ([address] [place] [address]);  Service: Endoscopy;  Laterality: N/A;  ok to hold Coumadin x 5 days with a Lovenox bridge per Coumadin clinic  2nd floor - history of difficult intubation-MS    COLONOSCOPY N/A 07/07/2022    Procedure: COLONOSCOPY;  Surgeon: Nikunj Larson MD;  [facility]  Service: Endoscopy;   Laterality: N/A;  ef 45%-5/31-coumadin hold per coumadin clinic see coag visist 6/29-tb-vacc- clears 4 hrs prior-am prep 2-3-am-inst portal-tb    CORONARY ANGIOGRAPHY INCLUDING BYPASS GRAFTS WITH CATHETERIZATION OF LEFT HEART N/A 2/11/2025    Procedure: ANGIOGRAM, CORONARY, INCLUDING BYPASS GRAFT, WITH LEFT HEART CATHETERIZATION;  Surgeon: Beau Jones MD;  Location: [facility] CATH/EP LAB;  Service: Cardiology;  Laterality: N/A;    CORONARY ARTERY BYPASS GRAFT      2014    CYSTOSCOPY      INSTANTANEOUS WAVE-FREE RATIO (IFR) N/A 2/11/2025    Procedure: Instantaneous Wave-Free Ratio (IFR);  Surgeon: Beau Jones MD;  Location: [facility] CATH/EP LAB;  Service: Cardiology;  Laterality: N/A;    KIDNEY STONE SURGERY      KNEE ARTHROPLASTY      bilateral    renal stone      ROBOTIC ARTHROPLASTY, KNEE Left 2/24/2025    Procedure: ROBOTIC ARTHROPLASTY, KNEE, TOTAL;  Surgeon: Carl Craig MD;  Location: [facility] OR;  Service: Orthopedics;  Laterality: Left;  RN NEEDS TO FINISH IMPLANTS    SKIN BIOPSY         Current Outpatient Medications   Medication Sig    amLODIPine (NORVASC) 2.5 MG tablet Take 1 tablet (2.5 mg total) by mouth once daily.    aspirin 81 MG Chew Take 81 mg by mouth once daily.    celecoxib (CELEBREX) 200 MG capsule Take 1 capsule (200 mg total) by mouth 2 (two) times daily.    cyanocobalamin 500 MCG tablet Take 500 mcg by mouth every morning. Every day    docusate sodium (COLACE) 100 MG capsule Take 1 capsule (100 mg total) by mouth 2 (two) times daily.    ezetimibe (ZETIA) 10 mg tablet Take 1 tablet (10 mg total) by mouth once daily.    FOLIC ACID/MULTIVITS-MIN/LUT (CENTRUM SILVER ORAL) Take 1 tablet by mouth once daily.    gabapentin (NEURONTIN) 300 MG capsule Take 1 capsule (300 mg total) by mouth 2 (two) times daily.    irbesartan (AVAPRO) 300 MG tablet Take 1 tablet (300 mg total) by mouth every evening.    ketoconazole (NIZORAL) 2 % cream Apply topically 2 (two) times daily.    ondansetron  (ZOFRAN) 4 MG tablet Take 1 tablet (4 mg total) by mouth every 6 (six) hours as needed for Nausea.    rosuvastatin (CRESTOR) 40 MG Tab Take 1 tablet (40 mg total) by mouth once daily.    triamcinolone acetonide 0.025% (KENALOG) 0.025 % cream Apply topically 2 (two) times daily.    warfarin (COUMADIN) 10 MG tablet Take 10mg daily or as directed by Coumadin Clinic.   Also uses warfarin 5mg tablet strength    warfarin (COUMADIN) 5 MG tablet Take 10mg daily except 12.5mg     enoxaparin (LOVENOX) 150 mg/mL Syrg Inject 1 mL (150 mg total) into the skin every 12 (twelve) hours. PER SPECIFIC INSTRUCTIONS FROM COUMADIN CLINIC (Patient not taking: Reported on 3/11/2025)    oxyCODONE-acetaminophen (PERCOCET) 7.5-325 mg per tablet Take 1 tablet by mouth every 6 (six) hours as needed for Pain.     Current Facility-Administered Medications   Medication    sodium chloride 0.9% flush 2 mL       Review of patient's allergies indicates:  No Known Allergies    Family History   Problem Relation Name Age of Onset    Stroke Mother incontinence     Dementia Mother incontinence     Heart attack Father bph         d. 85    Urolithiasis Father bph     Heart disease Father bph     Heart failure Father bph     Other Sister Mary         bladder lift, s/p 4 ; estranged    Heart attack Maternal Grandfather          d. 65    Hypertension Paternal Grandmother      Heart attack Paternal Grandfather      Heart failure Paternal Grandfather      No Known Problems Daughter Sabina     No Known Problems Son Doron        Social History[1]    History of present illness:  79-year-old male, returns to clinic today status post left total knee arthroplasty .  He is here today for routine follow up, suture removal and a wound check.  He has some drainage from the distal aspect of his wound.      Review of Systems:    Constitution: Negative for chills, fever, and sweats.  Negative for unexplained weight loss.    HENT:  Negative for  headaches and blurry vision.    Cardiovascular:Negative for chest pain or irregular heart beat. Negative for hypertension.    Respiratory:  Negative for cough and shortness of breath.    Gastrointestinal: Negative for abdominal pain, heartburn, melena, nausea, and vomitting.    Genitourinary:  Negative bladder incontinence and dysuria.    Musculoskeletal:  See HPI for details.     Neurological: Negative for numbness.    Psychiatric/Behavioral: Negative for depression.  The patient is not nervous/anxious.      Endocrine: Negative for polyuria    Hematologic/Lymphatic: Negative for bleeding problem.  Does not bruise/bleed easily.    Skin: Negative for poor would healing and rash    Objective:      Physical Examination:    Vital Signs:  There were no vitals filed for this visit.    Body mass index is 37.5 kg/m².    This a well-developed, well nourished patient in no acute distress.  They are alert and oriented and cooperative to examination.        Examination of the left knee, we removed the remainder of the patient's surgical dressings today.  The distal 3rd of the wound he has a 1 cm area that is actively draining serous fluid.  With gentle pressure some phlegmon and hematoma was expressed.  No signs and symptoms of infection or purulent material noted.  He does have some dermatitis which is well demarcated in the shape of the adhesive island dressings.  Homans sign is negative.  Pertinent New Results:    XRAY Report / Interpretation:       Assessment/Plan:      Status post left total knee arthroplasty, small area of wound dehiscence distally with serous drainage.  I have recommended surgical irrigation and debridement with poly exchange.  No obvious signs for infection but plan is for cultures and IV antibiotics per Infectious Disease.  This was all discussed with the patient and the family in great detail.  We plan to do this tomorrow.    Patient was consented for surgery. Risks and benefits of the procedure were  "discussed in great detail to include the expected preoperative, intraoperative and postoperative courses. Complications may include but are not limited to bleeding, infection, damage to nerves, arteries, blood vessels, bones, tendons, ligaments, stiffness, instability, deep vein thrombus (DVT), pulmonary embolus (PE), altered sensation, continued pain, RSD, loss of motion or a need for additional surgery. As well as general anesthetic complications including seizure, stroke, heart attack and even death.    Juan David Mckenzie, Physician Assistant, served in the capacity as a "scribe" for this patient encounter.  A "face-to-face" encounter occurred with Dr. Carl Craig on this date.  The treatment plan and medical decision-making is outlined above. Patient was seen and examined with a chaperone.       This note was created using Dragon voice recognition software that occasionally misinterpreted phrases or words.             [1]   Social History  Socioeconomic History    Marital status:    Tobacco Use    Smoking status: Former     Current packs/day: 0.00     Average packs/day: 1 pack/day for 20.0 years (20.0 ttl pk-yrs)     Types: Cigarettes     Start date: 10/16/1967     Quit date: 10/16/1987     Years since quittin.4     Passive exposure: Past    Smokeless tobacco: Former   Substance and Sexual Activity    Alcohol use: Yes     Types: 1 - 2 Cans of beer, 1 - 2 Standard drinks or equivalent per week     Comment: BEER OR WHISKEY DAILY 1-2    Drug use: No    Sexual activity: Yes     Partners: Female     Birth control/protection: None   Social History Narrative    SOCIAL HISTORY:     .     Retired  for [employer].     Son in [place]    Daughter lives in [place].     +/- on exercise with the joint problems.      Plays golf, now going to gym        FAMILY HISTORY:     Mom d. 2006 with dementia secondary to subarachnoid     hemorrhage and stroke.     Dad d. 85, sudden MI.     One [place].      Social Drivers of Health     Financial Resource Strain: Low Risk  (2/12/2025)    Overall Financial Resource Strain (CARDIA)     Difficulty of Paying Living Expenses: Not hard at all   Food Insecurity: No Food Insecurity (2/12/2025)    Hunger Vital Sign     Worried About Running Out of Food in the Last Year: Never true     Ran Out of Food in the Last Year: Never true   Transportation Needs: No Transportation Needs (2/12/2025)    PRAPARE - Transportation     Lack of Transportation (Medical): No     Lack of Transportation (Non-Medical): No   Physical Activity: Inactive (2/12/2025)    Exercise Vital Sign     Days of Exercise per Week: 0 days     Minutes of Exercise per Session: 0 min   Stress: No Stress Concern Present (2/12/2025)    [employer] [place] [employer] - Occupational Stress Questionnaire     Feeling of Stress : Only a little   Housing Stability: Low Risk  (2/12/2025)    Housing Stability Vital Sign     Unable to Pay for Housing in the Last Year: No     Number of Times Moved in the Last Year: 1     Homeless in the Last Year: No

## 2025-03-12 ENCOUNTER — PATIENT MESSAGE (OUTPATIENT)
Dept: CARDIOLOGY | Facility: CLINIC | Age: 80
End: 2025-03-12
Payer: MEDICARE

## 2025-03-12 ENCOUNTER — OFFICE VISIT (OUTPATIENT)
Dept: HEMATOLOGY/ONCOLOGY | Facility: CLINIC | Age: 80
End: 2025-03-12
Payer: MEDICARE

## 2025-03-12 VITALS
OXYGEN SATURATION: 95 % | SYSTOLIC BLOOD PRESSURE: 132 MMHG | RESPIRATION RATE: 18 BRPM | HEART RATE: 89 BPM | DIASTOLIC BLOOD PRESSURE: 63 MMHG

## 2025-03-12 DIAGNOSIS — Z79.01 LONG TERM CURRENT USE OF ANTICOAGULANT THERAPY: Primary | ICD-10-CM

## 2025-03-12 DIAGNOSIS — Z86.718 HISTORY OF DVT (DEEP VEIN THROMBOSIS): ICD-10-CM

## 2025-03-12 DIAGNOSIS — D64.9 ANEMIA, UNSPECIFIED TYPE: ICD-10-CM

## 2025-03-12 DIAGNOSIS — I26.99 PULMONARY EMBOLISM, UNSPECIFIED CHRONICITY, UNSPECIFIED PULMONARY EMBOLISM TYPE, UNSPECIFIED WHETHER ACUTE COR PULMONALE PRESENT: ICD-10-CM

## 2025-03-12 PROCEDURE — 1159F MED LIST DOCD IN RCRD: CPT | Mod: CPTII,S$GLB,, | Performed by: NURSE PRACTITIONER

## 2025-03-12 PROCEDURE — 99214 OFFICE O/P EST MOD 30 MIN: CPT | Mod: S$GLB,,, | Performed by: NURSE PRACTITIONER

## 2025-03-12 PROCEDURE — 3078F DIAST BP <80 MM HG: CPT | Mod: CPTII,S$GLB,, | Performed by: NURSE PRACTITIONER

## 2025-03-12 PROCEDURE — 99999 PR PBB SHADOW E&M-EST. PATIENT-LVL III: CPT | Mod: PBBFAC,,, | Performed by: NURSE PRACTITIONER

## 2025-03-12 PROCEDURE — 3075F SYST BP GE 130 - 139MM HG: CPT | Mod: CPTII,S$GLB,, | Performed by: NURSE PRACTITIONER

## 2025-03-12 PROCEDURE — 1126F AMNT PAIN NOTED NONE PRSNT: CPT | Mod: CPTII,S$GLB,, | Performed by: NURSE PRACTITIONER

## 2025-03-12 PROCEDURE — 1101F PT FALLS ASSESS-DOCD LE1/YR: CPT | Mod: CPTII,S$GLB,, | Performed by: NURSE PRACTITIONER

## 2025-03-12 PROCEDURE — 1157F ADVNC CARE PLAN IN RCRD: CPT | Mod: CPTII,S$GLB,, | Performed by: NURSE PRACTITIONER

## 2025-03-12 PROCEDURE — 3288F FALL RISK ASSESSMENT DOCD: CPT | Mod: CPTII,S$GLB,, | Performed by: NURSE PRACTITIONER

## 2025-03-12 NOTE — OR NURSING
PAT refresher done via phone. Instructions also sent to [employer].

## 2025-03-12 NOTE — PROGRESS NOTES
Intial Visit New Patient: Del is a 79 y.o. male is referred to us here in the Oncology Clinic for further evaluation.  He has been referred for long term anticoagulation recommendations.  Patient reports he had CABG in 2015 after which time he developed pulmonary embolism.  He was placed on Xarelto and later developed DVT while on Xarelto.  He was then placed on Lovenox and Coumadin and referred to Coumadin Clinic for management.  He was previously seen by Hematology in 2015 and in 2023.  Hematology note as follows: HPI  Mr. Anand is here for follow up of clotting problems 6 months into therapy with coumadin. His issues had started in January after 10 days of rest following three weeks of PT at which time he developed a pulmonary embolism 1/2015. He was begun on heparin and 1/29/15 switched to xaralto maybe missed three doses over the period since that time. Unfortunately, on 4/8/15 he developed new right leg symptoms and 4/9 was seen by his primary physician who referred him to [facility] where doppler showed DVT in the popliteal vein. He was started on IV heparin and transferred to [facility] main campus. There he underwent a venogram the following day and the popliteal DVT had cleared and the only thing left was a small infrapopliteal DVT. He was discharged to home on Lovenox and warfarin which he continues on now. He has been active throughout this period.      On 4/10/15 (one day after he developed his new clot) he underwent the following testing: Normal factor V leiden, normal prothrombin gene, protein S antigen/activity and protein C activity normal (protein C antigen slightly low at 67%), ATIII 87%, and APA IgG/IgM not elevated.   Both hematologists recommended lifelong anticoagulation.  He presents today for consultation.  He is status post 7 days after having a left knee replacement    3/12/2025:  He returns today for follow-up to review anemia workup.  His Coumadin was placed on hold by his orthopedic  surgeon due to postop bleeding.  He is scheduled to undergo I and D of of left knee in a.m.      Review of Systems   Constitutional:  Positive for malaise/fatigue.   HENT: Negative.     Eyes: Negative.    Respiratory: Negative.     Cardiovascular: Negative.    Gastrointestinal: Negative.    Genitourinary: Negative.    Musculoskeletal:         + left knee pain r/t surgery    Skin: Negative.    Neurological: Negative.    Endo/Heme/Allergies: Negative.    Psychiatric/Behavioral: Negative.        Past Medical History:   Diagnosis Date    Benign neoplasm of colon     Cataract     CHF (congestive heart failure) 01/13/2015    Coronary artery disease     Difficult intubation     DVT (deep venous thrombosis)     HLD (hyperlipidemia)     HTN (hypertension)     Impaired fasting glucose     Kidney stone     Metabolic syndrome     Nonspecific abnormal results of liver function study     Nonspecific findings on examination of blood     Nuclear sclerosis - Both Eyes 10/18/2013    Osteoarthrosis, unspecified whether generalized or localized, lower leg     Respiratory distress     AFTER OPEN HEART SURGERY STATES ON VENTILATOR    Squamous cell carcinoma of skin     Problem List[1]   Past Surgical History:   Procedure Laterality Date    CARDIAC SURGERY      CATARACT EXTRACTION W/  INTRAOCULAR LENS IMPLANT Right 09/17/2020    Procedure: EXTRACTION, CATARACT, WITH IOL INSERTION;  Surgeon: Chanel Klein MD;  Location: [facility];  Service: Ophthalmology;  Laterality: Right;    CATARACT EXTRACTION W/  INTRAOCULAR LENS IMPLANT Left 10/01/2020    Procedure: EXTRACTION, CATARACT, WITH IOL INSERTION;  Surgeon: Chanel Klein MD;  Location: [facility];  Service: Ophthalmology;  Laterality: Left;    COLONOSCOPY N/A 03/13/2019    Procedure: COLONOSCOPY;  Surgeon: Nikunj Larson MD;  Location: [facility] ([address] [place] [address]);  Service: Endoscopy;  Laterality: N/A;  ok to hold Coumadin x 5 days with a Lovenox bridge per Coumadin clinic  2nd floor - history of  difficult intubation-MS    COLONOSCOPY N/A 2022    Procedure: COLONOSCOPY;  Surgeon: Nikunj Larson MD;  Location: [facility] ([address] [place] [address]);  Service: Endoscopy;  Laterality: N/A;  ef 45%--coumadin hold per coumadin clinic see coag visist -tb-vacc- clears 4 hrs prior-am prep 2-3-am-inst portal-tb    CORONARY ANGIOGRAPHY INCLUDING BYPASS GRAFTS WITH CATHETERIZATION OF LEFT HEART N/A 2025    Procedure: ANGIOGRAM, CORONARY, INCLUDING BYPASS GRAFT, WITH LEFT HEART CATHETERIZATION;  Surgeon: Beau Jones MD;  Location: [facility] CATH/EP LAB;  Service: Cardiology;  Laterality: N/A;    CORONARY ARTERY BYPASS GRAFT          CYSTOSCOPY      INSTANTANEOUS WAVE-FREE RATIO (IFR) N/A 2025    Procedure: Instantaneous Wave-Free Ratio (IFR);  Surgeon: Beau Jones MD;  Location: [facility] CATH/EP LAB;  Service: Cardiology;  Laterality: N/A;    KIDNEY STONE SURGERY      KNEE ARTHROPLASTY      bilateral    renal stone      ROBOTIC ARTHROPLASTY, KNEE Left 2025    Procedure: ROBOTIC ARTHROPLASTY, KNEE, TOTAL;  Surgeon: Carl Craig MD;  Location: [facility] OR;  Service: Orthopedics;  Laterality: Left;  RN NEEDS TO FINISH IMPLANTS    SKIN BIOPSY      Current Medications[2] Review of patient's allergies indicates:  No Known Allergies   Family History   Problem Relation Name Age of Onset    Stroke Mother incontinence     Dementia Mother incontinence     Heart attack Father bph         d. 85    Urolithiasis Father bph     Heart disease Father bph     Heart failure Father bph     Other Sister Mary         bladder lift, s/p 4 ; estranged    Heart attack Maternal Grandfather          d. 65    Hypertension Paternal Grandmother      Heart attack Paternal Grandfather      Heart failure Paternal Grandfather      No Known Problems Daughter Sabina     No Known Problems Son Doron      PHYSICAL EXAM:     There were no vitals filed for this visit.    GENERAL: Comfortable looking patient. Patient is in no  distress.  Awake, alert and oriented to time, person and place.  No anxiety, or agitation.      HEENT: Normal conjunctivae and eyelids. WNL.  PERRLA 3 to 4 mm. No icterus, no pallor, no congestion, and no discharge noted.     NECK:  Supple. Trachea is central.  No crepitus.  No JVD or masses.    RESPIRATORY:  No intercostal retractions.  No dullness to percussion.  Chest is clear to auscultation.  No rales, rhonchi or wheezes.  No crepitus.  Good air entry bilaterally.    CARDIOVASCULAR:  S1 and S2 are normally heard without murmurs or gallops.  All peripheral pulses are present.    ABDOMEN:  Normal abdomen.  No hepatosplenomegaly.  No free fluid.  Bowel sounds are present.  No hernia noted. No masses.  No rebound or tenderness.  No guarding or rigidity.  Umbilicus is midline.    LYMPHATICS:  No axillary, cervical, supraclavicular, submental, or inguinal lymphadenopathy.    SKIN/MUSCULOSKELETAL:  Extensive bruising noted to left lower extremity.  Surgical dressing is clean dry and intact    NEUROLOGIC:  Higher functions are appropriate.  No cranial nerve deficits.  Normal danny.  Normal strength.  Motor and sensory functions are normal.  Deep tendon reflexes are normal.    GENITAL/RECTAL:  Exams are deferred.       Assessment/plan:    Pulmonary embolism followed by DVT while on Xarelto:  -recommend continuing Coumadin for life  -patient can see our Hematology Clinic for bridging recommendations for future surgery's  -would recommend prophylactic Lovenox while recovering from surgery.  Per patient he did not tolerate Lovenox well    Anemia of unknown etiology:   -anemia workup was unremarkable.  He has not iron-deficiency lower B12 deficient  -this is a macrocytic anemia and could be underlying MDS  -recommended bone marrow biopsy    Left knee surgery:   -has I and D scheduled for in the morning  -recommend restarting Coumadin as soon as possible         [1]   Patient Active Problem List  Diagnosis    Kidney stone on  left side    Low back pain    Fatty liver    Prediabetes    Metabolic syndrome    Nuclear sclerosis - Both Eyes    Coronary artery disease    Hyperglycemia    S/P CABG x 2    Severe obesity (BMI 35.0-39.9) with comorbidity    Long term current use of anticoagulant therapy    Osteoarthrosis, unspecified whether generalized or localized, lower leg    DVT (deep venous thrombosis)    Mixed hyperlipidemia    Essential hypertension    VERONICA on CPAP    Left-sided carotid artery disease    Hematuria, gross    History of squamous cell carcinoma    Colon cancer screening    Adenomatous polyp of ascending colon    Nuclear sclerosis, bilateral    Impaired functional mobility, balance, gait, and endurance    Erectile dysfunction    Joint pain in both hands    Stiffness of joints of both hands    Edema of left lower extremity   [2]   Current Outpatient Medications   Medication Sig Dispense Refill    amLODIPine (NORVASC) 2.5 MG tablet Take 1 tablet (2.5 mg total) by mouth once daily. (Patient taking differently: Take 2.5 mg by mouth every evening.) 90 tablet 1    aspirin 81 MG Chew Take 81 mg by mouth once daily.      cyanocobalamin 500 MCG tablet Take 500 mcg by mouth every morning. Every day      docusate sodium (COLACE) 100 MG capsule Take 1 capsule (100 mg total) by mouth 2 (two) times daily. 60 capsule 0    enoxaparin (LOVENOX) 150 mg/mL Syrg Inject 1 mL (150 mg total) into the skin every 12 (twelve) hours. PER SPECIFIC INSTRUCTIONS FROM COUMADIN CLINIC (Patient not taking: Reported on 3/11/2025) 14 each 1    ezetimibe (ZETIA) 10 mg tablet Take 1 tablet (10 mg total) by mouth once daily. 90 tablet 1    FOLIC ACID/MULTIVITS-MIN/LUT (CENTRUM SILVER ORAL) Take 1 tablet by mouth once daily.      irbesartan (AVAPRO) 300 MG tablet Take 1 tablet (300 mg total) by mouth every evening. 90 tablet 1    ketoconazole (NIZORAL) 2 % cream Apply topically 2 (two) times daily. 60 g 5    oxyCODONE-acetaminophen (PERCOCET) 7.5-325 mg per tablet  Take 1 tablet by mouth every 6 (six) hours as needed for Pain. 28 tablet 0    rosuvastatin (CRESTOR) 40 MG Tab Take 1 tablet (40 mg total) by mouth once daily. 90 tablet 1    triamcinolone acetonide 0.025% (KENALOG) 0.025 % cream Apply topically 2 (two) times daily. 80 g 5    warfarin (COUMADIN) 10 MG tablet Take 10mg daily or as directed by Coumadin Clinic.   Also uses warfarin 5mg tablet strength 90 tablet 3    warfarin (COUMADIN) 5 MG tablet Take 10mg daily except 12.5mg Wednesdays 180 tablet 3     No current facility-administered medications for this visit.

## 2025-03-13 ENCOUNTER — HOSPITAL ENCOUNTER (INPATIENT)
Facility: HOSPITAL | Age: 80
LOS: 1 days | Discharge: HOME | DRG: 950 | End: 2025-03-13
Attending: ORTHOPAEDIC SURGERY | Admitting: ORTHOPAEDIC SURGERY
Payer: MEDICARE

## 2025-03-13 ENCOUNTER — PATIENT MESSAGE (OUTPATIENT)
Dept: CARDIOLOGY | Facility: CLINIC | Age: 80
End: 2025-03-13
Payer: MEDICARE

## 2025-03-13 VITALS
SYSTOLIC BLOOD PRESSURE: 126 MMHG | WEIGHT: 314 LBS | OXYGEN SATURATION: 98 % | DIASTOLIC BLOOD PRESSURE: 61 MMHG | HEIGHT: 75 IN | TEMPERATURE: 98 F | RESPIRATION RATE: 18 BRPM | BODY MASS INDEX: 39.04 KG/M2 | HEART RATE: 78 BPM

## 2025-03-13 DIAGNOSIS — Z96.652 STATUS POST TOTAL LEFT KNEE REPLACEMENT: ICD-10-CM

## 2025-03-13 DIAGNOSIS — T81.31XD POSTOPERATIVE WOUND DEHISCENCE, SUBSEQUENT ENCOUNTER: Primary | ICD-10-CM

## 2025-03-13 DIAGNOSIS — Z01.818 PREOP TESTING: ICD-10-CM

## 2025-03-13 DIAGNOSIS — R60.0 EDEMA OF LEFT LOWER EXTREMITY: ICD-10-CM

## 2025-03-13 LAB
APTT PPP: 23.4 SEC (ref 21–32)
INR PPP: 1.1 (ref 0.8–1.2)
PROTHROMBIN TIME: 12 SEC (ref 9–12.5)

## 2025-03-13 PROCEDURE — 85730 THROMBOPLASTIN TIME PARTIAL: CPT | Performed by: ANESTHESIOLOGY

## 2025-03-13 PROCEDURE — 36415 COLL VENOUS BLD VENIPUNCTURE: CPT | Performed by: ANESTHESIOLOGY

## 2025-03-13 PROCEDURE — 85610 PROTHROMBIN TIME: CPT | Performed by: ANESTHESIOLOGY

## 2025-03-13 PROCEDURE — 11000001 HC ACUTE MED/SURG PRIVATE ROOM

## 2025-03-13 PROCEDURE — 25000003 PHARM REV CODE 250: Performed by: ANESTHESIOLOGY

## 2025-03-13 PROCEDURE — 94799 UNLISTED PULMONARY SVC/PX: CPT

## 2025-03-13 PROCEDURE — 99499 UNLISTED E&M SERVICE: CPT | Mod: ,,, | Performed by: ORTHOPAEDIC SURGERY

## 2025-03-13 RX ORDER — CEFAZOLIN 2 G/1
2 INJECTION, POWDER, FOR SOLUTION INTRAMUSCULAR; INTRAVENOUS
Status: DISCONTINUED | OUTPATIENT
Start: 2025-03-13 | End: 2025-03-17 | Stop reason: HOSPADM

## 2025-03-13 RX ADMIN — SODIUM CHLORIDE, SODIUM GLUCONATE, SODIUM ACETATE, POTASSIUM CHLORIDE AND MAGNESIUM CHLORIDE: 526; 502; 368; 37; 30 INJECTION, SOLUTION INTRAVENOUS at 06:03

## 2025-03-13 NOTE — OP NOTE
Patient presented this morning for surgical intervention to irrigate out the knee and change the poly.  However the patient's at this junction had stopped draining completely.  The wound looked good.  The knee was benign.  He had absolutely little or no discomfort.  I therefore did not feel that it would be prudent to open this knee.  Therefore surgery was canceled.  We will follow him closely

## 2025-03-13 NOTE — PROGRESS NOTES
Pt called to report that after being prepped for procedure MD has decided to cancel his procedure this morning due to incision healing/ closing on its own. Pt was given the OK to resume coumadin.      PAIN

## 2025-03-13 NOTE — CARE UPDATE
03/13/25 0701   Patient Assessment/Suction   Level of Consciousness (AVPU) alert   PRE-TX-O2   Device (Oxygen Therapy) room air   Incentive Spirometer   $ Incentive Spirometer Charges preop instruction   Incentive Spirometer Predicted Level (mL) 2320   Administration (IS) instruction provided, initial;mouthpiece utilized   Number of Repetitions (IS) 3   Level Incentive Spirometer (mL) 3000   Patient Tolerance (IS) good;no adverse signs/symptoms present

## 2025-03-13 NOTE — PROGRESS NOTES
"[facility]    2025 11:36 AM    Assessment/Plan:    Patient presents today with subtherapeutic  INR.    Assessment of patient findings and chart review: Patient's procedure was cancelled today. Per note from Dr Craig: "..stopped draining completely. The wound looked good. The knee was benign. He had absolutely little or no discomfort. I therefore did not feel that it would be prudent to open this knee. Therefore surgery was canceled. We will follow him closely"   We will place patient back on the full weekly warfarin dose he was stable on prior to the warfarin interruptions. Patient to be advised to contact us immediately if any oozing or bleeding occurs.     Recommendation for patient's warfarin regimen:  per calendar    Recommend repeat INR in 6 days  _________________________________________________________________    Del Anand (79 y.o.) is followed by the [facility].    Anticoagulation Summary  As of 3/10/2025      INR goal:  2.5-3.5   TTR:  72.6% (9.9 y)   INR used for dosin.3 (3/10/2025)   Warfarin maintenance plan:  12.5 mg (10 mg x 1 and 5 mg x 0.5) every Mon, Fri; 10 mg (10 mg x 1) all other days   Weekly warfarin total:  75 mg   Plan last modified:  Jennifer Hunt, PharmD (3/13/2025)   Next INR check:  3/19/2025   Target end date:  --    Indications    Long term current use of anticoagulant therapy [Z79.01]  Pulmonary embolism (Resolved) [I26.99]                 Anticoagulation Episode Summary       INR check location:  Home Draw    Preferred lab:  --    Send INR reminders to:  [facility] COUMADIN HOME MONITOR    Comments:  [place] every other Wednesday          Anticoagulation Care Providers       Provider Role Specialty Phone number    Beau Jones MD Responsible Cardiology [phone]                              "

## 2025-03-17 ENCOUNTER — PATIENT MESSAGE (OUTPATIENT)
Dept: CARDIOLOGY | Facility: CLINIC | Age: 80
End: 2025-03-17
Payer: MEDICARE

## 2025-03-19 ENCOUNTER — PATIENT MESSAGE (OUTPATIENT)
Dept: ORTHOPEDICS | Facility: CLINIC | Age: 80
End: 2025-03-19
Payer: MEDICARE

## 2025-03-19 ENCOUNTER — ANTI-COAG VISIT (OUTPATIENT)
Dept: CARDIOLOGY | Facility: CLINIC | Age: 80
End: 2025-03-19
Payer: MEDICARE

## 2025-03-19 DIAGNOSIS — Z96.652 STATUS POST TOTAL LEFT KNEE REPLACEMENT: Primary | ICD-10-CM

## 2025-03-19 DIAGNOSIS — Z79.01 LONG TERM CURRENT USE OF ANTICOAGULANT THERAPY: Primary | ICD-10-CM

## 2025-03-19 LAB — INR PPP: 1.6

## 2025-03-19 PROCEDURE — 93793 ANTICOAG MGMT PT WARFARIN: CPT | Mod: S$GLB,,, | Performed by: PHARMACIST

## 2025-03-19 NOTE — PROGRESS NOTES
[facility]    2025 4:19 PM    Assessment/Plan:    Patient presents today with subtherapeutic  INR.    Assessment of patient findings and chart review: INR not at goal. Medications, chart, and patient findings reviewed. See calendar for adjustments to dose and follow up plan.  Patient reports no bleeding issues. We will increase his warfarin dose    Recommendation for patient's warfarin regimen: Boost dose today to 15mg then increase maintenance dose    Recommend repeat INR in 1 week  _________________________________________________________________    Del Anand (79 y.o.) is followed by the [facility].    Anticoagulation Summary  As of 3/19/2025      INR goal:  2.5-3.5   TTR:  72.4% (9.9 y)   INR used for dosin.6 (3/19/2025)   Warfarin maintenance plan:  12.5 mg (10 mg x 1 and 5 mg x 0.5) every Mon, Thu, Sat; 10 mg (10 mg x 1) all other days   Weekly warfarin total:  77.5 mg   Plan last modified:  Jennifer Hunt, PharmD (3/19/2025)   Next INR check:  3/24/2025   Target end date:  --    Indications    Long term current use of anticoagulant therapy [Z79.01]  Pulmonary embolism (Resolved) [I26.99]                 Anticoagulation Episode Summary       INR check location:  Home Draw    Preferred lab:  --    Send INR reminders to:  [facility] COUMADIN HOME MONITOR    Comments:  [place] every other Wednesday          Anticoagulation Care Providers       Provider Role Specialty Phone number    Beau Jones MD [facility] Cardiology [phone]

## 2025-03-20 ENCOUNTER — CLINICAL SUPPORT (OUTPATIENT)
Dept: REHABILITATION | Facility: HOSPITAL | Age: 80
End: 2025-03-20
Payer: MEDICARE

## 2025-03-20 ENCOUNTER — OFFICE VISIT (OUTPATIENT)
Dept: ORTHOPEDICS | Facility: CLINIC | Age: 80
End: 2025-03-20
Payer: MEDICARE

## 2025-03-20 VITALS — HEIGHT: 75 IN | WEIGHT: 313.94 LBS | BODY MASS INDEX: 39.03 KG/M2

## 2025-03-20 DIAGNOSIS — Z74.09 IMPAIRED FUNCTIONAL MOBILITY, BALANCE, GAIT, AND ENDURANCE: Primary | ICD-10-CM

## 2025-03-20 DIAGNOSIS — Z96.652 STATUS POST TOTAL LEFT KNEE REPLACEMENT: Primary | ICD-10-CM

## 2025-03-20 DIAGNOSIS — Z96.652 STATUS POST TOTAL LEFT KNEE REPLACEMENT: ICD-10-CM

## 2025-03-20 DIAGNOSIS — Z79.01 LONG TERM CURRENT USE OF ANTICOAGULANT: ICD-10-CM

## 2025-03-20 PROCEDURE — 1101F PT FALLS ASSESS-DOCD LE1/YR: CPT | Mod: CPTII,S$GLB,, | Performed by: ORTHOPAEDIC SURGERY

## 2025-03-20 PROCEDURE — 1160F RVW MEDS BY RX/DR IN RCRD: CPT | Mod: CPTII,S$GLB,, | Performed by: ORTHOPAEDIC SURGERY

## 2025-03-20 PROCEDURE — 1157F ADVNC CARE PLAN IN RCRD: CPT | Mod: CPTII,S$GLB,, | Performed by: ORTHOPAEDIC SURGERY

## 2025-03-20 PROCEDURE — 99024 POSTOP FOLLOW-UP VISIT: CPT | Mod: S$GLB,,, | Performed by: ORTHOPAEDIC SURGERY

## 2025-03-20 PROCEDURE — 3288F FALL RISK ASSESSMENT DOCD: CPT | Mod: CPTII,S$GLB,, | Performed by: ORTHOPAEDIC SURGERY

## 2025-03-20 PROCEDURE — 1159F MED LIST DOCD IN RCRD: CPT | Mod: CPTII,S$GLB,, | Performed by: ORTHOPAEDIC SURGERY

## 2025-03-20 PROCEDURE — 1126F AMNT PAIN NOTED NONE PRSNT: CPT | Mod: CPTII,S$GLB,, | Performed by: ORTHOPAEDIC SURGERY

## 2025-03-20 PROCEDURE — 97110 THERAPEUTIC EXERCISES: CPT | Mod: PN

## 2025-03-20 PROCEDURE — 99999 PR PBB SHADOW E&M-EST. PATIENT-LVL III: CPT | Mod: PBBFAC,,, | Performed by: ORTHOPAEDIC SURGERY

## 2025-03-20 PROCEDURE — 97161 PT EVAL LOW COMPLEX 20 MIN: CPT | Mod: PN

## 2025-03-20 NOTE — PROGRESS NOTES
[facility] POST-OP NOTE    Subjective:           Chief Complaint:   Chief Complaint   Patient presents with    Left Knee - Pain, Post-op Evaluation     Left TKA 2/24/25, suture removal. Today redness is present on the L knee. I&D not performed. Pain in knee is present when flexing >90 degrees, tightness. Occasionally feels like it is going to give way.        Past Medical History:   Diagnosis Date    Benign neoplasm of colon     Cataract     CHF (congestive heart failure) 01/13/2015    Coronary artery disease     Difficult intubation     DVT (deep venous thrombosis)     HLD (hyperlipidemia)     HTN (hypertension)     Impaired fasting glucose     Kidney stone     Metabolic syndrome     Nonspecific abnormal results of liver function study     Nonspecific findings on examination of blood     Nuclear sclerosis - Both Eyes 10/18/2013    Osteoarthrosis, unspecified whether generalized or localized, lower leg     Respiratory distress     AFTER OPEN HEART SURGERY STATES ON VENTILATOR    Squamous cell carcinoma of skin        Past Surgical History:   Procedure Laterality Date    CARDIAC SURGERY      CATARACT EXTRACTION W/  INTRAOCULAR LENS IMPLANT Right 09/17/2020    Procedure: EXTRACTION, CATARACT, WITH IOL INSERTION;  Surgeon: Chanel Klein MD;  Location: [facility];  Service: Ophthalmology;  Laterality: Right;    CATARACT EXTRACTION W/  INTRAOCULAR LENS IMPLANT Left 10/01/2020    Procedure: EXTRACTION, CATARACT, WITH IOL INSERTION;  Surgeon: Chanel Klein MD;  Location: [facility] OR;  Service: Ophthalmology;  Laterality: Left;    COLONOSCOPY N/A 03/13/2019    Procedure: COLONOSCOPY;  Surgeon: Nikunj Larson MD;  Location: [facility] ([facility]);  Service: Endoscopy;  Laterality: N/A;  ok to hold Coumadin x 5 days with a Lovenox bridge per Coumadin clinic  2nd floor - history of difficult intubation-MS    COLONOSCOPY N/A 07/07/2022    Procedure: COLONOSCOPY;  Surgeon: Nikunj Larson MD;  Location: [facility] ([facility]);  Service: Endoscopy;  Laterality: N/A;  ef 45%-5/31-coumadin hold per coumadin clinic see coag visist 6/29-tb-vacc- clears 4 hrs prior-am prep 2-3-am-inst portal-tb    CORONARY ANGIOGRAPHY INCLUDING BYPASS GRAFTS WITH CATHETERIZATION OF LEFT HEART N/A 2/11/2025    Procedure: ANGIOGRAM, CORONARY, INCLUDING BYPASS GRAFT, WITH LEFT HEART CATHETERIZATION;  Surgeon: Beau Jones MD;  Location: [facility] CATH/EP LAB;  Service: Cardiology;  Laterality: N/A;    CORONARY ARTERY BYPASS GRAFT      2014    CYSTOSCOPY      INSTANTANEOUS WAVE-FREE RATIO (IFR) N/A 2/11/2025    Procedure: Instantaneous Wave-Free Ratio (IFR);  Surgeon: Beau Jones MD;  Location: [facility] CATH/EP LAB;  Service: Cardiology;  Laterality: N/A;    KIDNEY STONE SURGERY      KNEE ARTHROPLASTY      bilateral    renal stone      ROBOTIC ARTHROPLASTY, KNEE Left 2/24/2025    Procedure: ROBOTIC ARTHROPLASTY, KNEE, TOTAL;  Surgeon: Carl Craig MD;  Location: [facility] OR;  Service: Orthopedics;  Laterality: Left;  RN NEEDS TO FINISH IMPLANTS    SKIN BIOPSY         Current Outpatient Medications   Medication Sig    amLODIPine (NORVASC) 2.5 MG tablet Take 1 tablet (2.5 mg total) by mouth once daily. (Patient taking differently: Take 2.5 mg by mouth every evening.)    aspirin 81 MG Chew Take 81 mg by mouth once daily.    cyanocobalamin 500 MCG tablet Take 500 mcg by mouth every morning. Every day    docusate sodium (COLACE) 100 MG capsule Take 1 capsule (100 mg total) by mouth 2 (two) times daily.    ezetimibe (ZETIA) 10 mg tablet Take 1 tablet (10 mg total) by mouth once daily.    FOLIC ACID/MULTIVITS-MIN/LUT (CENTRUM SILVER ORAL) Take 1 tablet by mouth once daily.    irbesartan (AVAPRO) 300 MG tablet Take 1 tablet (300 mg total) by mouth every evening.    ketoconazole (NIZORAL) 2 % cream Apply topically 2 (two) times daily.    rosuvastatin (CRESTOR) 40 MG Tab Take 1 tablet (40 mg total) by mouth once daily.    triamcinolone acetonide 0.025%  (KENALOG) 0.025 % cream Apply topically 2 (two) times daily.    warfarin (COUMADIN) 10 MG tablet Take 10mg daily or as directed by Coumadin Clinic.   Also uses warfarin 5mg tablet strength    warfarin (COUMADIN) 5 MG tablet Take 10mg daily except 12.5mg     enoxaparin (LOVENOX) 150 mg/mL Syrg Inject 1 mL (150 mg total) into the skin every 12 (twelve) hours. PER SPECIFIC INSTRUCTIONS FROM COUMADIN CLINIC (Patient not taking: Reported on 3/20/2025)    oxyCODONE-acetaminophen (PERCOCET) 7.5-325 mg per tablet Take 1 tablet by mouth every 6 (six) hours as needed for Pain. (Patient not taking: Reported on 3/20/2025)     No current facility-administered medications for this visit.       Review of patient's allergies indicates:  No Known Allergies    Family History   Problem Relation Name Age of Onset    Stroke Mother incontinence     Dementia Mother incontinence     Heart attack Father bph         d. 85    Urolithiasis Father bph     Heart disease Father bph     Heart failure Father bph     Other Sister Mary         bladder lift, s/p 4 ; estranged    Heart attack Maternal Grandfather          d. 65    Hypertension Paternal Grandmother      Heart attack Paternal Grandfather      Heart failure Paternal Grandfather      No Known Problems Daughter Sabina     No Known Problems Son Doron        Social History[1]    History of present illness:  Mr. Mathis comes in today for follow-up for his left total knee arthroplasty.  Comes in today for routine follow up and wound check.  He is doing well.  He is scheduled to begin his outpatient physical therapy later today.    Review of Systems:    Musculoskeletal:  See HPI      Objective:        Physical Examination:    Vital Signs:  There were no vitals filed for this visit.    Body mass index is 39.24 kg/m².    This a well-developed, well nourished patient in no acute distress.  They are alert and oriented and cooperative to examination.        Left knee exam: Skin to left  "knee clean dry and intact.  No erythema or ecchymosis.  No signs or symptoms of infection.  Patient is neurovascularly intact throughout the left lower extremity.  Left knee anterior midline incision healing well without wound dehiscence or drainage.  Left knee range of motion 0-100 degrees.  Knee stable to varus and valgus stresses.  He can weightbear as tolerated on the left lower extremity.  Negative Homans to the left lower extremity.    Pertinent New Results:    XRAY Report / Interpretation:   No new radiographs taken on today's clinic visit.    Assessment/Plan:      1. Status post left total knee arthroplasty.      Patient can resume/continue his regular activities of daily living as pain tolerates.  Encouraged him to work with physical therapy.  Would like to check the wound again 1 more time in 1 week.    Sundar Milton, Physician Assistant, served in the capacity as a "scribe" for this patient encounter.  A "face-to-face" encounter occurred with Dr. Carl Craig on this date.  The treatment plan and medical decision-making is outlined above. Patient was seen and examined with a chaperone.     This note was created using Dragon voice recognition software that occasionally misinterpreted phrases or words.             [1]   Social History  Socioeconomic History    Marital status:    Tobacco Use    Smoking status: Former     Current packs/day: 0.00     Average packs/day: 1 pack/day for 20.0 years (20.0 ttl pk-yrs)     Types: Cigarettes     Start date: 10/16/1967     Quit date: 10/16/1987     Years since quittin.4     Passive exposure: Past    Smokeless tobacco: Former   Substance and Sexual Activity    Alcohol use: Yes     Types: 1 - 2 Cans of beer, 1 - 2 Standard drinks or equivalent per week     Comment: BEER OR WHISKEY DAILY 1-2    Drug use: No    Sexual activity: Yes     Partners: Female     Birth control/protection: None   Social History Narrative    SOCIAL HISTORY:     .     " Retired  for [employer].     Son in [place]    Daughter lives in [place].     +/- on exercise with the joint problems.      Plays golf, now going to gym        FAMILY HISTORY:     Mom d. 2006 with dementia secondary to subarachnoid     hemorrhage and stroke.     Dad d. 85, sudden MI.     [place].      Social Drivers of Health     Financial Resource Strain: Low Risk  (2/12/2025)    Overall Financial Resource Strain (CARDIA)     Difficulty of Paying Living Expenses: Not hard at all   Food Insecurity: No Food Insecurity (2/12/2025)    Hunger Vital Sign     Worried About Running Out of Food in the Last Year: Never true     Ran Out of Food in the Last Year: Never true   Transportation Needs: No Transportation Needs (2/12/2025)    PRAPARE - Transportation     Lack of Transportation (Medical): No     Lack of Transportation (Non-Medical): No   Physical Activity: Inactive (2/12/2025)    Exercise Vital Sign     Days of Exercise per Week: 0 days     Minutes of Exercise per Session: 0 min   Stress: No Stress Concern Present (2/12/2025)    [employer] [place] [employer] - Occupational Stress Questionnaire     Feeling of Stress : Only a little   Housing Stability: Low Risk  (2/12/2025)    Housing Stability Vital Sign     Unable to Pay for Housing in the Last Year: No     Number of Times Moved in the Last Year: 1     Homeless in the Last Year: No

## 2025-03-21 ENCOUNTER — RESULTS FOLLOW-UP (OUTPATIENT)
Dept: CARDIOLOGY | Facility: CLINIC | Age: 80
End: 2025-03-21

## 2025-03-21 NOTE — PROGRESS NOTES
Outpatient Rehab    Physical Therapy Evaluation    Patient Name: Del Anand  MRN: [id number]  YOB: 1945  Encounter Date: 3/20/2025    Therapy Diagnosis:   Encounter Diagnoses   Name Primary?    Status post total left knee replacement     Impaired functional mobility, balance, gait, and endurance Yes    Long term current use of anticoagulant      Physician: Sundar Milton, *    Physician Orders: Eval and Treat  Medical Diagnosis: Status post total left knee replacement    Visit # / Visits Authorized:  1 / 1  Date of Evaluation: 3/20/2025  Insurance Authorization Period: 3/19/2025 to 3/19/2026  Plan of Care Certification:  3/20/2025 to 6/20/25      PT/PTA:     Number of PTA visits since last PT visit:   Time In: 1430   Time Out: 1530  Total Time: 60   Total Billable Time: 60    Intake Outcome Measure for FOTO Survey    Therapist reviewed FOTO scores for Del Anand on 3/20/2025.   FOTO report - see Media section or FOTO account episode details.     Intake Score:  %         Subjective   History of Present Illness  Del is a 79 y.o. male who reports to physical therapy with a chief concern of left total knee..     The patient reports a medical diagnosis of Z96.652 (ICD-10-CM) - Status post total left knee replacement. The patient has experienced this issue since 03/20/25.           Dominant Hand: Right  History of Present Condition/Illness: Pt reports having a Left TKA on Feb 24, 2025. Pt hand increased draining in incision. Pt had HH PT. Pt reports he is not using a walker or cane at this time. Pt reports he was using walker. Pt is keeping walker by bed at night to assist him with balance when he has to get up in the middle of the night.     Activities of Daily Living      General Prior Level of Function Comments: I  General Current Level of Function Comments: I       Previously independent with activities of daily living? Yes     Currently independent with activities of daily living? Yes               Pain     Patient reports a current pain level of 0/10. Pain at best is reported as 0/10. Pain at worst is reported as 9/10.   Location: left knee  Clinical Progression (since onset): Stable  Pain Qualities: Aching         Treatment History  Treatments  Discharged From Past 30 Days: Home health care    Living Arrangements  Living Situation  Living Arrangements: Spouse/significant other        Employment  Employment Status: Retired          Past Medical History/Physical Systems Review:   Del Anand  has a past medical history of Benign neoplasm of colon, Cataract, CHF (congestive heart failure), Coronary artery disease, Difficult intubation, DVT (deep venous thrombosis), HLD (hyperlipidemia), HTN (hypertension), Impaired fasting glucose, Kidney stone, Metabolic syndrome, Nonspecific abnormal results of liver function study, Nonspecific findings on examination of blood, Nuclear sclerosis - Both Eyes, Osteoarthrosis, unspecified whether generalized or localized, lower leg, Respiratory distress, and Squamous cell carcinoma of skin.    Del Anand  has a past surgical history that includes Knee Arthroplasty; renal stone; Cystoscopy; Kidney stone surgery; Cardiac surgery; Skin biopsy; Coronary artery bypass graft; Colonoscopy (N/A, 03/13/2019); Cataract extraction w/  intraocular lens implant (Right, 09/17/2020); Cataract extraction w/  intraocular lens implant (Left, 10/01/2020); Colonoscopy (N/A, 07/07/2022); instantaneous wave-free ratio (ifr) (N/A, 2/11/2025); Coronary angiography including bypass grafts with catheterization of left heart (N/A, 2/11/2025); and robotic arthroplasty, knee (Left, 2/24/2025).    Del has a current medication list which includes the following prescription(s): amlodipine, aspirin, cyanocobalamin, docusate sodium, enoxaparin, ezetimibe, multivit-min/folic acid/lutein, irbesartan, ketoconazole, oxycodone-acetaminophen, rosuvastatin, triamcinolone acetonide 0.025%, warfarin, and  warfarin.    Review of patient's allergies indicates:  No Known Allergies     Objective   Posture                 Left knee position is: Flexed       Knee Observations  Left Knee Observations  Present: Edema and Incision             Knee Range of Motion   Right Knee   Active (deg) Passive (deg) Pain   Flexion 130       Extension -7           Left Knee   Active (deg) Passive (deg) Pain   Flexion 95   Yes   Extension -15 -10 Yes                    Gait Analysis  Base of Support: Wide  Gait Pattern: Waddling                   Treatment:  Balance/Neuromuscular Re-Education  NMR 1: Quad stets 2 min towel under ankle.  NMR 2: knee flexion and extension 2 min slide board  NMR 3: bridges 10x  NMR 4: SAQ 20x  NMR 5: LAQ 20x  Therapeutic Activity  TA 1: nu step level 2 for 15 minutes  TA 2: LAQ 25x  TA 3: quad set with towel under ankle and knee  TA 4: heel slides on slide board 3 minutes    Time Entry(in minutes):  PT Evaluation (Low) Time Entry: 30  Therapeutic Activity Time Entry: 30    Assessment & Plan   Assessment  Bill presents with a condition of Low complexity.   Presentation of Symptoms: Stable  Will Comorbidities Impact Care: No       Functional Limitations: Activity tolerance, Ambulating on uneven surfaces, Bed mobility, Carrying objects, Functional mobility, Gait limitations, Increased risk of fall, Maintaining balance, Manipulating objects, Pain with ADLs/IADLs, Painful locomotion/ambulation, Range of motion, Reaching, Standing tolerance, Transfers  Impairments: Abnormal coordination, Abnormal gait, Abnormal muscle tone, Abnormal muscle firing, Abnormal or restricted range of motion, Activity intolerance, Impaired balance, Impaired physical strength, Pain with functional activity  Personal Factors Affecting Prognosis: Pain    Prognosis: Good  Assessment Details: Pt presents to therapy following left TKA. Pt had HH for a few weeks but has been DC at this time. Pt lacks full ROM and strength at this time. Pt has  been completing 2 exercises at home. At this time Pt was given HEP to complete on non therapy days. Pt is not using an AD but PT advised pt to keep walker by bed at night to prevent falls.     Plan  From a physical therapy perspective, the patient would benefit from: Skilled Rehab Services    Planned therapy interventions include: Therapeutic activities, Therapeutic exercise, Neuromuscular re-education, Manual therapy, ADLs/IADLs, and Gait training.    Planned modalities to include: Electrical stimulation - attended, Electrical stimulation - passive/unattended, Ultrasound, Thermotherapy (hot pack), and Low-level laser therapy.        Visit Frequency: 2 times Per Week for 6 Weeks.       This plan was discussed with Patient, Therapy assistant, and Caregiver.   Discussion participants: Agreed Upon Plan of Care             Patient's spiritual, cultural, and educational needs considered and patient agreeable to plan of care and goals.           Goals:   Active       Physical Therapy       Physical Therapy Goal       Start:  03/21/25    Expected End:  05/02/25       Goals:  Short Term Goals: 3 weeks   Pt will be compliant with HEP.  Pt will improve quad strength by 1/2 grade to allow for strength to go up and down stairs.   Pt will improve sit <>  30 sec to at least 10 allow for increased functional mobility.   Pt will improve left knee flexion to at least 100 deg to allow for increased mobility.     Long Term Goals: 6 weeks   Pt will be independent with HEP to allow for increased functional tasks.  Pt will improve FOTO by 10 % to demonstrate improvement in quality of life.  Pt will improve hip flexor strength by 1/2 grade to allow for normalized body mechanics.              Tika Perez, PT

## 2025-03-23 ENCOUNTER — PATIENT MESSAGE (OUTPATIENT)
Dept: FAMILY MEDICINE | Facility: CLINIC | Age: 80
End: 2025-03-23
Payer: MEDICARE

## 2025-03-24 ENCOUNTER — CLINICAL SUPPORT (OUTPATIENT)
Dept: REHABILITATION | Facility: HOSPITAL | Age: 80
End: 2025-03-24
Payer: MEDICARE

## 2025-03-24 ENCOUNTER — TELEPHONE (OUTPATIENT)
Dept: RADIOLOGY | Facility: HOSPITAL | Age: 80
End: 2025-03-24
Payer: MEDICARE

## 2025-03-24 ENCOUNTER — PATIENT MESSAGE (OUTPATIENT)
Dept: CARDIOLOGY | Facility: CLINIC | Age: 80
End: 2025-03-24
Payer: MEDICARE

## 2025-03-24 DIAGNOSIS — Z74.09 IMPAIRED FUNCTIONAL MOBILITY, BALANCE, GAIT, AND ENDURANCE: Primary | ICD-10-CM

## 2025-03-24 PROCEDURE — 97530 THERAPEUTIC ACTIVITIES: CPT | Mod: PN,CQ

## 2025-03-24 PROCEDURE — 97112 NEUROMUSCULAR REEDUCATION: CPT | Mod: PN,CQ

## 2025-03-24 NOTE — PROGRESS NOTES
"  Outpatient Rehab    Physical Therapy Visit    Patient Name: Del Anand  MRN: [id number]  YOB: 1945  Encounter Date: 3/24/2025    Therapy Diagnosis:   Encounter Diagnosis   Name Primary?    Impaired functional mobility, balance, gait, and endurance Yes     Physician: Sundar Milton, *    Physician Orders: Eval and Treat  Medical Diagnosis: Status post total left knee replacement    Visit # / Visits Authorized:  1 / 20  Insurance Authorization Period: 3/21/2025 to 6/10/2025  Date of Evaluation: 3/20/2025  Plan of Care Certification: 3/20/2025 to 6/20/2025     PT/PTA:     Number of PTA visits since last PT visit:   Time In:   11:00 AM  Time Out:  11:50 AM  Total Time:   50 Minutes  Total Billable Time:   45 Minutes    FOTO:  Intake Score:  %  Survey Score 1:  %  Survey Score 2:  %         Subjective   No new c/o's; feeling pretty good; only discomfort is when I bend my knee towards my buttocks.  Pain reported as 0/10. Left Knee    Objective            Treatment:  Balance/Neuromuscular Re-Education  NMR 1: Quad sets x 2 min towel under ankle.  NMR 2: Heel Slides on SB x 2 min  NMR 3: Bridges x 10  NMR 4: SAQ on small gray roll x 2 min  NMR 5: LAQ x 20  NMR 6: SLR x 10  NMR 7: Supine Hip Abd on SB x 10  Therapeutic Activity  TA 1: Nu step level 2 for 15 minutes  TA 2: Heel Raises x 20  TA 3: Toe Taps on 6" step x 2 min  TA 4: FSU  on 4" step x 15  TA 5: Standing Hip Flex/Abd/Ext x 10 each  TA 6: Heel Cord stretch on wedge 3 x 30 sec  TA 7: LSU on 4" step x 15    Time Entry(in minutes):       Assessment & Plan   Assessment: Patient did pretty well with exercises; no exacerbations; fatigued at the end of session       Patient will continue to benefit from skilled outpatient physical therapy to address the deficits listed in the problem list box on initial evaluation, provide pt/family education and to maximize pt's level of independence in the home and community environment.     Patient's spiritual, " cultural, and educational needs considered and patient agreeable to plan of care and goals.           Plan: Continue per POC and progress with strength and mobility exercises as patient tolerates    Goals:   Active       Physical Therapy       Physical Therapy Goal (Progressing)       Start:  03/21/25    Expected End:  05/02/25       Goals:  Short Term Goals: 3 weeks   Pt will be compliant with HEP.  Pt will improve quad strength by 1/2 grade to allow for strength to go up and down stairs.   Pt will improve sit <>  30 sec to at least 10 allow for increased functional mobility.   Pt will improve left knee flexion to at least 100 deg to allow for increased mobility.     Long Term Goals: 6 weeks   Pt will be independent with HEP to allow for increased functional tasks.  Pt will improve FOTO by 10 % to demonstrate improvement in quality of life.  Pt will improve hip flexor strength by 1/2 grade to allow for normalized body mechanics.              Jonathan Favre, PTA     done

## 2025-03-25 ENCOUNTER — HOSPITAL ENCOUNTER (OUTPATIENT)
Dept: RADIOLOGY | Facility: HOSPITAL | Age: 80
Discharge: HOME | End: 2025-03-25
Attending: NURSE PRACTITIONER
Payer: MEDICARE

## 2025-03-25 ENCOUNTER — ANTI-COAG VISIT (OUTPATIENT)
Dept: CARDIOLOGY | Facility: CLINIC | Age: 80
End: 2025-03-25
Payer: MEDICARE

## 2025-03-25 VITALS
OXYGEN SATURATION: 97 % | DIASTOLIC BLOOD PRESSURE: 83 MMHG | HEART RATE: 71 BPM | SYSTOLIC BLOOD PRESSURE: 161 MMHG | RESPIRATION RATE: 12 BRPM

## 2025-03-25 DIAGNOSIS — Z79.01 LONG TERM CURRENT USE OF ANTICOAGULANT THERAPY: Primary | ICD-10-CM

## 2025-03-25 DIAGNOSIS — D64.9 ANEMIA, UNSPECIFIED TYPE: ICD-10-CM

## 2025-03-25 PROCEDURE — 93793 ANTICOAG MGMT PT WARFARIN: CPT | Mod: S$GLB,,, | Performed by: PHARMACIST

## 2025-03-25 PROCEDURE — 77012 CT SCAN FOR NEEDLE BIOPSY: CPT | Mod: TC | Performed by: RADIOLOGY

## 2025-03-25 PROCEDURE — 88237 TISSUE CULTURE BONE MARROW: CPT | Performed by: NURSE PRACTITIONER

## 2025-03-25 PROCEDURE — 99153 MOD SED SAME PHYS/QHP EA: CPT

## 2025-03-25 PROCEDURE — 38222 DX BONE MARROW BX & ASPIR: CPT | Mod: ,,, | Performed by: RADIOLOGY

## 2025-03-25 PROCEDURE — C1830 POWER BONE MARROW BX NEEDLE: HCPCS

## 2025-03-25 PROCEDURE — 63600175 PHARM REV CODE 636 W HCPCS: Performed by: NURSE PRACTITIONER

## 2025-03-25 PROCEDURE — 88185 FLOWCYTOMETRY/TC ADD-ON: CPT | Mod: 91 | Performed by: NURSE PRACTITIONER

## 2025-03-25 PROCEDURE — 99152 MOD SED SAME PHYS/QHP 5/>YRS: CPT

## 2025-03-25 PROCEDURE — 99152 MOD SED SAME PHYS/QHP 5/>YRS: CPT | Mod: ,,, | Performed by: RADIOLOGY

## 2025-03-25 RX ORDER — LIDOCAINE HYDROCHLORIDE 10 MG/ML
10 INJECTION, SOLUTION INFILTRATION; PERINEURAL ONCE
Status: COMPLETED | OUTPATIENT
Start: 2025-03-25 | End: 2025-03-25

## 2025-03-25 RX ADMIN — LIDOCAINE HYDROCHLORIDE 10 ML: 10 INJECTION, SOLUTION INFILTRATION; PERINEURAL at 10:03

## 2025-03-25 NOTE — PROGRESS NOTES
[facility]    2025 4:43 PM    Assessment/Plan:    Patient presents today with subtherapeutic  INR.    Assessment of patient findings and chart review: Patient states his last dose of coumadin was 3/22 and took 12.5mg.    Patient reports he  has been holding warfarin 3/23-3/25 due to have a procedure today- ok'd by Jamee Qureshi (bone marrow biopsy)    Recommendation for patient's warfarin regimen:  per calendar    Recommend repeat INR in 1 week  _________________________________________________________________    Del Anand (79 y.o.) is followed by the [facility].    Anticoagulation Summary  As of 3/25/2025      INR goal:  2.5-3.5   TTR:  72.2% (9.9 y)   INR used for dosin.0 (3/25/2025)   Warfarin maintenance plan:  12.5 mg (10 mg x 1 and 5 mg x 0.5) every Mon, Thu, Sat; 10 mg (10 mg x 1) all other days   Weekly warfarin total:  77.5 mg   Plan last modified:  Jennifer Hunt, PharmD (3/19/2025)   Next INR check:  --   Target end date:  --    Indications    Long term current use of anticoagulant therapy [Z79.01]  Pulmonary embolism (Resolved) [I26.99]                 Anticoagulation Episode Summary       INR check location:  Home Draw    Preferred lab:  --    Send INR reminders to:  [facility]MAD[facility] HOME MONITOR    Comments:  Casey every other Wednesday          Anticoagulation Care Providers       Provider Role Specialty Phone number    Beau Jones MD [facility] Cardiology [phone]

## 2025-03-26 ENCOUNTER — CLINICAL SUPPORT (OUTPATIENT)
Dept: REHABILITATION | Facility: HOSPITAL | Age: 80
End: 2025-03-26
Payer: MEDICARE

## 2025-03-26 DIAGNOSIS — Z74.09 IMPAIRED FUNCTIONAL MOBILITY, BALANCE, GAIT, AND ENDURANCE: Primary | ICD-10-CM

## 2025-03-26 PROCEDURE — 97112 NEUROMUSCULAR REEDUCATION: CPT | Mod: PN,CQ

## 2025-03-26 PROCEDURE — 97530 THERAPEUTIC ACTIVITIES: CPT | Mod: PN,CQ

## 2025-03-26 PROCEDURE — 97150 GROUP THERAPEUTIC PROCEDURES: CPT | Mod: PN,CQ

## 2025-03-26 NOTE — PROGRESS NOTES
"  Outpatient Rehab    Physical Therapy Visit    Patient Name: Del Anand  MRN: [id number]  YOB: 1945  Encounter Date: 3/26/2025    Therapy Diagnosis:   Encounter Diagnosis   Name Primary?    Impaired functional mobility, balance, gait, and endurance Yes     Physician: Sundar Milton, *    Physician Orders: Eval and Treat  Medical Diagnosis: Status post total left knee replacement    Visit # / Visits Authorized:  2 / 20  Insurance Authorization Period: 3/21/2025 to 6/10/2025  Date of Evaluation: 3/20/2025  Plan of Care Certification: 3/20/2025 to 6/20/2025     PT/PTA:     Number of PTA visits since last PT visit:  2  Time In:   9:00 AM  Time Out:   10:00 AM  Total Time:   60 Minutes  Total Billable Time:   45 Minutes split billing    FOTO:  Intake Score:  %  Survey Score 1:  %  Survey Score 2:  %         Subjective   I don't feel comfortable moving fast with my knee yet.  Pain reported as 0/10. Left Knee    Objective            Treatment:  Balance/Neuromuscular Re-Education  NMR 1: Quad sets x 2 min towel under ankle.  NMR 2: Heel Slides on SB x 2 min  NMR 3: Bridges x 10  NMR 4: SAQ on small gray roll x 2 min  NMR 5: LAQ x 20  NMR 6: SLR x 10  NMR 7: Supine Hip Abd on SB x 10  Therapeutic Activity  TA 1: Nu step level 2 for 15 minutes  TA 2: Heel Raises x 20  TA 3: Toe Taps on 6" step x 2 min  TA 4: FSU  on 4" step x 15  TA 5: Standing Hip Flex/Abd/Ext x 10 each  TA 6: Heel Cord stretch on wedge 3 x 30 sec  TA 7: LSU on 4" step x 15    Time Entry(in minutes):  Group Therapy Time Entry: 15  Neuromuscular Re-Education Time Entry: 15  Therapeutic Activity Time Entry: 25    Assessment & Plan   Assessment: Patient did pretty well with exercises; no exacerbations;       Patient will continue to benefit from skilled outpatient physical therapy to address the deficits listed in the problem list box on initial evaluation, provide pt/family education and to maximize pt's level of independence in the home " and community environment.     Patient's spiritual, cultural, and educational needs considered and patient agreeable to plan of care and goals.           Plan: Continue per POC and progress with strength and mobility exercises as patient tolerates    Goals:   Active       Physical Therapy       Physical Therapy Goal (Progressing)       Start:  03/21/25    Expected End:  05/02/25       Goals:  Short Term Goals: 3 weeks   Pt will be compliant with HEP.  Pt will improve quad strength by 1/2 grade to allow for strength to go up and down stairs.   Pt will improve sit <>  30 sec to at least 10 allow for increased functional mobility.   Pt will improve left knee flexion to at least 100 deg to allow for increased mobility.     Long Term Goals: 6 weeks   Pt will be independent with HEP to allow for increased functional tasks.  Pt will improve FOTO by 10 % to demonstrate improvement in quality of life.  Pt will improve hip flexor strength by 1/2 grade to allow for normalized body mechanics.              Jonathan Favre, PTA

## 2025-03-27 ENCOUNTER — PATIENT MESSAGE (OUTPATIENT)
Dept: CARDIOLOGY | Facility: CLINIC | Age: 80
End: 2025-03-27
Payer: MEDICARE

## 2025-03-27 ENCOUNTER — OFFICE VISIT (OUTPATIENT)
Dept: ORTHOPEDICS | Facility: CLINIC | Age: 80
End: 2025-03-27
Payer: MEDICARE

## 2025-03-27 VITALS — HEIGHT: 75 IN | BODY MASS INDEX: 39.03 KG/M2 | WEIGHT: 313.94 LBS

## 2025-03-27 DIAGNOSIS — Z96.652 STATUS POST TOTAL LEFT KNEE REPLACEMENT: Primary | ICD-10-CM

## 2025-03-27 PROCEDURE — 1160F RVW MEDS BY RX/DR IN RCRD: CPT | Mod: CPTII,S$GLB,, | Performed by: ORTHOPAEDIC SURGERY

## 2025-03-27 PROCEDURE — 1126F AMNT PAIN NOTED NONE PRSNT: CPT | Mod: CPTII,S$GLB,, | Performed by: ORTHOPAEDIC SURGERY

## 2025-03-27 PROCEDURE — 1157F ADVNC CARE PLAN IN RCRD: CPT | Mod: CPTII,S$GLB,, | Performed by: ORTHOPAEDIC SURGERY

## 2025-03-27 PROCEDURE — 99024 POSTOP FOLLOW-UP VISIT: CPT | Mod: S$GLB,,, | Performed by: ORTHOPAEDIC SURGERY

## 2025-03-27 PROCEDURE — 3288F FALL RISK ASSESSMENT DOCD: CPT | Mod: CPTII,S$GLB,, | Performed by: ORTHOPAEDIC SURGERY

## 2025-03-27 PROCEDURE — 99999 PR PBB SHADOW E&M-EST. PATIENT-LVL III: CPT | Mod: PBBFAC,,, | Performed by: ORTHOPAEDIC SURGERY

## 2025-03-27 PROCEDURE — 1159F MED LIST DOCD IN RCRD: CPT | Mod: CPTII,S$GLB,, | Performed by: ORTHOPAEDIC SURGERY

## 2025-03-27 PROCEDURE — 1101F PT FALLS ASSESS-DOCD LE1/YR: CPT | Mod: CPTII,S$GLB,, | Performed by: ORTHOPAEDIC SURGERY

## 2025-03-27 NOTE — PROGRESS NOTES
[facility] POST-OP NOTE    Subjective:           Chief Complaint:   Chief Complaint   Patient presents with    Left Knee - Post-op Evaluation     Wound check, PO Left TKA 2/24/25. Today patient states PT has been helping, feels like healing is slow.       Past Medical History:   Diagnosis Date    Benign neoplasm of colon     Cataract     CHF (congestive heart failure) 01/13/2015    Coronary artery disease     Difficult intubation     DVT (deep venous thrombosis)     HLD (hyperlipidemia)     HTN (hypertension)     Impaired fasting glucose     Kidney stone     Metabolic syndrome     Nonspecific abnormal results of liver function study     Nonspecific findings on examination of blood     Nuclear sclerosis - Both Eyes 10/18/2013    Osteoarthrosis, unspecified whether generalized or localized, lower leg     Respiratory distress     AFTER OPEN HEART SURGERY STATES ON VENTILATOR    Squamous cell carcinoma of skin        Past Surgical History:   Procedure Laterality Date    CARDIAC SURGERY      CATARACT EXTRACTION W/  INTRAOCULAR LENS IMPLANT Right 09/17/2020    Procedure: EXTRACTION, CATARACT, WITH IOL INSERTION;  Surgeon: Chanel Klein MD;  Location: [facility];  Service: Ophthalmology;  Laterality: Right;    CATARACT EXTRACTION W/  INTRAOCULAR LENS IMPLANT Left 10/01/2020    Procedure: EXTRACTION, CATARACT, WITH IOL INSERTION;  Surgeon: Chanel Klein MD;  Location: [facility] [place], operated by [facility] OR;  Service: Ophthalmology;  Laterality: Left;    COLONOSCOPY N/A 03/13/2019    Procedure: COLONOSCOPY;  Surgeon: Nikunj Larson MD;  Location: [facility] ([address] [place] [address]);  Service: Endoscopy;  Laterality: N/A;  ok to hold Coumadin x 5 days with a Lovenox bridge per Coumadin clinic  2nd floor - history of difficult intubation-MS    COLONOSCOPY N/A 07/07/2022    Procedure: COLONOSCOPY;  Surgeon: Nikunj Larson MD;  [facility]  Service: Endoscopy;  Laterality: N/A;  ef 45%-5/31-coumadin hold per coumadin clinic see coag visist  6/29-tb-vacc- clears 4 hrs prior-am prep 2-3-am-inst portal-tb    CORONARY ANGIOGRAPHY INCLUDING BYPASS GRAFTS WITH CATHETERIZATION OF LEFT HEART N/A 2/11/2025    Procedure: ANGIOGRAM, CORONARY, INCLUDING BYPASS GRAFT, WITH LEFT HEART CATHETERIZATION;  Surgeon: Beau Jones MD;  Location: [facility] CATH/EP LAB;  Service: Cardiology;  Laterality: N/A;    CORONARY ARTERY BYPASS GRAFT      2014    CYSTOSCOPY      INSTANTANEOUS WAVE-FREE RATIO (IFR) N/A 2/11/2025    Procedure: Instantaneous Wave-Free Ratio (IFR);  Surgeon: Beau Jones MD;  Location: [facility] CATH/EP LAB;  Service: Cardiology;  Laterality: N/A;    KIDNEY STONE SURGERY      KNEE ARTHROPLASTY      bilateral    renal stone      ROBOTIC ARTHROPLASTY, KNEE Left 2/24/2025    Procedure: ROBOTIC ARTHROPLASTY, KNEE, TOTAL;  Surgeon: Carl Craig MD;  Location: [facility];  Service: Orthopedics;  Laterality: Left;  RN NEEDS TO FINISH IMPLANTS    SKIN BIOPSY         Current Outpatient Medications   Medication Sig    amLODIPine (NORVASC) 2.5 MG tablet Take 1 tablet (2.5 mg total) by mouth once daily. (Patient not taking: Reported on 3/27/2025)    aspirin 81 MG Chew Take 81 mg by mouth once daily.    cyanocobalamin 500 MCG tablet Take 500 mcg by mouth every morning. Every day    enoxaparin (LOVENOX) 150 mg/mL Syrg Inject 1 mL (150 mg total) into the skin every 12 (twelve) hours. PER SPECIFIC INSTRUCTIONS FROM COUMADIN CLINIC (Patient not taking: Reported on 3/3/2025)    ezetimibe (ZETIA) 10 mg tablet Take 1 tablet (10 mg total) by mouth once daily.    FOLIC ACID/MULTIVITS-MIN/LUT (CENTRUM SILVER ORAL) Take 1 tablet by mouth once daily.    irbesartan (AVAPRO) 300 MG tablet Take 0.5 tablets (150 mg total) by mouth every evening.    ketoconazole (NIZORAL) 2 % cream Apply topically 2 (two) times daily.    oxyCODONE-acetaminophen (PERCOCET) 7.5-325 mg per tablet Take 1 tablet by mouth every 6 (six) hours as needed for Pain.    rosuvastatin (CRESTOR) 40 MG Tab  Take 1 tablet (40 mg total) by mouth once daily.    triamcinolone acetonide 0.025% (KENALOG) 0.025 % cream Apply topically 2 (two) times daily.    warfarin (COUMADIN) 10 MG tablet Take 10mg daily or as directed by Coumadin Clinic.   Also uses warfarin 5mg tablet strength    warfarin (COUMADIN) 5 MG tablet Take 10mg daily except 12.5mg      No current facility-administered medications for this visit.       Review of patient's allergies indicates:  No Known Allergies    Family History   Problem Relation Name Age of Onset    Stroke Mother incontinence     Dementia Mother incontinence     Heart attack Father bph         d. 85    Urolithiasis Father bph     Heart disease Father bph     Heart failure Father bph     Other Sister Mary         bladder lift, s/p 4 ; estranged    Heart attack Maternal Grandfather          d. 65    Hypertension Paternal Grandmother      Heart attack Paternal Grandfather      Heart failure Paternal Grandfather      No Known Problems Daughter Sabina     No Known Problems Son Doron        Social History[1]    History of present illness:  Mr. Mathis comes in today 1 month status post left total knee arthroplasty.  Comes in today for postoperative wound check.  He is doing well.  Working with outpatient physical therapy.    Review of Systems:    Musculoskeletal:  See HPI      Objective:        Physical Examination:    Vital Signs:  There were no vitals filed for this visit.    Body mass index is 39.24 kg/m².    This a well-developed, well nourished patient in no acute distress.  They are alert and oriented and cooperative to examination.        Left knee exam: Skin to left knee clean dry and intact.  No erythema or ecchymosis.  No signs or symptoms of infection.  Neurovascularly intact throughout the left lower extremity.  Left knee anterior midline incision healing well without wound dehiscence or drainage.  Negative Homans on the left.  Left knee range of motion 0-110 degrees.  Knee  "stable to varus and valgus stresses.  He can weightbear as tolerated left lower extremity.  This is where ambulates without an aid.      Pertinent New Results:    XRAY Report / Interpretation:   No new radiographs taken on today's clinic visit.    Assessment/Plan:      1.  Status post left total knee arthroplasty.      Patient doing very well at this stage postoperatively.  Continue working with PT for range of motion and strengthening.  We will check him back again in 6 weeks to assess his postoperative progress and obtain plain film radiographs at that time.    Sundar Milton, Physician Assistant, served in the capacity as a "scribe" for this patient encounter.  A "face-to-face" encounter occurred with Dr. Carl Craig on this date.  The treatment plan and medical decision-making is outlined above. Patient was seen and examined with a chaperone.     This note was created using Dragon voice recognition software that occasionally misinterpreted phrases or words.             [1]   Social History  Socioeconomic History    Marital status:    Tobacco Use    Smoking status: Former     Current packs/day: 0.00     Average packs/day: 1 pack/day for 20.0 years (20.0 ttl pk-yrs)     Types: Cigarettes     Start date: 10/16/1967     Quit date: 10/16/1987     Years since quittin.4     Passive exposure: Past    Smokeless tobacco: Former   Substance and Sexual Activity    Alcohol use: Yes     Types: 1 - 2 Cans of beer, 1 - 2 Standard drinks or equivalent per week     Comment: BEER OR WHISKEY DAILY 1-2    Drug use: No    Sexual activity: Yes     Partners: Female     Birth control/protection: None   Social History Narrative    SOCIAL HISTORY:     .     Retired  for [employer].     Son in [place]    Daughter lives in [place].     +/- on exercise with the joint problems.      Plays golf, now going to gym        FAMILY HISTORY:     Mom d.  with dementia secondary to subarachnoid     " hemorrhage and stroke.     Dad d. 85, sudden MI.     One [place].      Social Drivers of Health     Financial Resource Strain: Low Risk  (2/12/2025)    Overall Financial Resource Strain (CARDIA)     Difficulty of Paying Living Expenses: Not hard at all   Food Insecurity: No Food Insecurity (2/12/2025)    Hunger Vital Sign     Worried About Running Out of Food in the Last Year: Never true     Ran Out of Food in the Last Year: Never true   Transportation Needs: No Transportation Needs (2/12/2025)    PRAPARE - Transportation     Lack of Transportation (Medical): No     Lack of Transportation (Non-Medical): No   Physical Activity: Inactive (2/12/2025)    Exercise Vital Sign     Days of Exercise per Week: 0 days     Minutes of Exercise per Session: 0 min   Stress: No Stress Concern Present (2/12/2025)    [employer] [employer] - Occupational Stress Questionnaire     Feeling of Stress : Only a little   Housing Stability: Low Risk  (2/12/2025)    Housing Stability Vital Sign     Unable to Pay for Housing in the Last Year: No     Number of Times Moved in the Last Year: 1     Homeless in the Last Year: No

## 2025-03-28 LAB
LAB FLOW CYTOMETRY ANTIBODIES ANALYZED (OHS): NORMAL
LAB FLOW CYTOMETRY COMMENT (OHS): NORMAL
LAB FLOW CYTOMETRY INTERPRETATION (OHS): NORMAL
LABORATORY COMMENT REPORT: NORMAL

## 2025-04-02 ENCOUNTER — ANTI-COAG VISIT (OUTPATIENT)
Dept: CARDIOLOGY | Facility: CLINIC | Age: 80
End: 2025-04-02
Payer: MEDICARE

## 2025-04-02 ENCOUNTER — CLINICAL SUPPORT (OUTPATIENT)
Dept: REHABILITATION | Facility: HOSPITAL | Age: 80
End: 2025-04-02
Payer: MEDICARE

## 2025-04-02 DIAGNOSIS — Z79.01 LONG TERM CURRENT USE OF ANTICOAGULANT THERAPY: Primary | ICD-10-CM

## 2025-04-02 DIAGNOSIS — Z74.09 IMPAIRED FUNCTIONAL MOBILITY, BALANCE, GAIT, AND ENDURANCE: Primary | ICD-10-CM

## 2025-04-02 LAB — INR PPP: 2.3

## 2025-04-02 PROCEDURE — 93793 ANTICOAG MGMT PT WARFARIN: CPT | Mod: S$GLB,,, | Performed by: PHARMACIST

## 2025-04-02 PROCEDURE — 97112 NEUROMUSCULAR REEDUCATION: CPT | Mod: PN

## 2025-04-02 PROCEDURE — 97530 THERAPEUTIC ACTIVITIES: CPT | Mod: PN

## 2025-04-02 NOTE — PROGRESS NOTES
"  Outpatient Rehab    Physical Therapy Visit    Patient Name: Del Anand  MRN: [id number]  YOB: 1945  Encounter Date: 4/2/2025    Therapy Diagnosis:   Encounter Diagnosis   Name Primary?    Impaired functional mobility, balance, gait, and endurance Yes     Physician: Sundar Milton, *    Physician Orders: Eval and Treat  Medical Diagnosis: Status post total left knee replacement    Visit # / Visits Authorized:  3 / 20  Insurance Authorization Period: 3/21/2025 to 6/10/2025       PT/PTA:     Number of PTA visits since last PT visit:   Time In: 1000   Time Out: 1100  Total Time: 60   Total Billable Time: 30    FOTO:  Intake Score:  %  Survey Score 1:  %  Survey Score 2:  %         Subjective   Still feels like his knee could give out. Also complains of stiffness. Wants to be able to put his ankle on his opposite knee in order to get his socks on..  Pain reported as 0/10.      Objective            Treatment:  Balance/Neuromuscular Re-Education  NMR 1: Quad sets x 2 min towel under ankle.  NMR 2: Heel Slides on SB x 2 min  NMR 3: Bridges x 10  NMR 4: SAQ on small gray roll x 2 min, 2#  NMR 5: LAQ x 20, 2#  NMR 6: SLR 2 x 10  Therapeutic Activity  TA 1: Nu step level 2 for 13 minutes  TA 2: Heel Raises x 20  TA 3: Toe Taps on 6" step x 2 min  TA 4: FSU  on 6" step x 15  TA 5: Standing Hip Flex/Abd/Ext, RTB 2 x 10 each  TA 6: Heel Cord stretch on wedge 3 x 30 sec  TA 7: LSU on 6" step x 15  TA 8: piriformis stretch 30"x3 with progressing up shin to fig 4    Time Entry(in minutes):  Neuromuscular Re-Education Time Entry: 15  Therapeutic Activity Time Entry: 15    Assessment & Plan   Assessment: Flexion 109 prior to manual; 116 after manual       Patient will continue to benefit from skilled outpatient physical therapy to address the deficits listed in the problem list box on initial evaluation, provide pt/family education and to maximize pt's level of independence in the home and community environment. "     Patient's spiritual, cultural, and educational needs considered and patient agreeable to plan of care and goals.     Education  Education was done with Patient. The patient's learning style includes Demonstration. The patient Demonstrates understanding.         Quad strengthening        Plan:      Goals:   Active       Physical Therapy       Physical Therapy Goal (Progressing)       Start:  03/21/25    Expected End:  05/02/25       Goals:  Short Term Goals: 3 weeks   Pt will be compliant with HEP.  Pt will improve quad strength by 1/2 grade to allow for strength to go up and down stairs.   Pt will improve sit <>  30 sec to at least 10 allow for increased functional mobility.   Pt will improve left knee flexion to at least 100 deg to allow for increased mobility.     Long Term Goals: 6 weeks   Pt will be independent with HEP to allow for increased functional tasks.  Pt will improve FOTO by 10 % to demonstrate improvement in quality of life.  Pt will improve hip flexor strength by 1/2 grade to allow for normalized body mechanics.              Britney Caldwell, PT

## 2025-04-02 NOTE — PROGRESS NOTES
INR not at goal. Medications, chart, and patient findings reviewed. See calendar for adjustments to dose and follow up plan.  Patient denies any changes in diet, medications, or health that would effect warfarin therapy.

## 2025-04-04 ENCOUNTER — CLINICAL SUPPORT (OUTPATIENT)
Dept: REHABILITATION | Facility: HOSPITAL | Age: 80
End: 2025-04-04
Payer: MEDICARE

## 2025-04-04 DIAGNOSIS — Z74.09 IMPAIRED FUNCTIONAL MOBILITY, BALANCE, GAIT, AND ENDURANCE: Primary | ICD-10-CM

## 2025-04-04 PROCEDURE — 97530 THERAPEUTIC ACTIVITIES: CPT | Mod: PN,CQ

## 2025-04-04 PROCEDURE — 97112 NEUROMUSCULAR REEDUCATION: CPT | Mod: PN,CQ

## 2025-04-04 PROCEDURE — 97150 GROUP THERAPEUTIC PROCEDURES: CPT | Mod: PN,CQ

## 2025-04-04 NOTE — PROGRESS NOTES
"  Outpatient Rehab    Physical Therapy Visit    Patient Name: Del Anand  MRN: [id number]  YOB: 1945  Encounter Date: 4/4/2025    Therapy Diagnosis:   Encounter Diagnosis   Name Primary?    Impaired functional mobility, balance, gait, and endurance Yes     Physician: Sundar Milton, *    Physician Orders: Eval and Treat  Medical Diagnosis: Status post total left knee replacement    Visit # / Visits Authorized:  4 / 20  Insurance Authorization Period: 3/21/2025 to 6/10/2025  Date of Evaluation: 3/20/2025  Plan of Care Certification: 3/20/2025 to 6/20/2025     PT/PTA:     Number of PTA visits since last PT visit:  1  Time In:   10:00 AM  Time Out:   11:00 AM  Total Time:   60 Minutes  Total Billable Time:   45 Minutes split billing    FOTO:  Intake Score:  %  Survey Score 1:  %  Survey Score 2:  %         Subjective   No new c/o's.  Pain reported as 0/10. Left Knee    Objective            Treatment:  Balance/Neuromuscular Re-Education  NMR 1: Quad sets x 2 min towel under ankle.  NMR 2: Heel Slides on SB x 2 min  NMR 3: Bridges x 10  NMR 4: SAQ on small gray roll x 2 min, 2#  NMR 5: LAQ x 20, 2#  NMR 6: SLR 2 x 10  NMR 7: Supine Hip Abd on SB x 10  Therapeutic Activity  TA 1: Nu step level 3 for 13 minutes  TA 2: Heel Raises x 20  TA 3: Toe Taps on 6" step x 2 min  TA 4: FSU  on 6" step x 15  TA 5: Standing Hip Flex/Abd/Ext, RTB x 15 each  TA 6: Heel Cord stretch on wedge 3 x 30 sec  TA 7: LSU on 6" step x 15  TA 8: Piriformis stretch 30"x3 with progressing up shin to fig 4    Time Entry(in minutes):       Assessment & Plan   Assessment: Patient did well with exercises; making progress with strength and mobility       Patient will continue to benefit from skilled outpatient physical therapy to address the deficits listed in the problem list box on initial evaluation, provide pt/family education and to maximize pt's level of independence in the home and community environment.     Patient's " spiritual, cultural, and educational needs considered and patient agreeable to plan of care and goals.           Plan: Continue per POC and progress with strength and mobility exercises as patient tolerates    Goals:   Active       Physical Therapy       Physical Therapy Goal (Progressing)       Start:  03/21/25    Expected End:  05/02/25       Goals:  Short Term Goals: 3 weeks   Pt will be compliant with HEP.  Pt will improve quad strength by 1/2 grade to allow for strength to go up and down stairs.   Pt will improve sit <>  30 sec to at least 10 allow for increased functional mobility.   Pt will improve left knee flexion to at least 100 deg to allow for increased mobility.     Long Term Goals: 6 weeks   Pt will be independent with HEP to allow for increased functional tasks.  Pt will improve FOTO by 10 % to demonstrate improvement in quality of life.  Pt will improve hip flexor strength by 1/2 grade to allow for normalized body mechanics.              Jonathan Favre, PTA

## 2025-04-07 ENCOUNTER — TELEPHONE (OUTPATIENT)
Dept: HEMATOLOGY/ONCOLOGY | Facility: CLINIC | Age: 80
End: 2025-04-07
Payer: MEDICARE

## 2025-04-07 ENCOUNTER — TELEPHONE (OUTPATIENT)
Dept: CARDIOLOGY | Facility: CLINIC | Age: 80
End: 2025-04-07
Payer: MEDICARE

## 2025-04-07 NOTE — TELEPHONE ENCOUNTER
Message thru portal  lvm asking pt to call the office for the instruction next per. Joellen Colmenares Np

## 2025-04-09 ENCOUNTER — ANTI-COAG VISIT (OUTPATIENT)
Dept: CARDIOLOGY | Facility: CLINIC | Age: 80
End: 2025-04-09
Payer: MEDICARE

## 2025-04-09 ENCOUNTER — CLINICAL SUPPORT (OUTPATIENT)
Dept: REHABILITATION | Facility: HOSPITAL | Age: 80
End: 2025-04-09
Payer: MEDICARE

## 2025-04-09 DIAGNOSIS — Z74.09 IMPAIRED FUNCTIONAL MOBILITY, BALANCE, GAIT, AND ENDURANCE: Primary | ICD-10-CM

## 2025-04-09 DIAGNOSIS — Z79.01 LONG TERM CURRENT USE OF ANTICOAGULANT THERAPY: Primary | ICD-10-CM

## 2025-04-09 LAB — INR PPP: 2.6

## 2025-04-09 PROCEDURE — 93793 ANTICOAG MGMT PT WARFARIN: CPT | Mod: S$GLB,,, | Performed by: PHARMACIST

## 2025-04-09 PROCEDURE — 97112 NEUROMUSCULAR REEDUCATION: CPT | Mod: PN,CQ

## 2025-04-09 PROCEDURE — 97530 THERAPEUTIC ACTIVITIES: CPT | Mod: PN,CQ

## 2025-04-09 PROCEDURE — 97150 GROUP THERAPEUTIC PROCEDURES: CPT | Mod: PN,CQ

## 2025-04-09 NOTE — PROGRESS NOTES
"  Outpatient Rehab    Physical Therapy Visit    Patient Name: Del Anand  MRN: [id number]  YOB: 1945  Encounter Date: 4/9/2025    Therapy Diagnosis:   Encounter Diagnosis   Name Primary?    Impaired functional mobility, balance, gait, and endurance Yes     Physician: Sundar Milton, *    Physician Orders: Eval and Treat  Medical Diagnosis: Status post total left knee replacement    Visit # / Visits Authorized:  5 / 20  Insurance Authorization Period: 3/21/2025 to 6/10/2025  Date of Evaluation: 3/20/2025  Plan of Care Certification: 3/20/2025 to 6/20/2025     PT/PTA:     Number of PTA visits since last PT visit:  2  Time In:   10:00 AM  Time Out:   11:00 AM  Total Time:   60 Minutes  Total Billable Time:   45 Minutes split billing    FOTO:  Intake Score:  %  Survey Score 1:  %  Survey Score 2:  %         Subjective   No new c/o's; feeling good.  Pain reported as 0/10. Left Knee    Objective            Treatment:  Balance/Neuromuscular Re-Education  NMR 1: Quad sets x 2 min towel under ankle.  NMR 2: Heel Slides on SB x 2 min  NMR 3: Bridges x 10  NMR 4: SAQ on small gray roll x 2 min, 2#  NMR 5: LAQ x 20, 2#  NMR 6: SLR 2 x 10  NMR 7: Supine Hip Abd on SB x 10  Therapeutic Activity  TA 1: Nu step level 3 for 15 minutes  TA 2: Heel Raises x 20  TA 3: Toe Taps on 6" step x 2 min  TA 4: FSU  on 6" step x 15  TA 5: Standing Hip Flex/Abd/Ext, RTB x 15 each  TA 6: Heel Cord stretch on wedge 3 x 30 sec  TA 7: LSU on 6" step x 15  TA 8: Piriformis stretch 30"x3 with progressing up shin to fig 4    Time Entry(in minutes):  Group Therapy Time Entry: 15  Neuromuscular Re-Education Time Entry: 15  Therapeutic Activity Time Entry: 30    Assessment & Plan   Assessment: Patient did well with exercises; making progress with strength and mobility       Patient will continue to benefit from skilled outpatient physical therapy to address the deficits listed in the problem list box on initial evaluation, provide " pt/family education and to maximize pt's level of independence in the home and community environment.     Patient's spiritual, cultural, and educational needs considered and patient agreeable to plan of care and goals.           Plan: Continue per POC and progress with strength and mobility exercises as patient tolerates    Goals:   Active       Physical Therapy       Physical Therapy Goal (Progressing)       Start:  03/21/25    Expected End:  05/02/25       Goals:  Short Term Goals: 3 weeks   Pt will be compliant with HEP.  Pt will improve quad strength by 1/2 grade to allow for strength to go up and down stairs.   Pt will improve sit <>  30 sec to at least 10 allow for increased functional mobility.   Pt will improve left knee flexion to at least 100 deg to allow for increased mobility.     Long Term Goals: 6 weeks   Pt will be independent with HEP to allow for increased functional tasks.  Pt will improve FOTO by 10 % to demonstrate improvement in quality of life.  Pt will improve hip flexor strength by 1/2 grade to allow for normalized body mechanics.              Jonathan Favre, PTA

## 2025-04-09 NOTE — PROGRESS NOTES
[facility]    2025 2:13 PM    Assessment/Plan:    Patient presents today with therapeutic INR.    Assessment of patient findings and chart review: INR at goal. Medications and chart reviewed. No changes noted to necessitate adjustment of warfarin or follow-up plan. See calendar.      Recommendation for patient's warfarin regimen: Continue current maintenance dose    Recommend repeat INR in 1 week  _________________________________________________________________    Bill MARCIN Anand (79 y.o.) is followed by the [facility].    Anticoagulation Summary  As of 2025      INR goal:  2.5-3.5   TTR:  72.0% (10 y)   INR used for dosin.6 (2025)   Warfarin maintenance plan:  10 mg (10 mg x 1) every Sun, Tue; 12.5 mg (10 mg x 1 and 5 mg x 0.5) all other days   Weekly warfarin total:  82.5 mg   Plan last modified:  Jennifer Hunt, PharmD (2025)   Next INR check:  2025   Target end date:  --    Indications    Long term current use of anticoagulant therapy [Z79.01]  Pulmonary embolism (Resolved) [I26.99]                 Anticoagulation Episode Summary       INR check location:  Home Draw    Preferred lab:  --    [facility]    Comments:  [place] every other Wednesday          Anticoagulation Care Providers       Provider Role Specialty Phone number    Beau Jones MD [facility] [phone]

## 2025-04-11 ENCOUNTER — CLINICAL SUPPORT (OUTPATIENT)
Dept: REHABILITATION | Facility: HOSPITAL | Age: 80
End: 2025-04-11
Payer: MEDICARE

## 2025-04-11 DIAGNOSIS — Z74.09 IMPAIRED FUNCTIONAL MOBILITY, BALANCE, GAIT, AND ENDURANCE: Primary | ICD-10-CM

## 2025-04-11 PROCEDURE — 97530 THERAPEUTIC ACTIVITIES: CPT | Mod: PN

## 2025-04-11 PROCEDURE — 97112 NEUROMUSCULAR REEDUCATION: CPT | Mod: PN

## 2025-04-11 NOTE — PROGRESS NOTES
"  Outpatient Rehab    Physical Therapy Visit    Patient Name: Del Anand  MRN: [id number]  YOB: 1945  Encounter Date: 4/11/2025    Therapy Diagnosis: No diagnosis found.  Physician: Sundar Milton, *    Physician Orders: Eval and Treat  Medical Diagnosis: Status post total left knee replacement    Visit # / Visits Authorized:  6 / 20  Insurance Authorization Period: 3/21/2025 to 6/10/2025  Date of Evaluation: 3/21/25  Plan of Care Certification: 3/21/25 to 6/21/25     PT/PTA:     Number of PTA visits since last PT visit:   Time In: 0958   Time Out: 1051  Total Time: 53   Total Billable Time: 30    FOTO:  Intake Score:  %  Survey Score 1:  %  Survey Score 2:  %         Subjective   no new complaints at this time..  Pain reported as 0/10.      Objective            Treatment:  Balance/Neuromuscular Re-Education  NMR 1: Quad sets x 2 min towel under ankle.  NMR 2: Heel Slides on SB x 2 min  NMR 3: Bridges x 20  NMR 4: SAQ on small gray roll x 2 min, 3#  NMR 5: LAQ x 20, 3#  NMR 6: SLR 2 x 10 3#  Therapeutic Activity  TA 1: Nu step level 3 for 15 minutes  TA 2: Heel Raises x 20  TA 3: Toe Taps on 6" step x 2 min  TA 4: FSU  on 6" step x 15  TA 5: Standing Hip Flex/Abd/Ext, RTB x 15 each  TA 6: Heel Cord stretch on wedge 3 x 30 sec  TA 7: LSU on 6" step x 15  TA 9: mini squats 20x  TA 10: sit to stands 10x    Time Entry(in minutes):  Neuromuscular Re-Education Time Entry: 25  Therapeutic Activity Time Entry: 30    Assessment & Plan   Assessment: Pt did well with advancing treatment at this time. Pt able to increase weight and tasks at this time.       Patient will continue to benefit from skilled outpatient physical therapy to address the deficits listed in the problem list box on initial evaluation, provide pt/family education and to maximize pt's level of independence in the home and community environment.     Patient's spiritual, cultural, and educational needs considered and patient agreeable to " plan of care and goals.           Plan:      Goals:   Active       Physical Therapy       Physical Therapy Goal (Progressing)       Start:  03/21/25    Expected End:  05/02/25       Goals:  Short Term Goals: 3 weeks   Pt will be compliant with HEP.  Pt will improve quad strength by 1/2 grade to allow for strength to go up and down stairs.   Pt will improve sit <>  30 sec to at least 10 allow for increased functional mobility.   Pt will improve left knee flexion to at least 100 deg to allow for increased mobility.     Long Term Goals: 6 weeks   Pt will be independent with HEP to allow for increased functional tasks.  Pt will improve FOTO by 10 % to demonstrate improvement in quality of life.  Pt will improve hip flexor strength by 1/2 grade to allow for normalized body mechanics.              Tika Perez, PT

## 2025-04-15 DIAGNOSIS — I10 ESSENTIAL HYPERTENSION: ICD-10-CM

## 2025-04-15 DIAGNOSIS — E78.2 MIXED HYPERLIPIDEMIA: ICD-10-CM

## 2025-04-16 ENCOUNTER — CLINICAL SUPPORT (OUTPATIENT)
Dept: REHABILITATION | Facility: HOSPITAL | Age: 80
End: 2025-04-16
Payer: MEDICARE

## 2025-04-16 ENCOUNTER — ANTI-COAG VISIT (OUTPATIENT)
Dept: CARDIOLOGY | Facility: CLINIC | Age: 80
End: 2025-04-16
Payer: MEDICARE

## 2025-04-16 DIAGNOSIS — Z74.09 IMPAIRED FUNCTIONAL MOBILITY, BALANCE, GAIT, AND ENDURANCE: Primary | ICD-10-CM

## 2025-04-16 DIAGNOSIS — Z79.01 LONG TERM CURRENT USE OF ANTICOAGULANT THERAPY: Primary | ICD-10-CM

## 2025-04-16 LAB — INR PPP: 3

## 2025-04-16 PROCEDURE — 97150 GROUP THERAPEUTIC PROCEDURES: CPT | Mod: PN,CQ

## 2025-04-16 PROCEDURE — 97112 NEUROMUSCULAR REEDUCATION: CPT | Mod: PN,CQ

## 2025-04-16 PROCEDURE — 97530 THERAPEUTIC ACTIVITIES: CPT | Mod: PN,CQ

## 2025-04-16 PROCEDURE — 93793 ANTICOAG MGMT PT WARFARIN: CPT | Mod: S$GLB,,, | Performed by: PHARMACIST

## 2025-04-16 NOTE — PROGRESS NOTES
"  Outpatient Rehab    Physical Therapy Visit    Patient Name: Del Anand  MRN: [id number]  YOB: 1945  Encounter Date: 4/16/2025    Therapy Diagnosis:   Encounter Diagnosis   Name Primary?    Impaired functional mobility, balance, gait, and endurance Yes     Physician: Sundar Milton, *    Physician Orders: Eval and Treat  Medical Diagnosis: Status post total left knee replacement    Visit # / Visits Authorized:  7 / 20  Insurance Authorization Period: 3/21/2025 to 6/10/2025  Date of Evaluation: 3/21/2025  Plan of Care Certification: 3/21/2025 to 6/21/2025     PT/PTA:     Number of PTA visits since last PT visit:  1  Time In:   10:00 AM  Time Out:   11:00 AM  Total Time:   60 Minutes  Total Billable Time:   45 Minutes split billing    FOTO:  Intake Score:  %  Survey Score 1:  %  Survey Score 2:  %         Subjective   No new c/o's.  Pain reported as 0/10. Left Knee    Objective            Treatment:  Balance/Neuromuscular Re-Education  NMR 1: Quad sets x 2 min towel under ankle.  NMR 2: Heel Slides on SB x 2 min  NMR 3: Bridges x 20  NMR 4: SAQ on small gray roll x 2 min, 3#  NMR 5: LAQ x 20, 3#  NMR 6: SLR 2 x 10 3#  NMR 7: Supine Hip Abd on SB x 10  NMR 8: Ball Squeezes x 2 min  Therapeutic Activity  TA 1: Nu step level 3 for 15 minutes  TA 2: Heel Raises x 20  TA 3: Toe Taps on 6" step x 2 min  TA 4: FSU  on 6" step x 15  TA 5: Standing Hip Flex/Abd/Ext, RTB x 15 each  TA 6: Heel Cord stretch on wedge 3 x 30 sec  TA 7: LSU on 6" step x 15  TA 8: Piriformis stretch 30"x3 with progressing up shin to fig 4  TA 9: mini squats 20x  TA 10: sit to stands 10x    Time Entry(in minutes):  Group Therapy Time Entry: 15  Neuromuscular Re-Education Time Entry: 25  Therapeutic Activity Time Entry: 30    Assessment & Plan   Assessment: Patient did well with exercises; no exacerbation; making progress with strength and mobility       Patient will continue to benefit from skilled outpatient physical therapy to " address the deficits listed in the problem list box on initial evaluation, provide pt/family education and to maximize pt's level of independence in the home and community environment.     Patient's spiritual, cultural, and educational needs considered and patient agreeable to plan of care and goals.           Plan: Continue per POC and progress with strength and mobility exercises as patient tolerates    Goals:   Active       Physical Therapy       Physical Therapy Goal (Progressing)       Start:  03/21/25    Expected End:  05/02/25       Goals:  Short Term Goals: 3 weeks   Pt will be compliant with HEP.  Pt will improve quad strength by 1/2 grade to allow for strength to go up and down stairs.   Pt will improve sit <>  30 sec to at least 10 allow for increased functional mobility.   Pt will improve left knee flexion to at least 100 deg to allow for increased mobility.     Long Term Goals: 6 weeks   Pt will be independent with HEP to allow for increased functional tasks.  Pt will improve FOTO by 10 % to demonstrate improvement in quality of life.  Pt will improve hip flexor strength by 1/2 grade to allow for normalized body mechanics.              Jonathan Favre, PTA

## 2025-04-17 ENCOUNTER — CLINICAL SUPPORT (OUTPATIENT)
Dept: REHABILITATION | Facility: HOSPITAL | Age: 80
End: 2025-04-17
Payer: MEDICARE

## 2025-04-17 DIAGNOSIS — Z74.09 IMPAIRED FUNCTIONAL MOBILITY, BALANCE, GAIT, AND ENDURANCE: Primary | ICD-10-CM

## 2025-04-17 PROCEDURE — 97150 GROUP THERAPEUTIC PROCEDURES: CPT | Mod: PN

## 2025-04-17 PROCEDURE — 97112 NEUROMUSCULAR REEDUCATION: CPT | Mod: PN

## 2025-04-17 PROCEDURE — 97530 THERAPEUTIC ACTIVITIES: CPT | Mod: PN

## 2025-04-17 RX ORDER — IRBESARTAN 300 MG/1
300 TABLET ORAL NIGHTLY
Qty: 100 TABLET | Refills: 3 | Status: SHIPPED | OUTPATIENT
Start: 2025-04-17

## 2025-04-17 RX ORDER — ROSUVASTATIN CALCIUM 40 MG/1
40 TABLET, COATED ORAL
Qty: 100 TABLET | Refills: 3 | Status: SHIPPED | OUTPATIENT
Start: 2025-04-17

## 2025-04-17 RX ORDER — EZETIMIBE 10 MG/1
10 TABLET ORAL
Qty: 100 TABLET | Refills: 3 | Status: SHIPPED | OUTPATIENT
Start: 2025-04-17

## 2025-04-17 NOTE — PROGRESS NOTES
[facility]    04/17/2025 9:10 AM    Assessment/Plan:    Patient presents today with therapeutic INR.    Assessment of patient findings and chart review: INR at goal. Medications and chart reviewed. No changes noted to necessitate adjustment of warfarin or follow-up plan. See calendar.      Recommendation for patient's warfarin regimen: Continue current maintenance dose    Recommend repeat INR in 2 weeks  _________________________________________________________________    Del Anand (80 y.o.) is followed by the [facility].    Anticoagulation Summary  As of 4/16/2025      INR goal:  2.5-3.5   TTR:  72.1% (10 y)   INR used for dosing:  3.0 (4/16/2025)   Warfarin maintenance plan:  10 mg (10 mg x 1) every Sun, Tue; 12.5 mg (10 mg x 1 and 5 mg x 0.5) all other days   Weekly warfarin total:  82.5 mg   Plan last modified:  Jennifer Hunt, PharmD (4/2/2025)   Next INR check:  4/30/2025   Target end date:  --    Indications    Long term current use of anticoagulant therapy [Z79.01]  Pulmonary embolism (Resolved) [I26.99]                 Anticoagulation Episode Summary       INR check location:  Home Draw    Preferred lab:  --    [facility]    Comments:  [place] every other Wednesday          Anticoagulation Care Providers       Provider Role Specialty Phone number    Beau Jones MD [facility] [phone]

## 2025-04-19 NOTE — PROGRESS NOTES
"  Outpatient Rehab    Physical Therapy Visit    Patient Name: Del Anand  MRN: [id number]  YOB: 1945  Encounter Date: 4/17/2025    Therapy Diagnosis:   Encounter Diagnosis   Name Primary?    Impaired functional mobility, balance, gait, and endurance Yes     Physician: Sundar Milton, *    Physician Orders: Eval and Treat  Medical Diagnosis: Status post total left knee replacement    Visit # / Visits Authorized:  8 / 20  Insurance Authorization Period: 3/21/2025 to 6/10/2025  Date of Evaluation: 3/19/2025  Plan of Care Certification: 3/19/2025 to 6/19/2025     PT/PTA:     Number of PTA visits since last PT visit:   Time In: 1300   Time Out: 1400  Total Time: 60   Total Billable Time: 30 split biling     FOTO:  Intake Score:  %  Survey Score 1:  %  Survey Score 2:  %         Subjective   Patient without c/o's today; feels like his knee is working well; no pain; can do what he needs too at this time..  Pain reported as 0/10. Left Knee    Objective            Treatment:  Balance/Neuromuscular Re-Education  NMR 1: Quad sets x 2 min towel under ankle.  NMR 2: Heel Slides on SB x 2 min  NMR 3: Bridges x 20  NMR 4: SAQ on small gray roll x 2 min, 3#  NMR 5: LAQ x 20, 3#  NMR 6: SLR 2 x 10 3#  NMR 7: S/L hip abduction x 10 with cueing for position  NMR 8: Ball Squeezes x 2 min  NMR 9: S/L clams x 12  Therapeutic Activity  TA 1: Nu step level 4 for 15 minutes  TA 2: Heel Raises x 20  TA 3: Toe Taps on 8" step x 2 min  TA 4: FSU  on 6" step x 15  TA 5: Standing Hip Flex/Abd/Ext, RTB x 15 each  TA 6: Heel Cord stretch on wedge 3 x 30 sec  TA 7: LSU on 6" step x 15  TA 8: Piriformis stretch 30"x3 with progressing up shin to fig 4  TA 9: mini squats 20x  TA 10: sit to stands 10x    Time Entry(in minutes):  Group Therapy Time Entry: 15  Neuromuscular Re-Education Time Entry: 25  Therapeutic Activity Time Entry: 30    Assessment & Plan   Assessment: Patient continues to progress well with post TKA rehab; Active " ROM left knee to 114 deg; Passive to 118 after stretching;  Pain under control. Returns to Ortho next week.  Evaluation/Treatment Tolerance: Patient tolerated treatment well    Patient will continue to benefit from skilled outpatient physical therapy to address the deficits listed in the problem list box on initial evaluation, provide pt/family education and to maximize pt's level of independence in the home and community environment.     Patient's spiritual, cultural, and educational needs considered and patient agreeable to plan of care and goals.           Plan: Continue per POC and progress with strength and mobility exercises as patient tolerates    Goals:   Active       Physical Therapy       Physical Therapy Goal (Progressing)       Start:  03/21/25    Expected End:  05/02/25       Goals:  Short Term Goals: 3 weeks   Pt will be compliant with HEP.> MET  Pt will improve quad strength by 1/2 grade to allow for strength to go up and down stairs. > Progressing   Pt will improve sit <>  30 sec to at least 10 allow for increased functional mobility.  > Progressing   Pt will improve left knee flexion to at least 100 deg to allow for increased mobility.  > MET    Long Term Goals: 6 weeks   Pt will be independent with HEP to allow for increased functional tasks.  Pt will improve FOTO by 10 % to demonstrate improvement in quality of life.  Pt will improve hip flexor strength by 1/2 grade to allow for normalized body mechanics.              Giovana Singh, PT

## 2025-04-22 ENCOUNTER — CLINICAL SUPPORT (OUTPATIENT)
Dept: REHABILITATION | Facility: HOSPITAL | Age: 80
End: 2025-04-22
Payer: MEDICARE

## 2025-04-22 DIAGNOSIS — Z74.09 IMPAIRED FUNCTIONAL MOBILITY, BALANCE, GAIT, AND ENDURANCE: Primary | ICD-10-CM

## 2025-04-22 PROCEDURE — 97150 GROUP THERAPEUTIC PROCEDURES: CPT | Mod: PN

## 2025-04-22 PROCEDURE — 97530 THERAPEUTIC ACTIVITIES: CPT | Mod: PN

## 2025-04-22 PROCEDURE — 97112 NEUROMUSCULAR REEDUCATION: CPT | Mod: PN

## 2025-04-22 NOTE — PROGRESS NOTES
"  Outpatient Rehab    Physical Therapy Visit    Patient Name: Del Anand  MRN: [id number]  YOB: 1945  Encounter Date: 4/22/2025    Therapy Diagnosis:   Encounter Diagnosis   Name Primary?    Impaired functional mobility, balance, gait, and endurance Yes     Physician: Sundar Milton, *    Physician Orders: Eval and Treat  Medical Diagnosis: Status post total left knee replacement    Visit # / Visits Authorized:  9 / 20  Insurance Authorization Period: 3/21/2025 to 6/10/2025  Date of Evaluation: 3/19/25  Plan of Care Certification: 3/19/25 to 6/19/25     PT/PTA:     Number of PTA visits since last PT visit:   Time In: 1000   Time Out: 1055  Total Time: 55   Total Billable Time: 40; split billing    FOTO:  Intake Score:  %  Survey Score 1:  %  Survey Score 2:  %         Subjective   No knee pain but still has trouble putting socks on..         Objective            Treatment:  Balance/Neuromuscular Re-Education  NMR 1: Left hip/piriformis stretch 3 x 15 sec  NMR 3: Bridges x 20  NMR 4: SAQ on tan roll x 2 min, 3#  NMR 5: LAQ x 25each, 3#  NMR 6: SLR 2 x 10 3#  NMR 8: Ball Squeezes x 2 min  NMR 9: S/L clams x 15  Therapeutic Activity  TA 1: Nu step level 4 for 15 minutes  TA 2: Heel Raises x 20  TA 4: FSU  on 6" step x 15  TA 5: Standing Hip Flex/Abd/Ext, RTB x 15 each  TA 6: Heel Cord stretch on wedge 3 x 30 sec  TA 7: LSU on 6" step x 15  TA 8: Piriformis stretch 30"x3 with progressing up shin to fig 4  TA 9: mini squats 20x  TA 10: sit to stands 10x    Time Entry(in minutes):  Group Therapy Time Entry: 15  Neuromuscular Re-Education Time Entry: 12  Therapeutic Activity Time Entry: 13    Assessment & Plan   Assessment: Patient continues to progress well with post TKA rehab; Active ROM left knee to 110deg; Passive to 120 after stretching;  Pain under control. Billed time reflects one on one treatment.       Patient will continue to benefit from skilled outpatient physical therapy to address the " deficits listed in the problem list box on initial evaluation, provide pt/family education and to maximize pt's level of independence in the home and community environment.     Patient's spiritual, cultural, and educational needs considered and patient agreeable to plan of care and goals.           Plan: Continue per POC and progress with strength and mobility exercises as patient tolerates    Goals:   Active       Physical Therapy       Physical Therapy Goal (Progressing)       Start:  03/21/25    Expected End:  05/02/25       Goals:  Short Term Goals: 3 weeks   Pt will be compliant with HEP.> MET  Pt will improve quad strength by 1/2 grade to allow for strength to go up and down stairs. > Progressing   Pt will improve sit <>  30 sec to at least 10 allow for increased functional mobility.  > Progressing   Pt will improve left knee flexion to at least 100 deg to allow for increased mobility.  > MET    Long Term Goals: 6 weeks   Pt will be independent with HEP to allow for increased functional tasks.  Pt will improve FOTO by 10 % to demonstrate improvement in quality of life.  Pt will improve hip flexor strength by 1/2 grade to allow for normalized body mechanics.              Stefany Torrez, PT

## 2025-04-23 ENCOUNTER — PATIENT MESSAGE (OUTPATIENT)
Dept: HEMATOLOGY/ONCOLOGY | Facility: CLINIC | Age: 80
End: 2025-04-23

## 2025-04-23 ENCOUNTER — OFFICE VISIT (OUTPATIENT)
Dept: HEMATOLOGY/ONCOLOGY | Facility: CLINIC | Age: 80
End: 2025-04-23
Payer: MEDICARE

## 2025-04-23 VITALS
DIASTOLIC BLOOD PRESSURE: 69 MMHG | HEART RATE: 77 BPM | SYSTOLIC BLOOD PRESSURE: 128 MMHG | OXYGEN SATURATION: 97 % | WEIGHT: 315 LBS | RESPIRATION RATE: 18 BRPM | BODY MASS INDEX: 40.13 KG/M2

## 2025-04-23 DIAGNOSIS — D64.9 ANEMIA, UNSPECIFIED TYPE: Primary | ICD-10-CM

## 2025-04-23 PROCEDURE — 1157F ADVNC CARE PLAN IN RCRD: CPT | Mod: CPTII,S$GLB,, | Performed by: NURSE PRACTITIONER

## 2025-04-23 PROCEDURE — 3074F SYST BP LT 130 MM HG: CPT | Mod: CPTII,S$GLB,, | Performed by: NURSE PRACTITIONER

## 2025-04-23 PROCEDURE — 3288F FALL RISK ASSESSMENT DOCD: CPT | Mod: CPTII,S$GLB,, | Performed by: NURSE PRACTITIONER

## 2025-04-23 PROCEDURE — 1126F AMNT PAIN NOTED NONE PRSNT: CPT | Mod: CPTII,S$GLB,, | Performed by: NURSE PRACTITIONER

## 2025-04-23 PROCEDURE — 99999 PR PBB SHADOW E&M-EST. PATIENT-LVL III: CPT | Mod: PBBFAC,,, | Performed by: NURSE PRACTITIONER

## 2025-04-23 PROCEDURE — 99214 OFFICE O/P EST MOD 30 MIN: CPT | Mod: S$GLB,,, | Performed by: NURSE PRACTITIONER

## 2025-04-23 PROCEDURE — 1101F PT FALLS ASSESS-DOCD LE1/YR: CPT | Mod: CPTII,S$GLB,, | Performed by: NURSE PRACTITIONER

## 2025-04-23 PROCEDURE — 3078F DIAST BP <80 MM HG: CPT | Mod: CPTII,S$GLB,, | Performed by: NURSE PRACTITIONER

## 2025-04-23 NOTE — PROGRESS NOTES
Intial Visit New Patient: Del is a 80 y.o. male is referred to us here in the Oncology Clinic for further evaluation.  He has been referred for long term anticoagulation recommendations.  Patient reports he had CABG in 2015 after which time he developed pulmonary embolism.  He was placed on Xarelto and later developed DVT while on Xarelto.  He was then placed on Lovenox and Coumadin and referred to Coumadin Clinic for management.  He was previously seen by Hematology in 2015 and in 2023.  Hematology note as follows: HPI  Mr. Anand is here for follow up of clotting problems 6 months into therapy with coumadin. His issues had started in January after 10 days of rest following three weeks of PT at which time he developed a pulmonary embolism 1/2015. He was begun on heparin and 1/29/15 switched to xaralto maybe missed three doses over the period since that time. Unfortunately, on 4/8/15 he developed new right leg symptoms and 4/9 was seen by his primary physician who referred him to [facility] where doppler showed DVT in the popliteal vein. He was started on IV heparin and transferred to [facility] main campus. There he underwent a venogram the following day and the popliteal DVT had cleared and the only thing left was a small infrapopliteal DVT. He was discharged to home on Lovenox and warfarin which he continues on now. He has been active throughout this period.      On 4/10/15 (one day after he developed his new clot) he underwent the following testing: Normal factor V leiden, normal prothrombin gene, protein S antigen/activity and protein C activity normal (protein C antigen slightly low at 67%), ATIII 87%, and APA IgG/IgM not elevated.   Both hematologists recommended lifelong anticoagulation.  He presents today for consultation.  He is status post 7 days after having a left knee replacement    3/12/2025:  He returns today for follow-up to review anemia workup.  His Coumadin was placed on hold by his orthopedic  surgeon due to postop bleeding.  He is scheduled to undergo I and D of of left knee in a.m.    4/23/2025:  He returns today to review bone marrow biopsy results      Review of Systems   Constitutional:  Positive for malaise/fatigue.   HENT: Negative.     Eyes: Negative.    Respiratory: Negative.     Cardiovascular: Negative.    Gastrointestinal: Negative.    Genitourinary: Negative.    Musculoskeletal:         + left knee pain r/t surgery    Skin: Negative.    Neurological: Negative.    Endo/Heme/Allergies: Negative.    Psychiatric/Behavioral: Negative.        Past Medical History:   Diagnosis Date    Benign neoplasm of colon     Cataract     CHF (congestive heart failure) 01/13/2015    Coronary artery disease     Difficult intubation     DVT (deep venous thrombosis)     HLD (hyperlipidemia)     HTN (hypertension)     Impaired fasting glucose     Kidney stone     Metabolic syndrome     Nonspecific abnormal results of liver function study     Nonspecific findings on examination of blood     Nuclear sclerosis - Both Eyes 10/18/2013    Osteoarthrosis, unspecified whether generalized or localized, lower leg     Respiratory distress     AFTER OPEN HEART SURGERY STATES ON VENTILATOR    Squamous cell carcinoma of skin     Problem List[1]   Past Surgical History:   Procedure Laterality Date    CARDIAC SURGERY      CATARACT EXTRACTION W/  INTRAOCULAR LENS IMPLANT Right 09/17/2020    Procedure: EXTRACTION, CATARACT, WITH IOL INSERTION;  Surgeon: Chanel Klein MD;  Location: [facility];  Service: Ophthalmology;  Laterality: Right;    CATARACT EXTRACTION W/  INTRAOCULAR LENS IMPLANT Left 10/01/2020    Procedure: EXTRACTION, CATARACT, WITH IOL INSERTION;  Surgeon: Chanel Klein MD;  Location: [facility];  Service: Ophthalmology;  Laterality: Left;    COLONOSCOPY N/A 03/13/2019    Procedure: COLONOSCOPY;  Surgeon: Nikunj Larson MD;  Location: [facility] ([address] [place] [address]);  Service: Endoscopy;  Laterality: N/A;  ok to hold Coumadin x 5 days  with a Lovenox bridge per Coumadin clinic  2nd floor - history of difficult intubation-MS    COLONOSCOPY N/A 2022    Procedure: COLONOSCOPY;  Surgeon: Nikunj Larson MD;  Location: [facility] ([address] [place] [address]);  Service: Endoscopy;  Laterality: N/A;  ef 45%--coumadin hold per coumadin clinic see coag visist -tb-vacc- clears 4 hrs prior-am prep 2-3-am-inst portal-tb    CORONARY ANGIOGRAPHY INCLUDING BYPASS GRAFTS WITH CATHETERIZATION OF LEFT HEART N/A 2025    Procedure: ANGIOGRAM, CORONARY, INCLUDING BYPASS GRAFT, WITH LEFT HEART CATHETERIZATION;  Surgeon: Beau Jones MD;  Location: [facility] CATH/EP LAB;  Service: Cardiology;  Laterality: N/A;    CORONARY ARTERY BYPASS GRAFT          CYSTOSCOPY      INSTANTANEOUS WAVE-FREE RATIO (IFR) N/A 2025    Procedure: Instantaneous Wave-Free Ratio (IFR);  Surgeon: Beau Jones MD;  Location: [facility] CATH/EP LAB;  Service: Cardiology;  Laterality: N/A;    KIDNEY STONE SURGERY      KNEE ARTHROPLASTY      bilateral    renal stone      ROBOTIC ARTHROPLASTY, KNEE Left 2025    Procedure: ROBOTIC ARTHROPLASTY, KNEE, TOTAL;  Surgeon: Carl Craig MD;  Location: [facility];  Service: Orthopedics;  Laterality: Left;  RN NEEDS TO FINISH IMPLANTS    SKIN BIOPSY      Current Medications[2] Review of patient's allergies indicates:  No Known Allergies   Family History   Problem Relation Name Age of Onset    Stroke Mother incontinence     Dementia Mother incontinence     Heart attack Father bph         d. 85    Urolithiasis Father bph     Heart disease Father bph     Heart failure Father bph     Other Sister Mary         bladder lift, s/p 4 ; estranged    Heart attack Maternal Grandfather          d. 65    Hypertension Paternal Grandmother      Heart attack Paternal Grandfather      Heart failure Paternal Grandfather      No Known Problems Daughter Sabina     No Known Problems Son Doron      PHYSICAL EXAM:     There were no vitals filed for this  visit.    GENERAL: Comfortable looking patient. Patient is in no distress.  Awake, alert and oriented to time, person and place.  No anxiety, or agitation.      HEENT: Normal conjunctivae and eyelids. WNL.  PERRLA 3 to 4 mm. No icterus, no pallor, no congestion, and no discharge noted.     NECK:  Supple. Trachea is central.  No crepitus.  No JVD or masses.    RESPIRATORY:  No intercostal retractions.  No dullness to percussion.  Chest is clear to auscultation.  No rales, rhonchi or wheezes.  No crepitus.  Good air entry bilaterally.    CARDIOVASCULAR:  S1 and S2 are normally heard without murmurs or gallops.  All peripheral pulses are present.    ABDOMEN:  Normal abdomen.  No hepatosplenomegaly.  No free fluid.  Bowel sounds are present.  No hernia noted. No masses.  No rebound or tenderness.  No guarding or rigidity.  Umbilicus is midline.    LYMPHATICS:  No axillary, cervical, supraclavicular, submental, or inguinal lymphadenopathy.    SKIN/MUSCULOSKELETAL:  Extensive bruising noted to left lower extremity.  Surgical dressing is clean dry and intact    NEUROLOGIC:  Higher functions are appropriate.  No cranial nerve deficits.  Normal danny.  Normal strength.  Motor and sensory functions are normal.  Deep tendon reflexes are normal.    GENITAL/RECTAL:  Exams are deferred.     Bone marrow  Final Diagnosis    Peripheral blood and bone marrow, right iliac crest (aspirate smear, touch imprint, clot section, and core biopsy):  -- Hypercellular marrow (60%) with trilineage hematopoiesis, adequate megakaryocytes with atypia.   -- Markedly decreased storage iron.  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  -- Peripheral blood with macrocytic anemia.  -- See comment.            Assessment/plan:    Pulmonary embolism followed by DVT while on Xarelto:  -recommend continuing Coumadin for life  -patient can see our Hematology Clinic for bridging recommendations for future surgery's  -would recommend prophylactic Lovenox  while recovering from surgery.  Per patient he did not tolerate Lovenox well    Anemia of unknown etiology:   -bone marrow biopsy showed minimal reticulin myelofibrosis with markedly decreased iron stores  -he is on a multivitamin that contains iron  -I am okay with him continuing this  -we will repeat labs in 3 4 months including iron stores  -bone marrow biopsy reviewed with the patient and wife  -hemoglobin improved to 11.3    Left knee surgery:   -has I and D scheduled for in the morning  -recommend restarting Coumadin as soon as possible           [1]   Patient Active Problem List  Diagnosis    Kidney stone on left side    Low back pain    Fatty liver    Prediabetes    Metabolic syndrome    Nuclear sclerosis - Both Eyes    Coronary artery disease    Hyperglycemia    S/P CABG x 2    Severe obesity (BMI 35.0-39.9) with comorbidity    Long term current use of anticoagulant therapy    Osteoarthrosis, unspecified whether generalized or localized, lower leg    DVT (deep venous thrombosis)    Mixed hyperlipidemia    Essential hypertension    VERONICA on CPAP    Left-sided carotid artery disease    Hematuria, gross    History of squamous cell carcinoma    Colon cancer screening    Adenomatous polyp of ascending colon    Nuclear sclerosis, bilateral    Impaired functional mobility, balance, gait, and endurance    Erectile dysfunction    Joint pain in both hands    Stiffness of joints of both hands    Edema of left lower extremity   [2]   Current Outpatient Medications   Medication Sig Dispense Refill    aspirin 81 MG Chew Take 81 mg by mouth once daily.      cyanocobalamin 500 MCG tablet Take 500 mcg by mouth every morning. Every day      enoxaparin (LOVENOX) 150 mg/mL Syrg Inject 1 mL (150 mg total) into the skin every 12 (twelve) hours. PER SPECIFIC INSTRUCTIONS FROM COUMADIN CLINIC (Patient not taking: Reported on 3/3/2025) 14 each 1    ezetimibe (ZETIA) 10 mg tablet TAKE 1 TABLET ONE TIME DAILY 100 tablet 3    FOLIC  ACID/MULTIVITS-MIN/LUT (CENTRUM SILVER ORAL) Take 1 tablet by mouth once daily.      irbesartan (AVAPRO) 150 MG tablet Take 1 tablet (150 mg total) by mouth every evening. 90 tablet 1    ketoconazole (NIZORAL) 2 % cream Apply topically 2 (two) times daily. 60 g 5    oxyCODONE-acetaminophen (PERCOCET) 7.5-325 mg per tablet Take 1 tablet by mouth every 6 (six) hours as needed for Pain. 28 tablet 0    rosuvastatin (CRESTOR) 40 MG Tab TAKE 1 TABLET ONE TIME DAILY 100 tablet 3    triamcinolone acetonide 0.025% (KENALOG) 0.025 % cream Apply topically 2 (two) times daily. 80 g 5    warfarin (COUMADIN) 10 MG tablet Take 10mg daily or as directed by Coumadin Clinic.   Also uses warfarin 5mg tablet strength 90 tablet 3    warfarin (COUMADIN) 5 MG tablet Take 10mg daily except 12.5mg Wednesdays 180 tablet 3     No current facility-administered medications for this visit.

## 2025-04-25 ENCOUNTER — CLINICAL SUPPORT (OUTPATIENT)
Dept: REHABILITATION | Facility: HOSPITAL | Age: 80
End: 2025-04-25
Payer: MEDICARE

## 2025-04-25 DIAGNOSIS — Z74.09 IMPAIRED FUNCTIONAL MOBILITY, BALANCE, GAIT, AND ENDURANCE: Primary | ICD-10-CM

## 2025-04-25 PROCEDURE — 97112 NEUROMUSCULAR REEDUCATION: CPT | Mod: PN,CQ

## 2025-04-25 PROCEDURE — 97530 THERAPEUTIC ACTIVITIES: CPT | Mod: PN,CQ

## 2025-04-25 NOTE — PROGRESS NOTES
"  Outpatient Rehab    Physical Therapy Visit    Patient Name: Del Anand  MRN: [id number]  YOB: 1945  Encounter Date: 4/25/2025    Therapy Diagnosis:   Encounter Diagnosis   Name Primary?    Impaired functional mobility, balance, gait, and endurance Yes     Physician: Sundar Milton, *    Physician Orders: Eval and Treat  Medical Diagnosis: Status post total left knee replacement    Visit # / Visits Authorized:  10 / 20  Insurance Authorization Period: 3/21/2025 to 6/10/2025  Date of Evaluation: 3/20/2025  Plan of Care Certification: 3/20/2025 to 6/20/2025     PT/PTA:     Number of PTA visits since last PT visit:  1  Time In:   10:00 AM  Time Out:   11:00 AM  Total Time:   60 Minutes  Total Billable Time:   55 Minutes    FOTO:  Intake Score:  %  Survey Score 1:  %  Survey Score 2:  %         Subjective   No new c/o's.  Pain reported as 0/10. Left Knee    Objective            Treatment:  Balance/Neuromuscular Re-Education  NMR 1: Left hip/piriformis stretch 3 x 15 sec  NMR 2: Heel Slides on SB x 2 min  NMR 3: Bridges x 20  NMR 4: SAQ on tan roll x 2 min, 3#  NMR 5: LAQ x 25each, 3#  NMR 6: SLR 2 x 10 3#  NMR 7: S/L hip abduction x 10 with cueing for position  NMR 8: Ball Squeezes x 2 min  Therapeutic Activity  TA 1: Nu step level 4 for 15 minutes  TA 2: Heel Raises x 20  TA 3: Toe Taps on 8" step x 2 min  TA 4: FSU  on 6" step x 15  TA 5: Standing Hip Flex/Abd/Ext, RTB x 15 each  TA 6: Heel Cord stretch on wedge 3 x 30 sec  TA 7: LSU on 6" step x 15  TA 8: Piriformis stretch 30"x3 with progressing up shin to fig 4  TA 9: mini squats 20x  TA 10: sit to stands 10x    Time Entry(in minutes):  Neuromuscular Re-Education Time Entry: 25  Therapeutic Activity Time Entry: 30    Assessment & Plan   Assessment: Patient continues to progress well with post TKA rehab       Patient will continue to benefit from skilled outpatient physical therapy to address the deficits listed in the problem list box on " initial evaluation, provide pt/family education and to maximize pt's level of independence in the home and community environment.     Patient's spiritual, cultural, and educational needs considered and patient agreeable to plan of care and goals.           Plan: Continue per POC and progress with strength and mobility exercises as patient tolerates    Goals:   Active       Physical Therapy       Physical Therapy Goal (Progressing)       Start:  03/21/25    Expected End:  05/02/25       Goals:  Short Term Goals: 3 weeks   Pt will be compliant with HEP.> MET  Pt will improve quad strength by 1/2 grade to allow for strength to go up and down stairs. > Progressing   Pt will improve sit <>  30 sec to at least 10 allow for increased functional mobility.  > Progressing   Pt will improve left knee flexion to at least 100 deg to allow for increased mobility.  > MET    Long Term Goals: 6 weeks   Pt will be independent with HEP to allow for increased functional tasks.  Pt will improve FOTO by 10 % to demonstrate improvement in quality of life.  Pt will improve hip flexor strength by 1/2 grade to allow for normalized body mechanics.              Jonathan Favre, PTA

## 2025-04-29 ENCOUNTER — CLINICAL SUPPORT (OUTPATIENT)
Dept: REHABILITATION | Facility: HOSPITAL | Age: 80
End: 2025-04-29
Payer: MEDICARE

## 2025-04-29 ENCOUNTER — EXTERNAL HOME HEALTH (OUTPATIENT)
Dept: HOME HEALTH SERVICES | Facility: HOSPITAL | Age: 80
End: 2025-04-29
Payer: MEDICARE

## 2025-04-29 DIAGNOSIS — Z74.09 IMPAIRED FUNCTIONAL MOBILITY, BALANCE, GAIT, AND ENDURANCE: Primary | ICD-10-CM

## 2025-04-29 PROCEDURE — 97530 THERAPEUTIC ACTIVITIES: CPT | Mod: PN,CQ

## 2025-04-29 PROCEDURE — 97112 NEUROMUSCULAR REEDUCATION: CPT | Mod: PN,CQ

## 2025-04-29 NOTE — PROGRESS NOTES
"  Outpatient Rehab    Physical Therapy Visit    Patient Name: Del Anand  MRN: [id number]  YOB: 1945  Encounter Date: 4/29/2025    Therapy Diagnosis:   Encounter Diagnosis   Name Primary?    Impaired functional mobility, balance, gait, and endurance Yes     Physician: Sundar Milton, *    Physician Orders: Eval and Treat  Medical Diagnosis: Status post total left knee replacement    Visit # / Visits Authorized:  11 / 20  Insurance Authorization Period: 3/21/2025 to 6/10/2025  Date of Evaluation: 3/20/2025  Plan of Care Certification: 3/20/2025 to 6/20/2025     PT/PTA:     Number of PTA visits since last PT visit:  2  Time In:   9:00 AM  Time Out:   10:00 AM  Total Time:   60 Minutes  Total Billable Time:   55 Minutes    FOTO:  Intake Score:  %  Survey Score 1:  %  Survey Score 2:  %         Subjective   No new c/o's.  Pain reported as 0/10. Left Knee    Objective            Treatment:  Balance/Neuromuscular Re-Education  NMR 1: Left hip/piriformis stretch 3 x 15 sec  NMR 2: Heel Slides on SB x 2 min  NMR 3: Bridges x 20  NMR 4: SAQ on tan roll x 2 min, 3#  NMR 5: LAQ x 25 each, 3#  NMR 6: SLR 2 x 10 3#  NMR 7: S/L hip abduction x 10 with cueing for position  NMR 8: Ball Squeezes x 2 min  NMR 9: S/L clams x 15  Therapeutic Activity  TA 1: Nu step level 4 for 15 minutes  TA 2: Heel Raises x 20  TA 3: Toe Taps on 8" step x 2 min  TA 4: FSU  on 6" step x 15  TA 5: Standing Hip Flex/Abd/Ext, RTB x 15 each  TA 6: Heel Cord stretch on wedge 3 x 30 sec  TA 7: LSU on 6" step x 15  TA 9: Mini squats 20x  TA 10: Sit to stand x 10    Time Entry(in minutes):  Neuromuscular Re-Education Time Entry: 25  Therapeutic Activity Time Entry: 30    Assessment & Plan   Assessment: patient did well with exercises; no exacerbation of pain; making steady progress with strength and mobility       Patient will continue to benefit from skilled outpatient physical therapy to address the deficits listed in the problem list " box on initial evaluation, provide pt/family education and to maximize pt's level of independence in the home and community environment.     Patient's spiritual, cultural, and educational needs considered and patient agreeable to plan of care and goals.           Plan: Continue per POC and progress with strength and mobility exercises as patient tolerates    Goals:   Active       Physical Therapy       Physical Therapy Goal (Progressing)       Start:  03/21/25    Expected End:  05/02/25       Goals:  Short Term Goals: 3 weeks   Pt will be compliant with HEP.> MET  Pt will improve quad strength by 1/2 grade to allow for strength to go up and down stairs. > Progressing   Pt will improve sit <>  30 sec to at least 10 allow for increased functional mobility.  > Progressing   Pt will improve left knee flexion to at least 100 deg to allow for increased mobility.  > MET    Long Term Goals: 6 weeks   Pt will be independent with HEP to allow for increased functional tasks.  Pt will improve FOTO by 10 % to demonstrate improvement in quality of life.  Pt will improve hip flexor strength by 1/2 grade to allow for normalized body mechanics.              Jonathan Favre, PTA

## 2025-04-30 ENCOUNTER — ANTI-COAG VISIT (OUTPATIENT)
Dept: CARDIOLOGY | Facility: CLINIC | Age: 80
End: 2025-04-30
Payer: MEDICARE

## 2025-04-30 ENCOUNTER — PATIENT MESSAGE (OUTPATIENT)
Dept: CARDIOLOGY | Facility: CLINIC | Age: 80
End: 2025-04-30

## 2025-04-30 ENCOUNTER — DOCUMENT SCAN (OUTPATIENT)
Dept: HOME HEALTH SERVICES | Facility: HOSPITAL | Age: 80
End: 2025-04-30
Payer: MEDICARE

## 2025-04-30 DIAGNOSIS — Z79.01 LONG TERM CURRENT USE OF ANTICOAGULANT THERAPY: Primary | ICD-10-CM

## 2025-04-30 LAB — INR PPP: 3.4

## 2025-04-30 PROCEDURE — 93793 ANTICOAG MGMT PT WARFARIN: CPT | Mod: S$GLB,,, | Performed by: PHARMACIST

## 2025-04-30 NOTE — PROGRESS NOTES
[facility]    04/30/2025 9:52 AM    Assessment/Plan:    Patient presents today with therapeutic INR.    Assessment of patient findings and chart review: INR at goal. Rising INR trend. We will lower his dose of warfarin slightly to avoid INR above range at next INR date.       Recommendation for patient's warfarin regimen: lower    Recommend repeat INR in 2 weeks  _________________________________________________________________    Del Anand (80 y.o.) is followed by the [facility].    Anticoagulation Summary  As of 4/30/2025      INR goal:  2.5-3.5   TTR:  72.2% (10 y)   INR used for dosing:  3.5 (4/30/2025)   Warfarin maintenance plan:  10 mg (10 mg x 1) every Sun, Tue; 12.5 mg (10 mg x 1 and 5 mg x 0.5) all other days   Weekly warfarin total:  82.5 mg   Plan last modified:  Jennifer Hunt, PharmD (4/2/2025)   Next INR check:  5/14/2025   Target end date:  --    Indications    Long term current use of anticoagulant therapy [Z79.01]  Pulmonary embolism (Resolved) [I26.99]                 Anticoagulation Episode Summary       INR check location:  Home Draw    Preferred lab:  --    Send IN[facility] reminders to:  [facility]    Comments:  [facility] every other Wednesday          Anticoagulation Care Providers       Provider Role Specialty Phone number    Beau Jones MD Responsible Interventional Cardiology [phone]

## 2025-05-02 ENCOUNTER — CLINICAL SUPPORT (OUTPATIENT)
Dept: REHABILITATION | Facility: HOSPITAL | Age: 80
End: 2025-05-02
Payer: MEDICARE

## 2025-05-02 DIAGNOSIS — Z74.09 IMPAIRED FUNCTIONAL MOBILITY, BALANCE, GAIT, AND ENDURANCE: Primary | ICD-10-CM

## 2025-05-02 PROCEDURE — 97112 NEUROMUSCULAR REEDUCATION: CPT | Mod: PN,CQ

## 2025-05-02 PROCEDURE — 97530 THERAPEUTIC ACTIVITIES: CPT | Mod: PN,CQ

## 2025-05-02 NOTE — PROGRESS NOTES
"  Outpatient Rehab    Physical Therapy Visit    Patient Name: Del Anand  MRN: [id number]  YOB: 1945  Encounter Date: 5/2/2025    Therapy Diagnosis:   Encounter Diagnosis   Name Primary?    Impaired functional mobility, balance, gait, and endurance Yes     Physician: Sundar Milton, *    Physician Orders: Eval and Treat  Medical Diagnosis: Status post total left knee replacement    Visit # / Visits Authorized:  12 / 20  Insurance Authorization Period: 3/21/2025 to 6/10/2025  Date of Evaluation: 3/20/2025  Plan of Care Certification: 3/20/2025 to 6/20/2025     PT/PTA:     Number of PTA visits since last PT visit:  3  Time In:   9:55 AM  Time Out:   10:55 AM  Total Time:   60 Minutes  Total Billable Time:   55 Minutes    FOTO:  Intake Score:  %  Survey Score 1:  %  Survey Score 2:  %         Subjective   No new c/o's.  Pain reported as 0/10. Left Knee    Objective            Treatment:  Balance/Neuromuscular Re-Education  NMR 1: Left hip/piriformis stretch 3 x 15 sec  NMR 2: Heel Slides on SB x 2 min  NMR 3: Bridges x 20  NMR 4: SAQ on tan roll x 2 min, 3#  NMR 5: LAQ x 25 each, 3#  NMR 6: SLR 2 x 10 3#  NMR 7: S/L hip abduction x 10 with cueing for position  NMR 8: Ball Squeezes x 2 min  NMR 9: S/L clams x 15  Therapeutic Activity  TA 1: Nu step level 4 for 15 minutes  TA 2: Heel Raises x 20  TA 3: Toe Taps on 8" step x 2 min  TA 4: FSU  on 6" step x 15  TA 5: Standing Hip Flex/Abd/Ext, RTB x 15 each  TA 6: Heel Cord stretch on wedge 3 x 30 sec  TA 7: LSU on 6" step x 15  TA 8: Piriformis stretch 30"x3 with progressing up shin to fig 4  TA 9: Mini squats 20x  TA 10: Sit to stand x 10    Time Entry(in minutes):       Assessment & Plan   Assessment: Patient did well with exercises; no exacerbation of pain; making steady progress with strength and mobility       Patient will continue to benefit from skilled outpatient physical therapy to address the deficits listed in the problem list box on initial " evaluation, provide pt/family education and to maximize pt's level of independence in the home and community environment.     Patient's spiritual, cultural, and educational needs considered and patient agreeable to plan of care and goals.           Plan: Continue per POC and progress with strength and mobility exercises as patient tolerates    Goals:   Active       Physical Therapy       Physical Therapy Goal (Progressing)       Start:  03/21/25    Expected End:  05/02/25       Goals:  Short Term Goals: 3 weeks   Pt will be compliant with HEP.> MET  Pt will improve quad strength by 1/2 grade to allow for strength to go up and down stairs. > Progressing   Pt will improve sit <>  30 sec to at least 10 allow for increased functional mobility.  > Progressing   Pt will improve left knee flexion to at least 100 deg to allow for increased mobility.  > MET    Long Term Goals: 6 weeks   Pt will be independent with HEP to allow for increased functional tasks.  Pt will improve FOTO by 10 % to demonstrate improvement in quality of life.  Pt will improve hip flexor strength by 1/2 grade to allow for normalized body mechanics.              Jonathan Favre, PTA

## 2025-05-06 ENCOUNTER — DOCUMENT SCAN (OUTPATIENT)
Dept: HOME HEALTH SERVICES | Facility: HOSPITAL | Age: 80
End: 2025-05-06
Payer: MEDICARE

## 2025-05-06 ENCOUNTER — CLINICAL SUPPORT (OUTPATIENT)
Dept: REHABILITATION | Facility: HOSPITAL | Age: 80
End: 2025-05-06
Payer: MEDICARE

## 2025-05-06 DIAGNOSIS — Z74.09 IMPAIRED FUNCTIONAL MOBILITY, BALANCE, GAIT, AND ENDURANCE: Primary | ICD-10-CM

## 2025-05-06 PROCEDURE — 97530 THERAPEUTIC ACTIVITIES: CPT | Mod: PN,CQ

## 2025-05-06 PROCEDURE — 97112 NEUROMUSCULAR REEDUCATION: CPT | Mod: PN,CQ

## 2025-05-06 NOTE — PROGRESS NOTES
"  Outpatient Rehab    Physical Therapy Visit    Patient Name: Del Anand  MRN: [id number]  YOB: 1945  Encounter Date: 5/6/2025    Therapy Diagnosis:   Encounter Diagnosis   Name Primary?    Impaired functional mobility, balance, gait, and endurance Yes     Physician: Sundar Milton, *    Physician Orders: Eval and Treat  Medical Diagnosis: Status post total left knee replacement    Visit # / Visits Authorized:  13 / 20  Insurance Authorization Period: 3/21/2025 to 6/10/2025  Date of Evaluation: 3/20/2025  Plan of Care Certification: 3/20/2025 to 6/20/2025     PT/PTA:     Number of PTA visits since last PT visit:  4  Time In:   8:55 AM  Time Out:   9:55 AM  Total Time (in minutes):   60 Minutes  Total Billable Time (in minutes):   55 Minutes    FOTO:  Intake Score:  %  Survey Score 2:  %  Survey Score 3:  %         Subjective   My right knee is bothering me more than the left.  Pain reported as 2/10. Right Knee (non-surgical)    Objective            Treatment:  Balance/Neuromuscular Re-Education  NMR 1: Left hip/piriformis stretch 3 x 15 sec  NMR 2: Heel Slides on SB x 2 min  NMR 3: Bridges x 20  NMR 4: SAQ on tan roll x 2 min, 3#  NMR 5: LAQ x 25 each, 3#  NMR 6: SLR 2 x 10 3#  NMR 7: S/L hip abduction x 10 with cueing for position  NMR 8: Ball Squeezes x 2 min  NMR 9: S/L clams x 15  Therapeutic Activity  TA 1: Nu step level 4 for 15 minutes  TA 2: Heel Raises x 20  TA 3: Toe Taps on 8" step x 2 min  TA 4: FSU  on 6" step x 15  TA 5: Standing Hip Flex/Abd/Ext, RTB x 15 each  TA 6: Heel Cord stretch on wedge 3 x 30 sec  TA 7: LSU on 6" step x 15  TA 9: Mini squats 20x  TA 10: Sit to stand x 10    Time Entry(in minutes):  Neuromuscular Re-Education Time Entry: 25  Therapeutic Activity Time Entry: 30    Assessment & Plan   Assessment: Patient did well with exercises; no exacerbation of pain; making steady progress with strength and mobility       Patient will continue to benefit from skilled " outpatient physical therapy to address the deficits listed in the problem list box on initial evaluation, provide pt/family education and to maximize pt's level of independence in the home and community environment.     Patient's spiritual, cultural, and educational needs considered and patient agreeable to plan of care and goals.           Plan: Continue per POC and progress with strength and mobility exercises as patient tolerates    Goals:   Active       Physical Therapy       Physical Therapy Goal (Progressing)       Start:  03/21/25    Expected End:  05/02/25       Goals:  Short Term Goals: 3 weeks   Pt will be compliant with HEP.> MET  Pt will improve quad strength by 1/2 grade to allow for strength to go up and down stairs. > Progressing   Pt will improve sit <>  30 sec to at least 10 allow for increased functional mobility.  > Progressing   Pt will improve left knee flexion to at least 100 deg to allow for increased mobility.  > MET    Long Term Goals: 6 weeks   Pt will be independent with HEP to allow for increased functional tasks.  Pt will improve FOTO by 10 % to demonstrate improvement in quality of life.  Pt will improve hip flexor strength by 1/2 grade to allow for normalized body mechanics.              Jonathan Favre, PTA

## 2025-05-07 ENCOUNTER — OFFICE VISIT (OUTPATIENT)
Dept: FAMILY MEDICINE | Facility: CLINIC | Age: 80
End: 2025-05-07
Payer: MEDICARE

## 2025-05-07 VITALS
HEART RATE: 77 BPM | OXYGEN SATURATION: 98 % | DIASTOLIC BLOOD PRESSURE: 70 MMHG | WEIGHT: 315 LBS | HEIGHT: 75 IN | SYSTOLIC BLOOD PRESSURE: 120 MMHG | RESPIRATION RATE: 18 BRPM | BODY MASS INDEX: 39.17 KG/M2

## 2025-05-07 DIAGNOSIS — L21.9 SEBORRHEIC DERMATITIS: ICD-10-CM

## 2025-05-07 DIAGNOSIS — I10 ESSENTIAL HYPERTENSION: ICD-10-CM

## 2025-05-07 DIAGNOSIS — N40.1 BPH ASSOCIATED WITH NOCTURIA: ICD-10-CM

## 2025-05-07 DIAGNOSIS — E78.2 MIXED HYPERLIPIDEMIA: Primary | ICD-10-CM

## 2025-05-07 DIAGNOSIS — R35.1 BPH ASSOCIATED WITH NOCTURIA: ICD-10-CM

## 2025-05-07 DIAGNOSIS — R73.9 HYPERGLYCEMIA: ICD-10-CM

## 2025-05-07 DIAGNOSIS — R73.03 PREDIABETES: ICD-10-CM

## 2025-05-07 PROCEDURE — 1159F MED LIST DOCD IN RCRD: CPT | Mod: CPTII,S$GLB,, | Performed by: FAMILY MEDICINE

## 2025-05-07 PROCEDURE — 3074F SYST BP LT 130 MM HG: CPT | Mod: CPTII,S$GLB,, | Performed by: FAMILY MEDICINE

## 2025-05-07 PROCEDURE — 3078F DIAST BP <80 MM HG: CPT | Mod: CPTII,S$GLB,, | Performed by: FAMILY MEDICINE

## 2025-05-07 PROCEDURE — 1126F AMNT PAIN NOTED NONE PRSNT: CPT | Mod: CPTII,S$GLB,, | Performed by: FAMILY MEDICINE

## 2025-05-07 PROCEDURE — 1160F RVW MEDS BY RX/DR IN RCRD: CPT | Mod: CPTII,S$GLB,, | Performed by: FAMILY MEDICINE

## 2025-05-07 PROCEDURE — 1157F ADVNC CARE PLAN IN RCRD: CPT | Mod: CPTII,S$GLB,, | Performed by: FAMILY MEDICINE

## 2025-05-07 PROCEDURE — 3288F FALL RISK ASSESSMENT DOCD: CPT | Mod: CPTII,S$GLB,, | Performed by: FAMILY MEDICINE

## 2025-05-07 PROCEDURE — 99999 PR PBB SHADOW E&M-EST. PATIENT-LVL IV: CPT | Mod: PBBFAC,,, | Performed by: FAMILY MEDICINE

## 2025-05-07 PROCEDURE — 1101F PT FALLS ASSESS-DOCD LE1/YR: CPT | Mod: CPTII,S$GLB,, | Performed by: FAMILY MEDICINE

## 2025-05-07 PROCEDURE — 99214 OFFICE O/P EST MOD 30 MIN: CPT | Mod: S$GLB,,, | Performed by: FAMILY MEDICINE

## 2025-05-07 RX ORDER — TRIAMCINOLONE ACETONIDE 0.25 MG/G
CREAM TOPICAL 2 TIMES DAILY
Qty: 80 G | Refills: 5 | Status: SHIPPED | OUTPATIENT
Start: 2025-05-07

## 2025-05-07 RX ORDER — KETOCONAZOLE 20 MG/G
CREAM TOPICAL 2 TIMES DAILY
Qty: 60 G | Refills: 5 | Status: SHIPPED | OUTPATIENT
Start: 2025-05-07

## 2025-05-07 NOTE — PATIENT INSTRUCTIONS
[email or web address]    Best Mediterranean Diet Meal Plan for Beginners - [facility]     Thank you for allowing me to participate in your care today. It is an honor to be a part of your healthcare team at [facility]. If you had labs ordered today, you will receive notification via Minted, phone call or mailed letter regarding your results within 7 days. If you have any questions or concerns regarding your visit today, please do not hesitate to contact us.  Sincerely,   Tika Valle M.D.

## 2025-05-07 NOTE — PROGRESS NOTES
Subjective:       Patient ID: Del Anand is a 80 y.o. male.    Chief Complaint: Follow-up    History of Present Illness    CHIEF COMPLAINT:  Mr. Anand presents today for follow up after knee replacement surgery    ORTHOPEDIC HISTORY:  He underwent knee replacement surgery by Dr. Craig on February 24th. He experienced post-surgical drainage issues, with significant drainage present until March 11th. He reports the knee currently feels good despite a challenging recovery process.    CARDIOVASCULAR:  He has had low blood pressure over the past several weeks with associated symptoms including cold sweats, nausea, weakness, and instability. Symptoms are particularly pronounced when systolic blood pressure falls below 90 mmHg. Morning blood pressure readings are lower than usual. Three weeks ago, Dr. Delarosa reduced his Irbesartan dose by half. Prior to knee surgery, cardiac clearance evaluation included review of previous angiogram from 10 years ago showing 70% vessel blockage. New angiogram showed no progression, maintaining 70% occlusion in the same vessel.    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 He manages all household duties including meal preparation and daily chores. He expresses concerns about his mental health.      ROS:  General: +cold sweats, +weakness  Cardiovascular: +orthostatic symptoms  Gastrointestinal: +nausea  Psychiatric: +thought or thinking problems or concerns           Problem List[1]  Del has a current medication list which includes the following prescription(s): aspirin, cyanocobalamin, ezetimibe, multivit-min/folic acid/lutein, irbesartan, rosuvastatin, warfarin, warfarin, ketoconazole, and triamcinolone acetonide 0.025%.        There are no preventive care reminders to display for this patient.   Health Maintenance reviewed and discussed  Objective:      Vitals:    05/07/25 1109   BP: 120/70   Pulse: 77  "  Resp: 18   SpO2: 98%   Weight: (!) 143.9 kg (317 lb 3.2 oz)   Height: 6' 3" (1.905 m)   PainSc: 0-No pain     Body mass index is 39.65 kg/m².  Physical Exam  Vitals and nursing note reviewed.   Constitutional:       General: He is not in acute distress.     Appearance: He is obese. He is not ill-appearing.   Cardiovascular:      Rate and Rhythm: Normal rate and regular rhythm.      Heart sounds: No murmur heard.  Pulmonary:      Effort: Pulmonary effort is normal.      Breath sounds: Normal breath sounds. No wheezing.   Musculoskeletal:      Comments: Left knee incision healing well   Skin:     General: Skin is warm and dry.      Findings: No rash.   Neurological:      Mental Status: He is alert.   Psychiatric:         Mood and Affect: Mood normal.         Behavior: Behavior normal.         Assessment:       Assessment & Plan    1. Evaluated recent low BP symptoms after reducing Irbesartan dose.  2. Considered potential age-related increased medication sensitivity.  3. Assessed cardiovascular benefits of continuing Irbesartan, including renal protection and cardiac remodeling.  4. Reviewed recent angiogram results showing stable CAD with no progression over 10 years.  5. Discussed potential for further medication reduction if low BP persists.  6. Discussed stabilizing effect of cholesterol medication on existing plaques.           Plan:       1. Mixed hyperlipidemia  Overview:  Untreated total chol 200, LDL 120s, HDL <40 and triglycerides ~ 200 ~mg/dl.    Orders:  -     Lipid Panel; Future; Expected date: 05/07/2025    2. Seborrheic dermatitis  -     ketoconazole (NIZORAL) 2 % cream; Apply topically 2 (two) times daily.  Dispense: 60 g; Refill: 5  -     triamcinolone acetonide 0.025% (KENALOG) 0.025 % cream; Apply topically 2 (two) times daily.  Dispense: 80 g; Refill: 5    3. Essential hypertension  -     CBC Auto Differential; Future; Expected date: 05/07/2025  -     Comprehensive Metabolic Panel; Future; " Expected date: 05/07/2025  -     TSH; Future; Expected date: 05/07/2025    4. Prediabetes  -     Hemoglobin A1C; Future; Expected date: 05/07/2025    5. Hyperglycemia    6. BPH associated with nocturia  -     Prostate Specific Antigen, Diagnostic; Future; Expected date: 05/07/2025         This note was generated with the assistance of ambient listening technology. Verbal consent was obtained by the patient and accompanying visitor(s) for the recording of patient appointment to facilitate this note. I attest to having reviewed and edited the generated note for accuracy, though some syntax or spelling errors may persist. Please contact the author of this note for any clarification.         [1]   Patient Active Problem List  Diagnosis    Kidney stone on left side    Low back pain    Fatty liver    Prediabetes    Metabolic syndrome    Nuclear sclerosis - Both Eyes    Coronary artery disease    Hyperglycemia    S/P CABG x 2    Severe obesity (BMI 35.0-39.9) with comorbidity    Long term current use of anticoagulant therapy    DVT (deep venous thrombosis)    Mixed hyperlipidemia    Essential hypertension    VERONICA on CPAP    Left-sided carotid artery disease    Hematuria, gross    History of squamous cell carcinoma    Colon cancer screening    Adenomatous polyp of ascending colon    Nuclear sclerosis, bilateral    Impaired functional mobility, balance, gait, and endurance    Erectile dysfunction    Joint pain in both hands    Stiffness of joints of both hands    Edema of left lower extremity

## 2025-05-08 ENCOUNTER — HOSPITAL ENCOUNTER (OUTPATIENT)
Dept: RADIOLOGY | Facility: HOSPITAL | Age: 80
Discharge: HOME | End: 2025-05-08
Attending: ORTHOPAEDIC SURGERY
Payer: MEDICARE

## 2025-05-08 ENCOUNTER — OFFICE VISIT (OUTPATIENT)
Dept: ORTHOPEDICS | Facility: CLINIC | Age: 80
End: 2025-05-08
Payer: MEDICARE

## 2025-05-08 VITALS — WEIGHT: 315 LBS | HEIGHT: 75 IN | BODY MASS INDEX: 39.17 KG/M2

## 2025-05-08 DIAGNOSIS — Z96.652 STATUS POST TOTAL LEFT KNEE REPLACEMENT: Primary | ICD-10-CM

## 2025-05-08 DIAGNOSIS — M17.11 PRIMARY OSTEOARTHRITIS OF RIGHT KNEE: ICD-10-CM

## 2025-05-08 PROCEDURE — 1160F RVW MEDS BY RX/DR IN RCRD: CPT | Mod: CPTII,S$GLB,, | Performed by: ORTHOPAEDIC SURGERY

## 2025-05-08 PROCEDURE — 99024 POSTOP FOLLOW-UP VISIT: CPT | Mod: S$GLB,,, | Performed by: ORTHOPAEDIC SURGERY

## 2025-05-08 PROCEDURE — 1101F PT FALLS ASSESS-DOCD LE1/YR: CPT | Mod: CPTII,S$GLB,, | Performed by: ORTHOPAEDIC SURGERY

## 2025-05-08 PROCEDURE — 73562 X-RAY EXAM OF KNEE 3: CPT | Mod: 26,50,, | Performed by: RADIOLOGY

## 2025-05-08 PROCEDURE — 3288F FALL RISK ASSESSMENT DOCD: CPT | Mod: CPTII,S$GLB,, | Performed by: ORTHOPAEDIC SURGERY

## 2025-05-08 PROCEDURE — 73562 X-RAY EXAM OF KNEE 3: CPT | Mod: TC,50,PN

## 2025-05-08 PROCEDURE — 1159F MED LIST DOCD IN RCRD: CPT | Mod: CPTII,S$GLB,, | Performed by: ORTHOPAEDIC SURGERY

## 2025-05-08 PROCEDURE — 1126F AMNT PAIN NOTED NONE PRSNT: CPT | Mod: CPTII,S$GLB,, | Performed by: ORTHOPAEDIC SURGERY

## 2025-05-08 PROCEDURE — 1157F ADVNC CARE PLAN IN RCRD: CPT | Mod: CPTII,S$GLB,, | Performed by: ORTHOPAEDIC SURGERY

## 2025-05-08 PROCEDURE — 99999 PR PBB SHADOW E&M-EST. PATIENT-LVL III: CPT | Mod: PBBFAC,,, | Performed by: ORTHOPAEDIC SURGERY

## 2025-05-08 RX ORDER — TRIAMCINOLONE ACETONIDE 40 MG/ML
40 INJECTION, SUSPENSION INTRA-ARTICULAR; INTRAMUSCULAR
Status: DISCONTINUED | OUTPATIENT
Start: 2025-05-08 | End: 2025-05-08 | Stop reason: HOSPADM

## 2025-05-08 RX ADMIN — TRIAMCINOLONE ACETONIDE 40 MG: 40 INJECTION, SUSPENSION INTRA-ARTICULAR; INTRAMUSCULAR at 10:05

## 2025-05-08 NOTE — PROGRESS NOTES
[facility]    Subjective:     Chief Complaint:   Chief Complaint   Patient presents with    Left Knee - Post-op Evaluation     PO Left TKA 2/24/25. Today patient states he is doing well, has good ROM. Able to put sock on now.     Right Knee - Pain     R knee pain x 2-3 years. During PT, she therapist states it is buckling. Denies crepitus. Pain is located medially inside the knee. Painful with standing and walking. Cortisone shots have failed in the past. Would like to discuss getting set up for surgery in January d/t financial reasons.       Past Medical History:   Diagnosis Date    Benign neoplasm of colon     Cataract     CHF (congestive heart failure) 01/13/2015    Coronary artery disease     Difficult intubation     DVT (deep venous thrombosis)     HLD (hyperlipidemia)     HTN (hypertension)     Impaired fasting glucose     Kidney stone     Metabolic syndrome     Nonspecific abnormal results of liver function study     Nonspecific findings on examination of blood     Nuclear sclerosis - Both Eyes 10/18/2013    Osteoarthrosis, unspecified whether generalized or localized, lower leg     Respiratory distress     AFTER OPEN HEART SURGERY STATES ON VENTILATOR    Squamous cell carcinoma of skin        Past Surgical History:   Procedure Laterality Date    CARDIAC SURGERY      CATARACT EXTRACTION W/  INTRAOCULAR LENS IMPLANT Right 09/17/2020    Procedure: EXTRACTION, CATARACT, WITH IOL INSERTION;  Surgeon: Chanel Klein MD;  Location: [facility];  Service: Ophthalmology;  Laterality: Right;    CATARACT EXTRACTION W/  INTRAOCULAR LENS IMPLANT Left 10/01/2020    Procedure: EXTRACTION, CATARACT, WITH IOL INSERTION;  Surgeon: Chanel Klein MD;  Location: [facility];  Service: Ophthalmology;  Laterality: Left;    COLONOSCOPY N/A 03/13/2019    Procedure: COLONOSCOPY;  Surgeon: Nikunj Larson MD;  Location: [address]);  Service: Endoscopy;  Laterality: N/A;  ok to hold Coumadin x 5 days with a Lovenox  bridge per Coumadin clinic  2nd floor - history of difficult intubation-MS    COLONOSCOPY N/A 07/07/2022    Procedure: COLONOSCOPY;  Surgeon: Nikunj Larson MD;  [facility]  Service: Endoscopy;  Laterality: N/A;  ef 45%-5/31-coumadin hold per coumadin clinic see coag visist 6/29-tb-vacc- clears 4 hrs prior-am prep 2-3-am-inst portal-tb    CORONARY ANGIOGRAPHY INCLUDING BYPASS GRAFTS WITH CATHETERIZATION OF LEFT HEART N/A 2/11/2025    Procedure: ANGIOGRAM, CORONARY, INCLUDING BYPASS GRAFT, WITH LEFT HEART CATHETERIZATION;  Surgeon: Beau Jones MD;  Location: [facility] CATH/EP LAB;  Service: Cardiology;  Laterality: N/A;    CORONARY ARTERY BYPASS GRAFT      2014    CYSTOSCOPY      INSTANTANEOUS WAVE-FREE RATIO (IFR) N/A 2/11/2025    Procedure: Instantaneous Wave-Free Ratio (IFR);  Surgeon: Beau Jones MD;  Location: [facility] CATH/EP LAB;  Service: Cardiology;  Laterality: N/A;    KIDNEY STONE SURGERY      KNEE ARTHROPLASTY      bilateral    renal stone      ROBOTIC ARTHROPLASTY, KNEE Left 2/24/2025    Procedure: ROBOTIC ARTHROPLASTY, KNEE, TOTAL;  Surgeon: Carl Craig MD;  Location: [facility];  Service: Orthopedics;  Laterality: Left;  RN NEEDS TO FINISH IMPLANTS    SKIN BIOPSY         Current Outpatient Medications   Medication Sig    aspirin 81 MG Chew Take 81 mg by mouth once daily.    cyanocobalamin 500 MCG tablet Take 500 mcg by mouth every morning. Every day    ezetimibe (ZETIA) 10 mg tablet TAKE 1 TABLET ONE TIME DAILY    FOLIC ACID/MULTIVITS-MIN/LUT (CENTRUM SILVER ORAL) Take 1 tablet by mouth once daily.    irbesartan (AVAPRO) 150 MG tablet Take 1 tablet (150 mg total) by mouth every evening.    ketoconazole (NIZORAL) 2 % cream Apply topically 2 (two) times daily.    rosuvastatin (CRESTOR) 40 MG Tab TAKE 1 TABLET ONE TIME DAILY    triamcinolone acetonide 0.025% (KENALOG) 0.025 % cream Apply topically 2 (two) times daily.    warfarin (COUMADIN) 10 MG tablet Take 10mg daily  or as directed by Coumadin Clinic.   Also uses warfarin 5mg tablet strength    warfarin (COUMADIN) 5 MG tablet Take 10mg daily except 12.5mg      No current facility-administered medications for this visit.       Review of patient's allergies indicates:  No Known Allergies    Family History   Problem Relation Name Age of Onset    Stroke Mother incontinence     Dementia Mother incontinence     Heart attack Father bph         d. 85    Urolithiasis Father bph     Heart disease Father bph     Heart failure Father bph     Other Sister Mary         bladder lift, s/p 4 ; estranged    Heart attack Maternal Grandfather          d. 65    Hypertension Paternal Grandmother      Heart attack Paternal Grandfather      Heart failure Paternal Grandfather      No Known Problems Daughter Sabina     No Known Problems Son Doron        Social History[1]    History of present illness:  80-year-old male, presents to clinic today to follow-up on left total knee arthroplasty  of this year.  He is doing well, he has finished physical therapy, he has got great range of motion.  Minimal ache or discomfort.  Similar ache and discomfort in the right knee as well.  Known osteoarthritis in the right knee.  Interested in returning back to golf.      Review of Systems:    Constitution: Negative for chills, fever, and sweats.  Negative for unexplained weight loss.    HENT:  Negative for headaches and blurry vision.    Cardiovascular:Negative for chest pain or irregular heart beat. Negative for hypertension.    Respiratory:  Negative for cough and shortness of breath.    Gastrointestinal: Negative for abdominal pain, heartburn, melena, nausea, and vomitting.    Genitourinary:  Negative bladder incontinence and dysuria.    Musculoskeletal:  See HPI for details.     Neurological: Negative for numbness.    Psychiatric/Behavioral: Negative for depression.  The patient is not nervous/anxious.      Endocrine: Negative for  polyuria    Hematologic/Lymphatic: Negative for bleeding problem.  Does not bruise/bleed easily.    Skin: Negative for poor would healing and rash    Objective:      Physical Examination:    Vital Signs:  There were no vitals filed for this visit.    Body mass index is 39.65 kg/m².    This a well-developed, well nourished patient in no acute distress.  They are alert and oriented and cooperative to examination.        Examination of the left knee, skin is dry and intact, no erythema ecchymosis, no signs symptoms of infection.  Range of motion 0-120 degrees.  Stable anterior-posterior varus and valgus stress.  Homans sign is negative, straight leg raise is negative.  Distally neurovascular intact.    Examination of the right knee no effusion, no erythema or ecchymosis, no signs symptoms of infection.  Range of motion 0-120 degrees.  Stable anterior-posterior varus and valgus stress.  Homans sign is negative, straight leg raise is negative.  Distally neurovascular intact.    Pertinent New Results:  Narrative & Impression  EXAMINATION:  XR KNEE 3 VIEW BILATERAL     CLINICAL HISTORY:  Presence of left artificial knee joint     TECHNIQUE:  AP, lateral, and Merchant views of both knees were performed.     COMPARISON:  02/24/2025     FINDINGS:  Left knee total arthroplasty hardware with no periprosthetic fracture or abnormal lucency to suggest loosening or infection.  There is no malalignment on the left and there is lateral patellar tilt and translation on the right.  There are advanced tricompartment degenerative changes in the right knee.  Small bilateral knee joint effusions.        Electronically signed by:Nikunj Sanders  Date:                                            05/08/2025  Time:                                           10:35        Exam Ended: 05/08/25 10:27 CDT Last Resulted: 05/08/25 10:35 CDT     XRAY Report / Interpretation:   AP lateral sunrise views of the bilateral knees taken today in the office  "demonstrate left total knee arthroplasty in appropriate position without evidence of hardware failure loosening or subsidence.  Right knee with advanced tricompartmental arthrosis and bone-on-bone deformity.    Assessment/Plan:      Stable status post left total knee arthroplasty doing well.  Continue with home exercise program follow-up in 2 months.  Right knee osteoarthritis, not terribly symptomatic but we have offered and administered right intra-articular steroid injection lidocaine and triamcinolone anterior lateral approach sterile technique he tolerated well.  We will re-evaluate the intervention at his next follow-up appointment.  He can begin hitting golf balls in resuming his normal physical activities.    Juan David Mckenzie, Physician Assistant, served in the capacity as a "scribe" for this patient encounter.  A "face-to-face" encounter occurred with Dr. Carl Craig on this date.  The treatment plan and medical decision-making is outlined above. Patient was seen and examined with a chaperone.       This note was created using Dragon voice recognition software that occasionally misinterpreted phrases or words.             [1]   Social History  Socioeconomic History    Marital status:    Tobacco Use    Smoking status: Former     Current packs/day: 0.00     Average packs/day: 1 pack/day for 20.0 years (20.0 ttl pk-yrs)     Types: Cigarettes     Start date: 10/16/1967     Quit date: 10/16/1987     Years since quittin.5     Passive exposure: Past    Smokeless tobacco: Former   Substance and Sexual Activity    Alcohol use: Yes     Types: 1 - 2 Cans of beer, 1 - 2 Standard drinks or equivalent per week     Comment: BEER OR WHISKEY DAILY 1-2    Drug use: No    Sexual activity: Yes     Partners: Female     Birth control/protection: None   Social History Narrative    SOCIAL HISTORY:     .     Retired  for [employer].     Son in [place]    Daughter lives in [place].     +/- on " exercise with the joint problems.      Plays golf, now going to gym        FAMILY HISTORY:     Mom d. 2006 with dementia secondary to subarachnoid     hemorrhage and stroke.     Dad d. 85, sudden MI.     One [place].      Social Drivers of Health     Financial Resource Strain: Low Risk  (2/12/2025)    Overall Financial Resource Strain (CARDIA)     Difficulty of Paying Living Expenses: Not hard at all   Food Insecurity: No Food Insecurity (2/12/2025)    Hunger Vital Sign     Worried About Running Out of Food in the Last Year: Never true     Ran Out of Food in the Last Year: Never true   Transportation Needs: No Transportation Needs (2/12/2025)    PRAPARE - Transportation     Lack of Transportation (Medical): No     Lack of Transportation (Non-Medical): No   Physical Activity: Inactive (2/12/2025)    Exercise Vital Sign     Days of Exercise per Week: 0 days     Minutes of Exercise per Session: 0 min   Stress: No Stress Concern Present (2/12/2025)    [employer] - Occupational Stress Questionnaire     Feeling of Stress : Only a little   Housing Stability: Low Risk  (2/12/2025)    Housing Stability Vital Sign     Unable to Pay for Housing in the Last Year: No     Number of Times Moved in the Last Year: 1     Homeless in the Last Year: No

## 2025-05-08 NOTE — PROCEDURES
Large Joint Aspiration/Injection: R knee    Date/Time: 5/8/2025 10:30 AM    Performed by: Juan David Mckenzie PA  Authorized by: Juan David Mckenzie PA    Consent Done?:  Yes (Verbal)  Indications:  Pain  Site marked: the procedure site was marked    Timeout: prior to procedure the correct patient, procedure, and site was verified    Prep: patient was prepped and draped in usual sterile fashion      Local anesthesia used?: Yes    Local anesthetic:  Lidocaine 1% without epinephrine    Details:  Needle Size:  25 G  Ultrasonic Guidance for needle placement?: No    Location:  Knee  Site:  R knee  Medications:  40 mg triamcinolone acetonide 40 mg/mL  Patient tolerance:  Patient tolerated the procedure well with no immediate complications

## 2025-05-09 ENCOUNTER — CLINICAL SUPPORT (OUTPATIENT)
Dept: REHABILITATION | Facility: HOSPITAL | Age: 80
End: 2025-05-09
Payer: MEDICARE

## 2025-05-09 DIAGNOSIS — Z74.09 IMPAIRED FUNCTIONAL MOBILITY, BALANCE, GAIT, AND ENDURANCE: Primary | ICD-10-CM

## 2025-05-09 PROCEDURE — 97530 THERAPEUTIC ACTIVITIES: CPT | Mod: PN

## 2025-05-09 PROCEDURE — 97112 NEUROMUSCULAR REEDUCATION: CPT | Mod: PN

## 2025-05-09 NOTE — PROGRESS NOTES
"  Outpatient Rehab    Physical Therapy Visit    Patient Name: Del Anand  MRN: [id number]  YOB: 1945  Encounter Date: 5/9/2025    Therapy Diagnosis:   Encounter Diagnosis   Name Primary?    Impaired functional mobility, balance, gait, and endurance Yes     Physician: Sundar Milton, *    Physician Orders: Eval and Treat  Medical Diagnosis: Status post total left knee replacement    Visit # / Visits Authorized:  14 / 20  Insurance Authorization Period: 3/21/2025 to 6/10/2025  Date of Evaluation: 3/20/25  Plan of Care Certification: 3/20/25 to 6/20/25     PT/PTA:     Number of PTA visits since last PT visit:   Time In: 1000   Time Out: 1055  Total Time (in minutes): 55   Total Billable Time (in minutes): 40; split billing    FOTO:  Intake Score:  %  Survey Score 2:  %  Survey Score 3:  %         Subjective   Got injection in right knee yesterday..         Objective            Treatment:  Balance/Neuromuscular Re-Education  NMR 1: Left hip/piriformis stretch 3 x 15 sec  NMR 2: Heel Slides on SB x 2 min  NMR 3: Bridges x 20  NMR 4: SAQ on tan roll x 2 min, 3#  NMR 5: LAQ x 25 each, 3#  NMR 6: SLR 2 x 10 3#  NMR 8: Ball Squeezes x 2 min  NMR 9: S/L clams x 15  Therapeutic Activity  TA 1: Nu step level 4 for 10 minutes  TA 2: Heel Raises x 20  TA 4: FSU  on 6" step x 15  TA 5: Standing Hip Flex/Abd/Ext, RTB x 15 each  TA 6: Heel Cord stretch on wedge 3 x 30 sec  TA 7: LSU on 6" step x 15  TA 9: Mini squats 20x  TA 10: Sit to stand x 10    Time Entry(in minutes):  Neuromuscular Re-Education Time Entry: 15  Therapeutic Activity Time Entry: 25    Assessment & Plan   Assessment: Patient did well with exercises; no exacerbation of pain; making steady progress with strength and mobility. Billed time reflects one on one treatment.       Patient will continue to benefit from skilled outpatient physical therapy to address the deficits listed in the problem list box on initial evaluation, provide pt/family " education and to maximize pt's level of independence in the home and community environment.     Patient's spiritual, cultural, and educational needs considered and patient agreeable to plan of care and goals.           Plan: Continue per POC and progress with strength and mobility exercises as patient tolerates    Goals:   Active       Physical Therapy       Physical Therapy Goal (Progressing)       Start:  03/21/25    Expected End:  05/02/25       Goals:  Short Term Goals: 3 weeks   Pt will be compliant with HEP.> MET  Pt will improve quad strength by 1/2 grade to allow for strength to go up and down stairs. > Progressing   Pt will improve sit <>  30 sec to at least 10 allow for increased functional mobility.  > Progressing   Pt will improve left knee flexion to at least 100 deg to allow for increased mobility.  > MET    Long Term Goals: 6 weeks   Pt will be independent with HEP to allow for increased functional tasks.  Pt will improve FOTO by 10 % to demonstrate improvement in quality of life.  Pt will improve hip flexor strength by 1/2 grade to allow for normalized body mechanics.              Stefany Torrez, PT

## 2025-05-14 ENCOUNTER — ANTI-COAG VISIT (OUTPATIENT)
Dept: CARDIOLOGY | Facility: CLINIC | Age: 80
End: 2025-05-14
Payer: MEDICARE

## 2025-05-14 ENCOUNTER — PATIENT MESSAGE (OUTPATIENT)
Dept: CARDIOLOGY | Facility: CLINIC | Age: 80
End: 2025-05-14

## 2025-05-14 DIAGNOSIS — Z79.01 LONG TERM CURRENT USE OF ANTICOAGULANT THERAPY: Primary | ICD-10-CM

## 2025-05-14 LAB — INR PPP: 3.9

## 2025-05-14 PROCEDURE — 93793 ANTICOAG MGMT PT WARFARIN: CPT | Mod: S$GLB,,, | Performed by: PHARMACIST

## 2025-05-14 NOTE — PROGRESS NOTES
[facility]    05/14/2025 4:01 PM    Assessment/Plan:    Patient presents today with supratherapeutic INR.    Assessment of patient findings and chart review: 10mg tue/thu/sun and 12.5mg all other days   Pt states he has a glass of alcohol a day, hurt knee and swollen     Recommendation for patient's warfarin regimen: per calendar    Recommend repeat INR in 2 weeks  _________________________________________________________________    Del Anand (80 y.o.) is followed by the [facility].    Anticoagulation Summary  As of 5/14/2025      INR goal:  2.5-3.5   TTR:  72.0% (10.1 y)   INR used for dosing:  3.9 (5/14/2025)   Warfarin maintenance plan:  12.5 mg (10 mg x 1 and 5 mg x 0.5) every Mon, Wed, Fri; 10 mg (10 mg x 1) all other days   Weekly warfarin total:  77.5 mg   Plan last modified:  Jennifer Hunt, PharmD (5/14/2025)   Next INR check:  5/28/2025   Target end date:  --    Indications    Long term current use of anticoagulant therapy [Z79.01]  Pulmonary embolism (Resolved) [I26.99]                 Anticoagulation Episode Summary       INR check location:  Home Draw    Preferred lab:  --    Send INR reminders to:  [facility] COUMADIN HOME MONITOR    Comments:  [place] every other Wednesday          Anticoagulation Care Providers       Provider Role Specialty Phone number    Beau Jones MD [facility] Interventional Cardiology [phone]

## 2025-05-16 ENCOUNTER — CLINICAL SUPPORT (OUTPATIENT)
Dept: REHABILITATION | Facility: HOSPITAL | Age: 80
End: 2025-05-16
Payer: MEDICARE

## 2025-05-16 DIAGNOSIS — Z74.09 IMPAIRED FUNCTIONAL MOBILITY, BALANCE, GAIT, AND ENDURANCE: Primary | ICD-10-CM

## 2025-05-16 PROCEDURE — 97112 NEUROMUSCULAR REEDUCATION: CPT | Mod: PN,CQ

## 2025-05-16 PROCEDURE — 97530 THERAPEUTIC ACTIVITIES: CPT | Mod: PN,CQ

## 2025-05-16 NOTE — PROGRESS NOTES
"  Outpatient Rehab    Physical Therapy Visit    Patient Name: Del Anand  MRN: [id number]  YOB: 1945  Encounter Date: 5/16/2025    Therapy Diagnosis:   Encounter Diagnosis   Name Primary?    Impaired functional mobility, balance, gait, and endurance Yes     Physician: Sundar Milton, *    Physician Orders: Eval and Treat  Medical Diagnosis: Status post total left knee replacement    Visit # / Visits Authorized:  15 / 20  Insurance Authorization Period: 3/21/2025 to 6/10/2025  Date of Evaluation: 3/19/2025  Plan of Care Certification: 3/21/2025 to 6/20/2025      PT/PTA:     Number of PTA visits since last PT visit:  1  Time In:   9:50 AM  Time Out:   10:50 AM  Total Time (in minutes):   60 Minutes  Total Billable Time (in minutes):   30 Minutes split billing    FOTO:  Intake Score:  %  Survey Score 2:  %  Survey Score 3:  %    Precautions:       Subjective   I tweaked my right knee the other day.  Pain reported as 4/10. Right Knee (non-surgical)    Objective            Treatment:  Balance/Neuromuscular Re-Education  NMR 1: Left hip/piriformis stretch 3 x 15 sec  NMR 2: Heel Slides on SB x 2 min  NMR 3: Bridges x 20  NMR 4: SAQ on tan roll x 2 min, 3#  NMR 5: LAQ x 25 3#  NMR 6: SLR 2 x 10 3#  NMR 7: S/L hip abduction x 10 with cueing for position  NMR 8: Ball Squeezes x 2 min  NMR 9: S/L clams x 15  Therapeutic Activity  TA 1: Nu step level 4 x 15 minutes  TA 2: Heel Raises x 20  TA 3: Toe Taps on 8" step x 2 min  TA 4: FSU  on 6" step x 15  TA 5: Standing Hip Flex/Abd/Ext, RTB x 15 each  TA 6: Heel Cord stretch on wedge 3 x 30 sec  TA 7: LSU on 6" step x 15    Time Entry(in minutes):  Neuromuscular Re-Education Time Entry: 15  Therapeutic Activity Time Entry: 15    Assessment & Plan   Assessment: Patient did well with exercises; no exacerbation of pain; making steady progress with strength and mobility.       Patient will continue to benefit from skilled outpatient physical therapy to address " the deficits listed in the problem list box on initial evaluation, provide pt/family education and to maximize pt's level of independence in the home and community environment.     Patient's spiritual, cultural, and educational needs considered and patient agreeable to plan of care and goals.           Plan: Continue per POC and progress with strength and mobility exercises as patient tolerates    Goals:   Active       Physical Therapy       Physical Therapy Goal (Progressing)       Start:  03/21/25    Expected End:  05/02/25       Goals:  Short Term Goals: 3 weeks   Pt will be compliant with HEP.> MET  Pt will improve quad strength by 1/2 grade to allow for strength to go up and down stairs. > Progressing   Pt will improve sit <>  30 sec to at least 10 allow for increased functional mobility.  > Progressing   Pt will improve left knee flexion to at least 100 deg to allow for increased mobility.  > MET    Long Term Goals: 6 weeks   Pt will be independent with HEP to allow for increased functional tasks.  Pt will improve FOTO by 10 % to demonstrate improvement in quality of life.  Pt will improve hip flexor strength by 1/2 grade to allow for normalized body mechanics.              Jonathan Favre, PTA

## 2025-05-18 NOTE — PROGRESS NOTES
KHAI CONTRERAS 10/2018  Patient thinks his vision has gradually gotten worse over   the past few months.  Trouble seeing scoreboard and TV channel guide,   trouble recognizing people at a distance.  Patient states his distance   vision was never good at a distance with newest glasses and is concerned   with the rapid decline of his vision.   Patient is very bothered by   vision.  OD hernandez a lot. Using AT's once a day. Patient doesn't want to   be dilated today.    Last edited by Unique Quan on 7/17/2019  8:36 AM. (History)            Assessment /Plan     For exam results, see Encounter Report.    Nuclear sclerosis, bilateral    Astigmatism with presbyopia, bilateral      1. Educated pt on presence of cataracts and effects on vision. No surgery at this time. Recheck at yearly exam.   2. New Spec Rx given. Different lens options discussed with patient               
05-18    139  |  107  |  10  ----------------------------<  104[H]  3.1[L]   |  26  |  0.36[L]    Ca    8.8      18 May 2025 06:57  Phos  2.5     05-18  Mg     2.2     05-18    TPro  6.6  /  Alb  2.8[L]  /  TBili  0.3  /  DBili  x   /  AST  21  /  ALT  14  /  AlkPhos  51  05-18  POCT Blood Glucose.: 98 mg/dL (05-18-25 @ 08:02)

## 2025-05-20 ENCOUNTER — CLINICAL SUPPORT (OUTPATIENT)
Dept: REHABILITATION | Facility: HOSPITAL | Age: 80
End: 2025-05-20
Payer: MEDICARE

## 2025-05-20 DIAGNOSIS — Z74.09 IMPAIRED FUNCTIONAL MOBILITY, BALANCE, GAIT, AND ENDURANCE: Primary | ICD-10-CM

## 2025-05-20 PROCEDURE — 97112 NEUROMUSCULAR REEDUCATION: CPT | Mod: PN

## 2025-05-20 PROCEDURE — 97530 THERAPEUTIC ACTIVITIES: CPT | Mod: PN

## 2025-05-21 NOTE — PROGRESS NOTES
"  Outpatient Rehab    Physical Therapy Visit    Patient Name: Del Anand  MRN: [id number]  YOB: 1945  Encounter Date: 5/20/2025    Therapy Diagnosis:   Encounter Diagnosis   Name Primary?    Impaired functional mobility, balance, gait, and endurance Yes     Physician: Sundar Milton, *    Physician Orders: Eval and Treat  Medical Diagnosis: Status post total left knee replacement    Visit # / Visits Authorized:  16 / 20  Insurance Authorization Period: 3/21/2025 to 6/10/2025  Date of Evaluation: 3/19/2025  Plan of Care Certification: 3/21/2025 to 6/20/2025      PT/PTA:     Number of PTA visits since last PT visit:   Time In: 1500   Time Out: 1600  Total Time (in minutes): 60   Total Billable Time (in minutes): 30    FOTO:  Intake Score:  %  Survey Score 2:  %  Survey Score 3:  %    Precautions:       Subjective   My left knee is great; My right knee is better today; swelling is going down..  Pain reported as 3/10. Right Knee (non-surgical)    Objective            Treatment:  Balance/Neuromuscular Re-Education  NMR 2: Heel Slides on SB x 2 min  NMR 5: LAQ x 25 3#  Therapeutic Activity  TA 1: Nu step level 4 x 15 minutes  TA 2: Heel Raises x 20  TA 3: Toe Taps on 8" step x 2 min  TA 4: FSU  on 6" step x 15  TA 5: Standing Hip Flex/Abd/Ext, Level 2  x 15 each  TA 6: Heel Cord stretch on wedge 3 x 30 sec  TA 7: LSU on 6" step x 15    Time Entry(in minutes):  Neuromuscular Re-Education Time Entry: 15  Therapeutic Activity Time Entry: 20    Assessment & Plan   Assessment: Del continues to demonstrate improvement in overall functional mobility; Left knee AROM is good; strength in LE's 4-/5; NO pain in surgical knee; right knee has been uncomfortable the last couple of visits.  Evaluation/Treatment Tolerance: Patient tolerated treatment well    Patient will continue to benefit from skilled outpatient physical therapy to address the deficits listed in the problem list box on initial evaluation, provide " pt/family education and to maximize pt's level of independence in the home and community environment.     Patient's spiritual, cultural, and educational needs considered and patient agreeable to plan of care and goals.           Plan: Continue per POC and progress with strength and mobility exercises as patient tolerates    Goals:   Active       Physical Therapy       Physical Therapy Goal (Progressing)       Start:  03/21/25    Expected End:  05/30/25       Goals:  Short Term Goals: 3 weeks   Pt will be compliant with HEP.> MET  Pt will improve quad strength by 1/2 grade to allow for strength to go up and down stairs. > Progressing   Pt will improve sit <>  30 sec to at least 10 allow for increased functional mobility.  > Progressing   Pt will improve left knee flexion to at least 100 deg to allow for increased mobility.  > MET    Long Term Goals: 6 weeks   Pt will be independent with HEP to allow for increased functional tasks.  Pt will improve FOTO by 10 % to demonstrate improvement in quality of life.  Pt will improve hip flexor strength by 1/2 grade to allow for normalized body mechanics.              Giovana Singh, PT

## 2025-05-23 ENCOUNTER — CLINICAL SUPPORT (OUTPATIENT)
Dept: REHABILITATION | Facility: HOSPITAL | Age: 80
End: 2025-05-23
Payer: MEDICARE

## 2025-05-23 DIAGNOSIS — Z74.09 IMPAIRED FUNCTIONAL MOBILITY, BALANCE, GAIT, AND ENDURANCE: Primary | ICD-10-CM

## 2025-05-23 PROCEDURE — 97530 THERAPEUTIC ACTIVITIES: CPT | Mod: PN,CQ

## 2025-05-23 PROCEDURE — 97112 NEUROMUSCULAR REEDUCATION: CPT | Mod: PN,CQ

## 2025-05-23 NOTE — PROGRESS NOTES
"  Outpatient Rehab    Physical Therapy Visit    Patient Name: Del Anand  MRN: [id number]  YOB: 1945  Encounter Date: 5/23/2025    Therapy Diagnosis:   Encounter Diagnosis   Name Primary?    Impaired functional mobility, balance, gait, and endurance Yes     Physician: Sundar Milton, *    Physician Orders: Eval and Treat  Medical Diagnosis: Status post total left knee replacement    Visit # / Visits Authorized:  17 / 20  Insurance Authorization Period: 3/21/2025 to 6/10/2025  Date of Evaluation: 3/19/2025  Plan of Care Certification: 3/21/2025 to 6/20/2025      PT/PTA:     Number of PTA visits since last PT visit:  1  Time In:   10:00 AM  Time Out:   11:00 AM  Total Time (in minutes):   60 Minutes  Total Billable Time (in minutes):   53 Minutes    FOTO:  Intake Score:  %  Survey Score 2:  %  Survey Score 3:  %    Precautions:       Subjective   My left knee is great; My right knee is better today; swelling is going down..  Pain reported as 1/10. Right Knee (non-surgical)    Objective            Treatment:  Balance/Neuromuscular Re-Education  NMR 2: Heel Slides on SB x 2 min  NMR 3: Bridges x 20  NMR 4: SAQ on tan roll x 2 min, 3#  NMR 5: LAQ x 25 3#  NMR 6: SLR 2 x 10 3#  NMR 7: S/L hip abduction x 10 with cueing for position  NMR 8: Ball Squeezes x 2 min  NMR 9: S/L clams x 15  Therapeutic Activity  TA 1: Nu step level 4 x 15 minutes  TA 2: Heel Raises x 20  TA 3: Toe Taps on 8" step x 2 min  TA 4: FSU  on 6" step x 15  TA 5: Standing Hip Flex/Abd/Ext, Level 2  x 15 each  TA 6: Heel Cord stretch on wedge 3 x 30 sec  TA 7: LSU on 6" step x 15  TA 8: -  TA 9: Mini squats 20x  TA 10: Sit to stand x 10    Time Entry(in minutes):  Neuromuscular Re-Education Time Entry: 23  Therapeutic Activity Time Entry: 30    Assessment & Plan   Assessment: Del continues to demonstrate improvement in overall functional mobility; Left knee AROM is good; strength in LE's 4-/5; NO pain in surgical knee; right knee " has been uncomfortable the last couple of visits.       The patient will continue to benefit from skilled outpatient physical therapy in order to address the deficits listed in the problem list on the initial evaluation, provide patient and family education, and maximize the patients level of independence in the home and community environments.     The patient's spiritual, cultural, and educational needs were considered, and the patient is agreeable to the plan of care and goals.           Plan: Continue per POC and progress with strength and mobility exercises as patient tolerates    Goals:   Active       Physical Therapy       Physical Therapy Goal (Progressing)       Start:  03/21/25    Expected End:  05/30/25       Goals:  Short Term Goals: 3 weeks   Pt will be compliant with HEP.> MET  Pt will improve quad strength by 1/2 grade to allow for strength to go up and down stairs. > Progressing   Pt will improve sit <>  30 sec to at least 10 allow for increased functional mobility.  > Progressing   Pt will improve left knee flexion to at least 100 deg to allow for increased mobility.  > MET    Long Term Goals: 6 weeks   Pt will be independent with HEP to allow for increased functional tasks.  Pt will improve FOTO by 10 % to demonstrate improvement in quality of life.  Pt will improve hip flexor strength by 1/2 grade to allow for normalized body mechanics.              Jonathan Favre, PTA

## 2025-05-27 ENCOUNTER — CLINICAL SUPPORT (OUTPATIENT)
Dept: REHABILITATION | Facility: HOSPITAL | Age: 80
End: 2025-05-27
Payer: MEDICARE

## 2025-05-27 DIAGNOSIS — Z74.09 IMPAIRED FUNCTIONAL MOBILITY, BALANCE, GAIT, AND ENDURANCE: Primary | ICD-10-CM

## 2025-05-27 PROCEDURE — 97112 NEUROMUSCULAR REEDUCATION: CPT | Mod: PN,CQ

## 2025-05-27 PROCEDURE — 97530 THERAPEUTIC ACTIVITIES: CPT | Mod: PN,CQ

## 2025-05-27 NOTE — PROGRESS NOTES
"  Outpatient Rehab    Physical Therapy Visit    Patient Name: Del Anand  MRN: [id number]  YOB: 1945  Encounter Date: 5/27/2025    Therapy Diagnosis:   Encounter Diagnosis   Name Primary?    Impaired functional mobility, balance, gait, and endurance Yes     Physician: Sundar Milton, *    Physician Orders: Eval and Treat  Medical Diagnosis: Status post total left knee replacement    Visit # / Visits Authorized:  18 / 20  Insurance Authorization Period: 3/21/2025 to 6/10/2025  Date of Evaluation: 3/19/2025  Plan of Care Certification: 3/21/2025 to 6/20/2025      PT/PTA: PTA   Number of PTA visits since last PT visit:2  Time In:   9:00 AM  Time Out:   10:00 AM  Total Time (in minutes):   60 Minutes  Total Billable Time (in minutes):   53 Minutes    FOTO:  Intake Score:  %  Survey Score 2:  %  Survey Score 3:  %    Precautions:       Subjective   My left knee is great; I played golf this weekend for the first time since surgery.  Pain reported as 2/10. Right Knee (non-surgical)    Objective            Treatment:  Balance/Neuromuscular Re-Education  NMR 1: Left hip/piriformis stretch 3 x 15 sec  NMR 2: Heel Slides on SB x 2 min  NMR 3: Bridges x 20  NMR 4: SAQ on tan roll x 2 min, 3#  NMR 5: LAQ x 25 3#  NMR 6: SLR 2 x 10 3#  NMR 7: S/L hip abduction x 10 with cueing for position  NMR 8: Ball Squeezes x 2 min  NMR 9: S/L clams x 15  Therapeutic Activity  TA 1: Nu step level 4 x 15 minutes  TA 2: Heel Raises x 20  TA 3: Toe Taps on 8" step x 2 min  TA 4: FSU  on 6" step x 15  TA 5: Standing Hip Flex/Abd/Ext, Level 2  x 15 each  TA 6: Heel Cord stretch on wedge 3 x 30 sec  TA 7: LSU on 6" step x 15  TA 9: Mini squats 20x  TA 10: Sit to stand x 10    Time Entry(in minutes):  Neuromuscular Re-Education Time Entry: 23  Therapeutic Activity Time Entry: 30    Assessment & Plan   Assessment: Del did well with exercises; continues to demonstrate improvement in overall functional mobility; Left knee AROM is " good; NO pain in left knee; right knee is improving       The patient will continue to benefit from skilled outpatient physical therapy in order to address the deficits listed in the problem list on the initial evaluation, provide patient and family education, and maximize the patients level of independence in the home and community environments.     The patient's spiritual, cultural, and educational needs were considered, and the patient is agreeable to the plan of care and goals.           Plan: Continue per POC and progress with strength and mobility exercises as patient tolerates    Goals:   Active       Physical Therapy       Physical Therapy Goal (Progressing)       Start:  03/21/25    Expected End:  05/30/25       Goals:  Short Term Goals: 3 weeks   Pt will be compliant with HEP.> MET  Pt will improve quad strength by 1/2 grade to allow for strength to go up and down stairs. > Progressing   Pt will improve sit <>  30 sec to at least 10 allow for increased functional mobility.  > Progressing   Pt will improve left knee flexion to at least 100 deg to allow for increased mobility.  > MET    Long Term Goals: 6 weeks   Pt will be independent with HEP to allow for increased functional tasks.  Pt will improve FOTO by 10 % to demonstrate improvement in quality of life.  Pt will improve hip flexor strength by 1/2 grade to allow for normalized body mechanics.              Jonathan Favre, PTA

## 2025-05-28 ENCOUNTER — ANTI-COAG VISIT (OUTPATIENT)
Dept: CARDIOLOGY | Facility: CLINIC | Age: 80
End: 2025-05-28
Payer: MEDICARE

## 2025-05-28 DIAGNOSIS — Z79.01 LONG TERM CURRENT USE OF ANTICOAGULANT THERAPY: Primary | ICD-10-CM

## 2025-05-28 LAB — INR PPP: 2.7

## 2025-05-28 PROCEDURE — 93793 ANTICOAG MGMT PT WARFARIN: CPT | Mod: S$GLB,,, | Performed by: PHARMACIST

## 2025-05-28 NOTE — PROGRESS NOTES
[facility]    2025 11:13 AM    Assessment/Plan:    Patient presents today with therapeutic INR.    Assessment of patient findings and chart review: INR at goal. Medications and chart reviewed. No changes noted to necessitate adjustment of warfarin or follow-up plan. See calendar.      Recommendation for patient's warfarin regimen: Continue current maintenance dose    Recommend repeat INR in 2 weeks  _________________________________________________________________    Del Anand (80 y.o.) is followed by the [facility].    Anticoagulation Summary  As of 2025      INR goal:  2.5-3.5   TTR:  72.0% (10.1 y)   INR used for dosin.7 (2025)   Warfarin maintenance plan:  12.5 mg (10 mg x 1 and 5 mg x 0.5) every Mon, Wed, Fri; 10 mg (10 mg x 1) all other days   Weekly warfarin total:  77.5 mg   Plan last modified:  Jennifer Hunt, PharmD (2025)   Next INR check:  2025   Target end date:  --    Indications    Long term current use of anticoagulant therapy [Z79.01]  Pulmonary embolism (Resolved) [I26.99]                 Anticoagulation Episode Summary       INR check location:  Home Draw    Preferred lab:  --    Send INR reminders to:  [facility] COUMADIN HOME MONITOR    Comments:  [place] every other Wednesday          Anticoagulation Care Providers       Provider Role Specialty Phone number    Beau Jones MD [facility] Interventional Cardiology [phone]

## 2025-05-30 ENCOUNTER — CLINICAL SUPPORT (OUTPATIENT)
Dept: REHABILITATION | Facility: HOSPITAL | Age: 80
End: 2025-05-30
Payer: MEDICARE

## 2025-05-30 DIAGNOSIS — Z74.09 IMPAIRED FUNCTIONAL MOBILITY, BALANCE, GAIT, AND ENDURANCE: Primary | ICD-10-CM

## 2025-05-30 PROCEDURE — 97112 NEUROMUSCULAR REEDUCATION: CPT | Mod: PN,CQ

## 2025-05-30 PROCEDURE — 97530 THERAPEUTIC ACTIVITIES: CPT | Mod: PN,CQ

## 2025-05-30 NOTE — PROGRESS NOTES
"  Outpatient Rehab    Physical Therapy Visit    Patient Name: Del Anand  MRN: [id number]  YOB: 1945  Encounter Date: 5/30/2025    Therapy Diagnosis:   Encounter Diagnosis   Name Primary?    Impaired functional mobility, balance, gait, and endurance Yes     Physician: Sundar Milton, *    Physician Orders: Eval and Treat  Medical Diagnosis: Status post total left knee replacement    Visit # / Visits Authorized:  19 / 20  Insurance Authorization Period: 3/21/2025 to 6/10/2025  Date of Evaluation: 3/19/2025  Plan of Care Certification: 3/21/2025 to 6/20/2025      PT/PTA: PTA   Number of PTA visits since last PT visit:3  Time In:   10:00 AM  Time Out:   10:50 AM  Total Time (in minutes):   50 Minutes  Total Billable Time (in minutes):   30 Minutes split billing    FOTO:  Intake Score:  %  Survey Score 2:  %  Survey Score 3:  %    Precautions:       Subjective   My left knee is great; I think today is going to be my last day.  Pain reported as 2/10. Right Knee (non-surgical)    Objective            Treatment:  Balance/Neuromuscular Re-Education  NMR 1: Left hip/piriformis stretch 3 x 15 sec  NMR 2: Heel Slides on SB x 2 min  NMR 3: Bridges x 20  NMR 4: SAQ on tan roll x 2 min, 3#  NMR 5: LAQ x 25 3#  NMR 6: SLR 2 x 10 3#  NMR 7: S/L hip abduction x 10 with cueing for position  NMR 8: Ball Squeezes x 2 min  NMR 9: S/L clams x 15  Therapeutic Activity  TA 1: Nu step level 4 x 15 minutes  TA 2: Heel Raises x 20  TA 3: Toe Taps on 8" step x 2 min  TA 4: FSU  on 6" step x 15  TA 5: Standing Hip Flex/Abd/Ext, Level 2  x 15 each  TA 6: Heel Cord stretch on wedge 3 x 30 sec  TA 7: LSU on 6" step x 15    Time Entry(in minutes):  Neuromuscular Re-Education Time Entry: 15  Therapeutic Activity Time Entry: 15    Assessment & Plan   Assessment: Patient has done well since starting therapy; good progress with strength and mobility; ready to transition to independent exercise program       The patient will " continue to benefit from skilled outpatient physical therapy in order to address the deficits listed in the problem list on the initial evaluation, provide patient and family education, and maximize the patients level of independence in the home and community environments.     The patient's spiritual, cultural, and educational needs were considered, and the patient is agreeable to the plan of care and goals.           Plan: Recommend D/C from skilled PT at this time    Goals:   Active       Physical Therapy       Physical Therapy Goal (Progressing)       Start:  03/21/25    Expected End:  05/30/25       Goals:  Short Term Goals: 3 weeks   Pt will be compliant with HEP.> MET  Pt will improve quad strength by 1/2 grade to allow for strength to go up and down stairs. > Progressing   Pt will improve sit <>  30 sec to at least 10 allow for increased functional mobility.  > Progressing   Pt will improve left knee flexion to at least 100 deg to allow for increased mobility.  > MET    Long Term Goals: 6 weeks   Pt will be independent with HEP to allow for increased functional tasks.  Pt will improve FOTO by 10 % to demonstrate improvement in quality of life.  Pt will improve hip flexor strength by 1/2 grade to allow for normalized body mechanics.              Jonathan Favre, PTA

## 2025-06-13 ENCOUNTER — PATIENT MESSAGE (OUTPATIENT)
Dept: ORTHOPEDICS | Facility: CLINIC | Age: 80
End: 2025-06-13
Payer: MEDICARE

## 2025-06-13 ENCOUNTER — PATIENT MESSAGE (OUTPATIENT)
Dept: CARDIOLOGY | Facility: CLINIC | Age: 80
End: 2025-06-13
Payer: MEDICARE

## 2025-06-16 ENCOUNTER — PATIENT OUTREACH (OUTPATIENT)
Dept: ADMINISTRATIVE | Facility: HOSPITAL | Age: 80
End: 2025-06-16
Payer: MEDICARE

## 2025-06-16 NOTE — LETTER
AUTHORIZATION FOR RELEASE OF   CONFIDENTIAL INFORMATION    Dear Dr Soliz,    We are seeing Del Anand, date of birth 1945, in the clinic at [facility]. Tika Valle MD is the patient's PCP. Del Anand has an outstanding lab/procedure at the time we reviewed his chart. In order to help keep his health information updated, he has authorized us to request the following medical record(s):                                               ( X )  EYE EXAM                 Diabetic EYE EXAM           Please include the actual eye exam report along with the significant findings:    ________ No Diabetic Retinopathy  ________ Minimal Background Diabetic Retinopathy  ________ Moderate to Severe Background Diabetic Retinopathy  ________ Clinically Significant Macular Edema  ________ Proliferative Diabetic Retinopathy                   Diabetic EYE EXAM           Please include the actual eye exam report along with the significant findings:    ________ No Diabetic Retinopathy  ________ Minimal Background Diabetic Retinopathy  ________ Moderate to Severe Background Diabetic Retinopathy  ________ Clinically Significant Macular Edema  ________ Proliferative Diabetic Retinopathy               Please fax records to [facility], [place], Sarah E., MD, [phone].        Thank you  Brittany Lee LPN  Clinical Care Coordinator  [facility]             Patient Name: Del Anand  : 1945  Patient Phone #: [phone]                        Del Anand  MRN: [id number]  : 1945  Age: 80 y.o.  Sex: male         Patient/Legal Guardian Signature  This signature was collected at 2025           _______________________________   Printed Name/Relationship to Patient      Consent for Examination and Treatment: I hereby authorize the providers and employees of [facility] ([facility]) to provide medical treatment/services which includes, but is not limited to, performing and administering  tests and diagnostic procedures that are deemed necessary, including, but not limited to, imaging examinations, blood tests and other laboratory procedures as may be required by the hospital, clinic, or may be ordered by my physician(s) or persons working under the general and/or special instructions of my physician(s).      I understand and agree that this consent covers all authorized persons, including but not limited to physicians, residents, nurse practitioners, physicians' assistants, specialists, consultants, student nurses, and independently contracted physicians, who are called upon by the physician in charge, to carry out the diagnostic procedures and medical or surgical treatment.     I hereby authorize [facility] to retain or dispose of any specimens or tissue, should there be such remaining from any test or procedure.     I hereby authorize and give consent for [facility] providers and employees to take photographs, images or videotapes of such diagnostic, surgical or treatment procedures of Patient as may be required by [facility] or as may be ordered by a physician. I further acknowledge and agree that [facility] may use cameras or other devices for patient monitoring.     I am aware that the practice of medicine is not an exact science, and I acknowledge that no guarantees have been made to me as to the outcome of any tests, procedures or treatment.     Authorization for Release of Information: I understand that my insurance company and/or their agents may need information necessary to make determinations about payment/reimbursement. I hereby provide authorization to release to all insurance companies, their successors, assignees, other parties with whom they may have contracted, or others acting on their behalf, that are involved with payment for any hospital and/or clinic charges incurred by the patient, any information that they request and deem necessary for payment/reimbursement, and/or quality review.  I  further authorize the release of my health information to physicians or other health care practitioners on staff who are involved in my health care now and in the future, and to other health care providers, entities, or institutions for the purpose of my continued care and treatment, including referrals.     REGISTRATION AUTHORIZATION  Form No. 23557 (Rev. 3/25/2024)    Page 1 of 3                       Medicare Patient's Certification and Authorization to Release Information and Payment Request:  I certify that the information given by me in applying for payment under Title XVIII of the Social Security Act is correct. I authorize any alford of medical or other information about me to release to the Social SecurityAdministration, or its intermediaries or carriers, any information needed for this or a related Medicare claim. I request that payment of authorized benefits be made on my behalf.     Assignment of Insurance Benefits:   I hereby authorize any and all insurance companies, health plans, defined   benefit plans, health insurers or any entity that is or may be responsible for payment of my medical expenses to pay all hospital and medical benefits now due, and to become due and payable to me under any hospital benefits, sick benefits, injury benefits or any other benefit for services rendered to me, including Major Medical Benefits, direct to [facility] and all independently contracted physicians. I assign any and all rights that I may have against any and all insurance companies, health plans, defined benefit plans, health insurers or any entity that is or may be responsible for payment of my medical expenses, including, but not limited to any right to appeal a denial of a claim, any right to bring any action, lawsuit, administrative proceeding, or other cause of action on my behalf. I specifically assign my right to pursue litigation against any and all insurance companies, health plans, defined benefit plans,  health insurers or any entity that is or may be responsible for payment of my medical expenses based upon a refusal to pay charges.            E. Valuables: It is understood and agreed that [facility] is not liable for the damage to or loss of any money, jewelry,   documents, dentures, eye glasses, hearing aids, prosthetics, or other property of value.     F. Computer Equipment: I understand and agree that should I choose to use computer equipment owned by [facility] or if I choose to access the Internet via [facility]s network, I do so at my own risk. [facility] is not responsible for any damage to my computer equipment or to any damages of any type that might arise from my loss of equipment or data.     G. Acceptance of Financial Responsibility:  I agree that in consideration of the services and   supplies that have been   or will be furnished to the patient, I am hereby obligated to pay all charges made for or on the account of the patient according to the standard rates (in effect at the time the services and supplies are delivered) established by [facility], including its Patient Financial Assistance Policy to the extent it is applicable. I understand that I am responsible for all charges, or portions thereof, not covered by insurance or other sources. Patient refunds will be distributed only after balances at all [facility] facilities are paid.     H. Communication Authorization:  I hereby authorize [facility] and its representatives, along with any billing service   or  who may work on their behalf, to contact me on   my cell phone and/or home phone using pre- recorded messages, artificial voice messages, automatic telephone dialing devices or other computer assisted technology, or by electronic      mail, text messaging, or by any other form of electronic communication. This includes, but is not limited to, appointment reminders, yearly physical exam reminders, preventive care reminders, patient campaigns,  welcome calls, and calls about account balances on my account or any account on which I am listed as a guarantor. I understand I have the right to opt out of these communications at any time.      Relationship  Between  Facility and  Provider:      I understand that some, but not all, providers furnishing services to the patient are not employees or agents of [facility]. The patient is under the care and supervision of his/her attending physician, and it is the responsibility of the facility and its nursing staff to carry out the instructions of such physicians. It is the responsibility of the patient's physician/designee to obtain the patient's informed consent, when required, for medical or surgical treatment, special diagnostic or therapeutic procedures, or hospital services rendered for the patient under the special instructions of the physician/designee.           REGISTRATION AUTHORIZATION  Form No. 90531 (Rev. 3/25/2024)    Page 2 of 3                       Immunizations: [facility] shares immunization information with state sponsored health departments to help you and your doctor keep track of your immunization records. By signing, you consent to have this information shared with the health department in your state:                                [place] - LINKS ([place] Immunization Network for Kids Statewide)                                [place] - MIIX ([place] Immunization Information eXchange)                                [place] - ImmPRINT (Immunization Patient Registry with Integrated Technology)     TERM: This authorization is valid for this and subsequent care/treatment I receive at [facility] and will remain valid unless/until revoked in writing by me.     [facility]: As used in this document, [facility] means all [facility] owned and managed facilities, including, but not limited to, all health centers, surgery centers, clinics, urgent care centers, and hospitals.         [facility] complies with applicable Federal civil rights laws and does not discriminate on the basis of race, color, national origin, age, disability, or sex.  ATENCIÓN: si habla christina, tiene a white disposición servicios gratuitos de asistencia lingüística. Jose R al [phone].  CHÚ Ý: N?u b?n nói Ti?ng Vi?t, có các d?ch v? h? tr? ngôn ng? mi?n phí dành cho b?n. G?i s? [phone].        REGISTRATION AUTHORIZATION  Form No. 64631 (Rev. 3/25/2024)   Page 3 of 3

## 2025-06-16 NOTE — PROGRESS NOTES
Population Health Chart Review & Patient Outreach Details      Additional [facility] Health Notes:               Updates Requested / Reviewed:      Updated Care Coordination Note, Care Everywhere, , Care Team Updated, and Immunizations Reconciliation Completed or Queried: [place] and [place]         Health Maintenance Topics Overdue:      [facility] Score: 1     Hemoglobin A1c    RSV Vaccine                  Health Maintenance Topic(s) Outreach Outcomes & Actions Taken:    Eye Exam - Outreach Outcomes & Actions Taken  : External Records Requested & Care Team Updated if Applicable

## 2025-06-18 ENCOUNTER — ANTI-COAG VISIT (OUTPATIENT)
Dept: CARDIOLOGY | Facility: CLINIC | Age: 80
End: 2025-06-18
Payer: MEDICARE

## 2025-06-18 DIAGNOSIS — Z79.01 LONG TERM CURRENT USE OF ANTICOAGULANT THERAPY: Primary | ICD-10-CM

## 2025-06-18 LAB — INR PPP: 3

## 2025-06-18 PROCEDURE — 93793 ANTICOAG MGMT PT WARFARIN: CPT | Mod: S$GLB,,,

## 2025-06-18 NOTE — PROGRESS NOTES
[facility]    06/18/2025    Del Anand (80 y.o.) is followed by the [facility].      Assessment/Plan:    Del Anand presents with a therapeutic INR. Goal INR: 2.5-3.5    Lab Results   Component Value Date    INR 3.0 06/18/2025    INR 2.7 05/28/2025    INR 3.9 05/14/2025    PROTIME 20.9 (H) 03/25/2025       Assessment of patient findings per MA/LPN and chart review:   The following significant findings were found:   none    Recommendation for patient's warfarin regimen:   No change was made to warfarin therapy during this visit and patient has been instructed to continue their current warfarin regimen.    Recommended repeat INR in 2 weeks      Clau Lopez, PharmD, BCPS  Clinical Pharmacist - [facility]  Preferred Contact: Secure Messaging or In Basket Message

## 2025-06-24 ENCOUNTER — HOSPITAL ENCOUNTER (EMERGENCY)
Facility: HOSPITAL | Age: 80
Discharge: HOME | End: 2025-06-25
Attending: EMERGENCY MEDICINE
Payer: MEDICARE

## 2025-06-24 DIAGNOSIS — R52 PAIN: Primary | ICD-10-CM

## 2025-06-24 DIAGNOSIS — S82.191A OTHER CLOSED FRACTURE OF PROXIMAL END OF RIGHT TIBIA, INITIAL ENCOUNTER: ICD-10-CM

## 2025-06-24 PROCEDURE — 99285 EMERGENCY DEPT VISIT HI MDM: CPT | Mod: 25

## 2025-06-24 PROCEDURE — 73562 X-RAY EXAM OF KNEE 3: CPT | Mod: TC,RT

## 2025-06-24 PROCEDURE — 73562 X-RAY EXAM OF KNEE 3: CPT | Mod: TC,LT

## 2025-06-24 PROCEDURE — 25000003 PHARM REV CODE 250: Performed by: EMERGENCY MEDICINE

## 2025-06-24 PROCEDURE — 73562 X-RAY EXAM OF KNEE 3: CPT | Mod: 26,RT,, | Performed by: RADIOLOGY

## 2025-06-24 PROCEDURE — 96374 THER/PROPH/DIAG INJ IV PUSH: CPT

## 2025-06-24 PROCEDURE — 73700 CT LOWER EXTREMITY W/O DYE: CPT | Mod: 26,RT,, | Performed by: RADIOLOGY

## 2025-06-24 PROCEDURE — 73700 CT LOWER EXTREMITY W/O DYE: CPT | Mod: TC,RT

## 2025-06-24 PROCEDURE — 63600175 PHARM REV CODE 636 W HCPCS: Performed by: EMERGENCY MEDICINE

## 2025-06-24 PROCEDURE — 73562 X-RAY EXAM OF KNEE 3: CPT | Mod: 26,LT,, | Performed by: RADIOLOGY

## 2025-06-24 RX ORDER — HYDROMORPHONE HYDROCHLORIDE 1 MG/ML
0.5 INJECTION, SOLUTION INTRAMUSCULAR; INTRAVENOUS; SUBCUTANEOUS
Status: COMPLETED | OUTPATIENT
Start: 2025-06-24 | End: 2025-06-24

## 2025-06-24 RX ORDER — HYDROCODONE BITARTRATE AND ACETAMINOPHEN 5; 325 MG/1; MG/1
1 TABLET ORAL
Refills: 0 | Status: COMPLETED | OUTPATIENT
Start: 2025-06-24 | End: 2025-06-24

## 2025-06-24 RX ORDER — ONDANSETRON HYDROCHLORIDE 2 MG/ML
4 INJECTION, SOLUTION INTRAVENOUS
Status: COMPLETED | OUTPATIENT
Start: 2025-06-25 | End: 2025-06-25

## 2025-06-24 RX ADMIN — HYDROCODONE BITARTRATE AND ACETAMINOPHEN 1 TABLET: 5; 325 TABLET ORAL at 10:06

## 2025-06-24 RX ADMIN — HYDROMORPHONE HYDROCHLORIDE 0.5 MG: 1 INJECTION, SOLUTION INTRAMUSCULAR; INTRAVENOUS; SUBCUTANEOUS at 10:06

## 2025-06-25 VITALS
RESPIRATION RATE: 20 BRPM | OXYGEN SATURATION: 99 % | HEART RATE: 82 BPM | DIASTOLIC BLOOD PRESSURE: 86 MMHG | TEMPERATURE: 98 F | SYSTOLIC BLOOD PRESSURE: 145 MMHG

## 2025-06-25 PROCEDURE — 96375 TX/PRO/DX INJ NEW DRUG ADDON: CPT

## 2025-06-25 PROCEDURE — 63600175 PHARM REV CODE 636 W HCPCS: Performed by: EMERGENCY MEDICINE

## 2025-06-25 PROCEDURE — 29505 APPLICATION LONG LEG SPLINT: CPT | Mod: RT

## 2025-06-25 RX ORDER — OXYCODONE AND ACETAMINOPHEN 5; 325 MG/1; MG/1
1 TABLET ORAL EVERY 6 HOURS PRN
Qty: 12 TABLET | Refills: 0 | Status: SHIPPED | OUTPATIENT
Start: 2025-06-25 | End: 2025-06-26

## 2025-06-25 RX ADMIN — ONDANSETRON 4 MG: 2 INJECTION INTRAMUSCULAR; INTRAVENOUS at 12:06
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
## 2025-06-25 NOTE — DISCHARGE INSTRUCTIONS
Follow up with her orthopedic surgeon.  Nonweightbearing on your right leg.  Take your medication as directed.  You may put ice on your knee intermittently.

## 2025-06-25 NOTE — ED NOTES
Pt engaged call light and reports the urge to vomit. Emesis bag provided. MD notified. Family at bedside.

## 2025-06-25 NOTE — TELEPHONE ENCOUNTER
Unable to document in this encounter.... but patient has been scheduled an appointment to see Dr Craig, tomorrow.

## 2025-06-26 ENCOUNTER — OFFICE VISIT (OUTPATIENT)
Dept: ORTHOPEDICS | Facility: CLINIC | Age: 80
End: 2025-06-26
Payer: MEDICARE

## 2025-06-26 VITALS — WEIGHT: 315 LBS | HEIGHT: 75 IN | BODY MASS INDEX: 39.17 KG/M2

## 2025-06-26 DIAGNOSIS — S82.101A: Primary | ICD-10-CM

## 2025-06-26 DIAGNOSIS — M25.061 HEMARTHROSIS OF RIGHT KNEE: ICD-10-CM

## 2025-06-26 PROCEDURE — 99999 PR PBB SHADOW E&M-EST. PATIENT-LVL III: CPT | Mod: PBBFAC,,, | Performed by: ORTHOPAEDIC SURGERY

## 2025-06-26 PROCEDURE — 1160F RVW MEDS BY RX/DR IN RCRD: CPT | Mod: CPTII,S$GLB,, | Performed by: ORTHOPAEDIC SURGERY

## 2025-06-26 PROCEDURE — 1125F AMNT PAIN NOTED PAIN PRSNT: CPT | Mod: CPTII,S$GLB,, | Performed by: ORTHOPAEDIC SURGERY

## 2025-06-26 PROCEDURE — 1157F ADVNC CARE PLAN IN RCRD: CPT | Mod: CPTII,S$GLB,, | Performed by: ORTHOPAEDIC SURGERY

## 2025-06-26 PROCEDURE — 3288F FALL RISK ASSESSMENT DOCD: CPT | Mod: CPTII,S$GLB,, | Performed by: ORTHOPAEDIC SURGERY

## 2025-06-26 PROCEDURE — 1159F MED LIST DOCD IN RCRD: CPT | Mod: CPTII,S$GLB,, | Performed by: ORTHOPAEDIC SURGERY

## 2025-06-26 PROCEDURE — 1100F PTFALLS ASSESS-DOCD GE2>/YR: CPT | Mod: CPTII,S$GLB,, | Performed by: ORTHOPAEDIC SURGERY

## 2025-06-26 PROCEDURE — 99213 OFFICE O/P EST LOW 20 MIN: CPT | Mod: S$GLB,,, | Performed by: ORTHOPAEDIC SURGERY

## 2025-06-26 RX ORDER — OXYCODONE AND ACETAMINOPHEN 7.5; 325 MG/1; MG/1
1 TABLET ORAL EVERY 8 HOURS PRN
Qty: 21 TABLET | Refills: 0 | Status: SHIPPED | OUTPATIENT
Start: 2025-06-26

## 2025-06-26 NOTE — PROGRESS NOTES
[facility]    Subjective:     Chief Complaint:   Chief Complaint   Patient presents with    Right Knee - Injury      Right prox tibia fracture, ED 6/24/25. Had a fall from a golf cart onto his knees with the R leg folding underneath him. Has excruciating pain with weight bearing. Some relief with Percocet. Patient presents today in wheelchair and crutches, has leg immobilizer on R leg. Family would like education on how to walk with crutches.        Past Medical History:   Diagnosis Date    Benign neoplasm of colon     Cataract     CHF (congestive heart failure) 01/13/2015    Coronary artery disease     Difficult intubation     DVT (deep venous thrombosis)     HLD (hyperlipidemia)     HTN (hypertension)     Impaired fasting glucose     Kidney stone     Metabolic syndrome     Nonspecific abnormal results of liver function study     Nonspecific findings on examination of blood     Nuclear sclerosis - Both Eyes 10/18/2013    Osteoarthrosis, unspecified whether generalized or localized, lower leg     Respiratory distress     AFTER OPEN HEART SURGERY STATES ON VENTILATOR    Squamous cell carcinoma of skin        Past Surgical History:   Procedure Laterality Date    CARDIAC SURGERY      CATARACT EXTRACTION W/  INTRAOCULAR LENS IMPLANT Right 09/17/2020    Procedure: EXTRACTION, CATARACT, WITH IOL INSERTION;  Surgeon: Chanel Klein MD;  Location: [facility];  Service: Ophthalmology;  Laterality: Right;    CATARACT EXTRACTION W/  INTRAOCULAR LENS IMPLANT Left 10/01/2020    Procedure: EXTRACTION, CATARACT, WITH IOL INSERTION;  Surgeon: Chanel Klein MD;  Location: [facility];  Service: Ophthalmology;  Laterality: Left;    COLONOSCOPY N/A 03/13/2019    Procedure: COLONOSCOPY;  Surgeon: Nikunj Larson MD;  Location: [address]);  Service: Endoscopy;  Laterality: N/A;  ok to hold Coumadin x 5 days with a Lovenox bridge per Coumadin clinic  2nd floor - history of difficult intubation-MS    COLONOSCOPY N/A  07/07/2022    Procedure: COLONOSCOPY;  Surgeon: Nikunj Larson MD;  [facility]  Service: Endoscopy;  Laterality: N/A;  ef 45%-5/31-coumadin hold per coumadin clinic see coag visist 6/29-tb-vacc- clears 4 hrs prior-am prep 2-3-am-inst portal-tb    CORONARY ANGIOGRAPHY INCLUDING BYPASS GRAFTS WITH CATHETERIZATION OF LEFT HEART N/A 2/11/2025    Procedure: ANGIOGRAM, CORONARY, INCLUDING BYPASS GRAFT, WITH LEFT HEART CATHETERIZATION;  Surgeon: Beau Jones MD;  Location: [facility] CATH/EP LAB;  Service: Cardiology;  Laterality: N/A;    CORONARY ARTERY BYPASS GRAFT      2014    CYSTOSCOPY      INSTANTANEOUS WAVE-FREE RATIO (IFR) N/A 2/11/2025    Procedure: Instantaneous Wave-Free Ratio (IFR);  Surgeon: Beau Jones MD;  Location: [facility] CATH/EP LAB;  Service: Cardiology;  Laterality: N/A;    KIDNEY STONE SURGERY      KNEE ARTHROPLASTY      bilateral    renal stone      ROBOTIC ARTHROPLASTY, KNEE Left 2/24/2025    Procedure: ROBOTIC ARTHROPLASTY, KNEE, TOTAL;  Surgeon: Carl Craig MD;  Location: [facility];  Service: Orthopedics;  Laterality: Left;  RN NEEDS TO FINISH IMPLANTS    SKIN BIOPSY         Current Outpatient Medications   Medication Sig    aspirin 81 MG Chew Take 81 mg by mouth once daily.    cyanocobalamin 500 MCG tablet Take 500 mcg by mouth every morning. Every day    ezetimibe (ZETIA) 10 mg tablet TAKE 1 TABLET ONE TIME DAILY    FOLIC ACID/MULTIVITS-MIN/LUT (CENTRUM SILVER ORAL) Take 1 tablet by mouth once daily.    irbesartan (AVAPRO) 150 MG tablet Take 1 tablet (150 mg total) by mouth every evening.    ketoconazole (NIZORAL) 2 % cream Apply topically 2 (two) times daily.    oxyCODONE-acetaminophen (PERCOCET) 5-325 mg per tablet Take 1 tablet by mouth every 6 (six) hours as needed for Pain.    rosuvastatin (CRESTOR) 40 MG Tab TAKE 1 TABLET ONE TIME DAILY    triamcinolone acetonide 0.025% (KENALOG) 0.025 % cream Apply topically 2 (two) times daily.    warfarin (COUMADIN) 10  MG tablet Take 10mg daily or as directed by Coumadin Clinic.   Also uses warfarin 5mg tablet strength    warfarin (COUMADIN) 5 MG tablet Take 10mg daily except 12.5mg      No current facility-administered medications for this visit.       Review of patient's allergies indicates:  No Known Allergies    Family History   Problem Relation Name Age of Onset    Stroke Mother incontinence     Dementia Mother incontinence     Heart attack Father bph         d. 85    Urolithiasis Father bph     Heart disease Father bph     Heart failure Father bph     Other Sister Mary         bladder lift, s/p 4 ; estranged    Heart attack Maternal Grandfather          d. 65    Hypertension Paternal Grandmother      Heart attack Paternal Grandfather      Heart failure Paternal Grandfather      No Known Problems Daughter Sabina     No Known Problems Son Doron        Social History[1]    History of present illness:   Mr. Mathis comes in today with a new injury to his right knee.  He had a fall out of his golf car 2 days ago.  He was seen in the local ED and had radiographs and CT scan of the knee.  They reveal what is essentially an avulsion fracture of the ACL off of the tibia with the tibial spine fracture.  It is nondisplaced.  He was placed in a knee immobilizer and advised to non weightbear on the right lower extremity in the ED.  He is referred to Orthopedics for further evaluation and treatment.  He is about 4 months out from a left total knee arthroplasty.  That has done well for him.  It does have a large abrasion over the anterior aspect of the left knee from this new injury.    Review of Systems:    Constitution: Negative for chills, fever, and sweats.  Negative for unexplained weight loss.    HENT:  Negative for headaches and blurry vision.    Cardiovascular:Negative for chest pain or irregular heart beat. Negative for hypertension.    Respiratory:  Negative for cough and shortness of breath.    Gastrointestinal:  Negative for abdominal pain, heartburn, melena, nausea, and vomitting.    Genitourinary:  Negative bladder incontinence and dysuria.    Musculoskeletal:  See HPI for details.     Neurological: Negative for numbness.    Psychiatric/Behavioral: Negative for depression.  The patient is not nervous/anxious.      Endocrine: Negative for polyuria    Hematologic/Lymphatic: Negative for bleeding problem.  Does not bruise/bleed easily.    Skin: Negative for poor would healing and rash    Objective:      Physical Examination:    Vital Signs:  There were no vitals filed for this visit.    Body mass index is 39.65 kg/m².    This a well-developed, well nourished patient in no acute distress.  They are alert and oriented and cooperative to examination.          Bilateral knee exam: Skin to bilateral knees clean and dry.  Large palm size abrasion over the anterolateral aspect of the left knee.  No active bleeding or drainage from there.  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  He is on Coumadin.  He is neurovascularly intact throughout bilateral lower extremities.  Negative Homans bilaterally.  He can wiggle all toes of both feet.  Bilateral EHLs intact.      Pertinent New Results:    XRAY Report / Interpretation:   No new radiographs taken on today's clinic visit.  Did review plain film radiographs and CT scan from his local ED. 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    Assessment/Plan:        1. Left knee abrasion.  2. Right knee proximal tibia fracture, tibial spine.      He can discontinue the knee immobilizer.  He can use it for comfort as needed if it is beneficial.  He can weightbear as tolerated on the right lower extremity.  We will get him set up with home health PT to work on range of motion and strengthening.  Ultimately, we would need this fracture to heal and then  proceed with right total knee  arthroplasty.  He is tentatively scheduled for this in mid 2025.  We would like to keep him there for now.  We will see him back in 3 weeks to assess his progress with rehab /home health therapy.  I did provide him a prescription of Percocet 7.5/325 for pain control today.          This note was created using Dragon voice recognition software that occasionally misinterpreted phrases or words.               [1]   Social History  Socioeconomic History    Marital status:    Tobacco Use    Smoking status: Former     Current packs/day: 0.00     Average packs/day: 1 pack/day for 20.0 years (20.0 ttl pk-yrs)     Types: Cigarettes     Start date: 10/16/1967     Quit date: 10/16/1987     Years since quittin.7     Passive exposure: Past    Smokeless tobacco: Former   Substance and Sexual Activity    Alcohol use: Yes     Types: 1 - 2 Cans of beer, 1 - 2 Standard drinks or equivalent per week     Comment: BEER OR WHISKEY DAILY 1-2    Drug use: No    Sexual activity: Yes     Partners: Female     Birth control/protection: None   Social History Narrative    SOCIAL HISTORY:     .     Retired  for [employer].     Son in [place]    Daughter lives in [place].     +/- on exercise with the joint problems.      Plays golf, now going to gym        FAMILY HISTORY:     Mom d. 2006 with dementia secondary to subarachnoid     hemorrhage and stroke.     Dad d. 85, sudden MI.     One sister living in the [facility] doing well.      Social Drivers of Health     Financial Resource Strain: Low Risk  (2025)    Overall Financial Resource Strain (CARDIA)     Difficulty of Paying Living Expenses: Not hard at all   Food Insecurity: No Food Insecurity (2025)    Hunger Vital Sign     Worried About Running Out of Food in the Last Year: Never true     Ran Out of Food in the Last Year: Never true   Transportation Needs: No Transportation Needs (2025)    PRAPARE - Transportation     Lack of Transportation  (Medical): No     Lack of Transportation (Non-Medical): No   Physical Activity: Inactive (2/12/2025)    Exercise Vital Sign     Days of Exercise per Week: 0 days     Minutes of Exercise per Session: 0 min   Stress: No Stress Concern Present (2/12/2025)    [employer] - Occupational Stress Questionnaire     Feeling of Stress : Only a little   Housing Stability: Low Risk  (2/12/2025)    Housing Stability Vital Sign     Unable to Pay for Housing in the Last Year: No     Number of Times Moved in the Last Year: 1     Homeless in the Last Year: No

## 2025-06-27 ENCOUNTER — PATIENT MESSAGE (OUTPATIENT)
Dept: ORTHOPEDICS | Facility: CLINIC | Age: 80
End: 2025-06-27
Payer: MEDICARE

## 2025-06-27 ENCOUNTER — ANTI-COAG VISIT (OUTPATIENT)
Dept: CARDIOLOGY | Facility: CLINIC | Age: 80
End: 2025-06-27
Payer: MEDICARE

## 2025-06-27 ENCOUNTER — PATIENT MESSAGE (OUTPATIENT)
Dept: ADMINISTRATIVE | Facility: OTHER | Age: 80
End: 2025-06-27
Payer: MEDICARE

## 2025-06-27 DIAGNOSIS — Z79.01 LONG TERM CURRENT USE OF ANTICOAGULANT THERAPY: Primary | ICD-10-CM

## 2025-06-27 LAB — INR PPP: 3.5

## 2025-06-27 NOTE — PROGRESS NOTES
[facility]    06/27/2025 12:43 PM    Assessment/Plan:    Patient presents today with therapeutic INR.    Assessment of patient findings and chart review: INR at goal, but on the upper limit. INR has been rising steadily on current dosage. Of note, patient has been prescribed percocet for his tibia injury. Reports swelling and blood in his knee.     Recommendation for patient's warfarin regimen: Lower dose today to 10 mg then resume current maintenance dose    Recommend repeat INR in 5 days  _________________________________________________________________    Del Anand (80 y.o.) is followed by the [email or web address].    Anticoagulation Summary  As of 6/27/2025      INR goal:  2.5-3.5   TTR:  72.2% (10.2 y)   INR used for dosing:  3.5 (6/27/2025)   Warfarin maintenance plan:  12.5 mg (10 mg x 1 and 5 mg x 0.5) every Mon, Wed, Fri; 10 mg (10 mg x 1) all other days   Weekly warfarin total:  77.5 mg   Plan last modified:  Jennifer Hunt, PharmD (5/14/2025)   Next INR check:  7/2/2025   Target end date:  --    Indications    Long term current use of anticoagulant therapy [Z79.01]  Pulmonary embolism (Resolved) [I26.99]                 Anticoagulation Episode Summary       INR check location:  Home Draw    Preferred lab:  --    Send INR reminders to:  [facility]MAD[facility] HOME MONITOR    Comments:  [place] every other Wednesday          Anticoagulation Care Providers       Provider Role Specialty Phone number    Beau Jones MD [facility] Interventional Cardiology [phone]

## 2025-06-27 NOTE — PROGRESS NOTES
Based on INR and trend with recent trauma/injury, decrease dose. Repeat INR next Wednesday. Follow closely for now.

## 2025-07-02 ENCOUNTER — ANTI-COAG VISIT (OUTPATIENT)
Dept: CARDIOLOGY | Facility: CLINIC | Age: 80
End: 2025-07-02
Payer: MEDICARE

## 2025-07-02 DIAGNOSIS — Z79.01 LONG TERM CURRENT USE OF ANTICOAGULANT THERAPY: Primary | ICD-10-CM

## 2025-07-02 LAB — INR PPP: 3.6

## 2025-07-02 PROCEDURE — 93793 ANTICOAG MGMT PT WARFARIN: CPT | Mod: S$GLB,,, | Performed by: PHARMACIST

## 2025-07-02 NOTE — PROGRESS NOTES
[facility]    07/02/2025 3:09 PM    Assessment/Plan:    Patient presents today with supratherapeutic INR.    Assessment of patient findings and chart review: INR not at goal. Medications, chart, and patient findings reviewed. See calendar for adjustments to dose and follow up plan.      Recommendation for patient's warfarin regimen: Lower dose today to 10mg then resume current maintenance dose    Recommend repeat INR in 1 week  _________________________________________________________________    Del Anand (80 y.o.) is followed by the [facility].    Anticoagulation Summary  As of 7/2/2025      INR goal:  2.5-3.5   TTR:  72.1% (10.2 y)   INR used for dosing:  3.6 (7/2/2025)   Warfarin maintenance plan:  12.5 mg (10 mg x 1 and 5 mg x 0.5) every Wed; 10 mg (10 mg x 1) all other days   Weekly warfarin total:  72.5 mg   Plan last modified:  Hanane Rodriguez, PharmD (6/27/2025)   Next INR check:  7/9/2025   Target end date:  --    Indications    Long term current use of anticoagulant therapy [Z79.01]  Pulmonary embolism (Resolved) [I26.99]                 Anticoagulation Episode Summary       INR check location:  Home Draw    Preferred lab:  --    Send INR reminders to:  [facility] COUMADIN HOME MONITOR    Comments:  [place] every other Wednesday          Anticoagulation Care Providers       Provider Role Specialty Phone number    Beau Jones MD Responsible Interventional Cardiology [phone]

## 2025-07-09 ENCOUNTER — ANTI-COAG VISIT (OUTPATIENT)
Dept: CARDIOLOGY | Facility: CLINIC | Age: 80
End: 2025-07-09
Payer: MEDICARE

## 2025-07-09 DIAGNOSIS — Z79.01 LONG TERM CURRENT USE OF ANTICOAGULANT THERAPY: Primary | ICD-10-CM

## 2025-07-09 LAB — INR PPP: 2.8

## 2025-07-09 PROCEDURE — 93793 ANTICOAG MGMT PT WARFARIN: CPT | Mod: S$GLB,,,

## 2025-07-09 NOTE — PROGRESS NOTES
INR good. Based on trend will adjust dose to same as last week. Continue lowered dose for now. Recheck INR in 1 week

## 2025-07-11 ENCOUNTER — PATIENT MESSAGE (OUTPATIENT)
Dept: OTHER | Facility: OTHER | Age: 80
End: 2025-07-11
Payer: MEDICARE

## 2025-07-16 ENCOUNTER — ANTI-COAG VISIT (OUTPATIENT)
Dept: CARDIOLOGY | Facility: CLINIC | Age: 80
End: 2025-07-16
Payer: MEDICARE

## 2025-07-16 DIAGNOSIS — Z79.01 LONG TERM CURRENT USE OF ANTICOAGULANT THERAPY: Primary | ICD-10-CM

## 2025-07-16 LAB — INR PPP: 3.1

## 2025-07-16 PROCEDURE — 93793 ANTICOAG MGMT PT WARFARIN: CPT | Mod: S$GLB,,, | Performed by: PHARMACIST

## 2025-07-17 ENCOUNTER — OFFICE VISIT (OUTPATIENT)
Dept: ORTHOPEDICS | Facility: CLINIC | Age: 80
End: 2025-07-17
Payer: MEDICARE

## 2025-07-17 ENCOUNTER — PATIENT MESSAGE (OUTPATIENT)
Dept: ORTHOPEDICS | Facility: CLINIC | Age: 80
End: 2025-07-17
Payer: MEDICARE

## 2025-07-17 ENCOUNTER — HOSPITAL ENCOUNTER (OUTPATIENT)
Dept: RADIOLOGY | Facility: HOSPITAL | Age: 80
Discharge: HOME | End: 2025-07-17
Attending: ORTHOPAEDIC SURGERY
Payer: MEDICARE

## 2025-07-17 VITALS — BODY MASS INDEX: 39.17 KG/M2 | HEIGHT: 75 IN | WEIGHT: 315 LBS

## 2025-07-17 DIAGNOSIS — Z01.818 PRE-OP TESTING: ICD-10-CM

## 2025-07-17 DIAGNOSIS — M17.11 PRIMARY OSTEOARTHRITIS OF RIGHT KNEE: Primary | ICD-10-CM

## 2025-07-17 DIAGNOSIS — Z79.01 CURRENT USE OF ANTICOAGULANT THERAPY: ICD-10-CM

## 2025-07-17 DIAGNOSIS — S82.101A: ICD-10-CM

## 2025-07-17 PROCEDURE — 99999 PR PBB SHADOW E&M-EST. PATIENT-LVL IV: CPT | Mod: PBBFAC,,, | Performed by: ORTHOPAEDIC SURGERY

## 2025-07-17 PROCEDURE — 73560 X-RAY EXAM OF KNEE 1 OR 2: CPT | Mod: 26,RT,, | Performed by: RADIOLOGY

## 2025-07-17 PROCEDURE — 73560 X-RAY EXAM OF KNEE 1 OR 2: CPT | Mod: TC,PN,RT

## 2025-07-17 RX ORDER — CEFAZOLIN SODIUM 2 G/50ML
2 SOLUTION INTRAVENOUS
OUTPATIENT
Start: 2025-07-17
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
## 2025-07-18 NOTE — PROGRESS NOTES
Patient is scheduled for TKA 8/11/25. We will have an INR 7/30/25 and decide specific plan. Tentative plan is to hold coumadin 5-7 days prior with pre-procedure lovenox. Post procedure plan to be determined by MD since patient had bleeding issues post procedure previously. Message sent to Dr Craig's office

## 2025-07-21 ENCOUNTER — TELEPHONE (OUTPATIENT)
Dept: ORTHOPEDICS | Facility: CLINIC | Age: 80
End: 2025-07-21
Payer: MEDICARE

## 2025-07-21 ENCOUNTER — PATIENT MESSAGE (OUTPATIENT)
Dept: ORTHOPEDICS | Facility: CLINIC | Age: 80
End: 2025-07-21
Payer: MEDICARE

## 2025-07-21 DIAGNOSIS — Z96.652 STATUS POST TOTAL LEFT KNEE REPLACEMENT: Primary | ICD-10-CM

## 2025-07-21 NOTE — TELEPHONE ENCOUNTER
----- Message from Nurse Castillo sent at 7/18/2025 12:05 PM CDT -----    ----- Message -----  From: Jennifer Hunt, AyazD  Sent: 7/18/2025  12:04 PM CDT  To: Kiara Henry Staff    We have cleared this patient to hold coumadin 5-7 days for TKAprocedure on 8/11/25. We  are planning to bridge pre-procedure with lovenox. Please let us know if you have any questions or concerns otherwise we will proceed as planned.  Of note, tentative plan is to hold coumadin 5-7 days prior with pre-procedure lovenox. Post procedure plan to be determined by MD since patient had bleeding issues post procedure previously.       Respectfully,   Cou[facility]in Clinic  Ph [phone]

## 2025-07-21 NOTE — TELEPHONE ENCOUNTER
----- Message from Jason sent at 7/21/2025  8:55 AM CDT -----  Pt is requesting a call back to change his pre-op date.

## 2025-07-29 RX ORDER — AMOXICILLIN 500 MG/1
TABLET, FILM COATED ORAL
Qty: 4 TABLET | Refills: 2 | Status: SHIPPED | OUTPATIENT
Start: 2025-07-29

## 2025-07-30 ENCOUNTER — ANTI-COAG VISIT (OUTPATIENT)
Dept: CARDIOLOGY | Facility: CLINIC | Age: 80
End: 2025-07-30
Payer: MEDICARE

## 2025-07-30 ENCOUNTER — TELEPHONE (OUTPATIENT)
Dept: ORTHOPEDICS | Facility: CLINIC | Age: 80
End: 2025-07-30
Payer: MEDICARE

## 2025-07-30 ENCOUNTER — OFFICE VISIT (OUTPATIENT)
Dept: CARDIOLOGY | Facility: CLINIC | Age: 80
End: 2025-07-30
Payer: MEDICARE

## 2025-07-30 VITALS
DIASTOLIC BLOOD PRESSURE: 67 MMHG | SYSTOLIC BLOOD PRESSURE: 120 MMHG | BODY MASS INDEX: 39.17 KG/M2 | OXYGEN SATURATION: 95 % | WEIGHT: 315 LBS | HEART RATE: 89 BPM | HEIGHT: 75 IN

## 2025-07-30 DIAGNOSIS — I25.10 CORONARY ARTERY DISEASE INVOLVING NATIVE CORONARY ARTERY OF NATIVE HEART WITHOUT ANGINA PECTORIS: Primary | ICD-10-CM

## 2025-07-30 DIAGNOSIS — Z79.01 LONG TERM CURRENT USE OF ANTICOAGULANT THERAPY: Primary | ICD-10-CM

## 2025-07-30 LAB — INR PPP: 2.1

## 2025-07-30 PROCEDURE — 99999 PR PBB SHADOW E&M-EST. PATIENT-LVL III: CPT | Mod: PBBFAC,,, | Performed by: INTERNAL MEDICINE

## 2025-07-30 NOTE — PROGRESS NOTES
Subjective:    Patient ID:  Del Anand is a 80 y.o. male patient here for evaluation Sx Clearance  and Hypotension (Stated  no issues stated by pt )      History of Present Illness:     79-year-old male here for follow up.  New patient to me.  He was following up with Dr. Levin in the past.  He wants to establish care with cardiology in [place] from now on.  Denies any exertional anginal symptoms however his physical capacity is significantly limited due to unstable left knee with the pain for which he would like to get a knee replacement in the next 6 months.  He is scheduling an appointment with the orthopedic dr. Kiara bautista.  On Coumadin chronically and follows up with Coumadin Clinic.  Blood pressure controlled.  Has chronic left lower extremity edema and uses compression stockings        Review of patient's allergies indicates:  No Known Allergies    Past Medical History:   Diagnosis Date    Benign neoplasm of colon     Cataract     CHF (congestive heart failure) 01/13/2015    Coronary artery disease     Difficult intubation     DVT (deep venous thrombosis)     HLD (hyperlipidemia)     HTN (hypertension)     Impaired fasting glucose     Kidney stone     Metabolic syndrome     Nonspecific abnormal results of liver function study     Nonspecific findings on examination of blood     Nuclear sclerosis - Both Eyes 10/18/2013    Osteoarthrosis, unspecified whether generalized or localized, lower leg     Respiratory distress     AFTER OPEN HEART SURGERY STATES ON VENTILATOR    Squamous cell carcinoma of skin      Past Surgical History:   Procedure Laterality Date    CARDIAC SURGERY      CATARACT EXTRACTION W/  INTRAOCULAR LENS IMPLANT Right 09/17/2020    Procedure: EXTRACTION, CATARACT, WITH IOL INSERTION;  Surgeon: Chanel Klein MD;  Location: [facility];  Service: Ophthalmology;  Laterality: Right;    CATARACT EXTRACTION W/  INTRAOCULAR LENS IMPLANT Left 10/01/2020    Procedure: EXTRACTION, CATARACT, WITH IOL  INSERTION;  Surgeon: Chanel Klein MD;  Location: [facility] OR;  Service: Ophthalmology;  Laterality: Left;    COLONOSCOPY N/A 2019    Procedure: COLONOSCOPY;  Surgeon: Nikunj Larson MD;  Location: [facility] ENDO (2ND FLR);  Service: Endoscopy;  Laterality: N/A;  ok to hold Coumadin x 5 days with a Lovenox bridge per Coumadin clinic  2nd floor - history of difficult intubation-MS    COLONOSCOPY N/A 2022    Procedure: COLONOSCOPY;  Surgeon: Nikunj Larson MD;  Location: [facility] ENDO (2ND FLR);  Service: Endoscopy;  Laterality: N/A;  ef 45%--coumadin hold per coumadin clinic see coag visist -tb-vacc- clears 4 hrs prior-am prep 2-3-am-inst portal-tb    CORONARY ANGIOGRAPHY INCLUDING BYPASS GRAFTS WITH CATHETERIZATION OF LEFT HEART N/A 2025    Procedure: ANGIOGRAM, CORONARY, INCLUDING BYPASS GRAFT, WITH LEFT HEART CATHETERIZATION;  Surgeon: Beau Jones MD;  Location: [facility] CATH/EP LAB;  Service: Cardiology;  Laterality: N/A;    CORONARY ARTERY BYPASS GRAFT          CYSTOSCOPY      INSTANTANEOUS WAVE-FREE RATIO (IFR) N/A 2025    Procedure: Instantaneous Wave-Free Ratio (IFR);  Surgeon: Beau Jones MD;  Location: [facility] CATH/EP LAB;  Service: Cardiology;  Laterality: N/A;    KIDNEY STONE SURGERY      KNEE ARTHROPLASTY      bilateral    renal stone      ROBOTIC ARTHROPLASTY, KNEE Left 2025    Procedure: ROBOTIC ARTHROPLASTY, KNEE, TOTAL;  Surgeon: Carl Craig MD;  Location: [facility] OR;  Service: Orthopedics;  Laterality: Left;  RN NEEDS TO FINISH IMPLANTS    SKIN BIOPSY       Social History     Tobacco Use    Smoking status: Former     Current packs/day: 0.00     Average packs/day: 1 pack/day for 20.0 years (20.0 ttl pk-yrs)     Types: Cigarettes     Start date: 10/16/1967     Quit date: 10/16/1987     Years since quittin.8     Passive exposure: Past    Smokeless tobacco: Former   Substance Use Topics    Alcohol use: Yes     Types: 1 - 2 Cans of beer, 1 - 2 Standard  drinks or equivalent per week     Comment: BEER OR WHISKEY DAILY 1-2    Drug use: No        Review of Systems   Negative except as mentioned in HPI         Objective        Vitals:    07/30/25 1403   BP: 120/67   Pulse: 89       LIPIDS - LAST 2   Lab Results   Component Value Date    CHOL 108 (L) 06/11/2024    CHOL 109 (L) 11/21/2023    HDL 44 06/11/2024    HDL 41 11/21/2023    LDLCALC 43.2 (L) 06/11/2024    LDLCALC 47.8 (L) 11/21/2023    TRIG 104 06/11/2024    TRIG 101 11/21/2023    CHOLHDL 40.7 06/11/2024    CHOLHDL 37.6 11/21/2023       CBC - LAST 2  Lab Results   Component Value Date    WBC 4.76 03/25/2025    WBC 10.15 03/03/2025    RBC 3.57 (L) 03/25/2025    RBC 2.87 (L) 03/03/2025    HGB 11.3 (L) 03/25/2025    HGB 9.2 (L) 03/03/2025    HCT 35.3 (L) 03/25/2025    HCT 28.9 (L) 03/03/2025    MCV 99 (H) 03/25/2025     (H) 03/03/2025    MCH 31.7 03/25/2025    MCH 32.1 (H) 03/03/2025    MCHC 32.0 03/25/2025    MCHC 31.8 (L) 03/03/2025    RDW 14.1 03/25/2025    RDW 13.4 03/03/2025     03/25/2025     03/03/2025    MPV 9.6 03/25/2025    MPV 9.2 03/03/2025    GRAN 7.2 03/03/2025    GRAN 70.4 03/03/2025    LYMPH 31.9 03/25/2025    LYMPH 1.52 03/25/2025    MONO 10.3 03/25/2025    MONO 0.49 03/25/2025    BASO 0.05 03/03/2025    BASO 0.03 02/06/2025    NRBC 0 03/25/2025    NRBC 0 03/03/2025       CHEMISTRY & LIVER FUNCTION - LAST 2  Lab Results   Component Value Date     03/25/2025     03/03/2025    K 4.5 03/25/2025    K 4.8 03/03/2025     03/25/2025     03/03/2025    CO2 23 03/25/2025    CO2 21 (L) 03/03/2025    ANIONGAP 8 03/25/2025    ANIONGAP 14 03/03/2025    BUN 17 03/25/2025    BUN 25 (H) 03/03/2025    CREATININE 0.7 03/25/2025    CREATININE 0.8 03/03/2025     (H) 03/25/2025     (H) 03/03/2025    CALCIUM 8.8 03/25/2025    CALCIUM 8.9 03/03/2025    PH 7.360 12/03/2014    PH 7.369 12/02/2014    MG 2.0 04/11/2015    MG 1.8 04/10/2015    ALBUMIN 3.4 (L) 03/25/2025     ALBUMIN 3.1 (L) 03/03/2025    PROT 6.4 03/25/2025    PROT 6.7 03/03/2025    ALKPHOS 120 03/25/2025    ALKPHOS 106 03/03/2025    ALT 28 03/25/2025    ALT 23 03/03/2025    AST 27 03/25/2025    AST 23 03/03/2025    BILITOT 0.8 03/25/2025    BILITOT 1.8 (H) 03/03/2025        CARDIAC PROFILE - LAST 2  Lab Results   Component Value Date     (H) 01/13/2015     (H) 01/12/2015    CPK 51 04/10/2015    CPK 41 01/13/2015    CPKMB 0.6 01/13/2015     (H) 03/05/2025     11/02/2009    TROPONINI 0.014 01/14/2015    TROPONINI 0.019 01/14/2015        COAGULATION - LAST 2  Lab Results   Component Value Date    INR 2.1 07/30/2025    INR 3.1 07/16/2025    APTT 23.4 03/13/2025    APTT 25.0 02/24/2025       ENDOCRINE & PSA - LAST 2  Lab Results   Component Value Date    HGBA1C 5.9 (H) 06/11/2024    HGBA1C 5.7 (H) 11/21/2023    TSH 1.826 03/05/2025    TSH 1.455 06/11/2024    PROCAL 0.02 08/21/2019    PSA 0.14 11/21/2023    PSA 0.16 12/04/2013        ECHOCARDIOGRAM RESULTS  No results found for this or any previous visit.      CURRENT/PREVIOUS VISIT EKG  Results for orders placed or performed during the hospital encounter of 02/06/25   EKG 12-lead    Collection Time: 02/06/25  1:08 PM   Result Value Ref Range    QRS Duration 108 ms    OHS QTC Calculation 457 ms    Narrative    Test Reason : R94.39,    Vent. Rate :  66 BPM     Atrial Rate :  66 BPM     P-R Int : 200 ms          QRS Dur : 108 ms      QT Int : 436 ms       P-R-T Axes :  56 -69  42 degrees    QTcB Int : 457 ms    Normal sinus rhythm  Left anterior fascicular block  Cannot rule out Anterior infarct ,age undetermined  Abnormal ECG  When compared with ECG of 08-Jan-2025 14:25,  No significant change was found  Confirmed by Keshav Cueva ([id number]) on 2/8/2025 6:11:30 PM    Referred By:            Confirmed By: Keshav Cueva     No valid procedures specified.   No results found for this or any previous visit.    No valid procedures  specified.          PREVIOUS STRESS TEST              PREVIOUS ANGIOGRAM        PHYSICAL EXAM    CONSTITUTIONAL: Well built, well nourished in no apparent distress  HEENT: No pallor  NECK: no JVD  LUNGS: CTA b/l  HEART: regular rate and rhythm, S1, S2 normal, no murmur   ABDOMEN:  Nondistended  EXTREMITIES:  No edema  NEURO: AAO X 3   Psych:  Normal affect    I HAVE REVIEWED :    The vital signs, nurses notes, and all the pertinent radiology and labs.        Current Outpatient Medications   Medication Instructions    amoxicillin (AMOXIL) 500 MG Tab Take 2 tablets 1 hour prior to dental cleaning/procedure, then 2 tablets 6 hours after.    aspirin 81 mg, Daily    cyanocobalamin 500 mcg, Every morning    ezetimibe (ZETIA) 10 mg, Oral    FOLIC ACID/MULTIVITS-MIN/LUT (CENTRUM SILVER ORAL) 1 tablet, Daily    ketoconazole (NIZORAL) 2 % cream Topical (Top), 2 times daily    oxyCODONE-acetaminophen (PERCOCET) 7.5-325 mg per tablet 1 tablet, Oral, Every 8 hours PRN    rosuvastatin (CRESTOR) 40 mg, Oral    triamcinolone acetonide 0.025% (KENALOG) 0.025 % cream Topical (Top), 2 times daily    warfarin (COUMADIN) 10 MG tablet Take 10mg daily or as directed by Coumadin Clinic.   Also uses warfarin 5mg tablet strength    warfarin (COUMADIN) 5 MG tablet Take 10mg daily except 12.5mg Wednesdays    EKG reviewed by me: Normal sinus rhythm left anterior fascicular block  Assessment & Plan     Preop for right knee replacement.  History of CAD status post CABG-status post coronary angiogram 02/ 2025- Significant left main disease. Patent bypass grafts. Patent SVG to LAD and SVG to ramus. Circumflex was not bypassed. IFR ws performed left main into circumflex and IFR was not hemodynamically significant (IFR 1.0).   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  Currently blood pressure well controlled.  Dyslipidemia  Obesity    Continue Coumadin  aspirin, rosuvastatin 40 mg, , Zetia,  Follow up with the Coumadin Clinic   Home BP monitoring .  Advised patient to stay hydrated  Patient had coronary angiogram earlier this year as a part of preop workup for left knee replacement.  Did not require any interventions at that time.  Subsequently had left knee replacement with no cardiac issues.  Currently walking with a walker however denies any exertional anginal symptoms.  Scheduled for right knee replacement soon.  No further cardiac testing required prior to the right knee replacement. will send risk assessment letter.  Perioperative management of anticoagulation is as per Coumadin Clinic.    ,Follow up in about 6 months (around 1/30/2026).

## 2025-07-30 NOTE — PROGRESS NOTES
[facility]    2025 9:42 AM    Assessment/Plan:    Patient presents today with subtherapeutic  INR.    Assessment of patient findings and chart review: Patient questioned and confirmed correct warfarin dose. Patient stopped taking all blood pressure medications at the request of his digital medicine physician, and is scheduled to have a total right knee replacement on 25. We will plan to hold warfarin 5-7 days prior (based on 25 INR) with lovenox bridge pre-procedure.  Post procedure plan to be determined by MD since patient had bleeding issues post procedure previously. Message sent to Dr Craig's office.      Recommendation for patient's warfarin regimen: Boost dose today to 12.5mg then resume current maintenance dose    Recommend repeat INR in 5 days  _________________________________________________________________    Del Anand (80 y.o.) is followed by the [facility].    Anticoagulation Summary  As of 2025      INR goal:  2.5-3.5   TTR:  72.1% (10.3 y)   INR used for dosin.1 (2025)   Warfarin maintenance plan:  10 mg (10 mg x 1) every day   Weekly warfarin total:  70 mg   Plan last modified:  Hanane Rodriguez, PharmD (2025)   Next INR check:  2025   Target end date:  --    Indications    Long term current use of anticoagulant therapy [Z79.01]  Pulmonary embolism (Resolved) [I26.99]                 Anticoagulation Episode Summary       INR check location:  Home Draw    Preferred lab:  --    Send INR reminders to:  [facility] COUMADIN HOME MONITOR    Comments:  [facility] every other Wednesday          Anticoagulation Care Providers       Provider Role Specialty Phone number    Beau Jones MD Responsible Interventional Cardiology [phone]

## 2025-07-30 NOTE — TELEPHONE ENCOUNTER
----- Message from Pharmacist Jennifer sent at 7/30/2025  9:29 AM CDT -----  We have cleared this patient to hold coumadin 5-7 days for TKA procedure on 8/11/25. We  are planning to bridge pre-procedure with lovenox due to his history of DVT & PE. Of note, patient had post procedure bleeding after his previous procedure 2/24/25.  We would like input upon completion of his upcoming procedure on when we can safely restart coumadin and lovenox.   Respectfully,   Juno, AyazD  Coumadin Clinic  Ph [phone]

## 2025-08-04 ENCOUNTER — HOSPITAL ENCOUNTER (OUTPATIENT)
Dept: RADIOLOGY | Facility: HOSPITAL | Age: 80
Discharge: HOME | End: 2025-08-04
Attending: ORTHOPAEDIC SURGERY
Payer: MEDICARE

## 2025-08-04 ENCOUNTER — HOSPITAL ENCOUNTER (OUTPATIENT)
Dept: PREADMISSION TESTING | Facility: HOSPITAL | Age: 80
Discharge: HOME | End: 2025-08-04
Attending: ORTHOPAEDIC SURGERY
Payer: MEDICARE

## 2025-08-04 ENCOUNTER — ANTI-COAG VISIT (OUTPATIENT)
Dept: CARDIOLOGY | Facility: CLINIC | Age: 80
End: 2025-08-04
Payer: MEDICARE

## 2025-08-04 DIAGNOSIS — M17.11 PRIMARY OSTEOARTHRITIS OF RIGHT KNEE: ICD-10-CM

## 2025-08-04 DIAGNOSIS — Z79.01 CURRENT USE OF ANTICOAGULANT THERAPY: ICD-10-CM

## 2025-08-04 DIAGNOSIS — Z79.01 LONG TERM CURRENT USE OF ANTICOAGULANT THERAPY: Primary | ICD-10-CM

## 2025-08-04 DIAGNOSIS — Z01.818 PRE-OP TESTING: ICD-10-CM

## 2025-08-04 LAB
ABSOLUTE EOSINOPHIL (SMH): 0.07 K/UL
ABSOLUTE MONOCYTE (SMH): 0.66 K/UL (ref 0.3–1)
ABSOLUTE NEUTROPHIL COUNT (SMH): 3.8 K/UL (ref 1.8–7.7)
ALBUMIN SERPL-MCNC: 3.6 G/DL (ref 3.5–5.2)
ALP SERPL-CCNC: 110 UNIT/L (ref 40–150)
ALT SERPL-CCNC: 19 UNIT/L (ref 10–44)
ANION GAP (SMH): 8 MMOL/L (ref 8–16)
AST SERPL-CCNC: 28 UNIT/L (ref 11–45)
BASOPHILS # BLD AUTO: 0.03 K/UL
BASOPHILS NFR BLD AUTO: 0.5 %
BILIRUB SERPL-MCNC: 0.6 MG/DL (ref 0.1–1)
BUN SERPL-MCNC: 18 MG/DL (ref 8–23)
CALCIUM SERPL-MCNC: 8.9 MG/DL (ref 8.7–10.5)
CHLORIDE SERPL-SCNC: 105 MMOL/L (ref 95–110)
CO2 SERPL-SCNC: 28 MMOL/L (ref 23–29)
CREAT SERPL-MCNC: 0.7 MG/DL (ref 0.5–1.4)
ERYTHROCYTE [DISTWIDTH] IN BLOOD BY AUTOMATED COUNT: 14.4 % (ref 11.5–14.5)
GFR SERPLBLD CREATININE-BSD FMLA CKD-EPI: >60 ML/MIN/1.73/M2
GLUCOSE SERPL-MCNC: 105 MG/DL (ref 70–110)
HCT VFR BLD AUTO: 36.7 % (ref 40–54)
HGB BLD-MCNC: 11.9 GM/DL (ref 14–18)
IMM GRANULOCYTES # BLD AUTO: 0.02 K/UL (ref 0–0.04)
IMM GRANULOCYTES NFR BLD AUTO: 0.3 % (ref 0–0.5)
INR PPP: 2.5
LYMPHOCYTES # BLD AUTO: 1.84 K/UL (ref 1–4.8)
MCH RBC QN AUTO: 31.4 PG (ref 27–31)
MCHC RBC AUTO-ENTMCNC: 32.4 G/DL (ref 32–36)
MCV RBC AUTO: 97 FL (ref 82–98)
NUCLEATED RBC (/100WBC) (SMH): 0 /100 WBC
PLATELET # BLD AUTO: 202 K/UL (ref 150–450)
PMV BLD AUTO: 10 FL (ref 9.2–12.9)
POTASSIUM SERPL-SCNC: 4.4 MMOL/L (ref 3.5–5.1)
PROT SERPL-MCNC: 6.5 GM/DL (ref 6–8.4)
RBC # BLD AUTO: 3.79 M/UL (ref 4.6–6.2)
RELATIVE EOSINOPHIL (SMH): 1.1 % (ref 0–8)
RELATIVE LYMPHOCYTE (SMH): 28.8 % (ref 18–48)
RELATIVE MONOCYTE (SMH): 10.3 % (ref 4–15)
RELATIVE NEUTROPHIL (SMH): 59 % (ref 38–73)
SODIUM SERPL-SCNC: 141 MMOL/L (ref 136–145)
WBC # BLD AUTO: 6.38 K/UL (ref 3.9–12.7)

## 2025-08-04 PROCEDURE — 85025 COMPLETE CBC W/AUTO DIFF WBC: CPT | Performed by: ORTHOPAEDIC SURGERY

## 2025-08-04 PROCEDURE — 73700 CT LOWER EXTREMITY W/O DYE: CPT | Mod: 26,RT,, | Performed by: RADIOLOGY

## 2025-08-04 PROCEDURE — 93793 ANTICOAG MGMT PT WARFARIN: CPT | Mod: S$GLB,,, | Performed by: PHARMACIST

## 2025-08-04 PROCEDURE — 84450 TRANSFERASE (AST) (SGOT): CPT | Performed by: ORTHOPAEDIC SURGERY

## 2025-08-04 PROCEDURE — 73700 CT LOWER EXTREMITY W/O DYE: CPT | Mod: TC,RT

## 2025-08-04 PROCEDURE — 36415 COLL VENOUS BLD VENIPUNCTURE: CPT | Performed by: ORTHOPAEDIC SURGERY

## 2025-08-04 RX ORDER — ENOXAPARIN SODIUM 150 MG/ML
150 INJECTION SUBCUTANEOUS EVERY 12 HOURS
Qty: 14 EACH | Refills: 0 | Status: SHIPPED | OUTPATIENT
Start: 2025-08-06

## 2025-08-04 NOTE — PRE ADMISSION SCREENING
JOINT CAMP ASSESSMENT    Name Del Anand   MRN [id number]    Age/Sex 80 y.o. male    Surgeon Finger   Joint Camp Date 8/4/2025   Surgery Date 8/11/25   Procedure Right TKA   Insurance Payor: HU[employer] MANAGED MEDICARE / Plan: HU[employer] MEDICARE PPO / Product Type: Medicare Advantage /    Care Team Patient Care Team:  Tika Valle MD as PCP - General (Family Medicine)  Jennifer Hunt, PharmD as Pharmacist (Pharmacist)  Tika Valle MD as Hypertension Digital Medicine Responsible Provider (Family Medicine)  Stephanie Vega, PharmD as Hypertension Digital Medicine Clinician (Pharmacist)  Medicare, [employer] Gold Managed as Hypertension Digital Medicine Contract  Daniel Soliz Jr., OD (Optometry)    Pharmacy   [facility] [place] [address]  [address] [place] [address]  Phone: [phone] Fax: [phone]    [facility] - [facility] [address]  [address] [address]  Phone: [phone] Fax: [phone]     AM-PAC Score   14   Risk Assessment Score 36     Past Medical History:   Diagnosis Date    Benign neoplasm of colon     Cataract     CHF (congestive heart failure) 01/13/2015    Coronary artery disease     Difficult intubation     DVT (deep venous thrombosis)     HLD (hyperlipidemia)     HTN (hypertension)     Impaired fasting glucose     Kidney stone     Metabolic syndrome     Nonspecific abnormal results of liver function study     Nonspecific findings on examination of blood     Nuclear sclerosis - Both Eyes 10/18/2013    Osteoarthrosis, unspecified whether generalized or localized, lower leg     Respiratory distress     AFTER OPEN HEART SURGERY STATES ON VENTILATOR    Squamous cell carcinoma of skin        Past Surgical History:   Procedure Laterality Date    CARDIAC SURGERY      CATARACT EXTRACTION W/  INTRAOCULAR LENS IMPLANT Right 09/17/2020    Procedure: EXTRACTION, CATARACT, WITH IOL INSERTION;   Surgeon: Chanel Klein MD;  Location: [facility] OR;  Service: Ophthalmology;  Laterality: Right;    CATARACT EXTRACTION W/  INTRAOCULAR LENS IMPLANT Left 10/01/2020    Procedure: EXTRACTION, CATARACT, WITH IOL INSERTION;  Surgeon: Chanel Klein MD;  Location: [facility] OR;  Service: Ophthalmology;  Laterality: Left;    COLONOSCOPY N/A 03/13/2019    Procedure: COLONOSCOPY;  Surgeon: Nikunj Larson MD;  Location: [facility] ENDO (2ND FLR);  Service: Endoscopy;  Laterality: N/A;  ok to hold Coumadin x 5 days with a Lovenox bridge per Coumadin clinic  2nd floor - history of difficult intubation-MS    COLONOSCOPY N/A 07/07/2022    Procedure: COLONOSCOPY;  Surgeon: Nikunj Larson MD;  Location: [facility] ENDO (2ND FLR);  Service: Endoscopy;  Laterality: N/A;  ef 45%-5/31-coumadin hold per coumadin clinic see coag visist 6/29-tb-vacc- clears 4 hrs prior-am prep 2-3-am-inst portal-tb    CORONARY ANGIOGRAPHY INCLUDING BYPASS GRAFTS WITH CATHETERIZATION OF LEFT HEART N/A 2/11/2025    Procedure: ANGIOGRAM, CORONARY, INCLUDING BYPASS GRAFT, WITH LEFT HEART CATHETERIZATION;  Surgeon: Beau Jones MD;  Location: [facility] CATH/EP LAB;  Service: Cardiology;  Laterality: N/A;    CORONARY ARTERY BYPASS GRAFT      2014    CYSTOSCOPY      INSTANTANEOUS WAVE-FREE RATIO (IFR) N/A 2/11/2025    Procedure: Instantaneous Wave-Free Ratio (IFR);  Surgeon: Beau Jones MD;  Location: [facility] CATH/EP LAB;  Service: Cardiology;  Laterality: N/A;    KIDNEY STONE SURGERY      KNEE ARTHROPLASTY      bilateral    renal stone      ROBOTIC ARTHROPLASTY, KNEE Left 2/24/2025    Procedure: ROBOTIC ARTHROPLASTY, KNEE, TOTAL;  Surgeon: Carl Craig MD;  Location: [facility] OR;  Service: Orthopedics;  Laterality: Left;  RN NEEDS TO FINISH IMPLANTS    SKIN BIOPSY           Home Enviroment     Living Arrangement: Lives with spouse  Home Environment: 1-story house/ trailer, number of outside stairs: 1, walk-in shower  Home Safety Concerns: unremarkable    DISCHARGE  CAREGIVER/SUPPORT SYSTEM     Identified post-op caregiver: Patient has spouse / significant other.  Patient's caregiver(s) will be able to provide physical assistance. Patient will have someone to assist overnight.      Caregiver present at pre-op interview:  No      PRE-OPERATIVE FUNCTIONAL STATUS     Employment: Retired    Pre-op Functional Status: Patient is independent with mobility/ambulation, transfers, ADL's, IADL's.    Use of assistive device for ambulation: none  ADL: self care  ADL Limitations: difficulty with walking  Medical Restrictions: Unstable ambulation and Decreased range of motions in extremities    POTENTIAL BARRIERS TO DISCHARGE/POTENTIAL POST-OP COMPLICATIONS   CAD, hypertension,  hx of CHF, metabolic syndrome, VERONICA with CPAP.  DVT, difficult intubation. Previous TKA   Video:  watched before first surgery,  reviewed educational pamphlet      DISCHARGE PLAN     Expected LOS of 1 days or less for joint replacement discussed with patient. We also discussed a discharge path of HH for approximately two weeks with a transition to outpatient PT on the third week given no post-op complications.      Patient in agreement with discharge plan: Yes    Discharge to: Discharge home with home health (PT/OT) x2 weeks with transition to outpatient PT     HH:  [facility]. Patient disclosure form completed and sent to case management for upload to the medical record.      OP PT: [facility] [place]     Home DME: rolling walker and bedside commode    Needed DME at D/C: none     Rx: Per Dr. Craig at discharge     Meds to Beds: Yes  Patient expected to discharge on Coumadin (Warfarin) for DVT prophylaxis. Return to current regimen

## 2025-08-04 NOTE — PRE ADMISSION SCREENING
"               CJR Risk Assessment Scale    Patient Name: Del Anand  YOB: 1945  MRN: [id number]            RIsk Factor Measure Recommendation Patient Data Scale/Score   BMI >40 Reconsider surgery, weight loss   Estimated body mass index is 39.87 kg/m² as calculated from the following:    Height as of 7/30/25: 6' 3" (1.905 m).    Weight as of 7/30/25: 144.7 kg (319 lb 0.1 oz).   [] 0 = 1 - 24.9  [] 1 = 25-29.9  [] 2 = 30-34.9  [x] 3 = 35-39.9  [] 4 = 40-44.9  [] 5 = 45-99.9   Hemoglobin AIC (if applicable) >9 Delay surgery until DM under control  Refer for:  Nutrition Therapy  Exercise   Medication    Lab Results   Component Value Date    HGBA1C 5.9 (H) 06/11/2024       Lab Results   Component Value Date     08/04/2025      [] 0 = 4.0-5.6  [x] 1 = 5.7-6.4  [] 2 = 6.5-6.9  [] 3 = 7.0-7.9  [] 4 = 8.0-8.9  [] 5 = 9.0-12   Hemoglobin (Anemia) <9 Delay surgery   Correct anemia Lab Results   Component Value Date    HGB 11.9 (L) 08/04/2025    [] 20 - <9.0                    Albumin <3 Delay surgery &Workup Lab Results   Component Value Date    ALBUMIN 3.6 08/04/2025    [] 20 - <3.0   Smoking Cessation >4 Weeks Delay Surgery  Refer to OP Cessation Class    former [] 20 - current smoker                                _____ PPD                    Hx of MI, PE, Arrhythmia, CVA, DVT <30 Days Delay Surgery    NA [x] 20      Infection Variable Delay surgery and re-evaluate   NA [] 20 - recent/current infection     Depression (PHQ) >10 out of 27 Delay Surgery and re-evaluate  Medication  Counseling              [x] 0     []1     []2     []3      []4      [] 5                    (1-4)      (5-9)  (10-14)  (15-19)   (20-27)     Memory Impairment & Memory loss (Mini-Cog Screening Tool) Advanced dementia and/or Parkinson's Reconsider surgery     [x] 0     []1     []2     []3     []4     [] 5     Physical Conditioning (Modified AM-PAC Per Physical Therapy at [facility]) Unable to ambulate on day of surgery Delay " surgery and re-evaluate  Pre-Rehabilitation   (PT evaluation)       []  0   []4       []8     [x]12        []16     []20       (<20%)   (<40%)   (<60%)   (<80% )    (>80%)     Home Environment/Caregiver support  (Per /Navigator Interview)    Availability of basic services and/or approprate assistance during post-operative period Delay surgery and re-evaluate  Safe home environment  Health   1 week post-surgery  Transportation  availability  Ability to obtain DME/Medications post-op    [x] 0     []1     []2     []3     []4     [] 5  [x] 0     []1     []2     []3     []4     [] 5  [x] 0     []1     []2     []3     []4     [] 5  [x] 0     []1     []2     []3     []4     [] 5         MD Contact:  Comments:  Total Score:  36

## 2025-08-04 NOTE — PROGRESS NOTES
Lovenox instructions sent via patient portal, per request. Patient advised on warfarin per calendar and Lovenox per pharmacist's note. Patient also advised that he/she should contact the Coumadin Clinic once discharged.    8/5- Patient confirmed he received portal instructions, and expressed understanding of instructions.

## 2025-08-04 NOTE — PRE ADMISSION SCREENING
Patient Name: Del Anand  YOB: 1945   MRN: [id number]     [facility]   Basic Mobility Inpatient Short Form 6 Clicks         How much difficulty does the patient currently have  Unable  A Lot  A Little  None      1. Turning over in bed (including adjusting bedclothes, sheets and blankets)?     1 []    2 []    3 [x]    4 []        2. Sitting down on and standing up from a chair with arms (e.g., wheelchair, bedside commode, etc.)     1 []  2 [x]  3 []     4 []      3. Moving from lying on back to sitting on the side of the bed?     1 []  2 []  3 [x]    4 []    How much help from another person does the patient currently need  Total  A Lot  A Little  None      4. Moving to and from a bed to a chair (including a wheelchair)?    1 []  2 []  3 [x]    4 []      5. Need to walk in hospital room?    1 []  2 [x]  3 []    4 []      6. Climbing 3-5 steps with a railing?    1 [x]  2 []  3 []    4 []       Raw Score:       14           CMS 0-100% Score:      61.29      %   Standardized Score:        38.10       CMS Modifier:        CL                                  [facility]   Basic Mobility Inpatient Short Form 6 Clicks Score Conversion Table*         *Use this form to convert -PAC Basic Mobility Inpatient Raw Scores.   [facility] Inpatient Basic Mobility Short Form Scoring Example   1. Add the number values associated with the response to each item. For example, items totals yield a Raw Score of 21.   2. Match the raw score to the t-Scale scores (t-Scale score = 50.25, SE = 4.69).   3. Find the associated CMS % (CMS % = 28.97%).   4. Locate the correct CMS Functional Modifier Code, or G Code (G code = CJ)     NOTE: Each -PAC Short Form has a separate conversion table. Make sure that you use the correct conversion table.       Instruction Manual - page 45 contains conversion table

## 2025-08-04 NOTE — DISCHARGE INSTRUCTIONS
To confirm, Your doctor has instructed you that surgery is scheduled for: 8/11/25 WITH DR. OLIVA    Please report to [facility], Registration the morning of surgery. You must check-in and receive a wristband before going to your procedure.  [address] [place] [address] [place] [address]    Pre-Op will call the FRIDAY prior to surgery between 1:00 and 6:00 PM with the final arrival time.  Phone number: [phone]    PLEASE NOTE:  The surgery schedule has many variables which may affect the time of your surgery case.  Family members should be available if your surgery time changes.  Plan to be here the day of your procedure between 6-8 hours.    MEDICATIONS:  TAKE ONLY THESE MEDICATIONS WITH A SMALL SIP OF WATER THE MORNING OF YOUR PROCEDURE:  SEE MED LIST      DO NOT TAKE THESE MEDICATIONS 5-7 DAYS PRIOR to your procedure or per your surgeon's request:   ASPIRIN, ALEVE, ADVIL, IBUPROFEN, FISH OIL VITAMIN E, HERBALS  (May take Tylenol)    ONLY if you are prescribed any types of blood thinners such as:  Aspirin, Coumadin, Plavix, Pradaxa, Xarelto, Aggrenox, Effient, Eliquis, Savasya, Brilinta, or any other, ask your surgeon whether you should stop taking them and how long before surgery you should stop.  You may also need to verify with the prescribing physician if it is ok to stop your medication.      INSTRUCTIONS IMPORTANT!!  Do not eat or drink anything between midnight and the time of your procedure- this includes gum, mints, and candy.  EXCEPT: you may have clear liquids such as:  WATER, BLACK COFFEE, UNSWEET TEA, OR GATORADE (NO RED OR PURPLE) UP TO 2 HOURS PRIOR TO YOUR ARRIVAL TIME.  Do not smoke or drink alcoholic beverages 24 hours prior to your procedure.  Shower the night before AND the morning of your procedure with a Chlorhexidine wash such as Hibiclens or Dial antibacterial soap from the neck down.  Do not get it on your face or in your eyes.  You may use your own shampoo and face wash.  This helps your skin to be as bacteria free as possible.    If you wear contact lenses, dentures, hearing aids or glasses, bring a container to put them in during surgery and give to a family member for safe keeping.  Please leave all jewelry, piercing's and valuables at home. You must remove your false eyelashes prior to surgery.    DO NOT remove hair from the surgery site.  Do not shave the incision site unless you are given specific instructions to do so.    ONLY if you have been diagnosed with sleep apnea please bring your C-PAP machine.  ONLY if you wear home oxygen please bring your portable oxygen tank the day of your procedure.  ONLY if you have a history of OPEN HEART SURGERY you will need a clearance from your Cardiologist per Anesthesia.      ONLY for patients requiring bowel prep, written instructions will be given by your doctor's office.  ONLY if you have any type of stimulator implant or any type of implanted device with a remote control.  Please bring the controller with you the morning of surgery  If your doctor has scheduled you for an overnight stay, bring a small overnight bag with any personal items you need.  Make arrangements in advance for transportation home by a responsible adult. You can not go home in an uber or a cab per hospital policy.  It is not safe to drive a vehicle during the 24 hours after anesthesia.          All  facilities and properties are tobacco free.  Smoking is NOT allowed.   If you have any questions about these instructions, call Pre-Op Admit  Nursing at [phone] or the Pre-Op Day Surgery Unit at [phone].

## 2025-08-04 NOTE — PROGRESS NOTES
"[facility]    2025 9:05 AM    Assessment/Plan:    Patient presents today with therapeutic INR.    Assessment of patient findings and chart review: Patient is scheduled for TKA 25.  We will plan to hold warfarin 6 days prior with lovenox bridge pre-procedure.  Post procedure plan to be determined by MD since patient had bleeding issues post procedure previously. Message sent to Dr Craig's office on 25.    Patient is here for lovenox bridging instructions    Height  6'3"              Weight       145kg       SCr      0.7          CrCl     >30            Pre-Procedure Instructions:  Take last dose of warfarin on :    25                             Start enoxaparin injections the morning of:      25                           Enoxaparin dose is:             150mg           Every 12 hours (Twice Daily)  Last dose of enoxaparin will be the MORNING of:       25 *48 hours prior to procedure*                          Post-Procedure Instructions:  TBD by Dr Craig based on post procedural bleed risk  Patient is to call the coumadin clinic upon discharge. \Check status 25      _________________________________________________________________    Del Anand (80 y.o.) is followed by the [facility].    Anticoagulation Summary  As of 2025      INR goal:  2.5-3.5   TTR:  72.0% (10.3 y)   INR used for dosin.5 (2025)   Warfarin maintenance plan:  10 mg (10 mg x 1) every day   Weekly warfarin total:  70 mg   Plan last modified:  Hanane Rodriguez, PharmD (2025)   Next INR check:  --   Target end date:  --    Indications    Long term current use of anticoagulant therapy [Z79.01]  Pulmonary embolism (Resolved) [I26.99]                 Anticoagulation Episode Summary       INR check location:  Home Draw    Preferred lab:  --    Send INR reminders to:  [facility] COUMADIN HOME MONITOR    Comments:  [facility] every " other Wednesday          Anticoagulation Care Providers       Provider Role Specialty Phone number    Beau Jones MD Responsible Interventional Cardiology [phone]

## 2025-08-06 DIAGNOSIS — M17.11 PRIMARY OSTEOARTHRITIS OF RIGHT KNEE: Primary | ICD-10-CM

## 2025-08-06 RX ORDER — ONDANSETRON 4 MG/1
4 TABLET, FILM COATED ORAL EVERY 8 HOURS PRN
Qty: 30 TABLET | Refills: 0 | Status: SHIPPED | OUTPATIENT
Start: 2025-08-06

## 2025-08-06 RX ORDER — GABAPENTIN 300 MG/1
300 CAPSULE ORAL 2 TIMES DAILY
Qty: 60 CAPSULE | Refills: 0 | Status: SHIPPED | OUTPATIENT
Start: 2025-08-06 | End: 2025-09-07

## 2025-08-06 RX ORDER — OXYCODONE AND ACETAMINOPHEN 7.5; 325 MG/1; MG/1
1 TABLET ORAL EVERY 6 HOURS PRN
Qty: 28 TABLET | Refills: 0 | Status: SHIPPED | OUTPATIENT
Start: 2025-08-06

## 2025-08-06 RX ORDER — DOCUSATE SODIUM 100 MG/1
100 CAPSULE, LIQUID FILLED ORAL 2 TIMES DAILY
Qty: 60 CAPSULE | Refills: 0 | Status: SHIPPED | OUTPATIENT
Start: 2025-08-06 | End: 2025-09-07

## 2025-08-08 DIAGNOSIS — M17.11 PRIMARY OSTEOARTHRITIS OF RIGHT KNEE: Primary | ICD-10-CM

## 2025-08-15 ENCOUNTER — ANTI-COAG VISIT (OUTPATIENT)
Dept: CARDIOLOGY | Facility: CLINIC | Age: 80
End: 2025-08-15
Payer: MEDICARE

## 2025-08-15 DIAGNOSIS — Z79.01 LONG TERM CURRENT USE OF ANTICOAGULANT THERAPY: Primary | ICD-10-CM

## (undated) DEVICE — TOGA FLYTE PEEL AWAY XLARGE

## (undated) DEVICE — DRAPE INVISISHIELD TOWEL SMALL

## (undated) DEVICE — BLADE SAG 13.0 X1.27X90

## (undated) DEVICE — SPONGE BULKEE II ABSRB 6X6.75

## (undated) DEVICE — INTERPULSE SET

## (undated) DEVICE — ALCOHOL 70% ANTISEPTIC ISO 4OZ

## (undated) DEVICE — ELECTRODE MEGADYNE RETURN DUAL

## (undated) DEVICE — GOWN TOGA SYS PEELWY ZIP 2 XL

## (undated) DEVICE — SUT STRATAFIX PDS 1 CTX 18IN

## (undated) DEVICE — TOURNIQUET SB QC DP 34X4IN

## (undated) DEVICE — KIT VIZADISC KNEE TRACKING

## (undated) DEVICE — LINER SUCTION 3000CC

## (undated) DEVICE — BNDG COFLEX FOAM LF2 ST 4X5YD

## (undated) DEVICE — DRAPE INCISE IOBAN 2 23X17IN

## (undated) DEVICE — PACK SIRUS BASIC V SURG STRL

## (undated) DEVICE — SUT MONOCRYL PLUS UD 3-0 27

## (undated) DEVICE — GLOVE SENSICARE PI ALOE 7

## (undated) DEVICE — TOWEL OR DISP STRL BLUE 4/PK

## (undated) DEVICE — SUT 2-0 VICRYL / CT-1

## (undated) DEVICE — ALCOHOL RUBBING 70% ISO 4OZ

## (undated) DEVICE — GLOVE SENSICARE PI ALOE 8.5

## (undated) DEVICE — MIXER BONE CEMENT

## (undated) DEVICE — DRAPE INCISE IOBAN 2 23X33IN

## (undated) DEVICE — BLADE MAKO STANDARD

## (undated) DEVICE — SOL NACL IRR 3000ML

## (undated) DEVICE — CATH URETHRAL RED 16FR

## (undated) DEVICE — PACK CUSTOM UNIV BASIN SLI

## (undated) DEVICE — PADDING CAST SPECIALIST 6X4YD

## (undated) DEVICE — SUT FIBERWIRE 2 38 IN TAPER

## (undated) DEVICE — SOL NACL IRR 1000ML BTL

## (undated) DEVICE — GUIDEWIRE OMNI STR TIP 185CM

## (undated) DEVICE — CATH DXTERITY JL35 100CM 5FR

## (undated) DEVICE — SYR 50CC LL

## (undated) DEVICE — NDL SPINAL 18GX3.5 SPINOCAN

## (undated) DEVICE — DRAPE STERI INSTRUMENT 1018

## (undated) DEVICE — PENCIL SMK EVAC CONNECTOR 10FT

## (undated) DEVICE — SYS CLSR DERMABOND PRINEO 42CM

## (undated) DEVICE — SLEEVE SCD EXPRESS KNEE MEDIUM

## (undated) DEVICE — WRAP KNEE ACCU THERM GEL PACK

## (undated) DEVICE — TAPE SILK 3IN

## (undated) DEVICE — BANDAGE MATRIX HK LOOP 6IN 5YD

## (undated) DEVICE — CASSETTE INFINITI

## (undated) DEVICE — KIT LEG POSITIONER DISPOSABLES

## (undated) DEVICE — HEMOSTAT VASC BAND LONG 27CM

## (undated) DEVICE — KIT TRIATHLON CR TIB PREP SZ7

## (undated) DEVICE — SWAB CULTURETTE SINGLE

## (undated) DEVICE — GLOVE SENSICARE PI GRN 9

## (undated) DEVICE — Device

## (undated) DEVICE — STRAP OR TABLE 5IN X 72IN

## (undated) DEVICE — DRAPE THREE-QTR REINF 53X77IN

## (undated) DEVICE — GLOVE BIOGEL SKINSENSE PI 7.5

## (undated) DEVICE — GLOVE SENSICARE PI GRN 7.5

## (undated) DEVICE — APPLICATOR CHLORAPREP ORN 26ML

## (undated) DEVICE — PIN BONE 3.2X110MM
Type: IMPLANTABLE DEVICE | Site: KNEE | Status: NON-FUNCTIONAL
Removed: 2025-08-11

## (undated) DEVICE — BNDG COFLEX FOAM LF2 ST 6X5YD

## (undated) DEVICE — GUIDE LAUNCHER 6FR EBU 3.5

## (undated) DEVICE — SYR SLIP TIP 1CC

## (undated) DEVICE — KIT TRIATHLON TIBIAL SIZER

## (undated) DEVICE — KIT DRAPE RIO ONE PIECE W/POCK

## (undated) DEVICE — BANDAGE ACE DOUBLE STER 6IN

## (undated) DEVICE — SOL BETADINE 5%

## (undated) DEVICE — PAD DERMAPROX XL 22X14X.5IN

## (undated) DEVICE — YANKAUER FLEX NO VENT HI CAP

## (undated) DEVICE — MANIFOLD 4 PORT

## (undated) DEVICE — BLADE SURG CARBON STEEL #10

## (undated) DEVICE — SYS SURGIPHOR STRL IRRG

## (undated) DEVICE — GLOVE SENSICARE PI ALOE 6.5

## (undated) DEVICE — COVER TABLE 44X90 STERILE

## (undated) DEVICE — PACK EXTREMITY ORTHOMAX

## (undated) DEVICE — DRAPE U SPLIT SHEET 54X76IN

## (undated) DEVICE — SUT STRATAFIX SPIRAL VIOLET

## (undated) DEVICE — DRAPE STERI U-SHAPED 47X51IN

## (undated) DEVICE — SOL POVIDONE PREP IODINE 4 OZ

## (undated) DEVICE — GLOVE BIOGEL SKINSENSE PI 6.5

## (undated) DEVICE — SYR 50ML CATH TIP

## (undated) DEVICE — DRESSING GAUZE OIL EMUL 3X8

## (undated) DEVICE — DECANTER FLUID TRNSF WHITE 9IN

## (undated) DEVICE — BLADE SAW SGTL 21.2X85.5X1.2

## (undated) DEVICE — PIN FIXATION BONE 140X3.2MM
Type: IMPLANTABLE DEVICE | Site: KNEE | Status: NON-FUNCTIONAL
Removed: 2025-08-11

## (undated) DEVICE — BANDAGE ESMARK ELASTIC ST 6X9

## (undated) DEVICE — GLOVE SENSICARE PI GRN 8.5

## (undated) DEVICE — OMNIPAQUE 350MG 150ML VIAL

## (undated) DEVICE — CATH DXTERITY JR40 100CM 5FR

## (undated) DEVICE — GUIDEWIRE INQWIRE SE 3MM JTIP

## (undated) DEVICE — PADDING WYTEX UNDRCST 6INX4YD

## (undated) DEVICE — KIT COLLECTION E SWAB REGULAR

## (undated) DEVICE — CLOSURE SKIN STERI STRIP 1/2X4

## (undated) DEVICE — PIN BONE 3.2X110MM
Type: IMPLANTABLE DEVICE | Site: KNEE | Status: NON-FUNCTIONAL
Removed: 2025-02-24

## (undated) DEVICE — SUT STRATAFIX PDS 2 CT-1 14IN

## (undated) DEVICE — SPONGE LAP 18X18 PREWASHED

## (undated) DEVICE — GLOVE SENSICARE PI ALOE 7.5

## (undated) DEVICE — DRAPE ORTH SPLIT 77X108IN

## (undated) DEVICE — GLOVE SENSICARE PI GRN 7

## (undated) DEVICE — PIN FIXATION BONE 140X3.2MM
Type: IMPLANTABLE DEVICE | Site: KNEE | Status: NON-FUNCTIONAL
Removed: 2025-02-24

## (undated) DEVICE — GLOVE SENSICARE PI GRN 6.5

## (undated) DEVICE — KIT GLIDESHEATH SLEND 6FR 10CM